# Patient Record
Sex: MALE | Race: WHITE | NOT HISPANIC OR LATINO | Employment: FULL TIME | URBAN - NONMETROPOLITAN AREA
[De-identification: names, ages, dates, MRNs, and addresses within clinical notes are randomized per-mention and may not be internally consistent; named-entity substitution may affect disease eponyms.]

---

## 2017-01-22 ENCOUNTER — APPOINTMENT (OUTPATIENT)
Dept: GENERAL RADIOLOGY | Facility: HOSPITAL | Age: 52
End: 2017-01-22

## 2017-01-22 ENCOUNTER — APPOINTMENT (OUTPATIENT)
Dept: CT IMAGING | Facility: HOSPITAL | Age: 52
End: 2017-01-22

## 2017-01-22 ENCOUNTER — HOSPITAL ENCOUNTER (INPATIENT)
Facility: HOSPITAL | Age: 52
LOS: 2 days | Discharge: HOME | End: 2017-01-24
Attending: HOSPITALIST | Admitting: HOSPITALIST

## 2017-01-22 DIAGNOSIS — E10.10 DIABETIC KETOACIDOSIS WITHOUT COMA ASSOCIATED WITH TYPE 1 DIABETES MELLITUS (HCC): ICD-10-CM

## 2017-01-22 DIAGNOSIS — S16.1XXA CERVICAL STRAIN, ACUTE, INITIAL ENCOUNTER: Primary | ICD-10-CM

## 2017-01-22 DIAGNOSIS — M54.50 ACUTE RIGHT-SIDED LOW BACK PAIN WITHOUT SCIATICA: ICD-10-CM

## 2017-01-22 DIAGNOSIS — F10.10 ALCOHOL ABUSE: ICD-10-CM

## 2017-01-22 PROBLEM — E11.10 DIABETES MELLITUS WITH KETOACIDOSIS, UNCONTROLLED (HCC): Status: ACTIVE | Noted: 2017-01-22

## 2017-01-22 LAB
ALBUMIN SERPL-MCNC: 4 G/DL (ref 3.4–4.8)
ALBUMIN SERPL-MCNC: 4.4 G/DL (ref 3.4–4.8)
ALBUMIN/GLOB SERPL: 1.3 G/DL (ref 1.1–1.8)
ALBUMIN/GLOB SERPL: 1.4 G/DL (ref 1.1–1.8)
ALP SERPL-CCNC: 67 U/L (ref 38–126)
ALP SERPL-CCNC: 74 U/L (ref 38–126)
ALT SERPL W P-5'-P-CCNC: 105 U/L (ref 21–72)
ALT SERPL W P-5'-P-CCNC: 76 U/L (ref 21–72)
AMPHET+METHAMPHET UR QL: NEGATIVE
AMYLASE SERPL-CCNC: 52 U/L (ref 50–130)
ANION GAP SERPL CALCULATED.3IONS-SCNC: 13 MMOL/L (ref 5–15)
ANION GAP SERPL CALCULATED.3IONS-SCNC: 20 MMOL/L (ref 5–15)
ANION GAP SERPL CALCULATED.3IONS-SCNC: 20 MMOL/L (ref 5–15)
ARTERIAL PATENCY WRIST A: ABNORMAL
AST SERPL-CCNC: 192 U/L (ref 17–59)
AST SERPL-CCNC: 42 U/L (ref 17–59)
ATMOSPHERIC PRESS: ABNORMAL MMHG
BACTERIA UR QL AUTO: ABNORMAL /HPF
BARBITURATES UR QL SCN: NEGATIVE
BASE EXCESS BLDA CALC-SCNC: -8.1 MMOL/L (ref -2.4–2.4)
BASOPHILS # BLD AUTO: 0.01 10*3/MM3 (ref 0–0.2)
BASOPHILS NFR BLD AUTO: 0.1 % (ref 0–2)
BDY SITE: ABNORMAL
BENZODIAZ UR QL SCN: NEGATIVE
BILIRUB SERPL-MCNC: 1.3 MG/DL (ref 0.2–1.3)
BILIRUB SERPL-MCNC: 1.9 MG/DL (ref 0.2–1.3)
BILIRUB UR QL STRIP: NEGATIVE
BUN BLD-MCNC: 11 MG/DL (ref 7–21)
BUN BLD-MCNC: 11 MG/DL (ref 7–21)
BUN BLD-MCNC: 14 MG/DL (ref 7–21)
BUN/CREAT SERPL: 13.1 (ref 7–25)
BUN/CREAT SERPL: 13.1 (ref 7–25)
BUN/CREAT SERPL: 21.2 (ref 7–25)
CA-I BLDA-SCNC: 5 MMOL/L (ref 1.15–1.29)
CALCIUM SPEC-SCNC: 9.5 MG/DL (ref 8.4–10.2)
CANNABINOIDS SERPL QL: NEGATIVE
CHLORIDE SERPL-SCNC: 101 MMOL/L (ref 95–110)
CHLORIDE SERPL-SCNC: 99 MMOL/L (ref 95–110)
CHLORIDE SERPL-SCNC: 99 MMOL/L (ref 95–110)
CLARITY UR: CLEAR
CO2 BLDA-SCNC: 17.8 MMOL/L (ref 23–27)
CO2 SERPL-SCNC: 21 MMOL/L (ref 22–31)
CO2 SERPL-SCNC: 21 MMOL/L (ref 22–31)
CO2 SERPL-SCNC: 24 MMOL/L (ref 22–31)
COCAINE UR QL: NEGATIVE
COLOR UR: YELLOW
CREAT BLD-MCNC: 0.66 MG/DL (ref 0.7–1.3)
CREAT BLD-MCNC: 0.84 MG/DL (ref 0.7–1.3)
CREAT BLD-MCNC: 0.84 MG/DL (ref 0.7–1.3)
DEPRECATED RDW RBC AUTO: 40.4 FL (ref 35.1–43.9)
DEPRECATED RDW RBC AUTO: 41.1 FL (ref 35.1–43.9)
EOSINOPHIL # BLD AUTO: 0.02 10*3/MM3 (ref 0–0.7)
EOSINOPHIL NFR BLD AUTO: 0.3 % (ref 0–7)
ERYTHROCYTE [DISTWIDTH] IN BLOOD BY AUTOMATED COUNT: 13.8 % (ref 11.5–14.5)
ERYTHROCYTE [DISTWIDTH] IN BLOOD BY AUTOMATED COUNT: 14.2 % (ref 11.5–14.5)
ETHANOL BLD-MCNC: 175 MG/DL (ref 0–0)
ETHANOL UR QL: 0.17 %
GFR SERPL CREATININE-BSD FRML MDRD: 127 ML/MIN/1.73 (ref 56–130)
GFR SERPL CREATININE-BSD FRML MDRD: 96 ML/MIN/1.73 (ref 60–130)
GFR SERPL CREATININE-BSD FRML MDRD: 96 ML/MIN/1.73 (ref 60–130)
GLOBULIN UR ELPH-MCNC: 3.1 GM/DL (ref 2.3–3.5)
GLOBULIN UR ELPH-MCNC: 3.2 GM/DL (ref 2.3–3.5)
GLUCOSE BLD-MCNC: 244 MG/DL (ref 60–100)
GLUCOSE BLD-MCNC: 438 MG/DL (ref 60–100)
GLUCOSE BLD-MCNC: 438 MG/DL (ref 60–100)
GLUCOSE BLDA-MCNC: 385 MMOL/L
GLUCOSE BLDC GLUCOMTR-MCNC: 121 MG/DL (ref 70–130)
GLUCOSE BLDC GLUCOMTR-MCNC: 165 MG/DL (ref 70–130)
GLUCOSE BLDC GLUCOMTR-MCNC: 224 MG/DL (ref 70–130)
GLUCOSE BLDC GLUCOMTR-MCNC: 269 MG/DL (ref 70–130)
GLUCOSE UR STRIP-MCNC: ABNORMAL MG/DL
HCO3 BLDA-SCNC: 16.7 MMOL/L (ref 22–26)
HCT VFR BLD AUTO: 45.2 % (ref 39–49)
HCT VFR BLD AUTO: 47.7 % (ref 39–49)
HCT VFR BLD CALC: 55 % (ref 40–54)
HGB BLD-MCNC: 16.7 G/DL (ref 13.7–17.3)
HGB BLD-MCNC: 17.9 G/DL (ref 13.7–17.3)
HGB BLDA-MCNC: 18.6 G/DL (ref 14–18)
HGB UR QL STRIP.AUTO: ABNORMAL
HOLD SPECIMEN: NORMAL
HOLD SPECIMEN: NORMAL
HYALINE CASTS UR QL AUTO: ABNORMAL /LPF
IMM GRANULOCYTES # BLD: 0.02 10*3/MM3 (ref 0–0.02)
IMM GRANULOCYTES NFR BLD: 0.3 % (ref 0–0.5)
INR PPP: 0.98 (ref 0.8–1.2)
KETONES UR QL STRIP: ABNORMAL
LEUKOCYTE ESTERASE UR QL STRIP.AUTO: NEGATIVE
LIPASE SERPL-CCNC: 147 U/L (ref 23–300)
LYMPHOCYTES # BLD AUTO: 1.48 10*3/MM3 (ref 0.6–4.2)
LYMPHOCYTES # BLD MANUAL: 0.98 10*3/MM3 (ref 0.6–4.2)
LYMPHOCYTES NFR BLD AUTO: 18.9 % (ref 10–50)
LYMPHOCYTES NFR BLD MANUAL: 11 % (ref 10–50)
LYMPHOCYTES NFR BLD MANUAL: 5 % (ref 0–12)
MCH RBC QN AUTO: 29.6 PG (ref 26.5–34)
MCH RBC QN AUTO: 30.5 PG (ref 26.5–34)
MCHC RBC AUTO-ENTMCNC: 36.9 G/DL (ref 31.5–36.3)
MCHC RBC AUTO-ENTMCNC: 37.5 G/DL (ref 31.5–36.3)
MCV RBC AUTO: 80.1 FL (ref 80–98)
MCV RBC AUTO: 81.3 FL (ref 80–98)
METHADONE UR QL SCN: NEGATIVE
MODALITY: ABNORMAL
MONOCYTES # BLD AUTO: 0.44 10*3/MM3 (ref 0–0.9)
MONOCYTES # BLD AUTO: 0.58 10*3/MM3 (ref 0–0.9)
MONOCYTES NFR BLD AUTO: 7.4 % (ref 0–12)
NEUTROPHILS # BLD AUTO: 5.74 10*3/MM3 (ref 2–8.6)
NEUTROPHILS # BLD AUTO: 7.46 10*3/MM3 (ref 2–8.6)
NEUTROPHILS NFR BLD AUTO: 73 % (ref 37–80)
NEUTROPHILS NFR BLD MANUAL: 84 % (ref 37–80)
NITRITE UR QL STRIP: NEGATIVE
OPIATES UR QL: NEGATIVE
OXYCODONE UR QL SCN: NEGATIVE
PCO2 BLDA: 33.7 MM HG (ref 35–45)
PH BLDA: 7.31 PH UNITS (ref 7.35–7.45)
PH UR STRIP.AUTO: <=5 [PH] (ref 5–9)
PLAT MORPH BLD: NORMAL
PLATELET # BLD AUTO: 162 10*3/MM3 (ref 150–450)
PLATELET # BLD AUTO: 192 10*3/MM3 (ref 150–450)
PMV BLD AUTO: 10.6 FL (ref 8–12)
PMV BLD AUTO: 10.9 FL (ref 8–12)
PO2 BLDA: 82.8 MM HG (ref 80–105)
POTASSIUM BLD-SCNC: 3.8 MMOL/L (ref 3.5–5.1)
POTASSIUM BLD-SCNC: 4.5 MMOL/L (ref 3.5–5.1)
POTASSIUM BLD-SCNC: 4.5 MMOL/L (ref 3.5–5.1)
POTASSIUM BLDA-SCNC: 4.19 MMOL/L (ref 3.6–4.9)
PROT SERPL-MCNC: 7.1 G/DL (ref 6.3–8.6)
PROT SERPL-MCNC: 7.6 G/DL (ref 6.3–8.6)
PROT UR QL STRIP: ABNORMAL
PROTHROMBIN TIME: 13 SECONDS (ref 11.1–15.3)
RBC # BLD AUTO: 5.64 10*6/MM3 (ref 4.37–5.74)
RBC # BLD AUTO: 5.87 10*6/MM3 (ref 4.37–5.74)
RBC # UR: ABNORMAL /HPF
RBC MORPH BLD: NORMAL
REF LAB TEST METHOD: ABNORMAL
SAO2 % BLDCOA: 94.9 %
SODIUM BLD-SCNC: 138 MMOL/L (ref 137–145)
SODIUM BLD-SCNC: 140 MMOL/L (ref 137–145)
SODIUM BLD-SCNC: 140 MMOL/L (ref 137–145)
SODIUM BLDA-SCNC: 139.3 MMOL/L (ref 138–146)
SP GR UR STRIP: 1.02 (ref 1–1.03)
SQUAMOUS #/AREA URNS HPF: ABNORMAL /HPF
UROBILINOGEN UR QL STRIP: ABNORMAL
WBC MORPH BLD: NORMAL
WBC NRBC COR # BLD: 7.85 10*3/MM3 (ref 3.2–9.8)
WBC NRBC COR # BLD: 8.88 10*3/MM3 (ref 3.2–9.8)
WBC UR QL AUTO: ABNORMAL /HPF
WHOLE BLOOD HOLD SPECIMEN: NORMAL
WHOLE BLOOD HOLD SPECIMEN: NORMAL

## 2017-01-22 PROCEDURE — 80307 DRUG TEST PRSMV CHEM ANLYZR: CPT

## 2017-01-22 PROCEDURE — 63710000001 INSULIN REGULAR HUMAN PER 5 UNITS: Performed by: EMERGENCY MEDICINE

## 2017-01-22 PROCEDURE — 82803 BLOOD GASES ANY COMBINATION: CPT | Performed by: EMERGENCY MEDICINE

## 2017-01-22 PROCEDURE — 82150 ASSAY OF AMYLASE: CPT | Performed by: EMERGENCY MEDICINE

## 2017-01-22 PROCEDURE — 85007 BL SMEAR W/DIFF WBC COUNT: CPT | Performed by: EMERGENCY MEDICINE

## 2017-01-22 PROCEDURE — 25010000002 INSULIN REGULAR HUMAN PER 5 UNITS: Performed by: EMERGENCY MEDICINE

## 2017-01-22 PROCEDURE — 36600 WITHDRAWAL OF ARTERIAL BLOOD: CPT | Performed by: EMERGENCY MEDICINE

## 2017-01-22 PROCEDURE — 83690 ASSAY OF LIPASE: CPT | Performed by: EMERGENCY MEDICINE

## 2017-01-22 PROCEDURE — 80053 COMPREHEN METABOLIC PANEL: CPT | Performed by: EMERGENCY MEDICINE

## 2017-01-22 PROCEDURE — 85025 COMPLETE CBC W/AUTO DIFF WBC: CPT | Performed by: EMERGENCY MEDICINE

## 2017-01-22 PROCEDURE — 82962 GLUCOSE BLOOD TEST: CPT

## 2017-01-22 PROCEDURE — 25010000002 ONDANSETRON PER 1 MG: Performed by: EMERGENCY MEDICINE

## 2017-01-22 PROCEDURE — 99285 EMERGENCY DEPT VISIT HI MDM: CPT

## 2017-01-22 PROCEDURE — 80307 DRUG TEST PRSMV CHEM ANLYZR: CPT | Performed by: EMERGENCY MEDICINE

## 2017-01-22 PROCEDURE — 71020 HC CHEST PA AND LATERAL: CPT

## 2017-01-22 PROCEDURE — 85610 PROTHROMBIN TIME: CPT | Performed by: EMERGENCY MEDICINE

## 2017-01-22 PROCEDURE — 72170 X-RAY EXAM OF PELVIS: CPT

## 2017-01-22 PROCEDURE — 25010000002 KETOROLAC TROMETHAMINE PER 15 MG: Performed by: EMERGENCY MEDICINE

## 2017-01-22 PROCEDURE — 81001 URINALYSIS AUTO W/SCOPE: CPT | Performed by: EMERGENCY MEDICINE

## 2017-01-22 PROCEDURE — 87086 URINE CULTURE/COLONY COUNT: CPT | Performed by: EMERGENCY MEDICINE

## 2017-01-22 PROCEDURE — 72125 CT NECK SPINE W/O DYE: CPT

## 2017-01-22 PROCEDURE — 80048 BASIC METABOLIC PNL TOTAL CA: CPT | Performed by: EMERGENCY MEDICINE

## 2017-01-22 RX ORDER — LORAZEPAM 0.5 MG/1
1 TABLET ORAL
Status: DISCONTINUED | OUTPATIENT
Start: 2017-01-22 | End: 2017-01-24 | Stop reason: HOSPADM

## 2017-01-22 RX ORDER — SODIUM CHLORIDE 9 MG/ML
75 INJECTION, SOLUTION INTRAVENOUS CONTINUOUS
Status: DISCONTINUED | OUTPATIENT
Start: 2017-01-22 | End: 2017-01-23

## 2017-01-22 RX ORDER — LORAZEPAM 2 MG/ML
1 INJECTION INTRAMUSCULAR
Status: DISCONTINUED | OUTPATIENT
Start: 2017-01-22 | End: 2017-01-24 | Stop reason: HOSPADM

## 2017-01-22 RX ORDER — KETOROLAC TROMETHAMINE 30 MG/ML
30 INJECTION, SOLUTION INTRAMUSCULAR; INTRAVENOUS ONCE
Status: COMPLETED | OUTPATIENT
Start: 2017-01-22 | End: 2017-01-22

## 2017-01-22 RX ORDER — DEXTROSE MONOHYDRATE 25 G/50ML
25-50 INJECTION, SOLUTION INTRAVENOUS
Status: DISCONTINUED | OUTPATIENT
Start: 2017-01-22 | End: 2017-01-24 | Stop reason: HOSPADM

## 2017-01-22 RX ORDER — LORAZEPAM 2 MG/1
2 TABLET ORAL
Status: DISCONTINUED | OUTPATIENT
Start: 2017-01-22 | End: 2017-01-24 | Stop reason: HOSPADM

## 2017-01-22 RX ORDER — ALPRAZOLAM 1 MG/1
1 TABLET ORAL 2 TIMES DAILY PRN
COMMUNITY
End: 2019-06-21

## 2017-01-22 RX ORDER — LORAZEPAM 2 MG/ML
2 INJECTION INTRAMUSCULAR
Status: DISCONTINUED | OUTPATIENT
Start: 2017-01-22 | End: 2017-01-24 | Stop reason: HOSPADM

## 2017-01-22 RX ORDER — ONDANSETRON 2 MG/ML
4 INJECTION INTRAMUSCULAR; INTRAVENOUS ONCE
Status: COMPLETED | OUTPATIENT
Start: 2017-01-22 | End: 2017-01-22

## 2017-01-22 RX ORDER — SODIUM CHLORIDE 0.9 % (FLUSH) 0.9 %
10 SYRINGE (ML) INJECTION AS NEEDED
Status: DISCONTINUED | OUTPATIENT
Start: 2017-01-22 | End: 2017-01-24 | Stop reason: HOSPADM

## 2017-01-22 RX ORDER — INSULIN GLARGINE 100 [IU]/ML
125 INJECTION, SOLUTION SUBCUTANEOUS 2 TIMES DAILY
COMMUNITY
End: 2017-01-24 | Stop reason: HOSPADM

## 2017-01-22 RX ORDER — HYDROCHLOROTHIAZIDE 12.5 MG/1
5 TABLET ORAL DAILY
Status: ON HOLD | COMMUNITY
End: 2017-01-24

## 2017-01-22 RX ADMIN — ONDANSETRON 4 MG: 2 INJECTION INTRAMUSCULAR; INTRAVENOUS at 13:32

## 2017-01-22 RX ADMIN — KETOROLAC TROMETHAMINE 30 MG: 30 INJECTION, SOLUTION INTRAMUSCULAR; INTRAVENOUS at 12:28

## 2017-01-22 RX ADMIN — SODIUM CHLORIDE 75 ML/HR: 9 INJECTION, SOLUTION INTRAVENOUS at 21:06

## 2017-01-22 RX ADMIN — HUMAN INSULIN 6 UNITS: 100 INJECTION, SOLUTION SUBCUTANEOUS at 13:34

## 2017-01-22 RX ADMIN — SODIUM CHLORIDE 4 UNITS/HR: 9 INJECTION, SOLUTION INTRAVENOUS at 13:38

## 2017-01-22 NOTE — Clinical Note
Level of Care: Critical Care [6]   Diagnosis: Cervical strain, acute, initial encounter [201744]   Admitting Physician: SIMI DEWITT [[id number]]   Attending Physician: KATHY ROGERS [[id number]]   Bed request comments: step down
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
## 2017-01-22 NOTE — IP AVS SNAPSHOT
AFTER VISIT SUMMARY             Herbert White           About your hospitalization     You were admitted on:  January 22, 2017 You last received care in the:  [facility]       Procedures & Surgeries         Medications    If you or your caregiver advised us that you are currently taking a medication and that medication is marked below as “Resume”, this simply indicates that we have reviewed those medications to make sure our new therapy recommendations do not interfere.  If you have concerns about medications other than those new ones which we are prescribing today, please consult the physician who prescribed them (or your primary physician).  Our review of your home medications is not meant to indicate that we are directing their use.             Your Medications      START taking these medications     Blood Glucose Test strip   Use 4 x daily, one touch verio IQ           Empagliflozin 10 MG tablet   Take 1 tablet by mouth Daily With Breakfast.   Commonly known as:  JARDIANCE           gabapentin 300 MG capsule   Take 1 capsule by mouth Every Night.   Commonly known as:  NEURONTIN           insulin aspart 100 UNIT/ML solution pen-injector sc pen   Up to 50 units three times daily   Commonly known as:  NOVOLOG FLEXPEN           insulin aspart 100 UNIT/ML injection   Inject 6-30 Units under the skin 3 (Three) Times a Day With Meals.   Last time this was given:  1/24/2017 12:11 PM   Commonly known as:  novoLOG           Insulin Degludec 200 UNIT/ML solution pen-injector   Inject 120 Units under the skin Every Night.   Commonly known as:  TRESIBA FLEXTOUCH           Insulin Pen Needle 31G X 5 MM misc   Use 4 times daily   Commonly known as:  B-D UF III MINI PEN NEEDLES           metoprolol succinate  MG 24 hr tablet   Take 1 tablet by mouth Daily.   Commonly known as:  TOPROL XL           ONE TOUCH DELICA LANCING DEV misc   delica lancing device if approved, otherwise use  alternative           ONETOUCH DELICA LANCETS 33G misc   1 each 4 (Four) Times a Day. delica if covered, use alternative if not covered           SITagliptin-MetFORMIN HCl -1000 MG tablet sustained-release 24 hour   One tab daily with bkfast   Commonly known as:  JANUMET XR             CHANGE how you take these medications     hydrochlorothiazide 12.5 MG tablet   Take 1 tablet by mouth Daily.   Commonly known as:  HYDRODIURIL   What changed:  how much to take             CONTINUE taking these medications     ALPRAZolam 1 MG tablet   Take 1 mg by mouth 2 (Two) Times a Day As Needed for anxiety.   Commonly known as:  XANAX             STOP taking these medications     insulin glargine 100 UNIT/ML injection   Commonly known as:  LANTUS           metFORMIN 500 MG tablet   Commonly known as:  GLUCOPHAGE                Where to Get Your Medications      These medications were sent to [facility]/pharmacy #[id number] - [place], [place] - [address] [place] [address] [phone]  - [phone] [address] [address]     Phone:  [phone]     Blood Glucose Test strip    Empagliflozin 10 MG tablet    insulin aspart 100 UNIT/ML solution pen-injector sc pen    Insulin Degludec 200 UNIT/ML solution pen-injector    Insulin Pen Needle 31G X 5 MM misc    ONE TOUCH DELICA LANCING DEV misc    ONETOUCH DELICA LANCETS 33G misc    SITagliptin-MetFORMIN HCl -1000 MG tablet sustained-release 24 hour         You can get these medications from any pharmacy     Bring a paper prescription for each of these medications     gabapentin 300 MG capsule    hydrochlorothiazide 12.5 MG tablet    insulin aspart 100 UNIT/ML injection    metoprolol succinate  MG 24 hr tablet                  Your Medications      Your Medication List           Morning Noon Evening Bedtime As Needed    ALPRAZolam 1 MG tablet   Take 1 mg by mouth 2 (Two) Times a Day As Needed for anxiety.   Commonly known as:  XANAX                                    Blood Glucose Test strip   Use 4 x daily, one touch verio IQ                                Empagliflozin 10 MG tablet   Take 1 tablet by mouth Daily With Breakfast.   Commonly known as:  JARDIANCE            1/25/2017                       gabapentin 300 MG capsule   Take 1 capsule by mouth Every Night.   Commonly known as:  NEURONTIN                        1/24/201           hydrochlorothiazide 12.5 MG tablet   Take 1 tablet by mouth Daily.   Commonly known as:  HYDRODIURIL            1/25/2017                       * insulin aspart 100 UNIT/ML solution pen-injector sc pen   Up to 50 units three times daily   Commonly known as:  NOVOLOG FLEXPEN                    1/24/2017 with dinner               * insulin aspart 100 UNIT/ML injection   Inject 6-30 Units under the skin 3 (Three) Times a Day With Meals.   Commonly known as:  novoLOG                    1/24/2017 with dinner               Insulin Degludec 200 UNIT/ML solution pen-injector   Inject 120 Units under the skin Every Night.   Commonly known as:  TRESIBA FLEXTOUCH                        1/24/2017           Insulin Pen Needle 31G X 5 MM misc   Use 4 times daily   Commonly known as:  B-D UF III MINI PEN NEEDLES                                metoprolol succinate  MG 24 hr tablet   Take 1 tablet by mouth Daily.   Commonly known as:  TOPROL XL                        1/24/2017           ONE TOUCH DELICA LANCING DEV misc   delica lancing device if approved, otherwise use alternative                                ONETOUCH DELICA LANCETS 33G misc   1 each 4 (Four) Times a Day. delica if covered, use alternative if not covered                                SITagliptin-MetFORMIN HCl -1000 MG tablet sustained-release 24 hour   One tab daily with bkfast   Commonly known as:  JANUMET XR            1/25/2017                       * Notice:  This list has 2 medication(s) that are the same as other medications prescribed for you. Read the directions  carefully, and ask your doctor or other care provider to review them with you.             Instructions for After Discharge        Activity Instructions     As tolerated           Diet Instructions     Diet: Consistent Carbohydrate; Thin Liquids, No Restrictions       Discharge Diet:  Consistent Carbohydrate   Fluid Consistency:  Thin Liquids, No Restrictions               Diabetic              Discharge References/Attachments     ALCOHOL USE DISORDER (ENGLISH)    DIABETIC KETOACIDOSIS (ENGLISH)       Follow-ups for After Discharge        Follow-up Information     Follow up with Artemio Sanchez MD. Go on 1/31/2017.    Specialties:  Family Medicine, Emergency Medicine    Why:  9AM    Contact information:    [address] [facility] [address]  [phone]          Follow up with Alessandro Ferreira MD. Go on 1/31/2017.    Specialty:  Endocrinology    Why:  10:45am    Contact information:    [address]  [address] [place] [id number] [phone]        Referrals and Follow-ups to Schedule     Additional Follow-Up    As directed    Dr Holden 1 week       Follow-Up    As directed    Dr Sanchez in 1 week             Scheduled Appointments     Jan 31, 2017  9:00 AM CST   New Patient with Artemio Sanchez MD   [facility] FAMILY MEDICINE (--)    [address] [facility] [id number] [phone]           Bring all previous medical records and films, along with current medications and insurance information.            Jan 31, 2017 10:45 AM CST   Same Day with Alessandro Ferreira MD   [facility] ENDOCRINOLOGY (--)    [address]  [address] [place] [id number] [phone]              MyChart Signup     Our records indicate that you have declined [facility] MyChart signup. If you would like to sign up for [employer], please email [email or web address] or call [phone] to obtain an activation code.          Summary of Your Hospitalization        Reason for Hospitalization     Your primary diagnosis was:  Not on File    Your diagnoses also included:  Acute Strain Of Neck Muscle, Uncontrolled Diabetes With Ketoacidosis, High Blood Pressure, Diabetes With Neurologic Complications      Care Providers     Provider Service Role Specialty    Alessandro Ferreira MD -- Consulting Physician  Endocrinology     Javon Vela MD Medicine Consulting Physician  Hospitalist       Your Allergies  Date Reviewed: 1/22/2017    Allergen Reactions    Ace Inhibitors Anaphylaxis         Wellbutrin (Bupropion) Anaphylaxis      Patient Belongings Returned     Document Return of Belongings Flowsheet     Were the patient bedside belongings sent home?   Yes   Belongings Retrieved from Security & Sent Home   N/A    Belongings Sent to Safe   --   Medications Retrieved from Pharmacy & Sent Home   N/A              More Information      Alcohol Use Disorder  Alcohol use disorder is a mental disorder. It is not a one-time incident of heavy drinking. Alcohol use disorder is the excessive and uncontrollable use of alcohol over time that leads to problems with functioning in one or more areas of daily living. People with this disorder risk harming themselves and others when they drink to excess. Alcohol use disorder also can cause other mental disorders, such as mood and anxiety disorders, and serious physical problems. People with alcohol use disorder often misuse other drugs.   Alcohol use disorder is common and widespread. Some people with this disorder drink alcohol to cope with or escape from negative life events. Others drink to relieve chronic pain or symptoms of mental illness. People with a family history of alcohol use disorder are at higher risk of losing control and using alcohol to excess.   Drinking too much alcohol can cause injury, accidents, and health problems. One drink can be too much when you are:  · Working.  · Pregnant or  breastfeeding.  · Taking medicines. Ask your doctor.  · Driving or planning to drive.  SYMPTOMS   Signs and symptoms of alcohol use disorder may include the following:   · Consumption of alcohol in larger amounts or over a longer period of time than intended.  · Multiple unsuccessful attempts to cut down or control alcohol use.    · A great deal of time spent obtaining alcohol, using alcohol, or recovering from the effects of alcohol (hangover).  · A strong desire or urge to use alcohol (cravings).    · Continued use of alcohol despite problems at work, school, or home because of alcohol use.    · Continued use of alcohol despite problems in relationships because of alcohol use.  · Continued use of alcohol in situations when it is physically hazardous, such as driving a car.  · Continued use of alcohol despite awareness of a physical or psychological problem that is likely related to alcohol use. Physical problems related to alcohol use can involve the brain, heart, liver, stomach, and intestines. Psychological problems related to alcohol use include intoxication, depression, anxiety, psychosis, delirium, and dementia.    · The need for increased amounts of alcohol to achieve the same desired effect, or a decreased effect from the consumption of the same amount of alcohol (tolerance).  · Withdrawal symptoms upon reducing or stopping alcohol use, or alcohol use to reduce or avoid withdrawal symptoms. Withdrawal symptoms include:  ¨ Racing heart.  ¨ Hand tremor.  ¨ Difficulty sleeping.  ¨ Nausea.  ¨ Vomiting.  ¨ Hallucinations.  ¨ Restlessness.  ¨ Seizures.  DIAGNOSIS  Alcohol use disorder is diagnosed through an assessment by your health care provider. Your health care provider may start by asking three or four questions to screen for excessive or problematic alcohol use. To confirm a diagnosis of alcohol use disorder, at least two symptoms must be present within a 12-month period. The severity of alcohol use  disorder depends on the number of symptoms:  · Mild--two or three.  · Moderate--four or five.  · Severe--six or more.  Your health care provider may perform a physical exam or use results from lab tests to see if you have physical problems resulting from alcohol use. Your health care provider may refer you to a mental health professional for evaluation.  TREATMENT   Some people with alcohol use disorder are able to reduce their alcohol use to low-risk levels. Some people with alcohol use disorder need to quit drinking alcohol. When necessary, mental health professionals with specialized training in substance use treatment can help. Your health care provider can help you decide how severe your alcohol use disorder is and what type of treatment you need. The following forms of treatment are available:   · Detoxification. Detoxification involves the use of prescription medicines to prevent alcohol withdrawal symptoms in the first week after quitting. This is important for people with a history of symptoms of withdrawal and for heavy drinkers who are likely to have withdrawal symptoms. Alcohol withdrawal can be dangerous and, in severe cases, cause death. Detoxification is usually provided in a hospital or in-patient substance use treatment facility.  · Counseling or talk therapy. Talk therapy is provided by substance use treatment counselors. It addresses the reasons people use alcohol and ways to keep them from drinking again. The goals of talk therapy are to help people with alcohol use disorder find healthy activities and ways to cope with life stress, to identify and avoid triggers for alcohol use, and to handle cravings, which can cause relapse.  · Medicines. Different medicines can help treat alcohol use disorder through the following actions:    Decrease alcohol cravings.    Decrease the positive reward response felt from alcohol use.    Produce an uncomfortable physical reaction when alcohol is used (aversion  therapy).  · Support groups. Support groups are run by people who have quit drinking. They provide emotional support, advice, and guidance.  These forms of treatment are often combined. Some people with alcohol use disorder benefit from intensive combination treatment provided by specialized substance use treatment centers. Both inpatient and outpatient treatment programs are available.     This information is not intended to replace advice given to you by your health care provider. Make sure you discuss any questions you have with your health care provider.     Document Released: 01/25/2006 Document Revised: 01/08/2016 Document Reviewed: 03/27/2014  [employer] Interactive Patient Education ©2016 [employer]vier Inc.          Diabetic Ketoacidosis  Diabetic ketoacidosis is a life-threatening complication of diabetes. If it is not treated, it can cause severe dehydration and organ damage and can lead to a coma or death.  CAUSES  This condition develops when there is not enough of the hormone insulin in the body. Insulin helps the body to break down sugar for energy. Without insulin, the body cannot break down sugar, so it breaks down fats instead. This leads to the production of acids that are called ketones. Ketones are poisonous at high levels.  This condition can be triggered by:  · Stress on the body that is brought on by an illness.  · Medicines that raise blood glucose levels.  · Not taking diabetes medicine.  SYMPTOMS  Symptoms of this condition include:  · Fatigue.  · Weight loss.  · Excessive thirst.  · Light-headedness.  · Fruity or sweet-smelling breath.  · Excessive urination.  · Vision changes.  · Confusion or irritability.  · Nausea.  · Vomiting.  · Rapid breathing.  · Abdominal pain.  · Feeling flushed.  DIAGNOSIS  This condition is diagnosed based on a medical history, a physical exam, and blood tests. You may also have a urine test that checks for ketones.  TREATMENT  This condition may be treated  "with:  · Fluid replacement. This may be done to correct dehydration.  · Insulin injections. These may be given through the skin or through an IV tube.  · Electrolyte replacement. Electrolytes, such as potassium and sodium, may be given in pill form or through an IV tube.  · Antibiotic medicines. These may be prescribed if your condition was caused by an infection.  HOME CARE INSTRUCTIONS  Eating and Drinking  · Drink enough fluids to keep your urine clear or pale yellow.  · If you cannot eat, alternate between drinking fluids with sugar (such as juice) and salty fluids (such as broth or bouillon).  · If you can eat, follow your usual diet and drink sugar-free liquids, such as water.  Other Instructions  · Take insulin as directed by your health care provider. Do not skip insulin injections. Do not use  insulin.  · If your blood sugar is over 240 mg/dL, monitor your urine ketones every 4-6 hours.  · If you were prescribed an antibiotic medicine, finish all of it even if you start to feel better.  · Rest and exercise only as directed by your health care provider.  · If you get sick, call your health care provider and begin treatment quickly. Your body often needs extra insulin to fight an illness.  · Check your blood glucose levels regularly. If your blood glucose is high, drink plenty of fluids. This helps to flush out ketones.  SEEK MEDICAL CARE IF:  · Your blood glucose level is too high or too low.  · You have ketones in your urine.  · You have a fever.  · You cannot eat.  · You cannot tolerate fluids.  · You have been vomiting for more than 2 hours.  · You continue to have symptoms of this condition.  · You develop new symptoms.  SEEK IMMEDIATE MEDICAL CARE IF:  · Your blood glucose levels continue to be high (elevated).  · Your monitor reads \"high\" even when you are taking insulin.  · You faint.  · You have chest pain.  · You have trouble breathing.  · You have a sudden, severe headache.  · You have " sudden weakness in one arm or one leg.  · You have sudden trouble speaking or swallowing.  · You have vomiting or diarrhea that gets worse after 3 hours.  · You feel severely fatigued.  · You have trouble thinking.  · You have abdominal pain.  · You are severely dehydrated. Symptoms of severe dehydration include:    Extreme thirst.    Dry mouth.    Blue lips.    Cold hands and feet.    Rapid breathing.     This information is not intended to replace advice given to you by your health care provider. Make sure you discuss any questions you have with your health care provider.     Document Released: 12/15/2001 Document Revised: 05/03/2016 Document Reviewed: 11/25/2015  [employer] Interactive Patient Education ©2016 Elsevier Inc.            SYMPTOMS OF A STROKE    Call 911 or have someone take you to the Emergency Department if you have any of the following:    · Sudden numbness or weakness of your face, arm or leg especially on one side of the body  · Sudden confusion, diffiiculty speaking or trouble understanding   · Changes in your vision or loss of sight in one eye  · Sudden severe headache with no known cause  · sudden dizziness, trouble walking, loss of balance or coordination    It is important to seek emergency care right away if you have further stroke symptoms. If you get emergency help quickly, the powerful clot-dissolving medicines can reduce the disabilities caused by a stroke.     For more information:    American Stroke Association  9-429-4-STROKE  www.strokeassociation.org           IF YOU SMOKE OR USE TOBACCO PLEASE READ THE FOLLOWING:    Why is smoking bad for me?  Smoking increases the risk of heart disease, lung disease, vascular disease, stroke, and cancer.     If you smoke, STOP!    If you would like more information on quitting smoking, please visit the [employer] website: [email or web address]   This link will provide additional resources including the  QUIT line and the Beat the Pack support groups.     For more information:    American Cancer Society  ([phone] 349-6841    American Heart Association  1-338.609.8075               YOU ARE THE MOST IMPORTANT FACTOR IN YOUR RECOVERY.     Follow all instructions carefully.     I have reviewed my discharge instructions with my nurse, including the following information, if applicable:     Information about my illness and diagnosis   Follow up appointments (including lab draws)   Wound Care   Equipment Needs   Medications (new and continuing) along with side effects   Preventative information such as vaccines and smoking cessations   Diet   Pain   I know when to contact my Doctor's office or seek emergency care      I want my nurse to describe the side effects of my medications: YES NO   If the answer is no, I understand the side effects of my medications: YES NO   My nurse described the side effects of my medications in a way that I could understand: YES NO   I have taken my personal belongings and my own medications with me at discharge: YES NO            I have received this information and my questions have been answered. I have discussed any concerns I see with this plan with the nurse or physician. I understand these instructions.    Signature of Patient or Responsible Person: _____________________________________    Date: _________________  Time: __________________    Signature of Healthcare Provider: _______________________________________  Date: _________________  Time: __________________

## 2017-01-23 PROBLEM — I10 ESSENTIAL HYPERTENSION: Status: ACTIVE | Noted: 2017-01-23

## 2017-01-23 LAB
ALBUMIN SERPL-MCNC: 4 G/DL (ref 3.4–4.8)
ALBUMIN/GLOB SERPL: 1.4 G/DL (ref 1.1–1.8)
ALP SERPL-CCNC: 66 U/L (ref 38–126)
ALT SERPL W P-5'-P-CCNC: 71 U/L (ref 21–72)
ANION GAP SERPL CALCULATED.3IONS-SCNC: 13 MMOL/L (ref 5–15)
AST SERPL-CCNC: 32 U/L (ref 17–59)
BACTERIA SPEC AEROBE CULT: NORMAL
BASOPHILS # BLD AUTO: 0.01 10*3/MM3 (ref 0–0.2)
BASOPHILS NFR BLD AUTO: 0.1 % (ref 0–2)
BILIRUB SERPL-MCNC: 1.7 MG/DL (ref 0.2–1.3)
BUN BLD-MCNC: 16 MG/DL (ref 7–21)
BUN/CREAT SERPL: 24.6 (ref 7–25)
CALCIUM SPEC-SCNC: 9.5 MG/DL (ref 8.4–10.2)
CHLORIDE SERPL-SCNC: 102 MMOL/L (ref 95–110)
CO2 SERPL-SCNC: 25 MMOL/L (ref 22–31)
CREAT BLD-MCNC: 0.65 MG/DL (ref 0.7–1.3)
DEPRECATED RDW RBC AUTO: 40.8 FL (ref 35.1–43.9)
EOSINOPHIL # BLD AUTO: 0.08 10*3/MM3 (ref 0–0.7)
EOSINOPHIL NFR BLD AUTO: 0.8 % (ref 0–7)
ERYTHROCYTE [DISTWIDTH] IN BLOOD BY AUTOMATED COUNT: 14 % (ref 11.5–14.5)
GFR SERPL CREATININE-BSD FRML MDRD: 129 ML/MIN/1.73 (ref 56–130)
GLOBULIN UR ELPH-MCNC: 2.8 GM/DL (ref 2.3–3.5)
GLUCOSE BLD-MCNC: 70 MG/DL (ref 60–100)
GLUCOSE BLDC GLUCOMTR-MCNC: 109 MG/DL (ref 70–130)
GLUCOSE BLDC GLUCOMTR-MCNC: 164 MG/DL (ref 70–130)
GLUCOSE BLDC GLUCOMTR-MCNC: 217 MG/DL (ref 70–130)
GLUCOSE BLDC GLUCOMTR-MCNC: 225 MG/DL (ref 70–130)
GLUCOSE BLDC GLUCOMTR-MCNC: 244 MG/DL (ref 70–130)
GLUCOSE BLDC GLUCOMTR-MCNC: 274 MG/DL (ref 70–130)
GLUCOSE BLDC GLUCOMTR-MCNC: 278 MG/DL (ref 70–130)
GLUCOSE BLDC GLUCOMTR-MCNC: 389 MG/DL (ref 70–130)
GLUCOSE BLDC GLUCOMTR-MCNC: 77 MG/DL (ref 70–130)
GLUCOSE BLDC GLUCOMTR-MCNC: 85 MG/DL (ref 70–130)
HBA1C MFR BLD: 12.81 % (ref 4–5.6)
HCT VFR BLD AUTO: 46.1 % (ref 39–49)
HGB BLD-MCNC: 17.1 G/DL (ref 13.7–17.3)
HOLD SPECIMEN: NORMAL
HOLD SPECIMEN: NORMAL
IMM GRANULOCYTES # BLD: 0.02 10*3/MM3 (ref 0–0.02)
IMM GRANULOCYTES NFR BLD: 0.2 % (ref 0–0.5)
LYMPHOCYTES # BLD AUTO: 2.89 10*3/MM3 (ref 0.6–4.2)
LYMPHOCYTES NFR BLD AUTO: 29.1 % (ref 10–50)
MCH RBC QN AUTO: 30.1 PG (ref 26.5–34)
MCHC RBC AUTO-ENTMCNC: 37.1 G/DL (ref 31.5–36.3)
MCV RBC AUTO: 81.2 FL (ref 80–98)
MONOCYTES # BLD AUTO: 0.97 10*3/MM3 (ref 0–0.9)
MONOCYTES NFR BLD AUTO: 9.8 % (ref 0–12)
NEUTROPHILS # BLD AUTO: 5.95 10*3/MM3 (ref 2–8.6)
NEUTROPHILS NFR BLD AUTO: 60 % (ref 37–80)
PLATELET # BLD AUTO: 201 10*3/MM3 (ref 150–450)
PMV BLD AUTO: 10.8 FL (ref 8–12)
POTASSIUM BLD-SCNC: 3.1 MMOL/L (ref 3.5–5.1)
PROT SERPL-MCNC: 6.8 G/DL (ref 6.3–8.6)
RBC # BLD AUTO: 5.68 10*6/MM3 (ref 4.37–5.74)
SODIUM BLD-SCNC: 140 MMOL/L (ref 137–145)
WBC NRBC COR # BLD: 9.92 10*3/MM3 (ref 3.2–9.8)
WHOLE BLOOD HOLD SPECIMEN: NORMAL
WHOLE BLOOD HOLD SPECIMEN: NORMAL

## 2017-01-23 PROCEDURE — 83036 HEMOGLOBIN GLYCOSYLATED A1C: CPT | Performed by: INTERNAL MEDICINE

## 2017-01-23 PROCEDURE — 99254 IP/OBS CNSLTJ NEW/EST MOD 60: CPT | Performed by: INTERNAL MEDICINE

## 2017-01-23 PROCEDURE — 80053 COMPREHEN METABOLIC PANEL: CPT | Performed by: INTERNAL MEDICINE

## 2017-01-23 PROCEDURE — 80074 ACUTE HEPATITIS PANEL: CPT | Performed by: INTERNAL MEDICINE

## 2017-01-23 PROCEDURE — 82962 GLUCOSE BLOOD TEST: CPT

## 2017-01-23 PROCEDURE — 63710000001 INSULIN ASPART PER 5 UNITS: Performed by: INTERNAL MEDICINE

## 2017-01-23 PROCEDURE — 85025 COMPLETE CBC W/AUTO DIFF WBC: CPT | Performed by: INTERNAL MEDICINE

## 2017-01-23 PROCEDURE — 25010000002 MAGNESIUM SULFATE PER 500 MG OF MAGNESIUM: Performed by: INTERNAL MEDICINE

## 2017-01-23 PROCEDURE — 25010000002 THIAMINE PER 100 MG: Performed by: INTERNAL MEDICINE

## 2017-01-23 PROCEDURE — 63710000001 INSULIN DETEMIR PER 5 UNITS: Performed by: INTERNAL MEDICINE

## 2017-01-23 PROCEDURE — 25810000003 DEXTROSE-NACL PER 500 ML: Performed by: HOSPITALIST

## 2017-01-23 PROCEDURE — 63710000001 INSULIN ASPART PER 5 UNITS: Performed by: HOSPITALIST

## 2017-01-23 PROCEDURE — 25010000002 HYDRALAZINE PER 20 MG: Performed by: HOSPITALIST

## 2017-01-23 RX ORDER — DEXTROSE AND SODIUM CHLORIDE 5; .9 G/100ML; G/100ML
75 INJECTION, SOLUTION INTRAVENOUS CONTINUOUS
Status: DISCONTINUED | OUTPATIENT
Start: 2017-01-23 | End: 2017-01-23

## 2017-01-23 RX ORDER — NICOTINE POLACRILEX 4 MG
15 LOZENGE BUCCAL
Status: DISCONTINUED | OUTPATIENT
Start: 2017-01-23 | End: 2017-01-23

## 2017-01-23 RX ORDER — SODIUM CHLORIDE 9 MG/ML
75 INJECTION, SOLUTION INTRAVENOUS CONTINUOUS
Status: DISCONTINUED | OUTPATIENT
Start: 2017-01-23 | End: 2017-01-24 | Stop reason: HOSPADM

## 2017-01-23 RX ORDER — NICOTINE POLACRILEX 4 MG
15 LOZENGE BUCCAL
Status: DISCONTINUED | OUTPATIENT
Start: 2017-01-23 | End: 2017-01-24 | Stop reason: HOSPADM

## 2017-01-23 RX ORDER — HYDRALAZINE HYDROCHLORIDE 20 MG/ML
10 INJECTION INTRAMUSCULAR; INTRAVENOUS ONCE
Status: COMPLETED | OUTPATIENT
Start: 2017-01-23 | End: 2017-01-23

## 2017-01-23 RX ORDER — ACETAMINOPHEN 325 MG/1
650 TABLET ORAL EVERY 6 HOURS PRN
Status: DISCONTINUED | OUTPATIENT
Start: 2017-01-23 | End: 2017-01-24 | Stop reason: HOSPADM

## 2017-01-23 RX ORDER — DEXTROSE MONOHYDRATE 25 G/50ML
25 INJECTION, SOLUTION INTRAVENOUS
Status: DISCONTINUED | OUTPATIENT
Start: 2017-01-23 | End: 2017-01-24 | Stop reason: HOSPADM

## 2017-01-23 RX ORDER — DEXTROSE MONOHYDRATE 25 G/50ML
25 INJECTION, SOLUTION INTRAVENOUS
Status: DISCONTINUED | OUTPATIENT
Start: 2017-01-23 | End: 2017-01-23

## 2017-01-23 RX ORDER — HYDRALAZINE HYDROCHLORIDE 20 MG/ML
10 INJECTION INTRAMUSCULAR; INTRAVENOUS EVERY 6 HOURS PRN
Status: DISCONTINUED | OUTPATIENT
Start: 2017-01-23 | End: 2017-01-24 | Stop reason: HOSPADM

## 2017-01-23 RX ADMIN — INSULIN DETEMIR 100 UNITS: 100 INJECTION, SOLUTION SUBCUTANEOUS at 08:55

## 2017-01-23 RX ADMIN — ACETAMINOPHEN 650 MG: 325 TABLET ORAL at 20:36

## 2017-01-23 RX ADMIN — SODIUM CHLORIDE 75 ML/HR: 9 INJECTION, SOLUTION INTRAVENOUS at 03:18

## 2017-01-23 RX ADMIN — INSULIN ASPART 5 UNITS: 100 INJECTION, SOLUTION INTRAVENOUS; SUBCUTANEOUS at 17:52

## 2017-01-23 RX ADMIN — INSULIN ASPART 5 UNITS: 100 INJECTION, SOLUTION INTRAVENOUS; SUBCUTANEOUS at 08:59

## 2017-01-23 RX ADMIN — FOLIC ACID 100 ML/HR: 5 INJECTION, SOLUTION INTRAMUSCULAR; INTRAVENOUS; SUBCUTANEOUS at 08:51

## 2017-01-23 RX ADMIN — DEXTROSE AND SODIUM CHLORIDE 75 ML/HR: 5; 900 INJECTION, SOLUTION INTRAVENOUS at 00:23

## 2017-01-23 RX ADMIN — HYDRALAZINE HYDROCHLORIDE 10 MG: 20 INJECTION INTRAMUSCULAR; INTRAVENOUS at 20:36

## 2017-01-23 RX ADMIN — INSULIN DETEMIR 100 UNITS: 100 INJECTION, SOLUTION SUBCUTANEOUS at 00:43

## 2017-01-23 RX ADMIN — HYDRALAZINE HYDROCHLORIDE 10 MG: 20 INJECTION INTRAMUSCULAR; INTRAVENOUS at 04:13

## 2017-01-23 RX ADMIN — HYDRALAZINE HYDROCHLORIDE 10 MG: 20 INJECTION INTRAMUSCULAR; INTRAVENOUS at 06:44

## 2017-01-23 RX ADMIN — INSULIN ASPART 24 UNITS: 100 INJECTION, SOLUTION INTRAVENOUS; SUBCUTANEOUS at 17:53

## 2017-01-23 RX ADMIN — ACETAMINOPHEN 650 MG: 325 TABLET ORAL at 04:13
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
## 2017-01-23 NOTE — CONSULTS
"CONSULT NOTE     Herbert White is a 51 y.o. male who I am being consulted for  evaluation of   Problem   Diabetes Mellitus With Ketoacidosis, Uncontrolled   Diabetic Polyneuropathy Associated With Type 2 Diabetes Mellitus         Consulting  Provider, Dr. Javon Vela      Duration approx 5 years      Timing - Diabetes is Constant    Quality -  poorly controlled    Severity -  severe    Complications - presente with DKA this admission. He also has diabetic neuropathy       Current symptoms/problems  increase appetite, paresthesia of the feet, polydipsia, polyuria and visual disturbances     Alleviating Factors: understanding of diabetes regimen    Side Effects  none    Current diet  in general, an \"unhealthy\" diet    Current exercise walking    Current monitoring regimen: home blood tests - rarely checking  Home blood sugar records: when he checks bg in the 400 range     Hypoglycemia none    Past Medical History   Diagnosis Date   • Diabetes mellitus    • Hypertension      Family History   Problem Relation Age of Onset   • Diabetes Mother    • Hypertension Mother    • Cancer Mother    • Diabetes Father    • Hypertension Father    • Cancer Father      Social History   Substance Use Topics   • Smoking status: Current Every Day Smoker     Packs/day: 0.50     Years: 25.00     Types: Cigarettes   • Smokeless tobacco: None   • Alcohol use 15.0 oz/week     25 Cans of beer per week         Current Facility-Administered Medications:   •  acetaminophen (TYLENOL) tablet 650 mg, 650 mg, Oral, Q6H PRN, Hayden Lucia MD, 650 mg at 01/23/17 2036  •  dextrose (D50W) solution 25 g, 25 g, Intravenous, Q15 Min PRN, Alessandro Ferreira MD  •  dextrose (D50W) solution 25-50 mL, 25-50 mL, Intravenous, Q30 Min PRN, Cristian Kim MD  •  dextrose (GLUTOSE) oral gel 15 g, 15 g, Oral, Q15 Min PRN, Alessandro Ferreira MD  •  glucagon (human recombinant) (GLUCAGEN DIAGNOSTIC) injection 1 mg, 1 mg, Subcutaneous, Q15 Min PRN, " Alessandro Ferreira MD  •  hydrALAZINE (APRESOLINE) injection 10 mg, 10 mg, Intravenous, Q6H PRN, Hayden Lucia MD, 10 mg at 01/24/17 0827  •  influenza vac split quad (FLUZONE QUADRIVALENT) IM suspension 0.5 mL, 0.5 mL, Intramuscular, During Hospitalization, Hayden Lucia MD  •  insulin aspart (novoLOG) injection 0-14 Units, 0-14 Units, Subcutaneous, 4x Daily AC & at Bedtime, Hayden Lucia MD, 5 Units at 01/24/17 1211  •  insulin aspart (novoLOG) injection 0-14 Units, 0-14 Units, Subcutaneous, PRN, Alessandro Ferreira MD, 0 Units at 01/24/17 0828  •  insulin aspart (novoLOG) injection 6-30 Units, 6-30 Units, Subcutaneous, TID With Meals, Alessandro Ferreira MD, 18 Units at 01/24/17 1211  •  insulin detemir (LEVEMIR) injection 60 Units, 60 Units, Subcutaneous, Q12H, Alessandro Ferreira MD, 60 Units at 01/24/17 0823  •  LORazepam (ATIVAN) tablet 1 mg, 1 mg, Oral, Q2H PRN **OR** LORazepam (ATIVAN) injection 1 mg, 1 mg, Intravenous, Q2H PRN **OR** LORazepam (ATIVAN) tablet 2 mg, 2 mg, Oral, Q1H PRN **OR** LORazepam (ATIVAN) injection 2 mg, 2 mg, Intravenous, Q1H PRN **OR** LORazepam (ATIVAN) injection 2 mg, 2 mg, Intravenous, Q15 Min PRN **OR** LORazepam (ATIVAN) injection 1 mg, 1 mg, Intramuscular, Q15 Min PRN, Adams Elizabeth MD  •  multiple vitamin (M.V.I. Adult) 10 mL, thiamine (B-1) 100 mg, folic acid 1 mg, magnesium sulfate 2 g in sodium chloride 0.9 % 1,000 mL infusion, 100 mL/hr, Intravenous, Daily, Adams Elizabeth MD, Last Rate: 100 mL/hr at 01/24/17 1027, 100 mL/hr at 01/24/17 1027  •  Insert peripheral IV, , , Once **AND** sodium chloride 0.9 % flush 10 mL, 10 mL, Intravenous, PRN, Cristian Kim MD  •  sodium chloride 0.9 % infusion, 75 mL/hr, Intravenous, Continuous, Hayden Lucia MD, Last Rate: 75 mL/hr at 01/24/17 0150, 75 mL/hr at 01/24/17 0150    Review of Systems    Review of Systems   Constitutional: Positive for fatigue and unexpected weight change.  "Negative for activity change, appetite change, chills, diaphoresis and fever.   HENT: Negative for congestion, dental problem, ear discharge, ear pain, facial swelling, mouth sores, postnasal drip, rhinorrhea, sinus pressure, sore throat, tinnitus, trouble swallowing and voice change.    Eyes: Negative for photophobia, pain, discharge, redness, itching and visual disturbance.   Respiratory: Negative for apnea, cough, choking, chest tightness, shortness of breath, wheezing and stridor.    Cardiovascular: Negative for chest pain, palpitations and leg swelling.   Gastrointestinal: Negative for abdominal distention, abdominal pain, constipation, diarrhea, nausea and vomiting.   Endocrine: Positive for polydipsia, polyphagia and polyuria. Negative for cold intolerance and heat intolerance.   Genitourinary: Negative for decreased urine volume, difficulty urinating, dysuria, flank pain, frequency, hematuria and urgency.   Musculoskeletal: Negative for arthralgias, back pain, gait problem, joint swelling, myalgias, neck pain and neck stiffness.   Skin: Negative for color change, pallor, rash and wound.   Allergic/Immunologic: Negative for immunocompromised state.   Neurological: Positive for numbness. Negative for dizziness, tremors, seizures, syncope, facial asymmetry, speech difficulty, weakness, light-headedness and headaches.   Hematological: Negative for adenopathy.   Psychiatric/Behavioral: Negative for agitation, behavioral problems, confusion, decreased concentration, dysphoric mood, hallucinations, self-injury, sleep disturbance and suicidal ideas. The patient is not nervous/anxious and is not hyperactive.         Objective:     Visit Vitals   • /78 (BP Location: Left arm, Patient Position: Lying)   • Pulse 75   • Temp 98.5 °F (36.9 °C) (Oral)   • Resp 18   • Ht 74\" (188 cm)   • Wt 291 lb 14.2 oz (132 kg)   • SpO2 98%   • BMI 37.48 kg/m2       Physical Exam   Constitutional: He is oriented to person, place, " and time. He appears well-developed and well-nourished. He is cooperative.   HENT:   Head: Normocephalic and atraumatic.   Right Ear: External ear normal.   Left Ear: External ear normal.   Nose: Nose normal.   Mouth/Throat: Oropharynx is clear and moist. No oropharyngeal exudate.   Eyes: Conjunctivae and EOM are normal. Pupils are equal, round, and reactive to light. No scleral icterus. Right eye exhibits normal extraocular motion. Left eye exhibits normal extraocular motion.   Neck: Neck supple. No JVD present. No muscular tenderness present. No tracheal deviation, no edema and no erythema present. No thyromegaly present.   Cardiovascular: Normal rate, regular rhythm, normal heart sounds and intact distal pulses.  Exam reveals no gallop and no friction rub.    No murmur heard.  Pulmonary/Chest: Effort normal and breath sounds normal. No stridor. No respiratory distress. He has no decreased breath sounds. He has no wheezes. He has no rhonchi. He has no rales. He exhibits no tenderness.   Abdominal: Soft. Bowel sounds are normal. He exhibits no distension and no mass. There is no hepatomegaly. There is no tenderness. There is no rebound and no guarding. No hernia.   Musculoskeletal: Normal range of motion. He exhibits no edema, tenderness or deformity.   Lymphadenopathy:     He has no cervical adenopathy.   Neurological: He is alert and oriented to person, place, and time. He has normal reflexes. No cranial nerve deficit. He exhibits normal muscle tone. Coordination normal.   Skin: Skin is warm. No rash noted. No erythema. No pallor.   Psychiatric: He has a normal mood and affect. His behavior is normal. Judgment and thought content normal.   Nursing note and vitals reviewed.      Lab Review    Labs reviewed, detailed below    Assessment/Plan     Problem   Diabetes Mellitus With Ketoacidosis, Uncontrolled   Diabetic Polyneuropathy Associated With Type 2 Diabetes Mellitus       Diabetes     Glycemic Management:   Lab  Results   Component Value Date    HGBA1C 12.81 (H) 01/23/2017     Lab Results   Component Value Date    GLUCOSE 70 01/23/2017    BUN 16 01/23/2017    CREATININE 0.65 (L) 01/23/2017    EGFRIFNONA 129 01/23/2017    BCR 24.6 01/23/2017    CO2 25.0 01/23/2017    CALCIUM 9.5 01/23/2017    ALBUMIN 4.00 01/23/2017    LABIL2 1.4 01/23/2017    AST 32 01/23/2017    ALT 71 01/23/2017     Lab Results   Component Value Date    WBC 9.92 (H) 01/23/2017    HGB 17.1 01/23/2017    HCT 46.1 01/23/2017    MCV 81.2 01/23/2017     01/23/2017     Presently on levemir 100 bid and only sliding scale    Plan    levemir 60 bid inpatient but outpatient I will do tresiba 120 units daily    Novolog or Humalog 6 units per carb and 3 per 50    Start Janumet 100/1000 mg XR every bkfast upon discharge    Start Jardiance 10 mg before bkfast every day upon discharge         Lipid Management  No results found for: CHOL  No results found for: TRIG  No results found for: HDL  No results found for: LDLCALC  No results found for: LDL    Lipids pending       Blood Pressure Management:    Vitals:    01/24/17 1115   BP: 150/78   Pulse: 75   Resp: 18   Temp: 98.5 °F (36.9 °C)   SpO2: 98%     Lab Results   Component Value Date    GLUCOSE 70 01/23/2017    CALCIUM 9.5 01/23/2017     01/23/2017    K 3.1 (L) 01/23/2017    CO2 25.0 01/23/2017     01/23/2017    BUN 16 01/23/2017    CREATININE 0.65 (L) 01/23/2017    EGFRIFNONA 129 01/23/2017    BCR 24.6 01/23/2017    ANIONGAP 13.0 01/23/2017     Lab Results   Component Value Date    GLUCOSE 70 01/23/2017    BUN 16 01/23/2017    CREATININE 0.65 (L) 01/23/2017    EGFRIFNONA 129 01/23/2017    BCR 24.6 01/23/2017    CO2 25.0 01/23/2017    CALCIUM 9.5 01/23/2017    ALBUMIN 4.00 01/23/2017    LABIL2 1.4 01/23/2017    AST 32 01/23/2017    ALT 71 01/23/2017       Not on antihypertensives      Microvascular Complication Monitoring:      Start neurontin 300 mg po qhs    Immunizations:      none    Preventive  Care:      No smoking     Weight Related:   Wt Readings from Last 3 Encounters:   01/24/17 291 lb 14.2 oz (132 kg)     Body mass index is 37.48 kg/(m^2).        Diet interventions: moderate (500 kCal/d) deficit diet.      Bone Health    Lab Results   Component Value Date    CALCIUM 9.5 01/23/2017       Thyroid Health  No results found for: TSH        Other Diabetes Related Aspects       No results found for: [id number]        Last celiac panel         I reviewed and summarized records from Dr. Vela  from 2017 and I reviewed / ordered labs.           A copy of my note was sent to Dr. Javon Vela    Please see my above opinion and suggestions.

## 2017-01-24 VITALS
OXYGEN SATURATION: 98 % | DIASTOLIC BLOOD PRESSURE: 78 MMHG | HEIGHT: 74 IN | HEART RATE: 82 BPM | RESPIRATION RATE: 18 BRPM | BODY MASS INDEX: 37.46 KG/M2 | SYSTOLIC BLOOD PRESSURE: 150 MMHG | WEIGHT: 291.89 LBS | TEMPERATURE: 98.5 F

## 2017-01-24 PROBLEM — E11.42 DIABETIC POLYNEUROPATHY ASSOCIATED WITH TYPE 2 DIABETES MELLITUS: Status: ACTIVE | Noted: 2017-01-24

## 2017-01-24 LAB
GLUCOSE BLDC GLUCOMTR-MCNC: 228 MG/DL (ref 70–130)
GLUCOSE BLDC GLUCOMTR-MCNC: 229 MG/DL (ref 70–130)
GLUCOSE BLDC GLUCOMTR-MCNC: 406 MG/DL (ref 70–130)
GLUCOSE BLDC GLUCOMTR-MCNC: 407 MG/DL (ref 70–130)
GLUCOSE BLDC GLUCOMTR-MCNC: 409 MG/DL (ref 70–130)
HAV IGM SERPL QL IA: NEGATIVE
HBV CORE IGM SERPL QL IA: NEGATIVE
HBV SURFACE AG SERPL QL IA: NEGATIVE
HCV AB SER DONR QL: NEGATIVE
HCV S/C RATIO: 0.01 (ref 0–0.89)

## 2017-01-24 PROCEDURE — 25010000002 HYDRALAZINE PER 20 MG: Performed by: HOSPITALIST

## 2017-01-24 PROCEDURE — 25010000002 THIAMINE PER 100 MG: Performed by: INTERNAL MEDICINE

## 2017-01-24 PROCEDURE — 25010000002 MAGNESIUM SULFATE PER 500 MG OF MAGNESIUM: Performed by: INTERNAL MEDICINE

## 2017-01-24 PROCEDURE — 63710000001 INSULIN ASPART PER 5 UNITS: Performed by: HOSPITALIST

## 2017-01-24 PROCEDURE — 63710000001 INSULIN DETEMIR PER 5 UNITS: Performed by: INTERNAL MEDICINE

## 2017-01-24 PROCEDURE — 82962 GLUCOSE BLOOD TEST: CPT

## 2017-01-24 PROCEDURE — 63710000001 INSULIN ASPART PER 5 UNITS: Performed by: INTERNAL MEDICINE

## 2017-01-24 RX ORDER — LANCETS 33 GAUGE
1 EACH MISCELLANEOUS 4 TIMES DAILY
Qty: 120 EACH | Refills: 11 | Status: SHIPPED | OUTPATIENT
Start: 2017-01-24

## 2017-01-24 RX ORDER — LANCING DEVICE/LANCETS
KIT MISCELLANEOUS
Qty: 1 EACH | Refills: 11 | Status: SHIPPED | OUTPATIENT
Start: 2017-01-24 | End: 2020-11-13 | Stop reason: SDUPTHER

## 2017-01-24 RX ORDER — GABAPENTIN 300 MG/1
300 CAPSULE ORAL NIGHTLY
Qty: 30 CAPSULE | Refills: 0 | Status: SHIPPED | OUTPATIENT
Start: 2017-01-24 | End: 2017-01-31 | Stop reason: SDUPTHER

## 2017-01-24 RX ORDER — GABAPENTIN 300 MG/1
300 CAPSULE ORAL NIGHTLY
Status: DISCONTINUED | OUTPATIENT
Start: 2017-01-24 | End: 2017-01-24 | Stop reason: HOSPADM

## 2017-01-24 RX ORDER — GLUCOSAMINE HCL/CHONDROITIN SU 500-400 MG
CAPSULE ORAL
Qty: 120 EACH | Refills: 11 | Status: SHIPPED | OUTPATIENT
Start: 2017-01-24 | End: 2019-08-05 | Stop reason: SDUPTHER

## 2017-01-24 RX ORDER — HEPARIN SODIUM 5000 [USP'U]/ML
5000 INJECTION, SOLUTION INTRAVENOUS; SUBCUTANEOUS EVERY 8 HOURS SCHEDULED
Status: DISCONTINUED | OUTPATIENT
Start: 2017-01-24 | End: 2017-01-24 | Stop reason: HOSPADM

## 2017-01-24 RX ORDER — HYDROCHLOROTHIAZIDE 12.5 MG/1
12.5 TABLET ORAL DAILY
Qty: 30 TABLET | Refills: 0 | Status: SHIPPED | OUTPATIENT
Start: 2017-01-24 | End: 2017-01-31 | Stop reason: DRUGHIGH

## 2017-01-24 RX ORDER — METOPROLOL SUCCINATE 100 MG/1
100 TABLET, EXTENDED RELEASE ORAL DAILY
Qty: 30 TABLET | Refills: 0 | Status: SHIPPED | OUTPATIENT
Start: 2017-01-24 | End: 2019-06-21

## 2017-01-24 RX ADMIN — HYDRALAZINE HYDROCHLORIDE 10 MG: 20 INJECTION INTRAMUSCULAR; INTRAVENOUS at 08:27

## 2017-01-24 RX ADMIN — INSULIN ASPART 18 UNITS: 100 INJECTION, SOLUTION INTRAVENOUS; SUBCUTANEOUS at 08:23

## 2017-01-24 RX ADMIN — INSULIN ASPART 5 UNITS: 100 INJECTION, SOLUTION INTRAVENOUS; SUBCUTANEOUS at 08:20

## 2017-01-24 RX ADMIN — SODIUM CHLORIDE 75 ML/HR: 9 INJECTION, SOLUTION INTRAVENOUS at 01:50

## 2017-01-24 RX ADMIN — FOLIC ACID 100 ML/HR: 5 INJECTION, SOLUTION INTRAMUSCULAR; INTRAVENOUS; SUBCUTANEOUS at 10:27

## 2017-01-24 RX ADMIN — INSULIN ASPART 5 UNITS: 100 INJECTION, SOLUTION INTRAVENOUS; SUBCUTANEOUS at 12:11

## 2017-01-24 RX ADMIN — INSULIN ASPART 18 UNITS: 100 INJECTION, SOLUTION INTRAVENOUS; SUBCUTANEOUS at 12:11

## 2017-01-24 RX ADMIN — INSULIN DETEMIR 60 UNITS: 100 INJECTION, SOLUTION SUBCUTANEOUS at 08:23

## 2017-01-24 NOTE — NURSING NOTE
Patient discharged home. Follow up appointments provided. IV removed CDI. Telemetry monitor removed. Walked to lobby per patient request.

## 2017-01-24 NOTE — PLAN OF CARE
Problem: Patient Care Overview (Adult)  Goal: Plan of Care Review  Outcome: Outcome(s) achieved Date Met:  01/24/17 01/24/17 1016   Coping/Psychosocial Response Interventions   Plan Of Care Reviewed With patient   Patient Care Overview   Progress no change   Outcome Evaluation   Outcome Summary/Follow up Plan monitor blood pressure and blood glucose       Goal: Adult Individualization and Mutuality  Outcome: Ongoing (interventions implemented as appropriate)    01/24/17 1016   Mutuality/Individual Preferences   What Anxieties, Fears or Concerns Do You Have About Your Health or Care? no   What Questions Do You Have About Your Health or Care? no   What Information Would Help Us Give You More Personalized Care? no       Goal: Discharge Needs Assessment    01/24/17 1016   Discharge Needs Assessment   Concerns To Be Addressed no discharge needs identified   Readmission Within The Last 30 Days no previous admission in last 30 days   Equipment Needed After Discharge none   Current Health   Anticipated Changes Related to Illness none   Self-Care   Equipment Currently Used at Home none   Living Environment   Transportation Available car         Problem: Diabetes, Type 2 (Adult)  Intervention: Support/Optimize Psychosocial Response to Condition    01/24/17 1016   Coping Strategies   Family/Support System Care support provided   Supportive Measures active listening utilized   Coping/Psychosocial Interventions   Environmental Support calm environment promoted       Intervention: Optimize Glycemic Control    01/24/17 1016   Nutrition Interventions   Glycemic Management blood glucose monitoring;carbohydrate replacement provided;supplemental insulin given         Goal: Signs and Symptoms of Listed Potential Problems Will be Absent or Manageable (Diabetes, Type 2)  Outcome: Ongoing (interventions implemented as appropriate)    01/24/17 1016   Diabetes, Type 2   Problems Assessed (Type 2 Diabetes) diabetic ketoacidosis  (DKA)/hyperosmolar hyperglycemic state (HHS);impaired glycemic control;hypoglycemia   Problems Present (Type 2 Diabetes) impaired glycemic control
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
## 2017-01-31 ENCOUNTER — OFFICE VISIT (OUTPATIENT)
Dept: ENDOCRINOLOGY | Facility: CLINIC | Age: 52
End: 2017-01-31

## 2017-01-31 ENCOUNTER — OFFICE VISIT (OUTPATIENT)
Dept: FAMILY MEDICINE CLINIC | Facility: CLINIC | Age: 52
End: 2017-01-31

## 2017-01-31 VITALS
BODY MASS INDEX: 53.53 KG/M2 | DIASTOLIC BLOOD PRESSURE: 82 MMHG | SYSTOLIC BLOOD PRESSURE: 142 MMHG | HEIGHT: 62 IN | HEART RATE: 67 BPM | WEIGHT: 290.9 LBS

## 2017-01-31 VITALS
TEMPERATURE: 95.7 F | HEIGHT: 74 IN | WEIGHT: 289 LBS | BODY MASS INDEX: 37.09 KG/M2 | DIASTOLIC BLOOD PRESSURE: 96 MMHG | OXYGEN SATURATION: 98 % | SYSTOLIC BLOOD PRESSURE: 140 MMHG | HEART RATE: 59 BPM

## 2017-01-31 DIAGNOSIS — Z79.4 TYPE 2 DIABETES MELLITUS WITH DIABETIC POLYNEUROPATHY, WITH LONG-TERM CURRENT USE OF INSULIN (HCC): Primary | ICD-10-CM

## 2017-01-31 DIAGNOSIS — E10.69 MIXED DIABETIC HYPERLIPIDEMIA ASSOCIATED WITH TYPE 1 DIABETES MELLITUS (HCC): ICD-10-CM

## 2017-01-31 DIAGNOSIS — F33.1 MODERATE EPISODE OF RECURRENT MAJOR DEPRESSIVE DISORDER (HCC): ICD-10-CM

## 2017-01-31 DIAGNOSIS — E11.59 HYPERTENSION ASSOCIATED WITH DIABETES (HCC): ICD-10-CM

## 2017-01-31 DIAGNOSIS — I15.2 HYPERTENSION ASSOCIATED WITH DIABETES (HCC): ICD-10-CM

## 2017-01-31 DIAGNOSIS — E55.9 VITAMIN D DEFICIENCY: ICD-10-CM

## 2017-01-31 DIAGNOSIS — E11.42 TYPE 2 DIABETES MELLITUS WITH DIABETIC POLYNEUROPATHY, WITH LONG-TERM CURRENT USE OF INSULIN (HCC): Primary | ICD-10-CM

## 2017-01-31 DIAGNOSIS — E78.2 MIXED DIABETIC HYPERLIPIDEMIA ASSOCIATED WITH TYPE 1 DIABETES MELLITUS (HCC): ICD-10-CM

## 2017-01-31 DIAGNOSIS — I10 ESSENTIAL HYPERTENSION: Primary | ICD-10-CM

## 2017-01-31 DIAGNOSIS — E11.42 DIABETIC POLYNEUROPATHY ASSOCIATED WITH TYPE 2 DIABETES MELLITUS (HCC): ICD-10-CM

## 2017-01-31 LAB — GLUCOSE BLDC GLUCOMTR-MCNC: 257 MG/DL (ref 70–130)

## 2017-01-31 PROCEDURE — 82962 GLUCOSE BLOOD TEST: CPT | Performed by: INTERNAL MEDICINE

## 2017-01-31 PROCEDURE — 99203 OFFICE O/P NEW LOW 30 MIN: CPT | Performed by: FAMILY MEDICINE

## 2017-01-31 PROCEDURE — 99214 OFFICE O/P EST MOD 30 MIN: CPT | Performed by: INTERNAL MEDICINE

## 2017-01-31 RX ORDER — FLUOXETINE HYDROCHLORIDE 20 MG/1
20-60 CAPSULE ORAL DAILY
Qty: 90 CAPSULE | Refills: 11 | Status: SHIPPED | OUTPATIENT
Start: 2017-01-31 | End: 2017-03-21 | Stop reason: SDUPTHER

## 2017-01-31 RX ORDER — TRIAMTERENE AND HYDROCHLOROTHIAZIDE 37.5; 25 MG/1; MG/1
1 TABLET ORAL DAILY
Qty: 30 TABLET | Refills: 0 | Status: SHIPPED | OUTPATIENT
Start: 2017-01-31 | End: 2017-01-31

## 2017-01-31 RX ORDER — GABAPENTIN 300 MG/1
900 CAPSULE ORAL 3 TIMES DAILY
Qty: 270 CAPSULE | Refills: 0 | Status: SHIPPED | OUTPATIENT
Start: 2017-01-31 | End: 2017-03-02

## 2017-01-31 RX ORDER — AMLODIPINE BESYLATE 5 MG/1
5 TABLET ORAL DAILY
Qty: 30 TABLET | Refills: 11 | Status: SHIPPED | OUTPATIENT
Start: 2017-01-31 | End: 2017-02-07 | Stop reason: DRUGHIGH

## 2017-01-31 RX ORDER — PRAVASTATIN SODIUM 20 MG
20 TABLET ORAL EVERY EVENING
Qty: 30 TABLET | Refills: 11 | Status: SHIPPED | OUTPATIENT
Start: 2017-01-31 | End: 2018-01-31

## 2017-01-31 NOTE — MR AVS SNAPSHOT
Herbert White   1/31/2017 9:00 AM   Office Visit    Dept Phone:  [phone]   Encounter #:  [id number]    Provider:  Artemio Sanchez MD   Department:  [facility] FAMILY MEDICINE                Your Full Care Plan              Today's Medication Changes          These changes are accurate as of: 1/31/17  9:37 AM.  If you have any questions, ask your nurse or doctor.               New Medication(s)Ordered:     FLUoxetine 20 MG capsule   Commonly known as:  PROZAC   Take 1-3 capsules by mouth Daily.   Started by:  Artemio Sanchez MD       triamterene-hydrochlorothiazide 37.5-25 MG per tablet   Commonly known as:  MAXZIDE-25   Take 1 tablet by mouth Daily.   Started by:  Artemio Sanchez MD         Stop taking medication(s)listed here:     hydrochlorothiazide 12.5 MG tablet   Commonly known as:  HYDRODIURIL   Stopped by:  Artemio Sanchez MD                Where to Get Your Medications      These medications were sent to [facility]/pharmacy #[id number] - [address] [phone]  - [phone] [address] [address]     Phone:  [phone]     FLUoxetine 20 MG capsule    triamterene-hydrochlorothiazide 37.5-25 MG per tablet                  Your Updated Medication List          This list is accurate as of: 1/31/17  9:37 AM.  Always use your most recent med list.                ALPRAZolam 1 MG tablet   Commonly known as:  XANAX       Blood Glucose Test strip   Use 4 x daily, one touch verio IQ       Empagliflozin 10 MG tablet   Commonly known as:  JARDIANCE   Take 1 tablet by mouth Daily With Breakfast.       FLUoxetine 20 MG capsule   Commonly known as:  PROZAC   Take 1-3 capsules by mouth Daily.       gabapentin 300 MG capsule   Commonly known as:  NEURONTIN   Take 1 capsule by mouth Every Night.       * insulin aspart 100 UNIT/ML solution pen-injector sc pen   Commonly known as:  NOVOLOG FLEXPEN   Up to 50 units three times daily       *  insulin aspart 100 UNIT/ML injection   Commonly known as:  novoLOG   Inject 6-30 Units under the skin 3 (Three) Times a Day With Meals.       Insulin Degludec 200 UNIT/ML solution pen-injector   Commonly known as:  TRESIBA FLEXTOUCH   Inject 120 Units under the skin Every Night.       Insulin Pen Needle 31G X 5 MM misc   Commonly known as:  B-D UF III MINI PEN NEEDLES   Use 4 times daily       metoprolol succinate  MG 24 hr tablet   Commonly known as:  TOPROL XL   Take 1 tablet by mouth Daily.       ONE TOUCH DELICA LANCING DEV misc   delica lancing device if approved, otherwise use alternative       ONETOUCH DELICA LANCETS 33G misc   1 each 4 (Four) Times a Day. delica if covered, use alternative if not covered       SITagliptin-MetFORMIN HCl -1000 MG tablet sustained-release 24 hour   Commonly known as:  JANUMET XR   One tab daily with bkfast       triamterene-hydrochlorothiazide 37.5-25 MG per tablet   Commonly known as:  MAXZIDE-25   Take 1 tablet by mouth Daily.       * Notice:  This list has 2 medication(s) that are the same as other medications prescribed for you. Read the directions carefully, and ask your doctor or other care provider to review them with you.            Instructions     None    Patient Instructions History      Upcoming Appointments     Visit Type Date Time Department    NEW PATIENT 1/31/2017  9:00 AM [facility]    SAME DAY 1/31/2017 10:45 AM [facility] ENDOCRINOLOGY [facility]    OFFICE VISIT 2/7/2017 10:00 AM [facility]      [employer] Signup     Our records indicate that you have an active [employer] account.    You can view your After Visit Summary by going to [employer] and logging in with your [employer] username and password.  If you don't have a [employer] username and password but a parent or guardian has access to your record, the parent or guardian should login with their own [employer] username and password and access your record to view the After Visit  "Summary.    If you have questions, you can email [email or web address] or call [phone] to talk to our MyChart staff.  Remember, [employer] is NOT to be used for urgent needs.  For medical emergencies, dial 911.               Other Info from Your Visit           Your Appointments     Jan 31, 2017 10:45 AM CST   Same Day with Alessandro Ferreira MD   [facility] ENDOCRINOLOGY (--)    [address]  [facility] [place] [address] [place] [address] [phone]            Feb 07, 2017 10:00 AM CST   Office Visit with Artemio Sanchez MD   [facility] FAMILY MEDICINE (--)    [address]  [facility] [address] [phone]           Arrive 15 minutes prior to appointment.              Allergies     Ace Inhibitors  Anaphylaxis    Wellbutrin [Bupropion]  Anaphylaxis      Reason for Visit     Follow-up Hospital       Vital Signs     Blood Pressure Pulse Temperature Height    140/96 (BP Location: Left arm, Patient Position: Sitting, Cuff Size: Adult) 59 95.7 °F (35.4 °C) (Temporal Artery ) 74\" (188 cm)    Weight Oxygen Saturation Body Mass Index Smoking Status    289 lb (131 kg) 98% 37.11 kg/m2 Current Every Day Smoker        "

## 2017-01-31 NOTE — PROGRESS NOTES
" Herbert White is a 51 y.o. male who presents for  evaluation of   Chief Complaint   Patient presents with   • Diabetes         Primary Care / Referring Provider  Artemio Sanchez MD      Duration since 2002      Timing - Diabetes is Constant    Quality -  reasonably well controlled    Severity -  severe    Complications - peripheral neuropathy      Current symptoms/problems  paresthesia of the feet     Alleviating Factors: Compliance      Side Effects  none    Current diet  in general, a \"healthy\" diet      Current exercise walking    Current monitoring regimen: home blood tests - 3 times daily  Home blood sugar records:  Under 200s    Hypoglycemia none    Past Medical History   Diagnosis Date   • Diabetes mellitus    • Hypertension      Family History   Problem Relation Age of Onset   • Diabetes Mother    • Hypertension Mother    • Cancer Mother    • Diabetes Father    • Hypertension Father    • Cancer Father      Social History   Substance Use Topics   • Smoking status: Current Every Day Smoker     Packs/day: 0.50     Years: 25.00     Types: Cigarettes   • Smokeless tobacco: Not on file   • Alcohol use No         Current Outpatient Prescriptions:   •  ALPRAZolam (XANAX) 1 MG tablet, Take 1 mg by mouth 2 (Two) Times a Day As Needed for anxiety., Disp: , Rfl:   •  Empagliflozin (JARDIANCE) 10 MG tablet, Take 1 tablet by mouth Daily With Breakfast., Disp: 30 tablet, Rfl: 11  •  FLUoxetine (PROZAC) 20 MG capsule, Take 1-3 capsules by mouth Daily., Disp: 90 capsule, Rfl: 11  •  gabapentin (NEURONTIN) 300 MG capsule, Take 3 capsules by mouth 3 (Three) Times a Day for 30 days., Disp: 270 capsule, Rfl: 0  •  Glucose Blood (BLOOD GLUCOSE TEST) strip, Use 4 x daily, one touch verio IQ, Disp: 120 each, Rfl: 11  •  insulin aspart (NOVOLOG FLEXPEN) 100 UNIT/ML solution pen-injector sc pen, Up to 50 units three times daily, Disp: 15 pen, Rfl: 11  •  Insulin Degludec (TRESIBA FLEXTOUCH) 200 UNIT/ML solution pen-injector, Inject " 120 Units under the skin Every Night., Disp: 6 pen, Rfl: 11  •  Insulin Pen Needle (B-D UF III MINI PEN NEEDLES) 31G X 5 MM misc, Use 4 times daily, Disp: 120 each, Rfl: 11  •  Lancet Devices (ONE TOUCH DELICA LANCING DEV) misc, delica lancing device if approved, otherwise use alternative, Disp: 1 each, Rfl: 11  •  metoprolol succinate XL (TOPROL XL) 100 MG 24 hr tablet, Take 1 tablet by mouth Daily., Disp: 30 tablet, Rfl: 0  •  ONETOUCH DELICA LANCETS 33G misc, 1 each 4 (Four) Times a Day. delica if covered, use alternative if not covered, Disp: 120 each, Rfl: 11  •  SITagliptin-MetFORMIN HCl ER (JANUMET XR) 100-1000 MG tablet sustained-release 24 hour, One tab daily with bkfast, Disp: 30 tablet, Rfl: 11  •  amLODIPine (NORVASC) 5 MG tablet, Take 1 tablet by mouth Daily., Disp: 30 tablet, Rfl: 11    Review of Systems    Review of Systems   Constitutional: Negative for activity change, appetite change, chills, diaphoresis, fatigue, fever and unexpected weight change.   HENT: Negative for congestion, dental problem, drooling, ear discharge, ear pain, facial swelling, mouth sores, postnasal drip, rhinorrhea, sinus pressure, sore throat, tinnitus, trouble swallowing and voice change.    Eyes: Negative for photophobia, pain, discharge, redness, itching and visual disturbance.   Respiratory: Negative for apnea, cough, choking, chest tightness, shortness of breath, wheezing and stridor.    Cardiovascular: Negative for chest pain, palpitations and leg swelling.   Gastrointestinal: Negative for abdominal distention, abdominal pain, constipation, diarrhea, nausea and vomiting.   Endocrine: Negative for cold intolerance, heat intolerance, polydipsia, polyphagia and polyuria.   Genitourinary: Negative for decreased urine volume, difficulty urinating, dysuria, flank pain, frequency, hematuria and urgency.   Musculoskeletal: Negative for arthralgias, back pain, gait problem, joint swelling, myalgias, neck pain and neck  "stiffness.   Skin: Negative for color change, pallor, rash and wound.   Allergic/Immunologic: Negative for immunocompromised state.   Neurological: Negative for dizziness, tremors, seizures, syncope, facial asymmetry, speech difficulty, weakness, light-headedness, numbness and headaches.   Hematological: Negative for adenopathy.   Psychiatric/Behavioral: Negative for agitation, behavioral problems, confusion, decreased concentration, dysphoric mood, hallucinations, self-injury, sleep disturbance and suicidal ideas. The patient is not nervous/anxious and is not hyperactive.         Objective:     Visit Vitals   • /82 (BP Location: Right arm, Patient Position: Sitting, Cuff Size: Large Adult)   • Pulse 67   • Ht 62\" (157.5 cm)   • Wt 290 lb 14.4 oz (132 kg)   • BMI 53.21 kg/m2       Physical Exam   Constitutional: He is oriented to person, place, and time. He appears well-developed and well-nourished. He is cooperative.   HENT:   Head: Normocephalic and atraumatic.   Right Ear: External ear normal.   Left Ear: External ear normal.   Nose: Nose normal.   Mouth/Throat: Oropharynx is clear and moist. No oropharyngeal exudate.   Eyes: Conjunctivae and EOM are normal. Pupils are equal, round, and reactive to light. No scleral icterus. Right eye exhibits normal extraocular motion. Left eye exhibits normal extraocular motion.   Neck: Neck supple. No JVD present. No muscular tenderness present. No tracheal deviation, no edema and no erythema present. No thyromegaly present.   Cardiovascular: Normal rate, regular rhythm, normal heart sounds and intact distal pulses.  Exam reveals no gallop and no friction rub.    No murmur heard.  Pulmonary/Chest: Effort normal and breath sounds normal. No stridor. No respiratory distress. He has no decreased breath sounds. He has no wheezes. He has no rhonchi. He has no rales. He exhibits no tenderness.   Abdominal: Soft. Bowel sounds are normal. He exhibits no distension and no mass. " There is no hepatomegaly. There is no tenderness. There is no rebound and no guarding. No hernia.   Musculoskeletal: Normal range of motion. He exhibits no edema, tenderness or deformity.   Lymphadenopathy:     He has no cervical adenopathy.   Neurological: He is alert and oriented to person, place, and time. He has normal reflexes. No cranial nerve deficit. He exhibits normal muscle tone. Coordination normal.   Skin: Skin is warm. No rash noted. No erythema. No pallor.   Psychiatric: He has a normal mood and affect. His behavior is normal. Judgment and thought content normal.   Nursing note and vitals reviewed.      Lab Review    Results for orders placed or performed in visit on 01/31/17   POC Glucose Fingerstick   Result Value Ref Range    Glucose 257 (A) 70 - 130 mg/dL           Assessment/Plan       ICD-10-CM ICD-9-CM   1. Type 2 diabetes mellitus with diabetic polyneuropathy, with long-term current use of insulin E11.42 250.60    Z79.4 357.2     V58.67   2. Hypertension associated with diabetes E11.59 250.80    I10 401.9   3. Mixed diabetic hyperlipidemia associated with type 1 diabetes mellitus E10.69 250.81    E78.2 272.2   4. Diabetic polyneuropathy associated with type 2 diabetes mellitus E11.42 250.60     357.2       Glycemic Management:   Lab Results   Component Value Date    HGBA1C 12.81 (H) 01/23/2017     Lab Results   Component Value Date    GLUCOSE 70 01/23/2017    BUN 16 01/23/2017    CREATININE 0.65 (L) 01/23/2017    EGFRIFNONA 129 01/23/2017    BCR 24.6 01/23/2017    CO2 25.0 01/23/2017    CALCIUM 9.5 01/23/2017    ALBUMIN 4.00 01/23/2017    LABIL2 1.4 01/23/2017    AST 32 01/23/2017    ALT 71 01/23/2017     Lab Results   Component Value Date    WBC 9.92 (H) 01/23/2017    HGB 17.1 01/23/2017    HCT 46.1 01/23/2017    MCV 81.2 01/23/2017     01/23/2017       •  Empagliflozin (JARDIANCE) 10 MG tablet, Take 1 tablet by mouth Daily With Breakfast., Disp: 30 tablet, Rfl: 11    •   SITagliptin-MetFORMIN HCl ER (JANUMET XR) 100-1000 MG tablet sustained-release 24 hour, One tab daily with bkfast, Disp: 30 tablet, Rfl: 11    •  insulin aspart (NOVOLOG FLEXPEN) 100 UNIT/ML solution pen-injector sc pen, Up to 50 units three times daily, Disp: 15 pen, Rfl: 11    6 units per 15 grams   +  3 per 50     •  Insulin Degludec (TRESIBA FLEXTOUCH) 200 UNIT/ML solution pen-injector, Inject 120 Units under the skin Every Night., Disp: 6 pen, Rfl: 11  Doing 100 units daily         Lipid Management  No results found for: CHOL  No results found for: TRIG  No results found for: HDL  No results found for: LDLCALC  No results found for: LDL    Start pravastatin 20 mg qhs       Blood Pressure Management:    Vitals:    01/31/17 1109   BP: 142/82   Pulse: 67     Lab Results   Component Value Date    GLUCOSE 70 01/23/2017    CALCIUM 9.5 01/23/2017     01/23/2017    K 3.1 (L) 01/23/2017    CO2 25.0 01/23/2017     01/23/2017    BUN 16 01/23/2017    CREATININE 0.65 (L) 01/23/2017    EGFRIFNONA 129 01/23/2017    BCR 24.6 01/23/2017    ANIONGAP 13.0 01/23/2017     Lab Results   Component Value Date    GLUCOSE 70 01/23/2017    BUN 16 01/23/2017    CREATININE 0.65 (L) 01/23/2017    EGFRIFNONA 129 01/23/2017    BCR 24.6 01/23/2017    CO2 25.0 01/23/2017    CALCIUM 9.5 01/23/2017    ALBUMIN 4.00 01/23/2017    LABIL2 1.4 01/23/2017    AST 32 01/23/2017    ALT 71 01/23/2017       ANAPHYLAXIS ON ACE INHIBITORS    •  metoprolol succinate XL (TOPROL XL) 100 MG 24 hr tablet, Take 1 tablet by mouth Daily., Disp: 30 tablet, Rfl: 0    •  triamterene-hydrochlorothiazide (MAXZIDE-25) 37.5-25 MG per tablet, Take 1 tablet by mouth Daily., Disp: 30 tablet, Rfl: 0 = PLEASE DON'T TAKE    Start norvasc 5 mg daily           Microvascular Complication Monitoring:      •  gabapentin (NEURONTIN) 300 MG capsule, Take 1 capsule by mouth Every Night., Disp: 30 capsule, Rfl: 0    Change to up 3 tabs , 3 times daily     Immunizations:       Please get with Dr. Sanchez    Preventive Care:      Not a smoker     Weight Related:   Wt Readings from Last 3 Encounters:   01/31/17 290 lb 14.4 oz (132 kg)   01/31/17 289 lb (131 kg)   01/24/17 291 lb 14.2 oz (132 kg)     Body mass index is 53.21 kg/(m^2).          Diet interventions: moderate (500 kCal/d) deficit diet.      Bone Health    Lab Results   Component Value Date    CALCIUM 9.5 01/23/2017       Thyroid Health  No results found for: TSH        Other Diabetes Related Aspects       No results found for: [id number]        Last celiac panel         I reviewed and summarized records from Artemio Sanchez MD from 2017 and I reviewed / ordered labs.     Orders Placed This Encounter   Procedures   • POC Glucose Fingerstick         A copy of my note was sent to Artemio Sanchez MD    Please see my above opinion and suggestions.

## 2017-01-31 NOTE — MR AVS SNAPSHOT
Herbert White   1/31/2017 10:45 AM   Office Visit    Dept Phone:  [phone]   Encounter #:  [id number]    Provider:  Alessandro Ferreira MD   Department:  [facility] ENDOCRINOLOGY                Your Full Care Plan              Today's Medication Changes          These changes are accurate as of: 1/31/17 11:45 AM.  If you have any questions, ask your nurse or doctor.               New Medication(s)Ordered:     amLODIPine 5 MG tablet   Commonly known as:  NORVASC   Take 1 tablet by mouth Daily.   Started by:  Alessandro Ferreira MD       FLUoxetine 20 MG capsule   Commonly known as:  PROZAC   Take 1-3 capsules by mouth Daily.   Started by:  Artemio Sanchez MD       pravastatin 20 MG tablet   Commonly known as:  PRAVACHOL   Take 1 tablet by mouth Every Evening.   Started by:  Alessandro Ferreira MD         Medication(s)that have changed:     gabapentin 300 MG capsule   Commonly known as:  NEURONTIN   Take 3 capsules by mouth 3 (Three) Times a Day for 30 days.   What changed:    - how much to take  - when to take this   Changed by:  Alessandro Ferreira MD       insulin aspart 100 UNIT/ML solution pen-injector sc pen   Commonly known as:  NOVOLOG FLEXPEN   Up to 50 units three times daily   What changed:  Another medication with the same name was removed. Continue taking this medication, and follow the directions you see here.   Changed by:  Alessandro Ferreira MD         Stop taking medication(s)listed here:     hydrochlorothiazide 12.5 MG tablet   Commonly known as:  HYDRODIURIL   Stopped by:  Artemio Sanchez MD                Where to Get Your Medications      These medications were sent to [facility]/pharmacy #[id number] - [place], [place] - [address] [place] [address] - [phone]  - [phone] [address] [address]     Phone:  [phone]     amLODIPine 5 MG tablet    FLUoxetine 20 MG capsule    pravastatin 20 MG tablet         You can get  these medications from any pharmacy     Bring a paper prescription for each of these medications     gabapentin 300 MG capsule                  Your Updated Medication List          This list is accurate as of: 1/31/17 11:45 AM.  Always use your most recent med list.                ALPRAZolam 1 MG tablet   Commonly known as:  XANAX       amLODIPine 5 MG tablet   Commonly known as:  NORVASC   Take 1 tablet by mouth Daily.       Blood Glucose Test strip   Use 4 x daily, one touch verio IQ       Empagliflozin 10 MG tablet   Commonly known as:  JARDIANCE   Take 1 tablet by mouth Daily With Breakfast.       FLUoxetine 20 MG capsule   Commonly known as:  PROZAC   Take 1-3 capsules by mouth Daily.       gabapentin 300 MG capsule   Commonly known as:  NEURONTIN   Take 3 capsules by mouth 3 (Three) Times a Day for 30 days.       insulin aspart 100 UNIT/ML solution pen-injector sc pen   Commonly known as:  NOVOLOG FLEXPEN   Up to 50 units three times daily       Insulin Degludec 200 UNIT/ML solution pen-injector   Commonly known as:  TRESIBA FLEXTOUCH   Inject 120 Units under the skin Every Night.       Insulin Pen Needle 31G X 5 MM misc   Commonly known as:  B-D UF III MINI PEN NEEDLES   Use 4 times daily       metoprolol succinate  MG 24 hr tablet   Commonly known as:  TOPROL XL   Take 1 tablet by mouth Daily.       ONE TOUCH DELICA LANCING DEV misc   delica lancing device if approved, otherwise use alternative       ONETOUCH DELICA LANCETS 33G misc   1 each 4 (Four) Times a Day. delica if covered, use alternative if not covered       pravastatin 20 MG tablet   Commonly known as:  PRAVACHOL   Take 1 tablet by mouth Every Evening.       SITagliptin-MetFORMIN HCl -1000 MG tablet sustained-release 24 hour   Commonly known as:  JANUMET XR   One tab daily with bkfast               We Performed the Following     CBC Auto Differential     Comprehensive Metabolic Panel     Hemoglobin A1c     Lipid Panel     Microalbumin  / Creatinine Urine Ratio     POC Glucose Fingerstick     TSH     Vitamin B12     Vitamin D 25 Hydroxy       You Were Diagnosed With        Codes Comments    Type 2 diabetes mellitus with diabetic polyneuropathy, with long-term current use of insulin    -  Primary ICD-10-CM: E11.42, Z79.4  ICD-9-CM: 250.60, 357.2, V58.67     Hypertension associated with diabetes     ICD-10-CM: E11.59, I10  ICD-9-CM: 250.80, 401.9     Mixed diabetic hyperlipidemia associated with type 1 diabetes mellitus     ICD-10-CM: E10.69, E78.2  ICD-9-CM: 250.81, 272.2     Diabetic polyneuropathy associated with type 2 diabetes mellitus     ICD-10-CM: E11.42  ICD-9-CM: 250.60, 357.2     Vitamin D deficiency     ICD-10-CM: E55.9  ICD-9-CM: 268.9       Instructions     None    Patient Instructions History      Upcoming Appointments     Visit Type Date Time Department    NEW PATIENT 1/31/2017  9:00 AM [facility]    SAME DAY 1/31/2017 10:45 AM [facility] ENDOCRINOLOGY [facility]    OFFICE VISIT 2/7/2017 10:00 AM [facility]    FOLLOW UP 3/27/2017  9:00 AM [facility] ENDOCRINOLOGY [facility]      MyChart Signup     Our records indicate that you have an active [employer] account.    You can view your After Visit Summary by going to [employer] and logging in with your [employer] username and password.  If you don't have a [employer] username and password but a parent or guardian has access to your record, the parent or guardian should login with their own [employer] username and password and access your record to view the After Visit Summary.    If you have questions, you can email [email or web address] or call [phone] to talk to our [employer] staff.  Remember, [employer] is NOT to be used for urgent needs.  For medical emergencies, dial 911.               Other Info from Your Visit           Your Appointments     Feb 07, 2017 10:00 AM CST   Office Visit with Artemio Sanchez MD   [facility] (--)    [address]  [facility] [id number] [phone]           Arrive 15 minutes prior to appointment.            Mar 27, 2017  9:00 AM CDT   Follow Up with NELSY Fields   [facility] ENDOCRINOLOGY (--)    [address]  [address] [place] [id number] [phone]           Arrive 15 minutes prior to appointment.              Allergies     Ace Inhibitors  Anaphylaxis    Wellbutrin [Bupropion]  Anaphylaxis      Reason for Visit     Diabetes           Vital Signs     Blood Pressure Pulse Height Weight Body Mass Index Smoking Status    142/82 (BP Location: Right arm, Patient Position: Sitting, Cuff Size: Large Adult) 67 62\" (157.5 cm) 290 lb 14.4 oz (132 kg) 53.21 kg/m2 Current Every Day Smoker      Problems and Diagnoses Noted     Diabetes with neurologic complications    High blood pressure associated with diabetes    Mixed diabetic hyperlipidemia associated with type 1 diabetes mellitus    Type 2 diabetes mellitus with diabetic polyneuropathy, with long-term current use of insulin    Vitamin D deficiency          Results     POC Glucose Fingerstick      Component Value Standard Range & Units    Glucose 257 70 - 130 mg/dL                    "
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
## 2017-02-07 ENCOUNTER — OFFICE VISIT (OUTPATIENT)
Dept: FAMILY MEDICINE CLINIC | Facility: CLINIC | Age: 52
End: 2017-02-07

## 2017-02-07 VITALS
SYSTOLIC BLOOD PRESSURE: 148 MMHG | TEMPERATURE: 96.3 F | OXYGEN SATURATION: 97 % | WEIGHT: 291 LBS | BODY MASS INDEX: 37.35 KG/M2 | DIASTOLIC BLOOD PRESSURE: 90 MMHG | HEART RATE: 64 BPM | HEIGHT: 74 IN

## 2017-02-07 DIAGNOSIS — M95.4 CHEST WALL DEFORMITY: Primary | ICD-10-CM

## 2017-02-07 DIAGNOSIS — I10 ESSENTIAL HYPERTENSION: ICD-10-CM

## 2017-02-07 PROCEDURE — 99213 OFFICE O/P EST LOW 20 MIN: CPT | Performed by: FAMILY MEDICINE

## 2017-02-07 RX ORDER — AMLODIPINE BESYLATE 10 MG/1
10 TABLET ORAL DAILY
Qty: 90 TABLET | Refills: 3 | Status: SHIPPED | OUTPATIENT
Start: 2017-02-07 | End: 2019-06-07

## 2017-02-07 NOTE — PROGRESS NOTES
Subjective   Herbert White is a 51 y.o. male.     History of Present Illness     Patient pleased with dr fonseca.  Changes made in his meds.  nolonger on hctz.  Taking jardiance that will also act as diuretic and help lower bp.  He was started on amlodopine 1 week ago.  bp not at goal but better.  No leg swelling  Has lost 50 lbs or so.  Has noticed xiphoid process is quite large and protruding.  It is not really painful.     Review of Systems   Constitutional: Positive for fatigue.   Cardiovascular: Positive for chest pain.   Endocrine: Positive for polydipsia and polyuria.   Skin: Negative for pallor.   Neurological: Positive for tremors. Negative for dizziness, seizures, speech difficulty and headaches.   Psychiatric/Behavioral: Negative for confusion. The patient is nervous/anxious.    All other systems reviewed and are negative.      Objective   Physical Exam   Constitutional: He is oriented to person, place, and time. He appears well-developed and well-nourished.   HENT:   Head: Normocephalic and atraumatic.   Right Ear: External ear normal.   Left Ear: External ear normal.   Nose: Nose normal.   Eyes: Conjunctivae and EOM are normal. Pupils are equal, round, and reactive to light.   Neck: Normal range of motion.   Pulmonary/Chest: Effort normal.   Musculoskeletal: Normal range of motion.   Neurological: He is alert and oriented to person, place, and time.   Skin:   No edema legs.  Xiphoid process is quite large and not red or swelling.   Psychiatric: He has a normal mood and affect. His behavior is normal. Judgment and thought content normal.   Nursing note and vitals reviewed.      Assessment/Plan   Herbert was seen today for follow-up.    Diagnoses and all orders for this visit:    Chest wall deformity  -     XR sternum pa and lateral    Essential hypertension  -     amLODIPine (NORVASC) 10 MG tablet; Take 1 tablet by mouth Daily.      Increased norvasc to 10mg qd.    Waiting for radiologist opinion on  xiphoid process.  Suspect benign.  Was large due to weight gain, now noticeable due to weight loss.

## 2017-03-21 RX ORDER — FLUOXETINE HYDROCHLORIDE 20 MG/1
20-60 CAPSULE ORAL DAILY
Qty: 270 CAPSULE | Refills: 3 | Status: SHIPPED | OUTPATIENT
Start: 2017-03-21 | End: 2019-05-31

## 2018-01-13 ENCOUNTER — APPOINTMENT (OUTPATIENT)
Dept: ULTRASOUND IMAGING | Facility: HOSPITAL | Age: 53
End: 2018-01-13

## 2018-01-13 ENCOUNTER — APPOINTMENT (OUTPATIENT)
Dept: CT IMAGING | Facility: HOSPITAL | Age: 53
End: 2018-01-13

## 2018-01-13 ENCOUNTER — HOSPITAL ENCOUNTER (EMERGENCY)
Facility: HOSPITAL | Age: 53
Discharge: HOME | End: 2018-01-13
Attending: FAMILY MEDICINE | Admitting: FAMILY MEDICINE

## 2018-01-13 VITALS
BODY MASS INDEX: 34.01 KG/M2 | OXYGEN SATURATION: 93 % | HEIGHT: 74 IN | DIASTOLIC BLOOD PRESSURE: 98 MMHG | HEART RATE: 71 BPM | WEIGHT: 265 LBS | TEMPERATURE: 98 F | RESPIRATION RATE: 20 BRPM | SYSTOLIC BLOOD PRESSURE: 155 MMHG

## 2018-01-13 DIAGNOSIS — R10.31 GROIN PAIN, RIGHT: Primary | ICD-10-CM

## 2018-01-13 DIAGNOSIS — N50.3 EPIDIDYMAL CYST: ICD-10-CM

## 2018-01-13 LAB
ALBUMIN SERPL-MCNC: 4.3 G/DL (ref 3.4–4.8)
ALBUMIN/GLOB SERPL: 1.5 G/DL (ref 1.1–1.8)
ALP SERPL-CCNC: 57 U/L (ref 38–126)
ALT SERPL W P-5'-P-CCNC: 26 U/L (ref 21–72)
ANION GAP SERPL CALCULATED.3IONS-SCNC: 14 MMOL/L (ref 5–15)
AST SERPL-CCNC: 24 U/L (ref 17–59)
BASOPHILS # BLD AUTO: 0.03 10*3/MM3 (ref 0–0.2)
BASOPHILS NFR BLD AUTO: 0.5 % (ref 0–2)
BILIRUB SERPL-MCNC: 1 MG/DL (ref 0.2–1.3)
BILIRUB UR QL STRIP: ABNORMAL
BUN BLD-MCNC: 17 MG/DL (ref 7–21)
BUN/CREAT SERPL: 29.8 (ref 7–25)
CALCIUM SPEC-SCNC: 9.4 MG/DL (ref 8.4–10.2)
CHLORIDE SERPL-SCNC: 104 MMOL/L (ref 95–110)
CLARITY UR: CLEAR
CO2 SERPL-SCNC: 24 MMOL/L (ref 22–31)
COLOR UR: YELLOW
CREAT BLD-MCNC: 0.57 MG/DL (ref 0.7–1.3)
DEPRECATED RDW RBC AUTO: 36.7 FL (ref 35.1–43.9)
EOSINOPHIL # BLD AUTO: 0.21 10*3/MM3 (ref 0–0.7)
EOSINOPHIL NFR BLD AUTO: 3.3 % (ref 0–7)
ERYTHROCYTE [DISTWIDTH] IN BLOOD BY AUTOMATED COUNT: 13.1 % (ref 11.5–14.5)
GFR SERPL CREATININE-BSD FRML MDRD: 150 ML/MIN/1.73 (ref 60–130)
GLOBULIN UR ELPH-MCNC: 2.9 GM/DL (ref 2.3–3.5)
GLUCOSE BLD-MCNC: 189 MG/DL (ref 60–100)
GLUCOSE UR STRIP-MCNC: ABNORMAL MG/DL
HCT VFR BLD AUTO: 45.5 % (ref 39–49)
HGB BLD-MCNC: 16.5 G/DL (ref 13.7–17.3)
HGB UR QL STRIP.AUTO: NEGATIVE
HOLD SPECIMEN: NORMAL
HOLD SPECIMEN: NORMAL
IMM GRANULOCYTES # BLD: 0.02 10*3/MM3 (ref 0–0.02)
IMM GRANULOCYTES NFR BLD: 0.3 % (ref 0–0.5)
KETONES UR QL STRIP: ABNORMAL
LEUKOCYTE ESTERASE UR QL STRIP.AUTO: NEGATIVE
LIPASE SERPL-CCNC: 263 U/L (ref 23–300)
LYMPHOCYTES # BLD AUTO: 2.41 10*3/MM3 (ref 0.6–4.2)
LYMPHOCYTES NFR BLD AUTO: 38.3 % (ref 10–50)
MCH RBC QN AUTO: 28.9 PG (ref 26.5–34)
MCHC RBC AUTO-ENTMCNC: 36.7 G/DL (ref 31.5–36.3)
MCV RBC AUTO: 78.9 FL (ref 80–98)
MONOCYTES # BLD AUTO: 0.52 10*3/MM3 (ref 0–0.9)
MONOCYTES NFR BLD AUTO: 8.3 % (ref 0–12)
NEUTROPHILS # BLD AUTO: 3.11 10*3/MM3 (ref 2–8.6)
NEUTROPHILS NFR BLD AUTO: 49.3 % (ref 37–80)
NITRITE UR QL STRIP: NEGATIVE
NRBC BLD MANUAL-RTO: 0 /100 WBC (ref 0–0)
PH UR STRIP.AUTO: 5.5 [PH] (ref 5–9)
PLATELET # BLD AUTO: 197 10*3/MM3 (ref 150–450)
PMV BLD AUTO: 10.6 FL (ref 8–12)
POTASSIUM BLD-SCNC: 3.9 MMOL/L (ref 3.5–5.1)
PROT SERPL-MCNC: 7.2 G/DL (ref 6.3–8.6)
PROT UR QL STRIP: ABNORMAL
RBC # BLD AUTO: 5.77 10*6/MM3 (ref 4.37–5.74)
SODIUM BLD-SCNC: 142 MMOL/L (ref 137–145)
SP GR UR STRIP: 1.02 (ref 1–1.03)
UROBILINOGEN UR QL STRIP: ABNORMAL
WBC NRBC COR # BLD: 6.3 10*3/MM3 (ref 3.2–9.8)
WHOLE BLOOD HOLD SPECIMEN: NORMAL
WHOLE BLOOD HOLD SPECIMEN: NORMAL

## 2018-01-13 PROCEDURE — 80053 COMPREHEN METABOLIC PANEL: CPT | Performed by: FAMILY MEDICINE

## 2018-01-13 PROCEDURE — 81003 URINALYSIS AUTO W/O SCOPE: CPT | Performed by: FAMILY MEDICINE

## 2018-01-13 PROCEDURE — 76870 US EXAM SCROTUM: CPT

## 2018-01-13 PROCEDURE — 83690 ASSAY OF LIPASE: CPT | Performed by: FAMILY MEDICINE

## 2018-01-13 PROCEDURE — 93976 VASCULAR STUDY: CPT

## 2018-01-13 PROCEDURE — 96374 THER/PROPH/DIAG INJ IV PUSH: CPT

## 2018-01-13 PROCEDURE — 99284 EMERGENCY DEPT VISIT MOD MDM: CPT

## 2018-01-13 PROCEDURE — 25010000002 KETOROLAC TROMETHAMINE PER 15 MG: Performed by: FAMILY MEDICINE

## 2018-01-13 PROCEDURE — 74176 CT ABD & PELVIS W/O CONTRAST: CPT

## 2018-01-13 PROCEDURE — 85025 COMPLETE CBC W/AUTO DIFF WBC: CPT | Performed by: FAMILY MEDICINE

## 2018-01-13 PROCEDURE — 96361 HYDRATE IV INFUSION ADD-ON: CPT

## 2018-01-13 RX ORDER — KETOROLAC TROMETHAMINE 30 MG/ML
30 INJECTION, SOLUTION INTRAMUSCULAR; INTRAVENOUS ONCE
Status: COMPLETED | OUTPATIENT
Start: 2018-01-13 | End: 2018-01-13

## 2018-01-13 RX ORDER — HYDROCODONE BITARTRATE AND ACETAMINOPHEN 10; 325 MG/1; MG/1
1 TABLET ORAL ONCE
Status: COMPLETED | OUTPATIENT
Start: 2018-01-13 | End: 2018-01-13

## 2018-01-13 RX ORDER — HYDROCODONE BITARTRATE AND ACETAMINOPHEN 7.5; 325 MG/1; MG/1
1 TABLET ORAL EVERY 8 HOURS PRN
Qty: 10 TABLET | Refills: 0 | Status: SHIPPED | OUTPATIENT
Start: 2018-01-13 | End: 2018-01-16

## 2018-01-13 RX ORDER — SULFAMETHOXAZOLE AND TRIMETHOPRIM 800; 160 MG/1; MG/1
1 TABLET ORAL 2 TIMES DAILY
Qty: 14 TABLET | Refills: 0 | Status: SHIPPED | OUTPATIENT
Start: 2018-01-13 | End: 2018-01-20

## 2018-01-13 RX ORDER — SODIUM CHLORIDE 9 MG/ML
1000 INJECTION, SOLUTION INTRAVENOUS ONCE
Status: COMPLETED | OUTPATIENT
Start: 2018-01-13 | End: 2018-01-13

## 2018-01-13 RX ADMIN — SODIUM CHLORIDE 1000 ML: 9 INJECTION, SOLUTION INTRAVENOUS at 14:49

## 2018-01-13 RX ADMIN — HYDROCODONE BITARTRATE AND ACETAMINOPHEN 1 TABLET: 10; 325 TABLET ORAL at 16:50

## 2018-01-13 RX ADMIN — KETOROLAC TROMETHAMINE 30 MG: 30 INJECTION, SOLUTION INTRAMUSCULAR; INTRAVENOUS at 14:52

## 2018-01-13 NOTE — ED PROVIDER NOTES
Subjective   Patient is a 52 y.o. male presenting with abdominal pain.   History provided by:  Patient  Abdominal Pain   Pain location:  RLQ  Pain quality: aching and dull    Pain radiates to:  Groin  Pain severity:  Severe  Onset quality:  Sudden  Duration:  2 days  Timing:  Constant  Progression:  Worsening  Chronicity:  New  Context: not alcohol use, not awakening from sleep, not diet changes, not medication withdrawal, not previous surgeries, not recent illness, not retching, not sick contacts and not suspicious food intake    Relieved by:  Nothing  Worsened by:  Nothing  Ineffective treatments:  None tried  Associated symptoms: no anorexia, no belching, no chest pain, no chills, no constipation, no cough, no diarrhea, no dysuria, no fatigue, no fever, no flatus, no hematemesis, no melena, no nausea, no shortness of breath, no sore throat and no vaginal bleeding    Risk factors: no alcohol abuse, no aspirin use, has not had multiple surgeries and no NSAID use        Review of Systems   Constitutional: Negative for activity change, chills, diaphoresis, fatigue and fever.   HENT: Negative for congestion, dental problem, drooling and sore throat.    Eyes: Negative for pain, discharge, redness and itching.   Respiratory: Negative for apnea, cough, choking, chest tightness and shortness of breath.    Cardiovascular: Negative for chest pain.   Gastrointestinal: Positive for abdominal pain. Negative for abdominal distention, anal bleeding, anorexia, blood in stool, constipation, diarrhea, flatus, hematemesis, melena and nausea.   Endocrine: Negative for cold intolerance, heat intolerance, polydipsia and polyphagia.   Genitourinary: Negative for difficulty urinating, dysuria, enuresis, flank pain, frequency and vaginal bleeding.   Musculoskeletal: Negative for arthralgias, back pain, gait problem and joint swelling.   Skin: Negative for color change, pallor and rash.   Allergic/Immunologic: Negative for environmental  "allergies, food allergies and immunocompromised state.   Neurological: Negative for dizziness, facial asymmetry, light-headedness, numbness and headaches.   Psychiatric/Behavioral: Negative for agitation, behavioral problems, confusion, decreased concentration, dysphoric mood and hallucinations.       Past Medical History:   Diagnosis Date   • Diabetes mellitus    • Hypertension        Allergies   Allergen Reactions   • Ace Inhibitors Anaphylaxis   • Wellbutrin [Bupropion] Anaphylaxis       Past Surgical History:   Procedure Laterality Date   • CHOLECYSTECTOMY     • TONSILLECTOMY         Family History   Problem Relation Age of Onset   • Diabetes Mother    • Hypertension Mother    • Cancer Mother    • Diabetes Father    • Hypertension Father    • Cancer Father        Social History     Social History   • Marital status:      Spouse name: N/A   • Number of children: N/A   • Years of education: N/A     Social History Main Topics   • Smoking status: Current Every Day Smoker     Packs/day: 0.50     Years: 25.00     Types: Cigarettes   • Smokeless tobacco: None   • Alcohol use No   • Drug use: No   • Sexual activity: Defer     Other Topics Concern   • None     Social History Narrative           Objective    /98 (BP Location: Right arm, Patient Position: Lying)  Pulse 71  Temp 98 °F (36.7 °C) (Oral)   Resp 20  Ht 188 cm (74\")  Wt 120 kg (265 lb)  SpO2 93%  BMI 34.02 kg/m2    Physical Exam   Constitutional: He is oriented to person, place, and time. He appears well-developed and well-nourished.   HENT:   Head: Normocephalic and atraumatic.   Right Ear: External ear normal.   Left Ear: External ear normal.   Nose: Nose normal.   Mouth/Throat: Oropharynx is clear and moist.   Eyes: Conjunctivae and EOM are normal. Pupils are equal, round, and reactive to light.   Neck: Normal range of motion. Neck supple.   Cardiovascular: Normal rate, regular rhythm, normal heart sounds and intact distal pulses.  "   Pulmonary/Chest: Effort normal and breath sounds normal. No accessory muscle usage. No respiratory distress. He exhibits no tenderness and no deformity.   Abdominal: Soft. Bowel sounds are normal. There is tenderness in the right lower quadrant.       Musculoskeletal: Normal range of motion.   Neurological: He is alert and oriented to person, place, and time. He has normal reflexes.   Skin: Skin is warm.   Psychiatric: He has a normal mood and affect. His behavior is normal. Judgment and thought content normal.   Nursing note and vitals reviewed.      Procedures         ED Course  ED Course   Comment By Time   Awaiting us testicles /scrotum . Celena Elizabeth MD 01/13 7531     Labs Reviewed   COMPREHENSIVE METABOLIC PANEL - Abnormal; Notable for the following:        Result Value    Glucose 189 (*)     Creatinine 0.57 (*)     BUN/Creatinine Ratio 29.8 (*)     All other components within normal limits   URINALYSIS W/ CULTURE IF INDICATED - Abnormal; Notable for the following:     Glucose, UA >=1000 mg/dL (3+) (*)     Ketones, UA Trace (*)     Bilirubin, UA Small (1+) (*)     Protein, UA Trace (*)     All other components within normal limits    Narrative:     Urine microscopic not indicated.   CBC WITH AUTO DIFFERENTIAL - Abnormal; Notable for the following:     RBC 5.77 (*)     MCV 78.9 (*)     MCHC 36.7 (*)     All other components within normal limits   LIPASE - Normal   RAINBOW DRAW    Narrative:     The following orders were created for panel order [facility] Draw.  Procedure                               Abnormality         Status                     ---------                               -----------         ------                     Light Blue Top[[id number]]                                    Final result               Green Top (Gel)[[id number]]                                   Final result               Lavender Top[[id number]]                                      Final result               Gold Top -  SST[[id number]]                                   Final result                 Please view results for these tests on the individual orders.   LIGHT BLUE TOP   GREEN TOP   LAVENDER TOP   GOLD TOP - [facility]   CBC AND DIFFERENTIAL    Narrative:     The following orders were created for panel order CBC & Differential.  Procedure                               Abnormality         Status                     ---------                               -----------         ------                     CBC Auto Differential[[id number]]        Abnormal            Final result                 Please view results for these tests on the individual orders.     Ct Abdomen Pelvis Without Contrast    Result Date: 1/13/2018  Narrative: EXAMINATION:  Computed Tomography         REGION:    Abdomen / Pelvis                 INDICATION:   right groin pain  HISTORY: SOLOMON. IMAGING:    none        TECHNIQUE:     - reconstructions:    axial, coronal, sagittal     - contrast:      oral:  no ;   intravenous:  no    - Please note:       - Lack of IV contrast limits assessment of solid organ parenchyma, urinary system, or vascular structures.       - Lack of oral contrast limits assessment of GI tract structures and peritoneal disease.      This exam was performed according to the departmental dose-optimization program which includes automated exposure control, adjustment of the mA and/or kV according to patient size and/or use of iterative reconstruction technique.            COMMENTS: THORAX (INFERIOR): The lung bases are clear.   The pleura is without fluid or a mass.   The heart size is normal size and there is no pericardial fluid. ABDOMEN: Limited assessment of the solid organ parenchyma is grossly unremarkable demonstrating no evidence of organomegaly.  Status post cholecystectomy Limited assessment of the viscera is grossly unremarkable demonstrating normal caliber bowel loops.  No evidence of free fluid or free intraperitoneal air.   The osseous  structures are grossly unremarkable for age. RETROPERITONEUM: Limited assessment of the kidneys demonstrates overall normal size.   Limited assessment of the ureters demonstrates normal caliber and course.  The adrenal glands are of normal size and contour.   No gross evidence of significant retroperitoneal adenopathy. The vascular structures are grossly within normal limits for age. PELVIS: Limited assessment of the viscera is grossly unremarkable demonstrating normal caliber bowel loops.   No evidence of free fluid or free intraperitoneal air.   The osseous structures are grossly unremarkable for age.   The vascular structures are grossly within normal limits for age.       .          Impression: CONCLUSION:   1. Limited examination due to the lack of intravenous and oral contrast.      2. Grossly unremarkable.                             Electronically signed by:  CAROLE Storm MD  1/13/2018 3:59 PM CST Workstation: [id number]    Us Scrotum & Testicles    Result Date: 1/13/2018  Narrative: . EXAMINATION:  Ultrasound, scrotal CLINICAL INDICATION:  Right sided pain  HISTORY:  no trauma history elicited  COMPARISON:  none  TECHNIQUE:  scrotal ultrasound with grayscale, color flow, and Doppler imaging.   FINDINGS:    - Right testis:      - size:  normal , measures  4.4 x 2.8 x 3.5 cm      - echotexture:  physiologic, no mass      - blood flow:  grossly wnl  - Right epididymis:     - size:  grossly normal     - echotexture:  physiologic for age     - blood flow:  grossly wnl  - Right hemiscrotum:     - fluid:  Small amount of free fluid     - varicocele:  none  - Left testis:     - size:  normal , measures  3.7 x 2.6 x 4.5 cm     - echotexture:  physiologic, no mass     - blood flow:  grossly wnl   - Left epididymis:     - size:  grossly normal     - echotexture:  physiologic for age     - blood flow:  grossly wnl  - Left hemiscrotum:     - fluid:  Small amount of free fluid     - varicocele:  none  - Misc:   Adjacent to the left epididymis is an indeterminate soft tissue lesion measuring approximately 2 x 1.4 cm, of uncertain etiology. .    Impression: CONCLUSION: 1.  Negative right hemiscrotal examination. 2. Indeterminate soft tissue lesion in the left hemiscrotum adjacent to the epididymis, of uncertain etiology. Suggest 6 month follow-up.    Electronically signed by:  CAROLE Storm MD  1/13/2018 6:42 PM CST Workstation: [id number]    Us Testicular Or Ovarian Vascular Limited    Result Date: 1/13/2018  Narrative: . EXAMINATION:  Ultrasound, scrotal CLINICAL INDICATION:  Right sided pain  HISTORY:  no trauma history elicited  COMPARISON:  none  TECHNIQUE:  scrotal ultrasound with grayscale, color flow, and Doppler imaging.   FINDINGS:    - Right testis:      - size:  normal , measures  4.4 x 2.8 x 3.5 cm      - echotexture:  physiologic, no mass      - blood flow:  grossly wnl  - Right epididymis:     - size:  grossly normal     - echotexture:  physiologic for age     - blood flow:  grossly wnl  - Right hemiscrotum:     - fluid:  Small amount of free fluid     - varicocele:  none  - Left testis:     - size:  normal , measures  3.7 x 2.6 x 4.5 cm     - echotexture:  physiologic, no mass     - blood flow:  grossly wnl   - Left epididymis:     - size:  grossly normal     - echotexture:  physiologic for age     - blood flow:  grossly wnl  - Left hemiscrotum:     - fluid:  Small amount of free fluid     - varicocele:  none  - Misc:  Adjacent to the left epididymis is an indeterminate soft tissue lesion measuring approximately 2 x 1.4 cm, of uncertain etiology. .    Impression: CONCLUSION: 1.  Negative right hemiscrotal examination. 2. Indeterminate soft tissue lesion in the left hemiscrotum adjacent to the epididymis, of uncertain etiology. Suggest 6 month follow-up.    Electronically signed by:  CAROLE Storm MD  1/13/2018 6:42 PM CST Workstation: [id number]    Discussed findings with pt and reassured.   Advised  antibiotics and po analgesic           MDM  Number of Diagnoses or Management Options  Groin pain, right:       Final diagnoses:   Groin pain, right   Epididymal cyst            Celena Elizabeth MD  01/13/18 [id number]

## 2018-01-13 NOTE — ED NOTES
Unable to reassess patient pain at this time due to patient in [place].      Sandra Holman, YUSEF  01/13/18 [id number]

## 2019-05-31 ENCOUNTER — OFFICE VISIT (OUTPATIENT)
Dept: FAMILY MEDICINE CLINIC | Facility: CLINIC | Age: 54
End: 2019-05-31

## 2019-05-31 VITALS
DIASTOLIC BLOOD PRESSURE: 80 MMHG | WEIGHT: 235.8 LBS | SYSTOLIC BLOOD PRESSURE: 130 MMHG | TEMPERATURE: 98.1 F | BODY MASS INDEX: 30.26 KG/M2 | HEIGHT: 74 IN | HEART RATE: 92 BPM | OXYGEN SATURATION: 99 %

## 2019-05-31 DIAGNOSIS — R07.9 CHEST PAIN, UNSPECIFIED TYPE: Primary | ICD-10-CM

## 2019-05-31 DIAGNOSIS — M43.10 PARS DEFECT WITH SPONDYLOLISTHESIS: ICD-10-CM

## 2019-05-31 DIAGNOSIS — M43.00 PARS DEFECT WITH SPONDYLOLISTHESIS: ICD-10-CM

## 2019-05-31 DIAGNOSIS — E11.42 TYPE 2 DIABETES MELLITUS WITH DIABETIC POLYNEUROPATHY, WITH LONG-TERM CURRENT USE OF INSULIN (HCC): ICD-10-CM

## 2019-05-31 DIAGNOSIS — M54.41 CHRONIC BILATERAL LOW BACK PAIN WITH BILATERAL SCIATICA: ICD-10-CM

## 2019-05-31 DIAGNOSIS — Z79.4 TYPE 2 DIABETES MELLITUS WITH DIABETIC POLYNEUROPATHY, WITH LONG-TERM CURRENT USE OF INSULIN (HCC): ICD-10-CM

## 2019-05-31 DIAGNOSIS — Z82.49 FAMILY HISTORY OF HEART ATTACK: ICD-10-CM

## 2019-05-31 DIAGNOSIS — R94.31 ABNORMAL EKG: ICD-10-CM

## 2019-05-31 DIAGNOSIS — M54.42 CHRONIC BILATERAL LOW BACK PAIN WITH BILATERAL SCIATICA: ICD-10-CM

## 2019-05-31 DIAGNOSIS — R53.83 FATIGUE, UNSPECIFIED TYPE: ICD-10-CM

## 2019-05-31 DIAGNOSIS — G89.29 CHRONIC BILATERAL LOW BACK PAIN WITH BILATERAL SCIATICA: ICD-10-CM

## 2019-05-31 PROCEDURE — 96372 THER/PROPH/DIAG INJ SC/IM: CPT | Performed by: NURSE PRACTITIONER

## 2019-05-31 PROCEDURE — 99214 OFFICE O/P EST MOD 30 MIN: CPT | Performed by: NURSE PRACTITIONER

## 2019-05-31 PROCEDURE — 93005 ELECTROCARDIOGRAM TRACING: CPT | Performed by: NURSE PRACTITIONER

## 2019-05-31 PROCEDURE — 93010 ELECTROCARDIOGRAM REPORT: CPT | Performed by: INTERNAL MEDICINE

## 2019-05-31 RX ORDER — KETOROLAC TROMETHAMINE 10 MG/1
10 TABLET, FILM COATED ORAL 3 TIMES DAILY PRN
Refills: 0 | COMMUNITY
Start: 2019-05-25 | End: 2019-06-07 | Stop reason: ALTCHOICE

## 2019-05-31 RX ORDER — TRIAMCINOLONE ACETONIDE 40 MG/ML
80 INJECTION, SUSPENSION INTRA-ARTICULAR; INTRAMUSCULAR ONCE
Status: CANCELLED | OUTPATIENT
Start: 2019-05-31 | End: 2019-05-31

## 2019-05-31 RX ORDER — METHOCARBAMOL 500 MG/1
500 TABLET, FILM COATED ORAL 2 TIMES DAILY PRN
Refills: 0 | COMMUNITY
Start: 2019-05-25 | End: 2019-05-31 | Stop reason: SDUPTHER

## 2019-05-31 RX ORDER — TRIAMCINOLONE ACETONIDE 40 MG/ML
80 INJECTION, SUSPENSION INTRA-ARTICULAR; INTRAMUSCULAR ONCE
Status: COMPLETED | OUTPATIENT
Start: 2019-05-31 | End: 2019-05-31

## 2019-05-31 RX ORDER — METHOCARBAMOL 500 MG/1
500 TABLET, FILM COATED ORAL 2 TIMES DAILY PRN
Qty: 60 TABLET | Refills: 0 | Status: SHIPPED | OUTPATIENT
Start: 2019-05-31 | End: 2019-07-12 | Stop reason: SDUPTHER

## 2019-05-31 RX ADMIN — TRIAMCINOLONE ACETONIDE 80 MG: 40 INJECTION, SUSPENSION INTRA-ARTICULAR; INTRAMUSCULAR at 16:29

## 2019-06-07 ENCOUNTER — APPOINTMENT (OUTPATIENT)
Dept: LAB | Facility: HOSPITAL | Age: 54
End: 2019-06-07

## 2019-06-07 ENCOUNTER — OFFICE VISIT (OUTPATIENT)
Dept: FAMILY MEDICINE CLINIC | Facility: CLINIC | Age: 54
End: 2019-06-07

## 2019-06-07 VITALS
SYSTOLIC BLOOD PRESSURE: 150 MMHG | TEMPERATURE: 97.2 F | BODY MASS INDEX: 30.83 KG/M2 | OXYGEN SATURATION: 98 % | HEART RATE: 74 BPM | DIASTOLIC BLOOD PRESSURE: 100 MMHG | HEIGHT: 74 IN | WEIGHT: 240.2 LBS

## 2019-06-07 DIAGNOSIS — E11.42 TYPE 2 DIABETES MELLITUS WITH DIABETIC POLYNEUROPATHY, WITH LONG-TERM CURRENT USE OF INSULIN (HCC): ICD-10-CM

## 2019-06-07 DIAGNOSIS — Z79.4 TYPE 2 DIABETES MELLITUS WITH DIABETIC POLYNEUROPATHY, WITH LONG-TERM CURRENT USE OF INSULIN (HCC): ICD-10-CM

## 2019-06-07 DIAGNOSIS — I10 ESSENTIAL HYPERTENSION: Primary | ICD-10-CM

## 2019-06-07 DIAGNOSIS — E11.42 DIABETIC POLYNEUROPATHY ASSOCIATED WITH TYPE 2 DIABETES MELLITUS (HCC): ICD-10-CM

## 2019-06-07 PROCEDURE — 99213 OFFICE O/P EST LOW 20 MIN: CPT | Performed by: NURSE PRACTITIONER

## 2019-06-07 PROCEDURE — 80061 LIPID PANEL: CPT | Performed by: NURSE PRACTITIONER

## 2019-06-07 PROCEDURE — 80053 COMPREHEN METABOLIC PANEL: CPT | Performed by: NURSE PRACTITIONER

## 2019-06-07 PROCEDURE — 83036 HEMOGLOBIN GLYCOSYLATED A1C: CPT | Performed by: NURSE PRACTITIONER

## 2019-06-07 PROCEDURE — 85027 COMPLETE CBC AUTOMATED: CPT | Performed by: NURSE PRACTITIONER

## 2019-06-07 RX ORDER — AMLODIPINE BESYLATE 5 MG/1
5 TABLET ORAL DAILY
Status: CANCELLED | OUTPATIENT
Start: 2019-06-07

## 2019-06-07 RX ORDER — AMITRIPTYLINE HYDROCHLORIDE 10 MG/1
10 TABLET, FILM COATED ORAL NIGHTLY
Qty: 30 TABLET | Refills: 0 | Status: SHIPPED | OUTPATIENT
Start: 2019-06-07 | End: 2019-07-12 | Stop reason: SDUPTHER

## 2019-06-07 RX ORDER — AMLODIPINE BESYLATE 5 MG/1
10 TABLET ORAL DAILY
Qty: 60 TABLET | Refills: 0 | Status: SHIPPED | OUTPATIENT
Start: 2019-06-07 | End: 2019-07-12 | Stop reason: SDUPTHER

## 2019-06-07 NOTE — PATIENT INSTRUCTIONS
Type 2 Diabetes Mellitus, Diagnosis, Adult  Type 2 diabetes (type 2 diabetes mellitus) is a long-term (chronic) disease. It may be caused by one or both of these problems:  Your pancreas does not make enough of a hormone called insulin.  Your body does not react in a normal way to insulin that it makes.    Insulin lets sugars (glucose) go into cells in your body. This gives you energy. If you have type 2 diabetes, sugars cannot get into cells. This causes high blood sugar (hyperglycemia).  Your doctor will set treatment goals for you. Generally, you should have these blood sugar levels:  Before meals (preprandial):  mg/dL (4.4-7.2 mmol/L).  After meals (postprandial): below 180 mg/dL (10 mmol/L).  A1c (hemoglobin A1c) level: less than 7%.    Follow these instructions at home:  Questions to ask your doctor  You may want to ask these questions:  Do I need to meet with a diabetes educator?  Where can I find a support group for people with diabetes?  What equipment will I need to care for myself at home?  What diabetes medicines do I need? When should I take them?  How often do I need to check my blood sugar?  What number can I call if I have questions?  When is my next doctor's visit?  General instructions  Take over-the-counter and prescription medicines only as told by your doctor.  Keep all follow-up visits as told by your doctor. This is important.  Contact a doctor if:  Your blood sugar is at or above 240 mg/dL (13.3 mmol/L) for 2 days in a row.  You have been sick for 2 days or more, and you are not getting better.  You have had a fever for 2 days or more, and you are not getting better.  You have any of these problems for more than 6 hours:  You cannot eat or drink.  You feel sick to your stomach (nauseous).  You throw up (vomit).  You have watery poop (diarrhea).  Get help right away if:  Your blood sugar is lower than 54 mg/dL (3 mmol/L).  You get confused.  You have trouble:  Thinking  clearly.  Breathing.  You have moderate or large ketone levels in your pee (urine).  Summary  Type 2 diabetes is a long-term (chronic) disease. Your pancreas may not make enough of a hormone called insulin, or your body may not react normally to insulin that it makes.  Take over-the-counter and prescription medicines only as told by your doctor.  Keep all follow-up visits as told by your doctor. This is important.  This information is not intended to replace advice given to you by your health care provider. Make sure you discuss any questions you have with your health care provider.  Document Released: 09/26/2009 Document Revised: 07/19/2018 Document Reviewed: 01/20/2017  [employer] Interactive Patient Education © 2019 [employer]  Coping with Quitting Smoking  Quitting smoking is a physical and mental challenge. You will face cravings, withdrawal symptoms, and temptation. Before quitting, work with your health care provider to make a plan that can help you cope. Preparation can help you quit and keep you from giving in.  How can I cope with cravings?  Cravings usually last for 5-10 minutes. If you get through it, the craving will pass. Consider taking the following actions to help you cope with cravings:  · Keep your mouth busy:  ? Chew sugar-free gum.  ? Suck on hard candies or a straw.  ? Brush your teeth.  · Keep your hands and body busy:  ? Immediately change to a different activity when you feel a craving.  ? Squeeze or play with a ball.  ? Do an activity or a hobby, like making bead jewelry, practicing needlepoint, or working with wood.  ? Mix up your normal routine.  ? Take a short exercise break. Go for a quick walk or run up and down stairs.  ? Spend time in public places where smoking is not allowed.  · Focus on doing something kind or helpful for someone else.  · Call a friend or family member to talk during a craving.  · Join a support group.  · Call a quit line, such as Talkable800-QUIT-NOW.  · Talk with  your health care provider about medicines that might help you cope with cravings and make quitting easier for you.    How can I deal with withdrawal symptoms?  Your body may experience negative effects as it tries to get used to not having nicotine in the system. These effects are called withdrawal symptoms. They may include:  · Feeling hungrier than normal.  · Trouble concentrating.  · Irritability.  · Trouble sleeping.  · Feeling depressed.  · Restlessness and agitation.  · Craving a cigarette.    To manage withdrawal symptoms:  · Avoid places, people, and activities that trigger your cravings.  · Remember why you want to quit.  · Get plenty of sleep.  · Avoid coffee and other caffeinated drinks. These may worsen some of your symptoms.    How can I handle social situations?  Social situations can be difficult when you are quitting smoking, especially in the first few weeks. To manage this, you can:  · Avoid parties, bars, and other social situations where people might be smoking.  · Avoid alcohol.  · Leave right away if you have the urge to smoke.  · Explain to your family and friends that you are quitting smoking. Ask for understanding and support.  · Plan activities with friends or family where smoking is not an option.    What are some ways I can cope with stress?  Wanting to smoke may cause stress, and stress can make you want to smoke. Find ways to manage your stress. Relaxation techniques can help. For example:  · Breathe slowly and deeply, in through your nose and out through your mouth.  · Listen to soothing, relaxing music.  · Talk with a family member or friend about your stress.  · Light a candle.  · Soak in a bath or take a shower.  · Think about a peaceful place.    What are some ways I can prevent weight gain?  Be aware that many people gain weight after they quit smoking. However, not everyone does. To keep from gaining weight, have a plan in place before you quit and stick to the plan after you  quit. Your plan should include:  · Having healthy snacks. When you have a craving, it may help to:  ? Eat plain popcorn, crunchy carrots, celery, or other cut vegetables.  ? Chew sugar-free gum.  · Changing how you eat:  ? Eat small portion sizes at meals.  ? Eat 4-6 small meals throughout the day instead of 1-2 large meals a day.  ? Be mindful when you eat. Do not watch television or do other things that might distract you as you eat.  · Exercising regularly:  ? Make time to exercise each day. If you do not have time for a long workout, do short bouts of exercise for 5-10 minutes several times a day.  ? Do some form of strengthening exercise, like weight lifting, and some form of aerobic exercise, like running or swimming.  · Drinking plenty of water or other low-calorie or no-calorie drinks. Drink 6-8 glasses of water daily, or as much as instructed by your health care provider.    Summary  · Quitting smoking is a physical and mental challenge. You will face cravings, withdrawal symptoms, and temptation to smoke again. Preparation can help you as you go through these challenges.  · You can cope with cravings by keeping your mouth busy (such as by chewing gum), keeping your body and hands busy, and making calls to family, friends, or a helpline for people who want to quit smoking.  · You can cope with withdrawal symptoms by avoiding places where people smoke, avoiding drinks with caffeine, and getting plenty of rest.  · Ask your health care provider about the different ways to prevent weight gain, avoid stress, and handle social situations.  This information is not intended to replace advice given to you by your health care provider. Make sure you discuss any questions you have with your health care provider.  Document Released: 12/15/2017 Document Revised: 12/15/2017 Document Reviewed: 12/15/2017  [employer] Interactive Patient Education © 2019 [employer]  Hypertension  Hypertension, commonly called high blood  "pressure, is when the force of blood pumping through the arteries is too strong. The arteries are the blood vessels that carry blood from the heart throughout the body. Hypertension forces the heart to work harder to pump blood and may cause arteries to become narrow or stiff. Having untreated or uncontrolled hypertension can cause heart attacks, strokes, kidney disease, and other problems.  A blood pressure reading consists of a higher number over a lower number. Ideally, your blood pressure should be below 120/80. The first (\"top\") number is called the systolic pressure. It is a measure of the pressure in your arteries as your heart beats. The second (\"bottom\") number is called the diastolic pressure. It is a measure of the pressure in your arteries as the heart relaxes.  What are the causes?  The cause of this condition is not known.  What increases the risk?  Some risk factors for high blood pressure are under your control. Others are not.  Factors you can change  · Smoking.  · Having type 2 diabetes mellitus, high cholesterol, or both.  · Not getting enough exercise or physical activity.  · Being overweight.  · Having too much fat, sugar, calories, or salt (sodium) in your diet.  · Drinking too much alcohol.  Factors that are difficult or impossible to change  · Having chronic kidney disease.  · Having a family history of high blood pressure.  · Age. Risk increases with age.  · Race. You may be at higher risk if you are -American.  · Gender. Men are at higher risk than women before age 45. After age 65, women are at higher risk than men.  · Having obstructive sleep apnea.  · Stress.  What are the signs or symptoms?  Extremely high blood pressure (hypertensive crisis) may cause:  · Headache.  · Anxiety.  · Shortness of breath.  · Nosebleed.  · Nausea and vomiting.  · Severe chest pain.  · Jerky movements you cannot control (seizures).    How is this diagnosed?  This condition is diagnosed by measuring " your blood pressure while you are seated, with your arm resting on a surface. The cuff of the blood pressure monitor will be placed directly against the skin of your upper arm at the level of your heart. It should be measured at least twice using the same arm. Certain conditions can cause a difference in blood pressure between your right and left arms.  Certain factors can cause blood pressure readings to be lower or higher than normal (elevated) for a short period of time:  · When your blood pressure is higher when you are in a health care provider's office than when you are at home, this is called white coat hypertension. Most people with this condition do not need medicines.  · When your blood pressure is higher at home than when you are in a health care provider's office, this is called masked hypertension. Most people with this condition may need medicines to control blood pressure.    If you have a high blood pressure reading during one visit or you have normal blood pressure with other risk factors:  · You may be asked to return on a different day to have your blood pressure checked again.  · You may be asked to monitor your blood pressure at home for 1 week or longer.    If you are diagnosed with hypertension, you may have other blood or imaging tests to help your health care provider understand your overall risk for other conditions.  How is this treated?  This condition is treated by making healthy lifestyle changes, such as eating healthy foods, exercising more, and reducing your alcohol intake. Your health care provider may prescribe medicine if lifestyle changes are not enough to get your blood pressure under control, and if:  · Your systolic blood pressure is above 130.  · Your diastolic blood pressure is above 80.    Your personal target blood pressure may vary depending on your medical conditions, your age, and other factors.  Follow these instructions at home:  Eating and drinking  · Eat a diet that  is high in fiber and potassium, and low in sodium, added sugar, and fat. An example eating plan is called the DASH (Dietary Approaches to Stop Hypertension) diet. To eat this way:  ? Eat plenty of fresh fruits and vegetables. Try to fill half of your plate at each meal with fruits and vegetables.  ? Eat whole grains, such as whole wheat pasta, brown rice, or whole grain bread. Fill about one quarter of your plate with whole grains.  ? Eat or drink low-fat dairy products, such as skim milk or low-fat yogurt.  ? Avoid fatty cuts of meat, processed or cured meats, and poultry with skin. Fill about one quarter of your plate with lean proteins, such as fish, chicken without skin, beans, eggs, and tofu.  ? Avoid premade and processed foods. These tend to be higher in sodium, added sugar, and fat.  · Reduce your daily sodium intake. Most people with hypertension should eat less than 1,500 mg of sodium a day.  · Limit alcohol intake to no more than 1 drink a day for nonpregnant women and 2 drinks a day for men. One drink equals 12 oz of beer, 5 oz of wine, or 1½ oz of hard liquor.  Lifestyle  · Work with your health care provider to maintain a healthy body weight or to lose weight. Ask what an ideal weight is for you.  · Get at least 30 minutes of exercise that causes your heart to beat faster (aerobic exercise) most days of the week. Activities may include walking, swimming, or biking.  · Include exercise to strengthen your muscles (resistance exercise), such as pilates or lifting weights, as part of your weekly exercise routine. Try to do these types of exercises for 30 minutes at least 3 days a week.  · Do not use any products that contain nicotine or tobacco, such as cigarettes and e-cigarettes. If you need help quitting, ask your health care provider.  · Monitor your blood pressure at home as told by your health care provider.  · Keep all follow-up visits as told by your health care provider. This is  important.  Medicines  · Take over-the-counter and prescription medicines only as told by your health care provider. Follow directions carefully. Blood pressure medicines must be taken as prescribed.  · Do not skip doses of blood pressure medicine. Doing this puts you at risk for problems and can make the medicine less effective.  · Ask your health care provider about side effects or reactions to medicines that you should watch for.  Contact a health care provider if:  · You think you are having a reaction to a medicine you are taking.  · You have headaches that keep coming back (recurring).  · You feel dizzy.  · You have swelling in your ankles.  · You have trouble with your vision.  Get help right away if:  · You develop a severe headache or confusion.  · You have unusual weakness or numbness.  · You feel faint.  · You have severe pain in your chest or abdomen.  · You vomit repeatedly.  · You have trouble breathing.  Summary  · Hypertension is when the force of blood pumping through your arteries is too strong. If this condition is not controlled, it may put you at risk for serious complications.  · Your personal target blood pressure may vary depending on your medical conditions, your age, and other factors. For most people, a normal blood pressure is less than 120/80.  · Hypertension is treated with lifestyle changes, medicines, or a combination of both. Lifestyle changes include weight loss, eating a healthy, low-sodium diet, exercising more, and limiting alcohol.  This information is not intended to replace advice given to you by your health care provider. Make sure you discuss any questions you have with your health care provider.  Document Released: 12/18/2006 Document Revised: 11/15/2017 Document Reviewed: 11/15/2017  [employer] Interactive Patient Education © 2019 [employer]

## 2019-06-07 NOTE — PROGRESS NOTES
Subjective   Herbert White . is a 53 y.o. male. Physical exam.    History of Present Illness Patient is here today for annual physical exam and lab work. He has a history of Type II DM, essential hypertension, and depression. He has not been taking any of his medications for diabetes or high blood pressure because he cant afford them. 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 Taking Robaxin, says it provides some pain relief, has appointment with Dr. Kiran for further evaluation. Has appointment with cardiology for further evaluation of chest pain and abnormal ekg. His only concerns today are a spot on his second toe (left foot) and numbness and pain in his feet. The numbness and pain in his feet he says has been going on for several months now.     The following portions of the patient's history were reviewed and updated as appropriate: allergies, current medications, past family history, past medical history, past social history, past surgical history and problem list.    Review of Systems   Constitutional: Negative for chills, fatigue and fever.   HENT: Negative for congestion, ear pain, rhinorrhea and sore throat.    Eyes: Negative for blurred vision, double vision and visual disturbance.   Respiratory: Negative for cough, chest tightness, shortness of breath and wheezing.    Cardiovascular: Negative for chest pain, palpitations and leg swelling.   Gastrointestinal: Negative for abdominal pain, diarrhea, nausea and vomiting.   Endocrine: Negative for cold intolerance and heat intolerance.   Genitourinary: Negative for difficulty urinating, dysuria, frequency and hematuria.   Musculoskeletal: Positive for back pain. Negative for arthralgias, neck pain and neck stiffness.   Skin: Positive for skin lesions. Negative for dry skin, pallor, rash and bruise.   Allergic/Immunologic: Negative for environmental allergies, food allergies and immunocompromised state.    Neurological: Positive for numbness (numbness and tingling in his feet). Negative for dizziness, syncope, weakness, light-headedness, headache and confusion.   Hematological: Negative for adenopathy. Does not bruise/bleed easily.   Psychiatric/Behavioral: Negative for self-injury, sleep disturbance, suicidal ideas, depressed mood and stress. The patient is not nervous/anxious.        Objective   Physical Exam   Constitutional: He is oriented to person, place, and time. He appears well-developed and well-nourished. He is cooperative. He does not have a sickly appearance. He does not appear ill. No distress.   HENT:   Head: Normocephalic.   Right Ear: Hearing, tympanic membrane, external ear and ear canal normal.   Left Ear: Hearing, tympanic membrane, external ear and ear canal normal.   Nose: Nose normal.   Mouth/Throat: Uvula is midline, oropharynx is clear and moist and mucous membranes are normal.   Eyes: Conjunctivae, EOM and lids are normal. Pupils are equal, round, and reactive to light.   Neck: Trachea normal, normal range of motion, full passive range of motion without pain and phonation normal. Neck supple. No thyroid mass and no thyromegaly present.   Cardiovascular: Normal rate, regular rhythm, S1 normal, S2 normal and normal heart sounds.   No murmur heard.  Pulses:       Dorsalis pedis pulses are 1+ on the right side, and 1+ on the left side.        Posterior tibial pulses are 1+ on the right side, and 1+ on the left side.   Pulmonary/Chest: Effort normal and breath sounds normal. No respiratory distress. He has no wheezes. He has no rhonchi. He has no rales.   Abdominal: Soft. Normal appearance. There is no tenderness.    Herbert had a diabetic foot exam performed today.   During the foot exam he had a monofilament test performed.    Neurological Sensory Findings - Unaltered hot/cold right ankle/foot discrimination and unaltered hot/cold left ankle/foot discrimination. Altered sharp/dull right  ankle/foot discrimination and altered sharp/dull left ankle/foot discrimination.  Vascular Status -  His right foot exhibits normal foot vasculature  and no edema. His left foot exhibits normal foot vasculature  and no edema.  Skin Integrity  -  His right foot skin is intact.  He has no right foot onychomycosis, no right foot ulcer and non-callous right foot.His left foot skin is intact.He has callous left foot. He has no left foot onychomycosis and no left foot ulcer..  Neurological: He is alert and oriented to person, place, and time. He has normal strength. No cranial nerve deficit. Coordination and gait normal.   Skin: Skin is warm, dry and intact. He is not diaphoretic. No pallor.   Psychiatric: He has a normal mood and affect. His speech is normal and behavior is normal. Judgment and thought content normal. Cognition and memory are normal.         Assessment/Plan   Herbert was seen today for annual exam.    Diagnoses and all orders for this visit:    Essential hypertension  -     amLODIPine (NORVASC) 5 MG tablet; Take 2 tablets by mouth Daily.    Diabetic polyneuropathy associated with type 2 diabetes mellitus (CMS/[facility] [place])  -     amitriptyline (ELAVIL) 10 MG tablet; Take 1 tablet by mouth Every Night.    Type 2 diabetes mellitus with diabetic polyneuropathy, with long-term current use of insulin (CMS/[facility] [place])    Other orders  -     Cancel: amLODIPine (NORVASC) 5 MG tablet; Take 1 tablet by mouth Daily.    Essential Hypertension- Norvasc 10mg daily, patient has taken Norvasc in the past without any issues, instructed patient to keep blood pressure diary for my review at next appointment, will see back in 2 weeks for recheck.  Type 2 DM w/polyneuropathy- patient is not currently taking any medications for his diabetes because he could not afford his medications, checking hemoglobin a1c today, plan to follow up with lab results. Amitriptylline 10mg nightly for nerve pain.  If symptoms do not improve or worsen, patient  was instructed to return to clinic for further evaluation.         This document has been electronically signed by NELSY Morris on  June 7, 2019 11:22 PM

## 2019-06-10 ENCOUNTER — OFFICE VISIT (OUTPATIENT)
Dept: ORTHOPEDIC SURGERY | Facility: CLINIC | Age: 54
End: 2019-06-10

## 2019-06-10 DIAGNOSIS — M43.16 SPONDYLOLISTHESIS OF LUMBAR REGION: ICD-10-CM

## 2019-06-10 DIAGNOSIS — E11.42 TYPE 2 DIABETES MELLITUS WITH DIABETIC POLYNEUROPATHY, WITH LONG-TERM CURRENT USE OF INSULIN (HCC): Primary | ICD-10-CM

## 2019-06-10 DIAGNOSIS — E78.5 HYPERLIPIDEMIA, UNSPECIFIED HYPERLIPIDEMIA TYPE: ICD-10-CM

## 2019-06-10 DIAGNOSIS — R73.09 ELEVATED HEMOGLOBIN A1C: ICD-10-CM

## 2019-06-10 DIAGNOSIS — Z79.4 TYPE 2 DIABETES MELLITUS WITH DIABETIC POLYNEUROPATHY, WITH LONG-TERM CURRENT USE OF INSULIN (HCC): Primary | ICD-10-CM

## 2019-06-10 DIAGNOSIS — M51.36 DDD (DEGENERATIVE DISC DISEASE), LUMBAR: ICD-10-CM

## 2019-06-10 DIAGNOSIS — M54.5 ACUTE BILATERAL LOW BACK PAIN, WITH SCIATICA PRESENCE UNSPECIFIED: Primary | ICD-10-CM

## 2019-06-10 PROBLEM — M54.50 ACUTE BILATERAL LOW BACK PAIN: Status: ACTIVE | Noted: 2019-06-10

## 2019-06-10 LAB
ALBUMIN SERPL-MCNC: 4.7 G/DL (ref 3.5–5.2)
ALBUMIN/GLOB SERPL: 1.7 G/DL
ALP SERPL-CCNC: 51 U/L (ref 39–117)
ALT SERPL W P-5'-P-CCNC: 11 U/L (ref 1–41)
ANION GAP SERPL CALCULATED.3IONS-SCNC: 15.3 MMOL/L
AST SERPL-CCNC: 15 U/L (ref 1–40)
BILIRUB SERPL-MCNC: 1.5 MG/DL (ref 0.2–1.2)
BUN BLD-MCNC: 10 MG/DL (ref 6–20)
BUN/CREAT SERPL: 13.9 (ref 7–25)
CALCIUM SPEC-SCNC: 10.2 MG/DL (ref 8.6–10.5)
CHLORIDE SERPL-SCNC: 100 MMOL/L (ref 98–107)
CHOLEST SERPL-MCNC: 218 MG/DL (ref 0–200)
CO2 SERPL-SCNC: 23.7 MMOL/L (ref 22–29)
CREAT BLD-MCNC: 0.72 MG/DL (ref 0.76–1.27)
DEPRECATED RDW RBC AUTO: 38.5 FL (ref 37–54)
ERYTHROCYTE [DISTWIDTH] IN BLOOD BY AUTOMATED COUNT: 12.9 % (ref 12.3–15.4)
GFR SERPL CREATININE-BSD FRML MDRD: 114 ML/MIN/1.73
GLOBULIN UR ELPH-MCNC: 2.7 GM/DL
GLUCOSE BLD-MCNC: 154 MG/DL (ref 65–99)
HBA1C MFR BLD: 8.65 % (ref 4.8–5.6)
HCT VFR BLD AUTO: 49 % (ref 37.5–51)
HDLC SERPL-MCNC: 39 MG/DL (ref 40–60)
HGB BLD-MCNC: 16.5 G/DL (ref 13–17.7)
LDLC SERPL CALC-MCNC: 154 MG/DL (ref 0–100)
LDLC/HDLC SERPL: 3.94 {RATIO}
MCH RBC QN AUTO: 28.7 PG (ref 26.6–33)
MCHC RBC AUTO-ENTMCNC: 33.7 G/DL (ref 31.5–35.7)
MCV RBC AUTO: 85.2 FL (ref 79–97)
PLATELET # BLD AUTO: 259 10*3/MM3 (ref 140–450)
PMV BLD AUTO: 12 FL (ref 6–12)
POTASSIUM BLD-SCNC: 4.4 MMOL/L (ref 3.5–5.2)
PROT SERPL-MCNC: 7.4 G/DL (ref 6–8.5)
RBC # BLD AUTO: 5.75 10*6/MM3 (ref 4.14–5.8)
SODIUM BLD-SCNC: 139 MMOL/L (ref 136–145)
TRIGL SERPL-MCNC: 126 MG/DL (ref 0–150)
VLDLC SERPL-MCNC: 25.2 MG/DL (ref 5–40)
WBC NRBC COR # BLD: 8.74 10*3/MM3 (ref 3.4–10.8)

## 2019-06-10 PROCEDURE — 99203 OFFICE O/P NEW LOW 30 MIN: CPT | Performed by: ORTHOPAEDIC SURGERY

## 2019-06-10 RX ORDER — PRAVASTATIN SODIUM 20 MG
20 TABLET ORAL DAILY
Qty: 30 TABLET | Refills: 3 | Status: SHIPPED | OUTPATIENT
Start: 2019-06-10 | End: 2019-09-20

## 2019-06-10 NOTE — PROGRESS NOTES
Herbert White . is a 53 y.o. male   Primary provider:  Artemio Sanchez MD       Chief Complaint   Patient presents with   • Lumbar Spine - Pain   [facility] x[facility]ray     HISTORY OF PRESENT ILLNESS:low back pain. No known injury.  Pain scale today 7/10 Pain radiating down both legs left leg gives out more making him fall.      Pain   This is a new problem. The current episode started 1 to 4 weeks ago. The problem occurs constantly (severe). Associated symptoms include chest pain. Associated symptoms comments: Crushing,stabbing pain low back . The symptoms are aggravated by walking and standing (driving/sitting). He has tried rest for the symptoms. The treatment provided no relief.     53 y complains of sharp pain, low back, states pain radiates down both legs, pain is constant, pain is aggravated with work, states medicines help.    Hot shower helps.  Abrupt.  No fevers/ chills  + numbness of feet  History of diabetes, 10 years, insulin for 10 years  Smoker 1 pack daily 30 years  Drives a fork truck         CONCURRENT MEDICAL HISTORY:    Past Medical History:   Diagnosis Date   • Diabetes mellitus (CMS/HCC)    • Hypertension        Allergies   Allergen Reactions   • Ace Inhibitors Anaphylaxis   • Wellbutrin [Bupropion] Anaphylaxis         Current Outpatient Medications:   •  ALPRAZolam (XANAX) 1 MG tablet, Take 1 mg by mouth 2 (Two) Times a Day As Needed for anxiety., Disp: , Rfl:   •  amitriptyline (ELAVIL) 10 MG tablet, Take 1 tablet by mouth Every Night., Disp: 30 tablet, Rfl: 0  •  amLODIPine (NORVASC) 5 MG tablet, Take 2 tablets by mouth Daily., Disp: 60 tablet, Rfl: 0  •  Glucose Blood (BLOOD GLUCOSE TEST) strip, Use 4 x daily, one touch verio IQ, Disp: 120 each, Rfl: 11  •  insulin aspart (NOVOLOG FLEXPEN) 100 UNIT/ML solution pen-injector sc pen, Up to 50 units three times daily, Disp: 15 pen, Rfl: 11  •  Insulin Degludec (TRESIBA FLEXTOUCH) 200 UNIT/ML solution pen-injector, Inject 120  Units under the skin Every Night., Disp: 6 pen, Rfl: 11  •  Insulin Pen Needle (B-D UF III MINI PEN NEEDLES) 31G X 5 MM misc, Use 4 times daily, Disp: 120 each, Rfl: 11  •  Lancet Devices (ONE TOUCH DELICA LANCING DEV) misc, delica lancing device if approved, otherwise use alternative, Disp: 1 each, Rfl: 11  •  methocarbamol (ROBAXIN) 500 MG tablet, Take 1 tablet by mouth 2 (Two) Times a Day As Needed for Muscle Spasms. for pain, Disp: 60 tablet, Rfl: 0  •  metoprolol succinate XL (TOPROL XL) 100 MG 24 hr tablet, Take 1 tablet by mouth Daily., Disp: 30 tablet, Rfl: 0  •  ONETOUCH DELICA LANCETS 33G misc, 1 each 4 (Four) Times a Day. delica if covered, use alternative if not covered, Disp: 120 each, Rfl: 11    Past Surgical History:   Procedure Laterality Date   • CHOLECYSTECTOMY     • TONSILLECTOMY         Family History   Problem Relation Age of Onset   • Diabetes Mother    • Hypertension Mother    • Cancer Mother    • Diabetes Father    • Hypertension Father    • Cancer Father         Social History     Socioeconomic History   • Marital status:      Spouse name: Not on file   • Number of children: Not on file   • Years of education: Not on file   • Highest education level: Not on file   Tobacco Use   • Smoking status: Current Every Day Smoker     Packs/day: 0.50     Years: 25.00     Pack years: 12.50     Types: Cigarettes   Substance and Sexual Activity   • Alcohol use: No   • Drug use: No   • Sexual activity: Defer        Review of Systems   Constitutional: Negative.    HENT: Positive for postnasal drip.    Eyes: Positive for pain.        Dry eye   Respiratory: Negative.    Cardiovascular: Positive for chest pain.        Irregular heartbeat   Gastrointestinal: Negative.    Endocrine: Negative.    Genitourinary: Negative.    Musculoskeletal: Positive for back pain and gait problem.   Skin: Negative.    Allergic/Immunologic: Negative.    Hematological: Negative.    Psychiatric/Behavioral: The patient is  nervous/anxious.        PHYSICAL EXAMINATION:       There were no vitals taken for this visit.    Physical Exam   Constitutional: He appears well-developed.   HENT:   Head: Normocephalic and atraumatic.   Eyes: Pupils are equal, round, and reactive to light. Right eye exhibits no discharge. Left eye exhibits no discharge.   Neck: Normal range of motion. No JVD present. No tracheal deviation present. No thyromegaly present.   Cardiovascular: Normal rate and intact distal pulses. Exam reveals no gallop and no friction rub.   No murmur heard.  Pulmonary/Chest: Effort normal and breath sounds normal. No respiratory distress. He has no wheezes. He has no rales. He exhibits no tenderness.   Abdominal: Soft. Bowel sounds are normal. He exhibits no distension. There is no tenderness. There is no guarding.   Musculoskeletal: He exhibits no edema, tenderness or deformity.   Lymphadenopathy:     He has no cervical adenopathy.   Neurological: He is alert. He has normal reflexes. He displays normal reflexes. No cranial nerve deficit. Coordination normal.   Skin: Skin is warm. Capillary refill takes less than 2 seconds. No rash noted. No erythema.   Psychiatric: He has a normal mood and affect. His behavior is normal. Thought content normal.       GAIT:     []  Normal  [x]  Antalgic    Assistive device: [x]  None  []  Walker     []  Crutches  []  Cane     []  Wheelchair  []  Stretcher    Back Exam     Tenderness   The patient is experiencing tenderness in the lumbar.    Range of Motion   Extension: 10   Flexion: 70   Lateral bend right: 10   Lateral bend left: 10   Rotation right: 10   Rotation left: 10     Muscle Strength   Right Quadriceps:  5/5   Left Quadriceps:  5/5   Right Hamstrings:  5/5   Left Hamstrings:  5/5     Tests   Straight leg raise right: negative  Straight leg raise left: negative    Other   Gait: normal   Erythema: no back redness    Comments:  Very cautious gait              L_SPINE 2/3 views   5/25/19  The  pedicles and transverse processes are normal. The interspaces are well-maintained.  This patient has bilateral pars defect at L5 and a Grade 1 Spondylolisthesis of L5 on S1 .        ASSESSMENT:    Diagnoses and all orders for this visit:    Acute bilateral low back pain, with sciatica presence unspecified  -     MRI Lumbar Spine Without Contrast; Future    Spondylolisthesis of lumbar region  -     MRI Lumbar Spine Without Contrast; Future    DDD (degenerative disc disease), lumbar  -     MRI Lumbar Spine Without Contrast; Future          PLAN    Recommend MRI of lumbar spine        Gaudencio Kiran MD

## 2019-06-12 ENCOUNTER — TELEPHONE (OUTPATIENT)
Dept: FAMILY MEDICINE CLINIC | Facility: CLINIC | Age: 54
End: 2019-06-12

## 2019-06-12 DIAGNOSIS — E11.9 DIABETIC EYE EXAM (HCC): Primary | ICD-10-CM

## 2019-06-12 DIAGNOSIS — Z01.00 DIABETIC EYE EXAM (HCC): Primary | ICD-10-CM

## 2019-06-12 NOTE — TELEPHONE ENCOUNTER
PT CAME IN TODAY FOR BP CHECK    122/80  YOU RECENTLY PUT HIM ON BP MEDICINE    TOLD HIM IF HE CAN FIND HIS BP MACHINE AT HOME TO TRY AND KEEP A DAILY LOG OF IT TO MAKE SURE IT STAYS DOWN

## 2019-06-13 ENCOUNTER — HOSPITAL ENCOUNTER (OUTPATIENT)
Dept: MRI IMAGING | Facility: HOSPITAL | Age: 54
Discharge: HOME | End: 2019-06-13
Admitting: ORTHOPAEDIC SURGERY

## 2019-06-13 DIAGNOSIS — M54.5 ACUTE BILATERAL LOW BACK PAIN, WITH SCIATICA PRESENCE UNSPECIFIED: ICD-10-CM

## 2019-06-13 DIAGNOSIS — M43.16 SPONDYLOLISTHESIS OF LUMBAR REGION: ICD-10-CM

## 2019-06-13 DIAGNOSIS — M51.36 DDD (DEGENERATIVE DISC DISEASE), LUMBAR: ICD-10-CM

## 2019-06-13 PROCEDURE — 72148 MRI LUMBAR SPINE W/O DYE: CPT

## 2019-06-14 NOTE — TELEPHONE ENCOUNTER
I will place referral for diabetic eye exam. If he is not having any problems with his bowels and no personal or family history of colon cancer they will want us to do stool card. If he is having any issues with bowel function and/or has personal or family history then I can send referral for colonoscopy. I just need to know what problems or significant history of colon cancer pertains to him. Has he ever had colonoscopy? If so, did he have any polyps?

## 2019-06-21 ENCOUNTER — OFFICE VISIT (OUTPATIENT)
Dept: FAMILY MEDICINE CLINIC | Facility: CLINIC | Age: 54
End: 2019-06-21

## 2019-06-21 VITALS
DIASTOLIC BLOOD PRESSURE: 88 MMHG | TEMPERATURE: 96.6 F | SYSTOLIC BLOOD PRESSURE: 140 MMHG | WEIGHT: 232.6 LBS | HEIGHT: 74 IN | BODY MASS INDEX: 29.85 KG/M2 | HEART RATE: 88 BPM | OXYGEN SATURATION: 98 %

## 2019-06-21 DIAGNOSIS — M54.5 ACUTE BILATERAL LOW BACK PAIN, WITH SCIATICA PRESENCE UNSPECIFIED: ICD-10-CM

## 2019-06-21 DIAGNOSIS — I10 ESSENTIAL HYPERTENSION: Primary | ICD-10-CM

## 2019-06-21 PROCEDURE — 99213 OFFICE O/P EST LOW 20 MIN: CPT | Performed by: NURSE PRACTITIONER

## 2019-06-21 NOTE — PROGRESS NOTES
"Subjective   Herbert White . is a 53 y.o. male. Recheck BP    History of Present Illness Patient is here today for a recheck. At his last visit he was started on Norvasc for his blood pressure. He denies any bothersome side effects from it and his blood pressure is better today at 140/88. Reports he continues to have the back pain that radiates down his legs. He saw Dr. Kiran and is waiting to here about his MRI results and the plan. Reports the robaxin helps some with his back pain. He wants to know if I can give him \"stronger pain meds\" today. I informed him that I can not give controlled pain medications but he can take 600mg ibuprofen every 8 hours as needed. I informed him that he needs to talk to Dr. Silva office about his MRI results and plan.    The following portions of the patient's history were reviewed and updated as appropriate: allergies, current medications, past family history, past medical history, past social history, past surgical history and problem list.    Review of Systems   Constitutional: Negative for chills, fatigue and fever.   HENT: Negative for congestion, ear pain, rhinorrhea and sore throat.    Eyes: Negative for blurred vision, double vision and visual disturbance.   Respiratory: Negative for cough, chest tightness, shortness of breath and wheezing.    Cardiovascular: Negative for chest pain, palpitations and leg swelling.   Gastrointestinal: Negative for abdominal pain, diarrhea, nausea and vomiting.   Endocrine: Negative for cold intolerance and heat intolerance.   Genitourinary: Negative for difficulty urinating, dysuria, frequency and hematuria.   Musculoskeletal: Positive for back pain. Negative for arthralgias, neck pain and neck stiffness.   Skin: Negative for dry skin, pallor, rash, skin lesions and bruise.   Allergic/Immunologic: Negative for environmental allergies, food allergies and immunocompromised state.   Neurological: Negative for dizziness, syncope, " weakness, light-headedness, headache and confusion.   Hematological: Negative for adenopathy. Does not bruise/bleed easily.   Psychiatric/Behavioral: Negative for self-injury, sleep disturbance, suicidal ideas, depressed mood and stress. The patient is not nervous/anxious.        Objective   Physical Exam   Constitutional: He is oriented to person, place, and time. Vital signs are normal. He appears well-developed and well-nourished. He is cooperative. He does not have a sickly appearance. He does not appear ill. No distress.   HENT:   Head: Normocephalic.   Eyes: Conjunctivae, EOM and lids are normal. Pupils are equal, round, and reactive to light.   Neck: Carotid bruit is not present.   Cardiovascular: Normal rate, regular rhythm, S1 normal, S2 normal and normal heart sounds.   No murmur heard.  Pulmonary/Chest: Effort normal and breath sounds normal. No respiratory distress. He has no wheezes. He has no rhonchi. He has no rales.   Musculoskeletal:        Lumbar back: He exhibits pain.   Neurological: He is alert and oriented to person, place, and time.   Skin: Skin is warm, dry and intact. He is not diaphoretic. No pallor.   Psychiatric: He has a normal mood and affect. His speech is normal and behavior is normal. Judgment and thought content normal. Cognition and memory are normal.     Vitals:    06/21/19 1507   BP: 140/88   Pulse: 88   Temp: 96.6 °F (35.9 °C)   SpO2: 98%         Assessment/Plan   Herbert was seen today for follow-up.    Diagnoses and all orders for this visit:    Essential hypertension    Acute bilateral low back pain, with sciatica presence unspecified    Essential Hypertension- blood pressure is much better, Norvasc working well, continue Norvasc at current dose.  Low back pain- instructed patient to take Robaxin and Ibuprofen as needed for the pain, Dr. Silva office ordered MRI and will discuss results and plan with him.  If symptoms do not improve or worsen, patient was instructed to  return to clinic for further evaluation.         This document has been electronically signed by NELSY Morris on  June 22, 2019 4:39 PM

## 2019-06-24 ENCOUNTER — OFFICE VISIT (OUTPATIENT)
Dept: ORTHOPEDIC SURGERY | Facility: CLINIC | Age: 54
End: 2019-06-24

## 2019-06-24 VITALS — HEIGHT: 74 IN | WEIGHT: 235 LBS | BODY MASS INDEX: 30.16 KG/M2

## 2019-06-24 DIAGNOSIS — M51.36 DDD (DEGENERATIVE DISC DISEASE), LUMBAR: ICD-10-CM

## 2019-06-24 DIAGNOSIS — M54.5 ACUTE BILATERAL LOW BACK PAIN, WITH SCIATICA PRESENCE UNSPECIFIED: Primary | ICD-10-CM

## 2019-06-24 DIAGNOSIS — M43.16 SPONDYLOLISTHESIS OF LUMBAR REGION: ICD-10-CM

## 2019-06-24 PROCEDURE — 99213 OFFICE O/P EST LOW 20 MIN: CPT | Performed by: ORTHOPAEDIC SURGERY

## 2019-06-24 NOTE — PROGRESS NOTES
Herbert White Sr. is a 53 y.o. male returns for     Chief Complaint   Patient presents with   • Lumbar Spine - Follow-up, Pain   • Results     Herbert White Sr. is a 53 y.o. male   Primary provider:  Sandra Sorto APRN       Chief Complaint   Patient presents with   • Lumbar Spine - Follow-up, Pain   • Results   [facility] x-ray     HISTORY OF PRESENT ILLNESS:low back pain. No known injury.  Pain scale today 7/10 Pain radiating down both legs left leg gives out more making him fall.      Pain   This is a new problem. The current episode started 1 to 4 weeks ago. The problem occurs constantly (severe). Associated symptoms include chest pain. Associated symptoms comments: Crushing,stabbing pain low back . The symptoms are aggravated by walking and standing (driving/sitting). He has tried rest for the symptoms. The treatment provided no relief.     53 y complains of sharp pain, low back, states pain radiates down both legs, pain is constant, pain is aggravated with work, states medicines help.    Hot shower helps.  Abrupt.  No fevers/ chills  + numbness of feet  History of diabetes, 10 years, insulin for 10 years  Smoker 1 pack daily 30 years  Drives a fork truck         CONCURRENT MEDICAL HISTORY:    Past Medical History:   Diagnosis Date   • Diabetes mellitus (CMS/HCC)    • Hypertension        Allergies   Allergen Reactions   • Ace Inhibitors Anaphylaxis   • Wellbutrin [Bupropion] Anaphylaxis         Current Outpatient Medications:   •  amitriptyline (ELAVIL) 10 MG tablet, Take 1 tablet by mouth Every Night., Disp: 30 tablet, Rfl: 0  •  amLODIPine (NORVASC) 5 MG tablet, Take 2 tablets by mouth Daily., Disp: 60 tablet, Rfl: 0  •  Glucose Blood (BLOOD GLUCOSE TEST) strip, Use 4 x daily, one touch verio IQ, Disp: 120 each, Rfl: 11  •  insulin aspart (NOVOLOG FLEXPEN) 100 UNIT/ML solution pen-injector sc pen, Up to 50 units three times daily, Disp: 15 pen, Rfl: 11  •  Insulin Degludec  (TRESIBA FLEXTOUCH) 200 UNIT/ML solution pen-injector, Inject 120 Units under the skin Every Night., Disp: 6 pen, Rfl: 11  •  Insulin Pen Needle (B-D UF III MINI PEN NEEDLES) 31G X 5 MM misc, Use 4 times daily, Disp: 120 each, Rfl: 11  •  Lancet Devices (ONE TOUCH DELICA LANCING DEV) misc, delica lancing device if approved, otherwise use alternative, Disp: 1 each, Rfl: 11  •  methocarbamol (ROBAXIN) 500 MG tablet, Take 1 tablet by mouth 2 (Two) Times a Day As Needed for Muscle Spasms. for pain, Disp: 60 tablet, Rfl: 0  •  ONETOUCH DELICA LANCETS 33G misc, 1 each 4 (Four) Times a Day. delica if covered, use alternative if not covered, Disp: 120 each, Rfl: 11  •  pravastatin (PRAVACHOL) 20 MG tablet, Take 1 tablet by mouth Daily., Disp: 30 tablet, Rfl: 3    Past Surgical History:   Procedure Laterality Date   • CHOLECYSTECTOMY     • TONSILLECTOMY         Family History   Problem Relation Age of Onset   • Diabetes Mother    • Hypertension Mother    • Cancer Mother    • Diabetes Father    • Hypertension Father    • Cancer Father         Social History     Socioeconomic History   • Marital status:      Spouse name: Not on file   • Number of children: Not on file   • Years of education: Not on file   • Highest education level: Not on file   Tobacco Use   • Smoking status: Current Every Day Smoker     Packs/day: 0.50     Years: 25.00     Pack years: 12.50     Types: Cigarettes   Substance and Sexual Activity   • Alcohol use: No   • Drug use: No   • Sexual activity: Defer        Review of Systems   Constitutional: Negative.    HENT: Positive for postnasal drip.    Eyes: Positive for pain.        Dry eye   Respiratory: Negative.    Cardiovascular: Positive for chest pain.        Irregular heartbeat   Gastrointestinal: Negative.    Endocrine: Negative.    Genitourinary: Negative.    Musculoskeletal: Positive for back pain and gait problem.   Skin: Negative.    Allergic/Immunologic: Negative.    Hematological: Negative.  "   Psychiatric/Behavioral: The patient is nervous/anxious.        PHYSICAL EXAMINATION:       Ht 188 cm (74\")   Wt 107 kg (235 lb)   BMI 30.17 kg/m²     Physical Exam   Constitutional: He appears well-developed.   HENT:   Head: Normocephalic and atraumatic.   Eyes: Pupils are equal, round, and reactive to light. Right eye exhibits no discharge. Left eye exhibits no discharge.   Neck: Normal range of motion. No JVD present. No tracheal deviation present. No thyromegaly present.   Cardiovascular: Normal rate and intact distal pulses. Exam reveals no gallop and no friction rub.   No murmur heard.  Pulmonary/Chest: Effort normal and breath sounds normal. No respiratory distress. He has no wheezes. He has no rales. He exhibits no tenderness.   Abdominal: Soft. Bowel sounds are normal. He exhibits no distension. There is no tenderness. There is no guarding.   Musculoskeletal: He exhibits no edema, tenderness or deformity.   Lymphadenopathy:     He has no cervical adenopathy.   Neurological: He is alert. He has normal reflexes. He displays normal reflexes. No cranial nerve deficit. Coordination normal.   Skin: Skin is warm. Capillary refill takes less than 2 seconds. No rash noted. No erythema.   Psychiatric: He has a normal mood and affect. His behavior is normal. Thought content normal.       GAIT:     []  Normal  [x]  Antalgic    Assistive device: [x]  None  []  Walker     []  Crutches  []  Cane     []  Wheelchair  []  Stretcher    Back Exam     Tenderness   The patient is experiencing tenderness in the lumbar.    Range of Motion   Extension: 10   Flexion: 70   Lateral bend right: 10   Lateral bend left: 10   Rotation right: 10   Rotation left: 10     Muscle Strength   Right Quadriceps:  5/5   Left Quadriceps:  5/5   Right Hamstrings:  5/5   Left Hamstrings:  5/5     Tests   Straight leg raise right: negative  Straight leg raise left: negative    Other   Gait: normal   Erythema: no back redness    Comments:  Very " cautious gait              L_SPINE 2/3 views   5/25/19  The pedicles and transverse processes are normal. The interspaces are well-maintained.  This patient has bilateral pars defect at L5 and a Grade 1 Spondylolisthesis of L5 on S1 .    I reviewed the MRI of the lumbar spine, there is degenerativ changes of L4-5 and l5-S1, moderate facet degenerative changes, no spinal stenosis, moderate degenerative changes of hte disc at L5-S1      ASSESSMENT:    Diagnoses and all orders for this visit:    Acute bilateral low back pain, with sciatica presence unspecified    Spondylolisthesis of lumbar region    DDD (degenerative disc disease), lumbar          PLAN      Reviewed treatment options, schedule for lumbar epidural steroid injection, I reviewed with him the risks including hte risk of infection hematoma spinal fluid leak neurologic symptoms including numbness and weakness and possible worsneing of pain, all questions answered, no guarantees are given.         Gaudencio Kiran MD

## 2019-07-12 DIAGNOSIS — I10 ESSENTIAL HYPERTENSION: ICD-10-CM

## 2019-07-12 DIAGNOSIS — G89.29 CHRONIC BILATERAL LOW BACK PAIN WITH BILATERAL SCIATICA: ICD-10-CM

## 2019-07-12 DIAGNOSIS — M54.42 CHRONIC BILATERAL LOW BACK PAIN WITH BILATERAL SCIATICA: ICD-10-CM

## 2019-07-12 DIAGNOSIS — M54.41 CHRONIC BILATERAL LOW BACK PAIN WITH BILATERAL SCIATICA: ICD-10-CM

## 2019-07-12 DIAGNOSIS — E11.42 DIABETIC POLYNEUROPATHY ASSOCIATED WITH TYPE 2 DIABETES MELLITUS (HCC): ICD-10-CM

## 2019-07-12 RX ORDER — AMLODIPINE BESYLATE 5 MG/1
10 TABLET ORAL DAILY
Qty: 60 TABLET | Refills: 0 | Status: CANCELLED | OUTPATIENT
Start: 2019-07-12

## 2019-07-12 RX ORDER — AMLODIPINE BESYLATE 5 MG/1
10 TABLET ORAL DAILY
Qty: 60 TABLET | Refills: 3 | Status: SHIPPED | OUTPATIENT
Start: 2019-07-12 | End: 2019-07-24

## 2019-07-12 RX ORDER — AMITRIPTYLINE HYDROCHLORIDE 10 MG/1
10 TABLET, FILM COATED ORAL NIGHTLY
Qty: 90 TABLET | Refills: 3 | Status: SHIPPED | OUTPATIENT
Start: 2019-07-12 | End: 2019-07-24

## 2019-07-12 RX ORDER — METHOCARBAMOL 500 MG/1
500 TABLET, FILM COATED ORAL 2 TIMES DAILY PRN
Qty: 60 TABLET | Refills: 1 | Status: SHIPPED | OUTPATIENT
Start: 2019-07-12 | End: 2019-07-24

## 2019-07-12 RX ORDER — METHOCARBAMOL 500 MG/1
500 TABLET, FILM COATED ORAL 2 TIMES DAILY PRN
Qty: 60 TABLET | Refills: 0 | Status: CANCELLED | OUTPATIENT
Start: 2019-07-12

## 2019-07-12 RX ORDER — AMITRIPTYLINE HYDROCHLORIDE 10 MG/1
10 TABLET, FILM COATED ORAL NIGHTLY
Qty: 90 TABLET | Refills: 3 | Status: CANCELLED | OUTPATIENT
Start: 2019-07-12

## 2019-07-24 ENCOUNTER — OFFICE VISIT (OUTPATIENT)
Dept: CARDIOLOGY | Facility: CLINIC | Age: 54
End: 2019-07-24

## 2019-07-24 ENCOUNTER — DOCUMENTATION (OUTPATIENT)
Dept: CARDIOLOGY | Facility: CLINIC | Age: 54
End: 2019-07-24

## 2019-07-24 ENCOUNTER — HOSPITAL ENCOUNTER (OUTPATIENT)
Dept: INTERVENTIONAL RADIOLOGY/VASCULAR | Facility: HOSPITAL | Age: 54
Discharge: HOME | End: 2019-07-24
Admitting: ORTHOPAEDIC SURGERY

## 2019-07-24 VITALS
WEIGHT: 235 LBS | SYSTOLIC BLOOD PRESSURE: 140 MMHG | HEART RATE: 63 BPM | OXYGEN SATURATION: 99 % | HEIGHT: 74 IN | BODY MASS INDEX: 30.16 KG/M2 | DIASTOLIC BLOOD PRESSURE: 80 MMHG

## 2019-07-24 VITALS
SYSTOLIC BLOOD PRESSURE: 185 MMHG | DIASTOLIC BLOOD PRESSURE: 84 MMHG | HEART RATE: 61 BPM | TEMPERATURE: 96.6 F | WEIGHT: 234.13 LBS | BODY MASS INDEX: 30.05 KG/M2 | HEIGHT: 74 IN | RESPIRATION RATE: 20 BRPM | OXYGEN SATURATION: 100 %

## 2019-07-24 DIAGNOSIS — R07.9 CHEST PAIN, UNSPECIFIED TYPE: Primary | ICD-10-CM

## 2019-07-24 DIAGNOSIS — Z79.4 TYPE 2 DIABETES MELLITUS WITHOUT COMPLICATION, WITH LONG-TERM CURRENT USE OF INSULIN (HCC): ICD-10-CM

## 2019-07-24 DIAGNOSIS — R06.02 SOB (SHORTNESS OF BREATH): ICD-10-CM

## 2019-07-24 DIAGNOSIS — Z72.0 NICOTINE ABUSE: ICD-10-CM

## 2019-07-24 DIAGNOSIS — E11.9 TYPE 2 DIABETES MELLITUS WITHOUT COMPLICATION, WITH LONG-TERM CURRENT USE OF INSULIN (HCC): ICD-10-CM

## 2019-07-24 DIAGNOSIS — I73.9 PERIPHERAL ARTERIAL DISEASE (HCC): ICD-10-CM

## 2019-07-24 DIAGNOSIS — M54.5 ACUTE BILATERAL LOW BACK PAIN, WITH SCIATICA PRESENCE UNSPECIFIED: ICD-10-CM

## 2019-07-24 DIAGNOSIS — E78.00 PURE HYPERCHOLESTEROLEMIA: ICD-10-CM

## 2019-07-24 DIAGNOSIS — M43.16 SPONDYLOLISTHESIS OF LUMBAR REGION: ICD-10-CM

## 2019-07-24 DIAGNOSIS — I10 ESSENTIAL HYPERTENSION: ICD-10-CM

## 2019-07-24 PROCEDURE — 0 IOPAMIDOL 41 % SOLUTION: Performed by: ORTHOPAEDIC SURGERY

## 2019-07-24 PROCEDURE — 64483 NJX AA&/STRD TFRM EPI L/S 1: CPT | Performed by: ORTHOPAEDIC SURGERY

## 2019-07-24 PROCEDURE — 25010000002 METHYLPREDNISOLONE PER 80 MG: Performed by: ORTHOPAEDIC SURGERY

## 2019-07-24 PROCEDURE — 99204 OFFICE O/P NEW MOD 45 MIN: CPT | Performed by: INTERNAL MEDICINE

## 2019-07-24 PROCEDURE — 93000 ELECTROCARDIOGRAM COMPLETE: CPT | Performed by: INTERNAL MEDICINE

## 2019-07-24 RX ORDER — METHYLPREDNISOLONE ACETATE 80 MG/ML
80 INJECTION, SUSPENSION INTRA-ARTICULAR; INTRALESIONAL; INTRAMUSCULAR; SOFT TISSUE ONCE
Status: COMPLETED | OUTPATIENT
Start: 2019-07-24 | End: 2019-07-24

## 2019-07-24 RX ORDER — METOPROLOL SUCCINATE 25 MG/1
25 TABLET, EXTENDED RELEASE ORAL DAILY
Qty: 30 TABLET | Refills: 11 | Status: SHIPPED | OUTPATIENT
Start: 2019-07-24 | End: 2019-08-24 | Stop reason: SDUPTHER

## 2019-07-24 RX ORDER — BUPIVACAINE HYDROCHLORIDE 2.5 MG/ML
10 INJECTION, SOLUTION EPIDURAL; INFILTRATION; INTRACAUDAL ONCE
Status: COMPLETED | OUTPATIENT
Start: 2019-07-24 | End: 2019-07-24

## 2019-07-24 RX ADMIN — IOPAMIDOL 20 ML: 408 INJECTION, SOLUTION INTRATHECAL at 14:30

## 2019-07-24 RX ADMIN — BUPIVACAINE HYDROCHLORIDE 10 ML: 2.5 INJECTION, SOLUTION EPIDURAL; INFILTRATION; INTRACAUDAL; PERINEURAL at 14:30

## 2019-07-24 RX ADMIN — METHYLPREDNISOLONE ACETATE 80 MG: 80 INJECTION, SUSPENSION INTRA-ARTICULAR; INTRALESIONAL; INTRAMUSCULAR; SOFT TISSUE at 14:30

## 2019-07-24 NOTE — INTERVAL H&P NOTE
H&P reviewed. The patient was examined and there are no changes to the H&P.      Temp:  [97 °F (36.1 °C)] 97 °F (36.1 °C)  Heart Rate:  [50] 50  Resp:  [20] 20  BP: (196)/(91) 196/91    Allergies   Allergen Reactions   • Ace Inhibitors Anaphylaxis   • Wellbutrin [Bupropion] Anaphylaxis       Prior to Admission medications    Medication Sig Start Date End Date Taking? Authorizing Provider   amitriptyline (ELAVIL) 10 MG tablet Take 1 tablet by mouth Every Night. 7/12/19  Yes Sandra Sorto APRN   amLODIPine (NORVASC) 5 MG tablet Take 2 tablets by mouth Daily. 7/12/19  Yes Sandra Sorto APRN   insulin aspart (NOVOLOG FLEXPEN) 100 UNIT/ML solution pen-injector sc pen Up to 50 units three times daily 1/24/17  Yes Alessandro Gan MD   Insulin Degludec (TRESIBA FLEXTOUCH) 200 UNIT/ML solution pen-injector Inject 120 Units under the skin Every Night. 1/24/17  Yes Alessandro Gan MD   methocarbamol (ROBAXIN) 500 MG tablet Take 1 tablet by mouth 2 (Two) Times a Day As Needed for Muscle Spasms. for pain 7/12/19  Yes Sandra Sorto APRN   pravastatin (PRAVACHOL) 20 MG tablet Take 1 tablet by mouth Daily. 6/10/19  Yes Sandra Sorto APRN   Glucose Blood (BLOOD GLUCOSE TEST) strip Use 4 x daily, one touch verio IQ 1/24/17   Alessandro Gan MD   Insulin Pen Needle (B-D UF III MINI PEN NEEDLES) 31G X 5 MM misc Use 4 times daily 1/24/17   Alessandro Gan MD   Lancet Devices (ONE TOUCH DELICA LANCING DEV) misc delica lancing device if approved, otherwise use alternative 1/24/17   Alessandro Gan MD   ONETOUCH DELICA LANCETS 33G misc 1 each 4 (Four) Times a Day. delica if covered, use alternative if not covered 1/24/17   Alessandro Gan MD       Past Medical History:   Diagnosis Date   • Diabetes mellitus (CMS/HCC)    • Hyperlipidemia    • Hypertension    • Sleep apnea        Past Surgical History:   Procedure Laterality Date   • CHOLECYSTECTOMY     •  TONSILLECTOMY         Social History     Socioeconomic History   • Marital status:      Spouse name: Not on file   • Number of children: Not on file   • Years of education: Not on file   • Highest education level: Not on file   Tobacco Use   • Smoking status: Current Every Day Smoker     Packs/day: 1.00     Years: 25.00     Pack years: 25.00     Types: Cigarettes   • Smokeless tobacco: Never Used   Substance and Sexual Activity   • Alcohol use: No   • Drug use: No   • Sexual activity: Defer       Family History   Problem Relation Age of Onset   • Diabetes Mother    • Hypertension Mother    • Cancer Mother    • Diabetes Father    • Hypertension Father    • Cancer Father          * No active hospital problems. *

## 2019-07-24 NOTE — PROGRESS NOTES
[facility] Cardiology  OFFICE NOTE    Herbert Ballh Sr.  54 y.o. male    07/24/2019  1. Chest pain, unspecified type    2. Essential hypertension    3. Type 2 diabetes mellitus without complication, with long-term current use of insulin (CMS/[facility])    4. Pure hypercholesterolemia    5. Nicotine abuse    6. Peripheral arterial disease (CMS/HCC)    7. SOB (shortness of breath)        Chief complaint -chest pain with shortness of breath    History of present Fbumemj-54-pvo[age]-old gentleman who is been a diabetic since 2002 and used to smoke 2 packs a day now he smokes 1 pack a day on insulin for his diabetes and his A1c is 8.7 and has been on pravastatin since June and his LDL is 157 has been having chest tightness with shortness of breath and pain in the both calf.  Denies any headache or visual complaints.  He never had any stroke or heart attack in the past.              Allergies   Allergen Reactions   • Ace Inhibitors Anaphylaxis   • Wellbutrin [Bupropion] Anaphylaxis         Past Medical History:   Diagnosis Date   • Diabetes mellitus (CMS/HCC)    • Hyperlipidemia    • Hypertension    • Sleep apnea          Past Surgical History:   Procedure Laterality Date   • CHOLECYSTECTOMY     • TONSILLECTOMY           Family History   Problem Relation Age of Onset   • Diabetes Mother    • Hypertension Mother    • Cancer Mother    • Diabetes Father    • Hypertension Father    • Cancer Father          Social History     Socioeconomic History   • Marital status:      Spouse name: Not on file   • Number of children: Not on file   • Years of education: Not on file   • Highest education level: Not on file   Tobacco Use   • Smoking status: Current Every Day Smoker     Packs/day: 1.00     Years: 25.00     Pack years: 25.00     Types: Cigarettes   • Smokeless tobacco: Never Used   Substance and Sexual Activity   • Alcohol use: No   • Drug use: No   • Sexual activity: Defer         Current Outpatient  "Medications   Medication Sig Dispense Refill   • Glucose Blood (BLOOD GLUCOSE TEST) strip Use 4 x daily, one touch verio  each 11   • insulin aspart (NOVOLOG FLEXPEN) 100 UNIT/ML solution pen-injector sc pen Up to 50 units three times daily 15 pen 11   • Insulin Degludec (TRESIBA FLEXTOUCH) 200 UNIT/ML solution pen-injector Inject 120 Units under the skin Every Night. 6 pen 11   • Insulin Pen Needle (B-D UF III MINI PEN NEEDLES) 31G X 5 MM misc Use 4 times daily 120 each 11   • Lancet Devices (ONE TOUCH DELICA LANCING DEV) misc delica lancing device if approved, otherwise use alternative 1 each 11   • ONETOUCH DELICA LANCETS 33G misc 1 each 4 (Four) Times a Day. delica if covered, use alternative if not covered 120 each 11   • pravastatin (PRAVACHOL) 20 MG tablet Take 1 tablet by mouth Daily. 30 tablet 3   • metoprolol succinate XL (TOPROL-XL) 25 MG 24 hr tablet Take 1 tablet by mouth Daily. 30 tablet 11     No current facility-administered medications for this visit.          Review of Systems     Constitution: Denies any fatigue, fever or chills    HENT: Denies any headache, hearing impairment,     Eyes: Denies any blurring of vision, or photophobia     Cardivascular - As per history of present illness     Respiratory system-shortness of breath     Endocrine:  history of hyperlipidemia, diabetes                       Musculoskeletal:   history of arthritis with musculoskeletal problems    Gastrointestinal: No nausea, vomiting, or melena    Genitourinary: No dysuria or hematuria    Neurological:   No history of seizure disorder, stroke, memory problems    Psychiatric/Behavioral:        No history of depression    Hematological- no history of easy bruising or any bleeding diathesis            OBJECTIVE    /80   Pulse 63   Ht 188 cm (74.02\")   Wt 107 kg (235 lb)   SpO2 99%   BMI 30.16 kg/m²       Physical Exam     Constitutional: is oriented to person, place, and time.     Skin-warm and dry    Well " developed and nourished in no acute distress      Head: Normocephalic and atraumatic.     Eyes: Pupils are equal    Neck: Neck supple. No bruit in the carotids    Cardiovascular: [place] in the fifth intercostal space   Regular rate, and  Rhythm,    S1 greater than S2, no S3 or S4, no gallop     Pulmonary/Chest:   Air  Entry is equal on both sides  No wheezing or crackles,      Abdominal: Soft.  No hepatosplenomegaly, bowel sounds are present    Musculoskeletal: No kyphoscoliosis, no significant thickening of the joints    Neurological: is alert and oriented to person, place, and time.    cranial nerve are intact .   No motor or sensory deficit    Extremities-no edema, no radial femoral delay      Psychiatric: He has a normal mood and affect.                  His behavior is normal.           Procedures      Lab Results   Component Value Date    WBC 8.74 06/07/2019    HGB 16.5 06/07/2019    HCT 49.0 06/07/2019    MCV 85.2 06/07/2019     06/07/2019     Lab Results   Component Value Date    GLUCOSE 154 (H) 06/07/2019    BUN 10 06/07/2019    CREATININE 0.72 (L) 06/07/2019    EGFRIFNONA 114 06/07/2019    BCR 13.9 06/07/2019    CO2 23.7 06/07/2019    CALCIUM 10.2 06/07/2019    ALBUMIN 4.70 06/07/2019    AST 15 06/07/2019    ALT 11 06/07/2019     Lab Results   Component Value Date    CHOL 218 (H) 06/07/2019     Lab Results   Component Value Date    TRIG 126 06/07/2019     Lab Results   Component Value Date    HDL 39 (L) 06/07/2019     No components found for: LDLCALC  Lab Results   Component Value Date     (H) 06/07/2019     No results found for: HDLLDLRATIO  No components found for: CHOLHDL  Lab Results   Component Value Date    HGBA1C 8.65 (H) 06/07/2019     No results found for: TSH, [id number]     Patient's Body mass index is 30.16 kg/m². BMI is above normal parameters. Recommendations include: referral to primary care.      A/P  Chest pain with risk factors of diabetes, hypertension and tobacco use scheduled  for a exercise Cardiolite stress test.  Explained all the risks and benefits and if he cannot walk on the treadmill we will do a Lexiscan nuclear stress test.  We will get an echo to assess LV function valvular function as he has shortness of breath.    Peripheral vascular disease with claudication with diabetes and tobacco use scheduled for [facility].    Diabetes on insulin.    Hyperlipidemia on pravastatin started recently.    Tobacco use talked about risk factor modification.    Follow-up in 4 weeks after these tests            This document has been electronically signed by Beau Fuentes MD on July 24, 2019 3:12 PM       EMR Dragon/Transcription disclaimer:   Some of this note may be an electronic transcription/translation of spoken language to printed text. The electronic translation of spoken language may permit erroneous, or at times, nonsensical words or phrases to be inadvertently transcribed; Although I have reviewed the note for such errors, some may still exist.

## 2019-08-05 ENCOUNTER — TELEPHONE (OUTPATIENT)
Dept: ENDOCRINOLOGY | Facility: CLINIC | Age: 54
End: 2019-08-05

## 2019-08-05 ENCOUNTER — OFFICE VISIT (OUTPATIENT)
Dept: ENDOCRINOLOGY | Facility: CLINIC | Age: 54
End: 2019-08-05

## 2019-08-05 VITALS
HEIGHT: 74 IN | BODY MASS INDEX: 29.56 KG/M2 | DIASTOLIC BLOOD PRESSURE: 92 MMHG | OXYGEN SATURATION: 99 % | WEIGHT: 230.3 LBS | HEART RATE: 82 BPM | SYSTOLIC BLOOD PRESSURE: 164 MMHG

## 2019-08-05 DIAGNOSIS — E11.42 DIABETIC POLYNEUROPATHY ASSOCIATED WITH TYPE 2 DIABETES MELLITUS (HCC): ICD-10-CM

## 2019-08-05 DIAGNOSIS — E78.2 MIXED HYPERLIPIDEMIA: ICD-10-CM

## 2019-08-05 DIAGNOSIS — I15.2 HYPERTENSION ASSOCIATED WITH DIABETES (HCC): ICD-10-CM

## 2019-08-05 DIAGNOSIS — E11.59 HYPERTENSION ASSOCIATED WITH DIABETES (HCC): ICD-10-CM

## 2019-08-05 DIAGNOSIS — Z79.4 TYPE 2 DIABETES MELLITUS WITHOUT COMPLICATION, WITH LONG-TERM CURRENT USE OF INSULIN (HCC): Primary | ICD-10-CM

## 2019-08-05 DIAGNOSIS — E11.9 TYPE 2 DIABETES MELLITUS WITHOUT COMPLICATION, WITH LONG-TERM CURRENT USE OF INSULIN (HCC): Primary | ICD-10-CM

## 2019-08-05 PROCEDURE — 99204 OFFICE O/P NEW MOD 45 MIN: CPT | Performed by: NURSE PRACTITIONER

## 2019-08-05 RX ORDER — GLUCOSAMINE HCL/CHONDROITIN SU 500-400 MG
CAPSULE ORAL
Qty: 120 EACH | Refills: 11 | Status: SHIPPED | OUTPATIENT
Start: 2019-08-05 | End: 2019-10-01 | Stop reason: HOSPADM

## 2019-08-05 RX ORDER — PREGABALIN 50 MG/1
50 CAPSULE ORAL 2 TIMES DAILY
Qty: 28 CAPSULE | Refills: 0 | Status: SHIPPED | OUTPATIENT
Start: 2019-08-05 | End: 2019-10-11

## 2019-08-05 NOTE — TELEPHONE ENCOUNTER
JARVIS WHITE (Key: [id number])   Rx #: [id number]   Trulicity 0.75MG/0.5ML pen-injectors   Form  [facility] Non-Medicare Trulicity Form   Plan Contact  [phone] phone   [phone] fax   Created   3 hours ago   Sent to Plan   now   Determination   Wait for Determination  Please wait for [facility] to return a determination.           JARVIS WHITE (Key: [id number])   Rx #: [id number]   NovoLOG FlexPen 100UNIT/ML pen-injectors   Form  [facility] NON-MEDICARE Clinical Prior Authorization Criteria General Request Form   Plan Contact  [phone] phone   [phone] fax   Created   3 hours ago   Sent to Plan   now   Determination   Wait for Determination  Please wait for [facility] to return a determination.

## 2019-08-05 NOTE — PROGRESS NOTES
Referring provider Sandra WHITTINGTON     Subjective    HPI     54 year old male presents for consultation    REASON - Diabetes Mellitus not controlled       Duration---  Since 2002    Timing -constant       Quality -not controlled       Severity - moderate     Severity (Complication/Hospitalizations)          Secondary Macrovascular -- no CAD, no PAD, no CVA        Secondary Microvascular-- mild retinopathy, neuropathy      Context -- diagnosed after gallbladder             Diabetes Regimen -- insulin              Lab Results   Component Value Date    HGBA1C 8.65 (H) 06/07/2019           Blood Glucose Readings    Checks at least 2 to 3 times     Am readings 130     During the day             Diet-       Regular diet           Associated Signs/Symptoms       Hyperglycemic Symptoms: no polyuria, no polydipsia, no polyphagia        Hypoglycemic Episodes: history of hypoglycemia       Review of Systems  Review of Systems   Constitutional: Positive for fatigue and unexpected weight loss. Negative for activity change, appetite change, chills and unexpected weight gain.   HENT: Negative for congestion, dental problem, ear discharge, ear pain, tinnitus, trouble swallowing and voice change.    Eyes: Positive for blurred vision. Negative for photophobia, pain, discharge, redness, itching and visual disturbance.   Respiratory: Negative for apnea, cough, choking, chest tightness, shortness of breath and wheezing.    Cardiovascular: Positive for chest pain. Negative for palpitations and leg swelling.   Gastrointestinal: Negative for abdominal distention, abdominal pain, constipation, diarrhea, nausea and vomiting.   Endocrine: Positive for polydipsia, polyphagia and polyuria. Negative for cold intolerance and heat intolerance.   Genitourinary: Negative for breast discharge, decreased libido, decreased urine volume and difficulty urinating.   Musculoskeletal: Negative for arthralgias, back pain and neck pain.   Skin: Negative  for color change, dry skin, pallor and skin lesions.   Allergic/Immunologic: Negative for environmental allergies, food allergies and immunocompromised state.   Neurological: Positive for dizziness. Negative for tremors, seizures, speech difficulty, weakness, numbness, headache and memory problem.   Hematological: Negative for adenopathy.   Psychiatric/Behavioral: Negative for agitation, behavioral problems, decreased concentration and depressed mood. The patient is not nervous/anxious.      Wt Readings from Last 3 Encounters:   08/05/19 104 kg (230 lb 4.8 oz)   07/24/19 106 kg (234 lb 2.1 oz)   07/24/19 107 kg (235 lb)     Temp Readings from Last 3 Encounters:   07/24/19 96.6 °F (35.9 °C)   06/21/19 96.6 °F (35.9 °C)   06/07/19 97.2 °F (36.2 °C)     BP Readings from Last 3 Encounters:   08/05/19 164/92   07/24/19 (!) 185/84   07/24/19 140/80     Pulse Readings from Last 3 Encounters:   08/05/19 82   07/24/19 61   07/24/19 63       Physical Exam  Physical Exam   Constitutional: He is oriented to person, place, and time. He appears well-developed and well-nourished. No distress.   HENT:   Head: Normocephalic and atraumatic.   Right Ear: External ear normal.   Left Ear: External ear normal.   Nose: Nose normal.   Eyes: Conjunctivae and EOM are normal. Pupils are equal, round, and reactive to light.   Neck: Normal range of motion. Neck supple. No tracheal deviation present. No thyromegaly present.   Cardiovascular: Normal rate, regular rhythm and normal heart sounds.   No murmur heard.  Pulmonary/Chest: Effort normal and breath sounds normal. No respiratory distress. He has no wheezes.   Abdominal: Soft. Bowel sounds are normal. There is no tenderness. There is no rebound and no guarding.   Musculoskeletal: Normal range of motion. He exhibits no edema, tenderness or deformity.   Neurological: He is alert and oriented to person, place, and time. No cranial nerve deficit.   Skin: Skin is warm and dry. No rash noted.    Psychiatric: He has a normal mood and affect. His behavior is normal. Judgment and thought content normal.        Diagnosis Plan   1. Type 2 diabetes mellitus without complication, with long-term current use of insulin (CMS/[facility])  CBC & Differential    Comprehensive Metabolic Panel    Hemoglobin A1c    Lipid Panel    Vitamin B12    TSH    Protein / Creatinine Ratio, Urine - Urine, Clean Catch    Microalbumin / Creatinine Urine Ratio - Urine, Clean Catch    Vitamin D 25 Hydroxy    C-Peptide   2. Mixed hyperlipidemia  CBC & Differential    Comprehensive Metabolic Panel    Hemoglobin A1c    Lipid Panel    Vitamin B12    TSH    Protein / Creatinine Ratio, Urine - Urine, Clean Catch    Microalbumin / Creatinine Urine Ratio - Urine, Clean Catch    Vitamin D 25 Hydroxy    C-Peptide   3. Hypertension associated with diabetes (CMS/[facility])  CBC & Differential    Comprehensive Metabolic Panel    Hemoglobin A1c    Lipid Panel    Vitamin B12    TSH    Protein / Creatinine Ratio, Urine - Urine, Clean Catch    Microalbumin / Creatinine Urine Ratio - Urine, Clean Catch    Vitamin D 25 Hydroxy    C-Peptide   4. Diabetic polyneuropathy associated with type 2 diabetes mellitus (CMS/[facility])       Assessment/Plan      Glycemic Management  Lab Results   Component Value Date    HGBA1C 8.65 (H) 06/07/2019       Tresiba taking 50 units -- decrease to 40 units since starting trulicity     If am less than 80 decrease by 5 units       Novolog     4 units per 15 grams of CHO --- decrease to 3 per 15 since starting trulicity     +     Sliding scale     2 per 50 above 151           Start Trulicity 0.75 mg weekly    Side effects discussed including nausea     If nauseated eat less    If vomiting or abdominal pain stop    No history of pancreatitis                 Lipid Management        Lab Results   Component Value Date    CHOL 218 (H) 06/07/2019     Lab Results   Component Value Date    TRIG 126 06/07/2019     Lab Results   Component Value Date     HDL 39 (L) 06/07/2019     Lab Results   Component Value Date     (H) 06/07/2019     Lab Results   Component Value Date    VLDL 25.2 06/07/2019     Lab Results   Component Value Date    LDLHDL 3.94 06/07/2019         On Pravachol 20 mg at night -- just started last month     LDL should be 70 or less    Will reassess and if not at goal change to crestor or lipitor            Blood Pressure Management    Not controlled    Metoprolol XL 25 mg daily     Add lisinopril 10 mg daily       Microvascular Complication Monitoring      No results found for: MICROALBUR, [id number]         Neuropathy -- Neurontin not effective      Will try Lyrica 50 mg BID     [facility] controlled substance policy discussed.   [place] reviewed and appropriate    Eye exam scheduled for this month he states     Bone Health      Lab Results   Component Value Date    CALCIUM 10.2 06/07/2019             Other Diabetes Related Aspects      No results found for: [id number]      Thyroid Health    No results found for: TSH        Return in about 6 weeks (around 9/16/2019) for Recheck.       Records from  [facility] received and reviewed from 2019  Thank you for this consultation

## 2019-08-07 ENCOUNTER — TELEPHONE (OUTPATIENT)
Dept: ENDOCRINOLOGY | Facility: CLINIC | Age: 54
End: 2019-08-07

## 2019-08-09 ENCOUNTER — TELEPHONE (OUTPATIENT)
Dept: ENDOCRINOLOGY | Facility: CLINIC | Age: 54
End: 2019-08-09

## 2019-08-09 RX ORDER — METFORMIN HYDROCHLORIDE 500 MG/1
TABLET, EXTENDED RELEASE ORAL
Qty: 60 TABLET | Refills: 11 | Status: SHIPPED | OUTPATIENT
Start: 2019-08-09 | End: 2019-09-20

## 2019-08-15 ENCOUNTER — OFFICE VISIT (OUTPATIENT)
Dept: FAMILY MEDICINE CLINIC | Facility: CLINIC | Age: 54
End: 2019-08-15

## 2019-08-15 VITALS
HEART RATE: 80 BPM | SYSTOLIC BLOOD PRESSURE: 140 MMHG | BODY MASS INDEX: 29.7 KG/M2 | DIASTOLIC BLOOD PRESSURE: 80 MMHG | WEIGHT: 231.4 LBS | TEMPERATURE: 96.7 F | HEIGHT: 74 IN | OXYGEN SATURATION: 98 %

## 2019-08-15 DIAGNOSIS — N50.819 TESTICLE PAIN: Primary | ICD-10-CM

## 2019-08-15 PROCEDURE — 99213 OFFICE O/P EST LOW 20 MIN: CPT | Performed by: FAMILY MEDICINE

## 2019-08-15 NOTE — PROGRESS NOTES
" Subjective   Herbert White . is a 54 y.o. male.     History of Present Illness     4 days of testicular pain.  Getting better.  Not wearing boxers.  No trauma other than having dogs that may hit him there.  Not sexually active  U/s testicles showed lesion left testicle recommendation was rechecking 6 months.   This was 1/2018    Review of Systems   Constitutional: Negative for chills, fatigue and fever.   HENT: Negative for congestion, ear discharge, ear pain, facial swelling, hearing loss, postnasal drip, rhinorrhea, sinus pressure, sore throat, trouble swallowing and voice change.    Eyes: Negative for discharge, redness and visual disturbance.   Respiratory: Negative for cough, chest tightness, shortness of breath and wheezing.    Cardiovascular: Negative for chest pain and palpitations.   Gastrointestinal: Negative for abdominal pain, blood in stool, constipation, diarrhea, nausea and vomiting.   Endocrine: Negative for polydipsia and polyuria.   Genitourinary: Negative for dysuria, flank pain, hematuria and urgency.   Musculoskeletal: Negative for arthralgias, back pain, joint swelling and myalgias.   Skin: Negative for rash.   Neurological: Negative for dizziness, weakness, numbness and headaches.   Hematological: Negative for adenopathy.   Psychiatric/Behavioral: Negative for confusion and sleep disturbance. The patient is not nervous/anxious.            /80 (BP Location: Left arm, Patient Position: Sitting, Cuff Size: Adult)   Pulse 80   Temp 96.7 °F (35.9 °C) (Temporal)   Ht 188 cm (74.02\")   Wt 105 kg (231 lb 6.4 oz)   SpO2 98%   BMI 29.70 kg/m²       Objective     Physical Exam   Constitutional: He is oriented to person, place, and time. He appears well-developed and well-nourished.   HENT:   Head: Normocephalic and atraumatic.   Right Ear: External ear normal.   Left Ear: External ear normal.   Nose: Nose normal.   Eyes: Conjunctivae and EOM are normal. Pupils are equal, round, and " reactive to light.   Neck: Normal range of motion.   Pulmonary/Chest: Effort normal.   Abdominal:   No hernia either side, no palpable abnormality either testicle but he reports some tenderness to palpation   Musculoskeletal: Normal range of motion.   Neurological: He is alert and oriented to person, place, and time.   Psychiatric: He has a normal mood and affect. His behavior is normal. Judgment and thought content normal.   Nursing note and vitals reviewed.          PAST MEDICAL HISTORY     Past Medical History:   Diagnosis Date   • Diabetes mellitus (CMS/HCC)    • Hyperlipidemia    • Hypertension    • Sleep apnea       PAST SURGICAL HISTORY     Past Surgical History:   Procedure Laterality Date   • CHOLECYSTECTOMY     • TONSILLECTOMY        SOCIAL HISTORY     Social History     Socioeconomic History   • Marital status:      Spouse name: Not on file   • Number of children: Not on file   • Years of education: Not on file   • Highest education level: Not on file   Tobacco Use   • Smoking status: Current Every Day Smoker     Packs/day: 1.00     Years: 25.00     Pack years: 25.00     Types: Cigarettes   • Smokeless tobacco: Never Used   Substance and Sexual Activity   • Alcohol use: No   • Drug use: No   • Sexual activity: Defer      ALLERGIES   Ace inhibitors and Wellbutrin [bupropion]   MEDICATIONS     Current Outpatient Medications   Medication Sig Dispense Refill   • Glucose Blood (BLOOD GLUCOSE TEST) strip Use 4 x daily, one touch verio  each 11   • metFORMIN ER (GLUCOPHAGE XR) 500 MG 24 hr tablet Take one with supper for one week then one po BID 60 tablet 11   • metoprolol succinate XL (TOPROL-XL) 25 MG 24 hr tablet Take 1 tablet by mouth Daily. 30 tablet 11   • pravastatin (PRAVACHOL) 20 MG tablet Take 1 tablet by mouth Daily. 30 tablet 3   • pregabalin (LYRICA) 50 MG capsule Take 1 capsule by mouth 2 (Two) Times a Day. 28 capsule 0   • Dulaglutide 0.75 MG/0.5ML solution pen-injector Inject 0.75 mg  under the skin into the appropriate area as directed 1 (One) Time Per Week. 4 pen 11   • insulin aspart (NOVOLOG FLEXPEN) 100 UNIT/ML solution pen-injector sc pen Up to 50 units three times daily 15 pen 11   • Insulin Degludec (TRESIBA FLEXTOUCH) 200 UNIT/ML solution pen-injector Inject 60 Units under the skin into the appropriate area as directed Every Night. 6 pen 11   • Insulin Pen Needle (B-D UF III MINI PEN NEEDLES) 31G X 5 MM misc Use 4 times daily 120 each 11   • Lancet Devices (ONE TOUCH DELICA LANCING DEV) misc delica lancing device if approved, otherwise use alternative 1 each 11   • ONETOUCH DELICA LANCETS 33G misc 1 each 4 (Four) Times a Day. delica if covered, use alternative if not covered 120 each 11     No current facility-administered medications for this visit.         The following portions of the patient's history were reviewed and updated as appropriate: allergies, current medications, past family history, past medical history, past social history, past surgical history and problem list.        Assessment/Plan   Herbert was seen today for testicle pain.    Diagnoses and all orders for this visit:    Testicle pain  -     US scrotum and testicles       Cool compress, motrin otc, ultra sound since lesion reported last time left testicle                 No Follow-up on file.                  This document has been electronically signed by Artemio Sancehz MD on August 15, 2019 10:52 AM

## 2019-08-19 ENCOUNTER — HOSPITAL ENCOUNTER (OUTPATIENT)
Dept: NUCLEAR MEDICINE | Facility: HOSPITAL | Age: 54
Discharge: HOME | End: 2019-08-19

## 2019-08-19 ENCOUNTER — APPOINTMENT (OUTPATIENT)
Dept: NUCLEAR MEDICINE | Facility: HOSPITAL | Age: 54
End: 2019-08-19

## 2019-08-19 ENCOUNTER — HOSPITAL ENCOUNTER (OUTPATIENT)
Dept: CARDIOLOGY | Facility: HOSPITAL | Age: 54
End: 2019-08-19

## 2019-08-19 DIAGNOSIS — Z79.4 TYPE 2 DIABETES MELLITUS WITHOUT COMPLICATION, WITH LONG-TERM CURRENT USE OF INSULIN (HCC): ICD-10-CM

## 2019-08-19 DIAGNOSIS — R06.02 SOB (SHORTNESS OF BREATH): ICD-10-CM

## 2019-08-19 DIAGNOSIS — E11.9 TYPE 2 DIABETES MELLITUS WITHOUT COMPLICATION, WITH LONG-TERM CURRENT USE OF INSULIN (HCC): ICD-10-CM

## 2019-08-19 DIAGNOSIS — R07.9 CHEST PAIN, UNSPECIFIED TYPE: ICD-10-CM

## 2019-08-19 DIAGNOSIS — I10 ESSENTIAL HYPERTENSION: ICD-10-CM

## 2019-08-19 LAB
BH CV ECHO MEAS - ACS: 2.3 CM
BH CV ECHO MEAS - AO MAX PG (FULL): 0.68 MMHG
BH CV ECHO MEAS - AO MAX PG: 6.3 MMHG
BH CV ECHO MEAS - AO MEAN PG (FULL): 0 MMHG
BH CV ECHO MEAS - AO MEAN PG: 3 MMHG
BH CV ECHO MEAS - AO ROOT AREA (BSA CORRECTED): 1.5
BH CV ECHO MEAS - AO ROOT AREA: 9.1 CM^2
BH CV ECHO MEAS - AO ROOT DIAM: 3.4 CM
BH CV ECHO MEAS - AO V2 MAX: 125 CM/SEC
BH CV ECHO MEAS - AO V2 MEAN: 82.2 CM/SEC
BH CV ECHO MEAS - AO V2 VTI: 26 CM
BH CV ECHO MEAS - ASC AORTA: 3.5 CM
BH CV ECHO MEAS - AVA(I,A): 4.1 CM^2
BH CV ECHO MEAS - AVA(I,D): 4.1 CM^2
BH CV ECHO MEAS - AVA(V,A): 3.9 CM^2
BH CV ECHO MEAS - AVA(V,D): 3.9 CM^2
BH CV ECHO MEAS - BSA(HAYCOCK): 2.4 M^2
BH CV ECHO MEAS - BSA: 2.3 M^2
BH CV ECHO MEAS - BZI_BMI: 29.7 KILOGRAMS/M^2
BH CV ECHO MEAS - BZI_METRIC_HEIGHT: 188 CM
BH CV ECHO MEAS - BZI_METRIC_WEIGHT: 104.8 KG
BH CV ECHO MEAS - EDV(CUBED): 68.9 ML
BH CV ECHO MEAS - EDV(TEICH): 74.2 ML
BH CV ECHO MEAS - EF(CUBED): 77.3 %
BH CV ECHO MEAS - EF(TEICH): 69.9 %
BH CV ECHO MEAS - EPSS: 0.2 CM
BH CV ECHO MEAS - ESV(CUBED): 15.6 ML
BH CV ECHO MEAS - ESV(TEICH): 22.3 ML
BH CV ECHO MEAS - FS: 39 %
BH CV ECHO MEAS - IVS/LVPW: 0.68
BH CV ECHO MEAS - IVSD: 1.3 CM
BH CV ECHO MEAS - LA DIMENSION: 4 CM
BH CV ECHO MEAS - LA/AO: 1.2
BH CV ECHO MEAS - LV MASS(C)D: 266.9 GRAMS
BH CV ECHO MEAS - LV MASS(C)DI: 115.5 GRAMS/M^2
BH CV ECHO MEAS - LV MAX PG: 5.6 MMHG
BH CV ECHO MEAS - LV MEAN PG: 3 MMHG
BH CV ECHO MEAS - LV V1 MAX: 118 CM/SEC
BH CV ECHO MEAS - LV V1 MEAN: 78.7 CM/SEC
BH CV ECHO MEAS - LV V1 VTI: 25.5 CM
BH CV ECHO MEAS - LVIDD: 4.1 CM
BH CV ECHO MEAS - LVIDS: 2.5 CM
BH CV ECHO MEAS - LVOT AREA (M): 4.2 CM^2
BH CV ECHO MEAS - LVOT AREA: 4.2 CM^2
BH CV ECHO MEAS - LVOT DIAM: 2.3 CM
BH CV ECHO MEAS - LVPWD: 1.9 CM
BH CV ECHO MEAS - MV A MAX VEL: 54.8 CM/SEC
BH CV ECHO MEAS - MV E MAX VEL: 69.6 CM/SEC
BH CV ECHO MEAS - MV E/A: 1.3
BH CV ECHO MEAS - PA MAX PG: 5.2 MMHG
BH CV ECHO MEAS - PA MEAN PG: 3 MMHG
BH CV ECHO MEAS - PA V2 MAX: 114 CM/SEC
BH CV ECHO MEAS - PA V2 MEAN: 82.7 CM/SEC
BH CV ECHO MEAS - PA V2 VTI: 25.6 CM
BH CV ECHO MEAS - SI(AO): 102.2 ML/M^2
BH CV ECHO MEAS - SI(CUBED): 23.1 ML/M^2
BH CV ECHO MEAS - SI(LVOT): 45.9 ML/M^2
BH CV ECHO MEAS - SI(TEICH): 22.5 ML/M^2
BH CV ECHO MEAS - SV(AO): 236.1 ML
BH CV ECHO MEAS - SV(CUBED): 53.3 ML
BH CV ECHO MEAS - SV(LVOT): 105.9 ML
BH CV ECHO MEAS - SV(TEICH): 51.9 ML
LV EF 2D ECHO EST: 61 %

## 2019-08-19 PROCEDURE — 78452 HT MUSCLE IMAGE SPECT MULT: CPT

## 2019-08-19 PROCEDURE — 93017 CV STRESS TEST TRACING ONLY: CPT

## 2019-08-19 PROCEDURE — A9500 TC99M SESTAMIBI: HCPCS | Performed by: INTERNAL MEDICINE

## 2019-08-19 PROCEDURE — 93016 CV STRESS TEST SUPVJ ONLY: CPT | Performed by: INTERNAL MEDICINE

## 2019-08-19 PROCEDURE — 78452 HT MUSCLE IMAGE SPECT MULT: CPT | Performed by: INTERNAL MEDICINE

## 2019-08-19 PROCEDURE — 0 TECHNETIUM SESTAMIBI: Performed by: INTERNAL MEDICINE

## 2019-08-19 PROCEDURE — 93018 CV STRESS TEST I&R ONLY: CPT | Performed by: INTERNAL MEDICINE

## 2019-08-19 RX ADMIN — TECHNETIUM TC 99M SESTAMIBI 1 DOSE: 1 INJECTION INTRAVENOUS at 08:40

## 2019-08-20 ENCOUNTER — HOSPITAL ENCOUNTER (OUTPATIENT)
Dept: NUCLEAR MEDICINE | Facility: HOSPITAL | Age: 54
Discharge: HOME | End: 2019-08-20

## 2019-08-20 ENCOUNTER — HOSPITAL ENCOUNTER (OUTPATIENT)
Dept: ULTRASOUND IMAGING | Facility: HOSPITAL | Age: 54
Discharge: HOME | End: 2019-08-20

## 2019-08-20 ENCOUNTER — HOSPITAL ENCOUNTER (OUTPATIENT)
Dept: CARDIOLOGY | Facility: HOSPITAL | Age: 54
Discharge: HOME | End: 2019-08-20

## 2019-08-20 DIAGNOSIS — N50.819 TESTICLE PAIN: ICD-10-CM

## 2019-08-20 PROCEDURE — 93976 VASCULAR STUDY: CPT

## 2019-08-20 PROCEDURE — 25010000002 REGADENOSON 0.4 MG/5ML SOLUTION: Performed by: INTERNAL MEDICINE

## 2019-08-20 PROCEDURE — 0 TECHNETIUM SESTAMIBI: Performed by: INTERNAL MEDICINE

## 2019-08-20 PROCEDURE — A9500 TC99M SESTAMIBI: HCPCS | Performed by: INTERNAL MEDICINE

## 2019-08-20 PROCEDURE — 76870 US EXAM SCROTUM: CPT

## 2019-08-20 RX ORDER — 0.9 % SODIUM CHLORIDE 0.9 %
10 VIAL (ML) INJECTION ONCE
Status: COMPLETED | OUTPATIENT
Start: 2019-08-20 | End: 2019-08-20

## 2019-08-20 RX ADMIN — REGADENOSON 0.4 MG: 0.08 INJECTION, SOLUTION INTRAVENOUS at 08:12

## 2019-08-20 RX ADMIN — SODIUM CHLORIDE, PRESERVATIVE FREE 10 ML: 5 INJECTION INTRAVENOUS at 08:13

## 2019-08-20 RX ADMIN — TECHNETIUM TC 99M SESTAMIBI 1 DOSE: 1 INJECTION INTRAVENOUS at 08:13

## 2019-08-21 ENCOUNTER — TELEPHONE (OUTPATIENT)
Dept: FAMILY MEDICINE CLINIC | Facility: CLINIC | Age: 54
End: 2019-08-21

## 2019-08-21 DIAGNOSIS — N43.3 HYDROCELE, BILATERAL: Primary | ICD-10-CM

## 2019-08-21 LAB
BH CV STRESS BP STAGE 1: NORMAL
BH CV STRESS COMMENTS STAGE 1: NORMAL
BH CV STRESS DOSE REGADENOSON STAGE 1: 0.4
BH CV STRESS DURATION MIN STAGE 1: 0
BH CV STRESS DURATION SEC STAGE 1: 10
BH CV STRESS HR STAGE 1: 53
BH CV STRESS PROTOCOL 1: NORMAL
BH CV STRESS RECOVERY BP: 73 MMHG
BH CV STRESS RECOVERY HR: 73 BPM
BH CV STRESS STAGE 1: 1
LV EF NUC BP: 65 %
MAXIMAL PREDICTED HEART RATE: 165 BPM
PERCENT MAX PREDICTED HR: 51.52 %
STRESS BASELINE BP: NORMAL MMHG
STRESS BASELINE HR: 53 BPM
STRESS PERCENT HR: 61 %
STRESS POST ESTIMATED WORKLOAD: 1 METS
STRESS POST PEAK BP: NORMAL MMHG
STRESS POST PEAK HR: 85 BPM
STRESS TARGET HR: 140 BPM

## 2019-08-21 NOTE — TELEPHONE ENCOUNTER
----- Message from Chauncey Sifuentes sent at 8/21/2019  2:57 PM CDT -----  He would like referral to a urologist.  Informed patient of results.

## 2019-08-22 ENCOUNTER — OFFICE VISIT (OUTPATIENT)
Dept: CARDIOLOGY | Facility: CLINIC | Age: 54
End: 2019-08-22

## 2019-08-22 VITALS
SYSTOLIC BLOOD PRESSURE: 156 MMHG | HEART RATE: 86 BPM | BODY MASS INDEX: 29.77 KG/M2 | OXYGEN SATURATION: 97 % | DIASTOLIC BLOOD PRESSURE: 80 MMHG | WEIGHT: 232 LBS | HEIGHT: 74 IN

## 2019-08-22 DIAGNOSIS — I73.9 PERIPHERAL ARTERIAL DISEASE (HCC): ICD-10-CM

## 2019-08-22 DIAGNOSIS — Z79.4 TYPE 2 DIABETES MELLITUS WITHOUT COMPLICATION, WITH LONG-TERM CURRENT USE OF INSULIN (HCC): ICD-10-CM

## 2019-08-22 DIAGNOSIS — I20.8 ANGINA OF EFFORT (HCC): Primary | ICD-10-CM

## 2019-08-22 DIAGNOSIS — E11.9 TYPE 2 DIABETES MELLITUS WITHOUT COMPLICATION, WITH LONG-TERM CURRENT USE OF INSULIN (HCC): ICD-10-CM

## 2019-08-22 DIAGNOSIS — I10 ESSENTIAL HYPERTENSION: ICD-10-CM

## 2019-08-22 DIAGNOSIS — E78.00 PURE HYPERCHOLESTEROLEMIA: ICD-10-CM

## 2019-08-22 PROCEDURE — 99214 OFFICE O/P EST MOD 30 MIN: CPT | Performed by: INTERNAL MEDICINE

## 2019-08-22 NOTE — PROGRESS NOTES
[facility] Cardiology  OFFICE NOTE    Herbert White Sr.  54 y.o. male    08/22/2019  1. Angina of effort (CMS/HCC)    2. Essential hypertension    3. Pure hypercholesterolemia    4. Peripheral arterial disease (CMS/HCC)    5. Type 2 diabetes mellitus without complication, with long-term current use of insulin (CMS/HCC)        Chief complaint -chest pain with shortness of breath    History of present [id number] gentleman who is been a diabetic since 2002 and used to smoke 2 packs a day now he smokes 1 pack a day on insulin for his diabetes and his A1c is 8.7 and has been on pravastatin since June and his LDL is 157 has been having chest tightness with shortness of breath and pain in the both calf.  He had a nuclear stress of shortness ischemia but still having significant angina with activity, so explained all the risks and benefits about cardiac catheterization and patient wishes to proceed with definitive diagnosis and he is consented for Dr. Dwyer to do the procedure.  He never had any stroke or heart attack in the past.              Allergies   Allergen Reactions   • Ace Inhibitors Anaphylaxis   • Wellbutrin [Bupropion] Anaphylaxis         Past Medical History:   Diagnosis Date   • Diabetes mellitus (CMS/HCC)    • Hyperlipidemia    • Hypertension    • Sleep apnea          Past Surgical History:   Procedure Laterality Date   • CHOLECYSTECTOMY     • TONSILLECTOMY           Family History   Problem Relation Age of Onset   • Diabetes Mother    • Hypertension Mother    • Cancer Mother    • Diabetes Father    • Hypertension Father    • Cancer Father          Social History     Socioeconomic History   • Marital status:      Spouse name: Not on file   • Number of children: Not on file   • Years of education: Not on file   • Highest education level: Not on file   Tobacco Use   • Smoking status: Current Every Day Smoker     Packs/day: 1.00     Years: 25.00     Pack years: 25.00      Types: Cigarettes   • Smokeless tobacco: Never Used   Substance and Sexual Activity   • Alcohol use: No   • Drug use: No   • Sexual activity: Defer         Current Outpatient Medications   Medication Sig Dispense Refill   • Dulaglutide 0.75 MG/0.5ML solution pen-injector Inject 0.75 mg under the skin into the appropriate area as directed 1 (One) Time Per Week. 4 pen 11   • Glucose Blood (BLOOD GLUCOSE TEST) strip Use 4 x daily, one touch verio  each 11   • insulin aspart (NOVOLOG FLEXPEN) 100 UNIT/ML solution pen-injector sc pen Up to 50 units three times daily 15 pen 11   • Insulin Degludec (TRESIBA FLEXTOUCH) 200 UNIT/ML solution pen-injector Inject 60 Units under the skin into the appropriate area as directed Every Night. 6 pen 11   • Insulin Pen Needle (B-D UF III MINI PEN NEEDLES) 31G X 5 MM misc Use 4 times daily 120 each 11   • Lancet Devices (ONE TOUCH DELICA LANCING DEV) misc delica lancing device if approved, otherwise use alternative 1 each 11   • metFORMIN ER (GLUCOPHAGE XR) 500 MG 24 hr tablet Take one with supper for one week then one po BID 60 tablet 11   • metoprolol succinate XL (TOPROL-XL) 25 MG 24 hr tablet Take 1 tablet by mouth Daily. 30 tablet 11   • ONETOUCH DELICA LANCETS 33G misc 1 each 4 (Four) Times a Day. delica if covered, use alternative if not covered 120 each 11   • pravastatin (PRAVACHOL) 20 MG tablet Take 1 tablet by mouth Daily. 30 tablet 3   • pregabalin (LYRICA) 50 MG capsule Take 1 capsule by mouth 2 (Two) Times a Day. 28 capsule 0     No current facility-administered medications for this visit.          Review of Systems     Constitution: Denies any fatigue, fever or chills    HENT: Denies any headache, hearing impairment,     Eyes: Denies any blurring of vision, or photophobia     Cardivascular - As per history of present illness     Respiratory system-shortness of breath     Endocrine:  history of hyperlipidemia, diabetes                       Musculoskeletal:    "history of arthritis with musculoskeletal problems    Gastrointestinal: No nausea, vomiting, or melena    Genitourinary: No dysuria or hematuria    Neurological:   No history of seizure disorder, stroke, memory problems    Psychiatric/Behavioral:        No history of depression    Hematological- no history of easy bruising or any bleeding diathesis            OBJECTIVE    /80   Pulse 86   Ht 188 cm (74.02\")   Wt 105 kg (232 lb)   SpO2 97%   BMI 29.77 kg/m²       Physical Exam     Constitutional: is oriented to person, place, and time.     Skin-warm and dry    Well developed and nourished in no acute distress      Head: Normocephalic and atraumatic.     Eyes: Pupils are equal    Neck: Neck supple. No bruit in the carotids    Cardiovascular: [place] in the fifth intercostal space   Regular rate, and  Rhythm,    S1 greater than S2, no S3 or S4, no gallop     Pulmonary/Chest:   Air  Entry is equal on both sides  No wheezing or crackles,      Abdominal: Soft.  No hepatosplenomegaly, bowel sounds are present    Musculoskeletal: No kyphoscoliosis, no significant thickening of the joints    Neurological: is alert and oriented to person, place, and time.    cranial nerve are intact .   No motor or sensory deficit    Extremities-no edema, no radial femoral delay      Psychiatric: He has a normal mood and affect.                  His behavior is normal.           Procedures      Lab Results   Component Value Date    WBC 8.74 06/07/2019    HGB 16.5 06/07/2019    HCT 49.0 06/07/2019    MCV 85.2 06/07/2019     06/07/2019     Lab Results   Component Value Date    GLUCOSE 154 (H) 06/07/2019    BUN 10 06/07/2019    CREATININE 0.72 (L) 06/07/2019    EGFRIFNONA 114 06/07/2019    BCR 13.9 06/07/2019    CO2 23.7 06/07/2019    CALCIUM 10.2 06/07/2019    ALBUMIN 4.70 06/07/2019    AST 15 06/07/2019    ALT 11 06/07/2019     Lab Results   Component Value Date    CHOL 218 (H) 06/07/2019     Lab Results   Component Value Date "    TRIG 126 06/07/2019     Lab Results   Component Value Date    HDL 39 (L) 06/07/2019     No components found for: LDLCALC  Lab Results   Component Value Date     (H) 06/07/2019     No results found for: HDLLDLRATIO  No components found for: CHOLHDL  Lab Results   Component Value Date    HGBA1C 8.65 (H) 06/07/2019     No results found for: TSH, [id number]     Patient's Body mass index is 29.77 kg/m². BMI is above normal parameters. Recommendations include: referral to primary care.      A/P  Chest pain with risk factors of diabetes, hypertension and tobacco use had a nuclear stress test which showed no ischemia patient still having angina symptoms discussed option of cardiac catheterization explained all the risks and benefits not limited to bleeding, stroke, even death and patient consented for Dr. Dwyer to do cardiac catheterization through the right radial artery    Peripheral vascular disease with claudication with diabetes and tobacco use, ZAHRA was okay but possibly related to calcification of the arteries the ZAHRA was high    Diabetes on insulin.    Hyperlipidemia on pravastatin started recently.    Tobacco use talked about risk factor modification.                This document has been electronically signed by Beau Fuentes MD on August 22, 2019 10:23 AM       EMR Dragon/Transcription disclaimer:   Some of this note may be an electronic transcription/translation of spoken language to printed text. The electronic translation of spoken language may permit erroneous, or at times, nonsensical words or phrases to be inadvertently transcribed; Although I have reviewed the note for such errors, some may still exist.

## 2019-08-26 RX ORDER — METOPROLOL SUCCINATE 25 MG/1
25 TABLET, EXTENDED RELEASE ORAL DAILY
Qty: 30 TABLET | Refills: 11 | Status: SHIPPED | OUTPATIENT
Start: 2019-08-26 | End: 2019-09-20

## 2019-08-27 DIAGNOSIS — R07.9 CHEST PAIN, UNSPECIFIED TYPE: Primary | ICD-10-CM

## 2019-08-27 RX ORDER — SODIUM CHLORIDE 9 MG/ML
100 INJECTION, SOLUTION INTRAVENOUS CONTINUOUS
Status: CANCELLED | OUTPATIENT
Start: 2019-09-03

## 2019-09-03 ENCOUNTER — HOSPITAL ENCOUNTER (OUTPATIENT)
Facility: HOSPITAL | Age: 54
Setting detail: HOSPITAL OUTPATIENT SURGERY
Discharge: HOME | End: 2019-09-03
Attending: INTERNAL MEDICINE | Admitting: INTERNAL MEDICINE

## 2019-09-03 VITALS
WEIGHT: 226.63 LBS | RESPIRATION RATE: 18 BRPM | HEART RATE: 56 BPM | BODY MASS INDEX: 29.09 KG/M2 | SYSTOLIC BLOOD PRESSURE: 133 MMHG | HEIGHT: 74 IN | OXYGEN SATURATION: 99 % | DIASTOLIC BLOOD PRESSURE: 63 MMHG | TEMPERATURE: 97.2 F

## 2019-09-03 DIAGNOSIS — I73.9 PERIPHERAL ARTERIAL DISEASE (HCC): ICD-10-CM

## 2019-09-03 DIAGNOSIS — R07.9 CHEST PAIN, UNSPECIFIED TYPE: ICD-10-CM

## 2019-09-03 DIAGNOSIS — Z72.0 NICOTINE ABUSE: Primary | ICD-10-CM

## 2019-09-03 PROBLEM — I25.118 CORONARY ARTERY DISEASE OF NATIVE ARTERY OF NATIVE HEART WITH STABLE ANGINA PECTORIS (HCC): Status: ACTIVE | Noted: 2019-09-03

## 2019-09-03 LAB
ANION GAP SERPL CALCULATED.3IONS-SCNC: 9 MMOL/L (ref 5–15)
ARTERIAL PATENCY WRIST A: ABNORMAL
ATMOSPHERIC PRESS: ABNORMAL MM[HG]
BASE EXCESS BLDA CALC-SCNC: 1.5 MMOL/L (ref 0–2)
BDY SITE: ABNORMAL
BUN BLD-MCNC: 13 MG/DL (ref 6–20)
BUN/CREAT SERPL: 19.7 (ref 7–25)
CALCIUM SPEC-SCNC: 9.1 MG/DL (ref 8.6–10.5)
CHLORIDE SERPL-SCNC: 104 MMOL/L (ref 98–107)
CO2 SERPL-SCNC: 28 MMOL/L (ref 22–29)
CREAT BLD-MCNC: 0.66 MG/DL (ref 0.76–1.27)
DEPRECATED RDW RBC AUTO: 39.8 FL (ref 37–54)
ERYTHROCYTE [DISTWIDTH] IN BLOOD BY AUTOMATED COUNT: 13.2 % (ref 12.3–15.4)
GFR SERPL CREATININE-BSD FRML MDRD: 125 ML/MIN/1.73
GLUCOSE BLD-MCNC: 169 MG/DL (ref 65–99)
HCO3 BLDA-SCNC: 26.5 MMOL/L (ref 20–26)
HCT VFR BLD AUTO: 41.4 % (ref 37.5–51)
HGB BLD-MCNC: 14.8 G/DL (ref 13–17.7)
INR PPP: 0.96 (ref 0.8–1.2)
MCH RBC QN AUTO: 29.7 PG (ref 26.6–33)
MCHC RBC AUTO-ENTMCNC: 35.7 G/DL (ref 31.5–35.7)
MCV RBC AUTO: 83.1 FL (ref 79–97)
MODALITY: ABNORMAL
PCO2 BLDA: 42.3 MM HG (ref 35–45)
PH BLDA: 7.41 PH UNITS (ref 7.35–7.45)
PLATELET # BLD AUTO: 212 10*3/MM3 (ref 140–450)
PMV BLD AUTO: 10.8 FL (ref 6–12)
PO2 BLDA: 84.6 MM HG (ref 83–108)
POTASSIUM BLD-SCNC: 4.3 MMOL/L (ref 3.5–5.2)
PROTHROMBIN TIME: 12.6 SECONDS (ref 11.1–15.3)
RBC # BLD AUTO: 4.98 10*6/MM3 (ref 4.14–5.8)
SAO2 % BLDCOA: 95.3 % (ref 94–99)
SODIUM BLD-SCNC: 141 MMOL/L (ref 136–145)
WBC NRBC COR # BLD: 6.48 10*3/MM3 (ref 3.4–10.8)

## 2019-09-03 PROCEDURE — 85610 PROTHROMBIN TIME: CPT | Performed by: INTERNAL MEDICINE

## 2019-09-03 PROCEDURE — 0 IOPAMIDOL PER 1 ML: Performed by: INTERNAL MEDICINE

## 2019-09-03 PROCEDURE — 82803 BLOOD GASES ANY COMBINATION: CPT | Performed by: NURSE PRACTITIONER

## 2019-09-03 PROCEDURE — 93459 L HRT ART/GRFT ANGIO: CPT | Performed by: INTERNAL MEDICINE

## 2019-09-03 PROCEDURE — 25010000002 HEPARIN (PORCINE) PER 1000 UNITS: Performed by: INTERNAL MEDICINE

## 2019-09-03 PROCEDURE — 36600 WITHDRAWAL OF ARTERIAL BLOOD: CPT | Performed by: NURSE PRACTITIONER

## 2019-09-03 PROCEDURE — 85027 COMPLETE CBC AUTOMATED: CPT | Performed by: INTERNAL MEDICINE

## 2019-09-03 PROCEDURE — 80048 BASIC METABOLIC PNL TOTAL CA: CPT | Performed by: INTERNAL MEDICINE

## 2019-09-03 PROCEDURE — 25010000002 MIDAZOLAM PER 1 MG: Performed by: INTERNAL MEDICINE

## 2019-09-03 PROCEDURE — C1769 GUIDE WIRE: HCPCS | Performed by: INTERNAL MEDICINE

## 2019-09-03 PROCEDURE — 25010000002 FENTANYL CITRATE (PF) 100 MCG/2ML SOLUTION: Performed by: INTERNAL MEDICINE

## 2019-09-03 PROCEDURE — C1894 INTRO/SHEATH, NON-LASER: HCPCS | Performed by: INTERNAL MEDICINE

## 2019-09-03 PROCEDURE — 99244 OFF/OP CNSLTJ NEW/EST MOD 40: CPT | Performed by: NURSE PRACTITIONER

## 2019-09-03 RX ORDER — SODIUM CHLORIDE 9 MG/ML
5 INJECTION, SOLUTION INTRAVENOUS CONTINUOUS
Status: ACTIVE | OUTPATIENT
Start: 2019-09-03 | End: 2019-09-03

## 2019-09-03 RX ORDER — FENTANYL CITRATE 50 UG/ML
INJECTION, SOLUTION INTRAMUSCULAR; INTRAVENOUS AS NEEDED
Status: DISCONTINUED | OUTPATIENT
Start: 2019-09-03 | End: 2019-09-03 | Stop reason: HOSPADM

## 2019-09-03 RX ORDER — AMLODIPINE BESYLATE 5 MG/1
5 TABLET ORAL DAILY
Qty: 90 TABLET | Refills: 11 | Status: SHIPPED | OUTPATIENT
Start: 2019-09-03 | End: 2019-09-13 | Stop reason: HOSPADM

## 2019-09-03 RX ORDER — MIDAZOLAM HYDROCHLORIDE 1 MG/ML
INJECTION INTRAMUSCULAR; INTRAVENOUS AS NEEDED
Status: DISCONTINUED | OUTPATIENT
Start: 2019-09-03 | End: 2019-09-03 | Stop reason: HOSPADM

## 2019-09-03 RX ORDER — ASPIRIN 325 MG
325 TABLET ORAL ONCE
Status: COMPLETED | OUTPATIENT
Start: 2019-09-03 | End: 2019-09-03

## 2019-09-03 RX ORDER — SODIUM CHLORIDE 9 MG/ML
100 INJECTION, SOLUTION INTRAVENOUS CONTINUOUS
Status: DISCONTINUED | OUTPATIENT
Start: 2019-09-03 | End: 2019-09-03 | Stop reason: HOSPADM

## 2019-09-03 RX ORDER — LIDOCAINE HYDROCHLORIDE 20 MG/ML
INJECTION, SOLUTION INFILTRATION; PERINEURAL AS NEEDED
Status: DISCONTINUED | OUTPATIENT
Start: 2019-09-03 | End: 2019-09-03 | Stop reason: HOSPADM

## 2019-09-03 RX ORDER — HEPARIN SODIUM 1000 [USP'U]/ML
INJECTION, SOLUTION INTRAVENOUS; SUBCUTANEOUS AS NEEDED
Status: DISCONTINUED | OUTPATIENT
Start: 2019-09-03 | End: 2019-09-03 | Stop reason: HOSPADM

## 2019-09-03 RX ADMIN — SODIUM CHLORIDE 100 ML/HR: 9 INJECTION, SOLUTION INTRAVENOUS at 06:42

## 2019-09-03 RX ADMIN — ASPIRIN 325 MG: 325 TABLET, COATED ORAL at 08:30

## 2019-09-03 NOTE — INTERVAL H&P NOTE
[facility] Pre-Op:    Invasive coronary angiography was recommended to the patient. 54 year old M with DM, HTN, Tobacco use has  Been having intermittent chest pain and significant SOB, also noted to have significant PVCs, underwent lexiscan which was without evidence of ischemia, however concerning for balanced ischemia, patient is here for cardiac cath to rule out obstructive CAD The patientdenies  bleeding issues. The patient denies  use of antiplatelet agents.  The patient reports CKD. The patient denies  contrast allergy. The patient reports use of diabetes medications.    Pre-Cath Surgical History: NA    The indications, risks/benefits and alternatives of diagnostic left heart cardiac catheterization, angiography, conscious sedation, and possible blood transfusion were discussed in detail with the patient. The potential complications of 1/2000 chance of death, 1/1000 chance of heart attack or stroke, 1/500 chance of bleeding or clotting of the femoral artery, and 1/500 chance of allergic reaction to contrast were discussed. We also reviewed possible complications of infection and kidney dysfunction. If PCI were performed and intra-coronary stents indicated, we discussed the details about VINEET. This included a review of the risks of the infrequent, but relatively higher incidence of late thrombosis with VINEET. The importance of maintaining a consistent daily regimen of aspirin and an additional anti-platelet agent for as long as directed after implantation was emphasized. No contraindications were found. The patient  appeared to understand and agree to the above.  -Left heart catheterization, Coronary angiography, In-situ LIMA angiography, Left ventriculography, Intravascular ultrasound, Optical coherence tomography, Flow wire, Balloon Angioplasty, Coronary stent, Graft stent, Device closure, femoral artery, Intra-aortic balloon pump, Transvenous pacemaker, Pericardiocentesis and Subclavian angiography  -stop metformin  48 hours prior to surgery and restart 48 hours after surgery      ASA Class: II  Mallampati Score: II    Recommended contrast: 250  Maximum contrast: 375    Contraindications to DAPT: None      H&P reviewed. The patient was examined and there are no changes to the H&P.

## 2019-09-03 NOTE — CONSULTS
CVTS CONSULTATION          Patient Care Team:  Sandra Sorto APRN as PCP - General (Family Medicine)  Gianna Ochoa as Technician  Artemio Sanchez MD (Family Medicine)  Requesting Provider:     Chief complaint: CAD      SUBJECTIVE       History of Present Illness:  54 year old M with DM, HTN, Tobacco use has  Been having intermittent chest pain and significant SOB, also noted to have significant PVCs, underwent lexiscan which was without evidence of ischemia, however continued with chest discomfort and classic angina symptoms. Patient underwent cardiac cath to rule out obstructive CAD The patient denies  bleeding issues. The patient denies  use of antiplatelet agents.  The patient reports CKD. Cath demonstrated severe CAD multivessel with lesions LAD 80% DX 70% OM2 60% PDA 70%. Echocardiogram WNL. Cardiothoracic surgery consulted for surgical consideration.       The following portions of the patient's history were reviewed and updated as appropriate: allergies, current medications, past family history, past medical history, past social history, past surgical history and problem list.  Recent images independently reviewed.  Available laboratory values reviewed.    Review of Systems   Constitutional: Positive for activity change and fatigue.   Respiratory: Positive for chest tightness and shortness of breath.    Cardiovascular: Positive for chest pain. Negative for palpitations and leg swelling.   Gastrointestinal: Negative for abdominal pain.   Endocrine: Negative.    Genitourinary: Negative for difficulty urinating.   Musculoskeletal: Positive for arthralgias. Negative for gait problem.   Skin: Negative.    Neurological: Negative for dizziness, seizures, light-headedness, numbness and headaches.   Hematological: Does not bruise/bleed easily.   Psychiatric/Behavioral: Negative for agitation. The patient is not nervous/anxious.    All other systems reviewed and are negative.       Past Medical History:    Diagnosis Date   • Diabetes mellitus (CMS/HCC)    • Hyperlipidemia    • Hypertension    • Sleep apnea      Past Surgical History:   Procedure Laterality Date   • CHOLECYSTECTOMY     • TONSILLECTOMY       Family History   Problem Relation Age of Onset   • Diabetes Mother    • Hypertension Mother    • Cancer Mother    • Diabetes Father    • Hypertension Father    • Cancer Father      Social History     Tobacco Use   • Smoking status: Current Every Day Smoker     Packs/day: 1.00     Years: 25.00     Pack years: 25.00     Types: Cigarettes   • Smokeless tobacco: Never Used   Substance Use Topics   • Alcohol use: No   • Drug use: No     Medications Prior to Admission   Medication Sig Dispense Refill Last Dose   • Dulaglutide 0.75 MG/0.5ML solution pen-injector Inject 0.75 mg under the skin into the appropriate area as directed 1 (One) Time Per Week. 4 pen 11 9/2/2019 at 0800   • insulin aspart (NOVOLOG FLEXPEN) 100 UNIT/ML solution pen-injector sc pen Up to 50 units three times daily 15 pen 11 9/2/2019 at 0800   • Insulin Degludec (TRESIBA FLEXTOUCH) 200 UNIT/ML solution pen-injector Inject 60 Units under the skin into the appropriate area as directed Every Night. 6 pen 11 9/2/2019 at 2000   • metFORMIN ER (GLUCOPHAGE XR) 500 MG 24 hr tablet Take one with supper for one week then one po BID 60 tablet 11 9/2/2019 at 2000   • metoprolol succinate XL (TOPROL-XL) 25 MG 24 hr tablet Take 1 tablet by mouth Daily. 30 tablet 11 9/2/2019 at 0800   • pravastatin (PRAVACHOL) 20 MG tablet Take 1 tablet by mouth Daily. 30 tablet 3 9/2/2019 at [id number]   • pregabalin (LYRICA) 50 MG capsule Take 1 capsule by mouth 2 (Two) Times a Day. 28 capsule 0 9/2/2019 at 2000   • Glucose Blood (BLOOD GLUCOSE TEST) strip Use 4 x daily, one touch verio  each 11 Taking   • Insulin Pen Needle (B-D UF III MINI PEN NEEDLES) 31G X 5 MM misc Use 4 times daily 120 each 11    • Lancet Devices (ONE TOUCH DELICA LANCING DEV) misc delica lancing device if  "approved, otherwise use alternative 1 each 11 Taking   • ONETOUCH DELICA LANCETS 33G misc 1 each 4 (Four) Times a Day. delica if covered, use alternative if not covered 120 each 11 Taking       nicardipine 100 mcg/mL 500 mcg Intravenous Once     Allergies:  Ace inhibitors and Wellbutrin [bupropion]    OBJECTIVE        Vital Signs  Temp:  [96.9 °F (36.1 °C)-97 °F (36.1 °C)] 96.9 °F (36.1 °C)  Heart Rate:  [43-69] 63  Resp:  [18-20] 18  BP: (162-177)/(77-89) 162/87    Flowsheet Rows      First Filed Value   Admission Height  188 cm (74\") Documented at 09/03/2019 0634   Admission Weight  103 kg (226 lb 10.1 oz) Documented at 09/03/2019 0634        188 cm (74\")    Physical Exam   Constitutional: He is oriented to person, place, and time. He appears well-nourished.   HENT:   Head: Atraumatic.   Eyes: EOM are normal.   Neck: Carotid bruit is not present.   Cardiovascular: Normal heart sounds. Bradycardia present.   Pulses:       Dorsalis pedis pulses are 2+ on the right side, and 2+ on the left side.        Posterior tibial pulses are 2+ on the right side, and 2+ on the left side.   Pulmonary/Chest: Effort normal and breath sounds normal.   Abdominal: Soft. Bowel sounds are normal. He exhibits no distension.   Musculoskeletal: Normal range of motion. He exhibits no edema.   Neurological: He is alert and oriented to person, place, and time.   Skin: Skin is warm and dry. Capillary refill takes less than 2 seconds. No erythema.   Psychiatric: He has a normal mood and affect. Thought content normal.   Vitals reviewed.      Results Review:   Lab Results   Component Value Date    WBC 6.48 09/03/2019    HGB 14.8 09/03/2019    HCT 41.4 09/03/2019    MCV 83.1 09/03/2019     09/03/2019     Lab Results   Component Value Date    GLUCOSE 169 (H) 09/03/2019    BUN 13 09/03/2019    CREATININE 0.66 (L) 09/03/2019    EGFRIFNONA 125 09/03/2019    BCR 19.7 09/03/2019    K 4.3 09/03/2019    CO2 28.0 09/03/2019    CALCIUM 9.1 " 09/03/2019    ALBUMIN 4.70 06/07/2019    AST 15 06/07/2019    ALT 11 06/07/2019       Results for orders placed in visit on 07/24/19   Adult Transthoracic Echo Complete W/ Cont if Necessary Per Protocol    Narrative · Left ventricular wall thickness is consistent with mild concentric   hypertrophy.  · Estimated EF = 61%.  · Left ventricular systolic function is normal.  · Left ventricular diastolic dysfunction (grade I) consistent with   impaired relaxation.  · Mild mitral valve regurgitation is present             ASSESSMENT/PLAN         Chest pain    Coronary artery disease of native artery of native heart with stable angina pectoris (CMS/HCC)      Assessment & Plan    Coronary Artery Bypass Grafting x 3 vessels is advisable with endoscopic vein harvesting.  Currently we will complete pre-op work-up: Carotid Duplex Scan, BLE Vein Mapping, 5 Meter Walk Test, Type & Screen, PFT, ABG, TSH, MRSA Screen as outpatient. Dr. Ballard to review preoperative evaluation and see in clinic for further discussion. Discuss Risks and Benefits with patient and family and schedule accordingly. Preoperative teaching completed and all questions answered.       Thank you for the opportunity to participate in this patient's care.        I discussed the patients findings and my recommendations with Dr. Ballard              This document has been electronically signed by NELSY Moreland on September 3, 2019 11:20 AM

## 2019-09-03 NOTE — DISCHARGE INSTRUCTIONS
Radial Site Care    This sheet gives you information about how to care for yourself after your procedure. Your health care provider may also give you more specific instructions. If you have problems or questions, contact your health care provider.  What can I expect after the procedure?  After the procedure, it is common to have:  · Bruising and tenderness at the catheter insertion area.  Follow these instructions at home:  Medicines  · Take over-the-counter and prescription medicines only as told by your health care provider.  Insertion site care  · Follow instructions from your health care provider about how to take care of your insertion site. Make sure you:  ? Wash your hands with soap and water before you change your bandage (dressing). If soap and water are not available, use hand .  ? Change your dressing as told by your health care provider.  ? Leave stitches (sutures), skin glue, or adhesive strips in place. These skin closures may need to stay in place for 2 weeks or longer. If adhesive strip edges start to loosen and curl up, you may trim the loose edges. Do not remove adhesive strips completely unless your health care provider tells you to do that.  · Check your insertion site every day for signs of infection. Check for:  ? Redness, swelling, or pain.  ? Fluid or blood.  ? Pus or a bad smell.  ? Warmth.  · Do not take baths, swim, or use a hot tub until your health care provider approves.  · You may shower 24-48 hours after the procedure, or as directed by your health care provider.  ? Remove the dressing and gently wash the site with plain soap and water.  ? Pat the area dry with a clean towel.  ? Do not rub the site. That could cause bleeding.  · Do not apply powder or lotion to the site.  Activity    · For 24 hours after the procedure, or as directed by your health care provider:  ? Do not flex or bend the affected arm.  ? Do not push or pull heavy objects with the affected arm.  ? Do not  drive yourself home from the hospital or clinic. You may drive 24 hours after the procedure unless your health care provider tells you not to.  ? Do not operate machinery or power tools.  · Do not lift anything that is heavier than 10 lb (4.5 kg), or the limit that you are told, until your health care provider says that it is safe.  · Ask your health care provider when it is okay to:  ? Return to work or school.  ? Resume usual physical activities or sports.  ? Resume sexual activity.  General instructions  · If the catheter site starts to bleed, raise your arm and put firm pressure on the site. If the bleeding does not stop, get help right away. This is a medical emergency.  · If you went home on the same day as your procedure, a responsible adult should be with you for the first 24 hours after you arrive home.  · Keep all follow-up visits as told by your health care provider. This is important.  Contact a health care provider if:  · You have a fever.  · You have redness, swelling, or yellow drainage around your insertion site.  Get help right away if:  · You have unusual pain at the radial site.  · The catheter insertion area swells very fast.  · The insertion area is bleeding, and the bleeding does not stop when you hold steady pressure on the area.  · Your arm or hand becomes pale, cool, tingly, or numb.  These symptoms may represent a serious problem that is an emergency. Do not wait to see if the symptoms will go away. Get medical help right away. Call your local emergency services (911 in the [place] Do not drive yourself to the hospital.  Summary  · After the procedure, it is common to have bruising and tenderness at the site.  · Follow instructions from your health care provider about how to take care of your radial site wound. Check the wound every day for signs of infection.  · Do not lift anything that is heavier than 10 lb (4.5 kg), or the limit that you are told, until your health care provider says  that it is safe.  This information is not intended to replace advice given to you by your health care provider. Make sure you discuss any questions you have with your health care provider.  Document Released: 01/20/2012 Document Revised: 01/23/2019 Document Reviewed: 01/23/2019  [employer] Interactive Patient Education © 2019 [employer]

## 2019-09-06 ENCOUNTER — DOCUMENTATION (OUTPATIENT)
Dept: CARDIAC REHAB | Facility: HOSPITAL | Age: 54
End: 2019-09-06

## 2019-09-09 ENCOUNTER — PROCEDURE VISIT (OUTPATIENT)
Dept: PULMONOLOGY | Facility: CLINIC | Age: 54
End: 2019-09-09

## 2019-09-09 DIAGNOSIS — Z72.0 NICOTINE ABUSE: ICD-10-CM

## 2019-09-09 PROCEDURE — 94729 DIFFUSING CAPACITY: CPT | Performed by: INTERNAL MEDICINE

## 2019-09-09 PROCEDURE — 94060 EVALUATION OF WHEEZING: CPT | Performed by: INTERNAL MEDICINE

## 2019-09-09 PROCEDURE — 94727 GAS DIL/WSHOT DETER LNG VOL: CPT | Performed by: INTERNAL MEDICINE

## 2019-09-12 ENCOUNTER — APPOINTMENT (OUTPATIENT)
Dept: GENERAL RADIOLOGY | Facility: HOSPITAL | Age: 54
End: 2019-09-12

## 2019-09-12 ENCOUNTER — HOSPITAL ENCOUNTER (OUTPATIENT)
Facility: HOSPITAL | Age: 54
Setting detail: OBSERVATION
Discharge: HOME | End: 2019-09-13
Attending: EMERGENCY MEDICINE | Admitting: INTERNAL MEDICINE

## 2019-09-12 DIAGNOSIS — R07.9 CHEST PAIN, UNSPECIFIED TYPE: Primary | ICD-10-CM

## 2019-09-12 DIAGNOSIS — I25.118 CORONARY ARTERY DISEASE OF NATIVE ARTERY OF NATIVE HEART WITH STABLE ANGINA PECTORIS (HCC): ICD-10-CM

## 2019-09-12 LAB
ALBUMIN SERPL-MCNC: 4.1 G/DL (ref 3.5–5.2)
ALBUMIN/GLOB SERPL: 1.5 G/DL
ALP SERPL-CCNC: 49 U/L (ref 39–117)
ALT SERPL W P-5'-P-CCNC: 10 U/L (ref 1–41)
ANION GAP SERPL CALCULATED.3IONS-SCNC: 11 MMOL/L (ref 5–15)
AST SERPL-CCNC: 10 U/L (ref 1–40)
BASOPHILS # BLD AUTO: 0.04 10*3/MM3 (ref 0–0.2)
BASOPHILS NFR BLD AUTO: 0.5 % (ref 0–1.5)
BILIRUB SERPL-MCNC: 1.2 MG/DL (ref 0.2–1.2)
BUN BLD-MCNC: 16 MG/DL (ref 6–20)
BUN/CREAT SERPL: 23.2 (ref 7–25)
CALCIUM SPEC-SCNC: 9.3 MG/DL (ref 8.6–10.5)
CHLORIDE SERPL-SCNC: 103 MMOL/L (ref 98–107)
CO2 SERPL-SCNC: 27 MMOL/L (ref 22–29)
CREAT BLD-MCNC: 0.69 MG/DL (ref 0.76–1.27)
DEPRECATED RDW RBC AUTO: 38.8 FL (ref 37–54)
EOSINOPHIL # BLD AUTO: 0.16 10*3/MM3 (ref 0–0.4)
EOSINOPHIL NFR BLD AUTO: 2 % (ref 0.3–6.2)
ERYTHROCYTE [DISTWIDTH] IN BLOOD BY AUTOMATED COUNT: 13 % (ref 12.3–15.4)
GFR SERPL CREATININE-BSD FRML MDRD: 119 ML/MIN/1.73
GLOBULIN UR ELPH-MCNC: 2.8 GM/DL
GLUCOSE BLD-MCNC: 191 MG/DL (ref 65–99)
GLUCOSE BLDC GLUCOMTR-MCNC: 171 MG/DL (ref 70–130)
GLUCOSE BLDC GLUCOMTR-MCNC: 199 MG/DL (ref 70–130)
HCT VFR BLD AUTO: 43.6 % (ref 37.5–51)
HGB BLD-MCNC: 15.4 G/DL (ref 13–17.7)
HOLD SPECIMEN: NORMAL
HOLD SPECIMEN: NORMAL
IMM GRANULOCYTES # BLD AUTO: 0.03 10*3/MM3 (ref 0–0.05)
IMM GRANULOCYTES NFR BLD AUTO: 0.4 % (ref 0–0.5)
LYMPHOCYTES # BLD AUTO: 2.11 10*3/MM3 (ref 0.7–3.1)
LYMPHOCYTES NFR BLD AUTO: 25.9 % (ref 19.6–45.3)
MAGNESIUM SERPL-MCNC: 1.9 MG/DL (ref 1.6–2.6)
MCH RBC QN AUTO: 29.2 PG (ref 26.6–33)
MCHC RBC AUTO-ENTMCNC: 35.3 G/DL (ref 31.5–35.7)
MCV RBC AUTO: 82.7 FL (ref 79–97)
MONOCYTES # BLD AUTO: 0.47 10*3/MM3 (ref 0.1–0.9)
MONOCYTES NFR BLD AUTO: 5.8 % (ref 5–12)
NEUTROPHILS # BLD AUTO: 5.34 10*3/MM3 (ref 1.7–7)
NEUTROPHILS NFR BLD AUTO: 65.4 % (ref 42.7–76)
NRBC BLD AUTO-RTO: 0 /100 WBC (ref 0–0.2)
NT-PROBNP SERPL-MCNC: 356.1 PG/ML (ref 5–900)
PHOSPHATE SERPL-MCNC: 2.9 MG/DL (ref 2.5–4.5)
PLATELET # BLD AUTO: 218 10*3/MM3 (ref 140–450)
PMV BLD AUTO: 10.7 FL (ref 6–12)
POTASSIUM BLD-SCNC: 3.8 MMOL/L (ref 3.5–5.2)
PROT SERPL-MCNC: 6.9 G/DL (ref 6–8.5)
RBC # BLD AUTO: 5.27 10*6/MM3 (ref 4.14–5.8)
SODIUM BLD-SCNC: 141 MMOL/L (ref 136–145)
TROPONIN T SERPL-MCNC: <0.01 NG/ML (ref 0–0.03)
TROPONIN T SERPL-MCNC: <0.01 NG/ML (ref 0–0.03)
WBC NRBC COR # BLD: 8.15 10*3/MM3 (ref 3.4–10.8)
WHOLE BLOOD HOLD SPECIMEN: NORMAL

## 2019-09-12 PROCEDURE — 84484 ASSAY OF TROPONIN QUANT: CPT | Performed by: EMERGENCY MEDICINE

## 2019-09-12 PROCEDURE — 25010000002 ENOXAPARIN PER 10 MG: Performed by: INTERNAL MEDICINE

## 2019-09-12 PROCEDURE — 93005 ELECTROCARDIOGRAM TRACING: CPT | Performed by: EMERGENCY MEDICINE

## 2019-09-12 PROCEDURE — 94799 UNLISTED PULMONARY SVC/PX: CPT

## 2019-09-12 PROCEDURE — 71046 X-RAY EXAM CHEST 2 VIEWS: CPT

## 2019-09-12 PROCEDURE — G0378 HOSPITAL OBSERVATION PER HR: HCPCS

## 2019-09-12 PROCEDURE — 80053 COMPREHEN METABOLIC PANEL: CPT | Performed by: EMERGENCY MEDICINE

## 2019-09-12 PROCEDURE — 84100 ASSAY OF PHOSPHORUS: CPT | Performed by: INTERNAL MEDICINE

## 2019-09-12 PROCEDURE — 63710000001 INSULIN ASPART PER 5 UNITS: Performed by: INTERNAL MEDICINE

## 2019-09-12 PROCEDURE — 96376 TX/PRO/DX INJ SAME DRUG ADON: CPT

## 2019-09-12 PROCEDURE — 25010000002 MORPHINE PER 10 MG: Performed by: EMERGENCY MEDICINE

## 2019-09-12 PROCEDURE — 93010 ELECTROCARDIOGRAM REPORT: CPT | Performed by: INTERNAL MEDICINE

## 2019-09-12 PROCEDURE — 96374 THER/PROPH/DIAG INJ IV PUSH: CPT

## 2019-09-12 PROCEDURE — 36415 COLL VENOUS BLD VENIPUNCTURE: CPT | Performed by: EMERGENCY MEDICINE

## 2019-09-12 PROCEDURE — 99284 EMERGENCY DEPT VISIT MOD MDM: CPT

## 2019-09-12 PROCEDURE — 85025 COMPLETE CBC W/AUTO DIFF WBC: CPT | Performed by: EMERGENCY MEDICINE

## 2019-09-12 PROCEDURE — 96372 THER/PROPH/DIAG INJ SC/IM: CPT

## 2019-09-12 PROCEDURE — 94760 N-INVAS EAR/PLS OXIMETRY 1: CPT

## 2019-09-12 PROCEDURE — 83880 ASSAY OF NATRIURETIC PEPTIDE: CPT | Performed by: EMERGENCY MEDICINE

## 2019-09-12 PROCEDURE — 25010000002 MORPHINE PER 10 MG: Performed by: INTERNAL MEDICINE

## 2019-09-12 PROCEDURE — 83735 ASSAY OF MAGNESIUM: CPT | Performed by: INTERNAL MEDICINE

## 2019-09-12 PROCEDURE — 82962 GLUCOSE BLOOD TEST: CPT

## 2019-09-12 PROCEDURE — 93005 ELECTROCARDIOGRAM TRACING: CPT

## 2019-09-12 RX ORDER — AMLODIPINE BESYLATE 5 MG/1
5 TABLET ORAL DAILY
Status: DISCONTINUED | OUTPATIENT
Start: 2019-09-12 | End: 2019-09-13

## 2019-09-12 RX ORDER — METOPROLOL SUCCINATE 25 MG/1
25 TABLET, EXTENDED RELEASE ORAL DAILY
Status: DISCONTINUED | OUTPATIENT
Start: 2019-09-12 | End: 2019-09-13 | Stop reason: HOSPADM

## 2019-09-12 RX ORDER — IBUPROFEN 200 MG
600 TABLET ORAL 2 TIMES DAILY PRN
COMMUNITY
End: 2019-09-13 | Stop reason: HOSPADM

## 2019-09-12 RX ORDER — PRAVASTATIN SODIUM 10 MG
20 TABLET ORAL DAILY
Status: DISCONTINUED | OUTPATIENT
Start: 2019-09-12 | End: 2019-09-13 | Stop reason: HOSPADM

## 2019-09-12 RX ORDER — SODIUM CHLORIDE 0.9 % (FLUSH) 0.9 %
10 SYRINGE (ML) INJECTION AS NEEDED
Status: DISCONTINUED | OUTPATIENT
Start: 2019-09-12 | End: 2019-09-13 | Stop reason: HOSPADM

## 2019-09-12 RX ORDER — NICOTINE POLACRILEX 4 MG
15 LOZENGE BUCCAL
Status: DISCONTINUED | OUTPATIENT
Start: 2019-09-12 | End: 2019-09-13 | Stop reason: HOSPADM

## 2019-09-12 RX ORDER — DEXTROSE MONOHYDRATE 25 G/50ML
25 INJECTION, SOLUTION INTRAVENOUS
Status: DISCONTINUED | OUTPATIENT
Start: 2019-09-12 | End: 2019-09-13 | Stop reason: HOSPADM

## 2019-09-12 RX ORDER — PREGABALIN 50 MG/1
50 CAPSULE ORAL 2 TIMES DAILY
Status: DISCONTINUED | OUTPATIENT
Start: 2019-09-12 | End: 2019-09-13 | Stop reason: HOSPADM

## 2019-09-12 RX ORDER — NITROGLYCERIN 0.4 MG/1
0.4 TABLET SUBLINGUAL ONCE
Status: COMPLETED | OUTPATIENT
Start: 2019-09-12 | End: 2019-09-12

## 2019-09-12 RX ORDER — NITROGLYCERIN 0.4 MG/1
0.4 TABLET SUBLINGUAL
Status: DISCONTINUED | OUTPATIENT
Start: 2019-09-12 | End: 2019-09-13 | Stop reason: HOSPADM

## 2019-09-12 RX ORDER — SODIUM CHLORIDE 0.9 % (FLUSH) 0.9 %
10 SYRINGE (ML) INJECTION EVERY 12 HOURS SCHEDULED
Status: DISCONTINUED | OUTPATIENT
Start: 2019-09-12 | End: 2019-09-13 | Stop reason: HOSPADM

## 2019-09-12 RX ORDER — IBUPROFEN 600 MG/1
600 TABLET ORAL 2 TIMES DAILY PRN
Status: DISCONTINUED | OUTPATIENT
Start: 2019-09-12 | End: 2019-09-13

## 2019-09-12 RX ADMIN — INSULIN ASPART 2 UNITS: 100 INJECTION, SOLUTION INTRAVENOUS; SUBCUTANEOUS at 18:37

## 2019-09-12 RX ADMIN — MORPHINE SULFATE 4 MG: 4 INJECTION INTRAVENOUS at 21:01

## 2019-09-12 RX ADMIN — NITROGLYCERIN 0.4 MG: 0.4 TABLET SUBLINGUAL at 15:30

## 2019-09-12 RX ADMIN — SODIUM CHLORIDE, PRESERVATIVE FREE 10 ML: 5 INJECTION INTRAVENOUS at 20:21

## 2019-09-12 RX ADMIN — PREGABALIN 50 MG: 50 CAPSULE ORAL at 20:22

## 2019-09-12 RX ADMIN — IBUPROFEN 600 MG: 600 TABLET ORAL at 18:44

## 2019-09-12 RX ADMIN — MORPHINE SULFATE 4 MG: 4 INJECTION, SOLUTION INTRAMUSCULAR; INTRAVENOUS at 18:20

## 2019-09-12 RX ADMIN — AMLODIPINE BESYLATE 5 MG: 5 TABLET ORAL at 18:37

## 2019-09-12 RX ADMIN — INSULIN ASPART 2 UNITS: 100 INJECTION, SOLUTION INTRAVENOUS; SUBCUTANEOUS at 20:27

## 2019-09-12 RX ADMIN — PRAVASTATIN SODIUM 20 MG: 10 TABLET ORAL at 18:37

## 2019-09-12 RX ADMIN — ENOXAPARIN SODIUM 40 MG: 40 INJECTION SUBCUTANEOUS at 18:37

## 2019-09-12 NOTE — ED PROVIDER NOTES
Subjective   Patient presents to emergency department for substernal chest pain radiating to his left arm this morning which woke him from sleep.  Describes pain as pressure with intermittent sharp pains.  States he had a cardiac catheterization performed 1 week ago which showed three-vessel disease.  Dr. Queen is his cardiologist and has referred him to cardiovascular surgery, Dr. Ballard for further evaluation.  States today's is the worst pain he has had.  Current smoker.  History of diabetes hypertension hyperlipidemia.        History provided by:  Patient   used: No    Chest Pain   Pain location:  Substernal area  Pain quality: pressure and sharp    Pain radiates to:  L arm  Pain severity:  Severe  Onset quality:  Sudden  Duration:  1 day  Timing:  Constant  Progression:  Unchanged  Chronicity:  New  Context: at rest    Context: not breathing    Associated symptoms: no abdominal pain, no back pain, no dysphagia, no fever, no nausea, no shortness of breath, no vomiting and no weakness    Risk factors: coronary artery disease, diabetes mellitus, high cholesterol, hypertension, male sex and smoking        Review of Systems   Constitutional: Negative for chills and fever.   HENT: Negative for sore throat and trouble swallowing.    Eyes: Negative for visual disturbance.   Respiratory: Negative for shortness of breath and wheezing.    Cardiovascular: Positive for chest pain. Negative for leg swelling.   Gastrointestinal: Negative for abdominal pain, nausea and vomiting.   Genitourinary: Negative for dysuria and flank pain.   Musculoskeletal: Negative for back pain.   Skin: Negative for color change.   Neurological: Negative for syncope and weakness.   Hematological: Does not bruise/bleed easily.   Psychiatric/Behavioral: Negative for confusion.       Past Medical History:   Diagnosis Date   • Diabetes mellitus (CMS/HCC)    • Hyperlipidemia    • Hypertension    • Sleep apnea        Allergies  "  Allergen Reactions   • Ace Inhibitors Anaphylaxis   • Wellbutrin [Bupropion] Anaphylaxis       Past Surgical History:   Procedure Laterality Date   • CARDIAC CATHETERIZATION N/A 9/3/2019    Procedure: Coronary angiography/PCI if indicated;  Surgeon: Denia Dwyer MD;  Location: [facility] INVASIVE LOCATION;  Service: Cardiology   • CHOLECYSTECTOMY     • TONSILLECTOMY         Family History   Problem Relation Age of Onset   • Diabetes Mother    • Hypertension Mother    • Cancer Mother    • Diabetes Father    • Hypertension Father    • Cancer Father        Social History     Socioeconomic History   • Marital status:      Spouse name: Not on file   • Number of children: Not on file   • Years of education: Not on file   • Highest education level: Not on file   Tobacco Use   • Smoking status: Current Every Day Smoker     Packs/day: 1.00     Years: 25.00     Pack years: 25.00     Types: Cigarettes   • Smokeless tobacco: Never Used   Substance and Sexual Activity   • Alcohol use: No   • Drug use: No   • Sexual activity: Defer           Objective      /82 (BP Location: Right arm, Patient Position: Sitting)   Pulse 50   Temp 97.4 °F (36.3 °C) (Oral)   Resp 19   Ht 188 cm (74\")   Wt 102 kg (224 lb 12.8 oz)   SpO2 100%   BMI 28.86 kg/m²     Physical Exam   Constitutional: He is oriented to person, place, and time. He appears well-developed and well-nourished. No distress.   HENT:   Head: Normocephalic and atraumatic.   Eyes: Conjunctivae are normal.   Cardiovascular: Normal rate, regular rhythm and normal heart sounds.   Pulmonary/Chest: Effort normal and breath sounds normal.   Abdominal: Soft. Bowel sounds are normal.   Musculoskeletal: He exhibits no edema.   Neurological: He is alert and oriented to person, place, and time.   Skin: Skin is warm and dry. Capillary refill takes less than 2 seconds.   Psychiatric: He has a normal mood and affect. His behavior is normal. Thought content normal. "   Nursing note and vitals reviewed.      ECG 12 Lead    Date/Time: 9/12/2019 4:48 PM  Performed by: Curly Arenas PA-C  Authorized by: Rob Beebe MD   Interpreted by physician  Comparison: compared with previous ECG from 7/24/2019  Similar to previous ECG  Rhythm: sinus bradycardia  Rate: bradycardic  BPM: 58  ST Segments: ST segments normal  Clinical impression: abnormal ECG                 ED Course  ED Course as of Sep 12 1737   Thu Sep 12, 2019   1632 Paged Dr Villaseñor.    [SHARON]      ED Course User Index  [SHARON] Curly Arenas PA-C      Results for orders placed or performed during the hospital encounter of 09/12/19   Troponin   Result Value Ref Range    Troponin T <0.010 0.000 - 0.030 ng/mL   Comprehensive Metabolic Panel   Result Value Ref Range    Glucose 191 (H) 65 - 99 mg/dL    BUN 16 6 - 20 mg/dL    Creatinine 0.69 (L) 0.76 - 1.27 mg/dL    Sodium 141 136 - 145 mmol/L    Potassium 3.8 3.5 - 5.2 mmol/L    Chloride 103 98 - 107 mmol/L    CO2 27.0 22.0 - 29.0 mmol/L    Calcium 9.3 8.6 - 10.5 mg/dL    Total Protein 6.9 6.0 - 8.5 g/dL    Albumin 4.10 3.50 - 5.20 g/dL    ALT (SGPT) 10 1 - 41 U/L    AST (SGOT) 10 1 - 40 U/L    Alkaline Phosphatase 49 39 - 117 U/L    Total Bilirubin 1.2 0.2 - 1.2 mg/dL    eGFR Non African Amer 119 >60 mL/min/1.73    Globulin 2.8 gm/dL    A/G Ratio 1.5 g/dL    BUN/Creatinine Ratio 23.2 7.0 - 25.0    Anion Gap 11.0 5.0 - 15.0 mmol/L   BNP   Result Value Ref Range    proBNP 356.1 5.0 - 900.0 pg/mL   CBC Auto Differential   Result Value Ref Range    WBC 8.15 3.40 - 10.80 10*3/mm3    RBC 5.27 4.14 - 5.80 10*6/mm3    Hemoglobin 15.4 13.0 - 17.7 g/dL    Hematocrit 43.6 37.5 - 51.0 %    MCV 82.7 79.0 - 97.0 fL    MCH 29.2 26.6 - 33.0 pg    MCHC 35.3 31.5 - 35.7 g/dL    RDW 13.0 12.3 - 15.4 %    RDW-SD 38.8 37.0 - 54.0 fl    MPV 10.7 6.0 - 12.0 fL    Platelets 218 140 - 450 10*3/mm3    Neutrophil % 65.4 42.7 - 76.0 %    Lymphocyte % 25.9 19.6 - 45.3 %    Monocyte % 5.8 5.0  - 12.0 %    Eosinophil % 2.0 0.3 - 6.2 %    Basophil % 0.5 0.0 - 1.5 %    Immature Grans % 0.4 0.0 - 0.5 %    Neutrophils, Absolute 5.34 1.70 - 7.00 10*3/mm3    Lymphocytes, Absolute 2.11 0.70 - 3.10 10*3/mm3    Monocytes, Absolute 0.47 0.10 - 0.90 10*3/mm3    Eosinophils, Absolute 0.16 0.00 - 0.40 10*3/mm3    Basophils, Absolute 0.04 0.00 - 0.20 10*3/mm3    Immature Grans, Absolute 0.03 0.00 - 0.05 10*3/mm3    nRBC 0.0 0.0 - 0.2 /100 WBC   Magnesium   Result Value Ref Range    Magnesium 1.9 1.6 - 2.6 mg/dL   Phosphorus   Result Value Ref Range    Phosphorus 2.9 2.5 - 4.5 mg/dL   Light Blue Top   Result Value Ref Range    Extra Tube hold for add-on    Green Top (Gel)   Result Value Ref Range    Extra Tube Hold for add-ons.    Lavender Top   Result Value Ref Range    Extra Tube hold for add-on    Gold Top - SST   Result Value Ref Range    Extra Tube Hold for add-ons.      Xr Chest 2 View    Result Date: 9/12/2019  Narrative: PROCEDURE: Two-view chest COMPARISON: 1/22/2017 HISTORY: Chest Pain Triage Protocol FINDINGS: Frontal and lateral views of the chest are obtained. Devices: None Lungs/Pleura: The lungs are clear Cardiomediastinal structures: Normal     Impression: CONCLUSION:  No acute cardiopulmonary disease Electronically signed by:  Cuauhtemoc Chew MD  9/12/2019 3:27 PM CDT Workstation: [id number]    Us Scrotum And Testicles    Result Date: 8/20/2019  Narrative: PROCEDURE: US SCROTUM AND TESTICLES (accession [id number]), US TESTICULAR OR OVARIAN VASCULAR LIMITED (accession [id number]) Clinical History: testicular pain days, history of lesion left testicle, N50.819 Testicular pain, unspecified Indication: Same as above. Comparison: 1/13/2018 . Technique: Grayscale and color Doppler evaluation of the testes and the scrotum was done Findings: The right testes measures 4.5 x 2.2 x 3.5  centimeters and the left testes measures 3.9 x 2.5 x 3.0  centimeters. The bilateral testes do not  show any evidence of infiltrative  or focal mass lesions or any evidence of testicular atrophy. The bilateral testes show normal blood flow. Benign small epididymal head cysts/spermatoceles are again seen bilaterally. Note is again made of benign epididymal appendices bilaterally The bilateral epididymides show normal blood flow. There is presence of small bilateral hydroceles. There is no presence of varicocele on  either side. There is no presence of scrotal hernias.     Impression: Impression:  Small bilateral hydroceles  Electronically signed by:  Javier Carvalho MD  8/20/2019 3:28 PM CDT Workstation: [id number]    Us Testicular Or Ovarian Vascular Limited    Result Date: 8/20/2019  Narrative: PROCEDURE: US SCROTUM AND TESTICLES (accession [id number]), US TESTICULAR OR OVARIAN VASCULAR LIMITED (accession [id number]) Clinical History: testicular pain days, history of lesion left testicle, N50.819 Testicular pain, unspecified Indication: Same as above. Comparison: 1/13/2018 . Technique: Grayscale and color Doppler evaluation of the testes and the scrotum was done Findings: The right testes measures 4.5 x 2.2 x 3.5  centimeters and the left testes measures 3.9 x 2.5 x 3.0  centimeters. The bilateral testes do not  show any evidence of infiltrative or focal mass lesions or any evidence of testicular atrophy. The bilateral testes show normal blood flow. Benign small epididymal head cysts/spermatoceles are again seen bilaterally. Note is again made of benign epididymal appendices bilaterally The bilateral epididymides show normal blood flow. There is presence of small bilateral hydroceles. There is no presence of varicocele on  either side. There is no presence of scrotal hernias.     Impression: Impression:  Small bilateral hydroceles  Electronically signed by:  Javier Carvalho MD  8/20/2019 3:28 PM CDT Workstation: [id number]        Patient admitted to hospitalist.        [facility]    Final diagnoses:   Chest pain, unspecified type               Curly Arenas PA-C  09/12/19 1732

## 2019-09-12 NOTE — H&P
[facility] Medicine Admission      Date of Admission: 9/12/2019      Primary Care Physician: Artemio Sanchez MD      Chief Complaint: Chest pain x2 days     HPI:this is a 54-year-old man with past medical history of coronary artery disease patient had cardiac cath 1 week ago it was told that have three-vessel disease patient was told that he need to see Dr. Ballard from cardiothoracic this week Dr. Ballard office canceled the patient today patient had chest pain starting from the chest going to the left shoulder started yesterday increasingly worsening radiating to left shoulder acid with shortness of breath pain is at rest more and aggravated with effort patient had no fever no loss of consciousness no burning urination no seizure activity in is relieved by nitroglycerin for which patient is being admitted      Concurrent Medical History:  has a past medical history of Diabetes mellitus (CMS/[facility]), Hyperlipidemia, Hypertension, and Sleep apnea.    Past Surgical History:  has a past surgical history that includes Cholecystectomy; Tonsillectomy; and Cardiac catheterization (N/A, 9/3/2019).    Family History: family history includes Cancer in his father and mother; Diabetes in his father and mother; Hypertension in his father and mother.     Social History:  reports that he has been smoking cigarettes.  He has a 25.00 pack-year smoking history. He has never used smokeless tobacco. He reports that he does not drink alcohol or use drugs.    Allergies:   Allergies   Allergen Reactions   • Ace Inhibitors Anaphylaxis   • Wellbutrin [Bupropion] Anaphylaxis       Medications:   Prior to Admission medications    Medication Sig Start Date End Date Taking? Authorizing Provider   amLODIPine (NORVASC) 5 MG tablet Take 1 tablet by mouth Daily.  Patient taking differently: Take 5 mg by mouth every night at bedtime. 9/3/19  Yes Denia Dwyer MD   ibuprofen (ADVIL,MOTRIN) 200 MG tablet Take  600 mg by mouth 2 (Two) Times a Day As Needed for Mild Pain .   Yes Provider, MD Hai   metFORMIN ER (GLUCOPHAGE XR) 500 MG 24 hr tablet Take one with supper for one week then one po BID  Patient taking differently: Take 500 mg by mouth 2 (Two) Times a Day. Take one with supper for one week then one po BID 8/9/19  Yes Martinez Card APRN   metoprolol succinate XL (TOPROL-XL) 25 MG 24 hr tablet Take 1 tablet by mouth Daily.  Patient taking differently: Take 25 mg by mouth every night at bedtime. 8/26/19  Yes Beau Fuentes MD   pravastatin (PRAVACHOL) 20 MG tablet Take 1 tablet by mouth Daily.  Patient taking differently: Take 20 mg by mouth Every Night. 6/10/19  Yes Sandra Sorto APRN   Dulaglutide 0.75 MG/0.5ML solution pen-injector Inject 0.75 mg under the skin into the appropriate area as directed 1 (One) Time Per Week. 8/5/19   Martinez Card APRN   Glucose Blood (BLOOD GLUCOSE TEST) strip Use 4 x daily, one touch verio IQ 8/5/19   Martinez Card APRN   Lancet Devices (ONE TOUCH DELICA LANCING DEV) misc delica lancing device if approved, otherwise use alternative 1/24/17   Alessandro Gan MD   ONETOUCH DELICA LANCETS 33G misc 1 each 4 (Four) Times a Day. delica if covered, use alternative if not covered 1/24/17   Alessandro Gan MD   pregabalin (LYRICA) 50 MG capsule Take 1 capsule by mouth 2 (Two) Times a Day. 8/5/19 8/4/20  Martinez Card APRN   insulin aspart (NOVOLOG FLEXPEN) 100 UNIT/ML solution pen-injector sc pen Up to 50 units three times daily 8/5/19 9/12/19  Martinez Card APRN   Insulin Degludec (TRESIBA FLEXTOUCH) 200 UNIT/ML solution pen-injector Inject 60 Units under the skin into the appropriate area as directed Every Night. 8/5/19 9/12/19  Martinez Card APRN   Insulin Pen Needle (B-D UF III MINI PEN NEEDLES) 31G X 5 MM misc Use 4 times daily 8/5/19 9/12/19  Martinez Card APRN       Review of  Systems:  Review of Systems   Constitutional: Positive for activity change, appetite change, chills and fatigue. Negative for diaphoresis.   HENT: Negative for congestion, ear discharge, ear pain, hearing loss, postnasal drip, sinus pressure and sneezing.    Eyes: Negative for pain, discharge and itching.   Respiratory: Positive for chest tightness and shortness of breath.    Cardiovascular: Positive for chest pain. Negative for palpitations and leg swelling.   Gastrointestinal: Negative for abdominal distention, abdominal pain, diarrhea and vomiting.   Genitourinary: Negative for difficulty urinating, dysuria and flank pain.   Musculoskeletal: Negative for arthralgias, joint swelling and myalgias.   Skin: Negative for pallor.   Neurological: Negative for dizziness, seizures, facial asymmetry, weakness and light-headedness.   Psychiatric/Behavioral: Negative for confusion, decreased concentration, dysphoric mood and hallucinations.      Otherwise complete ROS is negative except as mentioned above.    Physical Exam:   Temp:  [97.4 °F (36.3 °C)] 97.4 °F (36.3 °C)  Heart Rate:  [50-59] 50  Resp:  [19] 19  BP: (162-167)/(82-98) 162/82  Physical Exam   Constitutional: He is oriented to person, place, and time. He appears well-developed and well-nourished.   HENT:   Head: Normocephalic and atraumatic.   Right Ear: External ear normal.   Left Ear: External ear normal.   Nose: Nose normal.   Mouth/Throat: Oropharynx is clear and moist. No oropharyngeal exudate.   Eyes: Conjunctivae and EOM are normal. Pupils are equal, round, and reactive to light. Right eye exhibits no discharge. Left eye exhibits no discharge. No scleral icterus.   Neck: Normal range of motion. Neck supple. No JVD present. No tracheal deviation present.   Cardiovascular: Normal rate, regular rhythm, normal heart sounds and intact distal pulses. Exam reveals no friction rub.   No murmur heard.  Pulmonary/Chest: Breath sounds normal. He is in respiratory  distress. He has no wheezes. He has no rales.   Abdominal: Soft. Bowel sounds are normal. He exhibits no distension. There is no rebound and no guarding.   Musculoskeletal: Normal range of motion. He exhibits no edema or deformity.   Neurological: He is alert and oriented to person, place, and time. He displays normal reflexes. A cranial nerve deficit is present. No sensory deficit. He exhibits normal muscle tone. Coordination normal.   Skin: Skin is warm. No rash noted.   Psychiatric: He has a normal mood and affect. His behavior is normal. Judgment and thought content normal.   Nursing note and vitals reviewed.        Results Reviewed:  I have personally reviewed current lab, radiology, and data and agree with results.  Lab Results (last 24 hours)     Procedure Component Value Units Date/Time    [place] Draw [[id number]] Collected:  09/12/19 1430    Specimen:  Blood Updated:  09/12/19 1545    Narrative:       The following orders were created for panel order [place] Draw.  Procedure                               Abnormality         Status                     ---------                               -----------         ------                     Light Blue Top[[id number]]                                   Final result               Green Top (Gel)[[id number]]                                  Final result               Lavender Top[[id number]]                                     Final result               Gold Top - SST[[id number]]                                   Final result                 Please view results for these tests on the individual orders.    Light Blue Top [[id number]] Collected:  09/12/19 1430    Specimen:  Blood Updated:  09/12/19 1545     Extra Tube hold for add-on     Comment: Auto resulted       Green Top (Gel) [[id number]] Collected:  09/12/19 1430    Specimen:  Blood Updated:  09/12/19 1545     Extra Tube Hold for add-ons.     Comment: Auto resulted.       Lavender Top [[id number]] Collected:  09/12/19  1430    Specimen:  Blood Updated:  09/12/19 1545     Extra Tube hold for add-on     Comment: Auto resulted       Gold Top - SST [[id number]] Collected:  09/12/19 1430    Specimen:  Blood Updated:  09/12/19 1545     Extra Tube Hold for add-ons.     Comment: Auto resulted.       Comprehensive Metabolic Panel [[id number]]  (Abnormal) Collected:  09/12/19 1430    Specimen:  Blood Updated:  09/12/19 1517     Glucose 191 mg/dL      BUN 16 mg/dL      Creatinine 0.69 mg/dL      Sodium 141 mmol/L      Potassium 3.8 mmol/L      Chloride 103 mmol/L      CO2 27.0 mmol/L      Calcium 9.3 mg/dL      Total Protein 6.9 g/dL      Albumin 4.10 g/dL      ALT (SGPT) 10 U/L      AST (SGOT) 10 U/L      Alkaline Phosphatase 49 U/L      Total Bilirubin 1.2 mg/dL      eGFR Non African Amer 119 mL/min/1.73      Globulin 2.8 gm/dL      A/G Ratio 1.5 g/dL      BUN/Creatinine Ratio 23.2     Anion Gap 11.0 mmol/L     Narrative:       GFR Normal >60  Chronic Kidney Disease <60  Kidney Failure <15    Troponin [[id number]]  (Normal) Collected:  09/12/19 1430    Specimen:  Blood Updated:  09/12/19 1513     Troponin T <0.010 ng/mL     Narrative:       Troponin T Reference Range:  <= 0.03 ng/mL-   Negative for AMI  >0.03 ng/mL-     Abnormal for myocardial necrosis.  Clinicians would have to utilize clinical acumen, EKG, Troponin and serial changes to determine if it is an Acute Myocardial Infarction or myocardial injury due to an underlying chronic condition.     BNP [[id number]]  (Normal) Collected:  09/12/19 1430    Specimen:  Blood Updated:  09/12/19 1512     proBNP 356.1 pg/mL     Narrative:       Among patients with dyspnea, NT-proBNP is highly sensitive for the detection of acute congestive heart failure. In addition NT-proBNP of <300 pg/ml effectively rules out acute congestive heart failure with 99% negative predictive value.    CBC & Differential [[id number]] Collected:  09/12/19 1430    Specimen:  Blood Updated:  09/12/19 1443    Narrative:        The following orders were created for panel order CBC & Differential.  Procedure                               Abnormality         Status                     ---------                               -----------         ------                     CBC Auto Differential[[id number]]        Normal              Final result                 Please view results for these tests on the individual orders.    CBC Auto Differential [[id number]]  (Normal) Collected:  09/12/19 1430    Specimen:  Blood Updated:  09/12/19 1443     WBC 8.15 10*3/mm3      RBC 5.27 10*6/mm3      Hemoglobin 15.4 g/dL      Hematocrit 43.6 %      MCV 82.7 fL      MCH 29.2 pg      MCHC 35.3 g/dL      RDW 13.0 %      RDW-SD 38.8 fl      MPV 10.7 fL      Platelets 218 10*3/mm3      Neutrophil % 65.4 %      Lymphocyte % 25.9 %      Monocyte % 5.8 %      Eosinophil % 2.0 %      Basophil % 0.5 %      Immature Grans % 0.4 %      Neutrophils, Absolute 5.34 10*3/mm3      Lymphocytes, Absolute 2.11 10*3/mm3      Monocytes, Absolute 0.47 10*3/mm3      Eosinophils, Absolute 0.16 10*3/mm3      Basophils, Absolute 0.04 10*3/mm3      Immature Grans, Absolute 0.03 10*3/mm3      nRBC 0.0 /100 WBC         Imaging Results (last 24 hours)     Procedure Component Value Units Date/Time    XR Chest 2 View [[id number]] Collected:  09/12/19 1500     Updated:  09/12/19 1528    Narrative:       PROCEDURE: Two-view chest    COMPARISON: 1/22/2017    HISTORY: Chest Pain Triage Protocol    FINDINGS: Frontal and lateral views of the chest are obtained.     Devices: None    Lungs/Pleura: The lungs are clear    Cardiomediastinal structures: Normal         Impression:       CONCLUSION:    No acute cardiopulmonary disease    Electronically signed by:  Cuauhtemoc Chew MD  9/12/2019 3:27 PM CDT  Workstation: [id number]            Assessment:      Chest pain          Plan:  1.  Chest pain  Patient had recent cardiac cath which showed three-vessel disease  Plan admit the patient to the hospital troponin  first cardiac enzyme is negative  Observe  Cardiology consult Dr. Dwyer group  Patient will benefit from Dr. Ballard cardiothoracic consult in a.m. by morning team for evaluation for three-vessel disease  Nitroglycerin as needed for pain    2.  Diabetes mellitus  Fingerstick cover with her insulin    3.  Hypertension  Continue current home medication  Follow blood pressure    4.  Hyperlipidemia  Continue current home medication    I discussed the patient's findings and my recommendations with: ER physician and ER    Marley Peace MD  09/12/19  4:53 PM

## 2019-09-13 VITALS
RESPIRATION RATE: 13 BRPM | SYSTOLIC BLOOD PRESSURE: 158 MMHG | DIASTOLIC BLOOD PRESSURE: 83 MMHG | OXYGEN SATURATION: 98 % | TEMPERATURE: 97.3 F | BODY MASS INDEX: 28.85 KG/M2 | HEART RATE: 65 BPM | HEIGHT: 74 IN | WEIGHT: 224.8 LBS

## 2019-09-13 LAB
ANION GAP SERPL CALCULATED.3IONS-SCNC: 9 MMOL/L (ref 5–15)
BASOPHILS # BLD AUTO: 0.03 10*3/MM3 (ref 0–0.2)
BASOPHILS # BLD AUTO: 0.04 10*3/MM3 (ref 0–0.2)
BASOPHILS NFR BLD AUTO: 0.6 % (ref 0–1.5)
BASOPHILS NFR BLD AUTO: 0.7 % (ref 0–1.5)
BUN BLD-MCNC: 18 MG/DL (ref 6–20)
BUN/CREAT SERPL: 24 (ref 7–25)
CALCIUM SPEC-SCNC: 9.1 MG/DL (ref 8.6–10.5)
CHLORIDE SERPL-SCNC: 102 MMOL/L (ref 98–107)
CO2 SERPL-SCNC: 28 MMOL/L (ref 22–29)
CREAT BLD-MCNC: 0.75 MG/DL (ref 0.76–1.27)
DEPRECATED RDW RBC AUTO: 39 FL (ref 37–54)
DEPRECATED RDW RBC AUTO: 39.2 FL (ref 37–54)
EOSINOPHIL # BLD AUTO: 0.15 10*3/MM3 (ref 0–0.4)
EOSINOPHIL # BLD AUTO: 0.15 10*3/MM3 (ref 0–0.4)
EOSINOPHIL NFR BLD AUTO: 2.5 % (ref 0.3–6.2)
EOSINOPHIL NFR BLD AUTO: 2.9 % (ref 0.3–6.2)
ERYTHROCYTE [DISTWIDTH] IN BLOOD BY AUTOMATED COUNT: 13 % (ref 12.3–15.4)
ERYTHROCYTE [DISTWIDTH] IN BLOOD BY AUTOMATED COUNT: 13.1 % (ref 12.3–15.4)
GFR SERPL CREATININE-BSD FRML MDRD: 108 ML/MIN/1.73
GLUCOSE BLD-MCNC: 153 MG/DL (ref 65–99)
GLUCOSE BLDC GLUCOMTR-MCNC: 186 MG/DL (ref 70–130)
GLUCOSE BLDC GLUCOMTR-MCNC: 224 MG/DL (ref 70–130)
HBA1C MFR BLD: 7.4 % (ref 4.8–5.6)
HCT VFR BLD AUTO: 40.4 % (ref 37.5–51)
HCT VFR BLD AUTO: 41.3 % (ref 37.5–51)
HGB BLD-MCNC: 14.3 G/DL (ref 13–17.7)
HGB BLD-MCNC: 14.6 G/DL (ref 13–17.7)
IMM GRANULOCYTES # BLD AUTO: 0.02 10*3/MM3 (ref 0–0.05)
IMM GRANULOCYTES # BLD AUTO: 0.02 10*3/MM3 (ref 0–0.05)
IMM GRANULOCYTES NFR BLD AUTO: 0.3 % (ref 0–0.5)
IMM GRANULOCYTES NFR BLD AUTO: 0.4 % (ref 0–0.5)
LYMPHOCYTES # BLD AUTO: 1.27 10*3/MM3 (ref 0.7–3.1)
LYMPHOCYTES # BLD AUTO: 1.77 10*3/MM3 (ref 0.7–3.1)
LYMPHOCYTES NFR BLD AUTO: 24.1 % (ref 19.6–45.3)
LYMPHOCYTES NFR BLD AUTO: 29.3 % (ref 19.6–45.3)
MAGNESIUM SERPL-MCNC: 1.9 MG/DL (ref 1.6–2.6)
MCH RBC QN AUTO: 29.3 PG (ref 26.6–33)
MCH RBC QN AUTO: 29.5 PG (ref 26.6–33)
MCHC RBC AUTO-ENTMCNC: 35.4 G/DL (ref 31.5–35.7)
MCHC RBC AUTO-ENTMCNC: 35.4 G/DL (ref 31.5–35.7)
MCV RBC AUTO: 82.8 FL (ref 79–97)
MCV RBC AUTO: 83.4 FL (ref 79–97)
MONOCYTES # BLD AUTO: 0.4 10*3/MM3 (ref 0.1–0.9)
MONOCYTES # BLD AUTO: 0.49 10*3/MM3 (ref 0.1–0.9)
MONOCYTES NFR BLD AUTO: 7.6 % (ref 5–12)
MONOCYTES NFR BLD AUTO: 8.1 % (ref 5–12)
NEUTROPHILS # BLD AUTO: 3.39 10*3/MM3 (ref 1.7–7)
NEUTROPHILS # BLD AUTO: 3.57 10*3/MM3 (ref 1.7–7)
NEUTROPHILS NFR BLD AUTO: 59.1 % (ref 42.7–76)
NEUTROPHILS NFR BLD AUTO: 64.4 % (ref 42.7–76)
NRBC BLD AUTO-RTO: 0 /100 WBC (ref 0–0.2)
NRBC BLD AUTO-RTO: 0 /100 WBC (ref 0–0.2)
PHOSPHATE SERPL-MCNC: 3.9 MG/DL (ref 2.5–4.5)
PLATELET # BLD AUTO: 175 10*3/MM3 (ref 140–450)
PLATELET # BLD AUTO: 190 10*3/MM3 (ref 140–450)
PMV BLD AUTO: 10.6 FL (ref 6–12)
PMV BLD AUTO: 10.7 FL (ref 6–12)
POTASSIUM BLD-SCNC: 4.1 MMOL/L (ref 3.5–5.2)
RBC # BLD AUTO: 4.88 10*6/MM3 (ref 4.14–5.8)
RBC # BLD AUTO: 4.95 10*6/MM3 (ref 4.14–5.8)
SODIUM BLD-SCNC: 139 MMOL/L (ref 136–145)
WBC NRBC COR # BLD: 5.26 10*3/MM3 (ref 3.4–10.8)
WBC NRBC COR # BLD: 6.04 10*3/MM3 (ref 3.4–10.8)

## 2019-09-13 PROCEDURE — 85025 COMPLETE CBC W/AUTO DIFF WBC: CPT | Performed by: NURSE PRACTITIONER

## 2019-09-13 PROCEDURE — 93010 ELECTROCARDIOGRAM REPORT: CPT | Performed by: INTERNAL MEDICINE

## 2019-09-13 PROCEDURE — 84681 ASSAY OF C-PEPTIDE: CPT | Performed by: NURSE PRACTITIONER

## 2019-09-13 PROCEDURE — 85025 COMPLETE CBC W/AUTO DIFF WBC: CPT | Performed by: INTERNAL MEDICINE

## 2019-09-13 PROCEDURE — 94760 N-INVAS EAR/PLS OXIMETRY 1: CPT

## 2019-09-13 PROCEDURE — 63710000001 INSULIN ASPART PER 5 UNITS: Performed by: INTERNAL MEDICINE

## 2019-09-13 PROCEDURE — 80053 COMPREHEN METABOLIC PANEL: CPT | Performed by: NURSE PRACTITIONER

## 2019-09-13 PROCEDURE — 83036 HEMOGLOBIN GLYCOSYLATED A1C: CPT | Performed by: NURSE PRACTITIONER

## 2019-09-13 PROCEDURE — 93005 ELECTROCARDIOGRAM TRACING: CPT | Performed by: INTERNAL MEDICINE

## 2019-09-13 PROCEDURE — 84100 ASSAY OF PHOSPHORUS: CPT | Performed by: INTERNAL MEDICINE

## 2019-09-13 PROCEDURE — 80061 LIPID PANEL: CPT | Performed by: NURSE PRACTITIONER

## 2019-09-13 PROCEDURE — 83735 ASSAY OF MAGNESIUM: CPT | Performed by: INTERNAL MEDICINE

## 2019-09-13 PROCEDURE — 82962 GLUCOSE BLOOD TEST: CPT

## 2019-09-13 PROCEDURE — 80048 BASIC METABOLIC PNL TOTAL CA: CPT | Performed by: INTERNAL MEDICINE

## 2019-09-13 PROCEDURE — 25010000002 MORPHINE PER 10 MG: Performed by: INTERNAL MEDICINE

## 2019-09-13 PROCEDURE — 84443 ASSAY THYROID STIM HORMONE: CPT | Performed by: NURSE PRACTITIONER

## 2019-09-13 PROCEDURE — 96376 TX/PRO/DX INJ SAME DRUG ADON: CPT

## 2019-09-13 PROCEDURE — 99244 OFF/OP CNSLTJ NEW/EST MOD 40: CPT | Performed by: NURSE PRACTITIONER

## 2019-09-13 PROCEDURE — 82306 VITAMIN D 25 HYDROXY: CPT | Performed by: NURSE PRACTITIONER

## 2019-09-13 PROCEDURE — 99244 OFF/OP CNSLTJ NEW/EST MOD 40: CPT | Performed by: INTERNAL MEDICINE

## 2019-09-13 PROCEDURE — G0378 HOSPITAL OBSERVATION PER HR: HCPCS

## 2019-09-13 PROCEDURE — 82607 VITAMIN B-12: CPT | Performed by: NURSE PRACTITIONER

## 2019-09-13 RX ORDER — ISOSORBIDE MONONITRATE 30 MG/1
30 TABLET, EXTENDED RELEASE ORAL
Qty: 30 TABLET | Refills: 1 | Status: ON HOLD | OUTPATIENT
Start: 2019-09-14 | End: 2019-09-25

## 2019-09-13 RX ORDER — ASPIRIN 81 MG/1
81 TABLET ORAL DAILY
Qty: 30 TABLET | Refills: 1 | Status: SHIPPED | OUTPATIENT
Start: 2019-09-14 | End: 2019-10-01 | Stop reason: HOSPADM

## 2019-09-13 RX ORDER — AMLODIPINE BESYLATE 10 MG/1
10 TABLET ORAL DAILY
Qty: 30 TABLET | Refills: 1 | Status: SHIPPED | OUTPATIENT
Start: 2019-09-14 | End: 2019-10-01 | Stop reason: HOSPADM

## 2019-09-13 RX ORDER — ISOSORBIDE MONONITRATE 30 MG/1
30 TABLET, EXTENDED RELEASE ORAL
Status: DISCONTINUED | OUTPATIENT
Start: 2019-09-13 | End: 2019-09-13 | Stop reason: HOSPADM

## 2019-09-13 RX ORDER — ASPIRIN 81 MG/1
81 TABLET ORAL DAILY
Status: DISCONTINUED | OUTPATIENT
Start: 2019-09-13 | End: 2019-09-13 | Stop reason: HOSPADM

## 2019-09-13 RX ORDER — NITROGLYCERIN 0.4 MG/1
0.4 TABLET SUBLINGUAL
Qty: 30 TABLET | Refills: 1 | Status: SHIPPED | OUTPATIENT
Start: 2019-09-13 | End: 2022-09-13 | Stop reason: SDUPTHER

## 2019-09-13 RX ORDER — AMLODIPINE BESYLATE 10 MG/1
10 TABLET ORAL DAILY
Status: DISCONTINUED | OUTPATIENT
Start: 2019-09-14 | End: 2019-09-13 | Stop reason: HOSPADM

## 2019-09-13 RX ADMIN — METOPROLOL SUCCINATE 25 MG: 25 TABLET, EXTENDED RELEASE ORAL at 08:40

## 2019-09-13 RX ADMIN — PRAVASTATIN SODIUM 20 MG: 10 TABLET ORAL at 08:40

## 2019-09-13 RX ADMIN — AMLODIPINE BESYLATE 5 MG: 5 TABLET ORAL at 08:39

## 2019-09-13 RX ADMIN — NITROGLYCERIN 0.4 MG: 0.4 TABLET SUBLINGUAL at 13:54

## 2019-09-13 RX ADMIN — PREGABALIN 50 MG: 50 CAPSULE ORAL at 08:39

## 2019-09-13 RX ADMIN — ASPIRIN 81 MG: 81 TABLET ORAL at 11:18

## 2019-09-13 RX ADMIN — NITROGLYCERIN 0.4 MG: 0.4 TABLET SUBLINGUAL at 13:49

## 2019-09-13 RX ADMIN — SODIUM CHLORIDE, PRESERVATIVE FREE 10 ML: 5 INJECTION INTRAVENOUS at 08:45

## 2019-09-13 RX ADMIN — ISOSORBIDE MONONITRATE 30 MG: 30 TABLET, EXTENDED RELEASE ORAL at 15:35

## 2019-09-13 RX ADMIN — INSULIN ASPART 2 UNITS: 100 INJECTION, SOLUTION INTRAVENOUS; SUBCUTANEOUS at 08:44

## 2019-09-13 RX ADMIN — INSULIN ASPART 3 UNITS: 100 INJECTION, SOLUTION INTRAVENOUS; SUBCUTANEOUS at 11:18

## 2019-09-13 RX ADMIN — MORPHINE SULFATE 4 MG: 4 INJECTION INTRAVENOUS at 02:03

## 2019-09-13 RX ADMIN — MORPHINE SULFATE 4 MG: 4 INJECTION INTRAVENOUS at 08:42

## 2019-09-13 NOTE — PLAN OF CARE
Problem: Patient Care Overview  Goal: Plan of Care Review  Outcome: Ongoing (interventions implemented as appropriate)   09/13/19 0413   Coping/Psychosocial   Plan of Care Reviewed With patient   Plan of Care Review   Progress no change   OTHER   Outcome Summary pt c/o pain, morphine given, placed on co2 monitor. vss.        Problem: Cardiac: ACS (Acute Coronary Syndrome) (Adult)  Goal: Signs and Symptoms of Listed Potential Problems Will be Absent, Minimized or Managed (Cardiac: ACS)  Outcome: Ongoing (interventions implemented as appropriate)

## 2019-09-13 NOTE — DISCHARGE SUMMARY
[facility] Medicine Services  DISCHARGE SUMMARY       Date of Admission: 9/12/2019  Date of Discharge:  9/13/2019  Primary Care Physician: Artemio Sanchez MD    Presenting Problem/History of Present Illness:  Chest pain, unspecified type [R07.9]   54-year-old male with history of diabetes mellitus hypertension, dyslipidemia, coronary artery disease who presented to emergency room secondary to chest pain.  Patient reportedly did radiate to the left upper extremity and was associated with shortness of air but was relieved with nitroglycerin.  The emergency room he had negative troponin and EKG did not show any acute changes.  Patient did have invasive coronary angiogram on 9/3/2019 and noted to have three-vessel coronary artery disease for which was recommended CABG.      Final Discharge Diagnoses:  Active Hospital Problems    Diagnosis   • Chest pain       Consults:   Consults     Date and Time Order Name Status Description    9/13/2019 1213 Inpatient Cardiothoracic Surgery Consult Completed     9/12/2019 1645 Cardiology - Primary (on-call MD unless specified) Completed     9/12/2019 1645 Hospitalist (on-call MD unless specified)            Procedures Performed: None.                Pertinent Test Results:   Lab Results (last 72 hours)     Procedure Component Value Units Date/Time    POC Glucose Once [[id number]]  (Abnormal) Collected:  09/13/19 1014    Specimen:  Blood Updated:  09/13/19 1050     Glucose 224 mg/dL      Comment: RN NotifiedOperator: [id number] LEANDRO PETERSENYMeter ID: [id number]       Phosphorus [[id number]]  (Normal) Collected:  09/13/19 0524    Specimen:  Blood Updated:  09/13/19 0632     Phosphorus 3.9 mg/dL     Basic Metabolic Panel [[id number]]  (Abnormal) Collected:  09/13/19 0524    Specimen:  Blood Updated:  09/13/19 0629     Glucose 153 mg/dL      BUN 18 mg/dL      Creatinine 0.75 mg/dL      Sodium 139 mmol/L      Potassium 4.1 mmol/L      Chloride  102 mmol/L      CO2 28.0 mmol/L      Calcium 9.1 mg/dL      eGFR Non African Amer 108 mL/min/1.73      BUN/Creatinine Ratio 24.0     Anion Gap 9.0 mmol/L     Narrative:       GFR Normal >60  Chronic Kidney Disease <60  Kidney Failure <15    Magnesium [[id number]]  (Normal) Collected:  09/13/19 0524    Specimen:  Blood Updated:  09/13/19 0629     Magnesium 1.9 mg/dL     CBC & Differential [[id number]] Collected:  09/13/19 0524    Specimen:  Blood Updated:  09/13/19 0607    Narrative:       The following orders were created for panel order CBC & Differential.  Procedure                               Abnormality         Status                     ---------                               -----------         ------                     CBC Auto Differential[[id number]]        Normal              Final result                 Please view results for these tests on the individual orders.    CBC Auto Differential [[id number]]  (Normal) Collected:  09/13/19 0524    Specimen:  Blood Updated:  09/13/19 0607     WBC 6.04 10*3/mm3      RBC 4.95 10*6/mm3      Hemoglobin 14.6 g/dL      Hematocrit 41.3 %      MCV 83.4 fL      MCH 29.5 pg      MCHC 35.4 g/dL      RDW 13.0 %      RDW-SD 39.0 fl      MPV 10.7 fL      Platelets 175 10*3/mm3      Neutrophil % 59.1 %      Lymphocyte % 29.3 %      Monocyte % 8.1 %      Eosinophil % 2.5 %      Basophil % 0.7 %      Immature Grans % 0.3 %      Neutrophils, Absolute 3.57 10*3/mm3      Lymphocytes, Absolute 1.77 10*3/mm3      Monocytes, Absolute 0.49 10*3/mm3      Eosinophils, Absolute 0.15 10*3/mm3      Basophils, Absolute 0.04 10*3/mm3      Immature Grans, Absolute 0.02 10*3/mm3      nRBC 0.0 /100 WBC     POC Glucose Once [[id number]]  (Abnormal) Collected:  09/12/19 1732    Specimen:  Blood Updated:  09/12/19 2312     Glucose 171 mg/dL      Comment: Sliding Scale AdminOperator: [id number] APARNA Hernandezer ID: [id number]       POC Glucose Once [[id number]]  (Abnormal) Collected:  09/12/19 2024     Specimen:  Blood Updated:  09/12/19 2038     Glucose 199 mg/dL      Comment: Result Not ConfirmedOperator: [id number] NUSRAT STOREYFERMeter ID: [id number]       Extra Tubes [[id number]] Collected:  09/12/19 1732    Specimen:  Blood, Venous Line Updated:  09/12/19 1845    Narrative:       The following orders were created for panel order Extra Tubes.  Procedure                               Abnormality         Status                     ---------                               -----------         ------                     Light Blue Top[[id number]]                                   Final result               Lavender Top[[id number]]                                     Final result                 Please view results for these tests on the individual orders.    Light Blue Top [[id number]] Collected:  09/12/19 1732    Specimen:  Blood Updated:  09/12/19 1845     Extra Tube hold for add-on     Comment: Auto resulted       Lavender Top [[id number]] Collected:  09/12/19 1732    Specimen:  Blood Updated:  09/12/19 1845     Extra Tube hold for add-on     Comment: Auto resulted       Troponin [[id number]]  (Normal) Collected:  09/12/19 1732    Specimen:  Blood Updated:  09/12/19 1801     Troponin T <0.010 ng/mL     Narrative:       Troponin T Reference Range:  <= 0.03 ng/mL-   Negative for AMI  >0.03 ng/mL-     Abnormal for myocardial necrosis.  Clinicians would have to utilize clinical acumen, EKG, Troponin and serial changes to determine if it is an Acute Myocardial Infarction or myocardial injury due to an underlying chronic condition.     Magnesium [[id number]]  (Normal) Collected:  09/12/19 1430    Specimen:  Blood Updated:  09/12/19 1736     Magnesium 1.9 mg/dL     Phosphorus [[id number]]  (Normal) Collected:  09/12/19 1430    Specimen:  Blood Updated:  09/12/19 1736     Phosphorus 2.9 mg/dL     [place] Draw [[id number]] Collected:  09/12/19 1430    Specimen:  Blood Updated:  09/12/19 2845    Narrative:       The following  orders were created for panel order [place] Draw.  Procedure                               Abnormality         Status                     ---------                               -----------         ------                     Light Blue Top[[id number]]                                   Final result               Green Top (Gel)[[id number]]                                  Final result               Lavender Top[[id number]]                                     Final result               Gold Top - SST[[id number]]                                   Final result                 Please view results for these tests on the individual orders.    Light Blue Top [[id number]] Collected:  09/12/19 1430    Specimen:  Blood Updated:  09/12/19 1545     Extra Tube hold for add-on     Comment: Auto resulted       Green Top (Gel) [[id number]] Collected:  09/12/19 1430    Specimen:  Blood Updated:  09/12/19 1545     Extra Tube Hold for add-ons.     Comment: Auto resulted.       Lavender Top [[id number]] Collected:  09/12/19 1430    Specimen:  Blood Updated:  09/12/19 1545     Extra Tube hold for add-on     Comment: Auto resulted       Gold Top - SST [[id number]] Collected:  09/12/19 1430    Specimen:  Blood Updated:  09/12/19 1545     Extra Tube Hold for add-ons.     Comment: Auto resulted.       Comprehensive Metabolic Panel [[id number]]  (Abnormal) Collected:  09/12/19 1430    Specimen:  Blood Updated:  09/12/19 1517     Glucose 191 mg/dL      BUN 16 mg/dL      Creatinine 0.69 mg/dL      Sodium 141 mmol/L      Potassium 3.8 mmol/L      Chloride 103 mmol/L      CO2 27.0 mmol/L      Calcium 9.3 mg/dL      Total Protein 6.9 g/dL      Albumin 4.10 g/dL      ALT (SGPT) 10 U/L      AST (SGOT) 10 U/L      Alkaline Phosphatase 49 U/L      Total Bilirubin 1.2 mg/dL      eGFR Non African Amer 119 mL/min/1.73      Globulin 2.8 gm/dL      A/G Ratio 1.5 g/dL      BUN/Creatinine Ratio 23.2     Anion Gap 11.0 mmol/L     Narrative:       GFR Normal  >60  Chronic Kidney Disease <60  Kidney Failure <15    Troponin [[id number]]  (Normal) Collected:  09/12/19 1430    Specimen:  Blood Updated:  09/12/19 1513     Troponin T <0.010 ng/mL     Narrative:       Troponin T Reference Range:  <= 0.03 ng/mL-   Negative for AMI  >0.03 ng/mL-     Abnormal for myocardial necrosis.  Clinicians would have to utilize clinical acumen, EKG, Troponin and serial changes to determine if it is an Acute Myocardial Infarction or myocardial injury due to an underlying chronic condition.     BNP [[id number]]  (Normal) Collected:  09/12/19 1430    Specimen:  Blood Updated:  09/12/19 1512     proBNP 356.1 pg/mL     Narrative:       Among patients with dyspnea, NT-proBNP is highly sensitive for the detection of acute congestive heart failure. In addition NT-proBNP of <300 pg/ml effectively rules out acute congestive heart failure with 99% negative predictive value.    CBC & Differential [[id number]] Collected:  09/12/19 1430    Specimen:  Blood Updated:  09/12/19 1443    Narrative:       The following orders were created for panel order CBC & Differential.  Procedure                               Abnormality         Status                     ---------                               -----------         ------                     CBC Auto Differential[[id number]]        Normal              Final result                 Please view results for these tests on the individual orders.    CBC Auto Differential [[id number]]  (Normal) Collected:  09/12/19 1430    Specimen:  Blood Updated:  09/12/19 1443     WBC 8.15 10*3/mm3      RBC 5.27 10*6/mm3      Hemoglobin 15.4 g/dL      Hematocrit 43.6 %      MCV 82.7 fL      MCH 29.2 pg      MCHC 35.3 g/dL      RDW 13.0 %      RDW-SD 38.8 fl      MPV 10.7 fL      Platelets 218 10*3/mm3      Neutrophil % 65.4 %      Lymphocyte % 25.9 %      Monocyte % 5.8 %      Eosinophil % 2.0 %      Basophil % 0.5 %      Immature Grans % 0.4 %      Neutrophils, Absolute 5.34  "10*3/mm3      Lymphocytes, Absolute 2.11 10*3/mm3      Monocytes, Absolute 0.47 10*3/mm3      Eosinophils, Absolute 0.16 10*3/mm3      Basophils, Absolute 0.04 10*3/mm3      Immature Grans, Absolute 0.03 10*3/mm3      nRBC 0.0 /100 WBC         Imaging Results (last 72 hours)     Procedure Component Value Units Date/Time    XR Chest 2 View [[id number]] Collected:  09/12/19 1500     Updated:  09/12/19 1528    Narrative:       PROCEDURE: Two-view chest    COMPARISON: 1/22/2017    HISTORY: Chest Pain Triage Protocol    FINDINGS: Frontal and lateral views of the chest are obtained.     Devices: None    Lungs/Pleura: The lungs are clear    Cardiomediastinal structures: Normal         Impression:       CONCLUSION:    No acute cardiopulmonary disease    Electronically signed by:  Cuauhtemoc Chew MD  9/12/2019 3:27 PM CDT  Workstation: [id number]            Chief Complaint on Day of Discharge: None.    Hospital Course:  The patient was admitted to telemetry and continued on aspirin, nitrates, statins and beta-blockers.  He remained chest pain-free, was seen by the cardiologist and CT surgery and appropriate recommendations were made.  Patient will be seen for preoperative assessment by CT surgery on 9/20/2019 and will be scheduled for CABG 9/25/2019.    Patient is a known diabetic was continued on anti-glycemic with Accu-Cheks and sliding scale insulin.  His hemoglobin A1c was 7.40 (it was 8.65 on 6/7/2019).  He also has history of hypertension and his amlodipine was increased to 10 mg daily for optimal blood pressure control.    Patient did ambulate prior to discharge and was asymptomatic.    Condition on Discharge: Stable.    Physical Exam on Discharge:  /77 (BP Location: Left arm, Patient Position: Lying)   Pulse 56   Temp 97.5 °F (36.4 °C) (Temporal)   Resp 11   Ht 188 cm (74\")   Wt 102 kg (224 lb 12.8 oz)   SpO2 98%   BMI 28.86 kg/m²   Physical Exam   Constitutional: He is oriented to person, place, and time. He " appears well-developed and well-nourished. No distress.   HENT:   Head: Normocephalic and atraumatic.   Eyes: EOM are normal. Pupils are equal, round, and reactive to light. No scleral icterus.   Neck: Normal range of motion. Neck supple. No JVD present. No thyromegaly present.   Cardiovascular: Normal rate, regular rhythm and normal heart sounds. Exam reveals no gallop and no friction rub.   No murmur heard.  Pulmonary/Chest: Effort normal and breath sounds normal. He has no wheezes. He has no rales. He exhibits no tenderness.   Abdominal: Soft. Bowel sounds are normal. He exhibits no distension and no mass. There is no tenderness. There is no rebound and no guarding.   Musculoskeletal: He exhibits no edema, tenderness or deformity.   Neurological: He is alert and oriented to person, place, and time. No cranial nerve deficit or sensory deficit. He exhibits normal muscle tone. Coordination normal.   Skin: Skin is warm and dry. No rash noted. He is not diaphoretic. No erythema. No pallor.   Psychiatric: He has a normal mood and affect. His behavior is normal. Judgment and thought content normal.   Nursing note and vitals reviewed.        Discharge Disposition:  Home or Self Care    Discharge Medications:     Discharge Medications      New Medications      Instructions Start Date   aspirin 81 MG EC tablet   81 mg, Oral, Daily   Start Date:  9/14/2019     isosorbide mononitrate 30 MG 24 hr tablet  Commonly known as:  IMDUR   30 mg, Oral, Every 24 Hours Scheduled   Start Date:  9/14/2019     nitroglycerin 0.4 MG SL tablet  Commonly known as:  NITROSTAT   0.4 mg, Sublingual, Every 5 Minutes PRN, Take no more than 3 doses in 15 minutes.         Changes to Medications      Instructions Start Date   amLODIPine 10 MG tablet  Commonly known as:  NORVASC  What changed:    · medication strength  · how much to take   10 mg, Oral, Daily   Start Date:  9/14/2019     metFORMIN  MG 24 hr tablet  Commonly known as:  GLUCOPHAGE  XR  What changed:    · how much to take  · how to take this  · when to take this  · additional instructions   Take one with supper for one week then one po BID      metoprolol succinate XL 25 MG 24 hr tablet  Commonly known as:  TOPROL-XL  What changed:  when to take this   25 mg, Oral, Daily      pravastatin 20 MG tablet  Commonly known as:  PRAVACHOL  What changed:  when to take this   20 mg, Oral, Daily         Continue These Medications      Instructions Start Date   Blood Glucose Test strip   Use 4 x daily, one touch verio IQ      ONE TOUCH DELICA LANCING DEV misc   delica lancing device if approved, otherwise use alternative      ONETOUCH DELICA LANCETS 33G misc   1 each, Does not apply, 4 Times Daily, delica if covered, use alternative if not covered      pregabalin 50 MG capsule  Commonly known as:  LYRICA   50 mg, Oral, 2 Times Daily         Stop These Medications    ibuprofen 200 MG tablet  Commonly known as:  ADVIL,MOTRIN            Discharge Diet: Cardiac and diabetic.     Activity at Discharge: As tolerated.     Discharge Care Plan/Instructions: Patient has been advised to take his medications as prescribed and to return to the emergency room in the event of any worsening symptoms.    Follow-up Appointments:   Future Appointments   Date Time Provider Department Center   9/17/2019 10:45 AM Martinez Card APRN [facility] None       Test Results Pending at Discharge:     Marcelino Goode MD  09/13/19  4:32 PM    Time: 35 minutes.

## 2019-09-13 NOTE — PROGRESS NOTES
[facility] Medicine Services  INPATIENT PROGRESS NOTE    Length of Stay: 0  Date of Admission: 9/12/2019  Primary Care Physician: Artemio Sanchez MD    Subjective   Chief Complaint: No new complaints.    HPI: Patient is seen for follow-up today.  He has history of coronary artery disease, hypertension, dyslipidemia and was admitted for chest pain.  He is currently chest pain-free and voices no new complaints.  He has had invasive coronary angiogram on 9/3/2019 and was recommended CABG.    Review of Systems   Constitutional: Positive for fatigue. Negative for activity change, appetite change, chills, diaphoresis and fever.   HENT: Negative for trouble swallowing and voice change.    Eyes: Negative for photophobia and visual disturbance.   Respiratory: Negative for cough, choking, chest tightness, shortness of breath, wheezing and stridor.    Cardiovascular: Negative for chest pain, palpitations and leg swelling.   Gastrointestinal: Negative for abdominal distention, abdominal pain, blood in stool, constipation, diarrhea, nausea and vomiting.   Endocrine: Negative for cold intolerance, heat intolerance, polydipsia, polyphagia and polyuria.   Genitourinary: Negative for decreased urine volume, difficulty urinating, dysuria, enuresis, flank pain, frequency, hematuria and urgency.   Musculoskeletal: Negative for arthralgias, gait problem, myalgias, neck pain and neck stiffness.   Skin: Negative for pallor, rash and wound.   Neurological: Negative for dizziness, tremors, seizures, syncope, facial asymmetry, speech difficulty, weakness, light-headedness, numbness and headaches.   Hematological: Does not bruise/bleed easily.   Psychiatric/Behavioral: Negative for agitation, behavioral problems and confusion.       Objective    Temp:  [97.3 °F (36.3 °C)-97.7 °F (36.5 °C)] 97.3 °F (36.3 °C)  Heart Rate:  [49-68] 68  Resp:  [12-20] 12  BP: (129-172)/(68-98) 157/81    Physical Exam    Constitutional: He is oriented to person, place, and time. He appears well-developed and well-nourished. No distress.   HENT:   Head: Normocephalic and atraumatic.   Eyes: EOM are normal. Pupils are equal, round, and reactive to light. No scleral icterus.   Neck: Normal range of motion. Neck supple. No JVD present. No thyromegaly present.   Cardiovascular: Normal rate, regular rhythm and normal heart sounds. Exam reveals no gallop and no friction rub.   No murmur heard.  Pulmonary/Chest: Effort normal and breath sounds normal. He has no wheezes. He has no rales. He exhibits no tenderness.   Abdominal: Soft. Bowel sounds are normal. He exhibits no distension and no mass. There is no tenderness. There is no rebound and no guarding.   Musculoskeletal: He exhibits no edema, tenderness or deformity.   Neurological: He is alert and oriented to person, place, and time. No cranial nerve deficit or sensory deficit. He exhibits normal muscle tone. Coordination normal.   Skin: Skin is warm and dry. No rash noted. He is not diaphoretic. No erythema. No pallor.   Psychiatric: He has a normal mood and affect. His behavior is normal. Judgment and thought content normal.   Nursing note and vitals reviewed.        Medication Review:    Current Facility-Administered Medications:   •  amLODIPine (NORVASC) tablet 5 mg, 5 mg, Oral, Daily, Marley Peace MD, 5 mg at 09/13/19 0839  •  dextrose (D50W) 25 g/ 50mL Intravenous Solution 25 g, 25 g, Intravenous, Q15 Min PRN, aMrley Peace MD  •  dextrose (GLUTOSE) oral gel 15 g, 15 g, Oral, Q15 Min PRN, Marley Peace MD  •  enoxaparin (LOVENOX) syringe 40 mg, 40 mg, Subcutaneous, Q24H, Marley Peace MD, 40 mg at 09/12/19 1837  •  glucagon (human recombinant) (GLUCAGEN DIAGNOSTIC) injection 1 mg, 1 mg, Subcutaneous, Q15 Min PRN, Marley Peace MD  •  ibuprofen (ADVIL,MOTRIN) tablet 600 mg, 600 mg, Oral, BID PRN, Marley Peace MD, 600 mg at 09/12/19 1844  •  insulin aspart (novoLOG)  injection 0-7 Units, 0-7 Units, Subcutaneous, 4x Daily AC & at Bedtime, Marley Peace MD, 2 Units at 09/13/19 0844  •  metoprolol succinate XL (TOPROL-XL) 24 hr tablet 25 mg, 25 mg, Oral, Daily, Marley Peace MD, 25 mg at 09/13/19 0840  •  morphine injection 4 mg, 4 mg, Intravenous, Q4H PRN, Lucio Coyle MD, 4 mg at 09/13/19 0842  •  nitroglycerin (NITROSTAT) SL tablet 0.4 mg, 0.4 mg, Sublingual, Q5 Min PRN, Marley Peace MD  •  pravastatin (PRAVACHOL) tablet 20 mg, 20 mg, Oral, Daily, Marley Peace MD, 20 mg at 09/13/19 0840  •  pregabalin (LYRICA) capsule 50 mg, 50 mg, Oral, BID, Marley Peace MD, 50 mg at 09/13/19 0839  •  sodium chloride 0.9 % flush 10 mL, 10 mL, Intravenous, PRN, Rob Beebe MD  •  sodium chloride 0.9 % flush 10 mL, 10 mL, Intravenous, Q12H, Marley Peace MD, 10 mL at 09/13/19 0845  •  sodium chloride 0.9 % flush 10 mL, 10 mL, Intravenous, PRN, Marley Peace MD    Results Review:  I have reviewed the labs, radiology results, and diagnostic studies.    Laboratory Data:   Results from last 7 days   Lab Units 09/13/19  0524 09/12/19  1430   SODIUM mmol/L 139 141   POTASSIUM mmol/L 4.1 3.8   CHLORIDE mmol/L 102 103   CO2 mmol/L 28.0 27.0   BUN mg/dL 18 16   CREATININE mg/dL 0.75* 0.69*   GLUCOSE mg/dL 153* 191*   CALCIUM mg/dL 9.1 9.3   BILIRUBIN mg/dL  --  1.2   ALK PHOS U/L  --  49   ALT (SGPT) U/L  --  10   AST (SGOT) U/L  --  10   ANION GAP mmol/L 9.0 11.0     Estimated Creatinine Clearance: 141.8 mL/min (A) (by C-G formula based on SCr of 0.75 mg/dL (L)).  Results from last 7 days   Lab Units 09/13/19  0524 09/12/19  1430   MAGNESIUM mg/dL 1.9 1.9   PHOSPHORUS mg/dL 3.9 2.9         Results from last 7 days   Lab Units 09/13/19  0524 09/12/19  1430   WBC 10*3/mm3 6.04 8.15   HEMOGLOBIN g/dL 14.6 15.4   HEMATOCRIT % 41.3 43.6   PLATELETS 10*3/mm3 175 218           Culture Data:   No results found for: BLOODCX  No results found for: URINECX  No results found for:  RESPCX  No results found for: WOUNDCX  No results found for: STOOLCX  No components found for: BODYFLD    Radiology Data:   Imaging Results (last 24 hours)     Procedure Component Value Units Date/Time    XR Chest 2 View [[id number]] Collected:  09/12/19 1500     Updated:  09/12/19 1528    Narrative:       PROCEDURE: Two-view chest    COMPARISON: 1/22/2017    HISTORY: Chest Pain Triage Protocol    FINDINGS: Frontal and lateral views of the chest are obtained.     Devices: None    Lungs/Pleura: The lungs are clear    Cardiomediastinal structures: Normal         Impression:       CONCLUSION:    No acute cardiopulmonary disease    Electronically signed by:  Cuauhtemoc Chew MD  9/12/2019 3:27 PM CDT  Workstation: [id number]          I have reviewed the patient's current medications.     Assessment/Plan     Hospital Problem List:  Active Problems:  History of symptomatic coronary artery disease: Patient is currently chest pain-free.  Continue guideline directed medical therapy.  Cardiologist and CT surgery have been consulted.    Diabetes mellitus: Stable continue anti-glycemic with Accu-Cheks and sliding scale insulin.    Hypertension: Continue current medications with adjustments as needed for optimal blood pressure control.    Continue GI and DVT prophylaxis.    Discharge Planning: In progress.    Marcelino Goode MD   09/13/19   10:48 AM

## 2019-09-13 NOTE — PROGRESS NOTES
CVTS CONSULTATION          Patient Care Team:  Artemio Sanhcez MD as PCP - General (Family Medicine)  GabrielaGianna arauz as Technician  Artemio Sanchez MD (Family Medicine)  Requesting Provider:     Chief complaint: Chest Pain/ CAD      SUBJECTIVE       History of Present Illness:  54 year old M with DM, HTN, Tobacco use has  Been having intermittent chest pain and significant SOB, also noted to have significant PVCs, underwent lexiscan which was without evidence of ischemia, however continued with chest discomfort and classic angina symptoms. Patient underwent cardiac cath to rule out obstructive CAD The patient denies  bleeding issues. The patient denies  use of antiplatelet agents.  The patient reports CKD. Cath demonstrated severe CAD multivessel with lesions LAD 80% DX 70% OM2 60% PDA 70%. Echocardiogram WNL. While awaiting outpatient CABG he returned with intermittent chest discomfort. Medical management not currently maximized. CT consulted for surgical evaluation.       The following portions of the patient's history were reviewed and updated as appropriate: allergies, current medications, past family history, past medical history, past social history, past surgical history and problem list.  Recent images independently reviewed.  Available laboratory values reviewed.    Review of Systems   Constitutional: Positive for activity change and fatigue.   Respiratory: Positive for chest tightness and shortness of breath.    Cardiovascular: Positive for chest pain. Negative for palpitations and leg swelling.   Gastrointestinal: Negative for abdominal pain.   Endocrine: Negative.    Genitourinary: Negative for difficulty urinating.   Musculoskeletal: Positive for arthralgias. Negative for gait problem.   Skin: Negative.    Neurological: Negative for dizziness, seizures, light-headedness, numbness and headaches.   Hematological: Does not bruise/bleed easily.   Psychiatric/Behavioral: Negative for agitation. The  patient is not nervous/anxious.    All other systems reviewed and are negative.    Past Medical History:   Diagnosis Date   • Coronary artery disease    • Diabetes mellitus (CMS/HCC)    • GERD (gastroesophageal reflux disease)    • Hyperlipidemia    • Hypertension    • Sleep apnea      Past Surgical History:   Procedure Laterality Date   • CARDIAC CATHETERIZATION N/A 9/3/2019    Procedure: Coronary angiography/PCI if indicated;  Surgeon: Denia Dwyer MD;  Location: [facility] INVASIVE LOCATION;  Service: Cardiology   • CHOLECYSTECTOMY     • TONSILLECTOMY       Family History   Problem Relation Age of Onset   • Diabetes Mother    • Hypertension Mother    • Cancer Mother    • Diabetes Father    • Hypertension Father    • Cancer Father      Social History     Tobacco Use   • Smoking status: Former Smoker     Packs/day: 1.00     Years: 25.00     Pack years: 25.00     Types: Cigarettes   • Smokeless tobacco: Never Used   Substance Use Topics   • Alcohol use: No   • Drug use: No     Medications Prior to Admission   Medication Sig Dispense Refill Last Dose   • amLODIPine (NORVASC) 5 MG tablet Take 1 tablet by mouth Daily. (Patient taking differently: Take 5 mg by mouth every night at bedtime.) 90 tablet 11 9/11/2019 at 2100   • ibuprofen (ADVIL,MOTRIN) 200 MG tablet Take 600 mg by mouth 2 (Two) Times a Day As Needed for Mild Pain .   9/12/2019 at 1000   • metFORMIN ER (GLUCOPHAGE XR) 500 MG 24 hr tablet Take one with supper for one week then one po BID (Patient taking differently: Take 500 mg by mouth 2 (Two) Times a Day. Take one with supper for one week then one po BID) 60 tablet 11 9/12/2019 at 0900   • metoprolol succinate XL (TOPROL-XL) 25 MG 24 hr tablet Take 1 tablet by mouth Daily. (Patient taking differently: Take 25 mg by mouth every night at bedtime.) 30 tablet 11 9/11/2019 at 2100   • pravastatin (PRAVACHOL) 20 MG tablet Take 1 tablet by mouth Daily. (Patient taking differently: Take 20 mg by mouth Every  "Night.) 30 tablet 3 9/11/2019 at 2100   • Glucose Blood (BLOOD GLUCOSE TEST) strip Use 4 x daily, one touch verio  each 11 Taking   • Lancet Devices (ONE TOUCH DELICA LANCING DEV) misc delica lancing device if approved, otherwise use alternative 1 each 11 Taking   • ONETOUCH DELICA LANCETS 33G misc 1 each 4 (Four) Times a Day. delica if covered, use alternative if not covered 120 each 11 Taking   • pregabalin (LYRICA) 50 MG capsule Take 1 capsule by mouth 2 (Two) Times a Day. 28 capsule 0 9/4/2019       [START ON 9/14/2019] amLODIPine 10 mg Oral Daily   aspirin 81 mg Oral Daily   enoxaparin 40 mg Subcutaneous Q24H   insulin aspart 0-7 Units Subcutaneous 4x Daily AC & at Bedtime   isosorbide mononitrate 30 mg Oral Q24H   metoprolol succinate XL 25 mg Oral Daily   pravastatin 20 mg Oral Daily   pregabalin 50 mg Oral BID   sodium chloride 10 mL Intravenous Q12H     Allergies:  Ace inhibitors and Wellbutrin [bupropion]    OBJECTIVE        Vital Signs  Temp:  [97.3 °F (36.3 °C)-97.7 °F (36.5 °C)] 97.5 °F (36.4 °C)  Heart Rate:  [49-68] 56  Resp:  [11-20] 11  BP: (128-172)/(66-85) 138/77    Flowsheet Rows      First Filed Value   Admission Height  188 cm (74\") Documented at 09/12/2019 1403   Admission Weight  104 kg (229 lb) Documented at 09/12/2019 1403        188 cm (74\")    Physical Exam  Constitutional: He is oriented to person, place, and time. He appears well-nourished.   HENT:   Head: Atraumatic.   Eyes: EOM are normal.   Neck: Carotid bruit is not present.   Cardiovascular: Normal heart sounds. Bradycardia present.   Pulses:       Dorsalis pedis pulses are 2+ on the right side, and 2+ on the left side.        Posterior tibial pulses are 2+ on the right side, and 2+ on the left side.   Pulmonary/Chest: Effort normal and breath sounds normal.   Abdominal: Soft. Bowel sounds are normal. He exhibits no distension.   Musculoskeletal: Normal range of motion. He exhibits no edema.   Neurological: He is alert and " oriented to person, place, and time.   Skin: Skin is warm and dry. Capillary refill takes less than 2 seconds. No erythema.   Psychiatric: He has a normal mood and affect. Thought content normal.   Vitals reviewed.    Results Review:   Lab Results   Component Value Date    WBC 5.26 09/13/2019    HGB 14.3 09/13/2019    HCT 40.4 09/13/2019    MCV 82.8 09/13/2019     09/13/2019     Lab Results   Component Value Date    GLUCOSE 153 (H) 09/13/2019    BUN 18 09/13/2019    CREATININE 0.75 (L) 09/13/2019    EGFRIFNONA 108 09/13/2019    BCR 24.0 09/13/2019    K 4.1 09/13/2019    CO2 28.0 09/13/2019    CALCIUM 9.1 09/13/2019    ALBUMIN 4.10 09/12/2019    AST 10 09/12/2019    ALT 10 09/12/2019     Lab Results   Component Value Date    TROPONINT <0.010 09/12/2019             ASSESSMENT/PLAN         Chest pain      Assessment & Plan    Known multivessel CAD with negative troponin. Uncontrolled chest pain. Add Imdur to medical management may increase dosing if needed. Ambulate in halls and evaluate for chest discomfort. Plan CABG 9/25/19 per Dr. Ballard, Preop evaluation 9/20/19. He will proceed with his scheduled endocrinology follow up on 9/17/19 with DARLIN WHITTINGTON. Detailed discussion regarding risks, benefits, and treatment plan. Patient understands, agrees, and wishes to proceed with plan.        Thank you for the opportunity to participate in this patient's care.      I discussed the patients findings and my recommendations with               This document has been electronically signed by NELSY Moreland on September 13, 2019 3:48 PM

## 2019-09-13 NOTE — CONSULTS
[facility] Cardiology  HISTORY AND PHYSICAL  Herbert White Sr.  54 y.o. male    Chief complaint -  Chest pain    History of Present Illness:  1. Angina of effort (CMS/HCC)    2. Essential hypertension    3. Pure hypercholesterolemia    4. Peripheral arterial disease (CMS/HCC)    5. Type 2 diabetes mellitus without complication, with long-term current use of insulin (CMS/HCC)          Chief complaint -chest pain with shortness of breath     History of present [id number]old gentleman who is been a diabetic since 2002 and used to smoke 2 packs a day now he smokes 1 pack a day on insulin for his diabetes and his A1c is 8.7 and has been on pravastatin since June and his LDL is 157 has been having chest tightness with shortness of breath and pain in the both calf.  He had a nuclear stress of shortness ischemia but still having significant angina with activity, so explained all the risks and benefits about cardiac catheterization, lhc revealed three-vessel obstructive coronary artery disease.  In the setting of diabetes with three-vessel coronary artery disease patient was referred to cardiovascular surgery for potential evaluation for CABG.  Patient is presented again with intermittent chest pain.          Allergies   Allergen Reactions   • Ace Inhibitors Anaphylaxis   • Wellbutrin [Bupropion] Anaphylaxis         Past Medical History:   Diagnosis Date   • Coronary artery disease    • Diabetes mellitus (CMS/HCC)    • GERD (gastroesophageal reflux disease)    • Hyperlipidemia    • Hypertension    • Sleep apnea          Past Surgical History:   Procedure Laterality Date   • CARDIAC CATHETERIZATION N/A 9/3/2019    Procedure: Coronary angiography/PCI if indicated;  Surgeon: Denia Dwyer MD;  Location: [facility] INVASIVE LOCATION;  Service: Cardiology   • CHOLECYSTECTOMY     • TONSILLECTOMY           Family History   Problem Relation Age of Onset   • Diabetes Mother    • Hypertension Mother     • Cancer Mother    • Diabetes Father    • Hypertension Father    • Cancer Father          Social History     Socioeconomic History   • Marital status:      Spouse name: Not on file   • Number of children: Not on file   • Years of education: Not on file   • Highest education level: Not on file   Tobacco Use   • Smoking status: Former Smoker     Packs/day: 1.00     Years: 25.00     Pack years: 25.00     Types: Cigarettes   • Smokeless tobacco: Never Used   Substance and Sexual Activity   • Alcohol use: No   • Drug use: No   • Sexual activity: Defer         Prior to Admission medications    Medication Sig Start Date End Date Taking? Authorizing Provider   amLODIPine (NORVASC) 5 MG tablet Take 1 tablet by mouth Daily.  Patient taking differently: Take 5 mg by mouth every night at bedtime. 9/3/19  Yes Denia Dwyer MD   ibuprofen (ADVIL,MOTRIN) 200 MG tablet Take 600 mg by mouth 2 (Two) Times a Day As Needed for Mild Pain .   Yes ProviderHai MD   metFORMIN ER (GLUCOPHAGE XR) 500 MG 24 hr tablet Take one with supper for one week then one po BID  Patient taking differently: Take 500 mg by mouth 2 (Two) Times a Day. Take one with supper for one week then one po BID 8/9/19  Yes Martinez Card APRN   metoprolol succinate XL (TOPROL-XL) 25 MG 24 hr tablet Take 1 tablet by mouth Daily.  Patient taking differently: Take 25 mg by mouth every night at bedtime. 8/26/19  Yes Beau Fuentes MD   pravastatin (PRAVACHOL) 20 MG tablet Take 1 tablet by mouth Daily.  Patient taking differently: Take 20 mg by mouth Every Night. 6/10/19  Yes Sandra Sorto APRN   Glucose Blood (BLOOD GLUCOSE TEST) strip Use 4 x daily, one touch verio IQ 8/5/19   Martinez Card APRN   Lancet Devices (ONE TOUCH DELICA LANCING DEV) misc delica lancing device if approved, otherwise use alternative 1/24/17   Alessandro Gan MD   ONETOUCH DELICA LANCETS 33G misc 1 each 4 (Four) Times a Day. delica if  "covered, use alternative if not covered 1/24/17   Alessandro Gan MD   pregabalin (LYRICA) 50 MG capsule Take 1 capsule by mouth 2 (Two) Times a Day. 8/5/19 8/4/20  Martinez Card APRN         Review of Systems:     Constitution: Denies any fatigue, fever or chills.  HENT: Denies any headache, hearing impairment.  Eyes: Denies any blurring of vision, or photophobia.  Cardiovascular:  As per history of present illness.   Respiratory system: Denies any COPD, shortness of breath.  Endocrine:  No history of hyperlipidemia, diabetes.  Musculoskeletal:  No history of arthritis with musculoskeletal problems.  Gastrointestinal: No nausea, vomiting, or melena.  Genitourinary: No dysuria or hematuria.  Neurological: No history of seizure disorder, stroke, or memory problems.    Psychiatric/Behavioral: No history of depression, bipolar disorder or schizophrenia .    Hematological: No history of easy bruising.    ROS          OBJECTIVE:    /74 (BP Location: Left arm, Patient Position: Lying)   Pulse 57   Temp 97.4 °F (36.3 °C) (Temporal)   Resp 12   Ht 188 cm (74\")   Wt 102 kg (224 lb 12.8 oz)   SpO2 98%   BMI 28.86 kg/m²       Physical Exam:   Constitutional: Patient is oriented to person, place, and time.   Skin: Warm and dry.  Well developed and nourished in no acute distress .  Head: Normocephalic and atraumatic.   Eyes: Pupils are equal, round, and reactive to light.   Neck: Neck supple. No bruit in the carotids, no elevation of JVD.  Cardiovascular: [place] in the fifth intercostal space, regular rate, and rhythm,  S1 greater than S2, no S3 or S4, no gallop.  Pulmonary/Chest: Air entry is equal on both sides.  No wheezing or crackles.  Abdominal: Soft.  No hepatosplenomegaly, bowel sounds are present.  Musculoskeletal: No kyphoscoliosis.  Neurological: Alert and oriented to person, place, and time.  Cranial nerve are intact. No motor or sensory deficit.  Extremities: No edema, no radial " femoral delay.  Psychiatric: Normal mood and affect. Behavior is normal.      Lab Results (last 24 hours)     Procedure Component Value Units Date/Time    Troponin [[id number]]  (Normal) Collected:  09/12/19 1430    Specimen:  Blood Updated:  09/12/19 1513     Troponin T <0.010 ng/mL     Narrative:       Troponin T Reference Range:  <= 0.03 ng/mL-   Negative for AMI  >0.03 ng/mL-     Abnormal for myocardial necrosis.  Clinicians would have to utilize clinical acumen, EKG, Troponin and serial changes to determine if it is an Acute Myocardial Infarction or myocardial injury due to an underlying chronic condition.     CBC & Differential [[id number]] Collected:  09/12/19 1430    Specimen:  Blood Updated:  09/12/19 1443    Narrative:       The following orders were created for panel order CBC & Differential.  Procedure                               Abnormality         Status                     ---------                               -----------         ------                     CBC Auto Differential[[id number]]        Normal              Final result                 Please view results for these tests on the individual orders.    Comprehensive Metabolic Panel [[id number]]  (Abnormal) Collected:  09/12/19 1430    Specimen:  Blood Updated:  09/12/19 1517     Glucose 191 mg/dL      BUN 16 mg/dL      Creatinine 0.69 mg/dL      Sodium 141 mmol/L      Potassium 3.8 mmol/L      Chloride 103 mmol/L      CO2 27.0 mmol/L      Calcium 9.3 mg/dL      Total Protein 6.9 g/dL      Albumin 4.10 g/dL      ALT (SGPT) 10 U/L      AST (SGOT) 10 U/L      Alkaline Phosphatase 49 U/L      Total Bilirubin 1.2 mg/dL      eGFR Non African Amer 119 mL/min/1.73      Globulin 2.8 gm/dL      A/G Ratio 1.5 g/dL      BUN/Creatinine Ratio 23.2     Anion Gap 11.0 mmol/L     Narrative:       GFR Normal >60  Chronic Kidney Disease <60  Kidney Failure <15    BNP [[id number]]  (Normal) Collected:  09/12/19 1430    Specimen:  Blood Updated:  09/12/19 1512      proBNP 356.1 pg/mL     Narrative:       Among patients with dyspnea, NT-proBNP is highly sensitive for the detection of acute congestive heart failure. In addition NT-proBNP of <300 pg/ml effectively rules out acute congestive heart failure with 99% negative predictive value.    CBC Auto Differential [[id number]]  (Normal) Collected:  09/12/19 1430    Specimen:  Blood Updated:  09/12/19 1443     WBC 8.15 10*3/mm3      RBC 5.27 10*6/mm3      Hemoglobin 15.4 g/dL      Hematocrit 43.6 %      MCV 82.7 fL      MCH 29.2 pg      MCHC 35.3 g/dL      RDW 13.0 %      RDW-SD 38.8 fl      MPV 10.7 fL      Platelets 218 10*3/mm3      Neutrophil % 65.4 %      Lymphocyte % 25.9 %      Monocyte % 5.8 %      Eosinophil % 2.0 %      Basophil % 0.5 %      Immature Grans % 0.4 %      Neutrophils, Absolute 5.34 10*3/mm3      Lymphocytes, Absolute 2.11 10*3/mm3      Monocytes, Absolute 0.47 10*3/mm3      Eosinophils, Absolute 0.16 10*3/mm3      Basophils, Absolute 0.04 10*3/mm3      Immature Grans, Absolute 0.03 10*3/mm3      nRBC 0.0 /100 WBC     Magnesium [[id number]]  (Normal) Collected:  09/12/19 1430    Specimen:  Blood Updated:  09/12/19 1736     Magnesium 1.9 mg/dL     Phosphorus [[id number]]  (Normal) Collected:  09/12/19 1430    Specimen:  Blood Updated:  09/12/19 1736     Phosphorus 2.9 mg/dL     Troponin [[id number]]  (Normal) Collected:  09/12/19 1732    Specimen:  Blood Updated:  09/12/19 1801     Troponin T <0.010 ng/mL     Narrative:       Troponin T Reference Range:  <= 0.03 ng/mL-   Negative for AMI  >0.03 ng/mL-     Abnormal for myocardial necrosis.  Clinicians would have to utilize clinical acumen, EKG, Troponin and serial changes to determine if it is an Acute Myocardial Infarction or myocardial injury due to an underlying chronic condition.     POC Glucose Once [[id number]]  (Abnormal) Collected:  09/12/19 1732    Specimen:  Blood Updated:  09/12/19 2312     Glucose 171 mg/dL      Comment: Sliding Scale AdminOperator:  [id number] APARNA MACKENZIEMeter ID: [id number]       POC Glucose Once [[id number]]  (Abnormal) Collected:  09/12/19 2024    Specimen:  Blood Updated:  09/12/19 2038     Glucose 199 mg/dL      Comment: Result Not ConfirmedOperator: [id number] NUSRAT TRAVISMeter ID: [id number]       Basic Metabolic Panel [[id number]]  (Abnormal) Collected:  09/13/19 0524    Specimen:  Blood Updated:  09/13/19 0629     Glucose 153 mg/dL      BUN 18 mg/dL      Creatinine 0.75 mg/dL      Sodium 139 mmol/L      Potassium 4.1 mmol/L      Chloride 102 mmol/L      CO2 28.0 mmol/L      Calcium 9.1 mg/dL      eGFR Non African Amer 108 mL/min/1.73      BUN/Creatinine Ratio 24.0     Anion Gap 9.0 mmol/L     Narrative:       GFR Normal >60  Chronic Kidney Disease <60  Kidney Failure <15    CBC & Differential [[id number]] Collected:  09/13/19 0524    Specimen:  Blood Updated:  09/13/19 0607    Narrative:       The following orders were created for panel order CBC & Differential.  Procedure                               Abnormality         Status                     ---------                               -----------         ------                     CBC Auto Differential[[id number]]        Normal              Final result                 Please view results for these tests on the individual orders.    Magnesium [[id number]]  (Normal) Collected:  09/13/19 0524    Specimen:  Blood Updated:  09/13/19 0629     Magnesium 1.9 mg/dL     Phosphorus [[id number]]  (Normal) Collected:  09/13/19 0524    Specimen:  Blood Updated:  09/13/19 0632     Phosphorus 3.9 mg/dL     CBC Auto Differential [[id number]]  (Normal) Collected:  09/13/19 0524    Specimen:  Blood Updated:  09/13/19 0607     WBC 6.04 10*3/mm3      RBC 4.95 10*6/mm3      Hemoglobin 14.6 g/dL      Hematocrit 41.3 %      MCV 83.4 fL      MCH 29.5 pg      MCHC 35.4 g/dL      RDW 13.0 %      RDW-SD 39.0 fl      MPV 10.7 fL      Platelets 175 10*3/mm3      Neutrophil % 59.1 %      Lymphocyte % 29.3 %       Monocyte % 8.1 %      Eosinophil % 2.5 %      Basophil % 0.7 %      Immature Grans % 0.3 %      Neutrophils, Absolute 3.57 10*3/mm3      Lymphocytes, Absolute 1.77 10*3/mm3      Monocytes, Absolute 0.49 10*3/mm3      Eosinophils, Absolute 0.15 10*3/mm3      Basophils, Absolute 0.04 10*3/mm3      Immature Grans, Absolute 0.02 10*3/mm3      nRBC 0.0 /100 WBC     POC Glucose Once [[id number]]  (Abnormal) Collected:  09/13/19 1014    Specimen:  Blood Updated:  09/13/19 1050     Glucose 224 mg/dL      Comment: RN NotifiedOperator: [id number] LEANDRO TIFZAHRAAYMeter ID: [id number]             Results for orders placed in visit on 07/24/19   Adult Transthoracic Echo Complete W/ Cont if Necessary Per Protocol    Narrative · Left ventricular wall thickness is consistent with mild concentric   hypertrophy.  · Estimated EF = 61%.  · Left ventricular systolic function is normal.  · Left ventricular diastolic dysfunction (grade I) consistent with   impaired relaxation.  · Mild mitral valve regurgitation is present          The ASCVD Risk score ([place] DC Jr., et al., 2013) failed to calculate for the following reasons:    The patient has a prior MI or stroke diagnosis          A/P:    1.  Coronary artery disease:  Recent cardiac catheterization she revealed obstructive coronary artery disease involving LAD, diagonal, OM and RPDA.  Patient is diabetic and was referred to CTS for potential evaluation for CABG.  Continue aspirin/statin/beta-blocker.  EKG without any ischemic changes and troponins have been negative.  Talk to Dr. Ballard and will get him evaluated while he is inpatient.  Patient is not a candidate will consider PCI of the LAD.  Crease his amlodipine to 10 mg for optimal and antianginal therapy.    2.  Hypertension:  Continue current regimen we will increase amlodipine to 10 mg.    3.  Hyperlipidemia:  Continue statin.        Denia Dwyer MD  9/13/2019  12:42 PM      Part of this note may be an electronic  transcription/translation of spoken language to printed text using the Dragon Dictation System.

## 2019-09-13 NOTE — CONSULTS
CVTS CONSULTATION          Patient Care Team:  Artemio Sanchez MD as PCP - General (Family Medicine)  GabrielaGianna arauz as Technician  Artemio Sanchez MD (Family Medicine)  Requesting Provider:     Chief complaint: Chest Pain/ CAD      SUBJECTIVE       History of Present Illness:  54 year old M with DM, HTN, Tobacco use has  Been having intermittent chest pain and significant SOB, also noted to have significant PVCs, underwent lexiscan which was without evidence of ischemia, however continued with chest discomfort and classic angina symptoms. Patient underwent cardiac cath to rule out obstructive CAD The patient denies  bleeding issues. The patient denies  use of antiplatelet agents.  The patient reports CKD. Cath demonstrated severe CAD multivessel with lesions LAD 80% DX 70% OM2 60% PDA 70%. Echocardiogram WNL. While awaiting outpatient CABG he returned with intermittent chest discomfort. Medical management not currently maximized. CT consulted for surgical evaluation.       The following portions of the patient's history were reviewed and updated as appropriate: allergies, current medications, past family history, past medical history, past social history, past surgical history and problem list.  Recent images independently reviewed.  Available laboratory values reviewed.    Review of Systems   Constitutional: Positive for activity change and fatigue.   Respiratory: Positive for chest tightness and shortness of breath.    Cardiovascular: Positive for chest pain. Negative for palpitations and leg swelling.   Gastrointestinal: Negative for abdominal pain.   Endocrine: Negative.    Genitourinary: Negative for difficulty urinating.   Musculoskeletal: Positive for arthralgias. Negative for gait problem.   Skin: Negative.    Neurological: Negative for dizziness, seizures, light-headedness, numbness and headaches.   Hematological: Does not bruise/bleed easily.   Psychiatric/Behavioral: Negative for agitation. The  patient is not nervous/anxious.    All other systems reviewed and are negative.    Past Medical History:   Diagnosis Date   • Coronary artery disease    • Diabetes mellitus (CMS/HCC)    • GERD (gastroesophageal reflux disease)    • Hyperlipidemia    • Hypertension    • Sleep apnea      Past Surgical History:   Procedure Laterality Date   • CARDIAC CATHETERIZATION N/A 9/3/2019    Procedure: Coronary angiography/PCI if indicated;  Surgeon: Denia Dwyer MD;  Location: [facility] INVASIVE LOCATION;  Service: Cardiology   • CHOLECYSTECTOMY     • TONSILLECTOMY       Family History   Problem Relation Age of Onset   • Diabetes Mother    • Hypertension Mother    • Cancer Mother    • Diabetes Father    • Hypertension Father    • Cancer Father      Social History     Tobacco Use   • Smoking status: Former Smoker     Packs/day: 1.00     Years: 25.00     Pack years: 25.00     Types: Cigarettes   • Smokeless tobacco: Never Used   Substance Use Topics   • Alcohol use: No   • Drug use: No     Medications Prior to Admission   Medication Sig Dispense Refill Last Dose   • amLODIPine (NORVASC) 5 MG tablet Take 1 tablet by mouth Daily. (Patient taking differently: Take 5 mg by mouth every night at bedtime.) 90 tablet 11 9/11/2019 at 2100   • ibuprofen (ADVIL,MOTRIN) 200 MG tablet Take 600 mg by mouth 2 (Two) Times a Day As Needed for Mild Pain .   9/12/2019 at 1000   • metFORMIN ER (GLUCOPHAGE XR) 500 MG 24 hr tablet Take one with supper for one week then one po BID (Patient taking differently: Take 500 mg by mouth 2 (Two) Times a Day. Take one with supper for one week then one po BID) 60 tablet 11 9/12/2019 at 0900   • metoprolol succinate XL (TOPROL-XL) 25 MG 24 hr tablet Take 1 tablet by mouth Daily. (Patient taking differently: Take 25 mg by mouth every night at bedtime.) 30 tablet 11 9/11/2019 at 2100   • pravastatin (PRAVACHOL) 20 MG tablet Take 1 tablet by mouth Daily. (Patient taking differently: Take 20 mg by mouth Every  "Night.) 30 tablet 3 9/11/2019 at 2100   • Glucose Blood (BLOOD GLUCOSE TEST) strip Use 4 x daily, one touch verio  each 11 Taking   • Lancet Devices (ONE TOUCH DELICA LANCING DEV) misc delica lancing device if approved, otherwise use alternative 1 each 11 Taking   • ONETOUCH DELICA LANCETS 33G misc 1 each 4 (Four) Times a Day. delica if covered, use alternative if not covered 120 each 11 Taking   • pregabalin (LYRICA) 50 MG capsule Take 1 capsule by mouth 2 (Two) Times a Day. 28 capsule 0 9/4/2019       [START ON 9/14/2019] amLODIPine 10 mg Oral Daily   aspirin 81 mg Oral Daily   enoxaparin 40 mg Subcutaneous Q24H   insulin aspart 0-7 Units Subcutaneous 4x Daily AC & at Bedtime   isosorbide mononitrate 30 mg Oral Q24H   metoprolol succinate XL 25 mg Oral Daily   pravastatin 20 mg Oral Daily   pregabalin 50 mg Oral BID   sodium chloride 10 mL Intravenous Q12H     Allergies:  Ace inhibitors and Wellbutrin [bupropion]    OBJECTIVE        Vital Signs  Temp:  [97.3 °F (36.3 °C)-97.7 °F (36.5 °C)] 97.5 °F (36.4 °C)  Heart Rate:  [49-68] 56  Resp:  [11-20] 11  BP: (128-172)/(66-85) 138/77    Flowsheet Rows      First Filed Value   Admission Height  188 cm (74\") Documented at 09/12/2019 1403   Admission Weight  104 kg (229 lb) Documented at 09/12/2019 1403        188 cm (74\")    Physical Exam  Constitutional: He is oriented to person, place, and time. He appears well-nourished.   HENT:   Head: Atraumatic.   Eyes: EOM are normal.   Neck: Carotid bruit is not present.   Cardiovascular: Normal heart sounds. Bradycardia present.   Pulses:       Dorsalis pedis pulses are 2+ on the right side, and 2+ on the left side.        Posterior tibial pulses are 2+ on the right side, and 2+ on the left side.   Pulmonary/Chest: Effort normal and breath sounds normal.   Abdominal: Soft. Bowel sounds are normal. He exhibits no distension.   Musculoskeletal: Normal range of motion. He exhibits no edema.   Neurological: He is alert and " oriented to person, place, and time.   Skin: Skin is warm and dry. Capillary refill takes less than 2 seconds. No erythema.   Psychiatric: He has a normal mood and affect. Thought content normal.   Vitals reviewed.    Results Review:   Lab Results   Component Value Date    WBC 5.26 09/13/2019    HGB 14.3 09/13/2019    HCT 40.4 09/13/2019    MCV 82.8 09/13/2019     09/13/2019     Lab Results   Component Value Date    GLUCOSE 153 (H) 09/13/2019    BUN 18 09/13/2019    CREATININE 0.75 (L) 09/13/2019    EGFRIFNONA 108 09/13/2019    BCR 24.0 09/13/2019    K 4.1 09/13/2019    CO2 28.0 09/13/2019    CALCIUM 9.1 09/13/2019    ALBUMIN 4.10 09/12/2019    AST 10 09/12/2019    ALT 10 09/12/2019     Lab Results   Component Value Date    TROPONINT <0.010 09/12/2019             ASSESSMENT/PLAN         Chest pain      Assessment & Plan    Known multivessel CAD with negative troponin. Uncontrolled chest pain. Add Imdur to medical management may increase dosing if needed. Ambulate in halls and evaluate for chest discomfort. Plan CABG 9/25/19 per Dr. Ballard, Preop evaluation 9/20/19. He will proceed with his scheduled endocrinology follow up on 9/17/19 with DARLIN WHITTINGTON. Detailed discussion regarding risks, benefits, and treatment plan. Patient understands, agrees, and wishes to proceed with plan.        Thank you for the opportunity to participate in this patient's care.      I discussed the patients findings and my recommendations with               This document has been electronically signed by NELSY Moreland on September 13, 2019 3:53 PM

## 2019-09-13 NOTE — PLAN OF CARE
Problem: Patient Care Overview  Goal: Plan of Care Review  Outcome: Ongoing (interventions implemented as appropriate)   09/13/19 1107   Coping/Psychosocial   Plan of Care Reviewed With patient   Plan of Care Review   Progress no change   OTHER   Outcome Summary patient having chest pain       Problem: Cardiac: ACS (Acute Coronary Syndrome) (Adult)  Goal: Signs and Symptoms of Listed Potential Problems Will be Absent, Minimized or Managed (Cardiac: ACS)  Outcome: Ongoing (interventions implemented as appropriate)

## 2019-09-14 LAB
25(OH)D3 SERPL-MCNC: 11.1 NG/ML (ref 30–100)
ALBUMIN SERPL-MCNC: 3.6 G/DL (ref 3.5–5.2)
ALBUMIN/GLOB SERPL: 1.4 G/DL
ALP SERPL-CCNC: 101 U/L (ref 39–117)
ALT SERPL W P-5'-P-CCNC: 191 U/L (ref 1–41)
ANION GAP SERPL CALCULATED.3IONS-SCNC: 12.2 MMOL/L (ref 5–15)
AST SERPL-CCNC: 138 U/L (ref 1–40)
BILIRUB SERPL-MCNC: 1.4 MG/DL (ref 0.2–1.2)
BUN BLD-MCNC: 15 MG/DL (ref 6–20)
BUN/CREAT SERPL: 22.4 (ref 7–25)
CALCIUM SPEC-SCNC: 8.8 MG/DL (ref 8.6–10.5)
CHLORIDE SERPL-SCNC: 104 MMOL/L (ref 98–107)
CHOLEST SERPL-MCNC: 137 MG/DL (ref 0–200)
CO2 SERPL-SCNC: 25.8 MMOL/L (ref 22–29)
CREAT BLD-MCNC: 0.67 MG/DL (ref 0.76–1.27)
GFR SERPL CREATININE-BSD FRML MDRD: 123 ML/MIN/1.73
GLOBULIN UR ELPH-MCNC: 2.5 GM/DL
GLUCOSE BLD-MCNC: 141 MG/DL (ref 65–99)
GLUCOSE BLDC GLUCOMTR-MCNC: 151 MG/DL (ref 70–130)
HDLC SERPL-MCNC: 34 MG/DL (ref 40–60)
LDLC SERPL CALC-MCNC: 77 MG/DL (ref 0–100)
LDLC/HDLC SERPL: 2.26 {RATIO}
POTASSIUM BLD-SCNC: 4.1 MMOL/L (ref 3.5–5.2)
PROT SERPL-MCNC: 6.1 G/DL (ref 6–8.5)
SODIUM BLD-SCNC: 142 MMOL/L (ref 136–145)
TRIGL SERPL-MCNC: 131 MG/DL (ref 0–150)
TSH SERPL DL<=0.05 MIU/L-ACNC: 4 UIU/ML (ref 0.27–4.2)
VIT B12 BLD-MCNC: 302 PG/ML (ref 211–946)
VLDLC SERPL-MCNC: 26.2 MG/DL (ref 5–40)

## 2019-09-16 DIAGNOSIS — I25.118 CORONARY ARTERY DISEASE OF NATIVE ARTERY OF NATIVE HEART WITH STABLE ANGINA PECTORIS (HCC): Primary | ICD-10-CM

## 2019-09-16 LAB — C PEPTIDE SERPL-MCNC: 3.5 NG/ML (ref 1.1–4.4)

## 2019-09-16 RX ORDER — DEXTROSE AND SODIUM CHLORIDE 5; .45 G/100ML; G/100ML
100 INJECTION, SOLUTION INTRAVENOUS CONTINUOUS
Status: CANCELLED | OUTPATIENT
Start: 2019-09-25

## 2019-09-16 RX ORDER — NITROGLYCERIN 0.4 MG/1
0.4 TABLET SUBLINGUAL
Status: CANCELLED | OUTPATIENT
Start: 2019-09-25

## 2019-09-16 RX ORDER — BUPIVACAINE HCL/0.9 % NACL/PF 0.1 %
2 PLASTIC BAG, INJECTION (ML) EPIDURAL ONCE
Status: CANCELLED | OUTPATIENT
Start: 2019-09-25

## 2019-09-17 ENCOUNTER — APPOINTMENT (OUTPATIENT)
Dept: LAB | Facility: HOSPITAL | Age: 54
End: 2019-09-17

## 2019-09-17 ENCOUNTER — OFFICE VISIT (OUTPATIENT)
Dept: ENDOCRINOLOGY | Facility: CLINIC | Age: 54
End: 2019-09-17

## 2019-09-17 ENCOUNTER — ANESTHESIA EVENT (OUTPATIENT)
Dept: PERIOP | Facility: HOSPITAL | Age: 54
End: 2019-09-17

## 2019-09-17 VITALS
OXYGEN SATURATION: 99 % | BODY MASS INDEX: 28.89 KG/M2 | WEIGHT: 225.1 LBS | DIASTOLIC BLOOD PRESSURE: 80 MMHG | HEART RATE: 72 BPM | SYSTOLIC BLOOD PRESSURE: 132 MMHG | HEIGHT: 74 IN

## 2019-09-17 DIAGNOSIS — E78.2 MIXED HYPERLIPIDEMIA: ICD-10-CM

## 2019-09-17 DIAGNOSIS — R74.8 ELEVATED LIVER ENZYMES: Primary | ICD-10-CM

## 2019-09-17 DIAGNOSIS — I10 ESSENTIAL HYPERTENSION: ICD-10-CM

## 2019-09-17 DIAGNOSIS — E11.9 CONTROLLED TYPE 2 DIABETES MELLITUS WITHOUT COMPLICATION, WITHOUT LONG-TERM CURRENT USE OF INSULIN (HCC): Primary | ICD-10-CM

## 2019-09-17 PROCEDURE — 36415 COLL VENOUS BLD VENIPUNCTURE: CPT | Performed by: NURSE PRACTITIONER

## 2019-09-17 PROCEDURE — 80053 COMPREHEN METABOLIC PANEL: CPT | Performed by: NURSE PRACTITIONER

## 2019-09-17 PROCEDURE — 99214 OFFICE O/P EST MOD 30 MIN: CPT | Performed by: NURSE PRACTITIONER

## 2019-09-17 PROCEDURE — 80074 ACUTE HEPATITIS PANEL: CPT | Performed by: NURSE PRACTITIONER

## 2019-09-17 RX ORDER — ERGOCALCIFEROL 1.25 MG/1
CAPSULE ORAL
Qty: 10 CAPSULE | Refills: 11 | Status: SHIPPED | OUTPATIENT
Start: 2019-09-17 | End: 2019-09-20

## 2019-09-17 NOTE — PROGRESS NOTES
Subjective    Herbert White . is a 54 y.o. male. he is here for follow up     History of Present Illness       HPI      54 year old male presents for follow up      REASON - Diabetes Mellitus type 2         Duration---  Since 2002     Timing -constant         Quality -improved controlled         Severity - moderate      Severity (Complication/Hospitalizations)           Secondary Macrovascular -- no CAD, no PAD, no CVA    He was in the hospital and does have heart blockages    He will have CAGB next week             Secondary Microvascular-- mild retinopathy, neuropathy        Context -- diagnosed after gallbladder                Diabetes Regimen -- insulin         Lab Results   Component Value Date    HGBA1C 7.40 (H) 09/13/2019                   Blood Glucose Readings     Checks at least 2 to 3 times      Am readings 130      During the day                  Diet-        Regular diet               Associated Signs/Symptoms       Hyperglycemic Symptoms: no polyuria, no polydipsia, no polyphagia        Hypoglycemic Episodes: history of hypoglycemia                   The following portions of the patient's history were reviewed and updated as appropriate:   Past Medical History:   Diagnosis Date   • Coronary artery disease    • Diabetes mellitus (CMS/HCC)    • GERD (gastroesophageal reflux disease)    • Hyperlipidemia    • Hypertension    • Sleep apnea      Past Surgical History:   Procedure Laterality Date   • CARDIAC CATHETERIZATION N/A 9/3/2019    Procedure: Coronary angiography/PCI if indicated;  Surgeon: Denia Dwyer MD;  Location: [facility] INVASIVE LOCATION;  Service: Cardiology   • CHOLECYSTECTOMY     • TONSILLECTOMY       Family History   Problem Relation Age of Onset   • Diabetes Mother    • Hypertension Mother    • Cancer Mother    • Diabetes Father    • Hypertension Father    • Cancer Father        Current Outpatient Medications   Medication Sig Dispense Refill   • amLODIPine (NORVASC) 10 MG  tablet Take 1 tablet by mouth Daily. 30 tablet 1   • aspirin 81 MG EC tablet Take 1 tablet by mouth Daily. 30 tablet 1   • Glucose Blood (BLOOD GLUCOSE TEST) strip Use 4 x daily, one touch verio  each 11   • isosorbide mononitrate (IMDUR) 30 MG 24 hr tablet Take 1 tablet by mouth Daily. 30 tablet 1   • Lancet Devices (ONE TOUCH DELICA LANCING DEV) misc delica lancing device if approved, otherwise use alternative 1 each 11   • metFORMIN ER (GLUCOPHAGE XR) 500 MG 24 hr tablet Take one with supper for one week then one po BID (Patient taking differently: Take 500 mg by mouth 2 (Two) Times a Day. Take one with supper for one week then one po BID) 60 tablet 11   • metoprolol succinate XL (TOPROL-XL) 25 MG 24 hr tablet Take 1 tablet by mouth Daily. (Patient taking differently: Take 25 mg by mouth every night at bedtime.) 30 tablet 11   • nitroglycerin (NITROSTAT) 0.4 MG SL tablet Place 1 tablet under the tongue Every 5 (Five) Minutes As Needed for Chest Pain. Take no more than 3 doses in 15 minutes. 30 tablet 1   • ONETOUCH DELICA LANCETS 33G misc 1 each 4 (Four) Times a Day. delica if covered, use alternative if not covered 120 each 11   • pravastatin (PRAVACHOL) 20 MG tablet Take 1 tablet by mouth Daily. (Patient taking differently: Take 20 mg by mouth Every Night.) 30 tablet 3   • pregabalin (LYRICA) 50 MG capsule Take 1 capsule by mouth 2 (Two) Times a Day. 28 capsule 0   • vitamin D (ERGOCALCIFEROL) 85730 units capsule capsule Take 1 capsule by mouth twice a week. 10 capsule 11     No current facility-administered medications for this visit.      Allergies   Allergen Reactions   • Ace Inhibitors Anaphylaxis   • Wellbutrin [Bupropion] Anaphylaxis     Social History     Socioeconomic History   • Marital status:      Spouse name: Not on file   • Number of children: Not on file   • Years of education: Not on file   • Highest education level: Not on file   Tobacco Use   • Smoking status: Former Smoker      "Packs/day: 1.00     Years: 25.00     Pack years: 25.00     Types: Cigarettes   • Smokeless tobacco: Never Used   Substance and Sexual Activity   • Alcohol use: No   • Drug use: No   • Sexual activity: Defer       Review of Systems  Review of Systems   Constitutional: Negative for activity change, appetite change, diaphoresis and fatigue.   HENT: Negative for facial swelling, sneezing, sore throat, tinnitus, trouble swallowing and voice change.    Eyes: Negative for photophobia, pain, discharge, redness, itching and visual disturbance.   Respiratory: Negative for apnea, cough, choking, chest tightness and shortness of breath.    Cardiovascular: Negative for chest pain, palpitations and leg swelling.   Gastrointestinal: Negative for abdominal distention, abdominal pain, constipation, diarrhea, nausea and vomiting.   Endocrine: Negative for cold intolerance, heat intolerance, polydipsia, polyphagia and polyuria.   Genitourinary: Negative for difficulty urinating, dysuria, frequency, hematuria and urgency.   Musculoskeletal: Negative for arthralgias, back pain, gait problem, joint swelling, myalgias, neck pain and neck stiffness.   Skin: Negative for color change, pallor, rash and wound.   Neurological: Negative for dizziness, tremors, weakness, light-headedness, numbness and headaches.   Hematological: Negative for adenopathy. Does not bruise/bleed easily.   Psychiatric/Behavioral: Negative for behavioral problems, confusion and sleep disturbance.        Objective    /80   Pulse 72   Ht 188 cm (74\")   Wt 102 kg (225 lb 1.6 oz)   SpO2 99%   BMI 28.90 kg/m²   Physical Exam   Constitutional: He is oriented to person, place, and time. He appears well-developed and well-nourished. No distress.   HENT:   Head: Normocephalic and atraumatic.   Right Ear: External ear normal.   Left Ear: External ear normal.   Nose: Nose normal.   Eyes: Conjunctivae and EOM are normal. Pupils are equal, round, and reactive to light. "   Neck: Normal range of motion. Neck supple. No tracheal deviation present. No thyromegaly present.   Cardiovascular: Normal rate, regular rhythm and normal heart sounds.   No murmur heard.  Pulmonary/Chest: Effort normal and breath sounds normal. No respiratory distress. He has no wheezes.   Abdominal: Soft. Bowel sounds are normal. There is no tenderness. There is no rebound and no guarding.   Musculoskeletal: Normal range of motion. He exhibits no edema, tenderness or deformity.   Neurological: He is alert and oriented to person, place, and time. No cranial nerve deficit.   Skin: Skin is warm and dry. No rash noted.   Psychiatric: He has a normal mood and affect. His behavior is normal. Judgment and thought content normal.       Lab Review  Glucose (mg/dL)   Date Value   09/13/2019 141 (H)   09/13/2019 153 (H)   09/12/2019 191 (H)     Glucose, Arterial (mmol/L)   Date Value   01/22/2017 385     Sodium (mmol/L)   Date Value   09/13/2019 142   09/13/2019 139   09/12/2019 141     Potassium (mmol/L)   Date Value   09/13/2019 4.1   09/13/2019 4.1   09/12/2019 3.8     Chloride (mmol/L)   Date Value   09/13/2019 104   09/13/2019 102   09/12/2019 103     CO2 (mmol/L)   Date Value   09/13/2019 25.8   09/13/2019 28.0   09/12/2019 27.0     BUN (mg/dL)   Date Value   09/13/2019 15   09/13/2019 18   09/12/2019 16     Creatinine (mg/dL)   Date Value   09/13/2019 0.67 (L)   09/13/2019 0.75 (L)   09/12/2019 0.69 (L)     Hemoglobin A1C (%)   Date Value   09/13/2019 7.40 (H)   06/07/2019 8.65 (H)   01/23/2017 12.81 (H)     Triglycerides (mg/dL)   Date Value   09/13/2019 131   06/07/2019 126     LDL Cholesterol  (mg/dL)   Date Value   09/13/2019 77   06/07/2019 154 (H)       Assessment/Plan      1. Controlled type 2 diabetes mellitus without complication, without long-term current use of insulin (CMS/[facility])    2. Mixed hyperlipidemia    3. Essential hypertension    .    Medications prescribed:  Outpatient Encounter Medications as of  9/17/2019   Medication Sig Dispense Refill   • amLODIPine (NORVASC) 10 MG tablet Take 1 tablet by mouth Daily. 30 tablet 1   • aspirin 81 MG EC tablet Take 1 tablet by mouth Daily. 30 tablet 1   • Glucose Blood (BLOOD GLUCOSE TEST) strip Use 4 x daily, one touch verio  each 11   • isosorbide mononitrate (IMDUR) 30 MG 24 hr tablet Take 1 tablet by mouth Daily. 30 tablet 1   • Lancet Devices (ONE TOUCH DELICA LANCING DEV) misc delica lancing device if approved, otherwise use alternative 1 each 11   • metFORMIN ER (GLUCOPHAGE XR) 500 MG 24 hr tablet Take one with supper for one week then one po BID (Patient taking differently: Take 500 mg by mouth 2 (Two) Times a Day. Take one with supper for one week then one po BID) 60 tablet 11   • metoprolol succinate XL (TOPROL-XL) 25 MG 24 hr tablet Take 1 tablet by mouth Daily. (Patient taking differently: Take 25 mg by mouth every night at bedtime.) 30 tablet 11   • nitroglycerin (NITROSTAT) 0.4 MG SL tablet Place 1 tablet under the tongue Every 5 (Five) Minutes As Needed for Chest Pain. Take no more than 3 doses in 15 minutes. 30 tablet 1   • ONETOUCH DELICA LANCETS 33G misc 1 each 4 (Four) Times a Day. delica if covered, use alternative if not covered 120 each 11   • pravastatin (PRAVACHOL) 20 MG tablet Take 1 tablet by mouth Daily. (Patient taking differently: Take 20 mg by mouth Every Night.) 30 tablet 3   • pregabalin (LYRICA) 50 MG capsule Take 1 capsule by mouth 2 (Two) Times a Day. 28 capsule 0   • vitamin D (ERGOCALCIFEROL) 90151 units capsule capsule Take 1 capsule by mouth twice a week. 10 capsule 11     No facility-administered encounter medications on file as of 9/17/2019.        Orders placed during this encounter include:  No orders of the defined types were placed in this encounter.    Glycemic Management    Diabetes mellitus type 2 ketone prone     cpeptide is detectable will check Ab     Lab Results   Component Value Date    HGBA1C 7.40 (H) 09/13/2019      Component      Latest Ref Rng & Units 9/13/2019   C-Peptide      1.1 - 4.4 ng/mL 3.5           Tresiba --- stopped      Novolog -- stopped                 Start Trulicity 0.75 mg weekly- stopped      Side effects discussed including nausea      If nauseated eat less     If vomiting or abdominal pain stop     No history of pancreatitis          metformin xr 500 mg bid --continue     After he is released from hospital will start jardiance               Lipid Management           Component      Latest Ref Rng & Units 9/13/2019   Total Cholesterol      0 - 200 mg/dL 137   Triglycerides      0 - 150 mg/dL 131   HDL Cholesterol      40 - 60 mg/dL 34 (L)   LDL Cholesterol       0 - 100 mg/dL 77   VLDL Cholesterol      5 - 40 mg/dL 26.2   LDL/HDL Ratio       2.26              pravachol                  Blood Pressure Management     Not controlled     Metoprolol XL 25 mg daily      Add lisinopril 10 mg daily         Microvascular Complication Monitoring        No results found for: MICROALBUR, [id number]            Neuropathy -- Neurontin not effective        Will try Lyrica 50 mg BID      [facility] controlled substance policy discussed.   Bowen reviewed and appropriate     Eye exam scheduled for this month he states      Bone Health              Lab Results   Component Value Date     CALCIUM 10.2 06/07/2019         Component      Latest Ref Rng & Units 9/13/2019   25 Hydroxy, Vitamin D      30.0 - 100.0 ng/ml 11.1 (L)      start 50,000 units twice a week         Other Diabetes Related Aspects     Lab Results   Component Value Date    [id number] 09/13/2019             Thyroid Health       Lab Results   Component Value Date    TSH 4.000 09/13/2019                   4. Follow-up: Return in about 6 weeks (around 10/29/2019) for Recheck.

## 2019-09-18 ENCOUNTER — OFFICE VISIT (OUTPATIENT)
Dept: FAMILY MEDICINE CLINIC | Facility: CLINIC | Age: 54
End: 2019-09-18

## 2019-09-18 VITALS
HEIGHT: 74 IN | OXYGEN SATURATION: 99 % | BODY MASS INDEX: 28.67 KG/M2 | SYSTOLIC BLOOD PRESSURE: 142 MMHG | WEIGHT: 223.4 LBS | TEMPERATURE: 97 F | HEART RATE: 93 BPM | DIASTOLIC BLOOD PRESSURE: 90 MMHG

## 2019-09-18 DIAGNOSIS — I25.118 CORONARY ARTERY DISEASE OF NATIVE ARTERY OF NATIVE HEART WITH STABLE ANGINA PECTORIS (HCC): Primary | ICD-10-CM

## 2019-09-18 LAB
ALBUMIN SERPL-MCNC: 4.3 G/DL (ref 3.5–5.2)
ALBUMIN/GLOB SERPL: 1.6 G/DL
ALP SERPL-CCNC: 81 U/L (ref 39–117)
ALT SERPL W P-5'-P-CCNC: 49 U/L (ref 1–41)
ANION GAP SERPL CALCULATED.3IONS-SCNC: 15.2 MMOL/L (ref 5–15)
AST SERPL-CCNC: 17 U/L (ref 1–40)
BILIRUB SERPL-MCNC: 0.8 MG/DL (ref 0.2–1.2)
BUN BLD-MCNC: 15 MG/DL (ref 6–20)
BUN/CREAT SERPL: 23.1 (ref 7–25)
CALCIUM SPEC-SCNC: 9.4 MG/DL (ref 8.6–10.5)
CHLORIDE SERPL-SCNC: 101 MMOL/L (ref 98–107)
CO2 SERPL-SCNC: 23.8 MMOL/L (ref 22–29)
CREAT BLD-MCNC: 0.65 MG/DL (ref 0.76–1.27)
GFR SERPL CREATININE-BSD FRML MDRD: 128 ML/MIN/1.73
GLOBULIN UR ELPH-MCNC: 2.7 GM/DL
GLUCOSE BLD-MCNC: 213 MG/DL (ref 65–99)
HAV IGM SERPL QL IA: NORMAL
HBV CORE IGM SERPL QL IA: NORMAL
HBV SURFACE AG SERPL QL IA: NORMAL
HCV AB SER DONR QL: NORMAL
POTASSIUM BLD-SCNC: 4.1 MMOL/L (ref 3.5–5.2)
PROT SERPL-MCNC: 7 G/DL (ref 6–8.5)
SODIUM BLD-SCNC: 140 MMOL/L (ref 136–145)

## 2019-09-18 PROCEDURE — 99212 OFFICE O/P EST SF 10 MIN: CPT | Performed by: FAMILY MEDICINE

## 2019-09-18 NOTE — PROGRESS NOTES
" Subjective   Herbert White . is a 54 y.o. male.     History of Present Illness     Follow up er chest pain  Plan is cabg dr camargo wedensday.    Then to have hydrocele's repaired  Has lost weight, feels better in that regard.  He stopped smoking 5 days ago      Review of Systems   Constitutional: Negative for chills, fatigue and fever.   HENT: Negative for congestion, ear discharge, ear pain, facial swelling, hearing loss, postnasal drip, rhinorrhea, sinus pressure, sore throat, trouble swallowing and voice change.    Eyes: Negative for discharge, redness and visual disturbance.   Respiratory: Negative for cough, chest tightness, shortness of breath and wheezing.    Cardiovascular: Negative for chest pain and palpitations.   Gastrointestinal: Negative for abdominal pain, blood in stool, constipation, diarrhea, nausea and vomiting.   Endocrine: Negative for polydipsia and polyuria.   Genitourinary: Negative for dysuria, flank pain, hematuria and urgency.   Musculoskeletal: Negative for arthralgias, back pain, joint swelling and myalgias.   Skin: Negative for rash.   Neurological: Negative for dizziness, weakness, numbness and headaches.   Hematological: Negative for adenopathy.   Psychiatric/Behavioral: Negative for confusion and sleep disturbance. The patient is not nervous/anxious.            /90 (BP Location: Left arm, Patient Position: Sitting, Cuff Size: Adult)   Pulse 93   Temp 97 °F (36.1 °C) (Temporal)   Ht 188 cm (74.02\")   Wt 101 kg (223 lb 6.4 oz)   SpO2 99%   BMI 28.67 kg/m²       Objective     Physical Exam   Constitutional: He is oriented to person, place, and time. He appears well-developed and well-nourished.   HENT:   Head: Normocephalic and atraumatic.   Right Ear: External ear normal.   Left Ear: External ear normal.   Nose: Nose normal.   Mouth/Throat: Oropharynx is clear and moist.   Eyes: Conjunctivae and EOM are normal. Pupils are equal, round, and reactive to light. "   Neck: Normal range of motion. Neck supple.   Cardiovascular: Normal rate, regular rhythm and normal heart sounds. Exam reveals no gallop and no friction rub.   No murmur heard.  Pulmonary/Chest: Effort normal and breath sounds normal.   Abdominal: Soft. Bowel sounds are normal. He exhibits no distension. There is no tenderness. There is no rebound and no guarding.   Musculoskeletal: Normal range of motion. He exhibits no edema or deformity.   Neurological: He is alert and oriented to person, place, and time. No cranial nerve deficit.   Skin: Skin is warm and dry. No rash noted. No erythema.   Psychiatric: He has a normal mood and affect. His behavior is normal. Judgment and thought content normal.   Nursing note and vitals reviewed.          PAST MEDICAL HISTORY     Past Medical History:   Diagnosis Date   • Coronary artery disease    • Diabetes mellitus (CMS/HCC)    • GERD (gastroesophageal reflux disease)    • Hyperlipidemia    • Hypertension    • Sleep apnea       PAST SURGICAL HISTORY     Past Surgical History:   Procedure Laterality Date   • CARDIAC CATHETERIZATION N/A 9/3/2019    Procedure: Coronary angiography/PCI if indicated;  Surgeon: Denia Dwyer MD;  Location: [facility] INVASIVE LOCATION;  Service: Cardiology   • CHOLECYSTECTOMY     • TONSILLECTOMY        SOCIAL HISTORY     Social History     Socioeconomic History   • Marital status:      Spouse name: Not on file   • Number of children: Not on file   • Years of education: Not on file   • Highest education level: Not on file   Tobacco Use   • Smoking status: Former Smoker     Packs/day: 1.00     Years: 25.00     Pack years: 25.00     Types: Cigarettes   • Smokeless tobacco: Never Used   Substance and Sexual Activity   • Alcohol use: No   • Drug use: No   • Sexual activity: Defer      ALLERGIES   Ace inhibitors and Wellbutrin [bupropion]   MEDICATIONS     Current Outpatient Medications   Medication Sig Dispense Refill   • amLODIPine (NORVASC)  10 MG tablet Take 1 tablet by mouth Daily. 30 tablet 1   • aspirin 81 MG EC tablet Take 1 tablet by mouth Daily. 30 tablet 1   • Glucose Blood (BLOOD GLUCOSE TEST) strip Use 4 x daily, one touch verio  each 11   • isosorbide mononitrate (IMDUR) 30 MG 24 hr tablet Take 1 tablet by mouth Daily. 30 tablet 1   • Lancet Devices (ONE TOUCH DELICA LANCING DEV) misc delica lancing device if approved, otherwise use alternative 1 each 11   • metFORMIN ER (GLUCOPHAGE XR) 500 MG 24 hr tablet Take one with supper for one week then one po BID (Patient taking differently: Take 500 mg by mouth 2 (Two) Times a Day. Take one with supper for one week then one po BID) 60 tablet 11   • metoprolol succinate XL (TOPROL-XL) 25 MG 24 hr tablet Take 1 tablet by mouth Daily. (Patient taking differently: Take 25 mg by mouth every night at bedtime.) 30 tablet 11   • nitroglycerin (NITROSTAT) 0.4 MG SL tablet Place 1 tablet under the tongue Every 5 (Five) Minutes As Needed for Chest Pain. Take no more than 3 doses in 15 minutes. 30 tablet 1   • ONETOUCH DELICA LANCETS 33G misc 1 each 4 (Four) Times a Day. delica if covered, use alternative if not covered 120 each 11   • pravastatin (PRAVACHOL) 20 MG tablet Take 1 tablet by mouth Daily. (Patient taking differently: Take 20 mg by mouth Every Night.) 30 tablet 3   • pregabalin (LYRICA) 50 MG capsule Take 1 capsule by mouth 2 (Two) Times a Day. 28 capsule 0   • vitamin D (ERGOCALCIFEROL) 54382 units capsule capsule Take 1 capsule by mouth twice a week. 10 capsule 11     No current facility-administered medications for this visit.         The following portions of the patient's history were reviewed and updated as appropriate: allergies, current medications, past family history, past medical history, past social history, past surgical history and problem list.        Assessment/Plan   Herbert was seen today for follow-up.    Diagnoses and all orders for this visit:    Coronary artery disease of  native artery of native heart with stable angina pectoris (CMS/HCC)      He should do well.  Reassurance cabg is best way to go.                 No Follow-up on file.                  This document has been electronically signed by Artemio Sanchez MD on September 18, 2019 12:20 PM

## 2019-09-18 NOTE — PATIENT INSTRUCTIONS

## 2019-09-20 ENCOUNTER — APPOINTMENT (OUTPATIENT)
Dept: PREADMISSION TESTING | Facility: HOSPITAL | Age: 54
End: 2019-09-20

## 2019-09-20 VITALS
HEIGHT: 73 IN | BODY MASS INDEX: 29.42 KG/M2 | DIASTOLIC BLOOD PRESSURE: 69 MMHG | WEIGHT: 222 LBS | HEART RATE: 67 BPM | RESPIRATION RATE: 18 BRPM | SYSTOLIC BLOOD PRESSURE: 116 MMHG | OXYGEN SATURATION: 100 %

## 2019-09-20 DIAGNOSIS — I25.118 CORONARY ARTERY DISEASE OF NATIVE ARTERY OF NATIVE HEART WITH STABLE ANGINA PECTORIS (HCC): ICD-10-CM

## 2019-09-20 LAB
ABO GROUP BLD: NORMAL
APTT PPP: 32 SECONDS (ref 20–40.3)
BLD GP AB SCN SERPL QL: NEGATIVE
INR PPP: 0.98 (ref 0.8–1.2)
Lab: NORMAL
PROTHROMBIN TIME: 12.8 SECONDS (ref 11.1–15.3)
RH BLD: POSITIVE
T&S EXPIRATION DATE: NORMAL

## 2019-09-20 PROCEDURE — 86850 RBC ANTIBODY SCREEN: CPT | Performed by: NURSE PRACTITIONER

## 2019-09-20 PROCEDURE — 85610 PROTHROMBIN TIME: CPT | Performed by: NURSE PRACTITIONER

## 2019-09-20 PROCEDURE — 94799 UNLISTED PULMONARY SVC/PX: CPT

## 2019-09-20 PROCEDURE — 86900 BLOOD TYPING SEROLOGIC ABO: CPT | Performed by: NURSE PRACTITIONER

## 2019-09-20 PROCEDURE — 85730 THROMBOPLASTIN TIME PARTIAL: CPT | Performed by: NURSE PRACTITIONER

## 2019-09-20 PROCEDURE — 36415 COLL VENOUS BLD VENIPUNCTURE: CPT

## 2019-09-20 PROCEDURE — 86901 BLOOD TYPING SEROLOGIC RH(D): CPT | Performed by: NURSE PRACTITIONER

## 2019-09-20 RX ORDER — ERGOCALCIFEROL 1.25 MG/1
50000 CAPSULE ORAL 2 TIMES WEEKLY
COMMUNITY
End: 2021-01-14

## 2019-09-20 RX ORDER — METOPROLOL SUCCINATE 25 MG/1
25 TABLET, EXTENDED RELEASE ORAL NIGHTLY
COMMUNITY
End: 2019-10-01 | Stop reason: HOSPADM

## 2019-09-20 RX ORDER — PRAVASTATIN SODIUM 20 MG
20 TABLET ORAL NIGHTLY
COMMUNITY
End: 2019-10-01 | Stop reason: HOSPADM

## 2019-09-20 NOTE — DISCHARGE INSTRUCTIONS
[facility]  Pre-op Information and Guidelines    You will be called after 2 p.m. the day before your surgery (Friday for Monday surgery) and notified of your time for arrival and approximate surgery time.  If you have not received a call by 4P.M., please contact Same Day Surgery at [phone] of if outside [facility] call [phone].    Please Follow these Important Safety Guidelines:    • The morning of your procedure, take only the medications listed below with   A sip of water:_____________________________________________       ______________________________________________    • DO NOT eat or drink anything after 12:00 midnight the night before surgery  Specific instructions concerning drinking clear liquids will be discussed during  the pre-surgery instruction call the day before your surgery.    • If you take a blood thinner (ex. Plavix, Coumadin, aspirin), ask your doctor when to stop it before surgery  STOP DATE: _________________    • Only 2 visitors are allowed in patient rooms at a time  Your visitors will be asked to wait in the lobby until the admission process is complete with the exception of a parent with a child and patients in need of special assistance.    • YOU CANNOT DRIVE YOURSELF HOME  You must be accompanied by someone who will be responsible for driving you home after surgery and for your care at home.    • DO NOT chew gum, use breath mints, hard candy, or smoke the day of surgery  • DO NOT drink alcohol for at least 24 hours before your surgery  • DO NOT wear any jewelry and remove all body piercing before coming to the hospital  • DO NOT wear make-up to the hospital  • If you are having surgery on an extremity (arm/leg/foot) remove nail polish/artificial nails on the surgical side  • Clothing, glasses, contacts, dentures, and hairpieces must be removed before surgery  • Bathe the night before or the morning of your surgery and do not use powders/lotions on  skin.

## 2019-09-20 NOTE — PAT
Dr. Serra and Vijay Richardson here to talk with patient and answer questions. Heart pathway, chlorhexidine wash, and pre-op instructions given, patient voiced understanding.

## 2019-09-24 RX ORDER — TRANEXAMIC ACID 100 MG/ML
2.5 INJECTION, SOLUTION INTRAVENOUS ONCE
Status: DISCONTINUED | OUTPATIENT
Start: 2019-09-25 | End: 2019-09-25

## 2019-09-25 ENCOUNTER — HOSPITAL ENCOUNTER (INPATIENT)
Facility: HOSPITAL | Age: 54
LOS: 6 days | Discharge: HOME | End: 2019-10-01
Attending: THORACIC SURGERY (CARDIOTHORACIC VASCULAR SURGERY) | Admitting: THORACIC SURGERY (CARDIOTHORACIC VASCULAR SURGERY)

## 2019-09-25 ENCOUNTER — APPOINTMENT (OUTPATIENT)
Dept: GENERAL RADIOLOGY | Facility: HOSPITAL | Age: 54
End: 2019-09-25

## 2019-09-25 ENCOUNTER — ANESTHESIA (OUTPATIENT)
Dept: PERIOP | Facility: HOSPITAL | Age: 54
End: 2019-09-25

## 2019-09-25 DIAGNOSIS — Z74.09 IMPAIRED FUNCTIONAL MOBILITY, BALANCE, GAIT, AND ENDURANCE: ICD-10-CM

## 2019-09-25 DIAGNOSIS — Z78.9 IMPAIRED MOBILITY AND ADLS: ICD-10-CM

## 2019-09-25 DIAGNOSIS — R26.89 DECREASED FUNCTIONAL MOBILITY: ICD-10-CM

## 2019-09-25 DIAGNOSIS — I25.118 CORONARY ARTERY DISEASE OF NATIVE ARTERY OF NATIVE HEART WITH STABLE ANGINA PECTORIS (HCC): Primary | ICD-10-CM

## 2019-09-25 DIAGNOSIS — Z74.09 IMPAIRED MOBILITY AND ADLS: ICD-10-CM

## 2019-09-25 PROBLEM — I25.10 CORONARY ARTERY DISEASE: Status: ACTIVE | Noted: 2019-09-25

## 2019-09-25 LAB
A-A DO2: ABNORMAL
ABO GROUP BLD: NORMAL
ACT BLD: 130 SECONDS (ref 82–152)
ACT BLD: 142 SECONDS (ref 82–152)
ACT BLD: 356 SECONDS (ref 82–152)
ACT BLD: 417 SECONDS (ref 82–152)
ACT BLD: 437 SECONDS (ref 82–152)
ACT BLD: 481 SECONDS (ref 82–152)
ACT BLD: 610 SECONDS (ref 82–152)
ALBUMIN SERPL-MCNC: 3.8 G/DL (ref 3.5–5.2)
ANION GAP SERPL CALCULATED.3IONS-SCNC: 11 MMOL/L (ref 5–15)
APTT PPP: 31.9 SECONDS (ref 20–40.3)
ARTERIAL PATENCY WRIST A: ABNORMAL
ATMOSPHERIC PRESS: 743 MMHG
ATMOSPHERIC PRESS: 744 MMHG
ATMOSPHERIC PRESS: 744 MMHG
BASE EXCESS BLDA CALC-SCNC: -0.6 MMOL/L (ref 0–2)
BASE EXCESS BLDA CALC-SCNC: -0.8 MMOL/L (ref 0–2)
BASE EXCESS BLDA CALC-SCNC: -1.1 MMOL/L (ref 0–2)
BASE EXCESS BLDA CALC-SCNC: -1.4 MMOL/L (ref 0–2)
BASE EXCESS BLDA CALC-SCNC: -1.8 MMOL/L (ref 0–2)
BASE EXCESS BLDA CALC-SCNC: -1.9 MMOL/L (ref 0–2)
BASE EXCESS BLDA CALC-SCNC: -2.6 MMOL/L (ref 0–2)
BASE EXCESS BLDA CALC-SCNC: 0 MMOL/L (ref -5–5)
BASE EXCESS BLDA CALC-SCNC: 0 MMOL/L (ref -5–5)
BASE EXCESS BLDA CALC-SCNC: 1 MMOL/L (ref -5–5)
BASE EXCESS BLDA CALC-SCNC: 2 MMOL/L (ref -5–5)
BASE EXCESS BLDA CALC-SCNC: 3 MMOL/L (ref -5–5)
BASE EXCESS BLDA CALC-SCNC: 4 MMOL/L (ref -5–5)
BASOPHILS # BLD AUTO: 0.03 10*3/MM3 (ref 0–0.2)
BASOPHILS NFR BLD AUTO: 0.3 % (ref 0–1.5)
BDY SITE: ABNORMAL
BLD GP AB SCN SERPL QL: NEGATIVE
BUN BLD-MCNC: 13 MG/DL (ref 6–20)
BUN/CREAT SERPL: 16.5 (ref 7–25)
CA-I BLD-MCNC: 4.58 MG/DL (ref 4.6–5.6)
CA-I BLD-MCNC: 4.61 MG/DL (ref 4.6–5.6)
CA-I BLD-MCNC: 4.65 MG/DL (ref 4.6–5.6)
CA-I BLD-MCNC: 4.66 MG/DL (ref 4.6–5.6)
CA-I BLD-MCNC: 4.7 MG/DL (ref 4.6–5.6)
CA-I BLD-MCNC: 4.82 MG/DL (ref 4.6–5.6)
CA-I BLDA-SCNC: 1.04 MMOL/L (ref 1.2–1.32)
CA-I BLDA-SCNC: 1.08 MMOL/L (ref 1.2–1.32)
CA-I BLDA-SCNC: 1.09 MMOL/L (ref 1.2–1.32)
CA-I BLDA-SCNC: 1.11 MMOL/L (ref 1.2–1.32)
CA-I BLDA-SCNC: 1.11 MMOL/L (ref 1.2–1.32)
CA-I BLDA-SCNC: 1.14 MMOL/L (ref 1.2–1.32)
CA-I BLDA-SCNC: 1.17 MMOL/L (ref 1.2–1.32)
CA-I BLDA-SCNC: 1.18 MMOL/L (ref 1.2–1.32)
CALCIUM SPEC-SCNC: 8.2 MG/DL (ref 8.6–10.5)
CHLORIDE SERPL-SCNC: 108 MMOL/L (ref 98–107)
CO2 BLDA-SCNC: 26 MMOL/L (ref 24–29)
CO2 BLDA-SCNC: 27 MMOL/L (ref 24–29)
CO2 BLDA-SCNC: 27 MMOL/L (ref 24–29)
CO2 BLDA-SCNC: 29 MMOL/L (ref 24–29)
CO2 BLDA-SCNC: 30 MMOL/L (ref 24–29)
CO2 BLDA-SCNC: 30 MMOL/L (ref 24–29)
CO2 SERPL-SCNC: 24 MMOL/L (ref 22–29)
COHGB MFR BLD: 1.1 % (ref 0–5)
COHGB MFR BLD: 1.2 % (ref 0–5)
COHGB MFR BLD: 1.2 % (ref 0–5)
COHGB MFR BLD: 1.5 % (ref 0–5)
CREAT BLD-MCNC: 0.79 MG/DL (ref 0.76–1.27)
DEPRECATED RDW RBC AUTO: 38.2 FL (ref 37–54)
EOSINOPHIL # BLD AUTO: 0.06 10*3/MM3 (ref 0–0.4)
EOSINOPHIL NFR BLD AUTO: 0.5 % (ref 0.3–6.2)
ERYTHROCYTE [DISTWIDTH] IN BLOOD BY AUTOMATED COUNT: 12.7 % (ref 12.3–15.4)
GAS FLOW AIRWAY: 2 LPM
GFR SERPL CREATININE-BSD FRML MDRD: 102 ML/MIN/1.73
GLUCOSE BLD-MCNC: 127 MG/DL (ref 65–99)
GLUCOSE BLDA-MCNC: 123 MMOL/L (ref 65–95)
GLUCOSE BLDA-MCNC: 124 MMOL/L (ref 65–95)
GLUCOSE BLDA-MCNC: 148 MMOL/L (ref 65–95)
GLUCOSE BLDA-MCNC: 172 MMOL/L (ref 65–95)
GLUCOSE BLDA-MCNC: 222 MMOL/L (ref 65–95)
GLUCOSE BLDA-MCNC: 227 MMOL/L (ref 65–95)
GLUCOSE BLDC GLUCOMTR-MCNC: 124 MG/DL (ref 70–130)
GLUCOSE BLDC GLUCOMTR-MCNC: 124 MG/DL (ref 70–130)
GLUCOSE BLDC GLUCOMTR-MCNC: 126 MG/DL (ref 70–130)
GLUCOSE BLDC GLUCOMTR-MCNC: 130 MG/DL (ref 70–130)
GLUCOSE BLDC GLUCOMTR-MCNC: 136 MG/DL (ref 70–130)
GLUCOSE BLDC GLUCOMTR-MCNC: 144 MG/DL (ref 70–130)
GLUCOSE BLDC GLUCOMTR-MCNC: 173 MG/DL (ref 70–130)
GLUCOSE BLDC GLUCOMTR-MCNC: 182 MG/DL (ref 70–130)
GLUCOSE BLDC GLUCOMTR-MCNC: 228 MG/DL (ref 70–130)
GLUCOSE BLDC GLUCOMTR-MCNC: 243 MG/DL (ref 70–130)
HCO3 BLDA-SCNC: 24 MMOL/L (ref 20–26)
HCO3 BLDA-SCNC: 24.2 MMOL/L (ref 20–26)
HCO3 BLDA-SCNC: 24.2 MMOL/L (ref 20–26)
HCO3 BLDA-SCNC: 24.5 MMOL/L (ref 20–26)
HCO3 BLDA-SCNC: 24.6 MMOL/L (ref 20–26)
HCO3 BLDA-SCNC: 24.7 MMOL/L (ref 22–26)
HCO3 BLDA-SCNC: 25.4 MMOL/L (ref 20–26)
HCO3 BLDA-SCNC: 25.6 MMOL/L (ref 22–26)
HCO3 BLDA-SCNC: 25.8 MMOL/L (ref 22–26)
HCO3 BLDA-SCNC: 26.7 MMOL/L (ref 20–26)
HCO3 BLDA-SCNC: 27.8 MMOL/L (ref 22–26)
HCO3 BLDA-SCNC: 27.9 MMOL/L (ref 22–26)
HCO3 BLDA-SCNC: 28 MMOL/L (ref 22–26)
HCO3 BLDA-SCNC: 28.2 MMOL/L (ref 22–26)
HCO3 BLDA-SCNC: 28.5 MMOL/L (ref 22–26)
HCT VFR BLD AUTO: 34 % (ref 37.5–51)
HCT VFR BLD CALC: 38.6 % (ref 38–51)
HCT VFR BLD CALC: 38.9 % (ref 38–51)
HCT VFR BLD CALC: 38.9 % (ref 38–51)
HCT VFR BLD CALC: 39.1 % (ref 38–51)
HCT VFR BLD CALC: 39.5 % (ref 38–51)
HCT VFR BLD CALC: 39.9 % (ref 38–51)
HCT VFR BLDA CALC: 27 % (ref 38–51)
HCT VFR BLDA CALC: 29 % (ref 38–51)
HCT VFR BLDA CALC: 29 % (ref 38–51)
HCT VFR BLDA CALC: 30 % (ref 38–51)
HCT VFR BLDA CALC: 31 % (ref 38–51)
HCT VFR BLDA CALC: 31 % (ref 38–51)
HCT VFR BLDA CALC: 35 % (ref 38–51)
HCT VFR BLDA CALC: 35 % (ref 38–51)
HGB BLD-MCNC: 12.3 G/DL (ref 13–17.7)
HGB BLDA-MCNC: 10.2 G/DL (ref 12–17)
HGB BLDA-MCNC: 10.5 G/DL (ref 12–17)
HGB BLDA-MCNC: 10.5 G/DL (ref 12–17)
HGB BLDA-MCNC: 11.9 G/DL (ref 12–17)
HGB BLDA-MCNC: 11.9 G/DL (ref 12–17)
HGB BLDA-MCNC: 12.6 G/DL (ref 14–18)
HGB BLDA-MCNC: 12.7 G/DL (ref 14–18)
HGB BLDA-MCNC: 12.9 G/DL (ref 14–18)
HGB BLDA-MCNC: 13 G/DL (ref 14–18)
HGB BLDA-MCNC: 9.2 G/DL (ref 12–17)
HGB BLDA-MCNC: 9.9 G/DL (ref 12–17)
HGB BLDA-MCNC: 9.9 G/DL (ref 12–17)
HOROWITZ INDEX BLD+IHG-RTO: 40 %
HOROWITZ INDEX BLD+IHG-RTO: 70 %
HOROWITZ INDEX BLD+IHG-RTO: 70 %
IMM GRANULOCYTES # BLD AUTO: 0.06 10*3/MM3 (ref 0–0.05)
IMM GRANULOCYTES NFR BLD AUTO: 0.5 % (ref 0–0.5)
INR PPP: 1.41 (ref 0.8–1.2)
LYMPHOCYTES # BLD AUTO: 1.34 10*3/MM3 (ref 0.7–3.1)
LYMPHOCYTES NFR BLD AUTO: 11.8 % (ref 19.6–45.3)
Lab: ABNORMAL
Lab: NORMAL
MAGNESIUM SERPL-MCNC: 2.3 MG/DL (ref 1.6–2.6)
MAGNESIUM SERPL-MCNC: 2.6 MG/DL (ref 1.6–2.6)
MCH RBC QN AUTO: 29.9 PG (ref 26.6–33)
MCHC RBC AUTO-ENTMCNC: 36.2 G/DL (ref 31.5–35.7)
MCV RBC AUTO: 82.5 FL (ref 79–97)
METHGB BLD QL: 0.6 % (ref 0–3)
METHGB BLD QL: 1 % (ref 0–3)
METHGB BLD QL: 1.2 % (ref 0–3)
METHGB BLD QL: 1.3 % (ref 0–3)
METHGB BLD QL: 1.3 % (ref 0–3)
METHGB BLD QL: 1.4 % (ref 0–3)
MODALITY: ABNORMAL
MONOCYTES # BLD AUTO: 0.63 10*3/MM3 (ref 0.1–0.9)
MONOCYTES NFR BLD AUTO: 5.5 % (ref 5–12)
NEUTROPHILS # BLD AUTO: 9.28 10*3/MM3 (ref 1.7–7)
NEUTROPHILS NFR BLD AUTO: 81.4 % (ref 42.7–76)
NOTE: ABNORMAL
NRBC BLD AUTO-RTO: 0 /100 WBC (ref 0–0.2)
OXYHGB MFR BLDV: 80.7 % (ref 94–99)
OXYHGB MFR BLDV: 94.3 % (ref 94–99)
OXYHGB MFR BLDV: 96 % (ref 94–99)
OXYHGB MFR BLDV: 96 % (ref 94–99)
OXYHGB MFR BLDV: 96.2 % (ref 94–99)
OXYHGB MFR BLDV: 97 % (ref 94–99)
PCO2 BLDA: 40.3 MM HG (ref 35–45)
PCO2 BLDA: 41.8 MM HG (ref 35–45)
PCO2 BLDA: 42 MM HG (ref 35–45)
PCO2 BLDA: 42.2 MM HG (ref 35–45)
PCO2 BLDA: 42.8 MM HG (ref 35–45)
PCO2 BLDA: 44 MM HG (ref 35–45)
PCO2 BLDA: 44.9 MM HG (ref 35–45)
PCO2 BLDA: 47.3 MM HG (ref 35–45)
PCO2 BLDA: 47.8 MM HG (ref 35–45)
PCO2 BLDA: 47.9 MM HG (ref 35–45)
PCO2 BLDA: 49.1 MM HG (ref 35–45)
PCO2 BLDA: 49.4 MM HG (ref 35–45)
PCO2 BLDA: 51.3 MM HG (ref 35–45)
PCO2 BLDA: 52.4 MM HG (ref 35–45)
PCO2 BLDA: 54 MM HG (ref 35–45)
PCO2 TEMP ADJ BLD: ABNORMAL MM[HG]
PEEP RESPIRATORY: 5 CM[H2O]
PH BLDA: 7.29 PH UNITS (ref 7.35–7.45)
PH BLDA: 7.3 PH UNITS (ref 7.35–7.45)
PH BLDA: 7.32 PH UNITS (ref 7.35–7.45)
PH BLDA: 7.34 PH UNITS (ref 7.35–7.45)
PH BLDA: 7.34 PH UNITS (ref 7.35–7.45)
PH BLDA: 7.34 PH UNITS (ref 7.35–7.6)
PH BLDA: 7.34 PH UNITS (ref 7.35–7.6)
PH BLDA: 7.36 PH UNITS (ref 7.35–7.45)
PH BLDA: 7.37 PH UNITS (ref 7.35–7.45)
PH BLDA: 7.37 PH UNITS (ref 7.35–7.6)
PH BLDA: 7.4 PH UNITS (ref 7.35–7.6)
PH BLDA: 7.4 PH UNITS (ref 7.35–7.6)
PH BLDA: 7.43 PH UNITS (ref 7.35–7.6)
PH, TEMP CORRECTED: ABNORMAL
PHOSPHATE SERPL-MCNC: 1.7 MG/DL (ref 2.5–4.5)
PLATELET # BLD AUTO: 136 10*3/MM3 (ref 140–450)
PMV BLD AUTO: 10.8 FL (ref 6–12)
PO2 BLDA: 102 MM HG (ref 83–108)
PO2 BLDA: 105 MM HG (ref 83–108)
PO2 BLDA: 115 MM HG (ref 83–108)
PO2 BLDA: 119 MM HG (ref 83–108)
PO2 BLDA: 336 MMHG (ref 80–105)
PO2 BLDA: 355 MMHG (ref 80–105)
PO2 BLDA: 374 MMHG (ref 80–105)
PO2 BLDA: 400 MMHG (ref 80–105)
PO2 BLDA: 426 MMHG (ref 80–105)
PO2 BLDA: 460 MMHG (ref 80–105)
PO2 BLDA: 47 MMHG (ref 80–105)
PO2 BLDA: 47 MMHG (ref 80–105)
PO2 BLDA: 50.1 MM HG (ref 83–108)
PO2 BLDA: 84.4 MM HG (ref 83–108)
PO2 BLDA: 93.7 MM HG (ref 83–108)
PO2 TEMP ADJ BLD: ABNORMAL MM[HG]
POTASSIUM BLD-SCNC: 3.5 MMOL/L (ref 3.5–5.2)
POTASSIUM BLD-SCNC: 3.9 MMOL/L (ref 3.5–5.2)
POTASSIUM BLDA-SCNC: 3.1 MMOL/L (ref 3.5–4.9)
POTASSIUM BLDA-SCNC: 3.4 MMOL/L (ref 3.4–4.5)
POTASSIUM BLDA-SCNC: 3.4 MMOL/L (ref 3.4–4.5)
POTASSIUM BLDA-SCNC: 3.5 MMOL/L (ref 3.4–4.5)
POTASSIUM BLDA-SCNC: 3.5 MMOL/L (ref 3.5–4.9)
POTASSIUM BLDA-SCNC: 3.6 MMOL/L (ref 3.4–4.5)
POTASSIUM BLDA-SCNC: 3.6 MMOL/L (ref 3.5–4.9)
POTASSIUM BLDA-SCNC: 3.6 MMOL/L (ref 3.5–4.9)
POTASSIUM BLDA-SCNC: 3.9 MMOL/L (ref 3.5–4.9)
POTASSIUM BLDA-SCNC: 4 MMOL/L (ref 3.5–4.9)
POTASSIUM BLDA-SCNC: 4 MMOL/L (ref 3.5–4.9)
POTASSIUM BLDA-SCNC: 4.1 MMOL/L (ref 3.4–4.5)
POTASSIUM BLDA-SCNC: 4.2 MMOL/L (ref 3.4–4.5)
POTASSIUM BLDA-SCNC: 4.2 MMOL/L (ref 3.5–4.9)
PROTHROMBIN TIME: 17 SECONDS (ref 11.1–15.3)
PSV: 10 CMH2O
RBC # BLD AUTO: 4.12 10*6/MM3 (ref 4.14–5.8)
RBC MORPH BLD: NORMAL
RH BLD: POSITIVE
SAO2 % BLDA: 100 % (ref 95–98)
SAO2 % BLDA: 79 % (ref 95–98)
SAO2 % BLDA: 83 % (ref 95–98)
SAO2 % BLDCOA: 82.8 % (ref 94–99)
SAO2 % BLDCOA: 96.7 % (ref 94–99)
SAO2 % BLDCOA: 97.9 % (ref 94–99)
SAO2 % BLDCOA: 98.4 % (ref 94–99)
SAO2 % BLDCOA: 98.5 % (ref 94–99)
SAO2 % BLDCOA: 98.5 % (ref 94–99)
SAO2 % BLDCOA: 98.7 % (ref 94–99)
SET MECH RESP RATE: 12
SET MECH RESP RATE: 12
SET MECH RESP RATE: 16
SET MECH RESP RATE: 8
SMALL PLATELETS BLD QL SMEAR: NORMAL
SODIUM BLD-SCNC: 143 MMOL/L (ref 136–145)
SODIUM BLDA-SCNC: 139 MMOL/L (ref 138–146)
SODIUM BLDA-SCNC: 139 MMOL/L (ref 138–146)
SODIUM BLDA-SCNC: 140 MMOL/L (ref 138–146)
SODIUM BLDA-SCNC: 141 MMOL/L (ref 136–146)
SODIUM BLDA-SCNC: 141 MMOL/L (ref 138–146)
SODIUM BLDA-SCNC: 141 MMOL/L (ref 138–146)
SODIUM BLDA-SCNC: 142 MMOL/L (ref 136–146)
SODIUM BLDA-SCNC: 143 MMOL/L (ref 136–146)
SODIUM BLDA-SCNC: 144 MMOL/L (ref 138–146)
T&S EXPIRATION DATE: NORMAL
VENTILATOR MODE: ABNORMAL
VENTILATOR MODE: AC
VT ON VENT VENT: 600 ML
WBC MORPH BLD: NORMAL
WBC NRBC COR # BLD: 11.4 10*3/MM3 (ref 3.4–10.8)

## 2019-09-25 PROCEDURE — 25010000002 FENTANYL CITRATE (PF) 100 MCG/2ML SOLUTION: Performed by: NURSE ANESTHETIST, CERTIFIED REGISTERED

## 2019-09-25 PROCEDURE — 25010000002 PAPAVERINE PER 60 MG: Performed by: THORACIC SURGERY (CARDIOTHORACIC VASCULAR SURGERY)

## 2019-09-25 PROCEDURE — 33508 ENDOSCOPIC VEIN HARVEST: CPT | Performed by: THORACIC SURGERY (CARDIOTHORACIC VASCULAR SURGERY)

## 2019-09-25 PROCEDURE — 25010000002 DOBUTAMINE PER 250 MG: Performed by: NURSE ANESTHETIST, CERTIFIED REGISTERED

## 2019-09-25 PROCEDURE — 021109W BYPASS CORONARY ARTERY, TWO ARTERIES FROM AORTA WITH AUTOLOGOUS VENOUS TISSUE, OPEN APPROACH: ICD-10-PCS | Performed by: THORACIC SURGERY (CARDIOTHORACIC VASCULAR SURGERY)

## 2019-09-25 PROCEDURE — 86900 BLOOD TYPING SEROLOGIC ABO: CPT | Performed by: ANESTHESIOLOGY

## 2019-09-25 PROCEDURE — 85730 THROMBOPLASTIN TIME PARTIAL: CPT | Performed by: NURSE PRACTITIONER

## 2019-09-25 PROCEDURE — 84132 ASSAY OF SERUM POTASSIUM: CPT

## 2019-09-25 PROCEDURE — 82803 BLOOD GASES ANY COMBINATION: CPT

## 2019-09-25 PROCEDURE — 25010000002 AMIODARONE PER 30 MG: Performed by: NURSE ANESTHETIST, CERTIFIED REGISTERED

## 2019-09-25 PROCEDURE — 71045 X-RAY EXAM CHEST 1 VIEW: CPT

## 2019-09-25 PROCEDURE — 80069 RENAL FUNCTION PANEL: CPT | Performed by: NURSE PRACTITIONER

## 2019-09-25 PROCEDURE — 25010000002 EPINEPHRINE PER 0.1 MG: Performed by: THORACIC SURGERY (CARDIOTHORACIC VASCULAR SURGERY)

## 2019-09-25 PROCEDURE — C1751 CATH, INF, PER/CENT/MIDLINE: HCPCS | Performed by: ANESTHESIOLOGY

## 2019-09-25 PROCEDURE — 25010000002 MORPHINE PER 10 MG: Performed by: NURSE PRACTITIONER

## 2019-09-25 PROCEDURE — 63710000001 INSULIN DETEMIR PER 5 UNITS: Performed by: NURSE PRACTITIONER

## 2019-09-25 PROCEDURE — 93005 ELECTROCARDIOGRAM TRACING: CPT | Performed by: NURSE PRACTITIONER

## 2019-09-25 PROCEDURE — 25010000002 HEPARIN (PORCINE) PER 1000 UNITS: Performed by: NURSE ANESTHETIST, CERTIFIED REGISTERED

## 2019-09-25 PROCEDURE — 25010000002 MIDAZOLAM PER 1 MG: Performed by: NURSE ANESTHETIST, CERTIFIED REGISTERED

## 2019-09-25 PROCEDURE — 84132 ASSAY OF SERUM POTASSIUM: CPT | Performed by: THORACIC SURGERY (CARDIOTHORACIC VASCULAR SURGERY)

## 2019-09-25 PROCEDURE — P9041 ALBUMIN (HUMAN),5%, 50ML: HCPCS | Performed by: NURSE PRACTITIONER

## 2019-09-25 PROCEDURE — 33533 CABG ARTERIAL SINGLE: CPT | Performed by: THORACIC SURGERY (CARDIOTHORACIC VASCULAR SURGERY)

## 2019-09-25 PROCEDURE — 83735 ASSAY OF MAGNESIUM: CPT | Performed by: THORACIC SURGERY (CARDIOTHORACIC VASCULAR SURGERY)

## 2019-09-25 PROCEDURE — 82962 GLUCOSE BLOOD TEST: CPT

## 2019-09-25 PROCEDURE — 25010000002 FUROSEMIDE PER 20 MG: Performed by: NURSE ANESTHETIST, CERTIFIED REGISTERED

## 2019-09-25 PROCEDURE — 94799 UNLISTED PULMONARY SVC/PX: CPT

## 2019-09-25 PROCEDURE — 83735 ASSAY OF MAGNESIUM: CPT | Performed by: NURSE PRACTITIONER

## 2019-09-25 PROCEDURE — C1729 CATH, DRAINAGE: HCPCS | Performed by: THORACIC SURGERY (CARDIOTHORACIC VASCULAR SURGERY)

## 2019-09-25 PROCEDURE — 25010000002 PROTAMINE SULFATE PER 10 MG: Performed by: NURSE ANESTHETIST, CERTIFIED REGISTERED

## 2019-09-25 PROCEDURE — 25010000002 SUCCINYLCHOLINE PER 20 MG: Performed by: NURSE ANESTHETIST, CERTIFIED REGISTERED

## 2019-09-25 PROCEDURE — 85347 COAGULATION TIME ACTIVATED: CPT

## 2019-09-25 PROCEDURE — 25010000002 HEPARIN (PORCINE) PER 1000 UNITS: Performed by: THORACIC SURGERY (CARDIOTHORACIC VASCULAR SURGERY)

## 2019-09-25 PROCEDURE — 82375 ASSAY CARBOXYHB QUANT: CPT

## 2019-09-25 PROCEDURE — 82330 ASSAY OF CALCIUM: CPT

## 2019-09-25 PROCEDURE — 02100Z9 BYPASS CORONARY ARTERY, ONE ARTERY FROM LEFT INTERNAL MAMMARY, OPEN APPROACH: ICD-10-PCS | Performed by: THORACIC SURGERY (CARDIOTHORACIC VASCULAR SURGERY)

## 2019-09-25 PROCEDURE — 33518 CABG ARTERY-VEIN TWO: CPT | Performed by: THORACIC SURGERY (CARDIOTHORACIC VASCULAR SURGERY)

## 2019-09-25 PROCEDURE — C1713 ANCHOR/SCREW BN/BN,TIS/BN: HCPCS | Performed by: THORACIC SURGERY (CARDIOTHORACIC VASCULAR SURGERY)

## 2019-09-25 PROCEDURE — 06BQ4ZZ EXCISION OF LEFT SAPHENOUS VEIN, PERCUTANEOUS ENDOSCOPIC APPROACH: ICD-10-PCS | Performed by: THORACIC SURGERY (CARDIOTHORACIC VASCULAR SURGERY)

## 2019-09-25 PROCEDURE — 83050 HGB METHEMOGLOBIN QUAN: CPT

## 2019-09-25 PROCEDURE — 25010000002 ONDANSETRON PER 1 MG: Performed by: NURSE PRACTITIONER

## 2019-09-25 PROCEDURE — 25010000002 ALBUMIN HUMAN 5% PER 50 ML: Performed by: NURSE PRACTITIONER

## 2019-09-25 PROCEDURE — 25010000002 PROPOFOL 1000 MG/100ML EMULSION: Performed by: NURSE ANESTHETIST, CERTIFIED REGISTERED

## 2019-09-25 PROCEDURE — 86923 COMPATIBILITY TEST ELECTRIC: CPT

## 2019-09-25 PROCEDURE — 85014 HEMATOCRIT: CPT

## 2019-09-25 PROCEDURE — 5A1221Z PERFORMANCE OF CARDIAC OUTPUT, CONTINUOUS: ICD-10-PCS | Performed by: THORACIC SURGERY (CARDIOTHORACIC VASCULAR SURGERY)

## 2019-09-25 PROCEDURE — C1896 LEAD, AICD, NON SING/DUAL: HCPCS | Performed by: THORACIC SURGERY (CARDIOTHORACIC VASCULAR SURGERY)

## 2019-09-25 PROCEDURE — 86850 RBC ANTIBODY SCREEN: CPT | Performed by: ANESTHESIOLOGY

## 2019-09-25 PROCEDURE — 63710000001 INSULIN REGULAR HUMAN PER 5 UNITS: Performed by: NURSE ANESTHETIST, CERTIFIED REGISTERED

## 2019-09-25 PROCEDURE — 84295 ASSAY OF SERUM SODIUM: CPT

## 2019-09-25 PROCEDURE — 85007 BL SMEAR W/DIFF WBC COUNT: CPT | Performed by: NURSE PRACTITIONER

## 2019-09-25 PROCEDURE — 85025 COMPLETE CBC W/AUTO DIFF WBC: CPT | Performed by: NURSE PRACTITIONER

## 2019-09-25 PROCEDURE — 25010000002 POTASSIUM PER 2 MEQ: Performed by: NURSE PRACTITIONER

## 2019-09-25 PROCEDURE — 82947 ASSAY GLUCOSE BLOOD QUANT: CPT

## 2019-09-25 PROCEDURE — 85610 PROTHROMBIN TIME: CPT | Performed by: NURSE PRACTITIONER

## 2019-09-25 PROCEDURE — 82805 BLOOD GASES W/O2 SATURATION: CPT

## 2019-09-25 PROCEDURE — 94002 VENT MGMT INPAT INIT DAY: CPT

## 2019-09-25 PROCEDURE — 25010000002 CEFAZOLIN PER 500 MG: Performed by: NURSE PRACTITIONER

## 2019-09-25 PROCEDURE — 93010 ELECTROCARDIOGRAM REPORT: CPT | Performed by: INTERNAL MEDICINE

## 2019-09-25 PROCEDURE — 86901 BLOOD TYPING SEROLOGIC RH(D): CPT | Performed by: ANESTHESIOLOGY

## 2019-09-25 DEVICE — IMPLANTABLE DEVICE: Type: IMPLANTABLE DEVICE | Status: FUNCTIONAL

## 2019-09-25 DEVICE — PLEDGET INCISIONLINE REINF TFE SFT PTFE 1.5X4.8X9.5MM WHT: Type: IMPLANTABLE DEVICE | Status: FUNCTIONAL

## 2019-09-25 RX ORDER — CLOPIDOGREL BISULFATE 75 MG/1
75 TABLET ORAL DAILY
Status: DISCONTINUED | OUTPATIENT
Start: 2019-09-26 | End: 2019-10-01 | Stop reason: HOSPADM

## 2019-09-25 RX ORDER — ASCORBIC ACID 500 MG
500 TABLET ORAL 2 TIMES DAILY
Status: DISCONTINUED | OUTPATIENT
Start: 2019-09-25 | End: 2019-10-01 | Stop reason: HOSPADM

## 2019-09-25 RX ORDER — POTASSIUM CHLORIDE 29.8 MG/ML
20 INJECTION INTRAVENOUS
Status: DISCONTINUED | OUTPATIENT
Start: 2019-09-25 | End: 2019-10-01 | Stop reason: HOSPADM

## 2019-09-25 RX ORDER — PAPAVERINE HYDROCHLORIDE 30 MG/ML
INJECTION INTRAMUSCULAR; INTRAVENOUS AS NEEDED
Status: DISCONTINUED | OUTPATIENT
Start: 2019-09-25 | End: 2019-09-25 | Stop reason: HOSPADM

## 2019-09-25 RX ORDER — DEXTROSE AND SODIUM CHLORIDE 5; .45 G/100ML; G/100ML
30 INJECTION, SOLUTION INTRAVENOUS CONTINUOUS
Status: DISCONTINUED | OUTPATIENT
Start: 2019-09-25 | End: 2019-09-26

## 2019-09-25 RX ORDER — SODIUM CHLORIDE, SODIUM GLUCONATE, SODIUM ACETATE, POTASSIUM CHLORIDE, AND MAGNESIUM CHLORIDE 526; 502; 368; 37; 30 MG/100ML; MG/100ML; MG/100ML; MG/100ML; MG/100ML
INJECTION, SOLUTION INTRAVENOUS CONTINUOUS PRN
Status: DISCONTINUED | OUTPATIENT
Start: 2019-09-25 | End: 2019-09-25 | Stop reason: SURG

## 2019-09-25 RX ORDER — PHENYLEPHRINE HCL IN 0.9% NACL 0.5 MG/5ML
.5-3 SYRINGE (ML) INTRAVENOUS CONTINUOUS PRN
Status: DISCONTINUED | OUTPATIENT
Start: 2019-09-25 | End: 2019-09-26

## 2019-09-25 RX ORDER — BUPIVACAINE HCL/0.9 % NACL/PF 0.1 %
2 PLASTIC BAG, INJECTION (ML) EPIDURAL EVERY 8 HOURS
Status: DISPENSED | OUTPATIENT
Start: 2019-09-25 | End: 2019-09-27

## 2019-09-25 RX ORDER — VECURONIUM BROMIDE 20 MG/20ML
INJECTION, POWDER, LYOPHILIZED, FOR SOLUTION INTRAVENOUS AS NEEDED
Status: DISCONTINUED | OUTPATIENT
Start: 2019-09-25 | End: 2019-09-25 | Stop reason: SURG

## 2019-09-25 RX ORDER — FAMOTIDINE 10 MG/ML
20 INJECTION, SOLUTION INTRAVENOUS EVERY 12 HOURS SCHEDULED
Status: DISCONTINUED | OUTPATIENT
Start: 2019-09-25 | End: 2019-10-01 | Stop reason: HOSPADM

## 2019-09-25 RX ORDER — HEPARIN SODIUM 1000 [USP'U]/ML
INJECTION, SOLUTION INTRAVENOUS; SUBCUTANEOUS AS NEEDED
Status: DISCONTINUED | OUTPATIENT
Start: 2019-09-25 | End: 2019-09-25 | Stop reason: SURG

## 2019-09-25 RX ORDER — BUPIVACAINE HCL/0.9 % NACL/PF 0.1 %
2 PLASTIC BAG, INJECTION (ML) EPIDURAL ONCE
Status: COMPLETED | OUTPATIENT
Start: 2019-09-25 | End: 2019-09-25

## 2019-09-25 RX ORDER — PROTAMINE SULFATE 10 MG/ML
INJECTION, SOLUTION INTRAVENOUS AS NEEDED
Status: DISCONTINUED | OUTPATIENT
Start: 2019-09-25 | End: 2019-09-25 | Stop reason: SURG

## 2019-09-25 RX ORDER — FUROSEMIDE 10 MG/ML
INJECTION INTRAMUSCULAR; INTRAVENOUS AS NEEDED
Status: DISCONTINUED | OUTPATIENT
Start: 2019-09-25 | End: 2019-09-25 | Stop reason: SURG

## 2019-09-25 RX ORDER — FAMOTIDINE 20 MG/1
20 TABLET, FILM COATED ORAL EVERY 12 HOURS SCHEDULED
Status: DISCONTINUED | OUTPATIENT
Start: 2019-09-25 | End: 2019-10-01 | Stop reason: HOSPADM

## 2019-09-25 RX ORDER — ASPIRIN 81 MG/1
81 TABLET ORAL DAILY
Status: DISCONTINUED | OUTPATIENT
Start: 2019-09-26 | End: 2019-09-30

## 2019-09-25 RX ORDER — MORPHINE SULFATE 2 MG/ML
2 INJECTION, SOLUTION INTRAMUSCULAR; INTRAVENOUS
Status: DISCONTINUED | OUTPATIENT
Start: 2019-09-25 | End: 2019-09-26

## 2019-09-25 RX ORDER — ACETAMINOPHEN 500 MG
1000 TABLET ORAL EVERY 6 HOURS
Status: DISCONTINUED | OUTPATIENT
Start: 2019-09-25 | End: 2019-10-01 | Stop reason: HOSPADM

## 2019-09-25 RX ORDER — SODIUM CHLORIDE, SODIUM GLUCONATE, SODIUM ACETATE, POTASSIUM CHLORIDE AND MAGNESIUM CHLORIDE 526; 502; 368; 37; 30 MG/100ML; MG/100ML; MG/100ML; MG/100ML; MG/100ML
INJECTION, SOLUTION INTRAVENOUS CONTINUOUS PRN
Status: COMPLETED | OUTPATIENT
Start: 2019-09-25 | End: 2019-09-25

## 2019-09-25 RX ORDER — ALBUMIN, HUMAN INJ 5% 5 %
500 SOLUTION INTRAVENOUS AS NEEDED
Status: DISCONTINUED | OUTPATIENT
Start: 2019-09-25 | End: 2019-09-26

## 2019-09-25 RX ORDER — NITROGLYCERIN 0.4 MG/1
0.4 TABLET SUBLINGUAL
Status: DISCONTINUED | OUTPATIENT
Start: 2019-09-25 | End: 2019-09-25 | Stop reason: HOSPADM

## 2019-09-25 RX ORDER — DOBUTAMINE HYDROCHLORIDE 100 MG/100ML
INJECTION INTRAVENOUS CONTINUOUS PRN
Status: DISCONTINUED | OUTPATIENT
Start: 2019-09-25 | End: 2019-09-25 | Stop reason: SURG

## 2019-09-25 RX ORDER — ATORVASTATIN CALCIUM 40 MG/1
40 TABLET, FILM COATED ORAL NIGHTLY
Status: DISCONTINUED | OUTPATIENT
Start: 2019-09-25 | End: 2019-10-01 | Stop reason: HOSPADM

## 2019-09-25 RX ORDER — OXYCODONE HYDROCHLORIDE 5 MG/1
5 TABLET ORAL EVERY 4 HOURS PRN
Status: DISPENSED | OUTPATIENT
Start: 2019-09-25 | End: 2019-10-01

## 2019-09-25 RX ORDER — FUROSEMIDE 10 MG/ML
20 INJECTION INTRAMUSCULAR; INTRAVENOUS ONCE
Status: DISCONTINUED | OUTPATIENT
Start: 2019-09-26 | End: 2019-09-26

## 2019-09-25 RX ORDER — PRENATAL VIT/IRON FUM/FOLIC AC 27MG-0.8MG
1 TABLET ORAL DAILY
Status: DISCONTINUED | OUTPATIENT
Start: 2019-09-26 | End: 2019-10-01 | Stop reason: HOSPADM

## 2019-09-25 RX ORDER — SUCCINYLCHOLINE CHLORIDE 20 MG/ML
INJECTION INTRAMUSCULAR; INTRAVENOUS AS NEEDED
Status: DISCONTINUED | OUTPATIENT
Start: 2019-09-25 | End: 2019-09-25 | Stop reason: SURG

## 2019-09-25 RX ORDER — DEXTROSE MONOHYDRATE 25 G/50ML
25-50 INJECTION, SOLUTION INTRAVENOUS
Status: DISCONTINUED | OUTPATIENT
Start: 2019-09-25 | End: 2019-09-26

## 2019-09-25 RX ORDER — MAGNESIUM SULFATE HEPTAHYDRATE 40 MG/ML
2 INJECTION, SOLUTION INTRAVENOUS AS NEEDED
Status: DISCONTINUED | OUTPATIENT
Start: 2019-09-25 | End: 2019-09-26

## 2019-09-25 RX ORDER — OXYCODONE HYDROCHLORIDE 5 MG/1
10 TABLET ORAL EVERY 4 HOURS PRN
Status: DISPENSED | OUTPATIENT
Start: 2019-09-25 | End: 2019-10-01

## 2019-09-25 RX ORDER — ONDANSETRON 2 MG/ML
4 INJECTION INTRAMUSCULAR; INTRAVENOUS EVERY 6 HOURS PRN
Status: DISCONTINUED | OUTPATIENT
Start: 2019-09-25 | End: 2019-10-01 | Stop reason: HOSPADM

## 2019-09-25 RX ORDER — FENTANYL CITRATE 50 UG/ML
INJECTION, SOLUTION INTRAMUSCULAR; INTRAVENOUS AS NEEDED
Status: DISCONTINUED | OUTPATIENT
Start: 2019-09-25 | End: 2019-09-25 | Stop reason: SURG

## 2019-09-25 RX ORDER — AMIODARONE HYDROCHLORIDE 50 MG/ML
INJECTION, SOLUTION INTRAVENOUS AS NEEDED
Status: DISCONTINUED | OUTPATIENT
Start: 2019-09-25 | End: 2019-09-25 | Stop reason: SURG

## 2019-09-25 RX ORDER — SENNA AND DOCUSATE SODIUM 50; 8.6 MG/1; MG/1
1 TABLET, FILM COATED ORAL 2 TIMES DAILY
Status: DISCONTINUED | OUTPATIENT
Start: 2019-09-26 | End: 2019-10-01 | Stop reason: HOSPADM

## 2019-09-25 RX ORDER — EPINEPHRINE 1 MG/ML
INJECTION, SOLUTION, CONCENTRATE INTRAVENOUS AS NEEDED
Status: DISCONTINUED | OUTPATIENT
Start: 2019-09-25 | End: 2019-09-25 | Stop reason: HOSPADM

## 2019-09-25 RX ORDER — MIDAZOLAM HYDROCHLORIDE 1 MG/ML
INJECTION INTRAMUSCULAR; INTRAVENOUS AS NEEDED
Status: DISCONTINUED | OUTPATIENT
Start: 2019-09-25 | End: 2019-09-25 | Stop reason: SURG

## 2019-09-25 RX ORDER — DEXTROSE AND SODIUM CHLORIDE 5; .45 G/100ML; G/100ML
100 INJECTION, SOLUTION INTRAVENOUS CONTINUOUS
Status: DISCONTINUED | OUTPATIENT
Start: 2019-09-25 | End: 2019-09-25

## 2019-09-25 RX ORDER — PROPOFOL 10 MG/ML
INJECTION, EMULSION INTRAVENOUS AS NEEDED
Status: DISCONTINUED | OUTPATIENT
Start: 2019-09-25 | End: 2019-09-25 | Stop reason: SURG

## 2019-09-25 RX ORDER — DOBUTAMINE HYDROCHLORIDE 100 MG/100ML
2-20 INJECTION INTRAVENOUS CONTINUOUS PRN
Status: DISCONTINUED | OUTPATIENT
Start: 2019-09-25 | End: 2019-09-26

## 2019-09-25 RX ORDER — SODIUM CHLORIDE 9 MG/ML
INJECTION, SOLUTION INTRAVENOUS CONTINUOUS PRN
Status: DISCONTINUED | OUTPATIENT
Start: 2019-09-25 | End: 2019-09-25 | Stop reason: SURG

## 2019-09-25 RX ADMIN — HEPARIN SODIUM 30000 UNITS: 1000 INJECTION, SOLUTION INTRAVENOUS; SUBCUTANEOUS at 08:12

## 2019-09-25 RX ADMIN — FENTANYL CITRATE 250 MCG: 50 INJECTION, SOLUTION INTRAMUSCULAR; INTRAVENOUS at 07:10

## 2019-09-25 RX ADMIN — VECURONIUM BROMIDE 10 MG: 1 INJECTION, POWDER, LYOPHILIZED, FOR SOLUTION INTRAVENOUS at 07:20

## 2019-09-25 RX ADMIN — SODIUM CHLORIDE 2 G: 9 INJECTION, SOLUTION INTRAVENOUS at 16:09

## 2019-09-25 RX ADMIN — FAMOTIDINE 20 MG: 10 INJECTION INTRAVENOUS at 13:46

## 2019-09-25 RX ADMIN — FAMOTIDINE 20 MG: 10 INJECTION INTRAVENOUS at 20:54

## 2019-09-25 RX ADMIN — SODIUM CHLORIDE 2 G: 9 INJECTION, SOLUTION INTRAVENOUS at 22:58

## 2019-09-25 RX ADMIN — INSULIN DETEMIR 30 UNITS: 100 INJECTION, SOLUTION SUBCUTANEOUS at 20:54

## 2019-09-25 RX ADMIN — PROTAMINE SULFATE 350 MG: 10 INJECTION, SOLUTION INTRAVENOUS at 11:19

## 2019-09-25 RX ADMIN — ONDANSETRON 4 MG: 2 INJECTION INTRAMUSCULAR; INTRAVENOUS at 16:56

## 2019-09-25 RX ADMIN — SODIUM CHLORIDE 12 UNITS/HR: 9 INJECTION, SOLUTION INTRAVENOUS at 07:43

## 2019-09-25 RX ADMIN — ATORVASTATIN CALCIUM 40 MG: 40 TABLET, FILM COATED ORAL at 20:54

## 2019-09-25 RX ADMIN — VECURONIUM BROMIDE 5 MCG/MIN: 10 INJECTION, POWDER, LYOPHILIZED, FOR SOLUTION INTRAVENOUS at 07:45

## 2019-09-25 RX ADMIN — FENTANYL CITRATE 300 MCG: 50 INJECTION, SOLUTION INTRAMUSCULAR; INTRAVENOUS at 07:44

## 2019-09-25 RX ADMIN — SODIUM CHLORIDE, SODIUM GLUCONATE, SODIUM ACETATE, POTASSIUM CHLORIDE, AND MAGNESIUM CHLORIDE: 526; 502; 368; 37; 30 INJECTION, SOLUTION INTRAVENOUS at 07:06

## 2019-09-25 RX ADMIN — MIDAZOLAM HYDROCHLORIDE 2 MG: 2 INJECTION, SOLUTION INTRAMUSCULAR; INTRAVENOUS at 11:13

## 2019-09-25 RX ADMIN — FENTANYL CITRATE 100 MCG: 50 INJECTION, SOLUTION INTRAMUSCULAR; INTRAVENOUS at 11:13

## 2019-09-25 RX ADMIN — EPHEDRINE SULFATE 5 MG: 50 INJECTION INTRAMUSCULAR; INTRAVENOUS; SUBCUTANEOUS at 08:23

## 2019-09-25 RX ADMIN — ALBUMIN HUMAN 250 ML: 0.05 INJECTION, SOLUTION INTRAVENOUS at 13:00

## 2019-09-25 RX ADMIN — DOBUTAMINE HYDROCHLORIDE 5 MCG/KG/MIN: 100 INJECTION INTRAVENOUS at 10:28

## 2019-09-25 RX ADMIN — SODIUM CHLORIDE: 9 INJECTION, SOLUTION INTRAVENOUS at 11:40

## 2019-09-25 RX ADMIN — SODIUM CHLORIDE 13 UNITS/HR: 9 INJECTION, SOLUTION INTRAVENOUS at 18:56

## 2019-09-25 RX ADMIN — DEXMEDETOMIDINE HYDROCHLORIDE 0.2 MCG/KG/HR: 100 INJECTION, SOLUTION INTRAVENOUS at 12:27

## 2019-09-25 RX ADMIN — FENTANYL CITRATE 250 MCG: 50 INJECTION, SOLUTION INTRAMUSCULAR; INTRAVENOUS at 07:47

## 2019-09-25 RX ADMIN — SODIUM CHLORIDE: 9 INJECTION, SOLUTION INTRAVENOUS at 08:21

## 2019-09-25 RX ADMIN — OXYCODONE HYDROCHLORIDE 10 MG: 5 TABLET ORAL at 18:56

## 2019-09-25 RX ADMIN — MAGNESIUM OXIDE 400 MG (241.3 MG MAGNESIUM) TABLET 400 MG: TABLET at 20:54

## 2019-09-25 RX ADMIN — OXYCODONE HYDROCHLORIDE AND ACETAMINOPHEN 500 MG: 500 TABLET ORAL at 20:54

## 2019-09-25 RX ADMIN — SODIUM CHLORIDE, SODIUM GLUCONATE, SODIUM ACETATE, POTASSIUM CHLORIDE, AND MAGNESIUM CHLORIDE: 526; 502; 368; 37; 30 INJECTION, SOLUTION INTRAVENOUS at 09:24

## 2019-09-25 RX ADMIN — FENTANYL CITRATE 50 MCG: 50 INJECTION, SOLUTION INTRAMUSCULAR; INTRAVENOUS at 10:31

## 2019-09-25 RX ADMIN — FENTANYL CITRATE 100 MCG: 50 INJECTION, SOLUTION INTRAMUSCULAR; INTRAVENOUS at 07:30

## 2019-09-25 RX ADMIN — ACETAMINOPHEN 1000 MG: 500 TABLET ORAL at 20:54

## 2019-09-25 RX ADMIN — MIDAZOLAM HYDROCHLORIDE 2 MG: 2 INJECTION, SOLUTION INTRAMUSCULAR; INTRAVENOUS at 09:19

## 2019-09-25 RX ADMIN — FENTANYL CITRATE 100 MCG: 50 INJECTION, SOLUTION INTRAMUSCULAR; INTRAVENOUS at 07:46

## 2019-09-25 RX ADMIN — TRANEXAMIC ACID 1000 MG: 100 INJECTION, SOLUTION INTRAVENOUS at 07:20

## 2019-09-25 RX ADMIN — FENTANYL CITRATE 250 MCG: 50 INJECTION, SOLUTION INTRAMUSCULAR; INTRAVENOUS at 07:04

## 2019-09-25 RX ADMIN — FENTANYL CITRATE 100 MCG: 50 INJECTION, SOLUTION INTRAMUSCULAR; INTRAVENOUS at 12:22

## 2019-09-25 RX ADMIN — DEXTROSE AND SODIUM CHLORIDE 30 ML/HR: 5; 450 INJECTION, SOLUTION INTRAVENOUS at 12:40

## 2019-09-25 RX ADMIN — MORPHINE SULFATE 2 MG: 2 INJECTION, SOLUTION INTRAMUSCULAR; INTRAVENOUS at 13:53

## 2019-09-25 RX ADMIN — MIDAZOLAM HYDROCHLORIDE 2 MG: 2 INJECTION, SOLUTION INTRAMUSCULAR; INTRAVENOUS at 06:59

## 2019-09-25 RX ADMIN — Medication 2 G: at 07:03

## 2019-09-25 RX ADMIN — POTASSIUM CHLORIDE 20 MEQ: 400 INJECTION, SOLUTION INTRAVENOUS at 14:47

## 2019-09-25 RX ADMIN — MIDAZOLAM HYDROCHLORIDE 2 MG: 2 INJECTION, SOLUTION INTRAMUSCULAR; INTRAVENOUS at 07:30

## 2019-09-25 RX ADMIN — POTASSIUM CHLORIDE 20 MEQ: 400 INJECTION, SOLUTION INTRAVENOUS at 13:46

## 2019-09-25 RX ADMIN — AMIODARONE HYDROCHLORIDE 300 MG: 50 INJECTION, SOLUTION INTRAVENOUS at 10:58

## 2019-09-25 RX ADMIN — HEPARIN SODIUM 5000 UNITS: 1000 INJECTION, SOLUTION INTRAVENOUS; SUBCUTANEOUS at 07:35

## 2019-09-25 RX ADMIN — NICARDIPINE HYDROCHLORIDE 5 MG/HR: 25 INJECTION INTRAVENOUS at 13:12

## 2019-09-25 RX ADMIN — FENTANYL CITRATE 100 MCG: 50 INJECTION, SOLUTION INTRAMUSCULAR; INTRAVENOUS at 12:20

## 2019-09-25 RX ADMIN — PROPOFOL 200 MG: 10 INJECTION, EMULSION INTRAVENOUS at 07:10

## 2019-09-25 RX ADMIN — SUCCINYLCHOLINE CHLORIDE 140 MG: 20 INJECTION, SOLUTION INTRAMUSCULAR; INTRAVENOUS at 07:10

## 2019-09-25 RX ADMIN — DEXTROSE AND SODIUM CHLORIDE: 5; 450 INJECTION, SOLUTION INTRAVENOUS at 06:38

## 2019-09-25 RX ADMIN — FUROSEMIDE 20 MG: 10 INJECTION, SOLUTION INTRAVENOUS at 09:48

## 2019-09-25 RX ADMIN — TRANEXAMIC ACID 1212 MG: 100 INJECTION, SOLUTION INTRAVENOUS at 07:50

## 2019-09-25 NOTE — ANESTHESIA POSTPROCEDURE EVALUATION
Patient: Herbert White [place]    Procedure Summary     Date:  09/25/19 Room / Location:  [facility] OR 04 / [facility] OR    Anesthesia Start:  0701 Anesthesia Stop:      Procedure:  CORONARY ARTERY BYPASS GRAFTING, ENDOSCOPIC VEIN HARVEST       (CELL SAVER) (N/A Chest) Diagnosis:       Coronary artery disease of native artery of native heart with stable angina pectoris (CMS/HCC)      (Coronary artery disease of native artery of native heart with stable angina pectoris (CMS/HCC) [I25.118])    Surgeon:  Xavier Ballard MD Provider:  Beau Serra MD    Anesthesia Type:  general ASA Status:  4          Anesthesia Type: general  Last vitals  BP   145/76 (09/25/19 0551)   Temp   97.5 °F (36.4 °C) (09/25/19 0550)   Pulse   59 (09/25/19 0550)   Resp   19 (09/25/19 0550)     SpO2   99 % (09/25/19 0550)     Post Anesthesia Care and Evaluation    Patient location during evaluation: ICU  Patient participation: complete - patient cannot participate  Level of consciousness: obtunded/minimal responses  Pain score: 0  Pain management: adequate  Airway patency: patent  Anesthetic complications: No anesthetic complications  PONV Status: none  Cardiovascular status: acceptable and hemodynamically stable  Respiratory status: acceptable, ETT and intubated  Hydration status: acceptable

## 2019-09-25 NOTE — ANESTHESIA PREPROCEDURE EVALUATION
Anesthesia Evaluation     Patient summary reviewed   NPO Solid Status: > 8 hours  NPO Liquid Status: > 8 hours           Airway   Mallampati: II  TM distance: >3 FB  Neck ROM: full  No difficulty expected  Dental    (+) poor dentition    Pulmonary - normal exam   (+) sleep apnea,   Cardiovascular - normal exam  Exercise tolerance: poor (<4 METS)    NYHA Classification: III  ECG reviewed  PT is on anticoagulation therapy  Patient on routine beta blocker    (+) hypertension, CAD, angina, PVD, hyperlipidemia,     ROS comment: Last chest pain was Sunday, relieved with rest. And ntg.    Neuro/Psych  (+) weakness, numbness, psychiatric history Anxiety,     GI/Hepatic/Renal/Endo    (+)  GERD well controlled,  diabetes mellitus,     Musculoskeletal     (+) back pain,   Abdominal    Substance History      OB/GYN          Other   (+) arthritis                     Anesthesia Plan    ASA 4     general   (I discussed the intubation process along with the arterial line and central line procedures; patient understands postop ventilation.)  intravenous induction   Anesthetic plan, all risks, benefits, and alternatives have been provided, discussed and informed consent has been obtained with: patient.  Use of blood products discussed with patient .

## 2019-09-25 NOTE — ANESTHESIA PROCEDURE NOTES
Central Line      Patient location during procedure: OR  Indications: central pressure monitoring and vascular access  Staff  Anesthesiologist: Beau Serra MD  Preanesthetic Checklist  Completed: patient identified and risks and benefits discussed  Central Line Prep  Sterile Tech:cap, gloves, gown, mask and sterile barriers  Prep: chloraprep  Patient monitoring: continuous pulse oximetry, blood pressure monitoring and EKG  Central Line Procedure  Laterality:right  Location:internal jugular  Catheter Type:Cordis  Catheter Size:9 Fr  Guidance:landmark technique and palpation technique  Assessment  Post procedure:biopatch applied, line sutured and occlusive dressing applied  Assessement:blood return through all ports and free fluid flow  Complications:no  Patient Tolerance:patient tolerated the procedure well with no apparent complications

## 2019-09-25 NOTE — ANESTHESIA PROCEDURE NOTES
Arterial Line      Patient location during procedure: OR   Line placed for hemodynamic monitoring.  Performed By   Anesthesiologist: Beau Serra MD  Preanesthetic Checklist  Completed: patient identified  Arterial Line Prep   Sterile Tech: gloves, mask and cap  Prep: ChloraPrep  Patient monitoring: blood pressure monitoring, continuous pulse oximetry and EKG  Arterial Line Procedure   Laterality:left  Location:  radial artery  Catheter size: 20 G   Guidance: palpation technique  Number of attempts: 1  Successful placement: yes  Post Assessment   Dressing Type: occlusive dressing applied.   Complications no  Patient Tolerance: patient tolerated the procedure well with no apparent complications

## 2019-09-25 NOTE — ANESTHESIA PROCEDURE NOTES
Airway  Date/Time: 9/25/2019 7:12 AM  Airway not difficult    General Information and Staff    Patient location during procedure: OR  CRNA: Jimmy Guerrero CRNA    Indications and Patient Condition  Indications for airway management: airway protection    Preoxygenated: yes  MILS maintained throughout  Mask difficulty assessment: 0 - not attempted    Final Airway Details  Final airway type: endotracheal airway      Successful airway: ETT  Cuffed: yes   Successful intubation technique: direct laryngoscopy  Facilitating devices/methods: cricoid pressure and intubating stylet  Endotracheal tube insertion site: oral  Blade: Terrie  Blade size: 4  ETT size (mm): 7.5  Cormack-Lehane Classification: grade III - view of epiglottis only  Placement verified by: chest auscultation and capnometry   Measured from: lips  ETT/EBT  to lips (cm): 22  Number of attempts at approach: 1  Assessment: lips, teeth, and gum same as pre-op and atraumatic intubation

## 2019-09-25 NOTE — ANESTHESIA PROCEDURE NOTES
Central Line      Patient reassessed immediately prior to procedure    Patient location during procedure: OR  Indications: central pressure monitoring and vascular access  Staff  Anesthesiologist: Beau Serra MD  CRNA: Jimmy Guerrero CRNA  Preanesthetic Checklist  Completed: patient identified, site marked, surgical consent, pre-op evaluation, timeout performed, IV checked, risks and benefits discussed and monitors and equipment checked  Central Line Prep  Sterile Tech:cap, gloves, mask, sterile barriers and gown  Prep: chloraprep  Patient monitoring: continuous pulse oximetry, EKG and blood pressure monitoring  Central Line Procedure  Laterality:right  Location:internal jugular  Catheter Type:[place]  Catheter Size:9 Fr  Guidance:landmark technique and palpation techniqueImages: still images not obtained  Assessment  Post procedure:biopatch applied, line sutured and occlusive dressing applied  Assessement:blood return through all ports, free fluid flow and Dre Test  Complications:no  Patient Tolerance:patient tolerated the procedure well with no apparent complications

## 2019-09-26 ENCOUNTER — APPOINTMENT (OUTPATIENT)
Dept: GENERAL RADIOLOGY | Facility: HOSPITAL | Age: 54
End: 2019-09-26

## 2019-09-26 LAB
ANION GAP SERPL CALCULATED.3IONS-SCNC: 9 MMOL/L (ref 5–15)
BASOPHILS # BLD AUTO: 0.02 10*3/MM3 (ref 0–0.2)
BASOPHILS NFR BLD AUTO: 0.2 % (ref 0–1.5)
BUN BLD-MCNC: 16 MG/DL (ref 6–20)
BUN/CREAT SERPL: 19.5 (ref 7–25)
CALCIUM SPEC-SCNC: 8.6 MG/DL (ref 8.6–10.5)
CHLORIDE SERPL-SCNC: 107 MMOL/L (ref 98–107)
CO2 SERPL-SCNC: 26 MMOL/L (ref 22–29)
CREAT BLD-MCNC: 0.82 MG/DL (ref 0.76–1.27)
DEPRECATED RDW RBC AUTO: 39.4 FL (ref 37–54)
EOSINOPHIL # BLD AUTO: 0 10*3/MM3 (ref 0–0.4)
EOSINOPHIL NFR BLD AUTO: 0 % (ref 0.3–6.2)
ERYTHROCYTE [DISTWIDTH] IN BLOOD BY AUTOMATED COUNT: 12.9 % (ref 12.3–15.4)
GFR SERPL CREATININE-BSD FRML MDRD: 98 ML/MIN/1.73
GLUCOSE BLD-MCNC: 117 MG/DL (ref 65–99)
GLUCOSE BLDC GLUCOMTR-MCNC: 101 MG/DL (ref 70–130)
GLUCOSE BLDC GLUCOMTR-MCNC: 104 MG/DL (ref 70–130)
GLUCOSE BLDC GLUCOMTR-MCNC: 106 MG/DL (ref 70–130)
GLUCOSE BLDC GLUCOMTR-MCNC: 108 MG/DL (ref 70–130)
GLUCOSE BLDC GLUCOMTR-MCNC: 122 MG/DL (ref 70–130)
GLUCOSE BLDC GLUCOMTR-MCNC: 124 MG/DL (ref 70–130)
GLUCOSE BLDC GLUCOMTR-MCNC: 138 MG/DL (ref 70–130)
GLUCOSE BLDC GLUCOMTR-MCNC: 183 MG/DL (ref 70–130)
GLUCOSE BLDC GLUCOMTR-MCNC: 208 MG/DL (ref 70–130)
GLUCOSE BLDC GLUCOMTR-MCNC: 214 MG/DL (ref 70–130)
GLUCOSE BLDC GLUCOMTR-MCNC: 247 MG/DL (ref 70–130)
GLUCOSE BLDC GLUCOMTR-MCNC: 87 MG/DL (ref 70–130)
GLUCOSE BLDC GLUCOMTR-MCNC: 88 MG/DL (ref 70–130)
GLUCOSE BLDC GLUCOMTR-MCNC: 99 MG/DL (ref 70–130)
HCT VFR BLD AUTO: 31.9 % (ref 37.5–51)
HGB BLD-MCNC: 11.3 G/DL (ref 13–17.7)
IMM GRANULOCYTES # BLD AUTO: 0.05 10*3/MM3 (ref 0–0.05)
IMM GRANULOCYTES NFR BLD AUTO: 0.5 % (ref 0–0.5)
LYMPHOCYTES # BLD AUTO: 0.47 10*3/MM3 (ref 0.7–3.1)
LYMPHOCYTES NFR BLD AUTO: 4.4 % (ref 19.6–45.3)
MAGNESIUM SERPL-MCNC: 2.2 MG/DL (ref 1.6–2.6)
MCH RBC QN AUTO: 29.9 PG (ref 26.6–33)
MCHC RBC AUTO-ENTMCNC: 35.4 G/DL (ref 31.5–35.7)
MCV RBC AUTO: 84.4 FL (ref 79–97)
MONOCYTES # BLD AUTO: 0.65 10*3/MM3 (ref 0.1–0.9)
MONOCYTES NFR BLD AUTO: 6.1 % (ref 5–12)
NEUTROPHILS # BLD AUTO: 9.55 10*3/MM3 (ref 1.7–7)
NEUTROPHILS NFR BLD AUTO: 88.8 % (ref 42.7–76)
NRBC BLD AUTO-RTO: 0 /100 WBC (ref 0–0.2)
PLATELET # BLD AUTO: 164 10*3/MM3 (ref 140–450)
PMV BLD AUTO: 11 FL (ref 6–12)
POTASSIUM BLD-SCNC: 5 MMOL/L (ref 3.5–5.2)
RBC # BLD AUTO: 3.78 10*6/MM3 (ref 4.14–5.8)
SODIUM BLD-SCNC: 142 MMOL/L (ref 136–145)
WBC NRBC COR # BLD: 10.74 10*3/MM3 (ref 3.4–10.8)

## 2019-09-26 PROCEDURE — 99024 POSTOP FOLLOW-UP VISIT: CPT | Performed by: NURSE PRACTITIONER

## 2019-09-26 PROCEDURE — 85025 COMPLETE CBC W/AUTO DIFF WBC: CPT | Performed by: NURSE PRACTITIONER

## 2019-09-26 PROCEDURE — 80048 BASIC METABOLIC PNL TOTAL CA: CPT | Performed by: NURSE PRACTITIONER

## 2019-09-26 PROCEDURE — 82962 GLUCOSE BLOOD TEST: CPT

## 2019-09-26 PROCEDURE — 94799 UNLISTED PULMONARY SVC/PX: CPT

## 2019-09-26 PROCEDURE — 97166 OT EVAL MOD COMPLEX 45 MIN: CPT

## 2019-09-26 PROCEDURE — 63710000001 INSULIN DETEMIR PER 5 UNITS: Performed by: NURSE PRACTITIONER

## 2019-09-26 PROCEDURE — 93010 ELECTROCARDIOGRAM REPORT: CPT | Performed by: INTERNAL MEDICINE

## 2019-09-26 PROCEDURE — 63710000001 INSULIN ASPART PER 5 UNITS: Performed by: NURSE PRACTITIONER

## 2019-09-26 PROCEDURE — 83735 ASSAY OF MAGNESIUM: CPT | Performed by: NURSE PRACTITIONER

## 2019-09-26 PROCEDURE — 93005 ELECTROCARDIOGRAM TRACING: CPT | Performed by: NURSE PRACTITIONER

## 2019-09-26 PROCEDURE — 25010000002 CEFAZOLIN PER 500 MG: Performed by: NURSE PRACTITIONER

## 2019-09-26 PROCEDURE — 25010000002 ONDANSETRON PER 1 MG: Performed by: NURSE PRACTITIONER

## 2019-09-26 PROCEDURE — 71045 X-RAY EXAM CHEST 1 VIEW: CPT

## 2019-09-26 PROCEDURE — 25010000002 FUROSEMIDE PER 20 MG: Performed by: NURSE PRACTITIONER

## 2019-09-26 PROCEDURE — 97162 PT EVAL MOD COMPLEX 30 MIN: CPT

## 2019-09-26 RX ORDER — SODIUM CHLORIDE 0.9 % (FLUSH) 0.9 %
10 SYRINGE (ML) INJECTION AS NEEDED
Status: DISCONTINUED | OUTPATIENT
Start: 2019-09-26 | End: 2019-10-01 | Stop reason: HOSPADM

## 2019-09-26 RX ORDER — FUROSEMIDE 10 MG/ML
20 INJECTION INTRAMUSCULAR; INTRAVENOUS ONCE
Status: COMPLETED | OUTPATIENT
Start: 2019-09-26 | End: 2019-09-26

## 2019-09-26 RX ORDER — NICOTINE POLACRILEX 4 MG
15 LOZENGE BUCCAL
Status: DISCONTINUED | OUTPATIENT
Start: 2019-09-26 | End: 2019-10-01 | Stop reason: HOSPADM

## 2019-09-26 RX ORDER — SODIUM CHLORIDE 0.9 % (FLUSH) 0.9 %
20 SYRINGE (ML) INJECTION AS NEEDED
Status: DISCONTINUED | OUTPATIENT
Start: 2019-09-26 | End: 2019-10-01 | Stop reason: HOSPADM

## 2019-09-26 RX ORDER — SODIUM CHLORIDE 0.9 % (FLUSH) 0.9 %
10 SYRINGE (ML) INJECTION EVERY 12 HOURS SCHEDULED
Status: DISCONTINUED | OUTPATIENT
Start: 2019-09-26 | End: 2019-10-01 | Stop reason: HOSPADM

## 2019-09-26 RX ORDER — DEXTROSE MONOHYDRATE 25 G/50ML
25 INJECTION, SOLUTION INTRAVENOUS
Status: DISCONTINUED | OUTPATIENT
Start: 2019-09-26 | End: 2019-10-01 | Stop reason: HOSPADM

## 2019-09-26 RX ADMIN — PRENATAL VIT W/ FE FUMARATE-FA TAB 27-0.8 MG 1 TABLET: 27-0.8 TAB at 08:33

## 2019-09-26 RX ADMIN — OXYCODONE HYDROCHLORIDE 10 MG: 5 TABLET ORAL at 04:58

## 2019-09-26 RX ADMIN — INSULIN DETEMIR 30 UNITS: 100 INJECTION, SOLUTION SUBCUTANEOUS at 20:05

## 2019-09-26 RX ADMIN — MAGNESIUM OXIDE 400 MG (241.3 MG MAGNESIUM) TABLET 400 MG: TABLET at 20:06

## 2019-09-26 RX ADMIN — FAMOTIDINE 20 MG: 20 TABLET ORAL at 08:32

## 2019-09-26 RX ADMIN — OXYCODONE HYDROCHLORIDE AND ACETAMINOPHEN 500 MG: 500 TABLET ORAL at 08:32

## 2019-09-26 RX ADMIN — ATORVASTATIN CALCIUM 40 MG: 40 TABLET, FILM COATED ORAL at 20:06

## 2019-09-26 RX ADMIN — OXYCODONE HYDROCHLORIDE 10 MG: 5 TABLET ORAL at 22:14

## 2019-09-26 RX ADMIN — INSULIN ASPART 3 UNITS: 100 INJECTION, SOLUTION INTRAVENOUS; SUBCUTANEOUS at 17:22

## 2019-09-26 RX ADMIN — SENNOSIDES AND DOCUSATE SODIUM 1 TABLET: 8.6; 5 TABLET ORAL at 08:33

## 2019-09-26 RX ADMIN — ACETAMINOPHEN 1000 MG: 500 TABLET ORAL at 02:56

## 2019-09-26 RX ADMIN — SODIUM CHLORIDE 2 G: 9 INJECTION, SOLUTION INTRAVENOUS at 22:15

## 2019-09-26 RX ADMIN — SODIUM CHLORIDE, PRESERVATIVE FREE 10 ML: 5 INJECTION INTRAVENOUS at 20:08

## 2019-09-26 RX ADMIN — FAMOTIDINE 20 MG: 20 TABLET ORAL at 20:06

## 2019-09-26 RX ADMIN — SODIUM CHLORIDE 2 G: 9 INJECTION, SOLUTION INTRAVENOUS at 08:33

## 2019-09-26 RX ADMIN — OXYCODONE HYDROCHLORIDE 5 MG: 5 TABLET ORAL at 18:03

## 2019-09-26 RX ADMIN — METOPROLOL TARTRATE 25 MG: 25 TABLET ORAL at 20:06

## 2019-09-26 RX ADMIN — ACETAMINOPHEN 1000 MG: 500 TABLET ORAL at 14:01

## 2019-09-26 RX ADMIN — MAGNESIUM OXIDE 400 MG (241.3 MG MAGNESIUM) TABLET 400 MG: TABLET at 08:32

## 2019-09-26 RX ADMIN — OXYCODONE HYDROCHLORIDE 10 MG: 5 TABLET ORAL at 00:37

## 2019-09-26 RX ADMIN — METOPROLOL TARTRATE 12.5 MG: 25 TABLET, FILM COATED ORAL at 08:32

## 2019-09-26 RX ADMIN — SODIUM CHLORIDE, PRESERVATIVE FREE 10 ML: 5 INJECTION INTRAVENOUS at 20:07

## 2019-09-26 RX ADMIN — ACETAMINOPHEN 1000 MG: 500 TABLET ORAL at 08:32

## 2019-09-26 RX ADMIN — SENNOSIDES AND DOCUSATE SODIUM 1 TABLET: 8.6; 5 TABLET ORAL at 20:06

## 2019-09-26 RX ADMIN — ONDANSETRON 4 MG: 2 INJECTION INTRAMUSCULAR; INTRAVENOUS at 09:36

## 2019-09-26 RX ADMIN — CLOPIDOGREL BISULFATE 75 MG: 75 TABLET ORAL at 08:32

## 2019-09-26 RX ADMIN — OXYCODONE HYDROCHLORIDE AND ACETAMINOPHEN 500 MG: 500 TABLET ORAL at 20:06

## 2019-09-26 RX ADMIN — ASPIRIN 81 MG: 81 TABLET, DELAYED RELEASE ORAL at 08:32

## 2019-09-26 RX ADMIN — FUROSEMIDE 20 MG: 10 INJECTION, SOLUTION INTRAVENOUS at 09:55

## 2019-09-26 RX ADMIN — ACETAMINOPHEN 1000 MG: 500 TABLET ORAL at 20:06

## 2019-09-26 RX ADMIN — OXYCODONE HYDROCHLORIDE 10 MG: 5 TABLET ORAL at 14:01

## 2019-09-27 ENCOUNTER — APPOINTMENT (OUTPATIENT)
Dept: GENERAL RADIOLOGY | Facility: HOSPITAL | Age: 54
End: 2019-09-27

## 2019-09-27 LAB
GLUCOSE BLDC GLUCOMTR-MCNC: 144 MG/DL (ref 70–130)
GLUCOSE BLDC GLUCOMTR-MCNC: 152 MG/DL (ref 70–130)
GLUCOSE BLDC GLUCOMTR-MCNC: 82 MG/DL (ref 70–130)
MAGNESIUM SERPL-MCNC: 2 MG/DL (ref 1.6–2.6)
MAGNESIUM SERPL-MCNC: 2.1 MG/DL (ref 1.6–2.6)
POTASSIUM BLD-SCNC: 3.9 MMOL/L (ref 3.5–5.2)
POTASSIUM BLD-SCNC: 4.3 MMOL/L (ref 3.5–5.2)

## 2019-09-27 PROCEDURE — 93005 ELECTROCARDIOGRAM TRACING: CPT | Performed by: INTERNAL MEDICINE

## 2019-09-27 PROCEDURE — 71046 X-RAY EXAM CHEST 2 VIEWS: CPT

## 2019-09-27 PROCEDURE — 97116 GAIT TRAINING THERAPY: CPT

## 2019-09-27 PROCEDURE — 63710000001 INSULIN ASPART PER 5 UNITS: Performed by: NURSE PRACTITIONER

## 2019-09-27 PROCEDURE — 84132 ASSAY OF SERUM POTASSIUM: CPT | Performed by: NURSE PRACTITIONER

## 2019-09-27 PROCEDURE — 97110 THERAPEUTIC EXERCISES: CPT

## 2019-09-27 PROCEDURE — 82962 GLUCOSE BLOOD TEST: CPT

## 2019-09-27 PROCEDURE — 83735 ASSAY OF MAGNESIUM: CPT | Performed by: THORACIC SURGERY (CARDIOTHORACIC VASCULAR SURGERY)

## 2019-09-27 PROCEDURE — 83735 ASSAY OF MAGNESIUM: CPT | Performed by: NURSE PRACTITIONER

## 2019-09-27 PROCEDURE — 25010000002 ONDANSETRON PER 1 MG: Performed by: NURSE PRACTITIONER

## 2019-09-27 PROCEDURE — 25010000002 AMIODARONE IN DEXTROSE 5% 360-4.14 MG/200ML-% SOLUTION: Performed by: NURSE PRACTITIONER

## 2019-09-27 PROCEDURE — 93010 ELECTROCARDIOGRAM REPORT: CPT | Performed by: INTERNAL MEDICINE

## 2019-09-27 PROCEDURE — 25010000002 MAGNESIUM SULFATE IN D5W 1G/100ML (PREMIX) 1-5 GM/100ML-% SOLUTION: Performed by: NURSE PRACTITIONER

## 2019-09-27 PROCEDURE — 84132 ASSAY OF SERUM POTASSIUM: CPT | Performed by: THORACIC SURGERY (CARDIOTHORACIC VASCULAR SURGERY)

## 2019-09-27 PROCEDURE — 25010000002 AMIODARONE IN DEXTROSE 5% 150-4.21 MG/100ML-% SOLUTION: Performed by: NURSE PRACTITIONER

## 2019-09-27 PROCEDURE — 99024 POSTOP FOLLOW-UP VISIT: CPT | Performed by: NURSE PRACTITIONER

## 2019-09-27 PROCEDURE — 63710000001 INSULIN DETEMIR PER 5 UNITS: Performed by: NURSE PRACTITIONER

## 2019-09-27 PROCEDURE — 97535 SELF CARE MNGMENT TRAINING: CPT

## 2019-09-27 RX ORDER — POTASSIUM CHLORIDE 750 MG/1
40 CAPSULE, EXTENDED RELEASE ORAL ONCE
Status: COMPLETED | OUTPATIENT
Start: 2019-09-27 | End: 2019-09-27

## 2019-09-27 RX ORDER — MAGNESIUM SULFATE 1 G/100ML
1 INJECTION INTRAVENOUS ONCE
Status: COMPLETED | OUTPATIENT
Start: 2019-09-27 | End: 2019-09-27

## 2019-09-27 RX ADMIN — OXYCODONE HYDROCHLORIDE 5 MG: 5 TABLET ORAL at 23:17

## 2019-09-27 RX ADMIN — INSULIN DETEMIR 30 UNITS: 100 INJECTION, SOLUTION SUBCUTANEOUS at 20:59

## 2019-09-27 RX ADMIN — ACETAMINOPHEN 1000 MG: 500 TABLET ORAL at 20:56

## 2019-09-27 RX ADMIN — FAMOTIDINE 20 MG: 20 TABLET ORAL at 20:57

## 2019-09-27 RX ADMIN — OXYCODONE HYDROCHLORIDE 10 MG: 5 TABLET ORAL at 06:28

## 2019-09-27 RX ADMIN — SODIUM CHLORIDE, PRESERVATIVE FREE 10 ML: 5 INJECTION INTRAVENOUS at 20:59

## 2019-09-27 RX ADMIN — METOPROLOL TARTRATE 25 MG: 25 TABLET ORAL at 20:57

## 2019-09-27 RX ADMIN — OXYCODONE HYDROCHLORIDE 10 MG: 5 TABLET ORAL at 10:33

## 2019-09-27 RX ADMIN — ACETAMINOPHEN 1000 MG: 500 TABLET ORAL at 11:55

## 2019-09-27 RX ADMIN — OXYCODONE HYDROCHLORIDE 10 MG: 5 TABLET ORAL at 18:42

## 2019-09-27 RX ADMIN — ATORVASTATIN CALCIUM 40 MG: 40 TABLET, FILM COATED ORAL at 20:57

## 2019-09-27 RX ADMIN — POTASSIUM CHLORIDE 40 MEQ: 750 CAPSULE, EXTENDED RELEASE ORAL at 11:56

## 2019-09-27 RX ADMIN — OXYCODONE HYDROCHLORIDE AND ACETAMINOPHEN 500 MG: 500 TABLET ORAL at 08:30

## 2019-09-27 RX ADMIN — METOPROLOL TARTRATE 25 MG: 25 TABLET ORAL at 08:31

## 2019-09-27 RX ADMIN — AMIODARONE HYDROCHLORIDE 150 MG: 1.5 INJECTION, SOLUTION INTRAVENOUS at 23:12

## 2019-09-27 RX ADMIN — MAGNESIUM OXIDE 400 MG (241.3 MG MAGNESIUM) TABLET 400 MG: TABLET at 20:57

## 2019-09-27 RX ADMIN — CLOPIDOGREL BISULFATE 75 MG: 75 TABLET ORAL at 08:30

## 2019-09-27 RX ADMIN — SODIUM CHLORIDE, PRESERVATIVE FREE 10 ML: 5 INJECTION INTRAVENOUS at 08:32

## 2019-09-27 RX ADMIN — OXYCODONE HYDROCHLORIDE 10 MG: 5 TABLET ORAL at 14:54

## 2019-09-27 RX ADMIN — PRENATAL VIT W/ FE FUMARATE-FA TAB 27-0.8 MG 1 TABLET: 27-0.8 TAB at 08:30

## 2019-09-27 RX ADMIN — SENNOSIDES AND DOCUSATE SODIUM 1 TABLET: 8.6; 5 TABLET ORAL at 20:57

## 2019-09-27 RX ADMIN — INSULIN ASPART 3 UNITS: 100 INJECTION, SOLUTION INTRAVENOUS; SUBCUTANEOUS at 16:43

## 2019-09-27 RX ADMIN — MUPIROCIN 1 APPLICATION: 20 OINTMENT TOPICAL at 08:31

## 2019-09-27 RX ADMIN — ASPIRIN 81 MG: 81 TABLET, DELAYED RELEASE ORAL at 08:31

## 2019-09-27 RX ADMIN — AMIODARONE HYDROCHLORIDE 1 MG/MIN: 1.8 INJECTION, SOLUTION INTRAVENOUS at 23:24

## 2019-09-27 RX ADMIN — ACETAMINOPHEN 1000 MG: 500 TABLET ORAL at 02:02

## 2019-09-27 RX ADMIN — ONDANSETRON 4 MG: 2 INJECTION INTRAMUSCULAR; INTRAVENOUS at 09:56

## 2019-09-27 RX ADMIN — MAGNESIUM OXIDE 400 MG (241.3 MG MAGNESIUM) TABLET 400 MG: TABLET at 08:30

## 2019-09-27 RX ADMIN — OXYCODONE HYDROCHLORIDE AND ACETAMINOPHEN 500 MG: 500 TABLET ORAL at 20:57

## 2019-09-27 RX ADMIN — OXYCODONE HYDROCHLORIDE 10 MG: 5 TABLET ORAL at 02:01

## 2019-09-27 RX ADMIN — SENNOSIDES AND DOCUSATE SODIUM 1 TABLET: 8.6; 5 TABLET ORAL at 08:30

## 2019-09-27 RX ADMIN — FAMOTIDINE 20 MG: 20 TABLET ORAL at 08:30

## 2019-09-27 RX ADMIN — MAGNESIUM SULFATE HEPTAHYDRATE 1 G: 1 INJECTION, SOLUTION INTRAVENOUS at 15:00

## 2019-09-28 ENCOUNTER — APPOINTMENT (OUTPATIENT)
Dept: GENERAL RADIOLOGY | Facility: HOSPITAL | Age: 54
End: 2019-09-28

## 2019-09-28 LAB
ABO + RH BLD: NORMAL
ABO + RH BLD: NORMAL
ALBUMIN SERPL-MCNC: 3.7 G/DL (ref 3.5–5.2)
ALBUMIN/GLOB SERPL: 1.2 G/DL
ALP SERPL-CCNC: 153 U/L (ref 39–117)
ALT SERPL W P-5'-P-CCNC: 73 U/L (ref 1–41)
ANION GAP SERPL CALCULATED.3IONS-SCNC: 10 MMOL/L (ref 5–15)
AST SERPL-CCNC: 50 U/L (ref 1–40)
BH BB BLOOD EXPIRATION DATE: NORMAL
BH BB BLOOD EXPIRATION DATE: NORMAL
BH BB BLOOD TYPE BARCODE: 6200
BH BB BLOOD TYPE BARCODE: 6200
BH BB DISPENSE STATUS: NORMAL
BH BB DISPENSE STATUS: NORMAL
BH BB PRODUCT CODE: NORMAL
BH BB PRODUCT CODE: NORMAL
BH BB UNIT NUMBER: NORMAL
BH BB UNIT NUMBER: NORMAL
BILIRUB SERPL-MCNC: 1.4 MG/DL (ref 0.2–1.2)
BUN BLD-MCNC: 16 MG/DL (ref 6–20)
BUN/CREAT SERPL: 19 (ref 7–25)
CALCIUM SPEC-SCNC: 9.2 MG/DL (ref 8.6–10.5)
CHLORIDE SERPL-SCNC: 103 MMOL/L (ref 98–107)
CO2 SERPL-SCNC: 27 MMOL/L (ref 22–29)
CREAT BLD-MCNC: 0.84 MG/DL (ref 0.76–1.27)
DEPRECATED RDW RBC AUTO: 39.6 FL (ref 37–54)
ERYTHROCYTE [DISTWIDTH] IN BLOOD BY AUTOMATED COUNT: 12.9 % (ref 12.3–15.4)
GFR SERPL CREATININE-BSD FRML MDRD: 95 ML/MIN/1.73
GLOBULIN UR ELPH-MCNC: 3 GM/DL
GLUCOSE BLD-MCNC: 149 MG/DL (ref 65–99)
GLUCOSE BLDC GLUCOMTR-MCNC: 113 MG/DL (ref 70–130)
GLUCOSE BLDC GLUCOMTR-MCNC: 142 MG/DL (ref 70–130)
GLUCOSE BLDC GLUCOMTR-MCNC: 160 MG/DL (ref 70–130)
GLUCOSE BLDC GLUCOMTR-MCNC: 180 MG/DL (ref 70–130)
GLUCOSE BLDC GLUCOMTR-MCNC: 305 MG/DL (ref 70–130)
HCT VFR BLD AUTO: 36.3 % (ref 37.5–51)
HGB BLD-MCNC: 12.4 G/DL (ref 13–17.7)
MCH RBC QN AUTO: 29 PG (ref 26.6–33)
MCHC RBC AUTO-ENTMCNC: 34.2 G/DL (ref 31.5–35.7)
MCV RBC AUTO: 85 FL (ref 79–97)
PLATELET # BLD AUTO: 201 10*3/MM3 (ref 140–450)
PMV BLD AUTO: 11.4 FL (ref 6–12)
POTASSIUM BLD-SCNC: 4.4 MMOL/L (ref 3.5–5.2)
PROT SERPL-MCNC: 6.7 G/DL (ref 6–8.5)
RBC # BLD AUTO: 4.27 10*6/MM3 (ref 4.14–5.8)
SODIUM BLD-SCNC: 140 MMOL/L (ref 136–145)
UNIT  ABO: NORMAL
UNIT  ABO: NORMAL
UNIT  RH: NORMAL
UNIT  RH: NORMAL
WBC NRBC COR # BLD: 11.68 10*3/MM3 (ref 3.4–10.8)

## 2019-09-28 PROCEDURE — 97535 SELF CARE MNGMENT TRAINING: CPT

## 2019-09-28 PROCEDURE — 85027 COMPLETE CBC AUTOMATED: CPT | Performed by: NURSE PRACTITIONER

## 2019-09-28 PROCEDURE — 71046 X-RAY EXAM CHEST 2 VIEWS: CPT

## 2019-09-28 PROCEDURE — 63710000001 INSULIN ASPART PER 5 UNITS: Performed by: NURSE PRACTITIONER

## 2019-09-28 PROCEDURE — 25010000002 AMIODARONE IN DEXTROSE 5% 360-4.14 MG/200ML-% SOLUTION: Performed by: NURSE PRACTITIONER

## 2019-09-28 PROCEDURE — 25010000002 MAGNESIUM SULFATE 2 GM/50ML SOLUTION: Performed by: NURSE PRACTITIONER

## 2019-09-28 PROCEDURE — 25010000002 ONDANSETRON PER 1 MG: Performed by: NURSE PRACTITIONER

## 2019-09-28 PROCEDURE — 63710000001 INSULIN DETEMIR PER 5 UNITS: Performed by: NURSE PRACTITIONER

## 2019-09-28 PROCEDURE — 82962 GLUCOSE BLOOD TEST: CPT

## 2019-09-28 PROCEDURE — 97116 GAIT TRAINING THERAPY: CPT

## 2019-09-28 PROCEDURE — 80053 COMPREHEN METABOLIC PANEL: CPT | Performed by: NURSE PRACTITIONER

## 2019-09-28 PROCEDURE — 99024 POSTOP FOLLOW-UP VISIT: CPT | Performed by: NURSE PRACTITIONER

## 2019-09-28 RX ORDER — AMIODARONE HYDROCHLORIDE 200 MG/1
200 TABLET ORAL EVERY 12 HOURS
Status: DISCONTINUED | OUTPATIENT
Start: 2019-09-29 | End: 2019-09-29

## 2019-09-28 RX ORDER — MAGNESIUM SULFATE HEPTAHYDRATE 40 MG/ML
2 INJECTION, SOLUTION INTRAVENOUS ONCE
Status: COMPLETED | OUTPATIENT
Start: 2019-09-28 | End: 2019-09-28

## 2019-09-28 RX ADMIN — OXYCODONE HYDROCHLORIDE AND ACETAMINOPHEN 500 MG: 500 TABLET ORAL at 20:40

## 2019-09-28 RX ADMIN — CLOPIDOGREL BISULFATE 75 MG: 75 TABLET ORAL at 08:34

## 2019-09-28 RX ADMIN — SENNOSIDES AND DOCUSATE SODIUM 1 TABLET: 8.6; 5 TABLET ORAL at 20:40

## 2019-09-28 RX ADMIN — SODIUM CHLORIDE, PRESERVATIVE FREE 10 ML: 5 INJECTION INTRAVENOUS at 20:42

## 2019-09-28 RX ADMIN — METOPROLOL TARTRATE 25 MG: 25 TABLET ORAL at 20:40

## 2019-09-28 RX ADMIN — OXYCODONE HYDROCHLORIDE AND ACETAMINOPHEN 500 MG: 500 TABLET ORAL at 08:33

## 2019-09-28 RX ADMIN — OXYCODONE HYDROCHLORIDE 10 MG: 5 TABLET ORAL at 20:39

## 2019-09-28 RX ADMIN — AMIODARONE HYDROCHLORIDE 200 MG: 200 TABLET ORAL at 21:59

## 2019-09-28 RX ADMIN — INSULIN DETEMIR 30 UNITS: 100 INJECTION, SOLUTION SUBCUTANEOUS at 20:41

## 2019-09-28 RX ADMIN — PRENATAL VIT W/ FE FUMARATE-FA TAB 27-0.8 MG 1 TABLET: 27-0.8 TAB at 08:34

## 2019-09-28 RX ADMIN — ASPIRIN 81 MG: 81 TABLET, DELAYED RELEASE ORAL at 08:33

## 2019-09-28 RX ADMIN — ATORVASTATIN CALCIUM 40 MG: 40 TABLET, FILM COATED ORAL at 20:40

## 2019-09-28 RX ADMIN — MUPIROCIN 1 APPLICATION: 20 OINTMENT TOPICAL at 08:33

## 2019-09-28 RX ADMIN — ACETAMINOPHEN 1000 MG: 500 TABLET ORAL at 15:51

## 2019-09-28 RX ADMIN — OXYCODONE HYDROCHLORIDE 10 MG: 5 TABLET ORAL at 03:21

## 2019-09-28 RX ADMIN — OXYCODONE HYDROCHLORIDE 10 MG: 5 TABLET ORAL at 09:26

## 2019-09-28 RX ADMIN — AMIODARONE HYDROCHLORIDE 1 MG/MIN: 1.8 INJECTION, SOLUTION INTRAVENOUS at 04:57

## 2019-09-28 RX ADMIN — SENNOSIDES AND DOCUSATE SODIUM 1 TABLET: 8.6; 5 TABLET ORAL at 10:22

## 2019-09-28 RX ADMIN — FAMOTIDINE 20 MG: 20 TABLET ORAL at 08:34

## 2019-09-28 RX ADMIN — FAMOTIDINE 20 MG: 20 TABLET ORAL at 20:40

## 2019-09-28 RX ADMIN — METOPROLOL TARTRATE 25 MG: 25 TABLET ORAL at 08:34

## 2019-09-28 RX ADMIN — ACETAMINOPHEN 1000 MG: 500 TABLET ORAL at 08:34

## 2019-09-28 RX ADMIN — ONDANSETRON 4 MG: 2 INJECTION INTRAMUSCULAR; INTRAVENOUS at 21:00

## 2019-09-28 RX ADMIN — INSULIN ASPART 3 UNITS: 100 INJECTION, SOLUTION INTRAVENOUS; SUBCUTANEOUS at 17:16

## 2019-09-28 RX ADMIN — MAGNESIUM OXIDE 400 MG (241.3 MG MAGNESIUM) TABLET 400 MG: TABLET at 20:40

## 2019-09-28 RX ADMIN — INSULIN ASPART 10 UNITS: 100 INJECTION, SOLUTION INTRAVENOUS; SUBCUTANEOUS at 20:41

## 2019-09-28 RX ADMIN — AMIODARONE HYDROCHLORIDE 0.5 MG/MIN: 1.8 INJECTION, SOLUTION INTRAVENOUS at 15:46

## 2019-09-28 RX ADMIN — MAGNESIUM SULFATE HEPTAHYDRATE 2 G: 40 INJECTION, SOLUTION INTRAVENOUS at 09:19

## 2019-09-28 RX ADMIN — ONDANSETRON 4 MG: 2 INJECTION INTRAMUSCULAR; INTRAVENOUS at 07:15

## 2019-09-28 RX ADMIN — MAGNESIUM OXIDE 400 MG (241.3 MG MAGNESIUM) TABLET 400 MG: TABLET at 08:33

## 2019-09-28 RX ADMIN — ACETAMINOPHEN 1000 MG: 500 TABLET ORAL at 01:09

## 2019-09-28 RX ADMIN — OXYCODONE HYDROCHLORIDE 10 MG: 5 TABLET ORAL at 15:51

## 2019-09-28 RX ADMIN — INSULIN ASPART 3 UNITS: 100 INJECTION, SOLUTION INTRAVENOUS; SUBCUTANEOUS at 11:21

## 2019-09-29 LAB
GLUCOSE BLDC GLUCOMTR-MCNC: 168 MG/DL (ref 70–130)
GLUCOSE BLDC GLUCOMTR-MCNC: 171 MG/DL (ref 70–130)
GLUCOSE BLDC GLUCOMTR-MCNC: 184 MG/DL (ref 70–130)
GLUCOSE BLDC GLUCOMTR-MCNC: 213 MG/DL (ref 70–130)
GLUCOSE BLDC GLUCOMTR-MCNC: 88 MG/DL (ref 70–130)
MAGNESIUM SERPL-MCNC: 1.9 MG/DL (ref 1.6–2.6)
POTASSIUM BLD-SCNC: 4.5 MMOL/L (ref 3.5–5.2)

## 2019-09-29 PROCEDURE — 63710000001 INSULIN DETEMIR PER 5 UNITS: Performed by: NURSE PRACTITIONER

## 2019-09-29 PROCEDURE — 25010000002 ONDANSETRON PER 1 MG: Performed by: NURSE PRACTITIONER

## 2019-09-29 PROCEDURE — 63710000001 INSULIN ASPART PER 5 UNITS: Performed by: NURSE PRACTITIONER

## 2019-09-29 PROCEDURE — 94760 N-INVAS EAR/PLS OXIMETRY 1: CPT

## 2019-09-29 PROCEDURE — 25010000002 AMIODARONE IN DEXTROSE 5% 360-4.14 MG/200ML-% SOLUTION: Performed by: NURSE PRACTITIONER

## 2019-09-29 PROCEDURE — 93005 ELECTROCARDIOGRAM TRACING: CPT | Performed by: THORACIC SURGERY (CARDIOTHORACIC VASCULAR SURGERY)

## 2019-09-29 PROCEDURE — 83735 ASSAY OF MAGNESIUM: CPT | Performed by: NURSE PRACTITIONER

## 2019-09-29 PROCEDURE — 84132 ASSAY OF SERUM POTASSIUM: CPT | Performed by: NURSE PRACTITIONER

## 2019-09-29 PROCEDURE — 93010 ELECTROCARDIOGRAM REPORT: CPT | Performed by: INTERNAL MEDICINE

## 2019-09-29 PROCEDURE — 82962 GLUCOSE BLOOD TEST: CPT

## 2019-09-29 PROCEDURE — 94799 UNLISTED PULMONARY SVC/PX: CPT

## 2019-09-29 PROCEDURE — 99024 POSTOP FOLLOW-UP VISIT: CPT | Performed by: NURSE PRACTITIONER

## 2019-09-29 RX ORDER — AMIODARONE HYDROCHLORIDE 200 MG/1
400 TABLET ORAL EVERY 12 HOURS
Status: DISCONTINUED | OUTPATIENT
Start: 2019-09-29 | End: 2019-10-01

## 2019-09-29 RX ORDER — AMIODARONE HYDROCHLORIDE 200 MG/1
400 TABLET ORAL EVERY 12 HOURS
Status: DISCONTINUED | OUTPATIENT
Start: 2019-09-29 | End: 2019-09-29

## 2019-09-29 RX ORDER — DILTIAZEM HYDROCHLORIDE 5 MG/ML
10 INJECTION INTRAVENOUS ONCE
Status: COMPLETED | OUTPATIENT
Start: 2019-09-29 | End: 2019-09-29

## 2019-09-29 RX ORDER — DILTIAZEM HYDROCHLORIDE 5 MG/ML
INJECTION INTRAVENOUS
Status: COMPLETED
Start: 2019-09-29 | End: 2019-09-29

## 2019-09-29 RX ADMIN — SENNOSIDES AND DOCUSATE SODIUM 1 TABLET: 8.6; 5 TABLET ORAL at 08:24

## 2019-09-29 RX ADMIN — ATORVASTATIN CALCIUM 40 MG: 40 TABLET, FILM COATED ORAL at 20:03

## 2019-09-29 RX ADMIN — OXYCODONE HYDROCHLORIDE 10 MG: 5 TABLET ORAL at 06:07

## 2019-09-29 RX ADMIN — OXYCODONE HYDROCHLORIDE AND ACETAMINOPHEN 500 MG: 500 TABLET ORAL at 20:03

## 2019-09-29 RX ADMIN — MUPIROCIN 1 APPLICATION: 20 OINTMENT TOPICAL at 08:25

## 2019-09-29 RX ADMIN — PRENATAL VIT W/ FE FUMARATE-FA TAB 27-0.8 MG 1 TABLET: 27-0.8 TAB at 08:25

## 2019-09-29 RX ADMIN — OXYCODONE HYDROCHLORIDE 10 MG: 5 TABLET ORAL at 02:05

## 2019-09-29 RX ADMIN — FAMOTIDINE 20 MG: 20 TABLET ORAL at 08:25

## 2019-09-29 RX ADMIN — ONDANSETRON 4 MG: 2 INJECTION INTRAMUSCULAR; INTRAVENOUS at 14:49

## 2019-09-29 RX ADMIN — ACETAMINOPHEN 1000 MG: 500 TABLET ORAL at 13:36

## 2019-09-29 RX ADMIN — OXYCODONE HYDROCHLORIDE 10 MG: 5 TABLET ORAL at 10:44

## 2019-09-29 RX ADMIN — SENNOSIDES AND DOCUSATE SODIUM 1 TABLET: 8.6; 5 TABLET ORAL at 20:02

## 2019-09-29 RX ADMIN — MAGNESIUM OXIDE 400 MG (241.3 MG MAGNESIUM) TABLET 400 MG: TABLET at 20:03

## 2019-09-29 RX ADMIN — METOPROLOL TARTRATE 25 MG: 25 TABLET ORAL at 08:24

## 2019-09-29 RX ADMIN — INSULIN ASPART 5 UNITS: 100 INJECTION, SOLUTION INTRAVENOUS; SUBCUTANEOUS at 20:02

## 2019-09-29 RX ADMIN — OXYCODONE HYDROCHLORIDE 10 MG: 5 TABLET ORAL at 18:31

## 2019-09-29 RX ADMIN — ONDANSETRON 4 MG: 2 INJECTION INTRAMUSCULAR; INTRAVENOUS at 05:14

## 2019-09-29 RX ADMIN — OXYCODONE HYDROCHLORIDE AND ACETAMINOPHEN 500 MG: 500 TABLET ORAL at 08:25

## 2019-09-29 RX ADMIN — INSULIN DETEMIR 30 UNITS: 100 INJECTION, SOLUTION SUBCUTANEOUS at 20:02

## 2019-09-29 RX ADMIN — INSULIN ASPART 3 UNITS: 100 INJECTION, SOLUTION INTRAVENOUS; SUBCUTANEOUS at 17:30

## 2019-09-29 RX ADMIN — FAMOTIDINE 20 MG: 20 TABLET ORAL at 20:02

## 2019-09-29 RX ADMIN — CLOPIDOGREL BISULFATE 75 MG: 75 TABLET ORAL at 08:24

## 2019-09-29 RX ADMIN — ASPIRIN 81 MG: 81 TABLET, DELAYED RELEASE ORAL at 08:25

## 2019-09-29 RX ADMIN — DILTIAZEM HYDROCHLORIDE 10 MG: 5 INJECTION INTRAVENOUS at 07:55

## 2019-09-29 RX ADMIN — ACETAMINOPHEN 1000 MG: 500 TABLET ORAL at 08:25

## 2019-09-29 RX ADMIN — INSULIN ASPART 3 UNITS: 100 INJECTION, SOLUTION INTRAVENOUS; SUBCUTANEOUS at 10:38

## 2019-09-29 RX ADMIN — AMIODARONE HYDROCHLORIDE 400 MG: 200 TABLET ORAL at 08:33

## 2019-09-29 RX ADMIN — AMIODARONE HYDROCHLORIDE 400 MG: 200 TABLET ORAL at 20:03

## 2019-09-29 RX ADMIN — SODIUM CHLORIDE, PRESERVATIVE FREE 10 ML: 5 INJECTION INTRAVENOUS at 08:25

## 2019-09-29 RX ADMIN — MAGNESIUM OXIDE 400 MG (241.3 MG MAGNESIUM) TABLET 400 MG: TABLET at 08:25

## 2019-09-29 RX ADMIN — DOCUSATE SODIUM 1 ENEMA: 283 LIQUID RECTAL at 13:25

## 2019-09-29 RX ADMIN — AMIODARONE HYDROCHLORIDE 0.5 MG/MIN: 1.8 INJECTION, SOLUTION INTRAVENOUS at 05:50

## 2019-09-29 RX ADMIN — METOPROLOL TARTRATE 12.5 MG: 25 TABLET, FILM COATED ORAL at 20:03

## 2019-09-29 RX ADMIN — DILTIAZEM HYDROCHLORIDE 10 MG/HR: 5 INJECTION INTRAVENOUS at 08:22

## 2019-09-29 RX ADMIN — DILTIAZEM HYDROCHLORIDE 10 MG/HR: 5 INJECTION INTRAVENOUS at 22:10

## 2019-09-29 RX ADMIN — ACETAMINOPHEN 1000 MG: 500 TABLET ORAL at 20:02

## 2019-09-30 LAB
ANION GAP SERPL CALCULATED.3IONS-SCNC: 7 MMOL/L (ref 5–15)
BUN BLD-MCNC: 10 MG/DL (ref 6–20)
BUN/CREAT SERPL: 16.7 (ref 7–25)
CALCIUM SPEC-SCNC: 8.9 MG/DL (ref 8.6–10.5)
CHLORIDE SERPL-SCNC: 104 MMOL/L (ref 98–107)
CO2 SERPL-SCNC: 29 MMOL/L (ref 22–29)
CREAT BLD-MCNC: 0.6 MG/DL (ref 0.76–1.27)
DEPRECATED RDW RBC AUTO: 38.5 FL (ref 37–54)
ERYTHROCYTE [DISTWIDTH] IN BLOOD BY AUTOMATED COUNT: 12.6 % (ref 12.3–15.4)
GFR SERPL CREATININE-BSD FRML MDRD: 140 ML/MIN/1.73
GLUCOSE BLD-MCNC: 99 MG/DL (ref 65–99)
GLUCOSE BLDC GLUCOMTR-MCNC: 181 MG/DL (ref 70–130)
GLUCOSE BLDC GLUCOMTR-MCNC: 209 MG/DL (ref 70–130)
GLUCOSE BLDC GLUCOMTR-MCNC: 211 MG/DL (ref 70–130)
GLUCOSE BLDC GLUCOMTR-MCNC: 216 MG/DL (ref 70–130)
GLUCOSE BLDC GLUCOMTR-MCNC: 96 MG/DL (ref 70–130)
HCT VFR BLD AUTO: 32.7 % (ref 37.5–51)
HGB BLD-MCNC: 11.1 G/DL (ref 13–17.7)
MCH RBC QN AUTO: 29 PG (ref 26.6–33)
MCHC RBC AUTO-ENTMCNC: 33.9 G/DL (ref 31.5–35.7)
MCV RBC AUTO: 85.4 FL (ref 79–97)
PLATELET # BLD AUTO: 238 10*3/MM3 (ref 140–450)
PMV BLD AUTO: 11 FL (ref 6–12)
POTASSIUM BLD-SCNC: 4.4 MMOL/L (ref 3.5–5.2)
RBC # BLD AUTO: 3.83 10*6/MM3 (ref 4.14–5.8)
SODIUM BLD-SCNC: 140 MMOL/L (ref 136–145)
WBC NRBC COR # BLD: 6.64 10*3/MM3 (ref 3.4–10.8)

## 2019-09-30 PROCEDURE — 82962 GLUCOSE BLOOD TEST: CPT

## 2019-09-30 PROCEDURE — 94799 UNLISTED PULMONARY SVC/PX: CPT

## 2019-09-30 PROCEDURE — 25010000002 ONDANSETRON PER 1 MG: Performed by: NURSE PRACTITIONER

## 2019-09-30 PROCEDURE — 80048 BASIC METABOLIC PNL TOTAL CA: CPT | Performed by: NURSE PRACTITIONER

## 2019-09-30 PROCEDURE — 25010000002 AMIODARONE IN DEXTROSE 5% 360-4.14 MG/200ML-% SOLUTION: Performed by: NURSE PRACTITIONER

## 2019-09-30 PROCEDURE — 97110 THERAPEUTIC EXERCISES: CPT

## 2019-09-30 PROCEDURE — 99024 POSTOP FOLLOW-UP VISIT: CPT | Performed by: NURSE PRACTITIONER

## 2019-09-30 PROCEDURE — 93010 ELECTROCARDIOGRAM REPORT: CPT | Performed by: INTERNAL MEDICINE

## 2019-09-30 PROCEDURE — 93005 ELECTROCARDIOGRAM TRACING: CPT | Performed by: NURSE PRACTITIONER

## 2019-09-30 PROCEDURE — 63710000001 INSULIN ASPART PER 5 UNITS: Performed by: NURSE PRACTITIONER

## 2019-09-30 PROCEDURE — 63710000001 INSULIN DETEMIR PER 5 UNITS: Performed by: NURSE PRACTITIONER

## 2019-09-30 PROCEDURE — 97535 SELF CARE MNGMENT TRAINING: CPT

## 2019-09-30 PROCEDURE — 99254 IP/OBS CNSLTJ NEW/EST MOD 60: CPT | Performed by: INTERNAL MEDICINE

## 2019-09-30 PROCEDURE — 85027 COMPLETE CBC AUTOMATED: CPT | Performed by: NURSE PRACTITIONER

## 2019-09-30 PROCEDURE — 97116 GAIT TRAINING THERAPY: CPT

## 2019-09-30 RX ADMIN — OXYCODONE HYDROCHLORIDE AND ACETAMINOPHEN 500 MG: 500 TABLET ORAL at 08:28

## 2019-09-30 RX ADMIN — SODIUM CHLORIDE, PRESERVATIVE FREE 10 ML: 5 INJECTION INTRAVENOUS at 21:02

## 2019-09-30 RX ADMIN — METOPROLOL TARTRATE 12.5 MG: 25 TABLET, FILM COATED ORAL at 08:29

## 2019-09-30 RX ADMIN — METOPROLOL TARTRATE 12.5 MG: 25 TABLET, FILM COATED ORAL at 20:57

## 2019-09-30 RX ADMIN — SODIUM CHLORIDE, PRESERVATIVE FREE 10 ML: 5 INJECTION INTRAVENOUS at 08:35

## 2019-09-30 RX ADMIN — OXYCODONE HYDROCHLORIDE 5 MG: 5 TABLET ORAL at 00:54

## 2019-09-30 RX ADMIN — ACETAMINOPHEN 1000 MG: 500 TABLET ORAL at 14:03

## 2019-09-30 RX ADMIN — ONDANSETRON 4 MG: 2 INJECTION INTRAMUSCULAR; INTRAVENOUS at 14:03

## 2019-09-30 RX ADMIN — SENNOSIDES AND DOCUSATE SODIUM 1 TABLET: 8.6; 5 TABLET ORAL at 08:29

## 2019-09-30 RX ADMIN — ASPIRIN 81 MG: 81 TABLET, DELAYED RELEASE ORAL at 08:29

## 2019-09-30 RX ADMIN — PRENATAL VIT W/ FE FUMARATE-FA TAB 27-0.8 MG 1 TABLET: 27-0.8 TAB at 08:29

## 2019-09-30 RX ADMIN — INSULIN ASPART 5 UNITS: 100 INJECTION, SOLUTION INTRAVENOUS; SUBCUTANEOUS at 17:31

## 2019-09-30 RX ADMIN — MAGNESIUM OXIDE 400 MG (241.3 MG MAGNESIUM) TABLET 400 MG: TABLET at 08:28

## 2019-09-30 RX ADMIN — MAGNESIUM OXIDE 400 MG (241.3 MG MAGNESIUM) TABLET 400 MG: TABLET at 20:57

## 2019-09-30 RX ADMIN — INSULIN DETEMIR 30 UNITS: 100 INJECTION, SOLUTION SUBCUTANEOUS at 20:56

## 2019-09-30 RX ADMIN — SODIUM CHLORIDE, PRESERVATIVE FREE 10 ML: 5 INJECTION INTRAVENOUS at 08:34

## 2019-09-30 RX ADMIN — OXYCODONE HYDROCHLORIDE AND ACETAMINOPHEN 500 MG: 500 TABLET ORAL at 20:57

## 2019-09-30 RX ADMIN — ACETAMINOPHEN 1000 MG: 500 TABLET ORAL at 20:57

## 2019-09-30 RX ADMIN — OXYCODONE HYDROCHLORIDE 5 MG: 5 TABLET ORAL at 11:30

## 2019-09-30 RX ADMIN — AMIODARONE HYDROCHLORIDE 400 MG: 200 TABLET ORAL at 08:30

## 2019-09-30 RX ADMIN — AMIODARONE HYDROCHLORIDE 0.5 MG/MIN: 1.8 INJECTION, SOLUTION INTRAVENOUS at 21:35

## 2019-09-30 RX ADMIN — FAMOTIDINE 20 MG: 20 TABLET ORAL at 08:29

## 2019-09-30 RX ADMIN — SENNOSIDES AND DOCUSATE SODIUM 1 TABLET: 8.6; 5 TABLET ORAL at 20:57

## 2019-09-30 RX ADMIN — DILTIAZEM HYDROCHLORIDE 10 MG/HR: 5 INJECTION INTRAVENOUS at 08:29

## 2019-09-30 RX ADMIN — ACETAMINOPHEN 1000 MG: 500 TABLET ORAL at 08:28

## 2019-09-30 RX ADMIN — FAMOTIDINE 20 MG: 20 TABLET ORAL at 20:57

## 2019-09-30 RX ADMIN — ONDANSETRON 4 MG: 2 INJECTION INTRAMUSCULAR; INTRAVENOUS at 05:07

## 2019-09-30 RX ADMIN — ACETAMINOPHEN 1000 MG: 500 TABLET ORAL at 02:10

## 2019-09-30 RX ADMIN — INSULIN ASPART 5 UNITS: 100 INJECTION, SOLUTION INTRAVENOUS; SUBCUTANEOUS at 20:56

## 2019-09-30 RX ADMIN — INSULIN ASPART 3 UNITS: 100 INJECTION, SOLUTION INTRAVENOUS; SUBCUTANEOUS at 11:22

## 2019-09-30 RX ADMIN — CLOPIDOGREL BISULFATE 75 MG: 75 TABLET ORAL at 08:29

## 2019-09-30 RX ADMIN — AMIODARONE HYDROCHLORIDE 400 MG: 200 TABLET ORAL at 20:57

## 2019-09-30 RX ADMIN — APIXABAN 5 MG: 5 TABLET, FILM COATED ORAL at 11:21

## 2019-09-30 RX ADMIN — ATORVASTATIN CALCIUM 40 MG: 40 TABLET, FILM COATED ORAL at 20:57

## 2019-09-30 RX ADMIN — APIXABAN 5 MG: 5 TABLET, FILM COATED ORAL at 20:57

## 2019-09-30 RX ADMIN — MUPIROCIN 1 APPLICATION: 20 OINTMENT TOPICAL at 08:30

## 2019-09-30 RX ADMIN — AMIODARONE HYDROCHLORIDE 0.5 MG/MIN: 1.8 INJECTION, SOLUTION INTRAVENOUS at 11:21

## 2019-10-01 VITALS
WEIGHT: 236.7 LBS | HEIGHT: 73 IN | HEART RATE: 77 BPM | OXYGEN SATURATION: 97 % | RESPIRATION RATE: 20 BRPM | DIASTOLIC BLOOD PRESSURE: 80 MMHG | TEMPERATURE: 97.6 F | BODY MASS INDEX: 31.37 KG/M2 | SYSTOLIC BLOOD PRESSURE: 152 MMHG

## 2019-10-01 LAB
GLUCOSE BLDC GLUCOMTR-MCNC: 108 MG/DL (ref 70–130)
GLUCOSE BLDC GLUCOMTR-MCNC: 182 MG/DL (ref 70–130)

## 2019-10-01 PROCEDURE — 99024 POSTOP FOLLOW-UP VISIT: CPT | Performed by: NURSE PRACTITIONER

## 2019-10-01 PROCEDURE — 97116 GAIT TRAINING THERAPY: CPT

## 2019-10-01 PROCEDURE — 97110 THERAPEUTIC EXERCISES: CPT

## 2019-10-01 PROCEDURE — 63710000001 INSULIN ASPART PER 5 UNITS: Performed by: NURSE PRACTITIONER

## 2019-10-01 PROCEDURE — 97530 THERAPEUTIC ACTIVITIES: CPT

## 2019-10-01 PROCEDURE — 82962 GLUCOSE BLOOD TEST: CPT

## 2019-10-01 PROCEDURE — 99232 SBSQ HOSP IP/OBS MODERATE 35: CPT | Performed by: INTERNAL MEDICINE

## 2019-10-01 PROCEDURE — 25010000002 ONDANSETRON PER 1 MG: Performed by: NURSE PRACTITIONER

## 2019-10-01 RX ORDER — SENNA AND DOCUSATE SODIUM 50; 8.6 MG/1; MG/1
1 TABLET, FILM COATED ORAL 2 TIMES DAILY
Start: 2019-10-01 | End: 2019-11-07

## 2019-10-01 RX ORDER — ASCORBIC ACID 500 MG
500 TABLET ORAL 2 TIMES DAILY
Start: 2019-10-01 | End: 2019-11-07

## 2019-10-01 RX ORDER — CLOPIDOGREL BISULFATE 75 MG/1
75 TABLET ORAL DAILY
Qty: 30 TABLET | Refills: 11 | Status: SHIPPED | OUTPATIENT
Start: 2019-10-02 | End: 2020-11-13 | Stop reason: SDUPTHER

## 2019-10-01 RX ORDER — AMIODARONE HYDROCHLORIDE 200 MG/1
200 TABLET ORAL EVERY 12 HOURS
Status: DISCONTINUED | OUTPATIENT
Start: 2019-10-01 | End: 2019-10-01 | Stop reason: HOSPADM

## 2019-10-01 RX ORDER — OXYCODONE HYDROCHLORIDE 5 MG/1
10 TABLET ORAL EVERY 4 HOURS PRN
Status: DISCONTINUED | OUTPATIENT
Start: 2019-10-01 | End: 2019-10-01 | Stop reason: HOSPADM

## 2019-10-01 RX ORDER — PRENATAL VIT/IRON FUM/FOLIC AC 27MG-0.8MG
1 TABLET ORAL DAILY
Start: 2019-10-02 | End: 2019-11-07

## 2019-10-01 RX ORDER — AMIODARONE HYDROCHLORIDE 200 MG/1
200 TABLET ORAL EVERY 12 HOURS
Qty: 40 TABLET | Refills: 0 | Status: SHIPPED | OUTPATIENT
Start: 2019-10-01 | End: 2019-10-16

## 2019-10-01 RX ORDER — ATORVASTATIN CALCIUM 40 MG/1
40 TABLET, FILM COATED ORAL NIGHTLY
Qty: 30 TABLET | Refills: 3 | Status: SHIPPED | OUTPATIENT
Start: 2019-10-01 | End: 2020-07-29 | Stop reason: SDUPTHER

## 2019-10-01 RX ORDER — OXYCODONE HYDROCHLORIDE AND ACETAMINOPHEN 5; 325 MG/1; MG/1
1-2 TABLET ORAL EVERY 4 HOURS PRN
Qty: 36 TABLET | Refills: 0 | Status: SHIPPED | OUTPATIENT
Start: 2019-10-01 | End: 2019-11-07

## 2019-10-01 RX ORDER — OXYCODONE HYDROCHLORIDE 5 MG/1
5 TABLET ORAL EVERY 4 HOURS PRN
Status: DISCONTINUED | OUTPATIENT
Start: 2019-10-01 | End: 2019-10-01 | Stop reason: HOSPADM

## 2019-10-01 RX ADMIN — ACETAMINOPHEN 1000 MG: 500 TABLET ORAL at 08:19

## 2019-10-01 RX ADMIN — OXYCODONE HYDROCHLORIDE AND ACETAMINOPHEN 500 MG: 500 TABLET ORAL at 08:20

## 2019-10-01 RX ADMIN — ONDANSETRON 4 MG: 2 INJECTION INTRAMUSCULAR; INTRAVENOUS at 00:10

## 2019-10-01 RX ADMIN — CLOPIDOGREL BISULFATE 75 MG: 75 TABLET ORAL at 08:20

## 2019-10-01 RX ADMIN — ACETAMINOPHEN 1000 MG: 500 TABLET ORAL at 01:48

## 2019-10-01 RX ADMIN — OXYCODONE HYDROCHLORIDE 10 MG: 5 TABLET ORAL at 12:29

## 2019-10-01 RX ADMIN — APIXABAN 5 MG: 5 TABLET, FILM COATED ORAL at 08:20

## 2019-10-01 RX ADMIN — MAGNESIUM OXIDE 400 MG (241.3 MG MAGNESIUM) TABLET 400 MG: TABLET at 08:20

## 2019-10-01 RX ADMIN — INSULIN ASPART 3 UNITS: 100 INJECTION, SOLUTION INTRAVENOUS; SUBCUTANEOUS at 10:52

## 2019-10-01 RX ADMIN — OXYCODONE HYDROCHLORIDE 5 MG: 5 TABLET ORAL at 08:28

## 2019-10-01 RX ADMIN — PRENATAL VIT W/ FE FUMARATE-FA TAB 27-0.8 MG 1 TABLET: 27-0.8 TAB at 08:20

## 2019-10-01 RX ADMIN — AMIODARONE HYDROCHLORIDE 400 MG: 200 TABLET ORAL at 08:23

## 2019-10-01 RX ADMIN — DOCUSATE SODIUM 1 ENEMA: 283 LIQUID RECTAL at 10:53

## 2019-10-01 RX ADMIN — ACETAMINOPHEN 1000 MG: 500 TABLET ORAL at 13:33

## 2019-10-01 RX ADMIN — METOPROLOL TARTRATE 12.5 MG: 25 TABLET, FILM COATED ORAL at 08:20

## 2019-10-01 RX ADMIN — SODIUM CHLORIDE, PRESERVATIVE FREE 10 ML: 5 INJECTION INTRAVENOUS at 09:52

## 2019-10-01 RX ADMIN — OXYCODONE HYDROCHLORIDE 5 MG: 5 TABLET ORAL at 04:15

## 2019-10-01 RX ADMIN — MUPIROCIN 1 APPLICATION: 20 OINTMENT TOPICAL at 08:29

## 2019-10-01 RX ADMIN — SENNOSIDES AND DOCUSATE SODIUM 1 TABLET: 8.6; 5 TABLET ORAL at 08:20

## 2019-10-01 NOTE — PROGRESS NOTES
LOS: 6 days   Patient Care Team:  Artemio Sanchez MD as PCP - General (Family Medicine)  Gianna Ochoa as Technician  Artemio Sanchez MD (Family Medicine)    Chief Complaint: Paroxysmal atrial fibrillation recovering from CABG currently sinus rhythm.  Will DC IV amiodarone and change to oral amiodarone to 200 mg twice a day for 5 days then 200 mg daily.  Continue oral anticoagulation to decrease risk of cardio embolic phenomena       Review of Systems:   ROS  Constitution:  No symptom of weakness reported  HENT: Denies any headache, hearing impairment.  Eyes: Denies any blurring of vision, or photophobia.  Cardiovascular:  As per history of present illness.   Respiratory system: Denies any COPD, shortness of breath.  Objective     Current Facility-Administered Medications   Medication Dose Route Frequency Provider Last Rate Last Dose   • acetaminophen (TYLENOL) tablet 1,000 mg  1,000 mg Oral Q6H Lidia Sanderson APRN   1,000 mg at 10/01/19 0819   • amiodarone (PACERONE) tablet 400 mg  400 mg Oral Q12H Zeferino Fernando APRN   400 mg at 10/01/19 0823   • apixaban (ELIQUIS) tablet 5 mg  5 mg Oral Q12H Zeferion Fernando APRN   5 mg at 10/01/19 0820   • atorvastatin (LIPITOR) tablet 40 mg  40 mg Oral Nightly Lidia Sanderson APRN   40 mg at 09/30/19 2057   • clopidogrel (PLAVIX) tablet 75 mg  75 mg Oral Daily Lidia Sanderson APRN   75 mg at 10/01/19 0820   • dextrose (D50W) 25 g/ 50mL Intravenous Solution 25 g  25 g Intravenous Q15 Min PRN Zeferino Fernando APRBRYANNA       • dextrose (GLUTOSE) oral gel 15 g  15 g Oral Q15 Min PRN Zeferino Fernando APRBRYANNA       • docusate sodium (ENEMEEZ) enema 1 enema  1 enema Rectal Daily PRN Zeferino Fernando APRBRYANNA   1 enema at 09/29/19 1325   • famotidine (PEPCID) injection 20 mg  20 mg Intravenous Q12H Lidia Sanderson APRN   20 mg at 09/25/19 2054    Or   • famotidine (PEPCID) tablet 20 mg  20 mg Oral Q12H Lidia Sanderson APRN   20 mg at 09/30/19 2057   • glucagon  (human recombinant) (GLUCAGEN DIAGNOSTIC) injection 1 mg  1 mg Subcutaneous Q15 Min PRN Zeferino Fernando APRBRYANNA       • insulin aspart (novoLOG) injection 0-14 Units  0-14 Units Subcutaneous 4x Daily AC & at Bedtime Zeferino Fernando APRN   5 Units at 09/30/19 2056   • insulin detemir (LEVEMIR) injection 30 Units  30 Units Subcutaneous Nightly Lidia Sanderson APRN   30 Units at 09/30/19 2056   • magnesium oxide (MAGOX) tablet 400 mg  400 mg Oral BID Lidia Sanderson APRN   400 mg at 10/01/19 0820   • metoprolol tartrate (LOPRESSOR) half tablet 12.5 mg  12.5 mg Oral Q12H Zeferino Fernando APRN   12.5 mg at 10/01/19 0820   • mupirocin (BACTROBAN) 2 % nasal ointment 1 application  1 application Each Nare Daily Lidia Sanderson APRN   1 application at 10/01/19 0829   • ondansetron (ZOFRAN) injection 4 mg  4 mg Intravenous Q6H PRN Lidia Sanderson APRN   4 mg at 10/01/19 0010   • oxyCODONE (ROXICODONE) immediate release tablet 10 mg  10 mg Oral Q4H PRN Xavier Ballard MD   10 mg at 09/29/19 1831   • oxyCODONE (ROXICODONE) immediate release tablet 5 mg  5 mg Oral Q4H PRN Xavier Ballard MD   5 mg at 10/01/19 0828   • potassium chloride 20 mEq in 50 mL IVPB  20 mEq Intravenous Q1H PRN Lidia Sanderson APRN        Or   • potassium chloride 20 mEq in 50 mL IVPB  20 mEq Intravenous Q1H PRN Lidia Sanderson APRN 50 mL/hr at 09/25/19 1447 20 mEq at 09/25/19 1447   • prenatal vitamin 27-0.8 tablet 1 tablet  1 tablet Oral Daily Lidia Sanderson APRN   1 tablet at 10/01/19 0820   • sennosides-docusate sodium (SENOKOT-S) 8.6-50 MG tablet 1 tablet  1 tablet Oral BID Lidia Sanderson APRN   1 tablet at 10/01/19 0820   • sodium chloride 0.9 % flush 10 mL  10 mL Intravenous Q12H Xavier Ballard MD   10 mL at 09/30/19 0835   • sodium chloride 0.9 % flush 10 mL  10 mL Intravenous Q12H Xavier Ballard MD   10 mL at 09/30/19 2102   • sodium chloride 0.9 % flush 10 mL  10 mL Intravenous PRN Xavier Ballard  "MD SAMI       • sodium chloride 0.9 % flush 20 mL  20 mL Intravenous PRN Xavier Ballard MD       • vitamin C (ASCORBIC ACID) tablet 500 mg  500 mg Oral BID Lidia Sanderson APRBRYANNA   500 mg at 10/01/19 0820       Vital Sign Min/Max for last 24 hours  Temp  Min: 96.8 °F (36 °C)  Max: 98.6 °F (37 °C)   BP  Min: 129/67  Max: 146/78   Pulse  Min: 59  Max: 103   Resp  Min: 18  Max: 18   SpO2  Min: 96 %  Max: 100 %   Flow (L/min)  Min: 1  Max: 2   Weight  Min: 107 kg (236 lb 11.2 oz)  Max: 107 kg (236 lb 11.2 oz)     Flowsheet Rows      First Filed Value   Admission Height  184.2 cm (72.5\") Documented at 09/25/2019 0550   Admission Weight  104 kg (229 lb 4.5 oz) Documented at 09/25/2019 0550            Intake/Output Summary (Last 24 hours) at 10/1/2019 0910  Last data filed at 10/1/2019 0830  Gross per 24 hour   Intake 1060 ml   Output 1350 ml   Net -290 ml       Physical Exam:     General Appearance:    Alert, cooperative, in no acute distress   Lungs:     Clear to auscultation,respirations regular, even and                  unlabored    Heart:    Regular rhythm and normal rate, normal S1 and S2, no            murmur, no gallop, no rub, no click   Chest Wall:    No abnormalities observed   Abdomen:     Normal bowel sounds, no masses, no organomegaly, soft        non-tender, non-distended, no guarding, no rebound                tenderness   Extremities:   Moves all extremities well, no edema, no cyanosis, no             redness   Pulses:   Pulses palpable and equal bilaterally   Skin:   No bleeding, bruising or rash        Results Review:   I reviewed the patient's new clinical results.  Lab Results   Component Value Date    WBC 6.64 09/30/2019    HGB 11.1 (L) 09/30/2019    HCT 32.7 (L) 09/30/2019    MCV 85.4 09/30/2019     09/30/2019     Lab Results   Component Value Date    GLUCOSE 99 09/30/2019    BUN 10 09/30/2019    CREATININE 0.60 (L) 09/30/2019    EGFRIFNONA 140 09/30/2019    BCR 16.7 09/30/2019    CO2 29.0 " 09/30/2019    CALCIUM 8.9 09/30/2019    ALBUMIN 3.70 09/28/2019    AST 50 (H) 09/28/2019    ALT 73 (H) 09/28/2019     Lab Results   Component Value Date    TSH 4.000 09/13/2019     Lab Results   Component Value Date    INR 1.41 (H) 09/25/2019    INR 0.98 09/20/2019    INR 0.96 09/03/2019    PROTIME 17.0 (H) 09/25/2019    PROTIME 12.8 09/20/2019    PROTIME 12.6 09/03/2019     Lab Results   Component Value Date    PTT 31.9 09/25/2019       EKG:     Telemetry:    Ejection Fraction  Lab Results   Component Value Date    EF 65 08/20/2019       Echo EF Estimated  Lab Results   Component Value Date    ECHOEFEST 61 08/19/2019         Present on Admission:  • Coronary artery disease of native artery of native heart with stable angina pectoris (CMS/[facility])  • Coronary artery disease    Assessment/Plan   #1 postop paroxysmal atrial fibrillation  #2 CAD status post CABG  #3 hypertension  #4 hyperlipidemia  #5 diabetes  54 years old patient with a background history of hypertension, hyperlipidemia, diabetes, metabolic syndrome subjective cardiac catheterization with multivessel CAD status post CABG flipped into atrial fibrillation.  Patient started on IV amiodarone along with continuation of oral.  Patient converted to sinus rhythm.  Recommend to discontinue IV amiodarone and decrease a dose of oral for 400 mg twice a day to 200 mg twice daily for 5 days then 200 mg daily.  To continue oral anticoagulation to decrease risk of cardio embolic phenomena.  With history of CAD and CABG, atorvastatin for hyperlipidemia and Lopressor for management of hypertensive heart disease.  See as needed in the hospital in the office.    Alice Rice MD  10/01/19  9:10 AM      Part of this note may be an electronic transcription/translation of spoken language to printed text using the Dragon Dictation system.

## 2019-10-01 NOTE — DISCHARGE SUMMARY
CVTS DISCHARGE SUMMARY  Date of Admission: 9/25/2019  5:29 AM  Date of Discharge:  10/1/2019    Admission Diagnosis:   Coronary artery disease of native artery of native heart with stable angina pectoris (CMS/Formerly [facility]) [I25.118]    Discharge Diagnosis:   Post-Op Diagnosis Codes:     * Coronary artery disease of native artery of native heart with stable angina pectoris (CMS/HCC) [I25.118]    Consults:   Consults     Date and Time Order Name Status Description    9/30/2019 0856 Inpatient Cardiology Consult Completed     9/13/2019 1213 Inpatient Cardiothoracic Surgery Consult Completed     9/12/2019 1645 Cardiology - Primary (on-call MD unless specified) Completed           Procedures Performed  Procedure(s):  CORONARY ARTERY BYPASS GRAFTING, ENDOSCOPIC VEIN HARVEST       (CELL SAVER)       Discharge Medications     Discharge Medications      New Medications      Instructions Start Date   amiodarone 200 MG tablet  Commonly known as:  PACERONE   200 mg, Oral, Every 12 Hours, Take 200mg twice a day for 7 days followed by 200mg daily      apixaban 5 MG tablet tablet  Commonly known as:  ELIQUIS   5 mg, Oral, Every 12 Hours Scheduled      ascorbic acid 500 MG tablet  Commonly known as:  VITAMIN C   500 mg, Oral, 2 Times Daily      atorvastatin 40 MG tablet  Commonly known as:  LIPITOR   40 mg, Oral, Nightly      clopidogrel 75 MG tablet  Commonly known as:  PLAVIX   75 mg, Oral, Daily   Start Date:  10/2/2019     magnesium oxide 400 (241.3 Mg) MG tablet tablet  Commonly known as:  MAGOX   400 mg, Oral, 2 Times Daily      metoprolol tartrate 25 MG tablet  Commonly known as:  LOPRESSOR   12.5 mg, Oral, Every 12 Hours Scheduled      prenatal vitamin 27-0.8 27-0.8 MG tablet tablet   1 tablet, Oral, Daily   Start Date:  10/2/2019     sennosides-docusate sodium 8.6-50 MG tablet  Commonly known as:  SENOKOT-S   1 tablet, Oral, 2 Times Daily         Continue These Medications      Instructions Start Date   metFORMIN 500 MG  tablet  Commonly known as:  GLUCOPHAGE   500 mg, Oral, 2 Times Daily With Meals      nitroglycerin 0.4 MG SL tablet  Commonly known as:  NITROSTAT   0.4 mg, Sublingual, Every 5 Minutes PRN, Take no more than 3 doses in 15 minutes.      ONE TOUCH DELICA LANCING DEV misc   delica lancing device if approved, otherwise use alternative      ONETOUCH DELICA LANCETS 33G misc   1 each, Does not apply, 4 Times Daily, delica if covered, use alternative if not covered      pregabalin 50 MG capsule  Commonly known as:  LYRICA   50 mg, Oral, 2 Times Daily      vitamin D 13638 units capsule capsule  Commonly known as:  ERGOCALCIFEROL   50,000 Units, Oral, 2 Times Weekly         Stop These Medications    amLODIPine 10 MG tablet  Commonly known as:  NORVASC     ASPIRIN LOW DOSE 81 MG EC tablet  Generic drug:  aspirin     Blood Glucose Test strip     metoprolol succinate XL 25 MG 24 hr tablet  Commonly known as:  TOPROL-XL     pravastatin 20 MG tablet  Commonly known as:  PRAVACHOL          Lidia Maria E WHITTINGTON  Reviewed risks, benefits, and habit forming potential and weaning from narcotic medication. Patient understands and wishes to receive prescription.  Prescription written for Percocet for post-surgical pain after Bowen placed on file.    Discharge Diet:   Diet Instructions     Diet: Regular, Consistent Carbohydrate, Cardiac      Discharge Diet:   Regular  Consistent Carbohydrate  Cardiac             Discharge Disposition: Home in stable condition with follow up appointments with CVTS Nurse Practitioner 1 week, Dr. Ballard 2 weeks, Cardiology/PCP as scheduled.     History of Present Illness/Hospital Course:   54 year old M with DM, HTN, Tobacco use has  Been having intermittent chest pain and significant SOB, also noted to have significant PVCs, underwent lexiscan which was without evidence of ischemia, however continued with chest discomfort and classic angina symptoms. Patient underwent cardiac cath to rule out obstructive  CAD The patient denies  bleeding issues. The patient denies  use of antiplatelet agents.  The patient reports CKD. Cath demonstrated severe CAD multivessel with lesions LAD 80% DX 70% OM2 60% PDA 70%. Echocardiogram WNL. While awaiting outpatient CABG he returned with intermittent chest discomfort. Medical management not currently maximized. CT consulted for surgical evaluation.     After adequate preop studies completed he was scheduled electively. Day of surgery he was admitted through [facility], taken to the operating suite placed under general anesthesia underwent said procedure without complications or difficulty.  Weaned easily from cardiopulmonary bypass with the assistance in drips of ABHISHEK, CARDENE, precedex, insulin and transferred to CCU in stable condition.  They began weaning mechanical ventilation with successful extubation and placed on oxygen per nasal cannula.  A total of 0 in blood products were given during their hospital stay.  POD1 they were out of bed to the chair and tolerated breakfast with stable glucose between the hours of 18-24 postoperatively.  Insulin drip was discontinued and they were continued on sliding scale insulin coverage as well as Lantus nightly.  Hemodynamics and neuro status remained stable for transfer to  telemetry. They continued with PT/OT participation to satisfactory levels with activities of daily living. Chest tubes were without air leak and removed with satisfactory post removal CXR viewed on 9/29.  Patient with a fib RVR, paroxsymal, required cardizem and IV amio for a period of time to control rate, converted to sinus on POD 5, embolic prophylaxis with eliquis, cardiology following case. Dr. Rice will see in 4 weeks.  Hemodynamics remained stable, they remained afebrile postoperatively, and in regular sinus rhythm for satisfactory discharge home in stable condition on POD 6.      Vital Signs  Temp:  [96.8 °F (36 °C)-98.6 °F (37 °C)] 97.6 °F (36.4 °C)  Heart Rate:   "[] 55  Resp:  [18] 18  BP: (126-146)/(65-92) 126/65      09/29/19  0417 09/30/19  0600 10/01/19  0425   Weight: 107 kg (235 lb 6.4 oz) 107 kg (236 lb 11.2 oz) 107 kg (236 lb 11.2 oz)       Pertinent Test Results:  Lab Results   Component Value Date    WBC 6.64 09/30/2019    HGB 11.1 (L) 09/30/2019    HCT 32.7 (L) 09/30/2019    MCV 85.4 09/30/2019     09/30/2019     Lab Results   Component Value Date    GLUCOSE 99 09/30/2019    BUN 10 09/30/2019    CREATININE 0.60 (L) 09/30/2019    EGFRIFNONA 140 09/30/2019    BCR 16.7 09/30/2019    K 4.4 09/30/2019    CO2 29.0 09/30/2019    CALCIUM 8.9 09/30/2019    ALBUMIN 3.70 09/28/2019    AST 50 (H) 09/28/2019    ALT 73 (H) 09/28/2019          Physical Exam   Objective:  General Appearance:  Comfortable, well-appearing, in no acute distress and in pain.    Vital signs: (most recent): Blood pressure 126/65, pulse 55, temperature 97.6 °F (36.4 °C), temperature source Temporal, resp. rate 18, height 184.2 cm (72.52\"), weight 107 kg (236 lb 11.2 oz), SpO2 99 %.  Vital signs are normal.  No fever.    Output: Producing urine and no stool output.    HEENT: Normal HEENT exam.    Lungs:  Normal effort and normal respiratory rate.  There are decreased breath sounds.  (CTx2  removed)  Heart: Normal rate.  Regular rhythm.  S1 normal and S2 normal.     Physical Exam   Objective:  General Appearance:  Comfortable, well-appearing, in no acute distress and in pain.    Vital signs: (most recent): Blood pressure 126/65, pulse 55, temperature 97.6 °F (36.4 °C), temperature source Temporal, resp. rate 18, height 184.2 cm (72.52\"), weight 107 kg (236 lb 11.2 oz), SpO2 99 %.  Vital signs are normal.  No fever.    Output: Producing urine and BM.    HEENT: Normal HEENT exam.    Lungs:  Normal effort and normal respiratory rate.  There are decreased breath sounds.  (CTx2  removed)  Heart: Normal rate.  Regular rhythm.  S1 normal and S2 normal.  (V wires removed     NSR)  Chest: Symmetric " chest wall expansion. Chest wall tenderness present.    Abdomen: Abdomen is soft.  Hypoactive bowel sounds.   There is no abdominal tenderness.     Extremities: Normal range of motion.  There is dependent edema.    Pulses: Distal pulses are intact.  There are no decreased pulses.    Neurological: Patient is alert and oriented to person, place and time.  GCS score is 15.    Pupils:  Pupils are equal, round, and reactive to light.    Skin:  Warm and dry.  (Midsternal incision CDI)     NSR)  Chest: Symmetric chest wall expansion. Chest wall tenderness present.    Abdomen: Abdomen is soft.  Hypoactive bowel sounds.   There is no abdominal tenderness.     Extremities: Normal range of motion.  There is dependent edema.    Pulses: Distal pulses are intact.  There are no decreased pulses.    Neurological: Patient is alert and oriented to person, place and time.  GCS score is 15.    Pupils:  Pupils are equal, round, and reactive to light.    Skin:  Warm and dry.  (Midsternal incision CDI)  Additional Instructions for the Follow-ups that You Need to Schedule     Call MD With Problems / Concerns   As directed      Instructions: Contact provider for shortness of breath, fever, chills, drainage at incision site, or uncontrolled pain    Order Comments:  Instructions: Contact provider for shortness of breath, fever, chills, drainage at incision site, or uncontrolled pain          Discharge Follow-up with PCP   As directed       Currently Documented PCP:    Artemio Sanchez MD    PCP Phone Number:    [phone]     Follow Up Details:  12 weeks         Discharge Follow-up with Specified Provider: Oscra WHITTINGTON 10/4/19 @ 11:00; 1 Week   As directed      To:  Oscar WHITTINGTON 10/4/19 @ 11:00    Follow Up:  1 Week    Follow Up Details:  10/4/19 @ 1100         Discharge Follow-up with Specified Provider: Dr. Rice; 1 Month   As directed      To:  Dr. Rice    Follow Up:  1 Month         Referral to Home Health   As directed      Face  to Face Visit Date:  10/1/2019    Follow-up provider for Plan of Care?:  I will be treating the patient on an ongoing basis.  Please send me the Plan of Care for signature.    Follow-up provider:  KAILEY HODGES [[id number]]    Reason/Clinical Findings:  CABG    Describe mobility limitations that make leaving home difficult:  Resitrctions    Nursing/Therapeutic Services Requested:  Other (H/H transition visit)    Frequency:  1 Week 1               Discharge Instructions: Discharge instructions include no heavy lifting anything greater than 10lbs for approximately 12 weeks.  No sex or driving for 3-6 weeks. Printed information given to the patient with advancement of activities weekly.  Risks and benefits of narcotic medications and weaning postoperatively have been discussed. Clean operative site with antibacterial soap/water, pat dry. Keep open to air unless draining, then may apply dry dressing.  No ointments or creams unless prescribed by provider. Signs and symptoms of infection including drainage from operative site, redness, swelling, with associated fever and/or chills notify [facility] center immediately for wound check.  Patient verbalizes understanding of discharge instructions, all questions are answered, follow up appointments have been made, they are discharged home in stable condition.         Follow-up Appointments  Future Appointments   Date Time Provider Department Center   10/4/2019 11:00 AM Zeferino Fernando APRN [facility] None   10/29/2019 11:15 AM Martinez Card APRN [facility] None       Test Results Pending at Discharge           This document has been electronically signed by Oscar Fernando AGACNP-BC @  On October 1, 2019 2:03 PM      Time: Discharge 45 min

## 2019-10-01 NOTE — PLAN OF CARE
Problem: Patient Care Overview  Goal: Plan of Care Review  Outcome: Ongoing (interventions implemented as appropriate)   10/01/19 3354   OTHER   Outcome Summary Pt sitting in chair upon entry. Sit-stand with supervision. Amb 200' CGA/SBA with intermittant use of 1 handrail. VSS with treatment. Pt will cont to benefit from further PT at next level of care.      Goal: Individualization and Mutuality  Outcome: Ongoing (interventions implemented as appropriate)

## 2019-10-01 NOTE — THERAPY TREATMENT NOTE
Acute Care - Occupational Therapy Treatment Note  [facility]     Patient Name: Herbert White Sr.  : 1965  MRN: [id number]  Today's Date: 10/1/2019  Onset of Illness/Injury or Date of Surgery: 19  Date of Referral to OT: 19  Referring Physician: Lidia Sanderson    Admit Date: 2019       ICD-10-CM ICD-9-CM   1. Decreased functional mobility R26.89 781.99   2. Impaired mobility and ADLs Z74.09 799.89   3. Impaired functional mobility, balance, gait, and endurance Z74.09 V49.89     Patient Active Problem List   Diagnosis   • Cervical strain, acute   • Diabetes mellitus with ketoacidosis, uncontrolled (CMS/[facility])   • Essential hypertension   • Diabetic polyneuropathy associated with type 2 diabetes mellitus (CMS/HCC)   • Type 2 diabetes mellitus without complication, with long-term current use of insulin (CMS/[facility])   • Hypertension associated with diabetes (CMS/HCC)   • Mixed diabetic hyperlipidemia associated with type 1 diabetes mellitus (CMS/HCC)   • Acute bilateral low back pain   • Spondylolisthesis of lumbar region   • DDD (degenerative disc disease), lumbar   • Chest pain   • Pure hypercholesterolemia   • Nicotine abuse   • Peripheral arterial disease (CMS/[facility])   • Mixed hyperlipidemia   • Coronary artery disease of native artery of native heart with stable angina pectoris (CMS/[facility])   • Coronary artery disease     Past Medical History:   Diagnosis Date   • Arthritis     c/o joint pain   • Coronary artery disease    • Diabetes mellitus (CMS/[facility])    • Hyperlipidemia    • Hypertension    • Retinopathy of both eyes    • Sleep apnea     not using c-pap     Past Surgical History:   Procedure Laterality Date   • CARDIAC CATHETERIZATION N/A 9/3/2019    Procedure: Coronary angiography/PCI if indicated;  Surgeon: Denia Dwyer MD;  Location: [facility] INVASIVE LOCATION;  Service: Cardiology   • CHOLECYSTECTOMY     • TONSILLECTOMY         Therapy Treatment    Rehabilitation Treatment  Summary     Row Name 10/01/19 0735             Treatment Time/Intention    Discipline  occupational therapy assistant  -KD      Document Type  therapy note (daily note)  -KD      Subjective Information  no complaints  -KD      Mode of Treatment  occupational therapy  -KD      Patient/Family Observations  pt up in chair  -KD      Therapy Frequency (OT Eval)  daily  -KD      Patient Effort  good  -KD      Comment  Pt reported he had ADL last night  -KD2      Existing Precautions/Restrictions  sternal;fall  -KD      Equipment Issued to Patient  gait belt  -KD      Recorded by [KD] Marisol Mccall BAI/L 10/01/19 0844  [KD2] Marisol Mccall BAI/L 10/01/19 0845      Row Name 10/01/19 0735             Vital Signs    Pre Systolic BP Rehab  158  -KD      Pre Treatment Diastolic BP  88  -KD      Pretreatment Heart Rate (beats/min)  69  -KD      Posttreatment Heart Rate (beats/min)  72  -KD      Pre SpO2 (%)  99  -KD      O2 Delivery Pre Treatment  supplemental O2  -KD      Post SpO2 (%)  99  -KD      O2 Delivery Post Treatment  supplemental O2  -KD      Pre Patient Position  Sitting  -KD      Intra Patient Position  Standing  -KD      Post Patient Position  Sitting  -KD      Recorded by [KD] Marisol Mccall BAI/L 10/01/19 0844      Row Name 10/01/19 0735             Cognitive Assessment/Intervention- PT/OT    Affect/Mental Status (Cognitive)  WFL  -KD      Orientation Status (Cognition)  oriented x 4  -KD      Follows Commands (Cognition)  WFL  -KD      Cognitive Function (Cognitive)  WFL  -KD      Recorded by [KD] Marisol Mccall BAI/L 10/01/19 0844      Row Name 10/01/19 0735             Functional Mobility    Functional Mobility- Ind. Level  supervision required  -KD      Functional Mobility- Device  other (see comments) Pt used HR in hallway no AD  -KD      Functional Mobility-Distance (Feet)  145  -KD      Functional Mobility- Comment  pt ambulated w/ no 02, sats 99% after gait. Pt required vc's for HH use.  -KD       Recorded by [KD] Marisol Mccall COTA/L 10/01/19 0844      Row Name 10/01/19 0735             Sit-Stand Transfer    Sit-Stand [place] (Transfers)  supervision  -KD      Assistive Device (Sit-Stand Transfers)  other (see comments)  -KD      Recorded by [KD] Marisol Mccall COTA/L 10/01/19 0844      Row Name 10/01/19 0735             Stand-Sit Transfer    Stand-Sit [place] (Transfers)  supervision  -KD      Recorded by [KD] Marisol Mccall COTA/L 10/01/19 0844      Row Name 10/01/19 0735             Therapeutic Exercise    Upper Extremity Range of Motion (Therapeutic Exercise)  shoulder abduction/adduction, bilateral;shoulder horizontal abduction/adduction, bilateral;shoulder internal/external rotation, bilateral;elbow flexion/extension, bilateral;forearm supination/pronation, bilateral;wrist flexion/extension, bilateral  -KD      Weight/Resistance (Therapeutic Exercise)  2 pounds  -KD      Exercise Type (Therapeutic Exercise)  AROM (active range of motion)  -KD      Position (Therapeutic Exercise)  seated  -KD      Equipment (Therapeutic Exercise)  free weight, barbell  -KD      Expected Outcome (Therapeutic Exercise)  facilitate normal movement patterns  -KD      Recorded by [KD] Marisol Mccall COTA/L 10/01/19 0844      Row Name 10/01/19 0735             Positioning and Restraints    Pre-Treatment Position  sitting in chair/recliner  -KD      In Bed  sitting;call light within reach;encouraged to call for assist  -KD      Recorded by [KD] Marisol Mccall COTA/L 10/01/19 0844      Row Name 10/01/19 0735             Pain Scale: Numbers Pre/Post-Treatment    Pain Scale: Numbers, Pretreatment  2/10  -KD      Pain Scale: Numbers, Post-Treatment  2/10  -KD      Pain Location  chest denies meds  -KD      Recorded by [KD] Marisol Mccall COTA/L 10/01/19 0844      Row Name                Wound 09/25/19 0902 midline sternal Incision    Wound - Properties Group Date first assessed: 09/25/19 [KM] Time first  assessed: 0902 [KM] Present on Hospital Admission: N [KM] Orientation: midline [KM] Location: sternal [KM] Primary Wound Type: Incision [KM] Recorded by:  [KM] Kate Becker RN 09/25/19 0903    Row Name                Wound 09/25/19 0903 Left leg Incision    Wound - Properties Group Date first assessed: 09/25/19 [KM] Time first assessed: 0903 [KM] Present on Hospital Admission: N [KM] Side: Left [KM] Location: leg [KM] Primary Wound Type: Incision [KM] Recorded by:  [KM] Kate Becker RN 09/25/19 0903    Row Name 10/01/19 0735             Outcome Summary/Treatment Plan (OT)    Daily Summary of Progress (OT)  progress toward functional goals as expected  -KD      Plan for Continued Treatment (OT)  cont ot poc  -KD      Anticipated Discharge Disposition (OT)  anticipate therapy at next level of care  -KD      Recorded by [KD] Marisol Mccall BAI/L 10/01/19 0844        User Key  (r) = Recorded By, (t) = Taken By, (c) = Cosigned By    Initials Name Effective Dates Discipline    KD Marisol Mccall BAI/L 03/07/18 -  OT    KM Kate Becker RN 05/21/18 -  Nurse        Wound 09/25/19 0902 midline sternal Incision (Active)   Dressing Appearance dry;intact 9/30/2019  9:07 PM   Closure MIRIAN 9/30/2019  9:07 PM   Base dressing in place, unable to visualize 9/30/2019  9:07 PM   Periwound intact;dry 9/30/2019  9:48 AM   Drainage Amount none 9/30/2019  9:07 PM   Care, Wound cleansed with 9/30/2019  9:48 AM   Dressing Care, Wound dressing changed 9/30/2019  9:48 AM       Wound 09/25/19 0903 Left leg Incision (Active)   Dressing Appearance open to air 9/30/2019  9:07 PM   Closure Liquid skin adhesive 9/30/2019  9:07 PM   Periwound intact;dry;ecchymotic 9/30/2019  9:07 PM   Periwound Temperature warm 9/30/2019  9:07 PM   Periwound Skin Turgor soft 9/30/2019  9:07 PM   Drainage Amount none 9/30/2019  9:07 PM   Care, Wound cleansed with 9/30/2019  9:48 AM     Rehab Goal Summary     Row Name 10/01/19 0735             Occupational  Therapy Goals    Transfer Goal Selection (OT)  transfer, OT goal 1  -KD      Bathing Goal Selection (OT)  bathing, OT goal 1  -KD      Dressing Goal Selection (OT)  dressing, OT goal 1  -KD      Toileting Goal Selection (OT)  toileting, OT goal 1  -KD         Transfer Goal 1 (OT)    Activity/Assistive Device (Transfer Goal 1, OT)  transfers, all  -KD      Dickinson Level/Cues Needed (Transfer Goal 1, OT)  supervision required  -KD      Time Frame (Transfer Goal 1, OT)  long term goal (LTG);by discharge  -KD      Progress/Outcome (Transfer Goal 1, OT)  goal met  -KD         Bathing Goal 1 (OT)    Activity/Assistive Device (Bathing Goal 1, OT)  bathing skills, all;long-handled sponge  -KD      Dickinson Level/Cues Needed (Bathing Goal 1, OT)  supervision required;set-up required  -KD      Time Frame (Bathing Goal 1, OT)  long term goal (LTG);by discharge  -KD      Progress/Outcomes (Bathing Goal 1, OT)  goal not met  -KD         Dressing Goal 1 (OT)    Activity/Assistive Device (Dressing Goal 1, OT)  dressing skills, all  -KD      Dickinson/Cues Needed (Dressing Goal 1, OT)  supervision required;set-up required  -KD      Time Frame (Dressing Goal 1, OT)  long term goal (LTG);by discharge  -KD      Progress/Outcome (Dressing Goal 1, OT)  goal not met  -KD         Toileting Goal 1 (OT)    Activity/Device (Toileting Goal 1, OT)  toileting skills, all  -KD      Dickinson Level/Cues Needed (Toileting Goal 1, OT)  supervision required  -KD      Time Frame (Toileting Goal 1, OT)  long term goal (LTG);by discharge  -KD      Progress/Outcome (Toileting Goal 1, OT)  goal met  -KD         Patient Education Goal (OT)    Activity (Patient Education Goal, OT)  Pt to demo understanding of sternal precautions as indication of improved safety awareness.   -KD      Dickinson/Cues/Accuracy (Memory Goal 2, OT)  demonstrates adequately  -KD      Time Frame (Patient Education Goal, OT)  long term goal (LTG);by discharge  -KD       Progress/Outcome (Patient Education Goal, OT)  goal met  -KD        User Key  (r) = Recorded By, (t) = Taken By, (c) = Cosigned By    Initials Name Provider Type Discipline    Marisol Melendez COTA/L Occupational Therapy Assistant OT        Occupational Therapy Education     Title: PT OT SLP Therapies (In Progress)     Topic: Occupational Therapy (Done)     Point: ADL training (Done)     Description: Instruct learner(s) on proper safety adaptation and remediation techniques during self care or transfers.   Instruct in proper use of assistive devices.    Learning Progress Summary           Patient Acceptance, E,TB,H, VU by LW at 9/30/2019  3:27 PM    Comment:  Discussed and Educated Sternal precautions with Pt.                   Point: Home exercise program (Done)     Description: Instruct learner(s) on appropriate technique for monitoring, assisting and/or progressing therapeutic exercises/activities.    Learning Progress Summary           Patient Acceptance, E,TB,H, VU by LW at 9/30/2019  3:27 PM    Comment:  Discussed and Educated Sternal precautions with Pt.                   Point: Precautions (Done)     Description: Instruct learner(s) on prescribed precautions during self-care and functional transfers.    Learning Progress Summary           Patient Acceptance, E,TB,H, VU by LW at 9/30/2019  3:27 PM    Comment:  Discussed and Educated Sternal precautions with Pt.    Acceptance, E, VU by BB at 9/28/2019  2:28 PM    Acceptance, E, VU by AS at 9/26/2019 12:57 PM    Comment:  role of OT, OT POC, sternal precautions, fall prevention, transfer training, purpose/use of heart hugger, call for assist when OOB                   Point: Body mechanics (Done)     Description: Instruct learner(s) on proper positioning and spine alignment during self-care, functional mobility activities and/or exercises.    Learning Progress Summary           Patient Acceptance, E,TB,H, VU by LW at 9/30/2019  3:27 PM    Comment:   Discussed and Educated Sternal precautions with Pt.                               User Key     Initials Effective Dates Name Provider Type Discipline    BB 03/07/18 -  Emelina Esposito COTA/L Occupational Therapy Assistant OT    LW 03/07/18 -  Joan Shea COTA/L Occupational Therapy Assistant OT    AS 03/25/19 -  Abi Lemon OT Occupational Therapist OT                OT Recommendation and Plan  Outcome Summary/Treatment Plan (OT)  Daily Summary of Progress (OT): progress toward functional goals as expected  Plan for Continued Treatment (OT): cont ot poc  Anticipated Discharge Disposition (OT): anticipate therapy at next level of care  Therapy Frequency (OT Eval): daily  Daily Summary of Progress (OT): progress toward functional goals as expected  Plan of Care Review  Plan of Care Reviewed With: patient  Plan of Care Reviewed With: patient  Outcome Summary: Sit-stand-sit-SBA, amb ~145' in hallway SBA w/ no AD, pt used HR. Pt completed BUE ther ex in all planes per sternal precautions w/ 2lb HW and RB's PRN.  Pt required vc's to use HH. Pt educated on sternal precautions  Outcome Measures     Row Name 09/30/19 1530 09/30/19 1020 09/28/19 1425       How much help from another person do you currently need...    Turning from your back to your side while in flat bed without using bedrails?  3  -TW  --  3  -SM    Moving from lying on back to sitting on the side of a flat bed without bedrails?  3  -TW  --  3  -SM    Moving to and from a bed to a chair (including a wheelchair)?  3  -TW  --  3  -SM    Standing up from a chair using your arms (e.g., wheelchair, bedside chair)?  3  -TW  --  3  -SM    Climbing 3-5 steps with a railing?  3  -TW  --  3  -SM    To walk in hospital room?  3  -TW  --  3  -SM    AM-PAC 6 Clicks Score (PT)  18  -TW  --  18  -SM       How much help from another is currently needed...    Putting on and taking off regular lower body clothing?  --  3  -LW  --    Bathing (including washing,  rinsing, and drying)  --  3  -LW  --    Toileting (which includes using toilet bed pan or urinal)  --  3  -LW  --    Putting on and taking off regular upper body clothing  --  3  -LW  --    Taking care of personal grooming (such as brushing teeth)  --  3  -LW  --    Eating meals  --  4  -LW  --    AM-PAC 6 Clicks Score (OT)  --  19 -LW  --       Functional Assessment    Outcome Measure Options  AM-PAC 6 Clicks Basic Mobility (PT)  -  --  AM-PAC 6 Clicks Basic Mobility (PT)  -    Row Name 09/28/19 0935             How much help from another is currently needed...    Putting on and taking off regular lower body clothing?  3  -BB      Bathing (including washing, rinsing, and drying)  3  -BB      Toileting (which includes using toilet bed pan or urinal)  3  -BB      Putting on and taking off regular upper body clothing  3  -BB      Taking care of personal grooming (such as brushing teeth)  3  -BB      Eating meals  4  -BB      AM-PAC 6 Clicks Score (OT)  19 -BB        User Key  (r) = Recorded By, (t) = Taken By, (c) = Cosigned By    Initials Name Provider Type     Catina Gavin, PTA Physical Therapy Assistant    TW Felipe Vela, AMIE Physical Therapy Assistant    BB Emelina Esposito, BAI/L Occupational Therapy Assistant    LW Joan Shea, BAI/L Occupational Therapy Assistant           Time Calculation:   Time Calculation- OT     Row Name 10/01/19 0848             Time Calculation- OT    OT Start Time  0735  -KD      OT Stop Time  0813  -KD      OT Time Calculation (min)  38 min  -KD      Total Timed Code Minutes- OT  38 minute(s)  -KD      OT Received On  10/01/19  -KD        User Key  (r) = Recorded By, (t) = Taken By, (c) = Cosigned By    Initials Name Provider Type    Marisol Melendez COTA/L Occupational Therapy Assistant        Therapy Charges for Today     Code Description Service Date Service Provider Modifiers Qty    [id number]  OT THERAPEUTIC ACT EA 15 MIN 10/1/2019 Stefany  BHUMIKA Chase/L GO 1    [id number]  OT THER PROC EA 15 MIN 10/1/2019 Marisol Mccall COTA/L GO 2               ARLEEN Manrique  10/1/2019

## 2019-10-01 NOTE — PLAN OF CARE
Problem: Patient Care Overview  Goal: Plan of Care Review  Outcome: Ongoing (interventions implemented as appropriate)   10/01/19 1311   Coping/Psychosocial   Plan of Care Reviewed With patient   Plan of Care Review   Progress improving   OTHER   Outcome Summary walked twice today on room air wnl. pt has had gas and small bm since surgery but hypoactive bowel sounds so enemeez given per APRN request. Also gave prune juice. Amio drip dc'd today will continue po.        Problem: Fall Risk (Adult)  Goal: Absence of Fall  Outcome: Ongoing (interventions implemented as appropriate)      Problem: Cardiac Surgery (Adult)  Goal: Signs and Symptoms of Listed Potential Problems Will be Absent, Minimized or Managed (Cardiac Surgery)  Outcome: Ongoing (interventions implemented as appropriate)      Problem: Arrhythmia/Dysrhythmia (Symptomatic) (Adult)  Goal: Signs and Symptoms of Listed Potential Problems Will be Absent, Minimized or Managed (Arrhythmia/Dysrhythmia)  Outcome: Ongoing (interventions implemented as appropriate)

## 2019-10-01 NOTE — PLAN OF CARE
Problem: Patient Care Overview  Goal: Plan of Care Review  Outcome: Ongoing (interventions implemented as appropriate)      Problem: Fall Risk (Adult)  Goal: Absence of Fall  Outcome: Ongoing (interventions implemented as appropriate)      Problem: Cardiac Surgery (Adult)  Goal: Signs and Symptoms of Listed Potential Problems Will be Absent, Minimized or Managed (Cardiac Surgery)  Outcome: Ongoing (interventions implemented as appropriate)      Problem: Arrhythmia/Dysrhythmia (Symptomatic) (Adult)  Goal: Signs and Symptoms of Listed Potential Problems Will be Absent, Minimized or Managed (Arrhythmia/Dysrhythmia)  Outcome: Ongoing (interventions implemented as appropriate)

## 2019-10-01 NOTE — PAYOR COMM NOTE
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 disease    • Diabetes mellitus (CMS/HCC)    • Hyperlipidemia    • Hypertension    • Retinopathy of both eyes    • Sleep apnea     not using c-pap       Past Surgical History:   Procedure Laterality Date   • CARDIAC CATHETERIZATION N/A 9/3/2019    Procedure: Coronary angiography/PCI if indicated;  Surgeon: Denia Dwyer MD;  Location: [facility] CATH INVASIVE LOCATION;  Service: Cardiology   • CHOLECYSTECTOMY     • TONSILLECTOMY         Allergies   Allergen Reactions   • Ace Inhibitors Anaphylaxis   • Wellbutrin [Bupropion] Anaphylaxis       No current facility-administered medications on file prior to encounter.      Current Outpatient Medications on File Prior to Encounter   Medication Sig Dispense Refill   • amLODIPine (NORVASC) 10 MG tablet Take 1 tablet by mouth Daily. 30 tablet 1   • aspirin 81 MG EC tablet Take 1 tablet by mouth Daily. 30 tablet 1   • pregabalin (LYRICA) 50 MG capsule Take 1 capsule by mouth 2 (Two) Times a Day. 28 capsule 0   • Glucose Blood (BLOOD GLUCOSE TEST) strip Use 4 x daily, one touch verio  each 11   • Lancet Devices (ONE TOUCH DELICA LANCING DEV) misc delica lancing device if approved,  otherwise use alternative 1 each 11   • nitroglycerin (NITROSTAT) 0.4 MG SL tablet Place 1 tablet under the tongue Every 5 (Five) Minutes As Needed for Chest Pain. Take no more than 3 doses in 15 minutes. 30 tablet 1   • ONETOUCH DELICA LANCETS 33G misc 1 each 4 (Four) Times a Day. delica if covered, use alternative if not covered 120 each 11       Social History     Socioeconomic History   • Marital status:      Spouse name: Not on file   • Number of children: Not on file   • Years of education: Not on file   • Highest education level: Not on file   Tobacco Use   • Smoking status: Former Smoker     Packs/day: 1.00     Years: 35.00     Pack years: 35.00     Types: Cigarettes     Last attempt to quit: 9/3/2019     Years since quittin.0   • Smokeless tobacco: Never Used   • Tobacco comment: quit after heart cath   Substance and Sexual Activity   • Alcohol use: No   • Drug use: No   • Sexual activity: Defer           REVIEW OF SYSTEMS:   ROS  Constitution: Fatigue and generalized weakness  HENT: Denies any headache, hearing impairment.  Eyes: Denies any blurring of vision, or photophobia.  Cardiovascular:  As per history of present illness.   Respiratory system: Denies any COPD, shortness of breath.  Endocrine:  No history of hyperlipidemia, diabetes.  Musculoskeletal:  No history of arthritis with musculoskeletal problems.  Gastrointestinal: No nausea, vomiting, or melena.  Genitourinary: No dysuria or hematuria.  Neurological: No history of seizure disorder, stroke, or memory problems.     Psychiatric/Behavioral: No history of depression, bipolar disorder or schizophrenia .     Hematological: No history of easy bruising.     Objective:     Vitals:    19 0717 19 1159 19 1506 19 1636   BP:  129/81 138/92    BP Location:  Left arm Left arm    Patient Position:  Sitting Lying    Pulse: 120 76 103 62   Resp:  18 18    Temp:  97.9 °F (36.6 °C) 97.9 °F (36.6 °C)    TempSrc:  Temporal  "Temporal    SpO2:  100% 97%    Weight:       Height:         Body mass index is 31.64 kg/m².  Flowsheet Rows      First Filed Value   Admission Height  184.2 cm (72.5\") Documented at 09/25/2019 0550   Admission Weight  104 kg (229 lb 4.5 oz) Documented at 09/25/2019 0550          Intake/Output Summary (Last 24 hours) at 9/30/2019 1652  Last data filed at 9/30/2019 1300  Gross per 24 hour   Intake 980 ml   Output 1600 ml   Net -620 ml         Physical Exam   Physical Exam  General Appearance:  Comfortable, well-appearing, in no acute distress and in pain.    Vital signs: (most recent): Blood pressure 142/70, pulse 120, temperature 97.4 °F (36.3 °C), temperature source Temporal, resp. rate 18, height 184.2 cm (72.52\"), weight 107 kg (236 lb 11.2 oz), SpO2 99 %.  Vital signs are normal.  No fever.    Output: Producing urine and no stool output.    HEENT: Normal HEENT exam.    Lungs:  Normal effort and normal respiratory rate.  There are decreased breath sounds.    Heart: Tachycardia with regular rate and rhythm  Chest: Symmetric chest wall expansion. Chest wall tenderness present.    Abdomen: Abdomen is soft.  Hypoactive bowel sounds.   There is no abdominal tenderness.     Extremities: Normal range of motion.  There is dependent edema.    Pulses: Distal pulses are intact.  There are no decreased pulses.    Neurological: Patient is alert and oriented to person, place and time.  GCS score is 15.    Pupils:  Pupils are equal, round, and reactive to light.    Skin:  Warm and dry.  (Midsternal incision CDI)  Radiology Review    XR Chest 2 View   Final Result   1. Stable very small left apical pneumothorax.   2. Trace left pleural effusion with mild basilar atelectasis.      Electronically signed by:  Carlos Guzmna  9/28/2019 6:33 AM   CDT Workstation: [id number]      XR Chest 2 View   Final Result   Impression:   1. Removal left thoracotomy tube with very small left apical   pneumothorax.   2. Mild left basilar " atelectasis with small left pleural   effusion.      Electronically signed by:  Arpan Christensen MD  9/27/2019 1:20 PM   CDT Workstation: [id number]      XR Chest 1 View   Final Result   Small left pleural effusion with mild bibasilar   atelectasis.      Electronically signed by:  Carlos Guzman  9/26/2019 5:52 AM   CDT Workstation: [id number]      XR Chest 1 View   Final Result   Lines and tubes as above. No acute abnormality identified.         Electronically signed by:  Yuliya Anders MD  9/25/2019 1:42 PM CDT   Workstation: [id number]          Lab Results:  Lab Results (last 24 hours)     Procedure Component Value Units Date/Time    POC Glucose Once [[id number]]  (Abnormal) Collected:  09/30/19 1608    Specimen:  Blood Updated:  09/30/19 1642     Glucose 216 mg/dL      Comment: RN NotifiedOperator: [id number] BETO NOAHMeter ID: [id number]       POC Glucose Once [[id number]]  (Abnormal) Collected:  09/30/19 1520    Specimen:  Blood Updated:  09/30/19 1642     Glucose 209 mg/dL      Comment: RN NotifiedOperator: [id number] VIED LACEYMeter ID: [id number]       POC Glucose Once [[id number]]  (Abnormal) Collected:  09/30/19 1009    Specimen:  Blood Updated:  09/30/19 1640     Glucose 181 mg/dL      Comment: RN NotifiedOperator: [id number] BETO NOAHMeter ID: [id number]       POC Glucose Once [[id number]]  (Normal) Collected:  09/30/19 0616    Specimen:  Blood Updated:  09/30/19 1639     Glucose 96 mg/dL      Comment: : [phone] [facility] ANGELAWNAMeter ID: [id number]       Basic Metabolic Panel [[id number]]  (Abnormal) Collected:  09/30/19 0451    Specimen:  Blood Updated:  09/30/19 0618     Glucose 99 mg/dL      BUN 10 mg/dL      Creatinine 0.60 mg/dL      Sodium 140 mmol/L      Potassium 4.4 mmol/L      Chloride 104 mmol/L      CO2 29.0 mmol/L      Calcium 8.9 mg/dL      eGFR Non African Amer 140 mL/min/1.73      BUN/Creatinine Ratio 16.7     Anion Gap 7.0 mmol/L     Narrative:       GFR Normal >60  Chronic Kidney  Disease <60  Kidney Failure <15    CBC (No Diff) [[id number]]  (Abnormal) Collected:  09/30/19 0451    Specimen:  Blood Updated:  09/30/19 0559     WBC 6.64 10*3/mm3      RBC 3.83 10*6/mm3      Hemoglobin 11.1 g/dL      Hematocrit 32.7 %      MCV 85.4 fL      MCH 29.0 pg      MCHC 33.9 g/dL      RDW 12.6 %      RDW-SD 38.5 fl      MPV 11.0 fL      Platelets 238 10*3/mm3     POC Glucose Once [[id number]]  (Abnormal) Collected:  09/29/19 1938    Specimen:  Blood Updated:  09/29/19 2052     Glucose 213 mg/dL      Comment: RN NotifiedOperator: [id number] DONITA MILLERWNAMeter ID: [id number]       POC Glucose Once [[id number]]  (Abnormal) Collected:  09/29/19 1646    Specimen:  Blood Updated:  09/29/19 1725     Glucose 168 mg/dL      Comment: RN NotifiedOperator: [id number] SIVAN SLAUGHTERYMeter ID: [id number]             I personally viewed and interpreted the patient's EKG/Telemetry data       Assessment/Plan:   #1 postop paroxysmal atrial fibrillation  #2 CAD status post CABG  #3 hypertension  #4 hyperlipidemia  #5 diabetes  54 years old patient with a background history of hypertension, hyperlipidemia, diabetes, metabolic syndrome subjective cardiac catheterization with multivessel CAD status post CABG flipped into atrial fibrillation over the weekend started IV Cardizem and oral amiodarone converted when unable to maintain sinus rhythm.  The patient back in atrial fibrillation with rapid ventricular response.  Patient given the chads vascular score is a moderate risk for cardio embolic phenomena.  Recommend oral anticoagulation if there is no absolute contraindications.  I will discontinue IV Cardizem, continue oral amiodarone and start IV amiodarone after giving 150 mg bolus and then continue infusion.  Periodic IV Cardizem or Lopressor can be given depending on blood pressure and heart rate.  Insulin for management of diabetes on sliding scale, Plavix with history of CAD and CABG, atorvastatin for management of  hyperlipidemia and Lopressor with a history of hypertension hypertensive heart disease.  Risk factor lifestyle modification discussed.        Thank you for allowing me to participate in the care of Herbert White Sr.. Feel free to contact me directly with any further questions or concerns.    Alice Rice MD  09/30/19  4:52 PM.      Part of this note may be an electronic transcription/translation of spoken language to printed text using the Dragon Dictation system.        Electronically signed by Alice Rice MD at 09/30/19 1703     ref# [id number]  Sujatha Toscano RN  [facility]  [phone]  Fax [phone]

## 2019-10-01 NOTE — PROGRESS NOTES
"  Cardiothoracic - Vascular Surgery Daily Note        LOS: 6 days   Patient Care Team:  Artemio Sanchez MD as PCP - General ([facility])  Gianna Ochoa as Technician  Artemio Sanchez MD (Family Medicine)  POD6  CABGx3  LIMA-LAD, SVG-PDA, SVG-OM    Chief Complaint: Post surgical pain      Subjective     The following portions of the patient's history were reviewed and updated as appropriate: allergies, current medications, past family history, past medical history, past social history, past surgical history and problem list.     Subjective:  Symptoms:  Stable.  He reports shortness of breath and weakness.    Diet:  Adequate intake.  He reports  nausea.  No vomiting.    Activity level: Impaired due to weakness.    Pain:  He complains of pain that is mild.  He reports pain is improving.  Pain is well controlled and requiring pain medication.          Objective     Vital Signs  Temp:  [96.8 °F (36 °C)-98.6 °F (37 °C)] 97.6 °F (36.4 °C)  Heart Rate:  [] 55  Resp:  [18] 18  BP: (126-146)/(65-92) 126/65  Body mass index is 31.64 kg/m².    Intake/Output Summary (Last 24 hours) at 10/1/2019 1319  Last data filed at 10/1/2019 0830  Gross per 24 hour   Intake 720 ml   Output 1150 ml   Net -430 ml     I/O this shift:  In: 480 [P.O.:480]  Out: -     Wt Readings from Last 3 Encounters:   10/01/19 107 kg (236 lb 11.2 oz)   09/20/19 101 kg (222 lb)   09/18/19 101 kg (223 lb 6.4 oz)         Physical Exam   Objective:  General Appearance:  Comfortable, well-appearing, in no acute distress and in pain.    Vital signs: (most recent): Blood pressure 126/65, pulse 55, temperature 97.6 °F (36.4 °C), temperature source Temporal, resp. rate 18, height 184.2 cm (72.52\"), weight 107 kg (236 lb 11.2 oz), SpO2 99 %.  Vital signs are normal.  No fever.    Output: Producing urine and producing stool.    HEENT: Normal HEENT exam.    Lungs:  Normal effort and normal respiratory rate.  There are decreased breath sounds.  (CTx2  " removed)  Heart: Normal rate.  Regular rhythm.  S1 normal and S2 normal.  (V wires I/T    NSR)  Chest: Symmetric chest wall expansion. Chest wall tenderness present.    Abdomen: Abdomen is soft.  Hypoactive bowel sounds.   There is no abdominal tenderness.     Extremities: Normal range of motion.  There is dependent edema.    Pulses: Distal pulses are intact.  There are no decreased pulses.    Neurological: Patient is alert and oriented to person, place and time.  GCS score is 15.    Pupils:  Pupils are equal, round, and reactive to light.    Skin:  Warm and dry.  (Midsternal incision CDI)            Results Review:    Lab Results   Component Value Date    WBC 6.64 09/30/2019    HGB 11.1 (L) 09/30/2019    HCT 32.7 (L) 09/30/2019    MCV 85.4 09/30/2019     09/30/2019     Lab Results   Component Value Date    GLUCOSE 99 09/30/2019    BUN 10 09/30/2019    CREATININE 0.60 (L) 09/30/2019    EGFRIFNONA 140 09/30/2019    BCR 16.7 09/30/2019    K 4.4 09/30/2019    CO2 29.0 09/30/2019    CALCIUM 8.9 09/30/2019    ALBUMIN 3.70 09/28/2019    AST 50 (H) 09/28/2019    ALT 73 (H) 09/28/2019       Calcium Calcium   Date/Time Value Ref Range Status   09/30/2019 0451 8.9 8.6 - 10.5 mg/dL Final      Magnesium Magnesium   Date/Time Value Ref Range Status   09/29/2019 0758 1.9 1.6 - 2.6 mg/dL Final        Imaging Results (last 24 hours)     ** No results found for the last 24 hours. **                                  acetaminophen 1,000 mg Oral Q6H   amiodarone 200 mg Oral Q12H   apixaban 5 mg Oral Q12H   atorvastatin 40 mg Oral Nightly   clopidogrel 75 mg Oral Daily   famotidine 20 mg Intravenous Q12H   Or      famotidine 20 mg Oral Q12H   insulin aspart 0-14 Units Subcutaneous 4x Daily AC & at Bedtime   insulin detemir 30 Units Subcutaneous Nightly   magnesium oxide 400 mg Oral BID   metoprolol tartrate 12.5 mg Oral Q12H   mupirocin 1 application Each Nare Daily   prenatal vitamin 27-0.8 1 tablet Oral Daily    sennosides-docusate sodium 1 tablet Oral BID   sodium chloride 10 mL Intravenous Q12H   sodium chloride 10 mL Intravenous Q12H   vitamin C 500 mg Oral BID          CT removed.          ASSESSMENT/PLAN     Satisfactory Post Op  Up in chair, well appearing, PO labs stable. Bowel sounds, BM.  Progressing well.      A-Fib RVR  Completed 24 hour amio infusion, converted back to sinus, amio gtt DC'd, will transition to amiodarone taper as outpatient.        ASCVD  DAPT, Statin, BB.  ACE/ARB contraindicated due to allergy.        DM II  Hold metformin till DC, continue SSI/Levemir    Transient Post Operative Hyperglycemia  SSI/Levemir    Post Operative Pain  Controlled IR Oxy/acetominophen    Respiratory  Room air, Nebs, IS/OPEP.    Rehab  PT/OT ambulate TID    Disposition  Possible DC home today      This document has been electronically signed by DANIELLA Zhou-BC @  On October 1, 2019 1:19 PM

## 2019-10-01 NOTE — THERAPY TREATMENT NOTE
Acute Care - Physical Therapy Treatment Note  [facility]     Patient Name: Herbert White Sr.  : 1965  MRN: [id number]  Today's Date: 10/1/2019  Onset of Illness/Injury or Date of Surgery: 19  Date of Referral to PT: 19  Referring Physician: Lidia Sanderson    Admit Date: 2019    Visit Dx:    ICD-10-CM ICD-9-CM   1. Coronary artery disease of native artery of native heart with stable angina pectoris (CMS/HCC) I25.118 414.01     413.9   2. Decreased functional mobility R26.89 781.99   3. Impaired mobility and ADLs Z74.09 799.89   4. Impaired functional mobility, balance, gait, and endurance Z74.09 V49.89     Patient Active Problem List   Diagnosis   • Cervical strain, acute   • Diabetes mellitus with ketoacidosis, uncontrolled (CMS/[facility], located within [facility])   • Essential hypertension   • Diabetic polyneuropathy associated with type 2 diabetes mellitus (CMS/[facility], located within [facility])   • Type 2 diabetes mellitus without complication, with long-term current use of insulin (CMS/[facility]AC, located within [facility])   • Hypertension associated with diabetes (CMS/HCC)   • Mixed diabetic hyperlipidemia associated with type 1 diabetes mellitus (CMS/HCC)   • Acute bilateral low back pain   • Spondylolisthesis of lumbar region   • DDD (degenerative disc disease), lumbar   • Chest pain   • Pure hypercholesterolemia   • Nicotine abuse   • Peripheral arterial disease (CMS/HCC)   • Mixed hyperlipidemia   • Coronary artery disease of native artery of native heart with stable angina pectoris (CMS/HCC)   • Coronary artery disease       Therapy Treatment    Rehabilitation Treatment Summary     Row Name 10/01/19 1452 10/01/19 0735          Treatment Time/Intention    Discipline  physical therapy assistant  -KARLIE  occupational therapy assistant  -KD     Document Type  therapy note (daily note)  -KARLIE  therapy note (daily note)  -KD     Subjective Information  no complaints  -KARLIE  no complaints  -KD     Mode of Treatment  physical therapy;individual therapy  -KARLIE  occupational  therapy  -KD     Patient/Family Observations  Pt's family present.   -JW  pt up in chair  -KD     Therapy Frequency (PT Clinical Impression)  5 times/wk  -JW  --     Therapy Frequency (OT Eval)  --  daily  -KD     Patient Effort  good  -JW  good  -KD     Comment  --  Pt reported he had ADL last night  -KD2     Existing Precautions/Restrictions  sternal;fall  -JW  sternal;fall  -KD     Equipment Issued to Patient  --  gait belt  -KD     Recorded by [JW] Philip Snyder, PTA 10/01/19 1523 [KD] Marisol Mccall, BAI/L 10/01/19 0844  [KD2] Marisol Mccall, BAI/L 10/01/19 0845     Row Name 10/01/19 1452 10/01/19 0735          Vital Signs    Pre Systolic BP Rehab  141  -JW  158  -KD     Pre Treatment Diastolic BP  71  -JW  88  -KD     Post Systolic BP Rehab  152  -JW  --     Post Treatment Diastolic BP  80  -JW  --     Pretreatment Heart Rate (beats/min)  56  -JW  69  -KD     Posttreatment Heart Rate (beats/min)  58  -JW  72  -KD     Pre SpO2 (%)  96  -JW  99  -KD     O2 Delivery Pre Treatment  room air  -JW  supplemental O2  -KD     Post SpO2 (%)  97  -JW  99  -KD     O2 Delivery Post Treatment  room air  -JW  supplemental O2  -KD     Pre Patient Position  Sitting  -JW  Sitting  -KD     Intra Patient Position  --  Standing  -KD     Post Patient Position  Sitting  -JW  Sitting  -KD     Recorded by [JW] Philip Snyder, PTA 10/01/19 1523 [KD] Marisol Mccall, BAI/L 10/01/19 0844     Row Name 10/01/19 1452 10/01/19 0795          Cognitive Assessment/Intervention- PT/OT    Affect/Mental Status (Cognitive)  WFL  -JW  WFL  -KD     Orientation Status (Cognition)  oriented x 4  -JW  oriented x 4  -KD     Follows Commands (Cognition)  WFL  -JW  WFL  -KD     Cognitive Function (Cognitive)  WFL  -  WFL  -KD     Recorded by [JW] Philip Snyder, PTA 10/01/19 1523 [KD] Marisol Mccall, BAI/L 10/01/19 0844     Row Name 10/01/19 1457             Safety Issues, Functional Mobility    Impairments Affecting Function (Mobility)   pain;endurance/activity tolerance  -JW      Recorded by [JW] Philip Snyder, PTA 10/01/19 1523      Row Name 10/01/19 1452             Bed Mobility Assessment/Treatment    Bed Mobility Assessment/Treatment  supine-sit  -JW      Supine-Sit [place] (Bed Mobility)  not tested  -JW      Sit-Supine [place] (Bed Mobility)  not tested  -JW      Recorded by [JW] Philip Snyder, PTA 10/01/19 1523      Row Name 10/01/19 0735             Functional Mobility    Functional Mobility- Ind. Level  supervision required  -KD      Functional Mobility- Device  other (see comments) Pt used HR in hallway no AD  -KD      Functional Mobility-Distance (Feet)  145  -KD      Functional Mobility- Comment  pt ambulated w/ no 02, sats 99% after gait. Pt required vc's for HH use.  -KD      Recorded by [KD] Marisol Mcclal BAI/L 10/01/19 0844      Row Name 10/01/19 1452             Transfer Assessment/Treatment    Transfer Assessment/Treatment  sit-stand transfer;stand-sit transfer  -JW      Recorded by [JW] Philip Snyder, PTA 10/01/19 1523      Row Name 10/01/19 1452 10/01/19 0735          Sit-Stand Transfer    Sit-Stand [place] (Transfers)  supervision  -  supervision  -KD     Assistive Device (Sit-Stand Transfers)  other (see comments) No AD  -JW  other (see comments)  -KD     Recorded by [JW] Philip Snyder, PTA 10/01/19 1523 [KD] Marisol Mccall BAI/L 10/01/19 0844     Row Name 10/01/19 1452 10/01/19 0735          Stand-Sit Transfer    Stand-Sit [place] (Transfers)  supervision  -  supervision  -KD     Assistive Device (Stand-Sit Transfers)  other (see comments) No AD  -JW  --     Recorded by [JW] Philip Snyder, PTA 10/01/19 1523 [KD] Marisol Mccall BAI/L 10/01/19 0844     Row Name 10/01/19 1452             Gait/Stairs Assessment/Training    [place] Level (Gait)  contact guard;stand by assist  -      Assistive Device (Gait)  -- intermitt use of 1 handrail  -JW      Distance in Feet (Gait)  200   -JW      Pattern (Gait)  step-through  -JW      Deviations/Abnormal Patterns (Gait)  gait speed decreased;stride length decreased;base of support, narrow  -JW      Recorded by [JW] Philip Snyder, PTA 10/01/19 1523      Row Name 10/01/19 0735             Therapeutic Exercise    Upper Extremity Range of Motion (Therapeutic Exercise)  shoulder abduction/adduction, bilateral;shoulder horizontal abduction/adduction, bilateral;shoulder internal/external rotation, bilateral;elbow flexion/extension, bilateral;forearm supination/pronation, bilateral;wrist flexion/extension, bilateral  -KD      Weight/Resistance (Therapeutic Exercise)  2 pounds  -KD      Exercise Type (Therapeutic Exercise)  AROM (active range of motion)  -KD      Position (Therapeutic Exercise)  seated  -KD      Equipment (Therapeutic Exercise)  free weight, barbell  -KD      Expected Outcome (Therapeutic Exercise)  facilitate normal movement patterns  -KD      Recorded by [KD] Marisol Mccall COTA/L 10/01/19 0844      Row Name 10/01/19 1452 10/01/19 0735          Positioning and Restraints    Pre-Treatment Position  sitting in chair/recliner  -JW  sitting in chair/recliner  -KD     Post Treatment Position  chair  -JW  --     In Bed  call light within reach;encouraged to call for assist;with family/caregiver;with nsg  -JW  sitting;call light within reach;encouraged to call for assist  -KD     Recorded by [JW] Philip Snyder, PTA 10/01/19 1523 [KD] Marisol Mccall BAI/L 10/01/19 0844     Row Name 10/01/19 1452 10/01/19 0735          Pain Scale: Numbers Pre/Post-Treatment    Pain Scale: Numbers, Pretreatment  5/10  -JW  2/10  -KD     Pain Scale: Numbers, Post-Treatment  5/10  -JW  2/10  -KD     Pain Location - Side  Left  -JW  --     Pain Location - Orientation  anterior  -JW  --     Pain Location  chest  -JW  chest denies meds  -KD     Recorded by [JW] Philip Snyder, PTA 10/01/19 1523 [KD] Marisol Mccall BAI/L 10/01/19 0844     Row Name                 Wound 09/25/19 0902 midline sternal Incision    Wound - Properties Group Date first assessed: 09/25/19 [KM] Time first assessed: 0902 [KM] Present on Hospital Admission: N [KM] Orientation: midline [KM] Location: sternal [KM] Primary Wound Type: Incision [KM] Recorded by:  [KM] Kate Becker RN 09/25/19 0903    Row Name                Wound 09/25/19 0903 Left leg Incision    Wound - Properties Group Date first assessed: 09/25/19 [KM] Time first assessed: 0903 [KM] Present on Hospital Admission: N [KM] Side: Left [KM] Location: leg [KM] Primary Wound Type: Incision [KM] Recorded by:  [KM] Kate Becker RN 09/25/19 0903    Row Name 10/01/19 0735             Outcome Summary/Treatment Plan (OT)    Daily Summary of Progress (OT)  progress toward functional goals as expected  -KD      Plan for Continued Treatment (OT)  cont ot poc  -KD      Anticipated Discharge Disposition (OT)  anticipate therapy at next level of care  -KD      Recorded by [KD] Marisol Mccall BAI/L 10/01/19 0844      Row Name 10/01/19 1452             Outcome Summary/Treatment Plan (PT)    Daily Summary of Progress (PT)  progress toward functional goals as expected  -      Plan for Continued Treatment (PT)  Cont per POC  -JW      Anticipated Discharge Disposition (PT)  anticipate therapy at next level of care  -      Recorded by [JW] Philip Snyder, PTA 10/01/19 1523        User Key  (r) = Recorded By, (t) = Taken By, (c) = Cosigned By    Initials Name Effective Dates Discipline     Philip Snyder, PTA 03/07/18 -  PT    KD Marisol Mccall BAI/L 03/07/18 -  OT    KM Kate Becker, RN 05/21/18 -  Nurse          Wound 09/25/19 0902 midline sternal Incision (Active)   Dressing Appearance dry;intact 9/30/2019  9:07 PM   Closure Liquid skin adhesive;Approximated 10/1/2019  2:00 PM   Base dressing in place, unable to visualize 10/1/2019 10:00 AM   Periwound pink 10/1/2019  2:00 PM   Drainage Amount none 10/1/2019  2:00 PM   Care,  Wound cleansed with;antimicrobial agent applied 10/1/2019  2:00 PM   Dressing Care, Wound dressing changed 10/1/2019  2:00 PM       Wound 09/25/19 0903 Left leg Incision (Active)   Dressing Appearance open to air 10/1/2019 10:00 AM   Closure Liquid skin adhesive 10/1/2019 10:00 AM   Base purple 10/1/2019 10:00 AM   Periwound intact;dry;ecchymotic 9/30/2019  9:07 PM   Periwound Temperature warm 9/30/2019  9:07 PM   Periwound Skin Turgor soft 9/30/2019  9:07 PM   Drainage Amount none 10/1/2019 10:00 AM   Care, Wound cleansed with;antimicrobial agent applied 10/1/2019  2:00 PM       Rehab Goal Summary     Row Name 10/01/19 1452 10/01/19 0735          Physical Therapy Goals    Bed Mobility Goal Selection (PT)  bed mobility, PT goal 1  -JW  --     Transfer Goal Selection (PT)  transfer, PT goal 1  -JW  --     Gait Training Goal Selection (PT)  gait training, PT goal 1  -JW  --     Problem Specific Goal Selection (PT)  problem specific goal 1, PT;problem specific goal 2, PT  -JW  --        Bed Mobility Goal 1 (PT)    Activity/Assistive Device (Bed Mobility Goal 1, PT)  sit to supine;supine to sit  -JW  --     [place] Level/Cues Needed (Bed Mobility Goal 1, PT)  independent  -JW  --     Time Frame (Bed Mobility Goal 1, PT)  5 days  -JW  --     Progress/Outcomes (Bed Mobility Goal 1, PT)  goal not met  -JW  --        Transfer Goal 1 (PT)    Activity/Assistive Device (Transfer Goal 1, PT)  sit-to-stand/stand-to-sit  -JW  --     [place] Level/Cues Needed (Transfer Goal 1, PT)  independent  -JW  --     Time Frame (Transfer Goal 1, PT)  5 days  -JW  --     Progress/Outcome (Transfer Goal 1, PT)  goal not met  -JW  --        Gait Training Goal 1 (PT)    Activity/Assistive Device (Gait Training Goal 1, PT)  gait (walking locomotion);decrease fall risk;diminish gait deviation;improve balance and speed;increase endurance/gait distance;increase energy conservation;maintain weight bearing status;forward stepping  -JW  --      [place] Level (Gait Training Goal 1, PT)  independent  -JW  --     Distance (Gait Goal 1, PT)  100 FT X1  -JW  --     Time Frame (Gait Training Goal 1, PT)  5 days  -JW  --     Progress/Outcome (Gait Training Goal 1, PT)  good progress toward goal  -JW  --        Problem Specific Goal 1 (PT)    Problem Specific Goal 1 (PT)  Patient will improve Tinetti balance gait assessment by 5 points.  -JW  --     Time Frame (Problem Specific Goal 1, PT)  3 - 5 days  -JW  --     Progress/Outcome (Problem Specific Goal 1, PT)  goal not met  -JW  --        Problem Specific Goal 2 (PT)    Problem Specific Goal 2 (PT)  Perform [place]etti balance and gait assessment .  -JW  --     Time Frame (Problem Specific Goal 2, PT)  3 - 5 days  -JW  --     Progress/Outcome (Problem Specific Goal 2, PT)  goal not met  -JW  --        Occupational Therapy Goals    Transfer Goal Selection (OT)  --  transfer, OT goal 1  -KD     Bathing Goal Selection (OT)  --  bathing, OT goal 1  -KD     Dressing Goal Selection (OT)  --  dressing, OT goal 1  -KD     Toileting Goal Selection (OT)  --  toileting, OT goal 1  -KD        Transfer Goal 1 (OT)    Activity/Assistive Device (Transfer Goal 1, OT)  --  transfers, all  -KD     [place] Level/Cues Needed (Transfer Goal 1, OT)  --  supervision required  -KD     Time Frame (Transfer Goal 1, OT)  --  long term goal (LTG);by discharge  -KD     Progress/Outcome (Transfer Goal 1, OT)  --  goal met  -KD        Bathing Goal 1 (OT)    Activity/Assistive Device (Bathing Goal 1, OT)  --  bathing skills, all;long-handled sponge  -KD     [place] Level/Cues Needed (Bathing Goal 1, OT)  --  supervision required;set-up required  -KD     Time Frame (Bathing Goal 1, OT)  --  long term goal (LTG);by discharge  -KD     Progress/Outcomes (Bathing Goal 1, OT)  --  goal not met  -KD        Dressing Goal 1 (OT)    Activity/Assistive Device (Dressing Goal 1, OT)  --  dressing skills, all  -KD     [place]/Cues Needed  (Dressing Goal 1, OT)  --  supervision required;set-up required  -KD     Time Frame (Dressing Goal 1, OT)  --  long term goal (LTG);by discharge  -KD     Progress/Outcome (Dressing Goal 1, OT)  --  goal not met  -KD        Toileting Goal 1 (OT)    Activity/Device (Toileting Goal 1, OT)  --  toileting skills, all  -KD     [place] Level/Cues Needed (Toileting Goal 1, OT)  --  supervision required  -KD     Time Frame (Toileting Goal 1, OT)  --  long term goal (LTG);by discharge  -KD     Progress/Outcome (Toileting Goal 1, OT)  --  goal met  -KD        Patient Education Goal (OT)    Activity (Patient Education Goal, OT)  --  Pt to demo understanding of sternal precautions as indication of improved safety awareness.   -KD     [place]/Cues/Accuracy (Memory Goal 2, OT)  --  demonstrates adequately  -KD     Time Frame (Patient Education Goal, OT)  --  long term goal (LTG);by discharge  -KD     Progress/Outcome (Patient Education Goal, OT)  --  goal met  -KD       User Key  (r) = Recorded By, (t) = Taken By, (c) = Cosigned By    Initials Name Provider Type Discipline    JW Philip Snyder, AMIE Physical Therapy Assistant PT    KD Marisol Mccall COTA/L Occupational Therapy Assistant OT          Physical Therapy Education     Title: PT OT SLP Therapies (In Progress)     Topic: Physical Therapy (In Progress)     Point: Mobility training (Done)     Learning Progress Summary           Patient Acceptance, E,TB, VU by ARLINE at 9/26/2019 10:34 AM    Comment:  PT provided  Verbal/visual/tactile cues for hand placement, body mechanics, safety, and PT POC.                               User Key     Initials Effective Dates Name Provider Type Discipline    ARLINE 06/25/19 -  Km Lee Physical Therapist PT                PT Recommendation and Plan  Anticipated Discharge Disposition (PT): anticipate therapy at next level of care  Therapy Frequency (PT Clinical Impression): 5 times/wk  Outcome Summary/Treatment Plan (PT)  Daily  Summary of Progress (PT): progress toward functional goals as expected  Plan for Continued Treatment (PT): Cont per POC  Anticipated Discharge Disposition (PT): anticipate therapy at next level of care  Outcome Summary: Pt sitting in chair upon entry. Sit-stand supervision. Amb 200' CGA/SBA with intermittant use of 1 handrail. VSS with treatment. Pt will cont to benefit from further PT at next level of care.   Outcome Measures     Row Name 10/01/19 1500 09/30/19 1530 09/30/19 1020       How much help from another person do you currently need...    Turning from your back to your side while in flat bed without using bedrails?  3  -  3  -TW  --    Moving from lying on back to sitting on the side of a flat bed without bedrails?  3  -  3  -TW  --    Moving to and from a bed to a chair (including a wheelchair)?  3  -  3  -TW  --    Standing up from a chair using your arms (e.g., wheelchair, bedside chair)?  3  -  3  -TW  --    Climbing 3-5 steps with a railing?  3  -  3  -TW  --    To walk in hospital room?  3  -  3  -TW  --    AM-PAC 6 Clicks Score (PT)  18  -  18  -TW  --       How much help from another is currently needed...    Putting on and taking off regular lower body clothing?  --  --  3  -LW    Bathing (including washing, rinsing, and drying)  --  --  3  -LW    Toileting (which includes using toilet bed pan or urinal)  --  --  3  -LW    Putting on and taking off regular upper body clothing  --  --  3  -LW    Taking care of personal grooming (such as brushing teeth)  --  --  3  -LW    Eating meals  --  --  4  -LW    AM-PAC 6 Clicks Score (OT)  --  --  19  -LW       Functional Assessment    Outcome Measure Options  AM-PAC 6 Clicks Basic Mobility (PT)  -  AM-PAC 6 Clicks Basic Mobility (PT)  -  --      User Key  (r) = Recorded By, (t) = Taken By, (c) = Cosigned By    Initials Name Provider Type     Philip Snyder, AMIE Physical Therapy Assistant    TW Felipe Vela, AMIE Physical Therapy  Assistant    Joan Hagen COTA/L Occupational Therapy Assistant         Time Calculation:   PT Charges     Row Name 10/01/19 1527             Time Calculation    Start Time  1452  -      Stop Time  1508  -      Time Calculation (min)  16 min  -      PT Received On  10/01/19  -         Time Calculation- PT    Total Timed Code Minutes- PT  16 minute(s)  -        User Key  (r) = Recorded By, (t) = Taken By, (c) = Cosigned By    Initials Name Provider Type     Philip Snyder PTA Physical Therapy Assistant        Therapy Charges for Today     Code Description Service Date Service Provider Modifiers Qty    [id number] HC GAIT TRAINING EA 15 MIN 10/1/2019 Philip Snyder PTA GP 1          PT G-Codes  Outcome Measure Options: AM-PAC 6 Clicks Basic Mobility (PT)  AM-PAC 6 Clicks Score (PT): 18  AM-PAC 6 Clicks Score (OT): 19    Philip Snyder PTA  10/1/2019

## 2019-10-01 NOTE — CONSULTS
Adult Nutrition  Assessment    Patient Name:  Herbert White Sr.  YOB: 1965  MRN: [id number]  Admit Date:  9/25/2019    Assessment Date:  10/1/2019    Comments:  Pt reported pretty good appetite.  Indicated he a lost a few lb since admit but was unsure of how much.  Intake 100% - 6x.  Blood glucose - usually elvated.  Levemir insulin, sliding scale novolog prescribed.  Hgb, Hct are low.  Prenatal Vit, Vitt C prescribed.  Pt reported some nausea.  Pepcid, PRN Zofran prescribed.  Pt with dx CAD and S/P CABG x 3.  Heart Healthy Diet info used to provide Heart Healthy Diet ed and diet copy provided.  Pt discharged.    Reason for Assessment     Row Name 10/01/19 1607          Reason for Assessment    Reason For Assessment  per organizational policy Length of Stay, Heart Healhty Diet ed     Diagnosis  cardiac disease dx CAD - S/P CABG x 3     Identified At Risk by Screening Criteria  need for education             Labs/Tests/Procedures/Meds     Row Name 10/01/19 1609          Labs/Procedures/Meds    Lab Results Reviewed  reviewed, pertinent        Medications    Pertinent Medications Reviewed  reviewed, pertinent           Estimated/Assessed Needs     Row Name 10/01/19 1610          Calculation Measurements    Weight Used For Calculations  87.5 kg (192 lb 14.4 oz)        Estimated/Assessed Needs    Additional Documentation  Calorie Requirements (Group);Fluid Requirements (Group);[place]-St. Jeor Equation (Group);Protein Requirements (Group)        Calorie Requirements    Estimated Calorie Need Method  [place]-St Jeor     Measured Resting Energy Expenditure (MREE)  2283 Kcal/day        [place]-St. Jeor Equation    RMR ([place]-St. Jeor Equation)  1756.255        Protein Requirements    Est Protein Requirement Amount (gms/kg)  1.2 gm protein     Estimated Protein Requirements (gms/day)  105        Fluid Requirements    Estimated Fluid Requirements (mL/day)  2625     RDA Method (mL)  3385      Jaimee Method (over 20 kg)  3250         Nutrition Prescription Ordered     Row Name 10/01/19 1614          Nutrition Prescription PO    Current PO Diet  Regular     Common Modifiers  Cardiac;Consistent Carbohydrate         Evaluation of Received Nutrient/Fluid Intake     Row Name 10/01/19 1614          PO Evaluation    Number of Meals  6     % PO Intake  100% - 6x               Electronically signed by:  Faye Ramirez, ANNAMARIA  10/01/19 4:15 PM

## 2019-10-01 NOTE — PLAN OF CARE
Problem: Patient Care Overview  Goal: Plan of Care Review  Outcome: Ongoing (interventions implemented as appropriate)   10/01/19 0433 10/01/19 0735   Coping/Psychosocial   Plan of Care Reviewed With patient --    Plan of Care Review   Progress improving --    OTHER   Outcome Summary --  Sit-stand-sit-SBA, amb ~145' in hallway SBA w/ no AD, pt used HR. Pt completed BUE ther ex in all planes per sternal precautions w/ 2lb HW and RB's PRN. Pt required vc's to use HH. Pt educated on sternal precautions.  Cont OT POC.

## 2019-10-02 ENCOUNTER — READMISSION MANAGEMENT (OUTPATIENT)
Dept: CALL CENTER | Facility: HOSPITAL | Age: 54
End: 2019-10-02

## 2019-10-02 NOTE — OUTREACH NOTE
Prep Survey      Responses   Facility patient discharged from?  [facility]   Is patient eligible?  Yes   Discharge diagnosis  CAD, S/P CABG x3, [facility] left   Does the patient have one of the following disease processes/diagnoses(primary or secondary)?  Cardiothoracic surgery   Does the patient have Home health ordered?  Yes   What is the Home health agency?   [facility] transition visit   Is there a DME ordered?  No   Comments regarding appointments  Office will call   Prep survey completed?  Yes          Radha Moore RN

## 2019-10-02 NOTE — PAYOR COMM NOTE
"Gia Coates  [facility]  [phone])[phone]  (F)[phone]      auth#[id number]        CindyJarvis fu Sadiq Sr. (54 y.o. Male)     Date of Birth Social Security Number Address Home Phone MRN    1965  [address] [facility] [address] [phone] [id number]    [employer] Marital Status          Scientology        Admission Date Admission Type Admitting Provider Attending Provider Department, Room/Bed    9/25/19 Elective Xavier Ballard MD  [address], 303/1    Discharge Date Discharge Disposition Discharge Destination        10/1/2019 Home or Self Care              Attending Provider:  (none)   Allergies:  Ace Inhibitors, Wellbutrin [Bupropion]    Isolation:  None   Infection:  None   Code Status:  Prior    Ht:  184.2 cm (72.52\")   Wt:  107 kg (236 lb 11.2 oz)    Admission Cmt:  None   Principal Problem:  Coronary artery disease of native artery of native heart with stable angina pectoris (CMS/HCC) [I25.118]                 Active Insurance as of 9/25/2019     Primary Coverage     Payor Plan Insurance Group Employer/Plan Group    [employer] BLUE [employer]OSS [employer] BLUE [employer] BLUE [employer] PPO [id number]     Payor Plan Address Payor Plan Phone Number Payor Plan Fax Number Effective Dates    [address] [phone]  7/1/2019 - None Entered    [place] [address]       Subscriber Name Subscriber Birth Date Member ID       JARVIS WHITE SR. 1965 [id number]                 Emergency Contacts      (Rel.) Home Phone Work Phone Mobile Phone    Jarvis White Jr. (Son) [phone] -- [phone]               Discharge Summary      Zeferino Fernando APRN at 10/01/19 1402     Attestation signed by Xavier Ballard MD at 10/02/19 7781    Xavier DIAZ MD, personally performed the services described in this documentation as scribed by the above named individual in my presence, and it is both accurate and complete.  10/2/2019  7:39 AM       "            CVTS DISCHARGE SUMMARY  Date of Admission: 9/25/2019  5:29 AM  Date of Discharge:  10/1/2019    Admission Diagnosis:   Coronary artery disease of native artery of native heart with stable angina pectoris (CMS/[facility], located within [facility]) [I25.118]    Discharge Diagnosis:   Post-Op Diagnosis Codes:     * Coronary artery disease of native artery of native heart with stable angina pectoris (CMS/HCC) [I25.118]    Consults:   Consults     Date and Time Order Name Status Description    9/30/2019 0856 Inpatient Cardiology Consult Completed     9/13/2019 1213 Inpatient Cardiothoracic Surgery Consult Completed     9/12/2019 1645 Cardiology - Primary (on-call MD unless specified) Completed           Procedures Performed  Procedure(s):  CORONARY ARTERY BYPASS GRAFTING, ENDOSCOPIC VEIN HARVEST       (CELL SAVER)       Discharge Medications     Discharge Medications      New Medications      Instructions Start Date   amiodarone 200 MG tablet  Commonly known as:  PACERONE   200 mg, Oral, Every 12 Hours, Take 200mg twice a day for 7 days followed by 200mg daily      apixaban 5 MG tablet tablet  Commonly known as:  ELIQUIS   5 mg, Oral, Every 12 Hours Scheduled      ascorbic acid 500 MG tablet  Commonly known as:  VITAMIN C   500 mg, Oral, 2 Times Daily      atorvastatin 40 MG tablet  Commonly known as:  LIPITOR   40 mg, Oral, Nightly      clopidogrel 75 MG tablet  Commonly known as:  PLAVIX   75 mg, Oral, Daily   Start Date:  10/2/2019     magnesium oxide 400 (241.3 Mg) MG tablet tablet  Commonly known as:  MAGOX   400 mg, Oral, 2 Times Daily      metoprolol tartrate 25 MG tablet  Commonly known as:  LOPRESSOR   12.5 mg, Oral, Every 12 Hours Scheduled      prenatal vitamin 27-0.8 27-0.8 MG tablet tablet   1 tablet, Oral, Daily   Start Date:  10/2/2019     sennosides-docusate sodium 8.6-50 MG tablet  Commonly known as:  SENOKOT-S   1 tablet, Oral, 2 Times Daily         Continue These Medications      Instructions Start Date   metFORMIN 500 MG  tablet  Commonly known as:  GLUCOPHAGE   500 mg, Oral, 2 Times Daily With Meals      nitroglycerin 0.4 MG SL tablet  Commonly known as:  NITROSTAT   0.4 mg, Sublingual, Every 5 Minutes PRN, Take no more than 3 doses in 15 minutes.      ONE TOUCH DELICA LANCING DEV misc   delica lancing device if approved, otherwise use alternative      ONETOUCH DELICA LANCETS 33G misc   1 each, Does not apply, 4 Times Daily, delica if covered, use alternative if not covered      pregabalin 50 MG capsule  Commonly known as:  LYRICA   50 mg, Oral, 2 Times Daily      vitamin D 85344 units capsule capsule  Commonly known as:  ERGOCALCIFEROL   50,000 Units, Oral, 2 Times Weekly         Stop These Medications    amLODIPine 10 MG tablet  Commonly known as:  NORVASC     ASPIRIN LOW DOSE 81 MG EC tablet  Generic drug:  aspirin     Blood Glucose Test strip     metoprolol succinate XL 25 MG 24 hr tablet  Commonly known as:  TOPROL-XL     pravastatin 20 MG tablet  Commonly known as:  PRAVACHOL          Lidia Maria E WHITTINGTON  Reviewed risks, benefits, and habit forming potential and weaning from narcotic medication. Patient understands and wishes to receive prescription.  Prescription written for Percocet for post-surgical pain after Bowen placed on file.    Discharge Diet:   Diet Instructions     Diet: Regular, Consistent Carbohydrate, Cardiac      Discharge Diet:   Regular  Consistent Carbohydrate  Cardiac             Discharge Disposition: Home in stable condition with follow up appointments with CVTS Nurse Practitioner 1 week, Dr. Ballard 2 weeks, Cardiology/PCP as scheduled.     History of Present Illness/Hospital Course:   54 year old M with DM, HTN, Tobacco use has  Been having intermittent chest pain and significant SOB, also noted to have significant PVCs, underwent lexiscan which was without evidence of ischemia, however continued with chest discomfort and classic angina symptoms. Patient underwent cardiac cath to rule out obstructive  CAD The patient denies  bleeding issues. The patient denies  use of antiplatelet agents.  The patient reports CKD. Cath demonstrated severe CAD multivessel with lesions LAD 80% DX 70% OM2 60% PDA 70%. Echocardiogram WNL. While awaiting outpatient CABG he returned with intermittent chest discomfort. Medical management not currently maximized. CT consulted for surgical evaluation.     After adequate preop studies completed he was scheduled electively. Day of surgery he was admitted through [facility], taken to the operating suite placed under general anesthesia underwent said procedure without complications or difficulty.  Weaned easily from cardiopulmonary bypass with the assistance in drips of ABHISHEK, CARDENE, precedex, insulin and transferred to CCU in stable condition.  They began weaning mechanical ventilation with successful extubation and placed on oxygen per nasal cannula.  A total of 0 in blood products were given during their hospital stay.  POD1 they were out of bed to the chair and tolerated breakfast with stable glucose between the hours of 18-24 postoperatively.  Insulin drip was discontinued and they were continued on sliding scale insulin coverage as well as Lantus nightly.  Hemodynamics and neuro status remained stable for transfer to  telemetry. They continued with PT/OT participation to satisfactory levels with activities of daily living. Chest tubes were without air leak and removed with satisfactory post removal CXR viewed on 9/29.  Patient with a fib RVR, paroxsymal, required cardizem and IV amio for a period of time to control rate, converted to sinus on POD 5, embolic prophylaxis with eliquis, cardiology following case. Dr. Rice will see in 4 weeks.  Hemodynamics remained stable, they remained afebrile postoperatively, and in regular sinus rhythm for satisfactory discharge home in stable condition on POD 6.      Vital Signs  Temp:  [96.8 °F (36 °C)-98.6 °F (37 °C)] 97.6 °F (36.4 °C)  Heart Rate:   "[] 55  Resp:  [18] 18  BP: (126-146)/(65-92) 126/65      09/29/19  0417 09/30/19  0600 10/01/19  0425   Weight: 107 kg (235 lb 6.4 oz) 107 kg (236 lb 11.2 oz) 107 kg (236 lb 11.2 oz)       Pertinent Test Results:  Lab Results   Component Value Date    WBC 6.64 09/30/2019    HGB 11.1 (L) 09/30/2019    HCT 32.7 (L) 09/30/2019    MCV 85.4 09/30/2019     09/30/2019     Lab Results   Component Value Date    GLUCOSE 99 09/30/2019    BUN 10 09/30/2019    CREATININE 0.60 (L) 09/30/2019    EGFRIFNONA 140 09/30/2019    BCR 16.7 09/30/2019    K 4.4 09/30/2019    CO2 29.0 09/30/2019    CALCIUM 8.9 09/30/2019    ALBUMIN 3.70 09/28/2019    AST 50 (H) 09/28/2019    ALT 73 (H) 09/28/2019          Physical Exam   Objective:  General Appearance:  Comfortable, well-appearing, in no acute distress and in pain.    Vital signs: (most recent): Blood pressure 126/65, pulse 55, temperature 97.6 °F (36.4 °C), temperature source Temporal, resp. rate 18, height 184.2 cm (72.52\"), weight 107 kg (236 lb 11.2 oz), SpO2 99 %.  Vital signs are normal.  No fever.    Output: Producing urine and no stool output.    HEENT: Normal HEENT exam.    Lungs:  Normal effort and normal respiratory rate.  There are decreased breath sounds.  (CTx2  removed)  Heart: Normal rate.  Regular rhythm.  S1 normal and S2 normal.     Physical Exam   Objective:  General Appearance:  Comfortable, well-appearing, in no acute distress and in pain.    Vital signs: (most recent): Blood pressure 126/65, pulse 55, temperature 97.6 °F (36.4 °C), temperature source Temporal, resp. rate 18, height 184.2 cm (72.52\"), weight 107 kg (236 lb 11.2 oz), SpO2 99 %.  Vital signs are normal.  No fever.    Output: Producing urine and  BM.    HEENT: Normal HEENT exam.    Lungs:  Normal effort and normal respiratory rate.  There are decreased breath sounds.  (CTx2  removed)  Heart: Normal rate.  Regular rhythm.  S1 normal and S2 normal.  (V wires removed     NSR)  Chest: Symmetric " chest wall expansion. Chest wall tenderness present.    Abdomen: Abdomen is soft.  Hypoactive bowel sounds.   There is no abdominal tenderness.     Extremities: Normal range of motion.  There is dependent edema.    Pulses: Distal pulses are intact.  There are no decreased pulses.    Neurological: Patient is alert and oriented to person, place and time.  GCS score is 15.    Pupils:  Pupils are equal, round, and reactive to light.    Skin:  Warm and dry.  (Midsternal incision CDI)     NSR)  Chest: Symmetric chest wall expansion. Chest wall tenderness present.    Abdomen: Abdomen is soft.  Hypoactive bowel sounds.   There is no abdominal tenderness.     Extremities: Normal range of motion.  There is dependent edema.    Pulses: Distal pulses are intact.  There are no decreased pulses.    Neurological: Patient is alert and oriented to person, place and time.  GCS score is 15.    Pupils:  Pupils are equal, round, and reactive to light.    Skin:  Warm and dry.  (Midsternal incision CDI)  Additional Instructions for the Follow-ups that You Need to Schedule     Call MD With Problems / Concerns   As directed      Instructions: Contact provider for shortness of breath, fever, chills, drainage at incision site, or uncontrolled pain    Order Comments:  Instructions: Contact provider for shortness of breath, fever, chills, drainage at incision site, or uncontrolled pain          Discharge Follow-up with PCP   As directed       Currently Documented PCP:    Artemio Sanchez MD    PCP Phone Number:    [phone]     Follow Up Details:  12 weeks         Discharge Follow-up with Specified Provider: Oscar WHITTINGTON 10/4/19 @ 11:00; 1 Week   As directed      To:  Oscar WHITTINGTON 10/4/19 @ 11:00    Follow Up:  1 Week    Follow Up Details:  10/4/19 @ 1100         Discharge Follow-up with Specified Provider: Dr. Rice; 1 Month   As directed      To:  Dr. Rice    Follow Up:  1 Month         Referral to Home Health   As directed      Face  to Face Visit Date:  10/1/2019    Follow-up provider for Plan of Care?:  I will be treating the patient on an ongoing basis.  Please send me the Plan of Care for signature.    Follow-up provider:  XAVIER HODGES [[id number]]    Reason/Clinical Findings:  CABG    Describe mobility limitations that make leaving home difficult:  Resitrctions    Nursing/Therapeutic Services Requested:  Other (H/H transition visit)    Frequency:  1 Week 1               Discharge Instructions: Discharge instructions include no heavy lifting anything greater than 10lbs for approximately 12 weeks.  No sex or driving for 3-6 weeks. Printed information given to the patient with advancement of activities weekly.  Risks and benefits of narcotic medications and weaning postoperatively have been discussed. Clean operative site with antibacterial soap/water, pat dry. Keep open to air unless draining, then may apply dry dressing.  No ointments or creams unless prescribed by provider. Signs and symptoms of infection including drainage from operative site, redness, swelling, with associated fever and/or chills notify [facility] V[facility] center immediately for wound check.  Patient verbalizes understanding of discharge instructions, all questions are answered, follow up appointments have been made, they are discharged home in stable condition.         Follow-up Appointments  Future Appointments   Date Time Provider Department Center   10/4/2019 11:00 AM Zeferino Fernando APRN [facility] None   10/29/2019 11:15 AM Martinez Card APRN [facility] None       Test Results Pending at Discharge           This document has been electronically signed by Oscar Fernando AGACNP-BC @  On October 1, 2019 2:03 PM      Time: Discharge 45 min      Electronically signed by Xavier Hodges MD at 10/02/19 0739       Discharge Order (From admission, onward)    Start     Ordered    10/01/19 1353  Discharge patient  Once     Expected Discharge Date:  10/01/19     Discharge Disposition:  Home or Self Care    Physician of Record for Attribution - Please select from Treatment Team:  KAILEY HODGES [[id number]]    Review needed by CMO to determine Physician of Record:  No       Question Answer Comment   Physician of Record for Attribution - Please select from Treatment Team KAILEY HODGES    Review needed by CMO to determine Physician of Record No        10/01/19 7256

## 2019-10-03 ENCOUNTER — APPOINTMENT (OUTPATIENT)
Dept: CT IMAGING | Facility: HOSPITAL | Age: 54
End: 2019-10-03

## 2019-10-03 ENCOUNTER — APPOINTMENT (OUTPATIENT)
Dept: GENERAL RADIOLOGY | Facility: HOSPITAL | Age: 54
End: 2019-10-03

## 2019-10-03 ENCOUNTER — HOSPITAL ENCOUNTER (OUTPATIENT)
Facility: HOSPITAL | Age: 54
Setting detail: OBSERVATION
Discharge: HOME | End: 2019-10-04
Attending: EMERGENCY MEDICINE | Admitting: HOSPITALIST

## 2019-10-03 DIAGNOSIS — J18.9 PNEUMONIA OF BOTH LOWER LOBES DUE TO INFECTIOUS ORGANISM: Primary | ICD-10-CM

## 2019-10-03 DIAGNOSIS — Z48.812 SURGICAL AFTERCARE, CIRCULATORY SYSTEM: ICD-10-CM

## 2019-10-03 DIAGNOSIS — I48.91 ATRIAL FIBRILLATION WITH RAPID VENTRICULAR RESPONSE (HCC): ICD-10-CM

## 2019-10-03 LAB
ABO GROUP BLD: NORMAL
ALBUMIN SERPL-MCNC: 3.7 G/DL (ref 3.5–5.2)
ALBUMIN/GLOB SERPL: 1.2 G/DL
ALP SERPL-CCNC: 201 U/L (ref 39–117)
ALT SERPL W P-5'-P-CCNC: 27 U/L (ref 1–41)
ANION GAP SERPL CALCULATED.3IONS-SCNC: 14 MMOL/L (ref 5–15)
APTT PPP: 33.3 SECONDS (ref 20–40.3)
AST SERPL-CCNC: 16 U/L (ref 1–40)
BACTERIA UR QL AUTO: ABNORMAL /HPF
BASOPHILS # BLD AUTO: 0.03 10*3/MM3 (ref 0–0.2)
BASOPHILS NFR BLD AUTO: 0.3 % (ref 0–1.5)
BILIRUB SERPL-MCNC: 0.9 MG/DL (ref 0.2–1.2)
BILIRUB UR QL STRIP: ABNORMAL
BLD GP AB SCN SERPL QL: NEGATIVE
BUN BLD-MCNC: 13 MG/DL (ref 6–20)
BUN/CREAT SERPL: 18.1 (ref 7–25)
CALCIUM SPEC-SCNC: 9.1 MG/DL (ref 8.6–10.5)
CHLORIDE SERPL-SCNC: 102 MMOL/L (ref 98–107)
CLARITY UR: ABNORMAL
CO2 SERPL-SCNC: 26 MMOL/L (ref 22–29)
COLOR UR: ABNORMAL
CREAT BLD-MCNC: 0.72 MG/DL (ref 0.76–1.27)
D-DIMER, QUANTITATIVE (MAD,POW, STR): 1604 NG/ML (FEU) (ref 0–470)
D-LACTATE SERPL-SCNC: 1.5 MMOL/L (ref 0.5–2)
DEPRECATED RDW RBC AUTO: 38.8 FL (ref 37–54)
EOSINOPHIL # BLD AUTO: 0.04 10*3/MM3 (ref 0–0.4)
EOSINOPHIL NFR BLD AUTO: 0.3 % (ref 0.3–6.2)
ERYTHROCYTE [DISTWIDTH] IN BLOOD BY AUTOMATED COUNT: 12.8 % (ref 12.3–15.4)
GFR SERPL CREATININE-BSD FRML MDRD: 113 ML/MIN/1.73
GLOBULIN UR ELPH-MCNC: 3.1 GM/DL
GLUCOSE BLD-MCNC: 187 MG/DL (ref 65–99)
GLUCOSE UR STRIP-MCNC: ABNORMAL MG/DL
HCT VFR BLD AUTO: 35.6 % (ref 37.5–51)
HGB BLD-MCNC: 12 G/DL (ref 13–17.7)
HGB UR QL STRIP.AUTO: NEGATIVE
HOLD SPECIMEN: NORMAL
HOLD SPECIMEN: NORMAL
HYALINE CASTS UR QL AUTO: ABNORMAL /LPF
IMM GRANULOCYTES # BLD AUTO: 0.17 10*3/MM3 (ref 0–0.05)
IMM GRANULOCYTES NFR BLD AUTO: 1.4 % (ref 0–0.5)
INR PPP: 1.23 (ref 0.8–1.2)
KETONES UR QL STRIP: ABNORMAL
LEUKOCYTE ESTERASE UR QL STRIP.AUTO: ABNORMAL
LYMPHOCYTES # BLD AUTO: 1.09 10*3/MM3 (ref 0.7–3.1)
LYMPHOCYTES NFR BLD AUTO: 9.2 % (ref 19.6–45.3)
Lab: NORMAL
MAGNESIUM SERPL-MCNC: 1.9 MG/DL (ref 1.6–2.6)
MCH RBC QN AUTO: 28.9 PG (ref 26.6–33)
MCHC RBC AUTO-ENTMCNC: 33.7 G/DL (ref 31.5–35.7)
MCV RBC AUTO: 85.8 FL (ref 79–97)
MONOCYTES # BLD AUTO: 0.52 10*3/MM3 (ref 0.1–0.9)
MONOCYTES NFR BLD AUTO: 4.4 % (ref 5–12)
NEUTROPHILS # BLD AUTO: 9.98 10*3/MM3 (ref 1.7–7)
NEUTROPHILS NFR BLD AUTO: 84.4 % (ref 42.7–76)
NITRITE UR QL STRIP: NEGATIVE
NRBC BLD AUTO-RTO: 0 /100 WBC (ref 0–0.2)
NT-PROBNP SERPL-MCNC: 2968 PG/ML (ref 5–900)
PH UR STRIP.AUTO: 7 [PH] (ref 5–9)
PLATELET # BLD AUTO: 398 10*3/MM3 (ref 140–450)
PMV BLD AUTO: 9.9 FL (ref 6–12)
POTASSIUM BLD-SCNC: 4.5 MMOL/L (ref 3.5–5.2)
PROT SERPL-MCNC: 6.8 G/DL (ref 6–8.5)
PROT UR QL STRIP: ABNORMAL
PROTHROMBIN TIME: 15.3 SECONDS (ref 11.1–15.3)
RBC # BLD AUTO: 4.15 10*6/MM3 (ref 4.14–5.8)
RBC # UR: ABNORMAL /HPF
REF LAB TEST METHOD: ABNORMAL
RH BLD: POSITIVE
SODIUM BLD-SCNC: 142 MMOL/L (ref 136–145)
SP GR UR STRIP: 1.03 (ref 1–1.03)
SQUAMOUS #/AREA URNS HPF: ABNORMAL /HPF
T&S EXPIRATION DATE: NORMAL
TROPONIN T SERPL-MCNC: 0.03 NG/ML (ref 0–0.03)
TROPONIN T SERPL-MCNC: 0.03 NG/ML (ref 0–0.03)
UROBILINOGEN UR QL STRIP: ABNORMAL
WBC NRBC COR # BLD: 11.83 10*3/MM3 (ref 3.4–10.8)
WBC UR QL AUTO: ABNORMAL /HPF
WHOLE BLOOD HOLD SPECIMEN: NORMAL
WHOLE BLOOD HOLD SPECIMEN: NORMAL

## 2019-10-03 PROCEDURE — G0378 HOSPITAL OBSERVATION PER HR: HCPCS

## 2019-10-03 PROCEDURE — 87040 BLOOD CULTURE FOR BACTERIA: CPT | Performed by: EMERGENCY MEDICINE

## 2019-10-03 PROCEDURE — 99285 EMERGENCY DEPT VISIT HI MDM: CPT

## 2019-10-03 PROCEDURE — 83880 ASSAY OF NATRIURETIC PEPTIDE: CPT | Performed by: EMERGENCY MEDICINE

## 2019-10-03 PROCEDURE — 96376 TX/PRO/DX INJ SAME DRUG ADON: CPT

## 2019-10-03 PROCEDURE — 86850 RBC ANTIBODY SCREEN: CPT | Performed by: EMERGENCY MEDICINE

## 2019-10-03 PROCEDURE — 84484 ASSAY OF TROPONIN QUANT: CPT | Performed by: EMERGENCY MEDICINE

## 2019-10-03 PROCEDURE — 93010 ELECTROCARDIOGRAM REPORT: CPT | Performed by: INTERNAL MEDICINE

## 2019-10-03 PROCEDURE — 96366 THER/PROPH/DIAG IV INF ADDON: CPT

## 2019-10-03 PROCEDURE — 96368 THER/DIAG CONCURRENT INF: CPT

## 2019-10-03 PROCEDURE — 25010000002 LEVOFLOXACIN PER 250 MG: Performed by: EMERGENCY MEDICINE

## 2019-10-03 PROCEDURE — 93005 ELECTROCARDIOGRAM TRACING: CPT | Performed by: INTERNAL MEDICINE

## 2019-10-03 PROCEDURE — 71045 X-RAY EXAM CHEST 1 VIEW: CPT

## 2019-10-03 PROCEDURE — 85610 PROTHROMBIN TIME: CPT | Performed by: EMERGENCY MEDICINE

## 2019-10-03 PROCEDURE — 85025 COMPLETE CBC W/AUTO DIFF WBC: CPT | Performed by: EMERGENCY MEDICINE

## 2019-10-03 PROCEDURE — 96365 THER/PROPH/DIAG IV INF INIT: CPT

## 2019-10-03 PROCEDURE — 81001 URINALYSIS AUTO W/SCOPE: CPT | Performed by: EMERGENCY MEDICINE

## 2019-10-03 PROCEDURE — 83605 ASSAY OF LACTIC ACID: CPT | Performed by: EMERGENCY MEDICINE

## 2019-10-03 PROCEDURE — 86900 BLOOD TYPING SEROLOGIC ABO: CPT | Performed by: EMERGENCY MEDICINE

## 2019-10-03 PROCEDURE — 85379 FIBRIN DEGRADATION QUANT: CPT | Performed by: EMERGENCY MEDICINE

## 2019-10-03 PROCEDURE — 86901 BLOOD TYPING SEROLOGIC RH(D): CPT | Performed by: EMERGENCY MEDICINE

## 2019-10-03 PROCEDURE — 93005 ELECTROCARDIOGRAM TRACING: CPT | Performed by: EMERGENCY MEDICINE

## 2019-10-03 PROCEDURE — 96361 HYDRATE IV INFUSION ADD-ON: CPT

## 2019-10-03 PROCEDURE — 84484 ASSAY OF TROPONIN QUANT: CPT | Performed by: INTERNAL MEDICINE

## 2019-10-03 PROCEDURE — 80053 COMPREHEN METABOLIC PANEL: CPT | Performed by: EMERGENCY MEDICINE

## 2019-10-03 PROCEDURE — 93005 ELECTROCARDIOGRAM TRACING: CPT

## 2019-10-03 PROCEDURE — 85730 THROMBOPLASTIN TIME PARTIAL: CPT | Performed by: EMERGENCY MEDICINE

## 2019-10-03 PROCEDURE — 83735 ASSAY OF MAGNESIUM: CPT | Performed by: EMERGENCY MEDICINE

## 2019-10-03 RX ORDER — MAGNESIUM SULFATE HEPTAHYDRATE 40 MG/ML
2 INJECTION, SOLUTION INTRAVENOUS AS NEEDED
Status: DISCONTINUED | OUTPATIENT
Start: 2019-10-03 | End: 2019-10-04 | Stop reason: HOSPADM

## 2019-10-03 RX ORDER — LEVOFLOXACIN 5 MG/ML
750 INJECTION, SOLUTION INTRAVENOUS ONCE
Status: COMPLETED | OUTPATIENT
Start: 2019-10-03 | End: 2019-10-03

## 2019-10-03 RX ORDER — FAMOTIDINE 10 MG/ML
20 INJECTION, SOLUTION INTRAVENOUS EVERY 12 HOURS SCHEDULED
Status: DISCONTINUED | OUTPATIENT
Start: 2019-10-04 | End: 2019-10-04 | Stop reason: HOSPADM

## 2019-10-03 RX ORDER — ONDANSETRON 2 MG/ML
4 INJECTION INTRAMUSCULAR; INTRAVENOUS EVERY 6 HOURS PRN
Status: DISCONTINUED | OUTPATIENT
Start: 2019-10-03 | End: 2019-10-04 | Stop reason: HOSPADM

## 2019-10-03 RX ORDER — OXYCODONE HYDROCHLORIDE AND ACETAMINOPHEN 5; 325 MG/1; MG/1
1 TABLET ORAL EVERY 4 HOURS PRN
Status: DISCONTINUED | OUTPATIENT
Start: 2019-10-03 | End: 2019-10-04 | Stop reason: HOSPADM

## 2019-10-03 RX ORDER — NITROGLYCERIN 0.4 MG/1
0.4 TABLET SUBLINGUAL
Status: DISCONTINUED | OUTPATIENT
Start: 2019-10-03 | End: 2019-10-04 | Stop reason: HOSPADM

## 2019-10-03 RX ORDER — NICOTINE POLACRILEX 4 MG
15 LOZENGE BUCCAL
Status: DISCONTINUED | OUTPATIENT
Start: 2019-10-03 | End: 2019-10-04 | Stop reason: HOSPADM

## 2019-10-03 RX ORDER — CLOPIDOGREL BISULFATE 75 MG/1
75 TABLET ORAL DAILY
Status: DISCONTINUED | OUTPATIENT
Start: 2019-10-04 | End: 2019-10-04 | Stop reason: HOSPADM

## 2019-10-03 RX ORDER — ATORVASTATIN CALCIUM 40 MG/1
40 TABLET, FILM COATED ORAL NIGHTLY
Status: DISCONTINUED | OUTPATIENT
Start: 2019-10-04 | End: 2019-10-04 | Stop reason: HOSPADM

## 2019-10-03 RX ORDER — POTASSIUM CHLORIDE 1.5 G/1.77G
40 POWDER, FOR SOLUTION ORAL AS NEEDED
Status: DISCONTINUED | OUTPATIENT
Start: 2019-10-03 | End: 2019-10-04 | Stop reason: HOSPADM

## 2019-10-03 RX ORDER — BISACODYL 5 MG/1
5 TABLET, DELAYED RELEASE ORAL DAILY PRN
Status: DISCONTINUED | OUTPATIENT
Start: 2019-10-03 | End: 2019-10-04 | Stop reason: HOSPADM

## 2019-10-03 RX ORDER — AMIODARONE HYDROCHLORIDE 200 MG/1
200 TABLET ORAL EVERY 12 HOURS
Status: DISCONTINUED | OUTPATIENT
Start: 2019-10-04 | End: 2019-10-04 | Stop reason: HOSPADM

## 2019-10-03 RX ORDER — SODIUM CHLORIDE 0.9 % (FLUSH) 0.9 %
10 SYRINGE (ML) INJECTION EVERY 12 HOURS SCHEDULED
Status: DISCONTINUED | OUTPATIENT
Start: 2019-10-04 | End: 2019-10-04 | Stop reason: HOSPADM

## 2019-10-03 RX ORDER — SODIUM CHLORIDE 9 MG/ML
75 INJECTION, SOLUTION INTRAVENOUS CONTINUOUS
Status: DISCONTINUED | OUTPATIENT
Start: 2019-10-04 | End: 2019-10-04

## 2019-10-03 RX ORDER — ONDANSETRON 4 MG/1
4 TABLET, FILM COATED ORAL EVERY 6 HOURS PRN
Status: DISCONTINUED | OUTPATIENT
Start: 2019-10-03 | End: 2019-10-04 | Stop reason: HOSPADM

## 2019-10-03 RX ORDER — SODIUM CHLORIDE 9 MG/ML
125 INJECTION, SOLUTION INTRAVENOUS CONTINUOUS
Status: DISCONTINUED | OUTPATIENT
Start: 2019-10-03 | End: 2019-10-04

## 2019-10-03 RX ORDER — MAGNESIUM SULFATE HEPTAHYDRATE 40 MG/ML
4 INJECTION, SOLUTION INTRAVENOUS AS NEEDED
Status: DISCONTINUED | OUTPATIENT
Start: 2019-10-03 | End: 2019-10-04 | Stop reason: HOSPADM

## 2019-10-03 RX ORDER — DILTIAZEM HYDROCHLORIDE 5 MG/ML
25 INJECTION INTRAVENOUS ONCE
Status: COMPLETED | OUTPATIENT
Start: 2019-10-03 | End: 2019-10-03

## 2019-10-03 RX ORDER — DEXTROSE MONOHYDRATE 25 G/50ML
25 INJECTION, SOLUTION INTRAVENOUS
Status: DISCONTINUED | OUTPATIENT
Start: 2019-10-03 | End: 2019-10-04 | Stop reason: HOSPADM

## 2019-10-03 RX ORDER — POTASSIUM CHLORIDE 750 MG/1
40 CAPSULE, EXTENDED RELEASE ORAL AS NEEDED
Status: DISCONTINUED | OUTPATIENT
Start: 2019-10-03 | End: 2019-10-04 | Stop reason: HOSPADM

## 2019-10-03 RX ORDER — POTASSIUM CHLORIDE 7.45 MG/ML
10 INJECTION INTRAVENOUS
Status: DISCONTINUED | OUTPATIENT
Start: 2019-10-03 | End: 2019-10-04 | Stop reason: HOSPADM

## 2019-10-03 RX ORDER — PREGABALIN 50 MG/1
50 CAPSULE ORAL 2 TIMES DAILY
Status: DISCONTINUED | OUTPATIENT
Start: 2019-10-04 | End: 2019-10-04 | Stop reason: HOSPADM

## 2019-10-03 RX ORDER — SODIUM CHLORIDE 0.9 % (FLUSH) 0.9 %
10 SYRINGE (ML) INJECTION AS NEEDED
Status: DISCONTINUED | OUTPATIENT
Start: 2019-10-03 | End: 2019-10-04 | Stop reason: HOSPADM

## 2019-10-03 RX ADMIN — LEVOFLOXACIN 750 MG: 5 INJECTION, SOLUTION INTRAVENOUS at 22:03

## 2019-10-03 RX ADMIN — SODIUM CHLORIDE, PRESERVATIVE FREE 10 ML: 5 INJECTION INTRAVENOUS at 23:37

## 2019-10-03 RX ADMIN — SODIUM CHLORIDE, PRESERVATIVE FREE 10 ML: 5 INJECTION INTRAVENOUS at 18:04

## 2019-10-03 RX ADMIN — DILTIAZEM HYDROCHLORIDE 25 MG: 5 INJECTION INTRAVENOUS at 21:16

## 2019-10-03 RX ADMIN — SODIUM CHLORIDE 125 ML/HR: 900 INJECTION, SOLUTION INTRAVENOUS at 18:11

## 2019-10-03 RX ADMIN — SODIUM CHLORIDE, PRESERVATIVE FREE 10 ML: 5 INJECTION INTRAVENOUS at 22:03

## 2019-10-03 RX ADMIN — DILTIAZEM HYDROCHLORIDE 5 MG/HR: 5 INJECTION INTRAVENOUS at 19:08

## 2019-10-04 ENCOUNTER — READMISSION MANAGEMENT (OUTPATIENT)
Dept: CALL CENTER | Facility: HOSPITAL | Age: 54
End: 2019-10-04

## 2019-10-04 ENCOUNTER — APPOINTMENT (OUTPATIENT)
Dept: CT IMAGING | Facility: HOSPITAL | Age: 54
End: 2019-10-04

## 2019-10-04 VITALS
TEMPERATURE: 97.2 F | HEART RATE: 73 BPM | BODY MASS INDEX: 31.06 KG/M2 | DIASTOLIC BLOOD PRESSURE: 76 MMHG | SYSTOLIC BLOOD PRESSURE: 134 MMHG | WEIGHT: 242 LBS | RESPIRATION RATE: 18 BRPM | HEIGHT: 74 IN | OXYGEN SATURATION: 93 %

## 2019-10-04 LAB
ALBUMIN SERPL-MCNC: 2.9 G/DL (ref 3.5–5.2)
ALBUMIN/GLOB SERPL: 1.1 G/DL
ALP SERPL-CCNC: 152 U/L (ref 39–117)
ALT SERPL W P-5'-P-CCNC: 19 U/L (ref 1–41)
ANION GAP SERPL CALCULATED.3IONS-SCNC: 8 MMOL/L (ref 5–15)
ANION GAP SERPL CALCULATED.3IONS-SCNC: 9 MMOL/L (ref 5–15)
AST SERPL-CCNC: 13 U/L (ref 1–40)
BASOPHILS # BLD AUTO: 0.03 10*3/MM3 (ref 0–0.2)
BASOPHILS NFR BLD AUTO: 0.3 % (ref 0–1.5)
BILIRUB SERPL-MCNC: 0.6 MG/DL (ref 0.2–1.2)
BUN BLD-MCNC: 14 MG/DL (ref 6–20)
BUN BLD-MCNC: 17 MG/DL (ref 6–20)
BUN/CREAT SERPL: 22.2 (ref 7–25)
BUN/CREAT SERPL: 23.3 (ref 7–25)
CALCIUM SPEC-SCNC: 8.5 MG/DL (ref 8.6–10.5)
CALCIUM SPEC-SCNC: 8.8 MG/DL (ref 8.6–10.5)
CHLORIDE SERPL-SCNC: 104 MMOL/L (ref 98–107)
CHLORIDE SERPL-SCNC: 105 MMOL/L (ref 98–107)
CHOLEST SERPL-MCNC: 101 MG/DL (ref 0–200)
CO2 SERPL-SCNC: 26 MMOL/L (ref 22–29)
CO2 SERPL-SCNC: 27 MMOL/L (ref 22–29)
CREAT BLD-MCNC: 0.63 MG/DL (ref 0.76–1.27)
CREAT BLD-MCNC: 0.73 MG/DL (ref 0.76–1.27)
DEPRECATED RDW RBC AUTO: 38.3 FL (ref 37–54)
EOSINOPHIL # BLD AUTO: 0.11 10*3/MM3 (ref 0–0.4)
EOSINOPHIL NFR BLD AUTO: 1.1 % (ref 0.3–6.2)
ERYTHROCYTE [DISTWIDTH] IN BLOOD BY AUTOMATED COUNT: 12.7 % (ref 12.3–15.4)
GFR SERPL CREATININE-BSD FRML MDRD: 112 ML/MIN/1.73
GFR SERPL CREATININE-BSD FRML MDRD: 132 ML/MIN/1.73
GLOBULIN UR ELPH-MCNC: 2.6 GM/DL
GLUCOSE BLD-MCNC: 152 MG/DL (ref 65–99)
GLUCOSE BLD-MCNC: 77 MG/DL (ref 65–99)
GLUCOSE BLDC GLUCOMTR-MCNC: 110 MG/DL (ref 70–130)
GLUCOSE BLDC GLUCOMTR-MCNC: 169 MG/DL (ref 70–130)
GLUCOSE BLDC GLUCOMTR-MCNC: 223 MG/DL (ref 70–130)
HCT VFR BLD AUTO: 30.7 % (ref 37.5–51)
HDLC SERPL-MCNC: 27 MG/DL (ref 40–60)
HGB BLD-MCNC: 10.7 G/DL (ref 13–17.7)
IMM GRANULOCYTES # BLD AUTO: 0.13 10*3/MM3 (ref 0–0.05)
IMM GRANULOCYTES NFR BLD AUTO: 1.3 % (ref 0–0.5)
LDLC SERPL CALC-MCNC: 54 MG/DL (ref 0–100)
LDLC/HDLC SERPL: 2 {RATIO}
LYMPHOCYTES # BLD AUTO: 2.03 10*3/MM3 (ref 0.7–3.1)
LYMPHOCYTES NFR BLD AUTO: 19.9 % (ref 19.6–45.3)
MAGNESIUM SERPL-MCNC: 2 MG/DL (ref 1.6–2.6)
MCH RBC QN AUTO: 29.8 PG (ref 26.6–33)
MCHC RBC AUTO-ENTMCNC: 34.9 G/DL (ref 31.5–35.7)
MCV RBC AUTO: 85.5 FL (ref 79–97)
MONOCYTES # BLD AUTO: 0.73 10*3/MM3 (ref 0.1–0.9)
MONOCYTES NFR BLD AUTO: 7.2 % (ref 5–12)
NEUTROPHILS # BLD AUTO: 7.17 10*3/MM3 (ref 1.7–7)
NEUTROPHILS NFR BLD AUTO: 70.2 % (ref 42.7–76)
NRBC BLD AUTO-RTO: 0 /100 WBC (ref 0–0.2)
NT-PROBNP SERPL-MCNC: 2502 PG/ML (ref 5–900)
PHOSPHATE SERPL-MCNC: 3 MG/DL (ref 2.5–4.5)
PLATELET # BLD AUTO: 393 10*3/MM3 (ref 140–450)
PMV BLD AUTO: 9.7 FL (ref 6–12)
POTASSIUM BLD-SCNC: 3.9 MMOL/L (ref 3.5–5.2)
POTASSIUM BLD-SCNC: 4.1 MMOL/L (ref 3.5–5.2)
PROT SERPL-MCNC: 5.5 G/DL (ref 6–8.5)
RBC # BLD AUTO: 3.59 10*6/MM3 (ref 4.14–5.8)
SODIUM BLD-SCNC: 139 MMOL/L (ref 136–145)
SODIUM BLD-SCNC: 140 MMOL/L (ref 136–145)
TRIGL SERPL-MCNC: 100 MG/DL (ref 0–150)
TROPONIN T SERPL-MCNC: 0.03 NG/ML (ref 0–0.03)
TROPONIN T SERPL-MCNC: 0.04 NG/ML (ref 0–0.03)
TROPONIN T SERPL-MCNC: 0.05 NG/ML (ref 0–0.03)
VLDLC SERPL-MCNC: 20 MG/DL
WBC NRBC COR # BLD: 10.2 10*3/MM3 (ref 3.4–10.8)

## 2019-10-04 PROCEDURE — 83880 ASSAY OF NATRIURETIC PEPTIDE: CPT | Performed by: INTERNAL MEDICINE

## 2019-10-04 PROCEDURE — 71275 CT ANGIOGRAPHY CHEST: CPT

## 2019-10-04 PROCEDURE — 82962 GLUCOSE BLOOD TEST: CPT

## 2019-10-04 PROCEDURE — 90674 CCIIV4 VAC NO PRSV 0.5 ML IM: CPT | Performed by: INTERNAL MEDICINE

## 2019-10-04 PROCEDURE — 96375 TX/PRO/DX INJ NEW DRUG ADDON: CPT

## 2019-10-04 PROCEDURE — G0378 HOSPITAL OBSERVATION PER HR: HCPCS

## 2019-10-04 PROCEDURE — 83735 ASSAY OF MAGNESIUM: CPT | Performed by: INTERNAL MEDICINE

## 2019-10-04 PROCEDURE — 80061 LIPID PANEL: CPT | Performed by: INTERNAL MEDICINE

## 2019-10-04 PROCEDURE — 80048 BASIC METABOLIC PNL TOTAL CA: CPT | Performed by: INTERNAL MEDICINE

## 2019-10-04 PROCEDURE — 96361 HYDRATE IV INFUSION ADD-ON: CPT

## 2019-10-04 PROCEDURE — G0008 ADMIN INFLUENZA VIRUS VAC: HCPCS | Performed by: INTERNAL MEDICINE

## 2019-10-04 PROCEDURE — 25010000002 INFLUENZA VAC SUBUNIT QUAD 0.5 ML SUSPENSION PREFILLED SYRINGE: Performed by: INTERNAL MEDICINE

## 2019-10-04 PROCEDURE — 96376 TX/PRO/DX INJ SAME DRUG ADON: CPT

## 2019-10-04 PROCEDURE — 84100 ASSAY OF PHOSPHORUS: CPT | Performed by: INTERNAL MEDICINE

## 2019-10-04 PROCEDURE — 63710000001 INSULIN ASPART PER 5 UNITS: Performed by: INTERNAL MEDICINE

## 2019-10-04 PROCEDURE — 84484 ASSAY OF TROPONIN QUANT: CPT | Performed by: INTERNAL MEDICINE

## 2019-10-04 PROCEDURE — 0 IOPAMIDOL PER 1 ML: Performed by: INTERNAL MEDICINE

## 2019-10-04 PROCEDURE — 99024 POSTOP FOLLOW-UP VISIT: CPT | Performed by: NURSE PRACTITIONER

## 2019-10-04 PROCEDURE — 80053 COMPREHEN METABOLIC PANEL: CPT | Performed by: NURSE PRACTITIONER

## 2019-10-04 PROCEDURE — 85025 COMPLETE CBC W/AUTO DIFF WBC: CPT | Performed by: INTERNAL MEDICINE

## 2019-10-04 RX ORDER — FUROSEMIDE 20 MG/1
20 TABLET ORAL DAILY
Status: DISCONTINUED | OUTPATIENT
Start: 2019-10-04 | End: 2019-10-04 | Stop reason: HOSPADM

## 2019-10-04 RX ORDER — DILTIAZEM HYDROCHLORIDE 180 MG/1
180 CAPSULE, COATED, EXTENDED RELEASE ORAL DAILY
Qty: 30 CAPSULE | Refills: 0 | Status: SHIPPED | OUTPATIENT
Start: 2019-10-04 | End: 2020-04-29

## 2019-10-04 RX ADMIN — SODIUM CHLORIDE 75 ML/HR: 9 INJECTION, SOLUTION INTRAVENOUS at 01:09

## 2019-10-04 RX ADMIN — FUROSEMIDE 20 MG: 20 TABLET ORAL at 11:33

## 2019-10-04 RX ADMIN — FAMOTIDINE 20 MG: 10 INJECTION, SOLUTION INTRAVENOUS at 01:08

## 2019-10-04 RX ADMIN — INFLUENZA A VIRUS A/IDAHO/07/2018 (H1N1) ANTIGEN (MDCK CELL DERIVED, PROPIOLACTONE INACTIVATED, INFLUENZA A VIRUS A/INDIANA/08/2018 (H3N2) ANTIGEN (MDCK CELL DERIVED, PROPIOLACTONE INACTIVATED), INFLUENZA B VIRUS B/SINGAPORE/INFTT-16-0610/2016 ANTIGEN (MDCK CELL DERIVED, PROPIOLACTONE INACTIVATED), INFLUENZA B VIRUS B/IOWA/06/2017 ANTIGEN (MDCK CELL DERIVED, PROPIOLACTONE INACTIVATED) 0.5 ML: 15; 15; 15; 15 INJECTION, SUSPENSION INTRAMUSCULAR at 14:08

## 2019-10-04 RX ADMIN — PREGABALIN 50 MG: 50 CAPSULE ORAL at 08:43

## 2019-10-04 RX ADMIN — ATORVASTATIN CALCIUM 40 MG: 40 TABLET, FILM COATED ORAL at 01:08

## 2019-10-04 RX ADMIN — BISACODYL 5 MG: 5 TABLET, COATED ORAL at 02:50

## 2019-10-04 RX ADMIN — METOPROLOL TARTRATE 12.5 MG: 25 TABLET, FILM COATED ORAL at 01:08

## 2019-10-04 RX ADMIN — AMIODARONE HYDROCHLORIDE 200 MG: 200 TABLET ORAL at 13:13

## 2019-10-04 RX ADMIN — APIXABAN 5 MG: 5 TABLET, FILM COATED ORAL at 01:08

## 2019-10-04 RX ADMIN — CLOPIDOGREL BISULFATE 75 MG: 75 TABLET ORAL at 08:43

## 2019-10-04 RX ADMIN — METOPROLOL TARTRATE 12.5 MG: 25 TABLET, FILM COATED ORAL at 08:43

## 2019-10-04 RX ADMIN — APIXABAN 5 MG: 5 TABLET, FILM COATED ORAL at 08:43

## 2019-10-04 RX ADMIN — INSULIN ASPART 5 UNITS: 100 INJECTION, SOLUTION INTRAVENOUS; SUBCUTANEOUS at 01:08

## 2019-10-04 RX ADMIN — SODIUM CHLORIDE, PRESERVATIVE FREE 10 ML: 5 INJECTION INTRAVENOUS at 08:43

## 2019-10-04 RX ADMIN — OXYCODONE HYDROCHLORIDE AND ACETAMINOPHEN 1 TABLET: 5; 325 TABLET ORAL at 02:50

## 2019-10-04 RX ADMIN — FAMOTIDINE 20 MG: 10 INJECTION, SOLUTION INTRAVENOUS at 08:43

## 2019-10-04 RX ADMIN — OXYCODONE HYDROCHLORIDE AND ACETAMINOPHEN 1 TABLET: 5; 325 TABLET ORAL at 08:43

## 2019-10-04 RX ADMIN — AMIODARONE HYDROCHLORIDE 200 MG: 200 TABLET ORAL at 01:08

## 2019-10-04 RX ADMIN — PREGABALIN 50 MG: 50 CAPSULE ORAL at 01:08

## 2019-10-04 RX ADMIN — IOPAMIDOL 90 ML: 755 INJECTION, SOLUTION INTRAVENOUS at 03:30

## 2019-10-04 RX ADMIN — INSULIN ASPART 3 UNITS: 100 INJECTION, SOLUTION INTRAVENOUS; SUBCUTANEOUS at 11:35

## 2019-10-04 NOTE — ED PROVIDER NOTES
Subjective   55yo male pmh significant htn/hyperlipidemia/cad/atrial fibrillation/dm2, recently discharged [facility] 10.01.2019 s/p CABG, presents ED c/o acute onset tachycardia/dizziness/near syncope/generalized weakness today.  ROS (+) neg fever/chills/chest pain/abd pain/syncope.        History provided by:  Patient and EMS personnel  Illness   Severity:  Moderate  Onset quality:  Sudden  Duration:  1 day  Chronicity:  New  Associated symptoms: cough and fatigue        Review of Systems   Constitutional: Positive for fatigue.   HENT: Negative.    Respiratory: Positive for cough.    Cardiovascular: Negative.    Gastrointestinal: Negative.    Genitourinary: Negative.    Musculoskeletal: Negative.    Skin: Negative.    Neurological: Positive for dizziness, weakness and light-headedness. Negative for syncope.   All other systems reviewed and are negative.      Past Medical History:   Diagnosis Date   • Arthritis     c/o joint pain   • Coronary artery disease    • Diabetes mellitus (CMS/HCC)    • Hyperlipidemia    • Hypertension    • Retinopathy of both eyes    • Sleep apnea     not using c-pap       Allergies   Allergen Reactions   • Ace Inhibitors Anaphylaxis   • Wellbutrin [Bupropion] Anaphylaxis       Past Surgical History:   Procedure Laterality Date   • CARDIAC CATHETERIZATION N/A 9/3/2019    Procedure: Coronary angiography/PCI if indicated;  Surgeon: Denia Dwyer MD;  Location: [facility] INVASIVE LOCATION;  Service: Cardiology   • CHOLECYSTECTOMY     • TONSILLECTOMY         Family History   Problem Relation Age of Onset   • Diabetes Mother    • Hypertension Mother    • Cancer Mother    • Diabetes Father    • Hypertension Father    • Cancer Father        Social History     Socioeconomic History   • Marital status:      Spouse name: Not on file   • Number of children: Not on file   • Years of education: Not on file   • Highest education level: Not on file   Tobacco Use   • Smoking status: Former Smoker      Packs/day: 1.00     Years: 35.00     Pack years: 35.00     Types: Cigarettes     Last attempt to quit: 9/3/2019     Years since quittin.0   • Smokeless tobacco: Never Used   • Tobacco comment: quit after heart cath   Substance and Sexual Activity   • Alcohol use: No   • Drug use: No   • Sexual activity: Defer           Objective   Physical Exam   Constitutional: He is oriented to person, place, and time. He appears well-developed and well-nourished.   HENT:   Head: Normocephalic and atraumatic.   Mouth/Throat: Oropharynx is clear and moist.   Eyes: Pupils are equal, round, and reactive to light.   Neck: Normal range of motion. Neck supple. No JVD present. No tracheal deviation present.   Cardiovascular: S1 normal, S2 normal and normal heart sounds. A regularly irregular rhythm present. Tachycardia present. Exam reveals no gallop and no friction rub.   No murmur heard.  Pulmonary/Chest: Effort normal. He has decreased breath sounds in the right lower field and the left lower field. He has no wheezes. He has no rhonchi. He has no rales.   Abdominal: Soft. Bowel sounds are normal. He exhibits no distension and no mass. There is no tenderness. There is no guarding.   Musculoskeletal: He exhibits no edema.   Lymphadenopathy:     He has no cervical adenopathy.   Neurological: He is alert and oriented to person, place, and time.   Skin: Skin is warm and dry. There is pallor.   Nursing note and vitals reviewed.      ECG 12 Lead    Date/Time: 10/3/2019 9:15 PM  Performed by: Rob Beebe MD  Authorized by: Rob Beebe MD   Interpreted by physician  Rhythm: atrial fibrillation  Rate: tachycardic  BPM: 141  QRS axis: normal  Conduction: conduction normal  ST Segments: ST segments normal  T Waves: T waves normal  Other findings: PRWP  Clinical impression: abnormal ECG and dysrhythmia - atrial                 ED Course      Labs Reviewed   COMPREHENSIVE METABOLIC PANEL - Abnormal; Notable for the following components:        Result Value    Glucose 187 (*)     Creatinine 0.72 (*)     Alkaline Phosphatase 201 (*)     All other components within normal limits    Narrative:     GFR Normal >60  Chronic Kidney Disease <60  Kidney Failure <15   TROPONIN (IN-HOUSE) - Abnormal; Notable for the following components:    Troponin T 0.031 (*)     All other components within normal limits    Narrative:     Troponin T Reference Range:  <= 0.03 ng/mL-   Negative for AMI  >0.03 ng/mL-     Abnormal for myocardial necrosis.  Clinicians would have to utilize clinical acumen, EKG, Troponin and serial changes to determine if it is an Acute Myocardial Infarction or myocardial injury due to an underlying chronic condition.    URINALYSIS W/ MICROSCOPIC IF INDICATED (NO CULTURE) - Abnormal; Notable for the following components:    Appearance, UA Cloudy (*)     Glucose,  mg/dL (Trace) (*)     Ketones, UA 40 mg/dL (2+) (*)     Bilirubin, UA Moderate (2+) (*)     Protein,  mg/dL (2+) (*)     Leuk Esterase, UA Trace (*)     All other components within normal limits   CBC WITH AUTO DIFFERENTIAL - Abnormal; Notable for the following components:    WBC 11.83 (*)     Hemoglobin 12.0 (*)     Hematocrit 35.6 (*)     Neutrophil % 84.4 (*)     Lymphocyte % 9.2 (*)     Monocyte % 4.4 (*)     Immature Grans % 1.4 (*)     Neutrophils, Absolute 9.98 (*)     Immature Grans, Absolute 0.17 (*)     All other components within normal limits   PROTIME-INR - Abnormal; Notable for the following components:    INR 1.23 (*)     All other components within normal limits    Narrative:     Therapeutic range for most indications is 2.0-3.0 INR,  or 2.5-3.5 for mechanical heart valves.   BNP (IN-HOUSE) - Abnormal; Notable for the following components:    proBNP 2,968.0 (*)     All other components within normal limits    Narrative:     Among patients with dyspnea, NT-proBNP is highly sensitive for the detection of acute congestive heart failure. In addition NT-proBNP of  <300 pg/ml effectively rules out acute congestive heart failure with 99% negative predictive value.   D-DIMER, QUANTITATIVE - Abnormal; Notable for the following components:    D-Dimer, Quantitative 1,604 (*)     All other components within normal limits    Narrative:     Dimer values <500 ng/ml FEU are FDA approved as aid in diagnosis of deep venous thrombosis and pulmonary embolism.  This test should not be used in an exclusion strategy with pretest probability alone.    A recent guideline regarding diagnosis for pulmonary thromboembolism recommends an adjusted exclusion criterion of age x 10 ng/ml FEU for patients >50 years of age ([employer] Intern Med 2015; 163: 701-711).   URINALYSIS, MICROSCOPIC ONLY - Abnormal; Notable for the following components:    WBC, UA 6-12 (*)     Bacteria, UA 1+ (*)     Squamous Epithelial Cells, UA 3-5 (*)     All other components within normal limits   MAGNESIUM - Normal   APTT - Normal    Narrative:     The recommended Heparin therapeutic range is 68-97 seconds.   BLOOD CULTURE   BLOOD CULTURE   RAINBOW DRAW    Narrative:     The following orders were created for panel order [place] Draw.  Procedure                               Abnormality         Status                     ---------                               -----------         ------                     Light Blue Top[[id number]]                                   Final result               Green Top (Gel)[[id number]]                                  Final result               Lavender Top[[id number]]                                     Final result               Gold Top - SST[[id number]]                                   Final result                 Please view results for these tests on the individual orders.   TROPONIN (IN-HOUSE)   TROPONIN (IN-HOUSE)   TROPONIN (IN-HOUSE)   LACTIC ACID, PLASMA   TYPE AND SCREEN   PREVIOUS HISTORY   CBC AND DIFFERENTIAL    Narrative:     The following orders were created for panel order CBC &  Differential.  Procedure                               Abnormality         Status                     ---------                               -----------         ------                     CBC Auto Differential[[id number]]        Abnormal            Final result                 Please view results for these tests on the individual orders.   LIGHT BLUE TOP   GREEN TOP   LAVENDER TOP   GOLD TOP - SST     Xr Chest 2 View    Result Date: 9/28/2019  Narrative: Chest 2 view on  9/28/2019 CLINICAL INDICATION: Postop CABG, dizziness COMPARISON: 9/27/2019 FINDINGS: The patient is status post median sternotomy. There are trace pleural effusions. There is mild left basilar atelectasis. Mild cardiomegaly is noted. Right IJ catheter has been removed. The lungs are otherwise clear. Pulmonary vascularity is within normal limits. There is stable very small left apical pneumothorax. This only measures approximately 5 mm of pleural separation superiorly.     Impression: 1. Stable very small left apical pneumothorax. 2. Trace left pleural effusion with mild basilar atelectasis. Electronically signed by:  Carlos Guzman  9/28/2019 6:33 AM CDT Workstation: [id number]    Xr Chest 2 View    Result Date: 9/27/2019  Narrative: [facility] Patient Name: JARVIS WHITE ATTENDING: KAILEY HODGES REFERRING: MOUSTAPHA GONCALVES ORDERING: MOUSTAPHA GONCALVES ----------------------- PROCEDURE: Chest, PA and lateral DATE OF EXAM: 9/27/2019 HISTORY: Chest tube removal PA and lateral views of the chest were obtained. Study is compared to 9/26/2019. FINDINGS: Left chest tube is been removed. There is a very small left apical pneumothorax identified. The mild left basal atelectasis and small left pleural effusion is seen. The right lung appears fairly clear. Right-sided jugular central venous access catheter remains in place.     Impression: Impression: 1. Removal left thoracotomy tube with very small left apical pneumothorax. 2. Mild left  basilar atelectasis with small left pleural effusion. Electronically signed by:  Arpan Christensen MD  9/27/2019 1:20 PM CDT Workstation: [id number]    Xr Chest 2 View    Result Date: 9/12/2019  Narrative: PROCEDURE: Two-view chest COMPARISON: 1/22/2017 HISTORY: Chest Pain Triage Protocol FINDINGS: Frontal and lateral views of the chest are obtained. Devices: None Lungs/Pleura: The lungs are clear Cardiomediastinal structures: Normal     Impression: CONCLUSION:  No acute cardiopulmonary disease Electronically signed by:  Cuauhtemoc Chew MD  9/12/2019 3:27 PM CDT Workstation: [id number]    Xr Chest 1 View    Result Date: 10/3/2019  Narrative: PROCEDURE: XR CHEST 1 VW VIEWS:Single INDICATION: Weakness, dizziness, altered mental status COMPARISON: CXR: 9/28/2018 FINDINGS:   - lines/tubes: None   - cardiac: Mild stable cardiomegaly.   - mediastinum: Contour within normal limits.   - lungs: Ill-defined opacification in the left lung base, and mild streaky patchy airspace opacification in the right lung base, less prominent than right..   - pleura: Blunting of the costophrenic angles, left greater than right.    - osseous: Unremarkable for age.       Impression: Bibasilar airspace opacification, left greater than right, possibly correlation with small bilateral effusions. This is increased from the prior study of 9/28/2019, and pneumonia is not excluded. Interval follow-up to complete radiographic resolution is suggested Electronically signed by:  Yuliya Anders MD  10/3/2019 7:08 PM CDT Workstation: [id number]    Xr Chest 1 View    Result Date: 9/26/2019  Narrative: Chest single view on  9/26/2019 CLINICAL INDICATION: Postop open heart surgery COMPARISON: 9/25/2019 FINDINGS: ET tube and NG tube have been removed. The patient is status post median sternotomy. Mediastinal drain and left basilar chest tube remain in place. Right IJ [place]-Hayder catheter has been removed with the introducer catheter tip in the SVC. There is mild bilateral  lower lung atelectasis. There is a trace left pleural effusion. There is no pneumothorax.     Impression: Small left pleural effusion with mild bibasilar atelectasis. Electronically signed by:  Carlos Guzman  9/26/2019 5:52 AM CDT Workstation: [id number]    Xr Chest 1 View    Result Date: 9/25/2019  Narrative: PROCEDURE: XR CHEST 1 VW VIEWS:Single INDICATION: Postoperative evaluation, line and tube placement COMPARISON: CXR: 9/12/2019 FINDINGS:   - lines/tubes: Nasogastric tube terminates in the left upper quadrant in the region of the stomach. A right-sided [place]-Hayder catheter terminates in the expected region of the pulmonary trunk. Left-sided chest tube is present, terminating laterally in the lower aspect of the left hemithorax.   - cardiac: Size within normal limits.   - mediastinum: Contour within normal limits.   - lungs: No evidence of a focal air space process, pulmonary interstitial edema, nodule(s)/mass.   - pleura: No evidence of  fluid.    - osseous: No acute abnormality.. Median sternotomy wires are now present.     Impression: Lines and tubes as above. No acute abnormality identified. Electronically signed by:  Yuliya Anders MD  9/25/2019 1:42 PM CDT Workstation: [id number]                MDM    Final diagnoses:   Pneumonia of both lower lobes due to infectious organism (CMS/HCC)   Atrial fibrillation with rapid ventricular response (CMS/HCC)              Rob Beebe MD  10/03/19 [id number]

## 2019-10-04 NOTE — PROGRESS NOTES
Discharge Planning Assessment  [facility]     Patient Name: Herbert White Sr.  MRN: [id number]  Today's Date: 10/4/2019    Admit Date: 10/3/2019    Discharge Needs Assessment     Row Name 10/04/19 1110       Living Environment    Lives With  child(maddie), adult    Current Living Arrangements  home/apartment/condo    Duration at Residence  1 week    Primary Care Provided by  self    Provides Primary Care For  no one    Family Caregiver if Needed  child(maddie), adult    Quality of Family Relationships  helpful;supportive    Able to Return to Prior Arrangements  yes    Living Arrangement Comments  Pt has been staying with his son following his surgery.        Resource/Environmental Concerns    Transportation Concerns  car, none       Transition Planning    Patient/Family Anticipates Transition to  home    Patient/Family Anticipated Services at Transition  home health care Pt had transition visit arranged on d/c 10-1-19.    Transportation Anticipated  family or friend will provide       Discharge Needs Assessment    Readmission Within the Last 30 Days  other (see comments) Pt reported to LSW that he was lightheaded and dizzy and decided to return to ER. Pt was found to be in Afib RVR upon arrival.     Concerns to be Addressed  adjustment to diagnosis/illness    Equipment Currently Used at Home  glucometer    Anticipated Changes Related to Illness  none    Equipment Needed After Discharge  -- Awaiting therapy recomendations. Pt did not anticipate any additional DME needs.     Discharge Facility/Level of Care Needs  home with home health    Offered/Gave Vendor List  yes    Patient's Choice of Community Agency(s)  [facility]    Current Discharge Risk  chronically ill        Discharge Plan     Row Name 10/04/19 1115       Plan    Plan Comments  LSW assessment complete. Pt resides at home with son. Pt voiced good support system. Pt informed LSW that prior to surgery he was independent with both ADL's and IADL's. Since  his CABG last week his son has been assisting with IADL's. Pt had transition visit oliviaing d/c from hospital on 10-1-19. His plan is to return home with son at d/c. Pt may benefit from home health. If recomended he prefers [facility]. LSW awaiting additional recomendations from MD and therapy. LSW/case mgt will follow up as consulted and complete arrangements as ordered. Yaniv Rodriguez LSW        Destination      No service coordination in this encounter.      Durable Medical Equipment      No service coordination in this encounter.      Dialysis/Infusion      No service coordination in this encounter.      Home Medical Care      No service coordination in this encounter.      Therapy      No service coordination in this encounter.      Community Resources      No service coordination in this encounter.          Demographic Summary     Row Name 10/04/19 1106       General Information    Admission Type  observation    Referral Source  high risk screening    Reason for Consult  discharge planning    Preferred Language  English     Used During This Interaction  no       Contact Information    Contact Information Obtained for          Functional Status     Row Name 10/04/19 1106       Functional Status    Usual Activity Tolerance  moderate    Current Activity Tolerance  fair    Functional Status Comments  S/P CABG 1 week ago. Did not require DME to assist with mobility.        Functional Status, IADL    Medications  independent    Meal Preparation  independent    Housekeeping  assistive person    Laundry  assistive person    Shopping  assistive person    IADL Comments  Typically independent with ADL's and IADL's however son has been assisting with IADL's since surgery.        Mental Status Summary    Recent Changes in Mental Status/Cognitive Functioning  no changes       Employment/    Employment Status  employed full time        Psychosocial    No documentation.       Abuse/Neglect    No  documentation.       Legal    No documentation.       Substance Abuse    No documentation.       Patient Forms    No documentation.           ALANIS Stevens

## 2019-10-04 NOTE — PLAN OF CARE
Problem: Fall Risk (Adult)  Goal: Identify Related Risk Factors and Signs and Symptoms  Outcome: Outcome(s) achieved Date Met: 10/04/19      Problem: Patient Care Overview  Goal: Plan of Care Review   10/04/19 0518   Coping/Psychosocial   Plan of Care Reviewed With patient   Plan of Care Review   Progress improving   OTHER   Outcome Summary Pt VS stable throughout shift, Pt HR on tele NSR rate 70's, cardizem gtt on hold, pt recieving IVF, pt given p.o pain medication to help with pain. pt on 2L NC. will continue to monitor       Problem: Pain, Chronic (Adult)  Goal: Identify Related Risk Factors and Signs and Symptoms  Outcome: Outcome(s) achieved Date Met: 10/04/19

## 2019-10-04 NOTE — OUTREACH NOTE
CT Surgery Week 1 Survey      Responses   Facility patient discharged from?  [place] [facility]   Does the patient have one of the following disease processes/diagnoses(primary or secondary)?  Cardiothoracic surgery   Is there a successful TCM telephone encounter documented?  No   Week 1 attempt successful?  No   Revoke  Readmitted            Laura Elizabeth RN

## 2019-10-04 NOTE — PROGRESS NOTES
"  Cardiothoracic - Vascular Surgery Daily Note        LOS: 1 day   Patient Care Team:  Artemio Sanchez MD as PCP - General ([facility])  GabrielaGianna arauz as Technician  Artemio Sanchez MD (Family Medicine)  POD9  CABGx3  LIMA-LAD, SVG-PDA, SVG-OM    Chief Complaint: Post surgical pain      Subjective     The following portions of the patient's history were reviewed and updated as appropriate: allergies, current medications, past family history, past medical history, past social history, past surgical history and problem list.     Subjective:  Symptoms:  Stable.  No shortness of breath or weakness.    Diet:  Adequate intake.  No nausea or vomiting.    Activity level: Impaired due to weakness.    Pain:  He complains of pain that is mild.  He reports pain is improving.  Pain is well controlled and requiring pain medication.          Objective     Vital Signs  Temp:  [96.6 °F (35.9 °C)-99.4 °F (37.4 °C)] 97 °F (36.1 °C)  Heart Rate:  [] 76  Resp:  [16-18] 16  BP: (122-195)/(55-98) 151/88  Body mass index is 31.07 kg/m².    Intake/Output Summary (Last 24 hours) at 10/4/2019 1017  Last data filed at 10/4/2019 0600  Gross per 24 hour   Intake 973.33 ml   Output 350 ml   Net 623.33 ml     No intake/output data recorded.    Wt Readings from Last 3 Encounters:   10/04/19 110 kg (242 lb)   10/01/19 107 kg (236 lb 11.2 oz)   09/20/19 101 kg (222 lb)         Physical Exam   Objective:  General Appearance:  Comfortable, well-appearing, in no acute distress and in pain.    Vital signs: (most recent): Blood pressure 151/88, pulse 76, temperature 97 °F (36.1 °C), temperature source Oral, resp. rate 16, height 188 cm (74\"), weight 110 kg (242 lb), SpO2 94 %.  Vital signs are normal.  No fever.    Output: Producing urine and producing stool.    HEENT: Normal HEENT exam.    Lungs:  Normal effort and normal respiratory rate.  There are decreased breath sounds.  (CTx2  removed)  Heart: Normal rate.  Regular rhythm.  S1 normal " and S2 normal.  (V wires I/T    NSR)  Chest: Symmetric chest wall expansion. Chest wall tenderness present.    Abdomen: Abdomen is soft.  Hypoactive bowel sounds.   There is no abdominal tenderness.     Extremities: Normal range of motion.  There is dependent edema.    Pulses: Distal pulses are intact.  There are no decreased pulses.    Neurological: Patient is alert and oriented to person, place and time.  GCS score is 15.    Pupils:  Pupils are equal, round, and reactive to light.    Skin:  Warm and dry.  (Midsternal incision CDI)              Results Review:    Lab Results   Component Value Date    WBC 10.20 10/04/2019    HGB 10.7 (L) 10/04/2019    HCT 30.7 (L) 10/04/2019    MCV 85.5 10/04/2019     10/04/2019     Lab Results   Component Value Date    GLUCOSE 77 10/04/2019    BUN 17 10/04/2019    CREATININE 0.73 (L) 10/04/2019    EGFRIFNONA 112 10/04/2019    BCR 23.3 10/04/2019    K 4.1 10/04/2019    CO2 27.0 10/04/2019    CALCIUM 8.8 10/04/2019    ALBUMIN 3.70 10/03/2019    AST 16 10/03/2019    ALT 27 10/03/2019       Calcium Calcium   Date/Time Value Ref Range Status   10/04/2019 0505 8.8 8.6 - 10.5 mg/dL Final   10/03/2019 1808 9.1 8.6 - 10.5 mg/dL Final      Magnesium Magnesium   Date/Time Value Ref Range Status   10/04/2019 0505 2.0 1.6 - 2.6 mg/dL Final   10/03/2019 1808 1.9 1.6 - 2.6 mg/dL Final        Imaging Results (last 24 hours)     Procedure Component Value Units Date/Time    CT Angiogram Chest With Contrast [[id number]] Collected:  10/04/19 0218     Updated:  10/04/19 0303    Narrative:       Exam: CT angiogram chest with contrast    INDICATION: Pneumonia    TECHNIQUE: Routine CT angiogram chest with contrast.  Computer-generated 3-D reconstruction/MIPS were obtained. The  exam is suboptimal due to patient motion and miss timing of the  contrast bolus resulting in diminished enhancement of the  pulmonary arteries. This exam was performed according to our  departmental dose-optimization program,  which includes automated  exposure control, adjustment of the mA and/or kV according to  patient size and/or use of iterative reconstruction technique.    FINDINGS: No mediastinal or hilar lymphadenopathy. The thoracic  aorta aneurysm or dissection. No obvious thrombus in the  pulmonary arterial tree. Evaluation of the the distal artery  segmental branches is limited. Heart is enlarged. Coronary artery  calcification is seen. Status post CABG. There is a moderate left  pleural effusion is small right pleural effusion. There is  bilateral lower lobe consolidation/atelectasis left greater  right. There is minimal infiltrate or atelectasis in the  posterior aspect of the left upper lobe. Tracheobronchial tree is  patent. No pneumothorax.    Status post cholecystectomy. Fatty infiltration of the liver.    Mild thoracic spondylosis.      Impression:       1. Limited exam for PE. No obvious pulmonary embolus.  2. Bilateral pleural effusions  3. Bilateral lower lobe consolidation/atelectasis left greater  than right. Recommend follow-up.  (    Electronically signed by:  Tomas Lawler MD  10/4/2019 3:02 AM  CDT Workstation: [id number]    XR Chest 1 View [[id number]] Collected:  10/03/19 1809     Updated:  10/03/19 1909    Narrative:       PROCEDURE: XR CHEST 1 VW    VIEWS:Single    INDICATION: Weakness, dizziness, altered mental status    COMPARISON: CXR: 9/28/2018    FINDINGS:       - lines/tubes: None    - cardiac: Mild stable cardiomegaly.    - mediastinum: Contour within normal limits.     - lungs: Ill-defined opacification in the left lung base, and  mild streaky patchy airspace opacification in the right lung  base, less prominent than right..     - pleura: Blunting of the costophrenic angles, left greater  than right.      - osseous: Unremarkable for age.        Impression:       Bibasilar airspace opacification, left greater than right,  possibly correlation with small bilateral effusions. This is  increased from the  prior study of 9/28/2019, and pneumonia is not  excluded. Interval follow-up to complete radiographic resolution  is suggested    Electronically signed by:  Yuliya Anders MD  10/3/2019 7:08 PM CDT  Workstation: [id number]                                  amiodarone 200 mg Oral Q12H   apixaban 5 mg Oral Q12H   atorvastatin 40 mg Oral Nightly   clopidogrel 75 mg Oral Daily   famotidine 20 mg Intravenous Q12H   furosemide 20 mg Oral Daily   insulin aspart 0-14 Units Subcutaneous 4x Daily AC & at Bedtime   metoprolol tartrate 12.5 mg Oral Q12H   pregabalin 50 mg Oral BID   sodium chloride 10 mL Intravenous Q12H       diltiaZEM 5-15 mg/hr Last Rate: Stopped (10/03/19 0196)         ASSESSMENT/PLAN     Pleural Effusion  Above pre-op weight.  D/C IVF, start lasix 20mg daily x1 week.      PAF with RVR  On amiodarone, eliquis for embolic prophylaxis, BB.  Converted NSR after cardizem push in ED.  Would discuss with Dr. Rice for further rate control recommendations prior to DC.      ASCVD  Medical management of coronary artery disease at this time includes ASA/PLAVIX, STATIN, and BB.  Currently does not report symptoms of angina or angina equivalents.  Patient is educated and understands the S/S of acute coronary syndrome and is aware to report to the nearest emergency department if these symptoms were to persist at rest.      Surgical Aftercare Post Op CABG  Progressing well with return of normal bodily functions.  Increasing activity and ambulation distance while following restrictions.  Pt is enrolled in cardiac rehab.  Follow up appointments as scheduled with PCP in 10 weeks PO and with Cardiology in 4 weeks.  Follow up in 1 week for CXR, CBC, CMP, EKG, Suture removal.          This document has been electronically signed by Oscar Fernando AGACNP-BC @  On October 4, 2019 10:17 AM

## 2019-10-04 NOTE — H&P
"      [facility] Medicine Admission      Date of Admission: 10/3/2019      Primary Care Physician: Artemio Sanchez MD      Chief Complaint: \"I felt lightheaded and dizzy\"    HPI:The patient is a 54-year-old  man who underwent a CABG 1 week ago and was apparently doing well at home when he developed sudden onset of lightheadedness and dizziness.  According to him, he had no chest pain but he experienced palpitations.  A family member reveals that the patient's blood pressure which was measured shortly before his symptoms started was 120/80 mmHg.  However when his blood pressure was measured shortly afterwards his systolic blood pressure had falling to 80 mg meters of mercury.  He was eased onto a chair and subsequently brought to the emergency department.    When he was evaluated in the emergency department, he was noted to be in atrial fibrillation with rapid ventricular response.  His initial measured pulse rate was 156 bpm, and his blood pressure was within normal limits of 122/55 at the time he was saturating well at 99% on nasal cannula and his respiratory rate was 18 pm.  The patient was commenced on Cardizem push and infusion.  This subsequently converted him back to normal sinus rhythm, with a pulse rate of 70 to 80 bpm.  He was subsequently admitted for overnight observation.    As of the time that I saw the patient, he was completely symptom-free.  His BNP was elevated.  There was also a minimal elevation of troponin as well.  EKG was normal sinus rhythm.    Past Medical History:  has a past medical history of Arthritis, Coronary artery disease, Diabetes mellitus (CMS/HCC), Hyperlipidemia, Hypertension, Retinopathy of both eyes, and Sleep apnea.    Past Surgical History:  has a past surgical history that includes Cholecystectomy; Tonsillectomy; and Cardiac catheterization (N/A, 9/3/2019).    Family History: family history includes Cancer in his father and " mother; Diabetes in his father and mother; Hypertension in his father and mother.    Social History:  reports that he quit smoking about 4 weeks ago. His smoking use included cigarettes. He has a 35.00 pack-year smoking history. He has never used smokeless tobacco. He reports that he does not drink alcohol or use drugs.    Allergies:   Allergies   Allergen Reactions   • Ace Inhibitors Anaphylaxis   • Wellbutrin [Bupropion] Anaphylaxis       Medications:   Prior to Admission medications    Medication Sig Start Date End Date Taking? Authorizing Provider   amiodarone (PACERONE) 200 MG tablet Take 1 tablet by mouth Every 12 (Twelve) Hours for 7 days, then 1 tablet once daily. 10/1/19   Zeferino Fernando APRN   apixaban (ELIQUIS) 5 MG tablet tablet Take 1 tablet by mouth Every 12 (Twelve) Hours. 10/1/19   Zeferino Fernando APRN   atorvastatin (LIPITOR) 40 MG tablet Take 1 tablet by mouth Every Night. 10/1/19   Zeferino Fernando APRN   clopidogrel (PLAVIX) 75 MG tablet Take 1 tablet by mouth Daily. 10/2/19   Zeferino Fernando APRN   Lancet Devices (ONE TOUCH DELICA LANCING DEV) misc delica lancing device if approved, otherwise use alternative 1/24/17   Alessandro Gan MD   magnesium oxide (MAGOX) 400 (241.3 Mg) MG tablet tablet Take 1 tablet by mouth 2 (Two) Times a Day. 10/1/19   Zeferino Fernando APRN   metFORMIN (GLUCOPHAGE) 500 MG tablet Take 500 mg by mouth 2 (Two) Times a Day With Meals.    Provider, MD Hai   metoprolol tartrate (LOPRESSOR) 25 MG tablet Take 1/2 tablet (12.5 mg) by mouth Every 12 (Twelve) Hours. 10/1/19   Zeferino Fernando APRN   nitroglycerin (NITROSTAT) 0.4 MG SL tablet Place 1 tablet under the tongue Every 5 (Five) Minutes As Needed for Chest Pain. Take no more than 3 doses in 15 minutes. 9/13/19   Marcelino Goode MD   ONETOUCH DELICA LANCETS 33G misc 1 each 4 (Four) Times a Day. delica if covered, use alternative if not covered 1/24/17   Alessandro Gan MD    oxyCODONE-acetaminophen (PERCOCET) 5-325 MG per tablet Take 1-2 tablets by mouth Every 4 (Four) Hours As Needed for Moderate Pain (Surgical Pain). 10/1/19   Lidia Sanderson APRN   pregabalin (LYRICA) 50 MG capsule Take 1 capsule by mouth 2 (Two) Times a Day. 8/5/19 8/4/20  Martinez Card APRN   Prenatal Vit-Fe Fumarate-FA (PRENATAL VITAMIN 27-0.8) 27-0.8 MG tablet tablet Take 1 tablet by mouth Daily. 10/2/19   Zeferino Fernando APRN   sennosides-docusate sodium (SENOKOT-S) 8.6-50 MG tablet Take 1 tablet by mouth 2 (Two) Times a Day. 10/1/19   Zeferino Fernando APRN   vitamin C (VITAMIN C) 500 MG tablet Take 1 tablet by mouth 2 (Two) Times a Day. 10/1/19   Zeferino Fernando APRN   vitamin D (ERGOCALCIFEROL) 31129 units capsule capsule Take 50,000 Units by mouth 2 (Two) Times a Week.    Provider, MD Hai       Review of Systems:  Review of Systems   Otherwise complete ROS is negative except as mentioned above.    Physical Exam:   Temp:  [99.1 °F (37.3 °C)] 99.1 °F (37.3 °C)  Heart Rate:  [] 124  Resp:  [18] 18  BP: (122-195)/(55-98) 143/75  Physical exam:  Constitutional:  Well-developed and well-nourished.  No respiratory distress.      HENT:  Head:  Normocephalic and atraumatic.  Mouth:  Moist mucous membranes.    Eyes:  Conjunctivae and EOM are normal.  Pupils are equal, round, and reactive to light.  No scleral icterus.    Neck:  Neck supple.  No JVD present.    Cardiovascular:  Normal rate, regular rhythm and normal heart sounds with no murmur.  Pulmonary/Chest:  No respiratory distress, no wheezes, no crackles, with normal breath sounds and good air movement.  Abdominal:  Soft.  Bowel sounds are normal.  No distension and no tenderness.   Musculoskeletal:  No edema, no tenderness, and no deformity.  No red or swollen joints anywhere.    Neurological:  Alert and oriented to person, place, and time.  No cranial nerve deficit.  No tongue deviation.  No facial droop.  No slurred  speech.   Skin:  Skin is warm and dry. No rash noted. No pallor.   Peripheral vascular: No clubbing, no cyanosis, no edema.    Results Reviewed:  I have personally reviewed current lab, radiology, and data and agree with results.  Lab Results (last 24 hours)     Procedure Component Value Units Date/Time    [place] Draw [[id number]] Collected:  10/03/19 1807    Specimen:  Blood Updated:  10/03/19 1935    Narrative:       The following orders were created for panel order [place] Draw.  Procedure                               Abnormality         Status                     ---------                               -----------         ------                     Light Blue Top[[id number]]                                   Final result               Green Top (Gel)[[id number]]                                  Final result               Lavender Top[[id number]]                                     Final result               Gold Top - SST[[id number]]                                   Final result                 Please view results for these tests on the individual orders.    Green Top (Gel) [[id number]] Collected:  10/03/19 1808    Specimen:  Blood Updated:  10/03/19 1935     Extra Tube Hold for add-ons.     Comment: Auto resulted.       Gold Top - SST [[id number]] Collected:  10/03/19 1808    Specimen:  Blood Updated:  10/03/19 1935     Extra Tube Hold for add-ons.     Comment: Auto resulted.       Light Blue Top [[id number]] Collected:  10/03/19 1807    Specimen:  Blood Updated:  10/03/19 1935     Extra Tube hold for add-on     Comment: Auto resulted       Lavender Top [[id number]] Collected:  10/03/19 1808    Specimen:  Blood Updated:  10/03/19 1935     Extra Tube hold for add-on     Comment: Auto resulted       Comprehensive Metabolic Panel [[id number]]  (Abnormal) Collected:  10/03/19 1808    Specimen:  Blood Updated:  10/03/19 1858     Glucose 187 mg/dL      BUN 13 mg/dL      Creatinine 0.72 mg/dL      Sodium 142 mmol/L       Potassium 4.5 mmol/L      Chloride 102 mmol/L      CO2 26.0 mmol/L      Calcium 9.1 mg/dL      Total Protein 6.8 g/dL      Albumin 3.70 g/dL      ALT (SGPT) 27 U/L      AST (SGOT) 16 U/L      Alkaline Phosphatase 201 U/L      Total Bilirubin 0.9 mg/dL      eGFR Non African Amer 113 mL/min/1.73      Globulin 3.1 gm/dL      A/G Ratio 1.2 g/dL      BUN/Creatinine Ratio 18.1     Anion Gap 14.0 mmol/L     Narrative:       GFR Normal >60  Chronic Kidney Disease <60  Kidney Failure <15    Magnesium [[id number]]  (Normal) Collected:  10/03/19 1808    Specimen:  Blood Updated:  10/03/19 1853     Magnesium 1.9 mg/dL     Troponin [[id number]]  (Abnormal) Collected:  10/03/19 1808    Specimen:  Blood Updated:  10/03/19 1852     Troponin T 0.031 ng/mL     Narrative:       Troponin T Reference Range:  <= 0.03 ng/mL-   Negative for AMI  >0.03 ng/mL-     Abnormal for myocardial necrosis.  Clinicians would have to utilize clinical acumen, EKG, Troponin and serial changes to determine if it is an Acute Myocardial Infarction or myocardial injury due to an underlying chronic condition.     BNP [[id number]]  (Abnormal) Collected:  10/03/19 1808    Specimen:  Blood Updated:  10/03/19 1850     proBNP 2,968.0 pg/mL     Narrative:       Among patients with dyspnea, NT-proBNP is highly sensitive for the detection of acute congestive heart failure. In addition NT-proBNP of <300 pg/ml effectively rules out acute congestive heart failure with 99% negative predictive value.    Urinalysis, Microscopic Only - Urine, Clean Catch [[id number]]  (Abnormal) Collected:  10/03/19 1820    Specimen:  Urine, Clean Catch Updated:  10/03/19 1846     RBC, UA None Seen /HPF      WBC, UA 6-12 /HPF      Bacteria, UA 1+ /HPF      Squamous Epithelial Cells, UA 3-5 /HPF      Hyaline Casts, UA 13-20 /LPF      Methodology Manual Light Microscopy    D-dimer, Quantitative [[id number]]  (Abnormal) Collected:  10/03/19 1807    Specimen:  Blood Updated:  10/03/19 1845      D-Dimer, Quantitative 1,604 ng/mL (FEU)     Narrative:       Dimer values <500 ng/ml FEU are FDA approved as aid in diagnosis of deep venous thrombosis and pulmonary embolism.  This test should not be used in an exclusion strategy with pretest probability alone.    A recent guideline regarding diagnosis for pulmonary thromboembolism recommends an adjusted exclusion criterion of age x 10 ng/ml FEU for patients >50 years of age ([employer] Intern Med 2015; 163: 701-711).    aPTT [[id number]]  (Normal) Collected:  10/03/19 1807    Specimen:  Blood Updated:  10/03/19 1843     PTT 33.3 seconds     Narrative:       The recommended Heparin therapeutic range is 68-97 seconds.    Protime-INR [[id number]]  (Abnormal) Collected:  10/03/19 1807    Specimen:  Blood Updated:  10/03/19 1842     Protime 15.3 Seconds      INR 1.23    Narrative:       Therapeutic range for most indications is 2.0-3.0 INR,  or 2.5-3.5 for mechanical heart valves.    Urinalysis With Microscopic If Indicated (No Culture) - Urine, Clean Catch [[id number]]  (Abnormal) Collected:  10/03/19 1820    Specimen:  Urine, Clean Catch Updated:  10/03/19 1835     Color, UA Dark Yellow     Appearance, UA Cloudy     pH, UA 7.0     Specific Gravity, UA 1.028     Glucose,  mg/dL (Trace)     Ketones, UA 40 mg/dL (2+)     Bilirubin, UA Moderate (2+)     Blood, UA Negative     Protein,  mg/dL (2+)     Leuk Esterase, UA Trace     Nitrite, UA Negative     Urobilinogen, UA 1.0 E.U./dL    CBC & Differential [[id number]] Collected:  10/03/19 1808    Specimen:  Blood Updated:  10/03/19 1820    Narrative:       The following orders were created for panel order CBC & Differential.  Procedure                               Abnormality         Status                     ---------                               -----------         ------                     CBC Auto Differential[[id number]]        Abnormal            Final result                 Please view results for these tests on  the individual orders.    CBC Auto Differential [[id number]]  (Abnormal) Collected:  10/03/19 1808    Specimen:  Blood Updated:  10/03/19 1820     WBC 11.83 10*3/mm3      RBC 4.15 10*6/mm3      Hemoglobin 12.0 g/dL      Hematocrit 35.6 %      MCV 85.8 fL      MCH 28.9 pg      MCHC 33.7 g/dL      RDW 12.8 %      RDW-SD 38.8 fl      MPV 9.9 fL      Platelets 398 10*3/mm3      Neutrophil % 84.4 %      Lymphocyte % 9.2 %      Monocyte % 4.4 %      Eosinophil % 0.3 %      Basophil % 0.3 %      Immature Grans % 1.4 %      Neutrophils, Absolute 9.98 10*3/mm3      Lymphocytes, Absolute 1.09 10*3/mm3      Monocytes, Absolute 0.52 10*3/mm3      Eosinophils, Absolute 0.04 10*3/mm3      Basophils, Absolute 0.03 10*3/mm3      Immature Grans, Absolute 0.17 10*3/mm3      nRBC 0.0 /100 WBC         Imaging Results (last 24 hours)     Procedure Component Value Units Date/Time    XR Chest 1 View [[id number]] Collected:  10/03/19 1809     Updated:  10/03/19 1909    Narrative:       PROCEDURE: XR CHEST 1 VW    VIEWS:Single    INDICATION: Weakness, dizziness, altered mental status    COMPARISON: CXR: 9/28/2018    FINDINGS:       - lines/tubes: None    - cardiac: Mild stable cardiomegaly.    - mediastinum: Contour within normal limits.     - lungs: Ill-defined opacification in the left lung base, and  mild streaky patchy airspace opacification in the right lung  base, less prominent than right..     - pleura: Blunting of the costophrenic angles, left greater  than right.      - osseous: Unremarkable for age.        Impression:       Bibasilar airspace opacification, left greater than right,  possibly correlation with small bilateral effusions. This is  increased from the prior study of 9/28/2019, and pneumonia is not  excluded. Interval follow-up to complete radiographic resolution  is suggested    Electronically signed by:  Yuliya Anders MD  10/3/2019 7:08 PM CDT  Workstation: [id number]        Assessment:    Active Hospital Problems     Diagnosis   • Pneumonia of both lower lobes due to infectious organism (CMS/HCC)   AF w/RVR      Plan:  The patient developed paroxysmal atrial fibrillation with rapid ventricular response.  He is now back in sinus rhythm.  Atrial feed may have been precipitated by his recent surgery.  We will resume the patient's home medications, admitting for overnight observation, and place him on telemetry.  Unless the patient develops a recurrence of his symptoms, additional work-up would not be undertaken at this time.  He may be discharged in the a.m. if he remains in sinus rhythm overnight.    I discussed the patients findings and my recommendations with the patient and family members.     Edgar Parra MD  10/03/19  9:20 PM

## 2019-10-04 NOTE — DISCHARGE SUMMARY
[facility]  DISCHARGE SUMMARY       Date of Admission: 10/3/2019  Date of Discharge:  10/4/2019  Primary Care Physician: Artemio Sanchez MD    Presenting Problem/History of Present Illness:  Atrial fibrillation with rapid ventricular response (CMS/HCC) [I48.91]  Pneumonia of both lower lobes due to infectious organism (CMS/HCC) [J18.1]  Pneumonia of both lower lobes due to infectious organism (CMS/HCC) [J18.1]       Final Discharge Diagnoses:  Active Hospital Problems    Diagnosis   • Pneumonia of both lower lobes due to infectious organism (CMS/HCC)   • Type 2 diabetes mellitus without complication, with long-term current use of insulin (CMS/HCC)   • Hypertension associated with diabetes (CMS/HCC)       Consults:   Consults     Date and Time Order Name Status Description    10/3/2019 2111 Hospitalist (on-call MD unless specified)      9/30/2019 0856 Inpatient Cardiology Consult Completed     9/13/2019 1213 Inpatient Cardiothoracic Surgery Consult Completed     9/12/2019 1645 Cardiology - Primary (on-call MD unless specified) Completed           Procedures Performed:                 Pertinent Test Results:   Lab Results (most recent)     Procedure Component Value Units Date/Time    Troponin [[id number]] Collected:  10/04/19 1106    Specimen:  Blood Updated:  10/04/19 1209    POC Glucose Once [[id number]]  (Abnormal) Collected:  10/04/19 1133    Specimen:  Blood Updated:  10/04/19 1146     Glucose 169 mg/dL      Comment: Sliding Scale AdminOperator: [id number] [facility] CenterM[facility]: [id number]       POC Glucose Once [[id number]]  (Normal) Collected:  10/04/19 0625    Specimen:  Blood Updated:  10/04/19 0643     Glucose 110 mg/dL      Comment: : [id number] VERONICA IYERYMeter ID: [id number]       Phosphorus [[id number]]  (Normal) Collected:  10/04/19 0505    Specimen:  Blood Updated:  10/04/19 0623     Phosphorus 3.0 mg/dL     Basic Metabolic Panel  [[id number]]  (Abnormal) Collected:  10/04/19 0505    Specimen:  Blood Updated:  10/04/19 0622     Glucose 77 mg/dL      BUN 17 mg/dL      Creatinine 0.73 mg/dL      Sodium 140 mmol/L      Potassium 4.1 mmol/L      Chloride 104 mmol/L      CO2 27.0 mmol/L      Calcium 8.8 mg/dL      eGFR Non African Amer 112 mL/min/1.73      BUN/Creatinine Ratio 23.3     Anion Gap 9.0 mmol/L     Narrative:       GFR Normal >60  Chronic Kidney Disease <60  Kidney Failure <15    Lipid Panel [[id number]]  (Abnormal) Collected:  10/04/19 0505    Specimen:  Blood Updated:  10/04/19 0612     Total Cholesterol 101 mg/dL      Triglycerides 100 mg/dL      HDL Cholesterol 27 mg/dL      LDL Cholesterol  54 mg/dL      VLDL Cholesterol 20 mg/dL      LDL/HDL Ratio 2.00    Narrative:       Cholesterol Reference Ranges  (U.S. Department of Health and Human Services ATP III Classifications)    Desirable          <200 mg/dL  Borderline High    200-239 mg/dL  High Risk          >240 mg/dL      Triglyceride Reference Ranges  (U.S. Department of Health and Human Services ATP III Classifications)    Normal           <150 mg/dL  Borderline High  150-199 mg/dL  High             200-499 mg/dL  Very High        >500 mg/dL    HDL Reference Ranges  (U.S. Department of Health and Human Services ATP III Classifcations)    Low     <40 mg/dl (major risk factor for CHD)  High    >60 mg/dl ('negative' risk factor for CHD)        LDL Reference Ranges  (U.S. Department of Health and Human Services ATP III Classifcations)    Optimal          <100 mg/dL  Near Optimal     100-129 mg/dL  Borderline High  130-159 mg/dL  High             160-189 mg/dL  Very High        >189 mg/dL    Magnesium [[id number]]  (Normal) Collected:  10/04/19 0505    Specimen:  Blood Updated:  10/04/19 0612     Magnesium 2.0 mg/dL     Troponin [[id number]]  (Abnormal) Collected:  10/04/19 0505    Specimen:  Blood Updated:  10/04/19 0610     Troponin T 0.047 ng/mL     Narrative:       Troponin T  Reference Range:  <= 0.03 ng/mL-   Negative for AMI  >0.03 ng/mL-     Abnormal for myocardial necrosis.  Clinicians would have to utilize clinical acumen, EKG, Troponin and serial changes to determine if it is an Acute Myocardial Infarction or myocardial injury due to an underlying chronic condition.     BNP [[id number]]  (Abnormal) Collected:  10/04/19 0505    Specimen:  Blood Updated:  10/04/19 0605     proBNP 2,502.0 pg/mL     Narrative:       Among patients with dyspnea, NT-proBNP is highly sensitive for the detection of acute congestive heart failure. In addition NT-proBNP of <300 pg/ml effectively rules out acute congestive heart failure with 99% negative predictive value.    CBC Auto Differential [[id number]]  (Abnormal) Collected:  10/04/19 0505    Specimen:  Blood Updated:  10/04/19 0544     WBC 10.20 10*3/mm3      RBC 3.59 10*6/mm3      Hemoglobin 10.7 g/dL      Hematocrit 30.7 %      MCV 85.5 fL      MCH 29.8 pg      MCHC 34.9 g/dL      RDW 12.7 %      RDW-SD 38.3 fl      MPV 9.7 fL      Platelets 393 10*3/mm3      Neutrophil % 70.2 %      Lymphocyte % 19.9 %      Monocyte % 7.2 %      Eosinophil % 1.1 %      Basophil % 0.3 %      Immature Grans % 1.3 %      Neutrophils, Absolute 7.17 10*3/mm3      Lymphocytes, Absolute 2.03 10*3/mm3      Monocytes, Absolute 0.73 10*3/mm3      Eosinophils, Absolute 0.11 10*3/mm3      Basophils, Absolute 0.03 10*3/mm3      Immature Grans, Absolute 0.13 10*3/mm3      nRBC 0.0 /100 WBC     Lactic Acid, Plasma [[id number]]  (Normal) Collected:  10/03/19 2207    Specimen:  Blood from Arm, Right Updated:  10/03/19 2242     Lactate 1.5 mmol/L     Blood Culture - Blood, Arm, Right [[id number]] Collected:  10/03/19 2208    Specimen:  Blood from Arm, Right Updated:  10/03/19 2222    Blood Culture - Blood, Arm, Right [[id number]] Collected:  10/03/19 2130    Specimen:  Blood from Arm, Right Updated:  10/03/19 2222    [place] Draw [[id number]] Collected:  10/03/19 1807    Specimen:  Blood  Updated:  10/03/19 1935    Narrative:       The following orders were created for panel order [place] Draw.  Procedure                               Abnormality         Status                     ---------                               -----------         ------                     Light Blue Top[[id number]]                                   Final result               Green Top (Gel)[[id number]]                                  Final result               Lavender Top[[id number]]                                     Final result               Gold Top - SST[[id number]]                                   Final result                 Please view results for these tests on the individual orders.    Green Top (Gel) [[id number]] Collected:  10/03/19 1808    Specimen:  Blood Updated:  10/03/19 1935     Extra Tube Hold for add-ons.     Comment: Auto resulted.       Gold Top - SST [[id number]] Collected:  10/03/19 1808    Specimen:  Blood Updated:  10/03/19 1935     Extra Tube Hold for add-ons.     Comment: Auto resulted.       Light Blue Top [[id number]] Collected:  10/03/19 1807    Specimen:  Blood Updated:  10/03/19 1935     Extra Tube hold for add-on     Comment: Auto resulted       Lavender Top [[id number]] Collected:  10/03/19 1808    Specimen:  Blood Updated:  10/03/19 1935     Extra Tube hold for add-on     Comment: Auto resulted       Comprehensive Metabolic Panel [[id number]]  (Abnormal) Collected:  10/03/19 1808    Specimen:  Blood Updated:  10/03/19 1858     Glucose 187 mg/dL      BUN 13 mg/dL      Creatinine 0.72 mg/dL      Sodium 142 mmol/L      Potassium 4.5 mmol/L      Chloride 102 mmol/L      CO2 26.0 mmol/L      Calcium 9.1 mg/dL      Total Protein 6.8 g/dL      Albumin 3.70 g/dL      ALT (SGPT) 27 U/L      AST (SGOT) 16 U/L      Alkaline Phosphatase 201 U/L      Total Bilirubin 0.9 mg/dL      eGFR Non African Amer 113 mL/min/1.73      Globulin 3.1 gm/dL      A/G Ratio 1.2 g/dL      BUN/Creatinine Ratio 18.1     Anion  Gap 14.0 mmol/L     Narrative:       GFR Normal >60  Chronic Kidney Disease <60  Kidney Failure <15    Magnesium [[id number]]  (Normal) Collected:  10/03/19 1808    Specimen:  Blood Updated:  10/03/19 1853     Magnesium 1.9 mg/dL     BNP [[id number]]  (Abnormal) Collected:  10/03/19 1808    Specimen:  Blood Updated:  10/03/19 1850     proBNP 2,968.0 pg/mL     Narrative:       Among patients with dyspnea, NT-proBNP is highly sensitive for the detection of acute congestive heart failure. In addition NT-proBNP of <300 pg/ml effectively rules out acute congestive heart failure with 99% negative predictive value.    Urinalysis, Microscopic Only - Urine, Clean Catch [[id number]]  (Abnormal) Collected:  10/03/19 1820    Specimen:  Urine, Clean Catch Updated:  10/03/19 1846     RBC, UA None Seen /HPF      WBC, UA 6-12 /HPF      Bacteria, UA 1+ /HPF      Squamous Epithelial Cells, UA 3-5 /HPF      Hyaline Casts, UA 13-20 /LPF      Methodology Manual Light Microscopy    D-dimer, Quantitative [[id number]]  (Abnormal) Collected:  10/03/19 1807    Specimen:  Blood Updated:  10/03/19 1845     D-Dimer, Quantitative 1,604 ng/mL (FEU)     Narrative:       Dimer values <500 ng/ml FEU are FDA approved as aid in diagnosis of deep venous thrombosis and pulmonary embolism.  This test should not be used in an exclusion strategy with pretest probability alone.    A recent guideline regarding diagnosis for pulmonary thromboembolism recommends an adjusted exclusion criterion of age x 10 ng/ml FEU for patients >50 years of age ([employer] Intern Med 2015; 163: 701-711).    aPTT [[id number]]  (Normal) Collected:  10/03/19 1807    Specimen:  Blood Updated:  10/03/19 1843     PTT 33.3 seconds     Narrative:       The recommended Heparin therapeutic range is 68-97 seconds.    Protime-INR [[id number]]  (Abnormal) Collected:  10/03/19 1807    Specimen:  Blood Updated:  10/03/19 1842     Protime 15.3 Seconds      INR 1.23    Narrative:       Therapeutic range  for most indications is 2.0-3.0 INR,  or 2.5-3.5 for mechanical heart valves.    Urinalysis With Microscopic If Indicated (No Culture) - Urine, Clean Catch [[id number]]  (Abnormal) Collected:  10/03/19 1820    Specimen:  Urine, Clean Catch Updated:  10/03/19 1835     Color, UA Dark Yellow     Appearance, UA Cloudy     pH, UA 7.0     Specific Gravity, UA 1.028     Glucose,  mg/dL (Trace)     Ketones, UA 40 mg/dL (2+)     Bilirubin, UA Moderate (2+)     Blood, UA Negative     Protein,  mg/dL (2+)     Leuk Esterase, UA Trace     Nitrite, UA Negative     Urobilinogen, UA 1.0 E.U./dL    CBC & Differential [[id number]] Collected:  10/03/19 1808    Specimen:  Blood Updated:  10/03/19 1820    Narrative:       The following orders were created for panel order CBC & Differential.  Procedure                               Abnormality         Status                     ---------                               -----------         ------                     CBC Auto Differential[[id number]]        Abnormal            Final result                 Please view results for these tests on the individual orders.    CBC Auto Differential [[id number]]  (Abnormal) Collected:  10/03/19 1808    Specimen:  Blood Updated:  10/03/19 1820     WBC 11.83 10*3/mm3      RBC 4.15 10*6/mm3      Hemoglobin 12.0 g/dL      Hematocrit 35.6 %      MCV 85.8 fL      MCH 28.9 pg      MCHC 33.7 g/dL      RDW 12.8 %      RDW-SD 38.8 fl      MPV 9.9 fL      Platelets 398 10*3/mm3      Neutrophil % 84.4 %      Lymphocyte % 9.2 %      Monocyte % 4.4 %      Eosinophil % 0.3 %      Basophil % 0.3 %      Immature Grans % 1.4 %      Neutrophils, Absolute 9.98 10*3/mm3      Lymphocytes, Absolute 1.09 10*3/mm3      Monocytes, Absolute 0.52 10*3/mm3      Eosinophils, Absolute 0.04 10*3/mm3      Basophils, Absolute 0.03 10*3/mm3      Immature Grans, Absolute 0.17 10*3/mm3      nRBC 0.0 /100 WBC         Imaging Results (most recent)     Procedure Component Value  Units Date/Time    CT Angiogram Chest With Contrast [[id number]] Collected:  10/04/19 0218     Updated:  10/04/19 0303    Narrative:       Exam: CT angiogram chest with contrast    INDICATION: Pneumonia    TECHNIQUE: Routine CT angiogram chest with contrast.  Computer-generated 3-D reconstruction/MIPS were obtained. The  exam is suboptimal due to patient motion and miss timing of the  contrast bolus resulting in diminished enhancement of the  pulmonary arteries. This exam was performed according to our  departmental dose-optimization program, which includes automated  exposure control, adjustment of the mA and/or kV according to  patient size and/or use of iterative reconstruction technique.    FINDINGS: No mediastinal or hilar lymphadenopathy. The thoracic  aorta aneurysm or dissection. No obvious thrombus in the  pulmonary arterial tree. Evaluation of the the distal artery  segmental branches is limited. Heart is enlarged. Coronary artery  calcification is seen. Status post CABG. There is a moderate left  pleural effusion is small right pleural effusion. There is  bilateral lower lobe consolidation/atelectasis left greater  right. There is minimal infiltrate or atelectasis in the  posterior aspect of the left upper lobe. Tracheobronchial tree is  patent. No pneumothorax.    Status post cholecystectomy. Fatty infiltration of the liver.    Mild thoracic spondylosis.      Impression:       1. Limited exam for PE. No obvious pulmonary embolus.  2. Bilateral pleural effusions  3. Bilateral lower lobe consolidation/atelectasis left greater  than right. Recommend follow-up.  (    Electronically signed by:  Tomas Lawler MD  10/4/2019 3:02 AM  CDT Workstation: [id number]    XR Chest 1 View [[id number]] Collected:  10/03/19 1809     Updated:  10/03/19 1909    Narrative:       PROCEDURE: XR CHEST 1 VW    VIEWS:Single    INDICATION: Weakness, dizziness, altered mental status    COMPARISON: CXR: 9/28/2018    FINDINGS:       -  "lines/tubes: None    - cardiac: Mild stable cardiomegaly.    - mediastinum: Contour within normal limits.     - lungs: Ill-defined opacification in the left lung base, and  mild streaky patchy airspace opacification in the right lung  base, less prominent than right..     - pleura: Blunting of the costophrenic angles, left greater  than right.      - osseous: Unremarkable for age.        Impression:       Bibasilar airspace opacification, left greater than right,  possibly correlation with small bilateral effusions. This is  increased from the prior study of 9/28/2019, and pneumonia is not  excluded. Interval follow-up to complete radiographic resolution  is suggested    Electronically signed by:  Yuliya Anders MD  10/3/2019 7:08 PM CDT  Workstation: [id number]          Chief Complaint on Day of Discharge: None    Hospital Course:  The patient is a 54 y.o. male who presented to [facility] with lightheadedness and dizziness.  Patient was found to be in atrial fibrillation with rapid ventricular response.  He was given IV Cardizem push and started on IV Cardizem drip in the emergency department.  He converted to normal sinus rhythm.  Case was discussed with CT surgery and electrophysiology.  The decision was made to add Cardizem  mg daily.  Patient has a propensity to revert back into atrial fibrillation.  He will be continued on amiodarone 200 mg.  No follow-up with his primary care provider in 1 week, Dr. Rice in 2 weeks, and CT surgery as scheduled.  He was discharged home in good condition.    Condition on Discharge: Stable    Physical Exam on Discharge:  /88 (BP Location: Left arm, Patient Position: Lying)   Pulse 76   Temp 97 °F (36.1 °C) (Oral)   Resp 16   Ht 188 cm (74\")   Wt 110 kg (242 lb)   SpO2 94%   BMI 31.07 kg/m²      Physical Exam   Constitutional: He appears well-developed and well-nourished.   HENT:   Head: Normocephalic and atraumatic.   Eyes: EOM are normal. Pupils " are equal, round, and reactive to light.   Neck: Normal range of motion. Neck supple.   Cardiovascular: Normal rate, regular rhythm, normal heart sounds and intact distal pulses. Exam reveals no gallop and no friction rub.   No murmur heard.  Pulmonary/Chest: Effort normal and breath sounds normal. No respiratory distress. He has no wheezes. He has no rales. He exhibits no tenderness.   Abdominal: Soft. Bowel sounds are normal. He exhibits no distension. There is no tenderness.   Psychiatric: He has a normal mood and affect. His behavior is normal.   Vitals reviewed.        Discharge Disposition:  Home or Self Care    Discharge Medications:     Discharge Medications      New Medications      Instructions Start Date   dilTIAZem  MG 24 hr capsule  Commonly known as:  CARDIZEM CD   180 mg, Oral, Daily         Continue These Medications      Instructions Start Date   amiodarone 200 MG tablet  Commonly known as:  PACERONE   200 mg, Oral, Every 12 Hours      ascorbic acid 500 MG tablet  Commonly known as:  VITAMIN C   500 mg, Oral, 2 Times Daily      atorvastatin 40 MG tablet  Commonly known as:  LIPITOR   40 mg, Oral, Nightly      clopidogrel 75 MG tablet  Commonly known as:  PLAVIX   75 mg, Oral, Daily      ELIQUIS 5 MG tablet tablet  Generic drug:  apixaban   5 mg, Oral, Every 12 Hours Scheduled      magnesium oxide 400 (241.3 Mg) MG tablet tablet  Commonly known as:  MAGOX   400 mg, Oral, 2 Times Daily      metFORMIN 500 MG tablet  Commonly known as:  GLUCOPHAGE   500 mg, Oral, 2 Times Daily With Meals      metoprolol tartrate 25 MG tablet  Commonly known as:  LOPRESSOR   12.5 mg, Oral, Every 12 Hours Scheduled      nitroglycerin 0.4 MG SL tablet  Commonly known as:  NITROSTAT   0.4 mg, Sublingual, Every 5 Minutes PRN, Take no more than 3 doses in 15 minutes.      ONE TOUCH DELICA LANCING DEV misc   delica lancing device if approved, otherwise use alternative      ONETOUCH DELICA LANCETS 33G misc   1 each, Does  not apply, 4 Times Daily, delica if covered, use alternative if not covered      oxyCODONE-acetaminophen 5-325 MG per tablet  Commonly known as:  PERCOCET   1-2 tablets, Oral, Every 4 Hours PRN      pregabalin 50 MG capsule  Commonly known as:  LYRICA   50 mg, Oral, 2 Times Daily      prenatal vitamin 27-0.8 27-0.8 MG tablet tablet   1 tablet, Oral, Daily      sennosides-docusate sodium 8.6-50 MG tablet  Commonly known as:  SENOKOT-S   1 tablet, Oral, 2 Times Daily      vitamin D 24370 units capsule capsule  Commonly known as:  ERGOCALCIFEROL   50,000 Units, Oral, 2 Times Weekly, tues and fridays             Discharge Diet:   Diet Instructions     Advance Diet As Tolerated            Activity at Discharge:   Activity Instructions     Activity as Tolerated              Follow-up Appointments:   Future Appointments   Date Time Provider Department Center   10/29/2019 11:15 AM Martinez Card APRN [facility] None   11/15/2019 10:15 AM Alice Rice MD [facility] None   12/23/2019  1:00 PM Artemio Sanchez MD [facility] None   Dr. Rice 2 weeks  Dr. Sanchez 1 week    Test Results Pending at Discharge:    Order Current Status    Blood Culture - Blood, Arm, Right In process    Blood Culture - Blood, Arm, Right In process    Troponin In process              This document has been electronically signed by Javon Vela MD on October 4, 2019 12:29 PM      Time: 35 min

## 2019-10-05 ENCOUNTER — READMISSION MANAGEMENT (OUTPATIENT)
Dept: CALL CENTER | Facility: HOSPITAL | Age: 54
End: 2019-10-05

## 2019-10-05 NOTE — OUTREACH NOTE
Prep Survey      Responses   Facility patient discharged from?  [place]   Is patient eligible?  Yes   Discharge diagnosis  PNA,  T2DM,  HTN   Does the patient have one of the following disease processes/diagnoses(primary or secondary)?  COPD/Pneumonia   Does the patient have Home health ordered?  No   Is there a DME ordered?  No   Comments regarding appointments  see AVS   Medication alerts for this patient  cardize   Prep survey completed?  Yes          Kendal Lilly RN

## 2019-10-07 ENCOUNTER — READMISSION MANAGEMENT (OUTPATIENT)
Dept: CALL CENTER | Facility: HOSPITAL | Age: 54
End: 2019-10-07

## 2019-10-07 NOTE — OUTREACH NOTE
COPD/PN Week 1 Survey      Responses   Facility patient discharged from?  [place]   Does the patient have one of the following disease processes/diagnoses(primary or secondary)?  COPD/Pneumonia   Is there a successful TCM telephone encounter documented?  No   Was the primary reason for admission:  Pneumonia   Week 1 attempt successful?  No   Unsuccessful attempts  Attempt 1          Catina Morales RN

## 2019-10-08 ENCOUNTER — READMISSION MANAGEMENT (OUTPATIENT)
Dept: CALL CENTER | Facility: HOSPITAL | Age: 54
End: 2019-10-08

## 2019-10-08 LAB
BACTERIA SPEC AEROBE CULT: NORMAL
BACTERIA SPEC AEROBE CULT: NORMAL

## 2019-10-08 NOTE — OUTREACH NOTE
COPD/PN Week 1 Survey      Responses   Facility patient discharged from?  [place]   Does the patient have one of the following disease processes/diagnoses(primary or secondary)?  COPD/Pneumonia   Is there a successful TCM telephone encounter documented?  No   Was the primary reason for admission:  Pneumonia   Week 1 attempt successful?  No   Unsuccessful attempts  Attempt 2          Radha James RN

## 2019-10-11 ENCOUNTER — LAB (OUTPATIENT)
Dept: LAB | Facility: HOSPITAL | Age: 54
End: 2019-10-11

## 2019-10-11 ENCOUNTER — OFFICE VISIT (OUTPATIENT)
Dept: CARDIAC SURGERY | Facility: CLINIC | Age: 54
End: 2019-10-11

## 2019-10-11 ENCOUNTER — HOSPITAL ENCOUNTER (OUTPATIENT)
Dept: GENERAL RADIOLOGY | Facility: HOSPITAL | Age: 54
Discharge: HOME | End: 2019-10-11
Admitting: NURSE PRACTITIONER

## 2019-10-11 VITALS
DIASTOLIC BLOOD PRESSURE: 65 MMHG | OXYGEN SATURATION: 94 % | SYSTOLIC BLOOD PRESSURE: 110 MMHG | TEMPERATURE: 97.7 F | HEIGHT: 74 IN | HEART RATE: 64 BPM | BODY MASS INDEX: 30.16 KG/M2 | WEIGHT: 235 LBS

## 2019-10-11 DIAGNOSIS — Z48.812 SURGICAL AFTERCARE, CIRCULATORY SYSTEM: ICD-10-CM

## 2019-10-11 DIAGNOSIS — E78.2 MIXED HYPERLIPIDEMIA: Primary | ICD-10-CM

## 2019-10-11 DIAGNOSIS — I10 ESSENTIAL HYPERTENSION: ICD-10-CM

## 2019-10-11 DIAGNOSIS — I25.10 CORONARY ARTERY DISEASE INVOLVING NATIVE CORONARY ARTERY OF NATIVE HEART WITHOUT ANGINA PECTORIS: ICD-10-CM

## 2019-10-11 DIAGNOSIS — I48.0 PAROXYSMAL ATRIAL FIBRILLATION (HCC): ICD-10-CM

## 2019-10-11 LAB
BASOPHILS # BLD AUTO: 0.04 10*3/MM3 (ref 0–0.2)
BASOPHILS NFR BLD AUTO: 0.4 % (ref 0–1.5)
DEPRECATED RDW RBC AUTO: 42.1 FL (ref 37–54)
EOSINOPHIL # BLD AUTO: 0.24 10*3/MM3 (ref 0–0.4)
EOSINOPHIL NFR BLD AUTO: 2.4 % (ref 0.3–6.2)
ERYTHROCYTE [DISTWIDTH] IN BLOOD BY AUTOMATED COUNT: 13.6 % (ref 12.3–15.4)
HCT VFR BLD AUTO: 38.6 % (ref 37.5–51)
HGB BLD-MCNC: 12.8 G/DL (ref 13–17.7)
IMM GRANULOCYTES # BLD AUTO: 0.05 10*3/MM3 (ref 0–0.05)
IMM GRANULOCYTES NFR BLD AUTO: 0.5 % (ref 0–0.5)
LYMPHOCYTES # BLD AUTO: 1.22 10*3/MM3 (ref 0.7–3.1)
LYMPHOCYTES NFR BLD AUTO: 12.3 % (ref 19.6–45.3)
MCH RBC QN AUTO: 29 PG (ref 26.6–33)
MCHC RBC AUTO-ENTMCNC: 33.2 G/DL (ref 31.5–35.7)
MCV RBC AUTO: 87.5 FL (ref 79–97)
MONOCYTES # BLD AUTO: 0.49 10*3/MM3 (ref 0.1–0.9)
MONOCYTES NFR BLD AUTO: 4.9 % (ref 5–12)
NEUTROPHILS # BLD AUTO: 7.91 10*3/MM3 (ref 1.7–7)
NEUTROPHILS NFR BLD AUTO: 79.5 % (ref 42.7–76)
NRBC BLD AUTO-RTO: 0 /100 WBC (ref 0–0.2)
PLATELET # BLD AUTO: 464 10*3/MM3 (ref 140–450)
PMV BLD AUTO: 9.4 FL (ref 6–12)
RBC # BLD AUTO: 4.41 10*6/MM3 (ref 4.14–5.8)
WBC NRBC COR # BLD: 9.95 10*3/MM3 (ref 3.4–10.8)

## 2019-10-11 PROCEDURE — 71046 X-RAY EXAM CHEST 2 VIEWS: CPT

## 2019-10-11 PROCEDURE — 93005 ELECTROCARDIOGRAM TRACING: CPT | Performed by: NURSE PRACTITIONER

## 2019-10-11 PROCEDURE — 93010 ELECTROCARDIOGRAM REPORT: CPT | Performed by: INTERNAL MEDICINE

## 2019-10-11 PROCEDURE — 85025 COMPLETE CBC W/AUTO DIFF WBC: CPT

## 2019-10-11 PROCEDURE — 36415 COLL VENOUS BLD VENIPUNCTURE: CPT

## 2019-10-11 PROCEDURE — 99024 POSTOP FOLLOW-UP VISIT: CPT | Performed by: NURSE PRACTITIONER

## 2019-10-11 NOTE — PATIENT INSTRUCTIONS
"Post-Operative Activities after heart surgery are as follows.  No driving until 2-3 weeks post-op, may ambulate as much as desired with periods of rest during the day.  Recreational walks outdoors are helpful for mood if weather permits.  No lifting anything over 10lbs until 6 weeks post op as this may strain the breast bone.  You should not lift anything exceeding 20lbs until 12 weeks post op.  Continue to shower daily, poor soap/rinse/and pat dry incision sites, do not scrub.  You can start household chores araound 2-3 weeks post op however, no vacuuming shoveling, gardening, mopping, or laundry until 12 weeks post op.  Use common sense in choosing activities, activities should not cause strain.  Alternate periods of activity with periods of rest.  Please refer to your \"Moving Right Along Book\" for specific details.  If leg swelling occurs elevate the extremity in a reclined position and use support hose as needed.      "

## 2019-10-14 ENCOUNTER — READMISSION MANAGEMENT (OUTPATIENT)
Dept: CALL CENTER | Facility: HOSPITAL | Age: 54
End: 2019-10-14

## 2019-10-14 NOTE — OUTREACH NOTE
COPD/PN Week 2 Survey      Responses   Facility patient discharged from?  [place]   Does the patient have one of the following disease processes/diagnoses(primary or secondary)?  COPD/Pneumonia   Was the primary reason for admission:  Pneumonia   Week 2 attempt successful?  No   Unsuccessful attempts  Attempt 1          Eliza Givens, RN

## 2019-10-15 ENCOUNTER — READMISSION MANAGEMENT (OUTPATIENT)
Dept: CALL CENTER | Facility: HOSPITAL | Age: 54
End: 2019-10-15

## 2019-10-15 DIAGNOSIS — I48.0 PAF (PAROXYSMAL ATRIAL FIBRILLATION) (HCC): Primary | ICD-10-CM

## 2019-10-15 NOTE — OUTREACH NOTE
COPD/PN Week 2 Survey      Responses   Facility patient discharged from?  [place]   Does the patient have one of the following disease processes/diagnoses(primary or secondary)?  COPD/Pneumonia   Was the primary reason for admission:  Pneumonia   Week 2 attempt successful?  No   Unsuccessful attempts  Attempt 2          Rosaura Cox RN

## 2019-10-16 ENCOUNTER — OFFICE VISIT (OUTPATIENT)
Dept: CARDIOLOGY | Facility: CLINIC | Age: 54
End: 2019-10-16

## 2019-10-16 VITALS
BODY MASS INDEX: 29.52 KG/M2 | SYSTOLIC BLOOD PRESSURE: 148 MMHG | DIASTOLIC BLOOD PRESSURE: 82 MMHG | WEIGHT: 230 LBS | HEART RATE: 70 BPM | HEIGHT: 74 IN

## 2019-10-16 DIAGNOSIS — I15.2 HYPERTENSION ASSOCIATED WITH DIABETES (HCC): ICD-10-CM

## 2019-10-16 DIAGNOSIS — Z79.4 TYPE 2 DIABETES MELLITUS WITHOUT COMPLICATION, WITH LONG-TERM CURRENT USE OF INSULIN (HCC): ICD-10-CM

## 2019-10-16 DIAGNOSIS — I10 ESSENTIAL HYPERTENSION: Primary | ICD-10-CM

## 2019-10-16 DIAGNOSIS — I48.0 PAROXYSMAL ATRIAL FIBRILLATION (HCC): ICD-10-CM

## 2019-10-16 DIAGNOSIS — E11.9 TYPE 2 DIABETES MELLITUS WITHOUT COMPLICATION, WITH LONG-TERM CURRENT USE OF INSULIN (HCC): ICD-10-CM

## 2019-10-16 DIAGNOSIS — I25.118 CORONARY ARTERY DISEASE OF NATIVE ARTERY OF NATIVE HEART WITH STABLE ANGINA PECTORIS (HCC): ICD-10-CM

## 2019-10-16 DIAGNOSIS — E78.00 PURE HYPERCHOLESTEROLEMIA: ICD-10-CM

## 2019-10-16 DIAGNOSIS — E11.59 HYPERTENSION ASSOCIATED WITH DIABETES (HCC): ICD-10-CM

## 2019-10-16 PROBLEM — I25.10 CORONARY ARTERY DISEASE: Status: RESOLVED | Noted: 2019-09-25 | Resolved: 2019-10-16

## 2019-10-16 PROCEDURE — 93000 ELECTROCARDIOGRAM COMPLETE: CPT | Performed by: INTERNAL MEDICINE

## 2019-10-16 PROCEDURE — 99214 OFFICE O/P EST MOD 30 MIN: CPT | Performed by: INTERNAL MEDICINE

## 2019-10-16 RX ORDER — AMIODARONE HYDROCHLORIDE 200 MG/1
200 TABLET ORAL EVERY 12 HOURS
Qty: 40 TABLET | Refills: 0 | Status: SHIPPED | OUTPATIENT
Start: 2019-10-16 | End: 2020-04-18 | Stop reason: SDUPTHER

## 2019-10-16 NOTE — PROGRESS NOTES
[facility]  54 y.o. male    10/16/2019  1. Essential hypertension    2. Type 2 diabetes mellitus without complication, with long-term current use of insulin (CMS/[facility])    3. Hypertension associated with diabetes (CMS/[facility])    4. Pure hypercholesterolemia    5. Coronary artery disease of native artery of native heart with stable angina pectoris (CMS/[facility])    6. Paroxysmal atrial fibrillation (CMS/[facility])        History of Present Illness:  Body mass index is 30.17 kg/m². BMI is above normal parameters. Recommendations include: exercise counseling, nutrition counseling and referral to primary care.    54 years old patient known to Dr. Fuentes and subsequent underwent cardiac catheterization with Dr. Guardado with a background history of diabetes, hypertension, history of smoking, hyperlipidemia noted multivessel coronary artery disease underwent bypass surgery by Dr. Ballard with history of PVCs and developed atrial fibrillation with rapid ventricular response patient started on oral amiodarone and subsequent discharge readmitted with atrial fibrillation with rapid ventricular response converted to sinus rhythm amiodarone was adjusted and discharged and presented post hospital follow-up.  No chest pain orthopnea PND reported.  Complaining of weakness and fatigability.  Echo normal left and systolic function with mild mitral regurgitation and relaxation abnormality consistent with diastolic dysfunction.  Potassium and magnesium within normal range        · Left ventricular wall thickness is consistent with mild concentric hypertrophy.  · Estimated EF = 61%.  · Left ventricular systolic function is normal.  · Left ventricular diastolic dysfunction (grade I) consistent with impaired relaxation.  · Mild mitral valve regurgitation is present  9/3/19  Conclusion:  1.  Severe three-vessel obstructive coronary artery disease involving mid LAD, diagonal, obtuse marginal and RPDA.  2.  Normal left-sided filling  pressures.  No gradient noted across aortic valve on pullback  3.  Patent left subclavian and LIMA     Complications:  None     Recommmendations:   1.  In the setting of long-standing diabetes, tobacco use and three-vessel disease in this 54-year-old patient I am referring him to CT surgery for potential CABG with a potential LIMA to LAD, SVG to diagonal, SVG to OM and SVG to RPDA.  2.  Aggressive risk factor modification for secondary prevention.   3.  Smoking cessation counseling  4.  Optimize his antianginal therapy with starting him on amlodipine 5 mg  5.  I calculated his syntax score which is 15, syntax score II score calculated 4-year mortality of 3.8% with PCI and 6.4% with CABG setting of his COPD.  We will plan on heart team approach.  I have told the cardiothoracic surgery for further evaluation so the patient can make an informed decision      SUBJECTIVE:    Allergies   Allergen Reactions   • Ace Inhibitors Anaphylaxis   • Wellbutrin [Bupropion] Anaphylaxis         Past Medical History:   Diagnosis Date   • Arthritis     c/o joint pain   • Coronary artery disease    • Diabetes mellitus (CMS/[facility])    • Hyperlipidemia    • Hypertension    • Retinopathy of both eyes    • Sleep apnea     not using c-pap         Past Surgical History:   Procedure Laterality Date   • CARDIAC CATHETERIZATION N/A 9/3/2019    Procedure: Coronary angiography/PCI if indicated;  Surgeon: Denia Dwyer MD;  Location: [facility] CATH INVASIVE LOCATION;  Service: Cardiology   • CHOLECYSTECTOMY     • CORONARY ARTERY BYPASS GRAFT N/A 9/25/2019    Procedure: CORONARY ARTERY BYPASS GRAFTING, ENDOSCOPIC VEIN HARVEST       (CELL SAVER);  Surgeon: Xavier Ballard MD;  Location: [facility] OR;  Service: Cardiothoracic   • TONSILLECTOMY           Family History   Problem Relation Age of Onset   • Diabetes Mother    • Hypertension Mother    • Cancer Mother    • Diabetes Father    • Hypertension Father    • Cancer Father          Social History      Socioeconomic History   • Marital status:      Spouse name: Not on file   • Number of children: Not on file   • Years of education: Not on file   • Highest education level: Not on file   Tobacco Use   • Smoking status: Former Smoker     Packs/day: 1.00     Years: 35.00     Pack years: 35.00     Types: Cigarettes     Last attempt to quit: 9/3/2019     Years since quittin.1   • Smokeless tobacco: Never Used   • Tobacco comment: quit after heart cath   Substance and Sexual Activity   • Alcohol use: No   • Drug use: No   • Sexual activity: Defer         Current Outpatient Medications   Medication Sig Dispense Refill   • amiodarone (PACERONE) 200 MG tablet Take 1 tablet by mouth Every 12 (Twelve) Hours for 7 days, then 1 tablet once daily. 40 tablet 0   • apixaban (ELIQUIS) 5 MG tablet tablet Take 1 tablet by mouth Every 12 (Twelve) Hours. 60 tablet 3   • atorvastatin (LIPITOR) 40 MG tablet Take 1 tablet by mouth Every Night. 30 tablet 3   • clopidogrel (PLAVIX) 75 MG tablet Take 1 tablet by mouth Daily. 30 tablet 11   • dilTIAZem CD (CARDIZEM CD) 180 MG 24 hr capsule Take 1 capsule by mouth Daily. 30 capsule 0   • Lancet Devices (ONE TOUCH DELICA LANCING DEV) misc delica lancing device if approved, otherwise use alternative 1 each 11   • magnesium oxide (MAGOX) 400 (241.3 Mg) MG tablet tablet Take 1 tablet by mouth 2 (Two) Times a Day. 60 each 3   • metFORMIN (GLUCOPHAGE) 500 MG tablet Take 500 mg by mouth 2 (Two) Times a Day With Meals.     • metoprolol tartrate (LOPRESSOR) 25 MG tablet Take 1/2 tablet (12.5 mg) by mouth Every 12 (Twelve) Hours. 60 tablet 0   • nitroglycerin (NITROSTAT) 0.4 MG SL tablet Place 1 tablet under the tongue Every 5 (Five) Minutes As Needed for Chest Pain. Take no more than 3 doses in 15 minutes. 30 tablet 1   • ONETOUCH DELICA LANCETS 33G misc 1 each 4 (Four) Times a Day. delica if covered, use alternative if not covered 120 each 11   • oxyCODONE-acetaminophen (PERCOCET) 5-325  "MG per tablet Take 1-2 tablets by mouth Every 4 (Four) Hours As Needed for Moderate Pain (Surgical Pain). 36 tablet 0   • Prenatal Vit-Fe Fumarate-FA (PRENATAL VITAMIN 27-0.8) 27-0.8 MG tablet tablet Take 1 tablet by mouth Daily.     • sennosides-docusate sodium (SENOKOT-S) 8.6-50 MG tablet Take 1 tablet by mouth 2 (Two) Times a Day.     • vitamin C (VITAMIN C) 500 MG tablet Take 1 tablet by mouth 2 (Two) Times a Day.     • vitamin D (ERGOCALCIFEROL) 89921 units capsule capsule Take 50,000 Units by mouth 2 (Two) Times a Week. tues and fridays       No current facility-administered medications for this visit.            Review of Systems:     Constitutional:  Denies recent weight loss, weight gain, fever or chills, no change in exercise tolerance.     HENT:  Denies any hearing loss, epistaxis, hoarseness, or difficulty speaking.     Eyes: No blurred    Respiratory:  Denies dyspnea with exertion,no cough, wheezing, or hemoptysis.     Cardiovascular: See H&P    Gastrointestinal:  Denies change in bowel habits, dyspepsia, ulcer disease, hematochezia, or melena.     Endocrine: Negative for cold intolerance, heat intolerance, polydipsia, polyphagia and polyuria. Denies any history of weight change, polydipsia, polyuria.     Genitourinary: Negative.      Musculoskeletal: Denies any history of arthritic symptoms or back problems.     Skin:  Denies any change in hair or nails, rashes, or skin lesions.     Allergic/Immunologic: Negative.  Negative for environmental allergies, food allergies and immunocompromised state.     Neurological:  Denies any history of recurrent headaches, strokes, TIA, or seizure disorder.     Hematological: Denies any food allergies, seasonal allergies, bleeding disorders, or lymphadenopathy.     Psychiatric/Behavioral: Denies any history of depression, substance abuse, or change in cognitive function.       OBJECTIVE:    Ht 188 cm (74\")   BMI 30.17 kg/m²       Physical Exam:     Constitutional: " Cooperative, alert and oriented, well-developed, well-nourished, in no acute distress.     HENT:   Head: Normocephalic, normal hair patterns, no masses or tenderness.  Ears, Nose, and Throat: No gross abnormalities. No pallor or cyanosis. Dentition good.   Eyes: EOMS intact, PERRL, conjunctivae and lids unremarkable. Fundoscopic exam and visual fields not performed.   Neck: No palpable masses or adenopathy, no thyromegaly, no JVD, carotid pulses are full and equal bilaterally and without  Bruits.     Cardiovascular: Regular rhythm, S1 and S2 normal, no S3 or S4. Apical impulse not displaced. No murmurs, gallops, or rubs detected.     Pulmonary/Chest: Chest: normal symmetry, no tenderness to palpation, normal respiratory excursion, no intercostal retraction, no use of accessory muscles. Pulmonary: Normal breath sounds. No rales or rhonchi.    Abdominal: Abdomen soft, bowel sounds normoactive, no masses, no hepatosplenomegaly, non-tender, no bruits.     Musculoskeletal: No deformities, clubbing, cyanosis, erythema, or edema observed. There are no spinal abnormalities noted. Normal muscle strength and tone. Pulses full and equal in all extremities, no bruits auscultated.     Neurological: No gross motor or sensory deficits noted, affect appropriate, oriented to time, person, place.     Skin: Warm and dry to the touch, no apparent skin lesions or masses noted.     Psychiatric: He has a normal mood and affect. His behavior is normal. Judgment and thought content normal.         Procedures      Lab Results   Component Value Date    WBC 9.95 10/11/2019    HGB 12.8 (L) 10/11/2019    HCT 38.6 10/11/2019    MCV 87.5 10/11/2019     (H) 10/11/2019     Lab Results   Component Value Date    GLUCOSE 152 (H) 10/04/2019    BUN 14 10/04/2019    CREATININE 0.63 (L) 10/04/2019    EGFRIFNONA 132 10/04/2019    BCR 22.2 10/04/2019    CO2 26.0 10/04/2019    CALCIUM 8.5 (L) 10/04/2019    ALBUMIN 2.90 (L) 10/04/2019    AST 13  10/04/2019    ALT 19 10/04/2019     Lab Results   Component Value Date    CHOL 101 10/04/2019    CHOL 137 09/13/2019    CHOL 218 (H) 06/07/2019     Lab Results   Component Value Date    TRIG 100 10/04/2019    TRIG 131 09/13/2019    TRIG 126 06/07/2019     Lab Results   Component Value Date    HDL 27 (L) 10/04/2019    HDL 34 (L) 09/13/2019    HDL 39 (L) 06/07/2019     No components found for: LDLCALC  Lab Results   Component Value Date    LDL 54 10/04/2019    LDL 77 09/13/2019     (H) 06/07/2019     No results found for: HDLLDLRATIO  No components found for: CHOLHDL  Lab Results   Component Value Date    HGBA1C 7.40 (H) 09/13/2019     Lab Results   Component Value Date    TSH 4.000 09/13/2019           ASSESSMENT AND PLAN:  #1 postop paroxysmal atrial fibrillation  #2 CAD status post CABG  #3 hypertension  #4 hyperlipidemia  #5 diabetes  54 years old patient with a background history of hypertension, hyperlipidemia, diabetes, metabolic syndrome subjective cardiac catheterization with multivessel CAD status post CABG postop developed atrial fibrillation with rapid ventricular response.  Initially treated with IV Cardizem changed to IV and oral amiodarone and subsequent discharged.  Patient readmitted with episode of atrial fibrillation with rapid ventricular response spontaneously converted amiodarone adjusted and subsequent discharge.  He presented post hospital follow-up he is maintaining sinus rhythm.  Recommend to continue amiodarone and oral anticoagulation for 4 to 6 months if there is no further recurrence can be discontinued given the postop atrial fibrillation.  Risk factor lifestyle modification discussed.  Recommend to continue atorvastatin for management hyperlipidemia Eliquis to decrease risk of cardio embolic phenomena given the chads vascular scoring system Plavix with his history history of CAD CAD and CABG and insulin along with oral hypoglycemic agent for management of diabetes.  The  significant of low carbohydrate, low-fat and DASH diet discussed with the patient.  EKG finding explained to the patient    Herbert was seen today for follow-up.    Diagnoses and all orders for this visit:    Essential hypertension    Type 2 diabetes mellitus without complication, with long-term current use of insulin (CMS/HCC)    Hypertension associated with diabetes (CMS/HCC)    Pure hypercholesterolemia    Coronary artery disease of native artery of native heart with stable angina pectoris (CMS/HCC)    Paroxysmal atrial fibrillation (CMS/HCC)        Alice Rice MD  10/16/2019  2:04 PM

## 2019-10-16 NOTE — PROGRESS NOTES
CVTS Office Progress Note     10/16/2019    Herbert White Sr.  1965    Chief Complaint:    Chief Complaint   Patient presents with   • Coronary Artery Disease     follow up (09/25/19) CABGx3       HPI:      PCP:  Artemio Sanchez MD  Cardiology: Dr. Rice      54 y.o. male with HTN(uncontrolled, increased risk stroke, rupture), Hyperlipidemia(stable, increased risk cardiovascular events), Diabetes Mellitus(stable, increased risk cardiovascular events) and Atrial fibrillation(stable, increased risk stroke) PVC's.  former smoker and quit 9/3/19.  Underwent LHC and sukhjinder-scan demonstrating MV CAD, subsequently underwent CTVS evaluation for CABG.  Underwent CABGx3 on 9/25/19, developed atrial fibrillation with RVR, subsequently converting back and forth on amio, Dr. Rice involved for PAF, recent observation admission for PAF with RVR placed on maintenance cardizem and no issues since.  No other associated signs, symptoms or modifying factors.    8/3/19 Echo: EF 61%, Mild MR  9/9/19 Carotid Duplex: DOMINGO 0-49%, LICA 0-49%    9/18/19 LHC: LAD 80%, DX70%, %, PDA 70%  9/3/19 CABGx3: LIMA-LAD, SVG-PDA, SVG-OM      The following portions of the patient's history were reviewed and updated as appropriate: allergies, current medications, past family history, past medical history, past social history, past surgical history and problem list.  Recent images independently reviewed.  Available laboratory values reviewed.    PMH:  Past Medical History:   Diagnosis Date   • Arthritis     c/o joint pain   • Coronary artery disease    • Diabetes mellitus (CMS/HCC)    • Hyperlipidemia    • Hypertension    • Retinopathy of both eyes    • Sleep apnea     not using c-pap     Past Surgical History:   Procedure Laterality Date   • CARDIAC CATHETERIZATION N/A 9/3/2019    Procedure: Coronary angiography/PCI if indicated;  Surgeon: Denia Dwyer MD;  Location: [facility] CATH INVASIVE LOCATION;  Service: Cardiology   •  CHOLECYSTECTOMY     • CORONARY ARTERY BYPASS GRAFT N/A 2019    Procedure: CORONARY ARTERY BYPASS GRAFTING, ENDOSCOPIC VEIN HARVEST       (CELL SAVER);  Surgeon: Xavier Ballard MD;  Location: [facility];  Service: Cardiothoracic   • TONSILLECTOMY       Family History   Problem Relation Age of Onset   • Diabetes Mother    • Hypertension Mother    • Cancer Mother    • Diabetes Father    • Hypertension Father    • Cancer Father      Social History     Tobacco Use   • Smoking status: Former Smoker     Packs/day: 1.00     Years: 35.00     Pack years: 35.00     Types: Cigarettes     Last attempt to quit: 9/3/2019     Years since quittin.1   • Smokeless tobacco: Never Used   • Tobacco comment: quit after heart cath   Substance Use Topics   • Alcohol use: No   • Drug use: No       ALLERGIES:  Allergies   Allergen Reactions   • Ace Inhibitors Anaphylaxis   • Wellbutrin [Bupropion] Anaphylaxis         MEDICATIONS:    Current Outpatient Medications:   •  amiodarone (PACERONE) 200 MG tablet, Take 1 tablet by mouth Every 12 (Twelve) Hours for 7 days, then 1 tablet once daily., Disp: 40 tablet, Rfl: 0  •  apixaban (ELIQUIS) 5 MG tablet tablet, Take 1 tablet by mouth Every 12 (Twelve) Hours., Disp: 60 tablet, Rfl: 3  •  atorvastatin (LIPITOR) 40 MG tablet, Take 1 tablet by mouth Every Night., Disp: 30 tablet, Rfl: 3  •  clopidogrel (PLAVIX) 75 MG tablet, Take 1 tablet by mouth Daily., Disp: 30 tablet, Rfl: 11  •  dilTIAZem CD (CARDIZEM CD) 180 MG 24 hr capsule, Take 1 capsule by mouth Daily., Disp: 30 capsule, Rfl: 0  •  Lancet Devices (ONE TOUCH DELICA LANCING DEV) misc, delica lancing device if approved, otherwise use alternative, Disp: 1 each, Rfl: 11  •  magnesium oxide (MAGOX) 400 (241.3 Mg) MG tablet tablet, Take 1 tablet by mouth 2 (Two) Times a Day., Disp: 60 each, Rfl: 3  •  metFORMIN (GLUCOPHAGE) 500 MG tablet, Take 500 mg by mouth 2 (Two) Times a Day With Meals., Disp: , Rfl:   •  metoprolol tartrate  (LOPRESSOR) 25 MG tablet, Take 1/2 tablet (12.5 mg) by mouth Every 12 (Twelve) Hours., Disp: 60 tablet, Rfl: 0  •  nitroglycerin (NITROSTAT) 0.4 MG SL tablet, Place 1 tablet under the tongue Every 5 (Five) Minutes As Needed for Chest Pain. Take no more than 3 doses in 15 minutes., Disp: 30 tablet, Rfl: 1  •  ONETOUCH DELICA LANCETS 33G misc, 1 each 4 (Four) Times a Day. delica if covered, use alternative if not covered, Disp: 120 each, Rfl: 11  •  oxyCODONE-acetaminophen (PERCOCET) 5-325 MG per tablet, Take 1-2 tablets by mouth Every 4 (Four) Hours As Needed for Moderate Pain (Surgical Pain)., Disp: 36 tablet, Rfl: 0  •  Prenatal Vit-Fe Fumarate-FA (PRENATAL VITAMIN 27-0.8) 27-0.8 MG tablet tablet, Take 1 tablet by mouth Daily., Disp: , Rfl:   •  sennosides-docusate sodium (SENOKOT-S) 8.6-50 MG tablet, Take 1 tablet by mouth 2 (Two) Times a Day., Disp: , Rfl:   •  vitamin C (VITAMIN C) 500 MG tablet, Take 1 tablet by mouth 2 (Two) Times a Day., Disp: , Rfl:   •  vitamin D (ERGOCALCIFEROL) 43376 units capsule capsule, Take 50,000 Units by mouth 2 (Two) Times a Week. tues and fridays, Disp: , Rfl:       Review of Systems   Constitution: Positive for malaise/fatigue and night sweats. Negative for decreased appetite, fever, weakness, weight gain and weight loss.   HENT: Negative for hearing loss, hoarse voice and sore throat.    Eyes: Negative for blurred vision, visual disturbance and visual halos.   Cardiovascular: Positive for chest pain (Sternal Discomfort). Negative for dyspnea on exertion, leg swelling and palpitations.        Patient does not report signs or symptoms of sternal malunion, denies popping, clicking, shifting, or uncontrolled pain.     Respiratory: Negative for cough, shortness of breath and sputum production.    Endocrine: Positive for cold intolerance. Negative for polyuria.   Hematologic/Lymphatic: Negative for bleeding problem. Bruises/bleeds easily.        Does not report S/S of adverse bleeding  "event including nose bleeds, hematuria, melena, gingival bleeding, or hematemesis.   Skin: Negative for color change, nail changes, poor wound healing and suspicious lesions.   Musculoskeletal: Positive for back pain. Negative for joint pain and myalgias.   Gastrointestinal: Negative for abdominal pain, constipation, diarrhea, hematemesis, melena, nausea and vomiting.   Genitourinary: Negative for flank pain, nocturia and urgency.   Neurological: Negative for brief paralysis, focal weakness, light-headedness, loss of balance, numbness and paresthesias.        No amaurosis fugax, TIA, or CVA.     Psychiatric/Behavioral: Negative for altered mental status, depression, suicidal ideas and thoughts of violence.   Allergic/Immunologic: Negative for hives and persistent infections.         Vitals:    10/11/19 1016   BP: 110/65   BP Location: Left arm   Pulse: 64   Temp: 97.7 °F (36.5 °C)   TempSrc: Temporal   SpO2: 94%   Weight: 107 kg (235 lb)   Height: 188 cm (74\")     Physical Exam   Constitutional: He is oriented to person, place, and time. He appears well-developed and well-nourished.   HENT:   Head: Normocephalic and atraumatic.   Mouth/Throat: Oropharynx is clear and moist.   Eyes: Conjunctivae and EOM are normal. Pupils are equal, round, and reactive to light.   Neck: Normal range of motion. Neck supple. No JVD present.   Cardiovascular: Normal rate, regular rhythm, normal heart sounds and intact distal pulses.   Sternum stable with moderate pressure and cough.  No popping, clicking, shifting, or play noted   Pulmonary/Chest: Effort normal and breath sounds normal. He has no wheezes.   Abdominal: Soft. Bowel sounds are normal. He exhibits no distension. There is no tenderness.   Musculoskeletal: Normal range of motion. He exhibits edema. He exhibits no tenderness.   Lymphadenopathy:     He has no cervical adenopathy.   Neurological: He is alert and oriented to person, place, and time. No cranial nerve deficit. " "Coordination normal.   Skin: Skin is warm and dry. Capillary refill takes less than 2 seconds.   EVH site, midsternal incision CDI   Psychiatric: He has a normal mood and affect. His behavior is normal. Judgment normal.   Nursing note and vitals reviewed.      CXR 10/11/2019    Assessment & Plan     Independent Review of Studies    1. Mixed hyperlipidemia  Lipid-lowering therapy has been proven beneficial in patients with vascular disease. Current guidelines recommend statin treatment for all patients with PAD and carotid stenosis. Statins are beneficial in preventing cardiovascular events, increasing functional capacity and lower the risk of adverse limb loss in PAD. Statins decrease the progression of plaque formation and may improve peripheral vessel lining, and aid in reversing atherosclerosis.    2. Coronary artery disease involving native coronary artery of native heart without angina pectoris  Medical management Statin, plavix, BB, Vid D.  Post op EKG no ischemic changes.  No reports of angina.    3. Essential hypertension  Controlled.  BB, ACE, CCB.    4. Surgical aftercare, circulatory system  Post-Operative Activities after heart surgery are as follows.  No driving until 2-3 weeks post-op, may ambulate as much as desired with periods of rest during the day.  Recreational walks outdoors are helpful for mood if weather permits.  No lifting anything over 10lbs until 6 weeks post op as this may strain the breast bone.  You should not lift anything exceeding 20lbs until 12 weeks post op.  Continue to shower daily, poor soap/rinse/and pat dry incision sites, do not scrub.  You can start household chores araound 2-3 weeks post op however, no vacuuming shoveling, gardening, mopping, or laundry until 12 weeks post op.  Use common sense in choosing activities, activities should not cause strain.  Alternate periods of activity with periods of rest.  Please refer to your \"Moving Right Along Book\" for specific details. "  If leg swelling occurs elevate the extremity in a reclined position and use support hose as needed.  Labs, CXR, EKG stable.      -Follow up in 4 weeks for recheck  - ECG 12 Lead    5. Paroxysmal atrial fibrillation (CMS/HCC)  Today NSR.  On cardizem, and amiodarone.  Follow up with Dr. Rice as scheduled.  Embolic prophylaxis with eliquis for the time being.      Detailed discussion regarding risks, benefits, and treatment plan. Images independently reviewed. Patient understands, agrees, and wishes to proceed with plan.       This document has been electronically signed by Oscar Fernando AGACNP-BC @  On October 16, 2019 1:12 PM      EMR Dragon/Transcription disclaimer:   Much of this encounter note is an electronic transcription/translation of spoken language to printed text. The electronic translation of spoken language may permit erroneous, or at times, nonsensical words or phrases to be inadvertently transcribed; Although I have reviewed the note for such errors, some may still exist.

## 2019-11-07 ENCOUNTER — OFFICE VISIT (OUTPATIENT)
Dept: CARDIAC SURGERY | Facility: CLINIC | Age: 54
End: 2019-11-07

## 2019-11-07 VITALS
TEMPERATURE: 97.6 F | DIASTOLIC BLOOD PRESSURE: 74 MMHG | OXYGEN SATURATION: 100 % | BODY MASS INDEX: 30.16 KG/M2 | HEIGHT: 74 IN | SYSTOLIC BLOOD PRESSURE: 126 MMHG | WEIGHT: 235 LBS | HEART RATE: 61 BPM

## 2019-11-07 DIAGNOSIS — E78.2 MIXED HYPERLIPIDEMIA: ICD-10-CM

## 2019-11-07 DIAGNOSIS — Z48.812 SURGICAL AFTERCARE, CIRCULATORY SYSTEM: ICD-10-CM

## 2019-11-07 DIAGNOSIS — I25.118 CORONARY ARTERY DISEASE OF NATIVE ARTERY OF NATIVE HEART WITH STABLE ANGINA PECTORIS (HCC): Primary | ICD-10-CM

## 2019-11-07 PROCEDURE — 99024 POSTOP FOLLOW-UP VISIT: CPT | Performed by: NURSE PRACTITIONER

## 2019-11-12 NOTE — PROGRESS NOTES
CVTS Office Progress Note     11/12/2019    Herbert White Sr.  1965    Chief Complaint:    Chief Complaint   Patient presents with   • Coronary Artery Disease     f/u       HPI:      PCP:  Artemio Sanchez MD  Cardiology: Dr. Rice      54 y.o. male with HTN(uncontrolled, increased risk stroke, rupture), Hyperlipidemia(stable, increased risk cardiovascular events), Diabetes Mellitus(stable, increased risk cardiovascular events) and Atrial fibrillation(stable, increased risk stroke) PVC's.  former smoker and quit 9/3/19.  Underwent LHC and sukhjindre-scan demonstrating MV CAD, subsequently underwent CTVS evaluation for CABG.  Underwent CABGx3 on 9/25/19, developed atrial fibrillation with RVR, subsequently converting back and forth on amio, Dr. Rice involved for PAF, recent observation admission for PAF with RVR placed on maintenance cardizem and no issues since.  Some insomnia with cardizem.  No other associated signs, symptoms or modifying factors.    8/3/19 Echo: EF 61%, Mild MR  9/9/19 Carotid Duplex: DOMINGO 0-49%, LICA 0-49%    9/18/19 LHC: LAD 80%, DX70%, %, PDA 70%  9/3/19 CABGx3: LIMA-LAD, SVG-PDA, SVG-OM      The following portions of the patient's history were reviewed and updated as appropriate: allergies, current medications, past family history, past medical history, past social history, past surgical history and problem list.  Recent images independently reviewed.  Available laboratory values reviewed.    PMH:  Past Medical History:   Diagnosis Date   • Arthritis     c/o joint pain   • Coronary artery disease    • Diabetes mellitus (CMS/HCC)    • Hyperlipidemia    • Hypertension    • Retinopathy of both eyes    • Sleep apnea     not using c-pap     Past Surgical History:   Procedure Laterality Date   • CARDIAC CATHETERIZATION N/A 9/3/2019    Procedure: Coronary angiography/PCI if indicated;  Surgeon: Denia Dwyer MD;  Location: [facility] CATH INVASIVE LOCATION;  Service: Cardiology   •  CHOLECYSTECTOMY     • CORONARY ARTERY BYPASS GRAFT N/A 2019    Procedure: CORONARY ARTERY BYPASS GRAFTING, ENDOSCOPIC VEIN HARVEST       (CELL SAVER);  Surgeon: Xavier Ballard MD;  Location: [facility];  Service: Cardiothoracic   • TONSILLECTOMY       Family History   Problem Relation Age of Onset   • Diabetes Mother    • Hypertension Mother    • Cancer Mother    • Diabetes Father    • Hypertension Father    • Cancer Father      Social History     Tobacco Use   • Smoking status: Former Smoker     Packs/day: 1.00     Years: 35.00     Pack years: 35.00     Types: Cigarettes     Last attempt to quit: 9/3/2019     Years since quittin.1   • Smokeless tobacco: Never Used   • Tobacco comment: quit after heart cath   Substance Use Topics   • Alcohol use: No   • Drug use: No       ALLERGIES:  Allergies   Allergen Reactions   • Ace Inhibitors Anaphylaxis   • Wellbutrin [Bupropion] Anaphylaxis         MEDICATIONS:    Current Outpatient Medications:   •  amiodarone (PACERONE) 200 MG tablet, Take 1 tablet by mouth Every 12 (Twelve) Hours for 7 days, then 1 tablet once daily., Disp: 40 tablet, Rfl: 0  •  apixaban (ELIQUIS) 5 MG tablet tablet, Take 1 tablet by mouth Every 12 (Twelve) Hours., Disp: 60 tablet, Rfl: 3  •  atorvastatin (LIPITOR) 40 MG tablet, Take 1 tablet by mouth Every Night., Disp: 30 tablet, Rfl: 3  •  clopidogrel (PLAVIX) 75 MG tablet, Take 1 tablet by mouth Daily., Disp: 30 tablet, Rfl: 11  •  dilTIAZem CD (CARDIZEM CD) 180 MG 24 hr capsule, Take 1 capsule by mouth Daily., Disp: 30 capsule, Rfl: 0  •  Lancet Devices (ONE TOUCH DELICA LANCING DEV) misc, delica lancing device if approved, otherwise use alternative, Disp: 1 each, Rfl: 11  •  magnesium oxide (MAGOX) 400 (241.3 Mg) MG tablet tablet, Take 1 tablet by mouth 2 (Two) Times a Day., Disp: 60 each, Rfl: 3  •  metFORMIN (GLUCOPHAGE) 500 MG tablet, Take 500 mg by mouth 2 (Two) Times a Day With Meals., Disp: , Rfl:   •  metoprolol tartrate  (LOPRESSOR) 25 MG tablet, Take 1/2 tablet (12.5 mg) by mouth Every 12 (Twelve) Hours., Disp: 60 tablet, Rfl: 0  •  nitroglycerin (NITROSTAT) 0.4 MG SL tablet, Place 1 tablet under the tongue Every 5 (Five) Minutes As Needed for Chest Pain. Take no more than 3 doses in 15 minutes., Disp: 30 tablet, Rfl: 1  •  ONETOUCH DELICA LANCETS 33G misc, 1 each 4 (Four) Times a Day. delica if covered, use alternative if not covered, Disp: 120 each, Rfl: 11  •  vitamin D (ERGOCALCIFEROL) 45191 units capsule capsule, Take 50,000 Units by mouth 2 (Two) Times a Week. tues and fridays, Disp: , Rfl:       Review of Systems   Constitution: Positive for malaise/fatigue. Negative for decreased appetite, fever, weakness, weight gain and weight loss.   HENT: Negative for hearing loss, hoarse voice and sore throat.    Eyes: Negative for blurred vision, visual disturbance and visual halos.   Cardiovascular: Positive for chest pain (Sternal Discomfort). Negative for dyspnea on exertion, leg swelling and palpitations.        Patient does not report signs or symptoms of sternal malunion, denies popping, clicking, shifting, or uncontrolled pain.     Respiratory: Negative for cough, shortness of breath and sputum production.    Endocrine: Positive for cold intolerance. Negative for polyuria.   Hematologic/Lymphatic: Negative for bleeding problem. Bruises/bleeds easily.        Does not report S/S of adverse bleeding event including nose bleeds, hematuria, melena, gingival bleeding, or hematemesis.   Skin: Negative for color change, nail changes, poor wound healing and suspicious lesions.   Musculoskeletal: Positive for back pain. Negative for joint pain and myalgias.   Gastrointestinal: Negative for abdominal pain, constipation, diarrhea, hematemesis, melena, nausea and vomiting.   Genitourinary: Negative for flank pain, nocturia and urgency.   Neurological: Negative for brief paralysis, focal weakness, light-headedness, loss of balance, numbness  "and paresthesias.        No amaurosis fugax, TIA, or CVA.     Psychiatric/Behavioral: Negative for altered mental status, depression, suicidal ideas and thoughts of violence.   Allergic/Immunologic: Negative for hives and persistent infections.         Vitals:    11/07/19 1307   BP: 126/74   BP Location: Left arm   Pulse: 61   Temp: 97.6 °F (36.4 °C)   TempSrc: Temporal   SpO2: 100%   Weight: 107 kg (235 lb)   Height: 188 cm (74\")     Physical Exam   Constitutional: He is oriented to person, place, and time. He appears well-developed and well-nourished.   HENT:   Head: Normocephalic and atraumatic.   Mouth/Throat: Oropharynx is clear and moist.   Eyes: Conjunctivae and EOM are normal. Pupils are equal, round, and reactive to light.   Neck: Normal range of motion. Neck supple. No JVD present.   Cardiovascular: Normal rate, regular rhythm, normal heart sounds and intact distal pulses.   Sternum stable with moderate pressure and cough.  No popping, clicking, shifting, or play noted   Pulmonary/Chest: Effort normal and breath sounds normal. He has no wheezes.   Abdominal: Soft. Bowel sounds are normal. He exhibits no distension. There is no tenderness.   Musculoskeletal: Normal range of motion. He exhibits edema. He exhibits no tenderness.   Lymphadenopathy:     He has no cervical adenopathy.   Neurological: He is alert and oriented to person, place, and time. No cranial nerve deficit. Coordination normal.   Skin: Skin is warm and dry. Capillary refill takes less than 2 seconds.   EVH site, midsternal incision CDI   Psychiatric: He has a normal mood and affect. His behavior is normal. Judgment normal.   Nursing note and vitals reviewed.      CXR 10/11/2019    Assessment & Plan     Independent Review of Studies    1. Mixed hyperlipidemia  Lipid-lowering therapy has been proven beneficial in patients with vascular disease. Current guidelines recommend statin treatment for all patients with PAD and carotid stenosis. " "Statins are beneficial in preventing cardiovascular events, increasing functional capacity and lower the risk of adverse limb loss in PAD. Statins decrease the progression of plaque formation and may improve peripheral vessel lining, and aid in reversing atherosclerosis.    2. Coronary artery disease involving native coronary artery of native heart without angina pectoris  Medical management Statin, plavix, BB, Vid D.  Post op EKG no ischemic changes.  No reports of angina.    3. Essential hypertension  Controlled.  BB, ACE, CCB.    4. Surgical aftercare, circulatory system  Post-Operative Activities after heart surgery are as follows.  No driving until 2-3 weeks post-op, may ambulate as much as desired with periods of rest during the day.  Recreational walks outdoors are helpful for mood if weather permits.  No lifting anything over 10lbs until 6 weeks post op as this may strain the breast bone.  You should not lift anything exceeding 20lbs until 12 weeks post op.  Continue to shower daily, poor soap/rinse/and pat dry incision sites, do not scrub.  You can start household chores araound 2-3 weeks post op however, no vacuuming shoveling, gardening, mopping, or laundry until 12 weeks post op.  Use common sense in choosing activities, activities should not cause strain.  Alternate periods of activity with periods of rest.  Please refer to your \"Moving Right Along Book\" for specific details.  If leg swelling occurs elevate the extremity in a reclined position and use support hose as needed.  Labs, CXR, EKG stable.      -Follow up in 4 weeks for release of surgical restrictions      5. Paroxysmal atrial fibrillation (CMS/HCC)  Today NSR.  On cardizem, and amiodarone.  Follow up with Dr. Rice as scheduled.  Embolic prophylaxis with eliquis for the time being.      Detailed discussion regarding risks, benefits, and treatment plan. Images independently reviewed. Patient understands, agrees, and wishes to proceed with " plan.       This document has been electronically signed by Oscar Fernando AGACNP-BC @  On November 12, 2019 11:01 AM      EMR Dragon/Transcription disclaimer:   Much of this encounter note is an electronic transcription/translation of spoken language to printed text. The electronic translation of spoken language may permit erroneous, or at times, nonsensical words or phrases to be inadvertently transcribed; Although I have reviewed the note for such errors, some may still exist.

## 2019-12-04 ENCOUNTER — OFFICE VISIT (OUTPATIENT)
Dept: CARDIAC SURGERY | Facility: CLINIC | Age: 54
End: 2019-12-04

## 2019-12-04 VITALS
HEART RATE: 91 BPM | DIASTOLIC BLOOD PRESSURE: 82 MMHG | OXYGEN SATURATION: 100 % | WEIGHT: 239 LBS | BODY MASS INDEX: 30.67 KG/M2 | SYSTOLIC BLOOD PRESSURE: 128 MMHG | HEIGHT: 74 IN

## 2019-12-04 DIAGNOSIS — I25.118 CORONARY ARTERY DISEASE OF NATIVE ARTERY OF NATIVE HEART WITH STABLE ANGINA PECTORIS (HCC): Primary | ICD-10-CM

## 2019-12-04 DIAGNOSIS — I65.23 CAROTID STENOSIS, ASYMPTOMATIC, BILATERAL: ICD-10-CM

## 2019-12-04 DIAGNOSIS — Z48.812 SURGICAL AFTERCARE, CIRCULATORY SYSTEM: ICD-10-CM

## 2019-12-04 DIAGNOSIS — E78.2 MIXED HYPERLIPIDEMIA: ICD-10-CM

## 2019-12-04 PROCEDURE — 99024 POSTOP FOLLOW-UP VISIT: CPT | Performed by: NURSE PRACTITIONER

## 2019-12-04 NOTE — PATIENT INSTRUCTIONS
Released from surgical restrictions continue cardiology follow up, increase weight bearing activity gradually.      Follow up with carotid duplex in 2 years.

## 2019-12-04 NOTE — PROGRESS NOTES
CVTS Office Progress Note     12/4/2019    Herbert White Sr.  1965    Chief Complaint:    Chief Complaint   Patient presents with   • Follow-up     CAD 4 wk       HPI:      PCP:  Artemio Sanchez MD  Cardiology: Dr. Rice      54 y.o. male with HTN(uncontrolled, increased risk stroke, rupture), Hyperlipidemia(stable, increased risk cardiovascular events), Diabetes Mellitus(stable, increased risk cardiovascular events) and Atrial fibrillation(stable, increased risk stroke) PVC's.  former smoker and quit 9/3/19.  Underwent LHC and sukhjinder-scan demonstrating MV CAD, subsequently underwent CTVS evaluation for CABG.  Underwent CABGx3 on 9/25/19, developed atrial fibrillation with RVR, subsequently converting back and forth on amio, Dr. Rice involved for PAF, remote observation admission for PAF with RVR placed on maintenance cardizem and no issues since.  Some insomnia with cardizem.  Returns today in follow up to be considered for release from surgical restrictions.  No other associated signs, symptoms or modifying factors.    8/3/19 Echo: EF 61%, Mild MR  9/9/19 Carotid Duplex: DOMINGO 0-49%, LICA 0-49%    9/18/19 LHC: LAD 80%, DX70%, %, PDA 70%  9/3/19 CABGx3: LIMA-LAD, SVG-PDA, SVG-OM      The following portions of the patient's history were reviewed and updated as appropriate: allergies, current medications, past family history, past medical history, past social history, past surgical history and problem list.  Recent images independently reviewed.  Available laboratory values reviewed.    PMH:  Past Medical History:   Diagnosis Date   • Arthritis     c/o joint pain   • Coronary artery disease    • Diabetes mellitus (CMS/HCC)    • Hyperlipidemia    • Hypertension    • Retinopathy of both eyes    • Sleep apnea     not using c-pap     Past Surgical History:   Procedure Laterality Date   • CARDIAC CATHETERIZATION N/A 9/3/2019    Procedure: Coronary angiography/PCI if indicated;  Surgeon: Denia Dwyer  MD;  Location: [facility] CATH INVASIVE LOCATION;  Service: Cardiology   • CHOLECYSTECTOMY     • CORONARY ARTERY BYPASS GRAFT N/A 2019    Procedure: CORONARY ARTERY BYPASS GRAFTING, ENDOSCOPIC VEIN HARVEST       (CELL SAVER);  Surgeon: Xavier Ballard MD;  Location: [facility] OR;  Service: Cardiothoracic   • TONSILLECTOMY       Family History   Problem Relation Age of Onset   • Diabetes Mother    • Hypertension Mother    • Cancer Mother    • Diabetes Father    • Hypertension Father    • Cancer Father      Social History     Tobacco Use   • Smoking status: Former Smoker     Packs/day: 1.00     Years: 35.00     Pack years: 35.00     Types: Cigarettes     Last attempt to quit: 9/3/2019     Years since quittin.2   • Smokeless tobacco: Never Used   • Tobacco comment: quit after heart cath   Substance Use Topics   • Alcohol use: No   • Drug use: No       ALLERGIES:  Allergies   Allergen Reactions   • Ace Inhibitors Anaphylaxis   • Wellbutrin [Bupropion] Anaphylaxis         MEDICATIONS:    Current Outpatient Medications:   •  amiodarone (PACERONE) 200 MG tablet, Take 1 tablet by mouth Every 12 (Twelve) Hours for 7 days, then 1 tablet once daily., Disp: 40 tablet, Rfl: 0  •  apixaban (ELIQUIS) 5 MG tablet tablet, Take 1 tablet by mouth Every 12 (Twelve) Hours., Disp: 60 tablet, Rfl: 3  •  atorvastatin (LIPITOR) 40 MG tablet, Take 1 tablet by mouth Every Night., Disp: 30 tablet, Rfl: 3  •  clopidogrel (PLAVIX) 75 MG tablet, Take 1 tablet by mouth Daily., Disp: 30 tablet, Rfl: 11  •  dilTIAZem CD (CARDIZEM CD) 180 MG 24 hr capsule, Take 1 capsule by mouth Daily., Disp: 30 capsule, Rfl: 0  •  Lancet Devices (ONE TOUCH DELICA LANCING DEV) misc, delica lancing device if approved, otherwise use alternative, Disp: 1 each, Rfl: 11  •  magnesium oxide (MAGOX) 400 (241.3 Mg) MG tablet tablet, Take 1 tablet by mouth 2 (Two) Times a Day., Disp: 60 each, Rfl: 3  •  metFORMIN (GLUCOPHAGE) 500 MG tablet, Take 500 mg by mouth 2 (Two)  Times a Day With Meals., Disp: , Rfl:   •  metoprolol tartrate (LOPRESSOR) 25 MG tablet, Take 1/2 tablet (12.5 mg) by mouth Every 12 (Twelve) Hours., Disp: 60 tablet, Rfl: 0  •  nitroglycerin (NITROSTAT) 0.4 MG SL tablet, Place 1 tablet under the tongue Every 5 (Five) Minutes As Needed for Chest Pain. Take no more than 3 doses in 15 minutes., Disp: 30 tablet, Rfl: 1  •  ONETOUCH DELICA LANCETS 33G misc, 1 each 4 (Four) Times a Day. delica if covered, use alternative if not covered, Disp: 120 each, Rfl: 11  •  vitamin D (ERGOCALCIFEROL) 09386 units capsule capsule, Take 50,000 Units by mouth 2 (Two) Times a Week. tues and fridays, Disp: , Rfl:       Review of Systems   Constitution: Positive for malaise/fatigue. Negative for decreased appetite, fever, weakness, weight gain and weight loss.   HENT: Negative for hearing loss, hoarse voice and sore throat.    Eyes: Negative for blurred vision, visual disturbance and visual halos.   Cardiovascular: Negative for chest pain, dyspnea on exertion, leg swelling and palpitations.        Patient does not report signs or symptoms of sternal malunion, denies popping, clicking, shifting, or uncontrolled pain.     Respiratory: Negative for cough, shortness of breath and sputum production.    Endocrine: Negative for cold intolerance and polyuria.   Hematologic/Lymphatic: Negative for bleeding problem. Bruises/bleeds easily.        Does not report S/S of adverse bleeding event including nose bleeds, hematuria, melena, gingival bleeding, or hematemesis.   Skin: Negative for color change, nail changes, poor wound healing and suspicious lesions.   Musculoskeletal: Positive for back pain. Negative for joint pain and myalgias.   Gastrointestinal: Negative for abdominal pain, constipation, diarrhea, hematemesis, melena, nausea and vomiting.   Genitourinary: Negative for flank pain, nocturia and urgency.   Neurological: Negative for brief paralysis, focal weakness, light-headedness, loss of  "balance, numbness and paresthesias.        No amaurosis fugax, TIA, or CVA.     Psychiatric/Behavioral: Negative for altered mental status, depression, suicidal ideas and thoughts of violence.   Allergic/Immunologic: Negative for hives and persistent infections.         Vitals:    12/04/19 1248   BP: 128/82   BP Location: Left arm   Patient Position: Sitting   Cuff Size: Adult   Pulse: 91   SpO2: 100%   Weight: 108 kg (239 lb)   Height: 188 cm (74\")     Physical Exam   Constitutional: He is oriented to person, place, and time. He appears well-developed and well-nourished.   HENT:   Head: Normocephalic and atraumatic.   Mouth/Throat: Oropharynx is clear and moist.   Eyes: Conjunctivae and EOM are normal. Pupils are equal, round, and reactive to light.   Neck: Normal range of motion. Neck supple. No JVD present.   Cardiovascular: Normal rate, regular rhythm, normal heart sounds and intact distal pulses.   Sternum stable with moderate pressure and cough.  No popping, clicking, shifting, or play noted   Pulmonary/Chest: Effort normal and breath sounds normal. He has no wheezes.   Abdominal: Soft. Bowel sounds are normal. He exhibits no distension. There is no tenderness.   Musculoskeletal: Normal range of motion. He exhibits no edema or tenderness.   Lymphadenopathy:     He has no cervical adenopathy.   Neurological: He is alert and oriented to person, place, and time. No cranial nerve deficit. Coordination normal.   Skin: Skin is warm and dry. Capillary refill takes less than 2 seconds.   EVH site, midsternal incision CDI   Psychiatric: He has a normal mood and affect. His behavior is normal. Judgment normal.   Nursing note and vitals reviewed.      CXR 10/11/2019    Assessment & Plan     Independent Review of Studies    1. Mixed hyperlipidemia  Lipid-lowering therapy has been proven beneficial in patients with vascular disease. Current guidelines recommend statin treatment for all patients with PAD and carotid " stenosis. Statins are beneficial in preventing cardiovascular events, increasing functional capacity and lower the risk of adverse limb loss in PAD. Statins decrease the progression of plaque formation and may improve peripheral vessel lining, and aid in reversing atherosclerosis.    2. Coronary artery disease involving native coronary artery of native heart without angina pectoris  Medical management Statin, plavix, BB, Vid D.  Post op EKG no ischemic changes.  No reports of angina.    3. Essential hypertension  Controlled.  BB, ACE, CCB.    4. Surgical aftercare, circulatory system  Released from surgical restrictions.  May return to work full duty.    5. Paroxysmal atrial fibrillation (CMS/HCC)  On cardizem, and amiodarone.  Follow up with Dr. Rice as scheduled.  Embolic prophylaxis with eliquis for the time being.      6. Asymptomatic bilateral carotid stenosis  Mild bilateral disease, medical management as above  Repeat duplex in 2 years    Detailed discussion regarding risks, benefits, and treatment plan. Images independently reviewed. Patient understands, agrees, and wishes to proceed with plan.       This document has been electronically signed by Oscar Fernando AGACNP-BC @  On December 4, 2019 12:59 PM      EMR Dragon/Transcription disclaimer:   Much of this encounter note is an electronic transcription/translation of spoken language to printed text. The electronic translation of spoken language may permit erroneous, or at times, nonsensical words or phrases to be inadvertently transcribed; Although I have reviewed the note for such errors, some may still exist.

## 2020-03-13 NOTE — TELEPHONE ENCOUNTER
JARVIS WHITE (Key: [id number])   Rx #: [id number]   Tresiba FlexTouch (insulin degludec injection) 200 Units/mL solution   Form  [facility] Electronic PA Form (NCPDP)   Created   2 hours ago   Sent to Plan   36 minutes ago   Plan Response   6 minutes ago   Submit Clinical Questions   Determination   N/A   Message from Plan  Your PA has been resolved, no additional PA is required. For further inquiries please contact the number on the back of the member prescription card. (Message [phone])      

## 2020-04-20 RX ORDER — AMIODARONE HYDROCHLORIDE 200 MG/1
200 TABLET ORAL EVERY 12 HOURS
Qty: 30 TABLET | Refills: 6 | Status: SHIPPED | OUTPATIENT
Start: 2020-04-20 | End: 2020-04-29

## 2020-04-29 ENCOUNTER — OFFICE VISIT (OUTPATIENT)
Dept: CARDIOLOGY | Facility: CLINIC | Age: 55
End: 2020-04-29

## 2020-04-29 DIAGNOSIS — I10 ESSENTIAL HYPERTENSION: Primary | ICD-10-CM

## 2020-04-29 DIAGNOSIS — I48.0 PAROXYSMAL ATRIAL FIBRILLATION (HCC): ICD-10-CM

## 2020-04-29 DIAGNOSIS — I15.2 HYPERTENSION ASSOCIATED WITH DIABETES (HCC): ICD-10-CM

## 2020-04-29 DIAGNOSIS — I25.118 CORONARY ARTERY DISEASE OF NATIVE ARTERY OF NATIVE HEART WITH STABLE ANGINA PECTORIS (HCC): ICD-10-CM

## 2020-04-29 DIAGNOSIS — E78.2 MIXED HYPERLIPIDEMIA: ICD-10-CM

## 2020-04-29 DIAGNOSIS — E11.59 HYPERTENSION ASSOCIATED WITH DIABETES (HCC): ICD-10-CM

## 2020-04-29 PROCEDURE — 99442 PR PHYS/QHP TELEPHONE EVALUATION 11-20 MIN: CPT | Performed by: INTERNAL MEDICINE

## 2020-04-29 RX ORDER — DILTIAZEM HYDROCHLORIDE 180 MG/1
180 CAPSULE, COATED, EXTENDED RELEASE ORAL DAILY
Qty: 30 CAPSULE | Refills: 0 | Status: SHIPPED | OUTPATIENT
Start: 2020-04-29 | End: 2020-06-19 | Stop reason: SDUPTHER

## 2020-04-29 NOTE — PROGRESS NOTES
Herbert Babcock Cindy .  54 y.o. male    10/16/2019  1. Essential hypertension    2. Hypertension associated with diabetes (CMS/[facility])    3. Mixed hyperlipidemia    4. Coronary artery disease of native artery of native heart with stable angina pectoris (CMS/[facility])    5. Paroxysmal atrial fibrillation (CMS/[facility])        History of Present Illness:  This visit has been rescheduled as a phone visit to comply with patient safety concerns in accordance with CDC recommendations. Total time of discussion was 15 minutes.    You have chosen to receive care through a telephone visit. Do you consent to use a telephone visit for your medical care today? Yes    54 years old patient presented with a history of post CABG atrial fibrillation managed with amiodarone now he is in sinus rhythm ,background history of diabetes, hypertension, history of smoking, hyperlipidemia noted multivessel coronary artery disease underwent bypass surgery by Dr. Ballard and previous  history of PVCs Complaining of weakness and fatigability.  Echo normal left and systolic function with mild mitral regurgitation and relaxation abnormality consistent with diastolic dysfunction.  .  The patient with orthopnea PND palpitation fluttering        · Left ventricular wall thickness is consistent with mild concentric hypertrophy.  · Estimated EF = 61%.  · Left ventricular systolic function is normal.  · Left ventricular diastolic dysfunction (grade I) consistent with impaired relaxation.  · Mild mitral valve regurgitation is present  9/3/19  Conclusion:  1.  Severe three-vessel obstructive coronary artery disease involving mid LAD, diagonal, obtuse marginal and RPDA.  2.  Normal left-sided filling pressures.  No gradient noted across aortic valve on pullback  3.  Patent left subclavian and LIMA     Complications:  None     Recommmendations:   1.  In the setting of long-standing diabetes, tobacco use and three-vessel disease in this 54-year-old patient I am referring  him to CT surgery for potential CABG with a potential LIMA to LAD, SVG to diagonal, SVG to OM and SVG to RPDA.  2.  Aggressive risk factor modification for secondary prevention.   3.  Smoking cessation counseling  4.  Optimize his antianginal therapy with starting him on amlodipine 5 mg  5.  I calculated his syntax score which is 15, syntax score II score calculated 4-year mortality of 3.8% with PCI and 6.4% with CABG setting of his COPD.  We will plan on heart team approach.  I have told the cardiothoracic surgery for further evaluation so the patient can make an informed decision      SUBJECTIVE:    Allergies   Allergen Reactions   • Ace Inhibitors Anaphylaxis   • Wellbutrin [Bupropion] Anaphylaxis         Past Medical History:   Diagnosis Date   • Arthritis     c/o joint pain   • Coronary artery disease    • Diabetes mellitus (CMS/HCC)    • Hyperlipidemia    • Hypertension    • Retinopathy of both eyes    • Sleep apnea     not using c-pap         Past Surgical History:   Procedure Laterality Date   • CARDIAC CATHETERIZATION N/A 9/3/2019    Procedure: Coronary angiography/PCI if indicated;  Surgeon: Denia Dwyer MD;  Location: [facility] CATH INVASIVE LOCATION;  Service: Cardiology   • CHOLECYSTECTOMY     • CORONARY ARTERY BYPASS GRAFT N/A 9/25/2019    Procedure: CORONARY ARTERY BYPASS GRAFTING, ENDOSCOPIC VEIN HARVEST       (CELL SAVER);  Surgeon: Xavier Ballard MD;  Location: [facility] OR;  Service: Cardiothoracic   • TONSILLECTOMY           Family History   Problem Relation Age of Onset   • Diabetes Mother    • Hypertension Mother    • Cancer Mother    • Diabetes Father    • Hypertension Father    • Cancer Father          Social History     Socioeconomic History   • Marital status:      Spouse name: Not on file   • Number of children: Not on file   • Years of education: Not on file   • Highest education level: Not on file   Tobacco Use   • Smoking status: Former Smoker     Packs/day: 1.00     Years: 35.00      Pack years: 35.00     Types: Cigarettes     Last attempt to quit: 9/3/2019     Years since quittin.6   • Smokeless tobacco: Never Used   • Tobacco comment: quit after heart cath   Substance and Sexual Activity   • Alcohol use: No   • Drug use: No   • Sexual activity: Defer         Current Outpatient Medications   Medication Sig Dispense Refill   • apixaban (ELIQUIS) 5 MG tablet tablet Take 1 tablet by mouth Every 12 (Twelve) Hours. 60 tablet 3   • atorvastatin (LIPITOR) 40 MG tablet Take 1 tablet by mouth Every Night. 30 tablet 3   • clopidogrel (PLAVIX) 75 MG tablet Take 1 tablet by mouth Daily. 30 tablet 11   • Lancet Devices (ONE TOUCH DELICA LANCING DEV) misc delica lancing device if approved, otherwise use alternative 1 each 11   • magnesium oxide (MAGOX) 400 (241.3 Mg) MG tablet tablet Take 1 tablet by mouth 2 (Two) Times a Day. 60 each 3   • metFORMIN (GLUCOPHAGE) 500 MG tablet Take 500 mg by mouth 2 (Two) Times a Day With Meals.     • metoprolol tartrate (LOPRESSOR) 25 MG tablet Take 1/2 tablet (12.5 mg) by mouth Every 12 (Twelve) Hours. 60 tablet 0   • nitroglycerin (NITROSTAT) 0.4 MG SL tablet Place 1 tablet under the tongue Every 5 (Five) Minutes As Needed for Chest Pain. Take no more than 3 doses in 15 minutes. 30 tablet 1   • ONETOUCH DELICA LANCETS 33G misc 1 each 4 (Four) Times a Day. delica if covered, use alternative if not covered 120 each 11   • vitamin D (ERGOCALCIFEROL) 07473 units capsule capsule Take 50,000 Units by mouth 2 (Two) Times a Week. tues and      • dilTIAZem CD (Cardizem CD) 180 MG 24 hr capsule Take 1 capsule by mouth Daily. 30 capsule 0     No current facility-administered medications for this visit.            Review of Systems:     Constitutional:  Denies recent weight loss, weight gain, fever or chills, no change in exercise tolerance.     HENT:  Denies any hearing loss, epistaxis, hoarseness, or difficulty speaking.     Eyes: No blurred    Respiratory:  Denies  dyspnea with exertion,no cough, wheezing, or hemoptysis.     Cardiovascular: See H&P    Gastrointestinal:  Denies change in bowel habits, dyspepsia, ulcer disease, hematochezia, or melena.     Endocrine: Negative for cold intolerance, heat intolerance, polydipsia, polyphagia and polyuria. Denies any history of weight change, polydipsia, polyuria.     Genitourinary: Negative.      Musculoskeletal: Denies any history of arthritic symptoms or back problems.     Skin:  Denies any change in hair or nails, rashes, or skin lesions.     Allergic/Immunologic: Negative.  Negative for environmental allergies, food allergies and immunocompromised state.     Neurological:  Denies any history of recurrent headaches, strokes, TIA, or seizure disorder.     Hematological: Denies any food allergies, seasonal allergies, bleeding disorders, or lymphadenopathy.     Psychiatric/Behavioral: Denies any history of depression, substance abuse, or change in cognitive function.       OBJECTIVE:    There were no vitals taken for this visit.      Physical Exam:   No physical exam due to telephone office visit        Procedures      Lab Results   Component Value Date    WBC 9.95 10/11/2019    HGB 12.8 (L) 10/11/2019    HCT 38.6 10/11/2019    MCV 87.5 10/11/2019     (H) 10/11/2019     Lab Results   Component Value Date    GLUCOSE 152 (H) 10/04/2019    BUN 14 10/04/2019    CREATININE 0.63 (L) 10/04/2019    EGFRIFNONA 132 10/04/2019    BCR 22.2 10/04/2019    CO2 26.0 10/04/2019    CALCIUM 8.5 (L) 10/04/2019    ALBUMIN 2.90 (L) 10/04/2019    AST 13 10/04/2019    ALT 19 10/04/2019     Lab Results   Component Value Date    CHOL 101 10/04/2019    CHOL 137 09/13/2019    CHOL 218 (H) 06/07/2019     Lab Results   Component Value Date    TRIG 100 10/04/2019    TRIG 131 09/13/2019    TRIG 126 06/07/2019     Lab Results   Component Value Date    HDL 27 (L) 10/04/2019    HDL 34 (L) 09/13/2019    HDL 39 (L) 06/07/2019     No components found for:  LDLCALC  Lab Results   Component Value Date    LDL 54 10/04/2019    LDL 77 09/13/2019     (H) 06/07/2019     No results found for: HDLLDLRATIO  No components found for: CHOLHDL  Lab Results   Component Value Date    HGBA1C 7.40 (H) 09/13/2019     Lab Results   Component Value Date    TSH 4.000 09/13/2019           ASSESSMENT AND PLAN:  #1 postop paroxysmal atrial fibrillation no further records will discontinue amiodarone but continue Eliquis  #2 CAD status post CABG no further recurrence of chest pain and is a pleased with her clinical outcome  #3 hypertension blood pressure and will continue diltiazem and beta-blocker  #4 hyperlipidemia on atorvastatin  #5 diabetes patient receiving metformin under care of the family doctor  54 years old patient with history of CAD status post CABG and postop developed atrial fibrillation with rapid ventricular spots managed with amiodarone no further recurrence.  Patient presented for telephone office visit.  Will discontinue amiodarone but continue oral anticoagulations.  If no further recurrence the anticoagulation can also be discontinued.  Significant for low carbohydrate, low-fat, DASH diet and graded exercise discussed  .  Recommend to continue atorvastatin for management hyperlipidemia Plavix with his history history of CAD CAD and CABG and insulin along with oral hypoglycemic agent for management of diabetes.  The significant of low carbohydrate, low-fat and DASH diet discussed with the patient.   Diagnoses and all orders for this visit:    Essential hypertension    Hypertension associated with diabetes (CMS/HCC)    Mixed hyperlipidemia    Coronary artery disease of native artery of native heart with stable angina pectoris (CMS/HCC)    Paroxysmal atrial fibrillation (CMS/HCC)    Other orders  -     dilTIAZem CD (Cardizem CD) 180 MG 24 hr capsule; Take 1 capsule by mouth Daily.  -     metoprolol tartrate (LOPRESSOR) 25 MG tablet; Take 1/2 tablet (12.5 mg) by mouth  Every 12 (Twelve) Hours.        Alice Rice MD  4/29/2020  11:20

## 2020-06-19 RX ORDER — DILTIAZEM HYDROCHLORIDE 180 MG/1
180 CAPSULE, COATED, EXTENDED RELEASE ORAL DAILY
Qty: 30 CAPSULE | Refills: 6 | Status: SHIPPED | OUTPATIENT
Start: 2020-06-19 | End: 2020-07-22 | Stop reason: SINTOL

## 2020-07-22 ENCOUNTER — OFFICE VISIT (OUTPATIENT)
Dept: FAMILY MEDICINE CLINIC | Facility: CLINIC | Age: 55
End: 2020-07-22

## 2020-07-22 ENCOUNTER — LAB (OUTPATIENT)
Dept: LAB | Facility: HOSPITAL | Age: 55
End: 2020-07-22

## 2020-07-22 VITALS
WEIGHT: 259 LBS | HEART RATE: 64 BPM | TEMPERATURE: 97.6 F | DIASTOLIC BLOOD PRESSURE: 70 MMHG | OXYGEN SATURATION: 99 % | SYSTOLIC BLOOD PRESSURE: 120 MMHG | BODY MASS INDEX: 33.24 KG/M2 | HEIGHT: 74 IN

## 2020-07-22 DIAGNOSIS — I10 ESSENTIAL HYPERTENSION: ICD-10-CM

## 2020-07-22 DIAGNOSIS — E11.42 TYPE 2 DIABETES MELLITUS WITH DIABETIC POLYNEUROPATHY, WITH LONG-TERM CURRENT USE OF INSULIN (HCC): Primary | ICD-10-CM

## 2020-07-22 DIAGNOSIS — M20.11 VALGUS DEFORMITY OF BOTH GREAT TOES: ICD-10-CM

## 2020-07-22 DIAGNOSIS — Z12.5 SCREENING FOR PROSTATE CANCER: ICD-10-CM

## 2020-07-22 DIAGNOSIS — R60.0 BILATERAL LEG EDEMA: ICD-10-CM

## 2020-07-22 DIAGNOSIS — T88.7XXA SIDE EFFECT OF MEDICATION: ICD-10-CM

## 2020-07-22 DIAGNOSIS — Z95.1 S/P CABG (CORONARY ARTERY BYPASS GRAFT): ICD-10-CM

## 2020-07-22 DIAGNOSIS — Z79.4 TYPE 2 DIABETES MELLITUS WITH DIABETIC POLYNEUROPATHY, WITH LONG-TERM CURRENT USE OF INSULIN (HCC): Primary | ICD-10-CM

## 2020-07-22 DIAGNOSIS — Q66.89 CLAW TOE, UNSPECIFIED LATERALITY: ICD-10-CM

## 2020-07-22 DIAGNOSIS — M20.12 VALGUS DEFORMITY OF BOTH GREAT TOES: ICD-10-CM

## 2020-07-22 PROCEDURE — 99214 OFFICE O/P EST MOD 30 MIN: CPT | Performed by: FAMILY MEDICINE

## 2020-07-22 PROCEDURE — G0103 PSA SCREENING: HCPCS | Performed by: FAMILY MEDICINE

## 2020-07-22 PROCEDURE — 83036 HEMOGLOBIN GLYCOSYLATED A1C: CPT | Performed by: FAMILY MEDICINE

## 2020-07-22 RX ORDER — METOPROLOL SUCCINATE 100 MG/1
100 TABLET, EXTENDED RELEASE ORAL DAILY
Qty: 30 TABLET | Refills: 0 | Status: SHIPPED | OUTPATIENT
Start: 2020-07-22 | End: 2020-08-21 | Stop reason: HOSPADM

## 2020-07-22 NOTE — PROGRESS NOTES
" Subjective   Herbert White . is a 55 y.o. male.     History of Present Illness     Cc diabetic shoes  Had recent cabg, ever since bilateral leg edema.  Due for diabetes check has been waiting for some reason?  Has sores on feet and lower legs.  Denies scratching them. Sore for weeks, edema now months.     Review of Systems   Constitutional: Negative for chills, fatigue and fever.   HENT: Negative for congestion, ear discharge, ear pain, facial swelling, hearing loss, postnasal drip, rhinorrhea, sinus pressure, sore throat, trouble swallowing and voice change.    Eyes: Negative for discharge, redness and visual disturbance.   Respiratory: Negative for cough, chest tightness, shortness of breath and wheezing.    Cardiovascular: Negative for chest pain and palpitations.   Gastrointestinal: Negative for abdominal pain, blood in stool, constipation, diarrhea, nausea and vomiting.   Endocrine: Negative for polydipsia and polyuria.   Genitourinary: Negative for dysuria, flank pain, hematuria and urgency.   Musculoskeletal: Negative for arthralgias, back pain, joint swelling and myalgias.   Skin: Positive for wound. Negative for rash.   Neurological: Negative for dizziness, weakness, numbness and headaches.   Hematological: Negative for adenopathy.   Psychiatric/Behavioral: Negative for confusion and sleep disturbance. The patient is not nervous/anxious.            /70 (BP Location: Left arm, Patient Position: Sitting, Cuff Size: Adult)   Pulse 64   Temp 97.6 °F (36.4 °C) (Temporal)   Ht 188 cm (74.02\")   Wt 117 kg (259 lb)   SpO2 99%   BMI 33.24 kg/m²       Objective     Physical Exam   Constitutional: He is oriented to person, place, and time. He appears well-developed and well-nourished.   HENT:   Head: Normocephalic and atraumatic.   Right Ear: External ear normal.   Left Ear: External ear normal.   Nose: Nose normal.   Eyes: Pupils are equal, round, and reactive to light. Conjunctivae and EOM are " normal.   Neck: Normal range of motion.   Pulmonary/Chest: Effort normal.   Musculoskeletal: Normal range of motion.    Herbert had a diabetic foot exam performed today.   Monofilament test performed: sensation intact everywhere touched with filiment.  Vascular Status -  His right foot exhibits abnormal foot edema. His right foot exhibits normal foot vasculature . His left foot exhibits abnormal foot edema. His left foot exhibits normal foot vasculature .  Skin Integrity  -  His right foot skin is intact.  Left foot skin intact: shallow 4mm ulcer left anterior prox foot..   Foot Structure and Biomechanics -  His right foot exhibits hallux valgus and claw toes.  His left foot exhibits hallux valgus and claw toes.  Neurological: He is alert and oriented to person, place, and time.   Skin:   1+ edema ankles/feet bilaterally  Excoriation both lower anterior legs    Psychiatric: He has a normal mood and affect. His behavior is normal. Judgment and thought content normal.   Nursing note and vitals reviewed.          PAST MEDICAL HISTORY     Past Medical History:   Diagnosis Date   • Arthritis     c/o joint pain   • Coronary artery disease    • Diabetes mellitus (CMS/HCC)    • Hyperlipidemia    • Hypertension    • Retinopathy of both eyes    • Sleep apnea     not using c-pap      PAST SURGICAL HISTORY     Past Surgical History:   Procedure Laterality Date   • CARDIAC CATHETERIZATION N/A 9/3/2019    Procedure: Coronary angiography/PCI if indicated;  Surgeon: Denia Dwyer MD;  Location: [facility] CATH INVASIVE LOCATION;  Service: Cardiology   • CHOLECYSTECTOMY     • CORONARY ARTERY BYPASS GRAFT N/A 9/25/2019    Procedure: CORONARY ARTERY BYPASS GRAFTING, ENDOSCOPIC VEIN HARVEST       (CELL SAVER);  Surgeon: Xavier Ballard MD;  Location: [facility] OR;  Service: Cardiothoracic   • TONSILLECTOMY        SOCIAL HISTORY     Social History     Socioeconomic History   • Marital status:      Spouse name: Not on file   • Number of  children: Not on file   • Years of education: Not on file   • Highest education level: Not on file   Tobacco Use   • Smoking status: Former Smoker     Packs/day: 1.00     Years: 35.00     Pack years: 35.00     Types: Cigarettes     Last attempt to quit: 9/3/2019     Years since quittin.8   • Smokeless tobacco: Never Used   • Tobacco comment: quit after heart cath   Substance and Sexual Activity   • Alcohol use: No   • Drug use: No   • Sexual activity: Defer      ALLERGIES   Ace inhibitors and Wellbutrin [bupropion]   MEDICATIONS     Current Outpatient Medications   Medication Sig Dispense Refill   • apixaban (ELIQUIS) 5 MG tablet tablet Take 1 tablet by mouth Every 12 (Twelve) Hours. 60 tablet 3   • atorvastatin (LIPITOR) 40 MG tablet Take 1 tablet by mouth Every Night. 30 tablet 3   • clopidogrel (PLAVIX) 75 MG tablet Take 1 tablet by mouth Daily. 30 tablet 11   • Lancet Devices (ONE TOUCH DELICA LANCING DEV) misc delica lancing device if approved, otherwise use alternative 1 each 11   • magnesium oxide (MAGOX) 400 (241.3 Mg) MG tablet tablet Take 1 tablet by mouth 2 (Two) Times a Day. 60 each 3   • metFORMIN (GLUCOPHAGE) 500 MG tablet Take 500 mg by mouth 2 (Two) Times a Day With Meals.     • nitroglycerin (NITROSTAT) 0.4 MG SL tablet Place 1 tablet under the tongue Every 5 (Five) Minutes As Needed for Chest Pain. Take no more than 3 doses in 15 minutes. 30 tablet 1   • ONETOUCH DELICA LANCETS 33G misc 1 each 4 (Four) Times a Day. delica if covered, use alternative if not covered 120 each 11   • vitamin D (ERGOCALCIFEROL) 88365 units capsule capsule Take 50,000 Units by mouth 2 (Two) Times a Week. tues and      • metoprolol succinate XL (Toprol XL) 100 MG 24 hr tablet Take 1 tablet by mouth Daily. 30 tablet 0   • mupirocin (Bactroban) 2 % ointment Apply  topically to the appropriate area as directed 3 (Three) Times a Day. 1 each 0     No current facility-administered medications for this visit.          The following portions of the patient's history were reviewed and updated as appropriate: allergies, current medications, past family history, past medical history, past social history, past surgical history and problem list.        Assessment/Plan   Herbert was seen today for sores on foot.    Diagnoses and all orders for this visit:    Type 2 diabetes mellitus with diabetic polyneuropathy, with long-term current use of insulin (CMS/[facility])  -     Hemoglobin A1c    Screening for prostate cancer  -     PSA Screen    Claw toe, unspecified laterality    Valgus deformity of both great toes    Bilateral leg edema    Side effect of medication    Essential hypertension    S/P CABG (coronary artery bypass graft)    Other orders  -     metoprolol succinate XL (Toprol XL) 100 MG 24 hr tablet; Take 1 tablet by mouth Daily.  -     mupirocin (Bactroban) 2 % ointment; Apply  topically to the appropriate area as directed 3 (Three) Times a Day.        Instructions written:    Stop diltazem cd, contributing to edema, stop metorpolol tartrate.  Start toprol xl 100mg qd  Return 1 week  Elevate legs    bactroban to open areas on legs/feet.  Getting rid of edema will help healing.    rx for diabetic shoes.   Deformities present but mild.                No follow-ups on file.                  This document has been electronically signed by Artemio Sanchez MD on July 22, 2020 13:27

## 2020-07-23 LAB
HBA1C MFR BLD: 8.47 % (ref 4.8–5.6)
PSA SERPL-MCNC: 0.25 NG/ML (ref 0–4)

## 2020-07-25 ENCOUNTER — APPOINTMENT (OUTPATIENT)
Dept: GENERAL RADIOLOGY | Facility: HOSPITAL | Age: 55
End: 2020-07-25

## 2020-07-25 ENCOUNTER — HOSPITAL ENCOUNTER (EMERGENCY)
Facility: HOSPITAL | Age: 55
Discharge: HOME | End: 2020-07-25
Attending: EMERGENCY MEDICINE | Admitting: EMERGENCY MEDICINE

## 2020-07-25 ENCOUNTER — APPOINTMENT (OUTPATIENT)
Dept: CT IMAGING | Facility: HOSPITAL | Age: 55
End: 2020-07-25

## 2020-07-25 VITALS
TEMPERATURE: 98.4 F | SYSTOLIC BLOOD PRESSURE: 157 MMHG | DIASTOLIC BLOOD PRESSURE: 78 MMHG | RESPIRATION RATE: 16 BRPM | HEART RATE: 52 BPM | WEIGHT: 250 LBS | BODY MASS INDEX: 32.08 KG/M2 | HEIGHT: 74 IN | OXYGEN SATURATION: 98 %

## 2020-07-25 DIAGNOSIS — K59.00 CONSTIPATION, UNSPECIFIED CONSTIPATION TYPE: ICD-10-CM

## 2020-07-25 DIAGNOSIS — R10.11 RUQ PAIN: Primary | ICD-10-CM

## 2020-07-25 LAB
ALBUMIN SERPL-MCNC: 3.8 G/DL (ref 3.5–5.2)
ALBUMIN/GLOB SERPL: 1.8 G/DL
ALP SERPL-CCNC: 67 U/L (ref 39–117)
ALT SERPL W P-5'-P-CCNC: 46 U/L (ref 1–41)
ANION GAP SERPL CALCULATED.3IONS-SCNC: 9 MMOL/L (ref 5–15)
AST SERPL-CCNC: 54 U/L (ref 1–40)
BASOPHILS # BLD AUTO: 0.03 10*3/MM3 (ref 0–0.2)
BASOPHILS NFR BLD AUTO: 0.4 % (ref 0–1.5)
BILIRUB SERPL-MCNC: 0.9 MG/DL (ref 0–1.2)
BILIRUB UR QL STRIP: NEGATIVE
BUN SERPL-MCNC: 16 MG/DL (ref 6–20)
BUN/CREAT SERPL: 22.9 (ref 7–25)
CALCIUM SPEC-SCNC: 8.5 MG/DL (ref 8.6–10.5)
CHLORIDE SERPL-SCNC: 104 MMOL/L (ref 98–107)
CLARITY UR: CLEAR
CO2 SERPL-SCNC: 24 MMOL/L (ref 22–29)
COLOR UR: YELLOW
CREAT SERPL-MCNC: 0.7 MG/DL (ref 0.76–1.27)
DEPRECATED RDW RBC AUTO: 37.6 FL (ref 37–54)
EOSINOPHIL # BLD AUTO: 0.16 10*3/MM3 (ref 0–0.4)
EOSINOPHIL NFR BLD AUTO: 2 % (ref 0.3–6.2)
ERYTHROCYTE [DISTWIDTH] IN BLOOD BY AUTOMATED COUNT: 12.5 % (ref 12.3–15.4)
GFR SERPL CREATININE-BSD FRML MDRD: 117 ML/MIN/1.73
GLOBULIN UR ELPH-MCNC: 2.1 GM/DL
GLUCOSE SERPL-MCNC: 260 MG/DL (ref 65–99)
GLUCOSE UR STRIP-MCNC: ABNORMAL MG/DL
HCT VFR BLD AUTO: 40.4 % (ref 37.5–51)
HGB BLD-MCNC: 14.5 G/DL (ref 13–17.7)
HGB UR QL STRIP.AUTO: NEGATIVE
HOLD SPECIMEN: NORMAL
HOLD SPECIMEN: NORMAL
IMM GRANULOCYTES # BLD AUTO: 0.05 10*3/MM3 (ref 0–0.05)
IMM GRANULOCYTES NFR BLD AUTO: 0.6 % (ref 0–0.5)
KETONES UR QL STRIP: NEGATIVE
LEUKOCYTE ESTERASE UR QL STRIP.AUTO: NEGATIVE
LIPASE SERPL-CCNC: 45 U/L (ref 13–60)
LYMPHOCYTES # BLD AUTO: 1.38 10*3/MM3 (ref 0.7–3.1)
LYMPHOCYTES NFR BLD AUTO: 17.1 % (ref 19.6–45.3)
MCH RBC QN AUTO: 30 PG (ref 26.6–33)
MCHC RBC AUTO-ENTMCNC: 35.9 G/DL (ref 31.5–35.7)
MCV RBC AUTO: 83.6 FL (ref 79–97)
MONOCYTES # BLD AUTO: 0.48 10*3/MM3 (ref 0.1–0.9)
MONOCYTES NFR BLD AUTO: 5.9 % (ref 5–12)
NEUTROPHILS NFR BLD AUTO: 5.98 10*3/MM3 (ref 1.7–7)
NEUTROPHILS NFR BLD AUTO: 74 % (ref 42.7–76)
NITRITE UR QL STRIP: NEGATIVE
NRBC BLD AUTO-RTO: 0 /100 WBC (ref 0–0.2)
NT-PROBNP SERPL-MCNC: 493.4 PG/ML (ref 0–900)
PH UR STRIP.AUTO: 6 [PH] (ref 5–9)
PLATELET # BLD AUTO: 188 10*3/MM3 (ref 140–450)
PMV BLD AUTO: 10.6 FL (ref 6–12)
POTASSIUM SERPL-SCNC: 4.3 MMOL/L (ref 3.5–5.2)
PROT SERPL-MCNC: 5.9 G/DL (ref 6–8.5)
PROT UR QL STRIP: NEGATIVE
RBC # BLD AUTO: 4.83 10*6/MM3 (ref 4.14–5.8)
SODIUM SERPL-SCNC: 137 MMOL/L (ref 136–145)
SP GR UR STRIP: 1.05 (ref 1–1.03)
TROPONIN T SERPL-MCNC: <0.01 NG/ML (ref 0–0.03)
TROPONIN T SERPL-MCNC: <0.01 NG/ML (ref 0–0.03)
UROBILINOGEN UR QL STRIP: ABNORMAL
WBC # BLD AUTO: 8.08 10*3/MM3 (ref 3.4–10.8)
WHOLE BLOOD HOLD SPECIMEN: NORMAL
WHOLE BLOOD HOLD SPECIMEN: NORMAL

## 2020-07-25 PROCEDURE — 74177 CT ABD & PELVIS W/CONTRAST: CPT

## 2020-07-25 PROCEDURE — 83880 ASSAY OF NATRIURETIC PEPTIDE: CPT | Performed by: EMERGENCY MEDICINE

## 2020-07-25 PROCEDURE — 25010000002 IOPAMIDOL 61 % SOLUTION: Performed by: EMERGENCY MEDICINE

## 2020-07-25 PROCEDURE — 99284 EMERGENCY DEPT VISIT MOD MDM: CPT

## 2020-07-25 PROCEDURE — 81003 URINALYSIS AUTO W/O SCOPE: CPT | Performed by: EMERGENCY MEDICINE

## 2020-07-25 PROCEDURE — 80053 COMPREHEN METABOLIC PANEL: CPT | Performed by: EMERGENCY MEDICINE

## 2020-07-25 PROCEDURE — 85025 COMPLETE CBC W/AUTO DIFF WBC: CPT | Performed by: EMERGENCY MEDICINE

## 2020-07-25 PROCEDURE — 84484 ASSAY OF TROPONIN QUANT: CPT | Performed by: EMERGENCY MEDICINE

## 2020-07-25 PROCEDURE — 93010 ELECTROCARDIOGRAM REPORT: CPT | Performed by: INTERNAL MEDICINE

## 2020-07-25 PROCEDURE — 93005 ELECTROCARDIOGRAM TRACING: CPT | Performed by: EMERGENCY MEDICINE

## 2020-07-25 PROCEDURE — 83690 ASSAY OF LIPASE: CPT | Performed by: EMERGENCY MEDICINE

## 2020-07-25 PROCEDURE — 71045 X-RAY EXAM CHEST 1 VIEW: CPT

## 2020-07-25 RX ORDER — SODIUM CHLORIDE 0.9 % (FLUSH) 0.9 %
10 SYRINGE (ML) INJECTION AS NEEDED
Status: DISCONTINUED | OUTPATIENT
Start: 2020-07-25 | End: 2020-07-25 | Stop reason: HOSPADM

## 2020-07-25 RX ADMIN — IOPAMIDOL 95 ML: 612 INJECTION, SOLUTION INTRAVENOUS at 16:29

## 2020-07-25 NOTE — ED PROVIDER NOTES
"Subjective   55-year-old male presents to the emergency department with complaint of right upper quadrant abdominal pain that he states \"my liver was hurting\" that started earlier today.  He then states he developed shortness of breath.  He states that he believes that this was likely due to the fact that he became concerned with the pain being something with his heart.  He reports that last year he had a 3 vessel CABG here at this facility. Reports his pain at that time was around his left breast. He reports he had a short bout of nausea but that has resolved. Denies cough, fever, chills, and no pain in his chest. Pain was in the RUQ and denied any radiation. Symptoms have since resolved, but reports that he told EMS the pain was \"20/10\".     Family history, surgical history, social history, current medications and allergies are reviewed with the patient and triage documentation and vitals are reviewed.        History provided by:  Patient and medical records   used: No        Review of Systems   Constitutional: Negative for chills and fever.   HENT: Negative for congestion, sinus pressure, sinus pain and sore throat.    Eyes: Negative for photophobia and visual disturbance.   Respiratory: Positive for shortness of breath. Negative for cough, chest tightness and wheezing.    Cardiovascular: Negative for chest pain, palpitations and leg swelling.   Gastrointestinal: Positive for abdominal pain and nausea. Negative for abdominal distention, blood in stool, constipation, diarrhea and vomiting.   Endocrine: Negative for polydipsia, polyphagia and polyuria.   Genitourinary: Negative for dysuria, frequency and urgency.   Musculoskeletal: Negative for arthralgias, back pain, myalgias and neck pain.   Skin: Negative for color change, pallor, rash and wound.   Allergic/Immunologic: Negative.    Neurological: Negative for light-headedness, numbness and headaches.   Hematological: Negative.  "   Psychiatric/Behavioral: Negative.        Past Medical History:   Diagnosis Date   • Arthritis     c/o joint pain   • Coronary artery disease    • Diabetes mellitus (CMS/HCC)    • Hyperlipidemia    • Hypertension    • Retinopathy of both eyes    • Sleep apnea     not using c-pap       Allergies   Allergen Reactions   • Ace Inhibitors Anaphylaxis   • Wellbutrin [Bupropion] Anaphylaxis       Past Surgical History:   Procedure Laterality Date   • CARDIAC CATHETERIZATION N/A 9/3/2019    Procedure: Coronary angiography/PCI if indicated;  Surgeon: Denia Dwyer MD;  Location: [facility] CATH INVASIVE LOCATION;  Service: Cardiology   • CHOLECYSTECTOMY     • CORONARY ARTERY BYPASS GRAFT N/A 2019    Procedure: CORONARY ARTERY BYPASS GRAFTING, ENDOSCOPIC VEIN HARVEST       (CELL SAVER);  Surgeon: Xavier Ballard MD;  Location: [facility] OR;  Service: Cardiothoracic   • TONSILLECTOMY         Family History   Problem Relation Age of Onset   • Diabetes Mother    • Hypertension Mother    • Cancer Mother    • Diabetes Father    • Hypertension Father    • Cancer Father        Social History     Socioeconomic History   • Marital status:      Spouse name: Not on file   • Number of children: Not on file   • Years of education: Not on file   • Highest education level: Not on file   Tobacco Use   • Smoking status: Former Smoker     Packs/day: 1.00     Years: 35.00     Pack years: 35.00     Types: Cigarettes     Last attempt to quit: 9/3/2019     Years since quittin.8   • Smokeless tobacco: Never Used   • Tobacco comment: quit after heart cath   Substance and Sexual Activity   • Alcohol use: No   • Drug use: No   • Sexual activity: Defer           Objective   Physical Exam   Constitutional: He is oriented to person, place, and time. He appears well-developed and well-nourished.  Non-toxic appearance. He does not appear ill. No distress.   HENT:   Head: Normocephalic.   Mouth/Throat: Oropharynx is clear and moist.   Eyes:  Pupils are equal, round, and reactive to light. No scleral icterus.   Bilateral subconjunctival hemorrhages    Neck: Normal range of motion.   Cardiovascular: Normal rate, regular rhythm, normal heart sounds and intact distal pulses.  No extrasystoles are present.   No murmur heard.  Pulmonary/Chest: Effort normal. No accessory muscle usage. No tachypnea. No respiratory distress. He has no decreased breath sounds. He has no wheezes. He has no rhonchi. He has no rales.   Abdominal: Soft. Bowel sounds are normal. He exhibits distension. He exhibits no ascites. There is no tenderness. There is no rebound and no guarding.   Musculoskeletal: Normal range of motion.        Right lower leg: Normal. He exhibits no tenderness and no edema.        Left lower leg: Normal. He exhibits no tenderness and no edema.   Neurological: He is alert and oriented to person, place, and time.   Skin: Skin is warm and dry. Capillary refill takes less than 2 seconds.   Psychiatric: He has a normal mood and affect. His behavior is normal.   Nursing note and vitals reviewed.      Procedures  none         ED Course      Labs Reviewed   COMPREHENSIVE METABOLIC PANEL - Abnormal; Notable for the following components:       Result Value    Glucose 260 (*)     Creatinine 0.70 (*)     Calcium 8.5 (*)     Total Protein 5.9 (*)     ALT (SGPT) 46 (*)     AST (SGOT) 54 (*)     All other components within normal limits    Narrative:     GFR Normal >60  Chronic Kidney Disease <60  Kidney Failure <15     URINALYSIS W/ MICROSCOPIC IF INDICATED (NO CULTURE) - Abnormal; Notable for the following components:    Specific Gravity, UA 1.050 (*)     Glucose, UA >=1000 mg/dL (3+) (*)     All other components within normal limits    Narrative:     Urine microscopic not indicated.   CBC WITH AUTO DIFFERENTIAL - Abnormal; Notable for the following components:    MCHC 35.9 (*)     Lymphocyte % 17.1 (*)     Immature Grans % 0.6 (*)     All other components within normal  limits   LIPASE - Normal   BNP (IN-HOUSE) - Normal    Narrative:     Among patients with dyspnea, NT-proBNP is highly sensitive for the detection of acute congestive heart failure. In addition NT-proBNP of <300 pg/ml effectively rules out acute congestive heart failure with 99% negative predictive value.    Results may be falsely decreased if patient taking Biotin.     TROPONIN (IN-HOUSE) - Normal    Narrative:     Troponin T Reference Range:  <= 0.03 ng/mL-   Negative for AMI  >0.03 ng/mL-     Abnormal for myocardial necrosis.  Clinicians would have to utilize clinical acumen, EKG, Troponin and serial changes to determine if it is an Acute Myocardial Infarction or myocardial injury due to an underlying chronic condition.       Results may be falsely decreased if patient taking Biotin.     TROPONIN (IN-HOUSE) - Normal    Narrative:     Troponin T Reference Range:  <= 0.03 ng/mL-   Negative for AMI  >0.03 ng/mL-     Abnormal for myocardial necrosis.  Clinicians would have to utilize clinical acumen, EKG, Troponin and serial changes to determine if it is an Acute Myocardial Infarction or myocardial injury due to an underlying chronic condition.       Results may be falsely decreased if patient taking Biotin.     RAINBOW DRAW    Narrative:     The following orders were created for panel order [place] Draw.  Procedure                               Abnormality         Status                     ---------                               -----------         ------                     Light Blue Top[[id number]]                                   Final result               Green Top (Gel)[[id number]]                                  Final result               Lavender Top[[id number]]                                     Final result               Gold Top - SST[[id number]]                                   Final result                 Please view results for these tests on the individual orders.   CBC AND DIFFERENTIAL    Narrative:      The following orders were created for panel order CBC & Differential.  Procedure                               Abnormality         Status                     ---------                               -----------         ------                     CBC Auto Differential[[id number]]        Abnormal            Final result                 Please view results for these tests on the individual orders.   LIGHT BLUE TOP   GREEN TOP   LAVENDER TOP   GOLD TOP - SST     Ct Abdomen Pelvis With Contrast    Result Date: 7/25/2020  Narrative: CT ABDOMEN AND PELVIS WITH CONTRAST HISTORY: Right upper quadrant pain Following the intravenous administration of contrast, axial spiral scans of the abdomen and pelvis were obtained.  Coronal and sagital reconstructions were preformed. This exam was performed according to our departmental dose-optimization program, which includes automated exposure control, adjustment of the mA and/or kV according to patient size and/or use of iterative reconstruction technique. DLP: 673.30 COMPARISON: January 13, 2018 FINDINGS: Scans of the lung bases demonstrate minimal dependent atelectasis. Coronary artery bypass. Cardiomegaly. No acute osseous abnormality. Degenerative changes thoracic and lumbar spine. Bilateral L5 spondylolysis without spondylolisthesis. The liver is unremarkable. Cholecystectomy. The spleen is unremarkable. The pancreas is unremarkable. Stable 1.6 cm left adrenal adenoma. Unremarkable right adrenal gland. No renal or ureteral calculi. No renal mass. No hydronephrosis. Unremarkable bladder. No pelvic mass. No abdominal aortic aneurysm. No adenopathy. Moderate to large amount retained feces throughout the colon. No bowel obstruction. Normal appendix. No free air. No free fluid. Unchanged 4.7 cm fat-containing midline ventral hernia just superior to the umbilicus.     Impression: CONCLUSION: Cholecystectomy. Stable 1.6 cm left adrenal adenoma. Moderate to large amount retained feces  throughout the colon. Unchanged 4.7 cm fat-containing midline ventral hernia just superior to the umbilicus. Coronary artery bypass. Cardiomegaly. Bilateral L5 spondylolysis without spondylolisthesis. [id number] Electronically signed by:  Hansel Card MD  7/25/2020 4:46 PM CDT Workstation: [id number]    Xr Chest 1 View    Result Date: 7/25/2020  Narrative: PROCEDURE: XR CHEST 1 VIEW Clinical History: short of breath Indication:  Same as above. Comparison:  10/11/2019. Technique: Single portable frontal projection of the chest was done. Findings: Note is made of median sternotomy. There are no discrete airspace infiltrates, pneumothoraces or pleural effusions. The pulmonary vascularity is normal. The cardiomediastinal contour is stable.     Impression: Impression: There is no acute pleural-parenchymal process seen in the imaged lung fields. Electronically signed by:  Javier Carvalho MD  7/25/2020 4:34 PM CDT Workstation: [id number]      EKG July 25, 2020 at 1512 reveals normal sinus rhythm rate of 62 bpm.  T wave inversion in aVL with no ST elevation or depression.  Unchanged from previous EKG.          MDM  Number of Diagnoses or Management Options  Constipation, unspecified constipation type:   RUQ pain:      Amount and/or Complexity of Data Reviewed  Clinical lab tests: reviewed  Tests in the radiology section of CPT®: reviewed  Tests in the medicine section of CPT®: reviewed    Patient Progress  Patient progress: stable    Laboratory studies including cardiac enzymes unremarkable.  EKG unchanged from previous.  Chest x-ray unremarkable.  CT imaging reveals constipation and stable adrenal adenoma as well as stable supraumbilical hernia with no evidence of incarceration or bowel obstruction.  Patient advised of findings, and follow-up.  Troponin negative x2.  Agreeable to discharge with outpatient follow-up.    Final diagnoses:   RUQ pain   Constipation, unspecified constipation type            Jluis Vela,  DO  07/25/20 2000

## 2020-07-26 NOTE — DISCHARGE INSTRUCTIONS
Please return with new or worsening symptoms.  Get MiraLAX over-the-counter and take as directed on the packaging to help with constipation.  Follow-up with primary care and cardiology as discussed.

## 2020-07-29 ENCOUNTER — OFFICE VISIT (OUTPATIENT)
Dept: FAMILY MEDICINE CLINIC | Facility: CLINIC | Age: 55
End: 2020-07-29

## 2020-07-29 VITALS
BODY MASS INDEX: 32.44 KG/M2 | HEART RATE: 72 BPM | DIASTOLIC BLOOD PRESSURE: 74 MMHG | OXYGEN SATURATION: 98 % | SYSTOLIC BLOOD PRESSURE: 142 MMHG | HEIGHT: 74 IN | TEMPERATURE: 98.2 F | WEIGHT: 252.8 LBS

## 2020-07-29 DIAGNOSIS — R60.0 BILATERAL LEG EDEMA: Primary | ICD-10-CM

## 2020-07-29 DIAGNOSIS — I10 ESSENTIAL HYPERTENSION: ICD-10-CM

## 2020-07-29 DIAGNOSIS — E11.65 TYPE 2 DIABETES MELLITUS WITH HYPERGLYCEMIA, WITHOUT LONG-TERM CURRENT USE OF INSULIN (HCC): ICD-10-CM

## 2020-07-29 PROCEDURE — 99214 OFFICE O/P EST MOD 30 MIN: CPT | Performed by: FAMILY MEDICINE

## 2020-07-29 RX ORDER — ATORVASTATIN CALCIUM 40 MG/1
40 TABLET, FILM COATED ORAL NIGHTLY
Qty: 30 TABLET | Refills: 3 | Status: ON HOLD | OUTPATIENT
Start: 2020-07-29 | End: 2022-01-25 | Stop reason: SDUPTHER

## 2020-07-29 NOTE — PROGRESS NOTES
" Subjective   Herbert White . is a 55 y.o. male.     History of Present Illness     Cc bp and leg edema  Rechecking edema and bp since changing meds to toprol xl 100mg qd.   Went to er ruq pain, decided it was due to constipation  Also  hga1c 8.47.  I increased his metformin to 1000mg bid    Review of Systems   Constitutional: Negative for chills, fatigue and fever.   HENT: Negative for congestion, ear discharge, ear pain, facial swelling, hearing loss, postnasal drip, rhinorrhea, sinus pressure, sore throat, trouble swallowing and voice change.    Eyes: Negative for discharge, redness and visual disturbance.   Respiratory: Negative for cough, chest tightness, shortness of breath and wheezing.    Cardiovascular: Negative for chest pain and palpitations.   Gastrointestinal: Negative for abdominal pain, blood in stool, constipation, diarrhea, nausea and vomiting.   Endocrine: Negative for polydipsia and polyuria.   Genitourinary: Negative for dysuria, flank pain, hematuria and urgency.   Musculoskeletal: Negative for arthralgias, back pain, joint swelling and myalgias.   Skin: Negative for rash.   Neurological: Negative for dizziness, weakness, numbness and headaches.   Hematological: Negative for adenopathy.   Psychiatric/Behavioral: Negative for confusion and sleep disturbance. The patient is not nervous/anxious.            /74 (BP Location: Left arm, Patient Position: Sitting, Cuff Size: Adult)   Pulse 72   Temp 98.2 °F (36.8 °C) (Temporal)   Ht 188 cm (74.02\")   Wt 115 kg (252 lb 12.8 oz)   SpO2 98%   BMI 32.44 kg/m²       Objective     Physical Exam   Constitutional: He is oriented to person, place, and time. He appears well-developed and well-nourished.   HENT:   Head: Normocephalic and atraumatic.   Right Ear: External ear normal.   Left Ear: External ear normal.   Nose: Nose normal.   Eyes: Pupils are equal, round, and reactive to light. Conjunctivae and EOM are normal.   Neck: Normal " range of motion.   Pulmonary/Chest: Effort normal.   Musculoskeletal: Normal range of motion.   Neurological: He is alert and oriented to person, place, and time.   Skin:   Trace edema both legs.   Scratches/abrasions look lighter.    Psychiatric: He has a normal mood and affect. His behavior is normal. Judgment and thought content normal.   Nursing note and vitals reviewed.          PAST MEDICAL HISTORY     Past Medical History:   Diagnosis Date   • Arthritis     c/o joint pain   • Coronary artery disease    • Diabetes mellitus (CMS/HCC)    • Hyperlipidemia    • Hypertension    • Retinopathy of both eyes    • Sleep apnea     not using c-pap      PAST SURGICAL HISTORY     Past Surgical History:   Procedure Laterality Date   • CARDIAC CATHETERIZATION N/A 9/3/2019    Procedure: Coronary angiography/PCI if indicated;  Surgeon: Denia Dwyer MD;  Location: [facility] INVASIVE LOCATION;  Service: Cardiology   • CHOLECYSTECTOMY     • CORONARY ARTERY BYPASS GRAFT N/A 2019    Procedure: CORONARY ARTERY BYPASS GRAFTING, ENDOSCOPIC VEIN HARVEST       (CELL SAVER);  Surgeon: Xavier Ballard MD;  Location: [facility] OR;  Service: Cardiothoracic   • TONSILLECTOMY        SOCIAL HISTORY     Social History     Socioeconomic History   • Marital status:      Spouse name: Not on file   • Number of children: Not on file   • Years of education: Not on file   • Highest education level: Not on file   Tobacco Use   • Smoking status: Former Smoker     Packs/day: 1.00     Years: 35.00     Pack years: 35.00     Types: Cigarettes     Last attempt to quit: 9/3/2019     Years since quittin.9   • Smokeless tobacco: Never Used   • Tobacco comment: quit after heart cath   Substance and Sexual Activity   • Alcohol use: No   • Drug use: No   • Sexual activity: Defer      ALLERGIES   Ace inhibitors and Wellbutrin [bupropion]   MEDICATIONS     Current Outpatient Medications   Medication Sig Dispense Refill   • clopidogrel (PLAVIX) 75 MG  tablet Take 1 tablet by mouth Daily. 30 tablet 11   • [employer] (ONE TOUCH DELICA LANCING DEV) misc delica lancing device if approved, otherwise use alternative 1 each 11   • metFORMIN (GLUCOPHAGE) 500 MG tablet Take 2 tablets by mouth 2 (Two) Times a Day With Meals. 120 tablet 2   • metoprolol succinate XL (Toprol XL) 100 MG 24 hr tablet Take 1 tablet by mouth Daily. 30 tablet 0   • mupirocin (Bactroban) 2 % ointment Apply  topically to the appropriate area as directed 3 (Three) Times a Day. 1 each 0   • nitroglycerin (NITROSTAT) 0.4 MG SL tablet Place 1 tablet under the tongue Every 5 (Five) Minutes As Needed for Chest Pain. Take no more than 3 doses in 15 minutes. 30 tablet 1   • ONETOUCH DELICA LANCETS 33G misc 1 each 4 (Four) Times a Day. delica if covered, use alternative if not covered 120 each 11   • vitamin D (ERGOCALCIFEROL) 16701 units capsule capsule Take 50,000 Units by mouth 2 (Two) Times a Week. tues and fridays     • apixaban (ELIQUIS) 5 MG tablet tablet Take 1 tablet by mouth Every 12 (Twelve) Hours. 60 tablet 3   • atorvastatin (LIPITOR) 40 MG tablet Take 1 tablet by mouth Every Night. 30 tablet 3   • magnesium oxide (MAGOX) 400 (241.3 Mg) MG tablet tablet Take 1 tablet by mouth 2 (Two) Times a Day. 60 each 3     No current facility-administered medications for this visit.         The following portions of the patient's history were reviewed and updated as appropriate: allergies, current medications, past family history, past medical history, past social history, past surgical history and problem list.        Assessment/Plan   Herbert was seen today for follow-up.    Diagnoses and all orders for this visit:    Bilateral leg edema    Essential hypertension    Type 2 diabetes mellitus with hyperglycemia, without long-term current use of insulin (CMS/[facility])    continue toprol xl 100mg qd and elevating legs    Follow up 3 months to recheck diabetes.                    No follow-ups on  file.                  This document has been electronically signed by Artemio Sanchez MD on July 29, 2020 11:36

## 2020-07-29 NOTE — PATIENT INSTRUCTIONS

## 2020-08-10 ENCOUNTER — OFFICE VISIT (OUTPATIENT)
Dept: FAMILY MEDICINE CLINIC | Facility: CLINIC | Age: 55
End: 2020-08-10

## 2020-08-10 DIAGNOSIS — R06.02 SOB (SHORTNESS OF BREATH): Primary | ICD-10-CM

## 2020-08-10 PROCEDURE — 99213 OFFICE O/P EST LOW 20 MIN: CPT | Performed by: FAMILY MEDICINE

## 2020-08-10 NOTE — PATIENT INSTRUCTIONS

## 2020-08-10 NOTE — PROGRESS NOTES
Subjective   Herbert White Sr. is a 55 y.o. male.     History of Present Illness     Cc sob  Saturday weed eating yard, hot, was sob.  Had to take frequent breaks.  No cp.   Has cardiac history.  New bp medicine is toprol xl 100mg qd.    Wants a work excuse for today.     Review of Systems   Respiratory: Positive for shortness of breath.    Cardiovascular: Negative for chest pain.           There were no vitals taken for this visit.      Objective     Physical Exam        PAST MEDICAL HISTORY     Past Medical History:   Diagnosis Date   • Arthritis     c/o joint pain   • Coronary artery disease    • Diabetes mellitus (CMS/HCC)    • Hyperlipidemia    • Hypertension    • Retinopathy of both eyes    • Sleep apnea     not using c-pap      PAST SURGICAL HISTORY     Past Surgical History:   Procedure Laterality Date   • CARDIAC CATHETERIZATION N/A 9/3/2019    Procedure: Coronary angiography/PCI if indicated;  Surgeon: Denia Dwyer MD;  Location: [facility] INVASIVE LOCATION;  Service: Cardiology   • CHOLECYSTECTOMY     • CORONARY ARTERY BYPASS GRAFT N/A 2019    Procedure: CORONARY ARTERY BYPASS GRAFTING, ENDOSCOPIC VEIN HARVEST       (CELL SAVER);  Surgeon: Xavier Ballard MD;  Location: [facility] OR;  Service: Cardiothoracic   • TONSILLECTOMY        SOCIAL HISTORY     Social History     Socioeconomic History   • Marital status:      Spouse name: Not on file   • Number of children: Not on file   • Years of education: Not on file   • Highest education level: Not on file   Tobacco Use   • Smoking status: Former Smoker     Packs/day: 1.00     Years: 35.00     Pack years: 35.00     Types: Cigarettes     Last attempt to quit: 9/3/2019     Years since quittin.9   • Smokeless tobacco: Never Used   • Tobacco comment: quit after heart cath   Substance and Sexual Activity   • Alcohol use: No   • Drug use: No   • Sexual activity: Defer      ALLERGIES   Ace inhibitors and Wellbutrin [bupropion]    MEDICATIONS     Current Outpatient Medications   Medication Sig Dispense Refill   • apixaban (ELIQUIS) 5 MG tablet tablet Take 1 tablet by mouth Every 12 (Twelve) Hours. 60 tablet 3   • atorvastatin (LIPITOR) 40 MG tablet Take 1 tablet by mouth Every Night. 30 tablet 3   • clopidogrel (PLAVIX) 75 MG tablet Take 1 tablet by mouth Daily. 30 tablet 11   • [employer] (ONE TOUCH DELICA LANCING DEV) misc delica lancing device if approved, otherwise use alternative 1 each 11   • magnesium oxide (MAGOX) 400 (241.3 Mg) MG tablet tablet Take 1 tablet by mouth 2 (Two) Times a Day. 60 each 3   • metFORMIN (GLUCOPHAGE) 500 MG tablet Take 2 tablets by mouth 2 (Two) Times a Day With Meals. 120 tablet 2   • metoprolol succinate XL (Toprol XL) 100 MG 24 hr tablet Take 1 tablet by mouth Daily. 30 tablet 0   • mupirocin (Bactroban) 2 % ointment Apply  topically to the appropriate area as directed 3 (Three) Times a Day. 1 each 0   • nitroglycerin (NITROSTAT) 0.4 MG SL tablet Place 1 tablet under the tongue Every 5 (Five) Minutes As Needed for Chest Pain. Take no more than 3 doses in 15 minutes. 30 tablet 1   • ONETOUCH DELICA LANCETS 33G misc 1 each 4 (Four) Times a Day. delica if covered, use alternative if not covered 120 each 11   • vitamin D (ERGOCALCIFEROL) 60673 units capsule capsule Take 50,000 Units by mouth 2 (Two) Times a Week. tues and fridays       No current facility-administered medications for this visit.         The following portions of the patient's history were reviewed and updated as appropriate: allergies, current medications, past family history, past medical history, past social history, past surgical history and problem list.        Assessment/Plan   Herbert was seen today for shortness of breath.    Diagnoses and all orders for this visit:    SOB (shortness of breath)  -     Ambulatory Referral to Cardiology      Suspect just needs to get use to the toprol xl 100mg qd new medicine  Should see cardiac since  cardiac history.     Work excuse.     This visit has been rescheduled as a phone visit to comply with patient safety concerns in accordance with CDC recommendations. Total time of discussion was 15 minutes.                 No follow-ups on file.                  This document has been electronically signed by Artemio Sanchez MD on August 10, 2020 11:15

## 2020-08-19 ENCOUNTER — APPOINTMENT (OUTPATIENT)
Dept: CT IMAGING | Facility: HOSPITAL | Age: 55
End: 2020-08-19

## 2020-08-19 ENCOUNTER — APPOINTMENT (OUTPATIENT)
Dept: GENERAL RADIOLOGY | Facility: HOSPITAL | Age: 55
End: 2020-08-19

## 2020-08-19 ENCOUNTER — HOSPITAL ENCOUNTER (OUTPATIENT)
Facility: HOSPITAL | Age: 55
Setting detail: OBSERVATION
Discharge: HOME | End: 2020-08-21
Attending: EMERGENCY MEDICINE | Admitting: FAMILY MEDICINE

## 2020-08-19 DIAGNOSIS — J32.2 ETHMOID SINUSITIS, UNSPECIFIED CHRONICITY: ICD-10-CM

## 2020-08-19 DIAGNOSIS — R07.9 CHEST PAIN, UNSPECIFIED TYPE: Primary | ICD-10-CM

## 2020-08-19 LAB
ALBUMIN SERPL-MCNC: 3.8 G/DL (ref 3.5–5.2)
ALBUMIN/GLOB SERPL: 1.7 G/DL
ALP SERPL-CCNC: 60 U/L (ref 39–117)
ALT SERPL W P-5'-P-CCNC: 24 U/L (ref 1–41)
ANION GAP SERPL CALCULATED.3IONS-SCNC: 10 MMOL/L (ref 5–15)
APTT PPP: 31.9 SECONDS (ref 20–40.3)
AST SERPL-CCNC: 17 U/L (ref 1–40)
BASOPHILS # BLD AUTO: 0.04 10*3/MM3 (ref 0–0.2)
BASOPHILS NFR BLD AUTO: 0.7 % (ref 0–1.5)
BILIRUB SERPL-MCNC: 1.1 MG/DL (ref 0–1.2)
BUN SERPL-MCNC: 19 MG/DL (ref 6–20)
BUN/CREAT SERPL: 27.9 (ref 7–25)
CALCIUM SPEC-SCNC: 9.1 MG/DL (ref 8.6–10.5)
CHLORIDE SERPL-SCNC: 99 MMOL/L (ref 98–107)
CO2 SERPL-SCNC: 27 MMOL/L (ref 22–29)
CREAT SERPL-MCNC: 0.68 MG/DL (ref 0.76–1.27)
D-DIMER, QUANTITATIVE (MAD,POW, STR): <270 NG/ML (FEU) (ref 0–470)
DEPRECATED RDW RBC AUTO: 38.3 FL (ref 37–54)
EOSINOPHIL # BLD AUTO: 0.19 10*3/MM3 (ref 0–0.4)
EOSINOPHIL NFR BLD AUTO: 3.5 % (ref 0.3–6.2)
ERYTHROCYTE [DISTWIDTH] IN BLOOD BY AUTOMATED COUNT: 12.7 % (ref 12.3–15.4)
GFR SERPL CREATININE-BSD FRML MDRD: 121 ML/MIN/1.73
GLOBULIN UR ELPH-MCNC: 2.3 GM/DL
GLUCOSE BLDC GLUCOMTR-MCNC: 199 MG/DL (ref 70–130)
GLUCOSE SERPL-MCNC: 292 MG/DL (ref 65–99)
HCT VFR BLD AUTO: 40.8 % (ref 37.5–51)
HGB BLD-MCNC: 14.7 G/DL (ref 13–17.7)
HOLD SPECIMEN: NORMAL
HOLD SPECIMEN: NORMAL
IMM GRANULOCYTES # BLD AUTO: 0.03 10*3/MM3 (ref 0–0.05)
IMM GRANULOCYTES NFR BLD AUTO: 0.5 % (ref 0–0.5)
INR PPP: 1.05 (ref 0.8–1.2)
LYMPHOCYTES # BLD AUTO: 1.66 10*3/MM3 (ref 0.7–3.1)
LYMPHOCYTES NFR BLD AUTO: 30.3 % (ref 19.6–45.3)
MCH RBC QN AUTO: 29.8 PG (ref 26.6–33)
MCHC RBC AUTO-ENTMCNC: 36 G/DL (ref 31.5–35.7)
MCV RBC AUTO: 82.8 FL (ref 79–97)
MONOCYTES # BLD AUTO: 0.6 10*3/MM3 (ref 0.1–0.9)
MONOCYTES NFR BLD AUTO: 11 % (ref 5–12)
NEUTROPHILS NFR BLD AUTO: 2.95 10*3/MM3 (ref 1.7–7)
NEUTROPHILS NFR BLD AUTO: 54 % (ref 42.7–76)
NRBC BLD AUTO-RTO: 0 /100 WBC (ref 0–0.2)
NT-PROBNP SERPL-MCNC: 391.3 PG/ML (ref 0–900)
PLATELET # BLD AUTO: 162 10*3/MM3 (ref 140–450)
PMV BLD AUTO: 10.6 FL (ref 6–12)
POTASSIUM SERPL-SCNC: 4 MMOL/L (ref 3.5–5.2)
PROT SERPL-MCNC: 6.1 G/DL (ref 6–8.5)
PROTHROMBIN TIME: 14.1 SECONDS (ref 11.1–15.3)
RBC # BLD AUTO: 4.93 10*6/MM3 (ref 4.14–5.8)
SARS-COV-2 N GENE RESP QL NAA+PROBE: NOT DETECTED
SODIUM SERPL-SCNC: 136 MMOL/L (ref 136–145)
TROPONIN T SERPL-MCNC: <0.01 NG/ML (ref 0–0.03)
WBC # BLD AUTO: 5.47 10*3/MM3 (ref 3.4–10.8)
WHOLE BLOOD HOLD SPECIMEN: NORMAL
WHOLE BLOOD HOLD SPECIMEN: NORMAL

## 2020-08-19 PROCEDURE — 96361 HYDRATE IV INFUSION ADD-ON: CPT

## 2020-08-19 PROCEDURE — G0378 HOSPITAL OBSERVATION PER HR: HCPCS

## 2020-08-19 PROCEDURE — 80053 COMPREHEN METABOLIC PANEL: CPT | Performed by: EMERGENCY MEDICINE

## 2020-08-19 PROCEDURE — 63710000001 INSULIN REGULAR HUMAN PER 5 UNITS: Performed by: EMERGENCY MEDICINE

## 2020-08-19 PROCEDURE — 25010000002 HYDRALAZINE PER 20 MG: Performed by: NURSE PRACTITIONER

## 2020-08-19 PROCEDURE — 93010 ELECTROCARDIOGRAM REPORT: CPT | Performed by: INTERNAL MEDICINE

## 2020-08-19 PROCEDURE — 71046 X-RAY EXAM CHEST 2 VIEWS: CPT

## 2020-08-19 PROCEDURE — 93005 ELECTROCARDIOGRAM TRACING: CPT

## 2020-08-19 PROCEDURE — 85025 COMPLETE CBC W/AUTO DIFF WBC: CPT | Performed by: EMERGENCY MEDICINE

## 2020-08-19 PROCEDURE — 85730 THROMBOPLASTIN TIME PARTIAL: CPT | Performed by: EMERGENCY MEDICINE

## 2020-08-19 PROCEDURE — 63710000001 INSULIN ASPART PER 5 UNITS: Performed by: NURSE PRACTITIONER

## 2020-08-19 PROCEDURE — 84484 ASSAY OF TROPONIN QUANT: CPT | Performed by: EMERGENCY MEDICINE

## 2020-08-19 PROCEDURE — 87635 SARS-COV-2 COVID-19 AMP PRB: CPT | Performed by: EMERGENCY MEDICINE

## 2020-08-19 PROCEDURE — C9803 HOPD COVID-19 SPEC COLLECT: HCPCS

## 2020-08-19 PROCEDURE — 36415 COLL VENOUS BLD VENIPUNCTURE: CPT | Performed by: EMERGENCY MEDICINE

## 2020-08-19 PROCEDURE — 85610 PROTHROMBIN TIME: CPT | Performed by: EMERGENCY MEDICINE

## 2020-08-19 PROCEDURE — 93005 ELECTROCARDIOGRAM TRACING: CPT | Performed by: EMERGENCY MEDICINE

## 2020-08-19 PROCEDURE — 36415 COLL VENOUS BLD VENIPUNCTURE: CPT

## 2020-08-19 PROCEDURE — 82962 GLUCOSE BLOOD TEST: CPT

## 2020-08-19 PROCEDURE — 99285 EMERGENCY DEPT VISIT HI MDM: CPT

## 2020-08-19 PROCEDURE — 93005 ELECTROCARDIOGRAM TRACING: CPT | Performed by: NURSE PRACTITIONER

## 2020-08-19 PROCEDURE — 85379 FIBRIN DEGRADATION QUANT: CPT | Performed by: EMERGENCY MEDICINE

## 2020-08-19 PROCEDURE — 70450 CT HEAD/BRAIN W/O DYE: CPT

## 2020-08-19 PROCEDURE — 83880 ASSAY OF NATRIURETIC PEPTIDE: CPT | Performed by: EMERGENCY MEDICINE

## 2020-08-19 PROCEDURE — 96374 THER/PROPH/DIAG INJ IV PUSH: CPT

## 2020-08-19 RX ORDER — ONDANSETRON 2 MG/ML
4 INJECTION INTRAMUSCULAR; INTRAVENOUS EVERY 6 HOURS PRN
Status: DISCONTINUED | OUTPATIENT
Start: 2020-08-19 | End: 2020-08-21 | Stop reason: HOSPADM

## 2020-08-19 RX ORDER — NITROGLYCERIN 0.4 MG/1
0.4 TABLET SUBLINGUAL
Status: DISCONTINUED | OUTPATIENT
Start: 2020-08-19 | End: 2020-08-21 | Stop reason: HOSPADM

## 2020-08-19 RX ORDER — ACETAMINOPHEN 650 MG/1
650 SUPPOSITORY RECTAL EVERY 4 HOURS PRN
Status: DISCONTINUED | OUTPATIENT
Start: 2020-08-19 | End: 2020-08-21 | Stop reason: HOSPADM

## 2020-08-19 RX ORDER — SODIUM CHLORIDE 9 MG/ML
125 INJECTION, SOLUTION INTRAVENOUS CONTINUOUS
Status: DISCONTINUED | OUTPATIENT
Start: 2020-08-19 | End: 2020-08-21 | Stop reason: HOSPADM

## 2020-08-19 RX ORDER — ACETAMINOPHEN 325 MG/1
650 TABLET ORAL EVERY 4 HOURS PRN
Status: DISCONTINUED | OUTPATIENT
Start: 2020-08-19 | End: 2020-08-21 | Stop reason: HOSPADM

## 2020-08-19 RX ORDER — SODIUM CHLORIDE 0.9 % (FLUSH) 0.9 %
10 SYRINGE (ML) INJECTION AS NEEDED
Status: DISCONTINUED | OUTPATIENT
Start: 2020-08-19 | End: 2020-08-21 | Stop reason: HOSPADM

## 2020-08-19 RX ORDER — AMOXICILLIN AND CLAVULANATE POTASSIUM 875; 125 MG/1; MG/1
1 TABLET, FILM COATED ORAL EVERY 12 HOURS SCHEDULED
Status: DISCONTINUED | OUTPATIENT
Start: 2020-08-20 | End: 2020-08-21 | Stop reason: HOSPADM

## 2020-08-19 RX ORDER — AMOXICILLIN AND CLAVULANATE POTASSIUM 875; 125 MG/1; MG/1
1 TABLET, FILM COATED ORAL ONCE
Status: COMPLETED | OUTPATIENT
Start: 2020-08-19 | End: 2020-08-19

## 2020-08-19 RX ORDER — SODIUM CHLORIDE 0.9 % (FLUSH) 0.9 %
10 SYRINGE (ML) INJECTION EVERY 12 HOURS SCHEDULED
Status: DISCONTINUED | OUTPATIENT
Start: 2020-08-19 | End: 2020-08-21 | Stop reason: HOSPADM

## 2020-08-19 RX ORDER — ASPIRIN 81 MG/1
324 TABLET, CHEWABLE ORAL ONCE
Status: COMPLETED | OUTPATIENT
Start: 2020-08-19 | End: 2020-08-19

## 2020-08-19 RX ORDER — ATORVASTATIN CALCIUM 40 MG/1
40 TABLET, FILM COATED ORAL NIGHTLY
Status: DISCONTINUED | OUTPATIENT
Start: 2020-08-19 | End: 2020-08-21 | Stop reason: HOSPADM

## 2020-08-19 RX ORDER — ACETAMINOPHEN 160 MG/5ML
650 SOLUTION ORAL EVERY 4 HOURS PRN
Status: DISCONTINUED | OUTPATIENT
Start: 2020-08-19 | End: 2020-08-21 | Stop reason: HOSPADM

## 2020-08-19 RX ORDER — DEXTROSE MONOHYDRATE 25 G/50ML
25 INJECTION, SOLUTION INTRAVENOUS
Status: DISCONTINUED | OUTPATIENT
Start: 2020-08-19 | End: 2020-08-21 | Stop reason: HOSPADM

## 2020-08-19 RX ORDER — NICOTINE POLACRILEX 4 MG
15 LOZENGE BUCCAL
Status: DISCONTINUED | OUTPATIENT
Start: 2020-08-19 | End: 2020-08-21 | Stop reason: HOSPADM

## 2020-08-19 RX ORDER — LANOLIN ALCOHOL/MO/W.PET/CERES
400 CREAM (GRAM) TOPICAL 2 TIMES DAILY
Status: DISCONTINUED | OUTPATIENT
Start: 2020-08-19 | End: 2020-08-21 | Stop reason: HOSPADM

## 2020-08-19 RX ORDER — HYDRALAZINE HYDROCHLORIDE 20 MG/ML
10 INJECTION INTRAMUSCULAR; INTRAVENOUS EVERY 6 HOURS PRN
Status: DISCONTINUED | OUTPATIENT
Start: 2020-08-19 | End: 2020-08-21 | Stop reason: HOSPADM

## 2020-08-19 RX ORDER — CLOPIDOGREL BISULFATE 75 MG/1
75 TABLET ORAL DAILY
Status: DISCONTINUED | OUTPATIENT
Start: 2020-08-20 | End: 2020-08-21 | Stop reason: HOSPADM

## 2020-08-19 RX ADMIN — INSULIN ASPART 2 UNITS: 100 INJECTION, SOLUTION INTRAVENOUS; SUBCUTANEOUS at 20:39

## 2020-08-19 RX ADMIN — ATORVASTATIN CALCIUM 40 MG: 40 TABLET, FILM COATED ORAL at 20:39

## 2020-08-19 RX ADMIN — MAGNESIUM OXIDE 400 MG (241.3 MG MAGNESIUM) TABLET 400 MG: TABLET at 20:39

## 2020-08-19 RX ADMIN — SODIUM CHLORIDE 125 ML/HR: 900 INJECTION, SOLUTION INTRAVENOUS at 20:49

## 2020-08-19 RX ADMIN — HUMAN INSULIN 4 UNITS: 100 INJECTION, SOLUTION SUBCUTANEOUS at 13:11

## 2020-08-19 RX ADMIN — HYDRALAZINE HYDROCHLORIDE 10 MG: 20 INJECTION INTRAMUSCULAR; INTRAVENOUS at 23:52

## 2020-08-19 RX ADMIN — ACETAMINOPHEN 650 MG: 325 TABLET, FILM COATED ORAL at 20:49

## 2020-08-19 RX ADMIN — SODIUM CHLORIDE, PRESERVATIVE FREE 10 ML: 5 INJECTION INTRAVENOUS at 20:49

## 2020-08-19 RX ADMIN — ASPIRIN 324 MG: 81 TABLET, CHEWABLE ORAL at 12:19

## 2020-08-19 RX ADMIN — SODIUM CHLORIDE 125 ML/HR: 900 INJECTION, SOLUTION INTRAVENOUS at 12:19

## 2020-08-19 RX ADMIN — APIXABAN 5 MG: 5 TABLET, FILM COATED ORAL at 20:39

## 2020-08-19 RX ADMIN — AMOXICILLIN AND CLAVULANATE POTASSIUM 1 TABLET: 875; 125 TABLET, FILM COATED ORAL at 15:39

## 2020-08-19 NOTE — H&P
[facility] Medicine Admission      Date of Admission: 8/19/2020      Primary Care Physician: Artemio Sanchez MD      Chief Complaint: Chest pain    HPI: This is a 55-year-old  male with past medical history of CAD (CABG 9/2019), DM 2, hyperlipidemia, hypertension and sleep apnea that presented to [facility] on 8/19/2020 with complaints of chest pain below the left nipple.  Patient states he has had shortness of air since his CABG last September.  Patient also reports a headache.  COVID-19 test pending.  CT of the head showed no acute abnormalities, with partial opacification of the left ethmoid air cells.    BNP is negative.  Cardiac enzymes x2 are negative.  Sinus bradycardia noted on EKG.  Pulse rate noted to be as low as the high 40s.    Concurrent Medical History:  has a past medical history of Arthritis, Coronary artery disease, Diabetes mellitus (CMS/HCC), Hyperlipidemia, Hypertension, Retinopathy of both eyes, and Sleep apnea.    Past Surgical History:  has a past surgical history that includes Cholecystectomy; Tonsillectomy; Cardiac catheterization (N/A, 9/3/2019); and Coronary artery bypass graft (N/A, 9/25/2019).    Family History: family history includes Cancer in his father and mother; Diabetes in his father and mother; Hypertension in his father and mother.    Social History:  reports that he quit smoking about a year ago. His smoking use included cigarettes. He has a 35.00 pack-year smoking history. He has never used smokeless tobacco. He reports that he does not drink alcohol or use drugs.    Allergies:   Allergies   Allergen Reactions   • Ace Inhibitors Anaphylaxis   • Wellbutrin [Bupropion] Anaphylaxis       Medications:   Prior to Admission medications    Medication Sig Start Date End Date Taking? Authorizing Provider   apixaban (ELIQUIS) 5 MG tablet tablet Take 1 tablet by mouth Every 12 (Twelve) Hours. 7/29/20  Yes Zeferino Fernando APRN    atorvastatin (LIPITOR) 40 MG tablet Take 1 tablet by mouth Every Night. 7/29/20  Yes Zeferino Fernando APRN   clopidogrel (PLAVIX) 75 MG tablet Take 1 tablet by mouth Daily. 10/2/19  Yes Zeferino Fernando APRN   magnesium oxide (MAGOX) 400 (241.3 Mg) MG tablet tablet Take 1 tablet by mouth 2 (Two) Times a Day. 7/29/20  Yes Zeferino Fernando APRN   metFORMIN (GLUCOPHAGE) 500 MG tablet Take 2 tablets by mouth 2 (Two) Times a Day With Meals. 7/24/20  Yes Aretmio Sanchez MD   metoprolol succinate XL (Toprol XL) 100 MG 24 hr tablet Take 1 tablet by mouth Daily. 7/22/20  Yes Artemio Sanchez MD   mupirocin (Bactroban) 2 % ointment Apply  topically to the appropriate area as directed 3 (Three) Times a Day. 7/22/20  Yes Artemio Sanchez MD   nitroglycerin (NITROSTAT) 0.4 MG SL tablet Place 1 tablet under the tongue Every 5 (Five) Minutes As Needed for Chest Pain. Take no more than 3 doses in 15 minutes. 9/13/19  Yes Marcelino Goode MD   vitamin D (ERGOCALCIFEROL) 48505 units capsule capsule Take 50,000 Units by mouth 2 (Two) Times a Week. tues and fridays   Yes Hai Rodriguez MD   Lancet Devices (ONE TOUCH DELICA LANCING DEV) misc delica lancing device if approved, otherwise use alternative 1/24/17   Alessandro Gan MD   ONETOUCH DELICA LANCETS 33G misc 1 each 4 (Four) Times a Day. delica if covered, use alternative if not covered 1/24/17   Alessandro Gna MD       Review of Systems:  Review of Systems   Constitutional: Negative for activity change and fatigue.   HENT: Negative for ear pain and sore throat.    Eyes: Negative for pain and discharge.   Respiratory: Positive for shortness of breath. Negative for cough.    Cardiovascular: Positive for chest pain. Negative for palpitations.   Gastrointestinal: Negative for abdominal pain and nausea.   Endocrine: Negative for cold intolerance and heat intolerance.   Genitourinary: Negative for difficulty urinating and dysuria.   Musculoskeletal:  Negative for arthralgias and gait problem.   Skin: Negative for color change and rash.   Neurological: Negative for dizziness and weakness.   Psychiatric/Behavioral: Negative for agitation and confusion.      Otherwise complete ROS is negative except as mentioned above.    Physical Exam:   Temp:  [97 °F (36.1 °C)-97.4 °F (36.3 °C)] 97 °F (36.1 °C)  Heart Rate:  [47-70] 47  Resp:  [17-18] 18  BP: (136-170)/(70-88) 165/73  Physical Exam   Constitutional: He is oriented to person, place, and time. He appears well-developed and well-nourished.   HENT:   Head: Normocephalic and atraumatic.   Eyes: Pupils are equal, round, and reactive to light. EOM are normal.   Neck: Normal range of motion. Neck supple.   Cardiovascular: Normal rate and regular rhythm.   Pulmonary/Chest: Effort normal and breath sounds normal.   Abdominal: Soft. Bowel sounds are normal.   Musculoskeletal: Normal range of motion.   Neurological: He is alert and oriented to person, place, and time.   Skin: Skin is warm and dry.   Psychiatric: He has a normal mood and affect. His behavior is normal.       Results Reviewed:  I have personally reviewed current lab, radiology, and data and agree with results.  Lab Results (last 24 hours)     Procedure Component Value Units Date/Time    Troponin [[id number]] Collected:  08/19/20 1756    Specimen:  Blood Updated:  08/19/20 1801    COVID PRE-OP / PRE-PROCEDURE SCREENING ORDER (NO ISOLATION) - Swab, Nasopharynx [[id number]] Collected:  08/19/20 1545    Specimen:  Swab from Nasopharynx Updated:  08/19/20 1552    Narrative:       The following orders were created for panel order COVID PRE-OP / PRE-PROCEDURE SCREENING ORDER (NO ISOLATION) - Swab, Nasopharynx.  Procedure                               Abnormality         Status                     ---------                               -----------         ------                     COVID-19,  [facility] IN-HOUS...[[id number]]                      In process                    Please view results for these tests on the individual orders.    COVID-19, [facility] IN-HOUSE, NP SWAB IN TRANSPORT MEDIA 8-10 HR TAT - Swab, Nasopharynx [[id number]] Collected:  08/19/20 1545    Specimen:  Swab from Nasopharynx Updated:  08/19/20 1552    Troponin [[id number]]  (Normal) Collected:  08/19/20 1404    Specimen:  Blood Updated:  08/19/20 1439     Troponin T <0.010 ng/mL     Narrative:       Troponin T Reference Range:  <= 0.03 ng/mL-   Negative for AMI  >0.03 ng/mL-     Abnormal for myocardial necrosis.  Clinicians would have to utilize clinical acumen, EKG, Troponin and serial changes to determine if it is an Acute Myocardial Infarction or myocardial injury due to an underlying chronic condition.       Results may be falsely decreased if patient taking Biotin.      Protime-INR [[id number]]  (Normal) Collected:  08/19/20 1136    Specimen:  Blood Updated:  08/19/20 1329     Protime 14.1 Seconds      INR 1.05    Narrative:       Therapeutic range for most indications is 2.0-3.0 INR,  or 2.5-3.5 for mechanical heart valves.    D-dimer, Quantitative [[id number]]  (Normal) Collected:  08/19/20 1136    Specimen:  Blood Updated:  08/19/20 1329     D-Dimer, Quantitative <270 ng/mL (FEU)     Narrative:       Dimer values <500 ng/ml FEU are FDA approved as aid in diagnosis of deep venous thrombosis and pulmonary embolism.  This test should not be used in an exclusion strategy with pretest probability alone.    A recent guideline regarding diagnosis for pulmonary thromboembolism recommends an adjusted exclusion criterion of age x 10 ng/ml FEU for patients >50 years of age ([employer] Intern Med 2015; 163: 701-711).      aPTT [[id number]]  (Normal) Collected:  08/19/20 1136    Specimen:  Blood Updated:  08/19/20 1329     PTT 31.9 seconds     Narrative:       The recommended Heparin therapeutic range is 68-97 seconds.    [place] Draw [[id number]] Collected:  08/19/20 1136    Specimen:  Blood Updated:  08/19/20 1245     Narrative:       The following orders were created for panel order [place] Draw.  Procedure                               Abnormality         Status                     ---------                               -----------         ------                     Light Blue Top[[id number]]                                   Final result               Green Top (Gel)[[id number]]                                  Final result               Lavender Top[[id number]]                                     Final result               Gold Top - SST[[id number]]                                   Final result                 Please view results for these tests on the individual orders.    Light Blue Top [[id number]] Collected:  08/19/20 1136    Specimen:  Blood Updated:  08/19/20 1245     Extra Tube hold for add-on     Comment: Auto resulted       Green Top (Gel) [[id number]] Collected:  08/19/20 1136    Specimen:  Blood Updated:  08/19/20 1245     Extra Tube Hold for add-ons.     Comment: Auto resulted.       Lavender Top [[id number]] Collected:  08/19/20 1136    Specimen:  Blood Updated:  08/19/20 1245     Extra Tube hold for add-on     Comment: Auto resulted       Gold Top - SST [[id number]] Collected:  08/19/20 1136    Specimen:  Blood Updated:  08/19/20 1245     Extra Tube Hold for add-ons.     Comment: Auto resulted.       Comprehensive Metabolic Panel [[id number]]  (Abnormal) Collected:  08/19/20 1136    Specimen:  Blood Updated:  08/19/20 1219     Glucose 292 mg/dL      BUN 19 mg/dL      Creatinine 0.68 mg/dL      Sodium 136 mmol/L      Potassium 4.0 mmol/L      Chloride 99 mmol/L      CO2 27.0 mmol/L      Calcium 9.1 mg/dL      Total Protein 6.1 g/dL      Albumin 3.80 g/dL      ALT (SGPT) 24 U/L      AST (SGOT) 17 U/L      Alkaline Phosphatase 60 U/L      Total Bilirubin 1.1 mg/dL      eGFR Non African Amer 121 mL/min/1.73      Globulin 2.3 gm/dL      A/G Ratio 1.7 g/dL      BUN/Creatinine Ratio 27.9     Anion Gap 10.0 mmol/L      Narrative:       GFR Normal >60  Chronic Kidney Disease <60  Kidney Failure <15      Troponin [[id number]]  (Normal) Collected:  08/19/20 1136    Specimen:  Blood Updated:  08/19/20 1219     Troponin T <0.010 ng/mL     Narrative:       Troponin T Reference Range:  <= 0.03 ng/mL-   Negative for AMI  >0.03 ng/mL-     Abnormal for myocardial necrosis.  Clinicians would have to utilize clinical acumen, EKG, Troponin and serial changes to determine if it is an Acute Myocardial Infarction or myocardial injury due to an underlying chronic condition.       Results may be falsely decreased if patient taking Biotin.      BNP [[id number]]  (Normal) Collected:  08/19/20 1136    Specimen:  Blood Updated:  08/19/20 1217     proBNP 391.3 pg/mL     Narrative:       Among patients with dyspnea, NT-proBNP is highly sensitive for the detection of acute congestive heart failure. In addition NT-proBNP of <300 pg/ml effectively rules out acute congestive heart failure with 99% negative predictive value.    Results may be falsely decreased if patient taking Biotin.      CBC & Differential [[id number]] Collected:  08/19/20 1136    Specimen:  Blood Updated:  08/19/20 1156    Narrative:       The following orders were created for panel order CBC & Differential.  Procedure                               Abnormality         Status                     ---------                               -----------         ------                     CBC Auto Differential[[id number]]        Abnormal            Final result                 Please view results for these tests on the individual orders.    CBC Auto Differential [[id number]]  (Abnormal) Collected:  08/19/20 1136    Specimen:  Blood Updated:  08/19/20 1156     WBC 5.47 10*3/mm3      RBC 4.93 10*6/mm3      Hemoglobin 14.7 g/dL      Hematocrit 40.8 %      MCV 82.8 fL      MCH 29.8 pg      MCHC 36.0 g/dL      RDW 12.7 %      RDW-SD 38.3 fl      MPV 10.6 fL      Platelets 162 10*3/mm3      Neutrophil % 54.0  %      Lymphocyte % 30.3 %      Monocyte % 11.0 %      Eosinophil % 3.5 %      Basophil % 0.7 %      Immature Grans % 0.5 %      Neutrophils, Absolute 2.95 10*3/mm3      Lymphocytes, Absolute 1.66 10*3/mm3      Monocytes, Absolute 0.60 10*3/mm3      Eosinophils, Absolute 0.19 10*3/mm3      Basophils, Absolute 0.04 10*3/mm3      Immature Grans, Absolute 0.03 10*3/mm3      nRBC 0.0 /100 WBC         Imaging Results (Last 24 Hours)     Procedure Component Value Units Date/Time    CT Head Without Contrast [[id number]] Collected:  08/19/20 1323     Updated:  08/19/20 1352    Narrative:       EXAMINATION:  CT SCAN OF THE HEAD WITHOUT INTRAVENOUS CONTRAST    CLINICAL INFORMATION:  headache x 2 weeks    This exam was performed using radiation doses that are as low as  reasonably achievable (ALARA).  This exam was performed according to our departmental dose  optimization program, which includes automated exposure control,  adjustment of the mA and/or KV according to patient size and/or  use of iterative reconstruction technique.    COMPARISON: None available.    TECHNIQUE:  Axial images from skull base to vertex.        FINDINGS:      There is no evidence of intracranial hemorrhage, parenchymal  mass, midline shift, or focal mass effect.  There is no hydrocephalus or effacement of the basilar cisterns.    There is no extra-axial hemorrhage or collection identified.  There is partial opacification of the left ethmoid air cells.  The mastoid air cells and visualized paranasal sinuses appear  otherwise clear.          Impression:       No evidence of intracranial hemorrhage, mass effect or large  acute infarct.      Electronically signed by:  Cuauhtemoc Chew MD  8/19/2020 1:51 PM CDT  Workstation: [id number]    XR Chest 2 View [[id number]] Collected:  08/19/20 1159     Updated:  08/19/20 1219    Narrative:       Chest x-ray PA and lateral       CLINICAL INDICATION: Shortness of breath    COMPARISON: Chest July 25,  2020    FINDINGS: Cardiac silhouette is borderline enlarged in size.  Pulmonary vascularity is unremarkable.     Sternal sutures from prior cardiac surgery. Lungs are otherwise  clear.  No focal infiltrate or consolidation.  No pleural effusion.  No  pneumothorax.      Impression:       Borderline cardiomegaly. Midline sternal sutures from prior  cardiac surgery. Otherwise unremarkable chest without change  since prior exam.    Electronically signed by:  Jerry An MD  8/19/2020 12:18 PM CDT  Workstation: [id number]            Assessment:        Active Hospital Problems    Diagnosis POA   • Chest pain [R07.9] Yes       Plan:  1.  Chest pain, rule out ACS: Serial cardiac enzymes, EKG and continuous telemetry.  Echocardiogram and CTA of the coronary arteries pending.  Will consult cardiology with any positive findings.  2.  CAD/hypertension: Continue home aspirin, Lipitor and Plavix.  Metoprolol held secondary to bradycardia.  Hydralazine 10 mg every 6 hours PRN systolic blood pressure greater than 160.  Patient is allergic to ACE inhibitors  3.  Diabetes mellitus, type II: SSI started.  4.  Hyperlipidemia: Continue home statin.  5.  Paroxysmal atrial fibrillation: Continue home Eliquis.  6.  Bradycardia: Hold metoprolol.  7.  Sinusitis, left ethmoid: Augmentin x5 days.  Tylenol for headache.    I discussed the patient's findings and my recommendations with: Patient    The patient wishes to be a full code.      This document has been electronically signed by NELSY Barber on August 19, 2020 18:02

## 2020-08-19 NOTE — ED PROVIDER NOTES
"Subjective   56yo male pmh significant htn/hyperlipidemia/cad/paroxysmal atrial fibrillation presents ED c/o 2 week hx nontraumatic generalized headache associated with soa/lyman/fatigue.  Pt additionally c/o onset left sided chest pain this am characterized as \"heaviness\"/radiating left shoulder/neg exac or relieve factors.      History provided by:  Patient  Illness   Severity:  Moderate  Duration:  2 weeks  Chronicity:  New  Associated symptoms: chest pain, fatigue, headaches and shortness of breath    Associated symptoms: no cough        Review of Systems   Constitutional: Positive for fatigue.   Eyes: Negative for redness.   Respiratory: Positive for shortness of breath. Negative for cough.    Cardiovascular: Positive for chest pain. Negative for palpitations and leg swelling.   Genitourinary: Negative.    Musculoskeletal: Negative.    Skin: Negative.    Allergic/Immunologic: Negative for immunocompromised state.   Neurological: Positive for headaches.   All other systems reviewed and are negative.      Past Medical History:   Diagnosis Date   • Arthritis     c/o joint pain   • Coronary artery disease    • Diabetes mellitus (CMS/HCC)    • Hyperlipidemia    • Hypertension    • Retinopathy of both eyes    • Sleep apnea     not using c-pap       Allergies   Allergen Reactions   • Ace Inhibitors Anaphylaxis   • Wellbutrin [Bupropion] Anaphylaxis       Past Surgical History:   Procedure Laterality Date   • CARDIAC CATHETERIZATION N/A 9/3/2019    Procedure: Coronary angiography/PCI if indicated;  Surgeon: Denia Dwyer MD;  Location: [facility] CATH INVASIVE LOCATION;  Service: Cardiology   • CHOLECYSTECTOMY     • CORONARY ARTERY BYPASS GRAFT N/A 9/25/2019    Procedure: CORONARY ARTERY BYPASS GRAFTING, ENDOSCOPIC VEIN HARVEST       (CELL SAVER);  Surgeon: Xavier Ballard MD;  Location: [facility] OR;  Service: Cardiothoracic   • TONSILLECTOMY         Family History   Problem Relation Age of Onset   • Diabetes Mother    • " Hypertension Mother    • Cancer Mother    • Diabetes Father    • Hypertension Father    • Cancer Father        Social History     Socioeconomic History   • Marital status:      Spouse name: Not on file   • Number of children: Not on file   • Years of education: Not on file   • Highest education level: Not on file   Tobacco Use   • Smoking status: Former Smoker     Packs/day: 1.00     Years: 35.00     Pack years: 35.00     Types: Cigarettes     Last attempt to quit: 9/3/2019     Years since quittin.9   • Smokeless tobacco: Never Used   • Tobacco comment: quit after heart cath   Substance and Sexual Activity   • Alcohol use: No   • Drug use: No   • Sexual activity: Defer           Objective   Physical Exam   Constitutional: He is oriented to person, place, and time. He appears well-developed and well-nourished.   HENT:   Head: Normocephalic and atraumatic.   Right Ear: External ear normal.   Left Ear: External ear normal.   Nose: Nose normal.   Mouth/Throat: Oropharynx is clear and moist.   Eyes: Pupils are equal, round, and reactive to light.   Neck: Normal range of motion and full passive range of motion without pain. Neck supple. No JVD present. No tracheal deviation present.   Cardiovascular: Normal rate, regular rhythm, normal heart sounds and intact distal pulses. Exam reveals no gallop and no friction rub.   No murmur heard.  Pulmonary/Chest: Effort normal and breath sounds normal. No stridor. He has no wheezes. He has no rales.   Abdominal: Soft. Bowel sounds are normal. He exhibits no distension and no mass. There is no tenderness. There is no rebound and no guarding.   Musculoskeletal: He exhibits no edema.   Lymphadenopathy:     He has no cervical adenopathy.   Neurological: He is alert and oriented to person, place, and time. He has normal strength. No sensory deficit. GCS eye subscore is 4. GCS verbal subscore is 5. GCS motor subscore is 6.   Skin: Skin is warm and dry.   Nursing note and  vitals reviewed.      ECG 12 Lead    Date/Time: 8/19/2020 1:04 PM  Performed by: Rob Beebe MD  Authorized by: Rob Beebe MD   Interpreted by physician  Rhythm: sinus bradycardia  Rate: bradycardic  BPM: 50  QRS axis: right  Conduction: conduction normal  ST Segments: ST segments normal  T flattening: aVL  Other findings: PRWP  Clinical impression: abnormal ECG                 ED Course      Labs Reviewed   COMPREHENSIVE METABOLIC PANEL - Abnormal; Notable for the following components:       Result Value    Glucose 292 (*)     Creatinine 0.68 (*)     BUN/Creatinine Ratio 27.9 (*)     All other components within normal limits    Narrative:     GFR Normal >60  Chronic Kidney Disease <60  Kidney Failure <15     CBC WITH AUTO DIFFERENTIAL - Abnormal; Notable for the following components:    MCHC 36.0 (*)     All other components within normal limits   BNP (IN-HOUSE) - Normal    Narrative:     Among patients with dyspnea, NT-proBNP is highly sensitive for the detection of acute congestive heart failure. In addition NT-proBNP of <300 pg/ml effectively rules out acute congestive heart failure with 99% negative predictive value.    Results may be falsely decreased if patient taking Biotin.     TROPONIN (IN-HOUSE) - Normal    Narrative:     Troponin T Reference Range:  <= 0.03 ng/mL-   Negative for AMI  >0.03 ng/mL-     Abnormal for myocardial necrosis.  Clinicians would have to utilize clinical acumen, EKG, Troponin and serial changes to determine if it is an Acute Myocardial Infarction or myocardial injury due to an underlying chronic condition.       Results may be falsely decreased if patient taking Biotin.     PROTIME-INR - Normal    Narrative:     Therapeutic range for most indications is 2.0-3.0 INR,  or 2.5-3.5 for mechanical heart valves.   APTT - Normal    Narrative:     The recommended Heparin therapeutic range is 68-97 seconds.   TROPONIN (IN-HOUSE) - Normal    Narrative:     Troponin T Reference  Range:  <= 0.03 ng/mL-   Negative for AMI  >0.03 ng/mL-     Abnormal for myocardial necrosis.  Clinicians would have to utilize clinical acumen, EKG, Troponin and serial changes to determine if it is an Acute Myocardial Infarction or myocardial injury due to an underlying chronic condition.       Results may be falsely decreased if patient taking Biotin.     D-DIMER, QUANTITATIVE - Normal    Narrative:     Dimer values <500 ng/ml FEU are FDA approved as aid in diagnosis of deep venous thrombosis and pulmonary embolism.  This test should not be used in an exclusion strategy with pretest probability alone.    A recent guideline regarding diagnosis for pulmonary thromboembolism recommends an adjusted exclusion criterion of age x 10 ng/ml FEU for patients >50 years of age ([employer] Intern Med 2015; 163: 701-711).     RAINBOW DRAW    Narrative:     The following orders were created for panel order [facility] Draw.  Procedure                               Abnormality         Status                     ---------                               -----------         ------                     Light Blue Top[[id number]]                                   Final result               Green Top (Gel)[[id number]]                                  Final result               Lavender Top[[id number]]                                     Final result               Gold Top - SST[[id number]]                                   Final result                 Please view results for these tests on the individual orders.   TROPONIN (IN-HOUSE)   CBC AND DIFFERENTIAL    Narrative:     The following orders were created for panel order CBC & Differential.  Procedure                               Abnormality         Status                     ---------                               -----------         ------                     CBC Auto Differential[[id number]]        Abnormal            Final result                 Please view results for these tests on the  individual orders.   LIGHT BLUE TOP   GREEN TOP   LAVENDER TOP   GOLD TOP - SST     Xr Chest 2 View    Result Date: 8/19/2020  Narrative: Chest x-ray PA and lateral CLINICAL INDICATION: Shortness of breath COMPARISON: Chest July 25, 2020 FINDINGS: Cardiac silhouette is borderline enlarged in size. Pulmonary vascularity is unremarkable. Sternal sutures from prior cardiac surgery. Lungs are otherwise clear. No focal infiltrate or consolidation.  No pleural effusion.  No pneumothorax.     Impression: Borderline cardiomegaly. Midline sternal sutures from prior cardiac surgery. Otherwise unremarkable chest without change since prior exam. Electronically signed by:  Jerry An MD  8/19/2020 12:18 PM CDT Workstation: [id number]    Ct Head Without Contrast    Result Date: 8/19/2020  Narrative: EXAMINATION:  CT SCAN OF THE HEAD WITHOUT INTRAVENOUS CONTRAST CLINICAL INFORMATION:  headache x 2 weeks This exam was performed using radiation doses that are as low as reasonably achievable (ALARA). This exam was performed according to our departmental dose optimization program, which includes automated exposure control, adjustment of the mA and/or KV according to patient size and/or use of iterative reconstruction technique. COMPARISON: None available. TECHNIQUE:  Axial images from skull base to vertex.  FINDINGS: There is no evidence of intracranial hemorrhage, parenchymal mass, midline shift, or focal mass effect. There is no hydrocephalus or effacement of the basilar cisterns. There is no extra-axial hemorrhage or collection identified. There is partial opacification of the left ethmoid air cells. The mastoid air cells and visualized paranasal sinuses appear otherwise clear.       Impression: No evidence of intracranial hemorrhage, mass effect or large acute infarct. Electronically signed by:  Cuauhtemoc Chew MD  8/19/2020 1:51 PM CDT Workstation: [id number]    Ct Abdomen Pelvis With Contrast    Result Date: 7/25/2020  Narrative:  CT ABDOMEN AND PELVIS WITH CONTRAST HISTORY: Right upper quadrant pain Following the intravenous administration of contrast, axial spiral scans of the abdomen and pelvis were obtained.  Coronal and sagital reconstructions were preformed. This exam was performed according to our departmental dose-optimization program, which includes automated exposure control, adjustment of the mA and/or kV according to patient size and/or use of iterative reconstruction technique. DLP: 673.30 COMPARISON: January 13, 2018 FINDINGS: Scans of the lung bases demonstrate minimal dependent atelectasis. Coronary artery bypass. Cardiomegaly. No acute osseous abnormality. Degenerative changes thoracic and lumbar spine. Bilateral L5 spondylolysis without spondylolisthesis. The liver is unremarkable. Cholecystectomy. The spleen is unremarkable. The pancreas is unremarkable. Stable 1.6 cm left adrenal adenoma. Unremarkable right adrenal gland. No renal or ureteral calculi. No renal mass. No hydronephrosis. Unremarkable bladder. No pelvic mass. No abdominal aortic aneurysm. No adenopathy. Moderate to large amount retained feces throughout the colon. No bowel obstruction. Normal appendix. No free air. No free fluid. Unchanged 4.7 cm fat-containing midline ventral hernia just superior to the umbilicus.     Impression: CONCLUSION: Cholecystectomy. Stable 1.6 cm left adrenal adenoma. Moderate to large amount retained feces throughout the colon. Unchanged 4.7 cm fat-containing midline ventral hernia just superior to the umbilicus. Coronary artery bypass. Cardiomegaly. Bilateral L5 spondylolysis without spondylolisthesis. [id number] Electronically signed by:  Hansel Card MD  7/25/2020 4:46 PM CDT Workstation: [id number]    Xr Chest 1 View    Result Date: 7/25/2020  Narrative: PROCEDURE: XR CHEST 1 VIEW Clinical History: short of breath Indication:  Same as above. Comparison:  10/11/2019. Technique: Single portable frontal projection of the chest was done.  Findings: Note is made of median sternotomy. There are no discrete airspace infiltrates, pneumothoraces or pleural effusions. The pulmonary vascularity is normal. The cardiomediastinal contour is stable.     Impression: Impression: There is no acute pleural-parenchymal process seen in the imaged lung fields. Electronically signed by:  Javier Carvalho MD  7/25/2020 4:34 PM CDT Workstation: -[id number]                                       HEART Score (for prediction of 6-week risk of major adverse cardiac event) reviewed and/or performed as part of the patient evaluation and treatment planning process.  The result associated with this review/performance is: 5       MDM    Final diagnoses:   Chest pain, unspecified type   Ethmoid sinusitis, unspecified chronicity            Rob Beebe MD  08/19/20 [id number]

## 2020-08-19 NOTE — ED TRIAGE NOTES
Pt c/o shortness of breath, mostly while lying, headaches, and chest pain. Pt also reports constantly feeling tired.

## 2020-08-20 ENCOUNTER — APPOINTMENT (OUTPATIENT)
Dept: CARDIOLOGY | Facility: HOSPITAL | Age: 55
End: 2020-08-20

## 2020-08-20 ENCOUNTER — APPOINTMENT (OUTPATIENT)
Dept: CT IMAGING | Facility: HOSPITAL | Age: 55
End: 2020-08-20

## 2020-08-20 LAB
ANION GAP SERPL CALCULATED.3IONS-SCNC: 10 MMOL/L (ref 5–15)
BASOPHILS # BLD AUTO: 0.04 10*3/MM3 (ref 0–0.2)
BASOPHILS NFR BLD AUTO: 0.7 % (ref 0–1.5)
BH CV ECHO MEAS - ACS: 2.1 CM
BH CV ECHO MEAS - AO ISTHMUS: 3.3 CM
BH CV ECHO MEAS - AO MAX PG (FULL): 2 MMHG
BH CV ECHO MEAS - AO MAX PG: 6.6 MMHG
BH CV ECHO MEAS - AO MEAN PG (FULL): 1 MMHG
BH CV ECHO MEAS - AO MEAN PG: 3 MMHG
BH CV ECHO MEAS - AO ROOT AREA (BSA CORRECTED): 1.4
BH CV ECHO MEAS - AO ROOT AREA: 9.1 CM^2
BH CV ECHO MEAS - AO ROOT DIAM: 3.4 CM
BH CV ECHO MEAS - AO V2 MAX: 128 CM/SEC
BH CV ECHO MEAS - AO V2 MEAN: 83.9 CM/SEC
BH CV ECHO MEAS - AO V2 VTI: 31.6 CM
BH CV ECHO MEAS - ASC AORTA: 3.3 CM
BH CV ECHO MEAS - AVA(I,A): 3 CM^2
BH CV ECHO MEAS - AVA(I,D): 3 CM^2
BH CV ECHO MEAS - AVA(V,A): 3.2 CM^2
BH CV ECHO MEAS - AVA(V,D): 3.2 CM^2
BH CV ECHO MEAS - BSA(HAYCOCK): 2.5 M^2
BH CV ECHO MEAS - BSA: 2.4 M^2
BH CV ECHO MEAS - BZI_BMI: 32.2 KILOGRAMS/M^2
BH CV ECHO MEAS - BZI_METRIC_HEIGHT: 188 CM
BH CV ECHO MEAS - BZI_METRIC_WEIGHT: 113.9 KG
BH CV ECHO MEAS - EDV(CUBED): 119.1 ML
BH CV ECHO MEAS - EDV(MOD-SP2): 96.3 ML
BH CV ECHO MEAS - EDV(MOD-SP4): 119 ML
BH CV ECHO MEAS - EDV(TEICH): 113.9 ML
BH CV ECHO MEAS - EF(CUBED): 68.1 %
BH CV ECHO MEAS - EF(MOD-SP2): 59.8 %
BH CV ECHO MEAS - EF(MOD-SP4): 59.9 %
BH CV ECHO MEAS - EF(TEICH): 59.5 %
BH CV ECHO MEAS - ESV(CUBED): 37.9 ML
BH CV ECHO MEAS - ESV(MOD-SP2): 38.7 ML
BH CV ECHO MEAS - ESV(MOD-SP4): 47.7 ML
BH CV ECHO MEAS - ESV(TEICH): 46.1 ML
BH CV ECHO MEAS - FS: 31.7 %
BH CV ECHO MEAS - IVS/LVPW: 0.91
BH CV ECHO MEAS - IVSD: 1.2 CM
BH CV ECHO MEAS - LA DIMENSION: 4.6 CM
BH CV ECHO MEAS - LA/AO: 1.4
BH CV ECHO MEAS - LV DIASTOLIC VOL/BSA (35-75): 49.7 ML/M^2
BH CV ECHO MEAS - LV MASS(C)D: 246.9 GRAMS
BH CV ECHO MEAS - LV MASS(C)DI: 103.2 GRAMS/M^2
BH CV ECHO MEAS - LV MAX PG: 4.6 MMHG
BH CV ECHO MEAS - LV MEAN PG: 2 MMHG
BH CV ECHO MEAS - LV SYSTOLIC VOL/BSA (12-30): 19.9 ML/M^2
BH CV ECHO MEAS - LV V1 MAX: 107 CM/SEC
BH CV ECHO MEAS - LV V1 MEAN: 65.1 CM/SEC
BH CV ECHO MEAS - LV V1 VTI: 24.7 CM
BH CV ECHO MEAS - LVIDD: 4.9 CM
BH CV ECHO MEAS - LVIDS: 3.4 CM
BH CV ECHO MEAS - LVLD AP2: 8.5 CM
BH CV ECHO MEAS - LVLD AP4: 9.5 CM
BH CV ECHO MEAS - LVLS AP2: 7.1 CM
BH CV ECHO MEAS - LVLS AP4: 7.8 CM
BH CV ECHO MEAS - LVOT AREA (M): 3.8 CM^2
BH CV ECHO MEAS - LVOT AREA: 3.8 CM^2
BH CV ECHO MEAS - LVOT DIAM: 2.2 CM
BH CV ECHO MEAS - LVPWD: 1.3 CM
BH CV ECHO MEAS - MV A MAX VEL: 45.8 CM/SEC
BH CV ECHO MEAS - MV DEC SLOPE: 602 CM/SEC^2
BH CV ECHO MEAS - MV E MAX VEL: 84.1 CM/SEC
BH CV ECHO MEAS - MV E/A: 1.8
BH CV ECHO MEAS - MV MAX PG: 3.8 MMHG
BH CV ECHO MEAS - MV MEAN PG: 1 MMHG
BH CV ECHO MEAS - MV P1/2T MAX VEL: 97.7 CM/SEC
BH CV ECHO MEAS - MV P1/2T: 47.5 MSEC
BH CV ECHO MEAS - MV V2 MAX: 97.9 CM/SEC
BH CV ECHO MEAS - MV V2 MEAN: 51.5 CM/SEC
BH CV ECHO MEAS - MV V2 VTI: 20.3 CM
BH CV ECHO MEAS - MVA P1/2T LCG: 2.3 CM^2
BH CV ECHO MEAS - MVA(P1/2T): 4.6 CM^2
BH CV ECHO MEAS - MVA(VTI): 4.6 CM^2
BH CV ECHO MEAS - PA MAX PG: 8.9 MMHG
BH CV ECHO MEAS - PA V2 MAX: 149 CM/SEC
BH CV ECHO MEAS - RAP SYSTOLE: 5 MMHG
BH CV ECHO MEAS - RVDD: 3.7 CM
BH CV ECHO MEAS - RVSP: 32.2 MMHG
BH CV ECHO MEAS - SI(AO): 119.9 ML/M^2
BH CV ECHO MEAS - SI(CUBED): 33.9 ML/M^2
BH CV ECHO MEAS - SI(LVOT): 39.2 ML/M^2
BH CV ECHO MEAS - SI(MOD-SP2): 24.1 ML/M^2
BH CV ECHO MEAS - SI(MOD-SP4): 29.8 ML/M^2
BH CV ECHO MEAS - SI(TEICH): 28.3 ML/M^2
BH CV ECHO MEAS - SV(AO): 286.9 ML
BH CV ECHO MEAS - SV(CUBED): 81.2 ML
BH CV ECHO MEAS - SV(LVOT): 93.9 ML
BH CV ECHO MEAS - SV(MOD-SP2): 57.6 ML
BH CV ECHO MEAS - SV(MOD-SP4): 71.3 ML
BH CV ECHO MEAS - SV(TEICH): 67.8 ML
BH CV ECHO MEAS - TR MAX VEL: 261 CM/SEC
BUN SERPL-MCNC: 15 MG/DL (ref 6–20)
BUN/CREAT SERPL: 24.6 (ref 7–25)
CALCIUM SPEC-SCNC: 9 MG/DL (ref 8.6–10.5)
CHLORIDE SERPL-SCNC: 105 MMOL/L (ref 98–107)
CO2 SERPL-SCNC: 24 MMOL/L (ref 22–29)
CREAT SERPL-MCNC: 0.61 MG/DL (ref 0.76–1.27)
DEPRECATED RDW RBC AUTO: 36.6 FL (ref 37–54)
EOSINOPHIL # BLD AUTO: 0.23 10*3/MM3 (ref 0–0.4)
EOSINOPHIL NFR BLD AUTO: 3.8 % (ref 0.3–6.2)
ERYTHROCYTE [DISTWIDTH] IN BLOOD BY AUTOMATED COUNT: 12.2 % (ref 12.3–15.4)
GFR SERPL CREATININE-BSD FRML MDRD: 137 ML/MIN/1.73
GLUCOSE BLDC GLUCOMTR-MCNC: 199 MG/DL (ref 70–130)
GLUCOSE BLDC GLUCOMTR-MCNC: 232 MG/DL (ref 70–130)
GLUCOSE BLDC GLUCOMTR-MCNC: 319 MG/DL (ref 70–130)
GLUCOSE SERPL-MCNC: 225 MG/DL (ref 65–99)
HCT VFR BLD AUTO: 44.2 % (ref 37.5–51)
HGB BLD-MCNC: 15.9 G/DL (ref 13–17.7)
IMM GRANULOCYTES # BLD AUTO: 0.03 10*3/MM3 (ref 0–0.05)
IMM GRANULOCYTES NFR BLD AUTO: 0.5 % (ref 0–0.5)
LV EF 2D ECHO EST: 61 %
LYMPHOCYTES # BLD AUTO: 2.11 10*3/MM3 (ref 0.7–3.1)
LYMPHOCYTES NFR BLD AUTO: 35.2 % (ref 19.6–45.3)
MAXIMAL PREDICTED HEART RATE: 164 BPM
MCH RBC QN AUTO: 29.7 PG (ref 26.6–33)
MCHC RBC AUTO-ENTMCNC: 36 G/DL (ref 31.5–35.7)
MCV RBC AUTO: 82.5 FL (ref 79–97)
MONOCYTES # BLD AUTO: 0.46 10*3/MM3 (ref 0.1–0.9)
MONOCYTES NFR BLD AUTO: 7.7 % (ref 5–12)
NEUTROPHILS NFR BLD AUTO: 3.13 10*3/MM3 (ref 1.7–7)
NEUTROPHILS NFR BLD AUTO: 52.1 % (ref 42.7–76)
NRBC BLD AUTO-RTO: 0 /100 WBC (ref 0–0.2)
PLATELET # BLD AUTO: 175 10*3/MM3 (ref 140–450)
PMV BLD AUTO: 10.6 FL (ref 6–12)
POTASSIUM SERPL-SCNC: 3.9 MMOL/L (ref 3.5–5.2)
RBC # BLD AUTO: 5.36 10*6/MM3 (ref 4.14–5.8)
SODIUM SERPL-SCNC: 139 MMOL/L (ref 136–145)
STRESS TARGET HR: 139 BPM
TROPONIN T SERPL-MCNC: <0.01 NG/ML (ref 0–0.03)
WBC # BLD AUTO: 6 10*3/MM3 (ref 3.4–10.8)

## 2020-08-20 PROCEDURE — 82962 GLUCOSE BLOOD TEST: CPT

## 2020-08-20 PROCEDURE — 80048 BASIC METABOLIC PNL TOTAL CA: CPT | Performed by: NURSE PRACTITIONER

## 2020-08-20 PROCEDURE — G0378 HOSPITAL OBSERVATION PER HR: HCPCS

## 2020-08-20 PROCEDURE — 99214 OFFICE O/P EST MOD 30 MIN: CPT | Performed by: INTERNAL MEDICINE

## 2020-08-20 PROCEDURE — 75574 CT ANGIO HRT W/3D IMAGE: CPT

## 2020-08-20 PROCEDURE — 93306 TTE W/DOPPLER COMPLETE: CPT | Performed by: INTERNAL MEDICINE

## 2020-08-20 PROCEDURE — 93010 ELECTROCARDIOGRAM REPORT: CPT | Performed by: INTERNAL MEDICINE

## 2020-08-20 PROCEDURE — 63710000001 INSULIN ASPART PER 5 UNITS: Performed by: NURSE PRACTITIONER

## 2020-08-20 PROCEDURE — 93306 TTE W/DOPPLER COMPLETE: CPT

## 2020-08-20 PROCEDURE — 85025 COMPLETE CBC W/AUTO DIFF WBC: CPT | Performed by: NURSE PRACTITIONER

## 2020-08-20 PROCEDURE — 0 IOPAMIDOL PER 1 ML: Performed by: FAMILY MEDICINE

## 2020-08-20 PROCEDURE — 84484 ASSAY OF TROPONIN QUANT: CPT | Performed by: FAMILY MEDICINE

## 2020-08-20 PROCEDURE — 93005 ELECTROCARDIOGRAM TRACING: CPT | Performed by: NURSE PRACTITIONER

## 2020-08-20 PROCEDURE — 96361 HYDRATE IV INFUSION ADD-ON: CPT

## 2020-08-20 RX ORDER — ISOSORBIDE MONONITRATE 30 MG/1
30 TABLET, EXTENDED RELEASE ORAL
Status: DISCONTINUED | OUTPATIENT
Start: 2020-08-20 | End: 2020-08-21 | Stop reason: HOSPADM

## 2020-08-20 RX ORDER — METOPROLOL SUCCINATE 25 MG/1
25 TABLET, EXTENDED RELEASE ORAL
Status: DISCONTINUED | OUTPATIENT
Start: 2020-08-20 | End: 2020-08-21 | Stop reason: HOSPADM

## 2020-08-20 RX ADMIN — INSULIN ASPART 7 UNITS: 100 INJECTION, SOLUTION INTRAVENOUS; SUBCUTANEOUS at 18:21

## 2020-08-20 RX ADMIN — MAGNESIUM OXIDE 400 MG (241.3 MG MAGNESIUM) TABLET 400 MG: TABLET at 13:10

## 2020-08-20 RX ADMIN — METOPROLOL SUCCINATE 25 MG: 25 TABLET, FILM COATED, EXTENDED RELEASE ORAL at 18:22

## 2020-08-20 RX ADMIN — APIXABAN 5 MG: 5 TABLET, FILM COATED ORAL at 13:10

## 2020-08-20 RX ADMIN — ISOSORBIDE MONONITRATE 30 MG: 30 TABLET, EXTENDED RELEASE ORAL at 18:22

## 2020-08-20 RX ADMIN — MAGNESIUM OXIDE 400 MG (241.3 MG MAGNESIUM) TABLET 400 MG: TABLET at 20:14

## 2020-08-20 RX ADMIN — ACETAMINOPHEN 650 MG: 325 TABLET, FILM COATED ORAL at 04:10

## 2020-08-20 RX ADMIN — INSULIN ASPART 4 UNITS: 100 INJECTION, SOLUTION INTRAVENOUS; SUBCUTANEOUS at 13:11

## 2020-08-20 RX ADMIN — ATORVASTATIN CALCIUM 40 MG: 40 TABLET, FILM COATED ORAL at 20:14

## 2020-08-20 RX ADMIN — APIXABAN 5 MG: 5 TABLET, FILM COATED ORAL at 20:14

## 2020-08-20 RX ADMIN — AMOXICILLIN AND CLAVULANATE POTASSIUM 1 TABLET: 875; 125 TABLET, FILM COATED ORAL at 13:09

## 2020-08-20 RX ADMIN — AMOXICILLIN AND CLAVULANATE POTASSIUM 1 TABLET: 875; 125 TABLET, FILM COATED ORAL at 20:14

## 2020-08-20 RX ADMIN — IOPAMIDOL 90 ML: 755 INJECTION, SOLUTION INTRAVENOUS at 11:30

## 2020-08-20 RX ADMIN — SODIUM CHLORIDE 125 ML/HR: 900 INJECTION, SOLUTION INTRAVENOUS at 13:14

## 2020-08-20 RX ADMIN — CLOPIDOGREL BISULFATE 75 MG: 75 TABLET ORAL at 13:10

## 2020-08-20 RX ADMIN — ACETAMINOPHEN 650 MG: 325 TABLET, FILM COATED ORAL at 13:09

## 2020-08-20 NOTE — PLAN OF CARE
Patient having CTA today.   Problem: Patient Care Overview  Goal: Plan of Care Review  Outcome: Ongoing (interventions implemented as appropriate)

## 2020-08-20 NOTE — PROGRESS NOTES
[facility] Medicine Services  INPATIENT PROGRESS NOTE    Length of Stay: 0  Date of Admission: 8/19/2020  Primary Care Physician: Artemio Sanchez MD    Subjective   Chief Complaint: headache     HPI:  The patient denies any further CP . He continues to have a headache related to his sinusitis.      Review of Systems   Constitutional: Negative for chills and fever.   HENT: Negative for congestion, rhinorrhea and sore throat.    Eyes: Negative for discharge.   Respiratory: Negative for cough and shortness of breath.    Cardiovascular: Positive for chest pain. Negative for palpitations and leg swelling.   Gastrointestinal: Negative for abdominal pain, nausea and vomiting.   Genitourinary: Negative for dysuria, frequency and urgency.   Musculoskeletal: Negative for arthralgias and myalgias.   Skin: Negative for rash.   Neurological: Positive for headaches. Negative for dizziness, weakness and light-headedness.          Objective    Temp:  [96.4 °F (35.8 °C)-97.6 °F (36.4 °C)] 96.9 °F (36.1 °C)  Heart Rate:  [45-73] 68  Resp:  [18] 18  BP: (127-170)/(70-89) 163/89    Physical Exam   Constitutional: He is oriented to person, place, and time. He appears well-developed and well-nourished.   HENT:   Head: Normocephalic and atraumatic.   Eyes: Pupils are equal, round, and reactive to light. EOM are normal.   Neck: Neck supple. No thyromegaly present.   Cardiovascular: Normal rate and regular rhythm.   No murmur heard.  Pulmonary/Chest: Effort normal and breath sounds normal. He has no wheezes.   Abdominal: Soft. Bowel sounds are normal. There is no tenderness.   Musculoskeletal: He exhibits no edema or tenderness.   Lymphadenopathy:     He has no cervical adenopathy.   Neurological: He is alert and oriented to person, place, and time. No cranial nerve deficit or sensory deficit. He exhibits normal muscle tone.   Skin: Skin is warm and dry. No rash noted. No erythema. No pallor.    Post-sternotomy scar           Results Review:  I have reviewed the labs, radiology results, and diagnostic studies.    Laboratory Data:   Results from last 7 days   Lab Units 08/20/20  0542 08/19/20  1136   SODIUM mmol/L 139 136   POTASSIUM mmol/L 3.9 4.0   CHLORIDE mmol/L 105 99   CO2 mmol/L 24.0 27.0   BUN mg/dL 15 19   CREATININE mg/dL 0.61* 0.68*   GLUCOSE mg/dL 225* 292*   CALCIUM mg/dL 9.0 9.1   BILIRUBIN mg/dL  --  1.1   ALK PHOS U/L  --  60   ALT (SGPT) U/L  --  24   AST (SGOT) U/L  --  17   ANION GAP mmol/L 10.0 10.0     Estimated Creatinine Clearance: 181.5 mL/min (A) (by C-G formula based on SCr of 0.61 mg/dL (L)).          Results from last 7 days   Lab Units 08/20/20  0542 08/19/20  1136   WBC 10*3/mm3 6.00 5.47   HEMOGLOBIN g/dL 15.9 14.7   HEMATOCRIT % 44.2 40.8   PLATELETS 10*3/mm3 175 162     Results from last 7 days   Lab Units 08/19/20  1136   INR  1.05       Culture Data:   No results found for: BLOODCX  No results found for: URINECX  No results found for: RESPCX  No results found for: WOUNDCX  No results found for: STOOLCX  No components found for: BODYFLD    Radiology Data:   Imaging Results (Last 24 Hours)     Procedure Component Value Units Date/Time    CT Angiogram Coronary [[id number]] Collected:  08/20/20 1021     Updated:  08/20/20 1240    Addenda:         ADDENDUM   ADDENDUM #1       Addendum: On call physician for referring clinician was notified  of findings by phone at 12:35 PM on 8/20/2020.    Electronically signed by:  Jacob Garcia MD  8/20/2020 12:39 PM CDT  Workstation: [id number]    Signed:  08/20/20 1239 by Jens Garcia MD    Narrative:       PROCEDURE: CT ANGIOGRAM CORONARY WITH CONTRAST      CLINICAL HISTORY: Chest pain. History of CABG.    COMPARISON: None.    Post processing was performed by the radiologist at the [email or web address]  workstation. Serial axial CT images were obtained through the  heart at 3 mm thickness without contrast for calcium scoring. 3D  images including  vessel probing technique were also obtained.  Subsequently, following the intravenous administration of 90 ml  of Isovue-370, serial axial CT images were obtained through the  heart at 0.6 mm thickness utilizing retrospective gating. This  exam was performed according to our departmental dose  optimization program, which includes automated exposure control,  adjustment of the mA and/or KV according to patient size and/or  use of iterative reconstruction technique.Full field of view  reconstructed images were used for evaluation of the extracardiac  tissues.    CALCIUM PLAQUE BURDEN:    REGION                                         CALCIUM SCORE  ([place])  Left Main                                                      0   Right Coronary Artery                                 86   Left Anterior Descending                           459   Circumflex                                                    152    Posterior Descending Artery                       0          Your Calcium Score is 697      This places the patent in the high likelihood of at least one  significant coronary narrowing risk for coronary artery disease.    CTA OF THE CORONARY ARTERIES: There is satisfactory visualization  of the left main, LAD, diagonals, circumflex, and RCA. The  patient is right dominant.    Left Main: No calcified or soft tissue density plaque and no  stenosis.  LAD: Proximal calcified atherosclerotic plaque causing mild  stenosis of less than 50%. Mid LAD soft and calcified  atherosclerotic plaque foci causing moderate stenosis of 50-70%.  LIMA to distal LAD bypass graft which appears patent.  Circumflex: Proximal calcified atherosclerotic plaque causing  significant stenosis of 70% or greater. No other calcified or  soft tissue density plaque and/or stenosis. Patent saphenous vein  bypass graft to the obtuse marginal second branch.  RCA: No calcified or soft tissue density plaque and no stenosis.  Patent saphenous vein bypass  graft to the PDA.    The aortic valve is tricuspid. There is no myocardial bridging.  On short axis views, the myocardium is homogeneous in thickness.    EXTRACARDIAC SOFT TISSUES:  Mediastinum: On the imaging, the ascending and descending aorta  are normal in caliber. There is no mediastinal or hilar  lymphadenopathy. The pericardium is normal.  Lungs: No consolidation or focal nodules are present. There is no  pneumothorax or effusion. The pulmonary arteries are normal in  appearance.  Abdomen: No evidence of hiatal hernia. The imaged liver and  spleen are unremarkable. There is no lymphadenopathy in the upper  abdomen.  Bones: There are no lytic or blastic lesions within the osseous  structures.    CT FUNCTIONAL ANALYSIS:  Ejection Fraction     74 %  Diastolic Volume     136 ml  Systolic Volume      35 ml  Stroke Volume        101 ml  Cardiac Output       4.6 L/minute      Impression:       CONCLUSION:   1.  Patient's calcium score is 697.This places the patent in the  high likelihood of at least one significant coronary narrowing  risk for coronary artery disease.  2.  LAD: Proximal calcified atherosclerotic plaque causing mild  stenosis of less than 50%. Mid LAD soft and calcified  atherosclerotic plaque foci causing moderate stenosis of 50-70%.  LIMA to distal LAD bypass graft which appears patent.  3.  Circumflex: Proximal calcified atherosclerotic plaque causing  significant stenosis of 70% or greater. No other calcified or  soft tissue density plaque and/or stenosis. Patent saphenous vein  bypass graft to the obtuse marginal second branch.  4.  RCA: No calcified or soft tissue density plaque and no  stenosis. Patent saphenous vein bypass graft to the PDA.  5.  Remainder CT angiogram coronary exam unremarkable.    Electronically signed by:  Jacob Garcia MD  8/20/2020 12:06 PM CDT  Workstation: [id number]          I have reviewed the patient's current medications.     Assessment/Plan     [facility]  Problems    Diagnosis   • Chest pain       Plan:        1.  Chest pain - trops and EKG negative.  CTA coronaries with open vessels per cardiology, Dr. Rice.  No further intervention or testing indicated at this time. Appreciate  recommendations.  Cont statin, plavix.    2.  CAD/CABG - as above.  Statin, plavix.  Imdur added by Dr. Rice.     3.  Bradycardia - BBlocker decreased to Toprol XL 25mg daily.    4.  PAF - cont BB and eliquis.   5.  DM2 - cont current mgmt.    6.  Acute ethmoid sinusitis - Augmentin                    Discharge Planning: I expect patient to be discharged to home tomorrow.     Emeka Groves MD

## 2020-08-20 NOTE — PLAN OF CARE
Problem: Patient Care Overview  Goal: Plan of Care Review  Outcome: Ongoing (interventions implemented as appropriate)  Flowsheets (Taken 8/20/2020 1924)  Progress: no change  Plan of Care Reviewed With: patient

## 2020-08-20 NOTE — CONSULTS
Cardiology at [facility]  Cardiovascular Consultation Note      Herbert White Sr.  319/1  [id number]  1965    DATE OF ADMISSION: 8/19/2020  DATE OF CONSULTATION:  8/20/2020    Artemio Sanchez MD  Treatment Team:   Attending Provider: Emeka Groves MD  Nurse Practitioner: Bri Wayne APRN  Consulting Physician: Alice Rice MD  Admitting Provider: Emeka Groves MD    Chief Complaint   Patient presents with   • Shortness of Breath   • Chest Pain   • Headache       Chief complaint/ Reason for Consultation; chest pain      History of Present Illness  Body mass index is 32.25 kg/m². BMI is above normal parameters. Recommendations include: exercise counseling, nutrition counseling and referral to primary care.          54 years old patient presented to [facility] for evaluations of chest pain below the left nipple area with mild shortness of breath which is baseline since the CABG and complaint of headache CT scan revealed no acute pathology except sinusitis EKG performed bradycardia the patient was on Toprol- mg and had history of paroxysmal atrial fibrillation postop amiodarone discontinued he is a pain-free at the time of evaluation, cardiac enzymes are negative and CT coronary angiogram reported patent graft with native coronary artery disease and fortunately stop smoking with background history of post CABG atrial fibrillation managed with amiodarone now he is in sinus rhythm ,background history of diabetes, hypertension, history of smoking, hyperlipidemia noted multivessel coronary artery disease underwent bypass surgery by Dr. Ballard and previous  history of PVCs Complaining of weakness and fatigability.  Echo normal left and systolic function with mild mitral regurgitation and relaxation abnormality consistent with diastolic dysfunction.  .  The patient with orthopnea PND palpitation fluttering      CT coronary angiogram 8/20/2020    CT  FUNCTIONAL ANALYSIS:  Ejection Fraction     74 %  Diastolic Volume     136 ml  Systolic Volume      35 ml  Stroke Volume        101 ml  Cardiac Output       4.6 L/minute     IMPRESSION:  CONCLUSION:   1.  Patient's calcium score is 697.This places the patent in the  high likelihood of at least one significant coronary narrowing  risk for coronary artery disease.  2.  LAD: Proximal calcified atherosclerotic plaque causing mild  stenosis of less than 50%. Mid LAD soft and calcified  atherosclerotic plaque foci causing moderate stenosis of 50-70%.  LIMA to distal LAD bypass graft which appears patent.  3.  Circumflex: Proximal calcified atherosclerotic plaque causing  significant stenosis of 70% or greater. No other calcified or  soft tissue density plaque and/or stenosis. Patent saphenous vein  bypass graft to the obtuse marginal second branch.  4.  RCA: No calcified or soft tissue density plaque and no  stenosis. Patent saphenous vein bypass graft to the PDA.  5.  Remainder CT angiogram coronary exam unremarkable.    September 2019 cardiac  Cath demonstrated severe CAD multivessel with lesions LAD 80% DX 70% OM2 60% PDA 70%.    CABG 9/25/2019   DISTAL BYPASS TARGETS:    1. LIMA to LAD.    2. Greater saphenous vein to OM2    3. Greater saphenous vein to PDA   · Left ventricular wall thickness is consistent with mild concentric hypertrophy.  · Estimated EF = 61%.  · Left ventricular systolic function is normal.  · Left ventricular diastolic dysfunction (grade I) consistent with impaired relaxation.  · Mild mitral valve regurgitation is present  9/3/19  Conclusion:  1.  Severe three-vessel obstructive coronary artery disease involving mid LAD, diagonal, obtuse marginal and RPDA.  2.  Normal left-sided filling pressures.  No gradient noted across aortic valve on pullback  3.  Patent left subclavian and LIMA  Past Medical History:   Diagnosis Date   • Arthritis     c/o joint pain   • Coronary artery disease    • Diabetes  mellitus (CMS/HCC)    • Hyperlipidemia    • Hypertension    • Retinopathy of both eyes    • Sleep apnea     not using c-pap       Past Surgical History:   Procedure Laterality Date   • CARDIAC CATHETERIZATION N/A 9/3/2019    Procedure: Coronary angiography/PCI if indicated;  Surgeon: Denia Dwyer MD;  Location: [facility] CATH INVASIVE LOCATION;  Service: Cardiology   • CHOLECYSTECTOMY     • CORONARY ARTERY BYPASS GRAFT N/A 9/25/2019    Procedure: CORONARY ARTERY BYPASS GRAFTING, ENDOSCOPIC VEIN HARVEST       (CELL SAVER);  Surgeon: Xavier Ballard MD;  Location: [facility] OR;  Service: Cardiothoracic   • TONSILLECTOMY         Allergies   Allergen Reactions   • Ace Inhibitors Anaphylaxis   • Wellbutrin [Bupropion] Anaphylaxis       No current facility-administered medications on file prior to encounter.      Current Outpatient Medications on File Prior to Encounter   Medication Sig Dispense Refill   • apixaban (ELIQUIS) 5 MG tablet tablet Take 1 tablet by mouth Every 12 (Twelve) Hours. 60 tablet 3   • atorvastatin (LIPITOR) 40 MG tablet Take 1 tablet by mouth Every Night. 30 tablet 3   • clopidogrel (PLAVIX) 75 MG tablet Take 1 tablet by mouth Daily. 30 tablet 11   • magnesium oxide (MAGOX) 400 (241.3 Mg) MG tablet tablet Take 1 tablet by mouth 2 (Two) Times a Day. 60 each 3   • metFORMIN (GLUCOPHAGE) 500 MG tablet Take 2 tablets by mouth 2 (Two) Times a Day With Meals. 120 tablet 2   • metoprolol succinate XL (Toprol XL) 100 MG 24 hr tablet Take 1 tablet by mouth Daily. 30 tablet 0   • mupirocin (Bactroban) 2 % ointment Apply  topically to the appropriate area as directed 3 (Three) Times a Day. 1 each 0   • nitroglycerin (NITROSTAT) 0.4 MG SL tablet Place 1 tablet under the tongue Every 5 (Five) Minutes As Needed for Chest Pain. Take no more than 3 doses in 15 minutes. 30 tablet 1   • vitamin D (ERGOCALCIFEROL) 72908 units capsule capsule Take 50,000 Units by mouth 2 (Two) Times a Week. tues and fridays     • Lancet  Devices (ONE TOUCH DELICA LANCING DEV) misc delica lancing device if approved, otherwise use alternative 1 each 11   • ONETOUCH DELICA LANCETS 33G misc 1 each 4 (Four) Times a Day. delica if covered, use alternative if not covered 120 each 11       Social History     Socioeconomic History   • Marital status:      Spouse name: Not on file   • Number of children: Not on file   • Years of education: Not on file   • Highest education level: Not on file   Tobacco Use   • Smoking status: Former Smoker     Packs/day: 1.00     Years: 35.00     Pack years: 35.00     Types: Cigarettes     Last attempt to quit: 9/3/2019     Years since quittin.9   • Smokeless tobacco: Never Used   • Tobacco comment: quit after heart cath   Substance and Sexual Activity   • Alcohol use: No   • Drug use: No   • Sexual activity: Defer           REVIEW OF SYSTEMS:   ROS      Constitutional: Negative for activity change and fatigue.   HENT: Negative for ear pain and sore throat.    Eyes: Negative for pain and discharge.   Respiratory: Positive for shortness of breath. Negative for cough.    Cardiovascular: Positive for chest pain. Negative for palpitations.   Gastrointestinal: Negative for abdominal pain and nausea.   Endocrine: Negative for cold intolerance and heat intolerance.   Genitourinary: Negative for difficulty urinating and dysuria.   Musculoskeletal: Negative for arthralgias and gait problem.   Skin: Negative for color change and rash.   Neurological: Negative for dizziness and weakness.   Psychiatric/Behavioral: Negative for agitation and confusion.      Otherwise complete ROS is negative except as mentioned above.  Objective:     Vitals:    20 1124 20 1202 20 1500 20 1526   BP:  161/74 158/84 163/89   BP Location:  Right arm Right arm Left arm   Patient Position:  Lying Lying Lying   Pulse:  (!) 48 54 52   Resp:  18 18 18   Temp:  96.4 °F (35.8 °C) 96.9 °F (36.1 °C)    TempSrc:  Oral Oral    SpO2:   "95% 98% 97%   Weight: 114 kg (251 lb 5.2 oz)      Height: 188 cm (74.02\")        Body mass index is 32.25 kg/m².  Flowsheet Rows      First Filed Value   Admission Height  182.9 cm (72\") Documented at 08/19/2020 1128   Admission Weight  113 kg (250 lb) Documented at 08/19/2020 1128        No intake or output data in the 24 hours ending 08/20/20 1329      Physical Exam   Physical Exam  Constitutional: He is oriented to person, place, and time. He appears well-developed and well-nourished.   HENT:   Head: Normocephalic and atraumatic.   Eyes: Pupils are equal, round, and reactive to light. EOM are normal.   Neck: Normal range of motion. Neck supple.   Cardiovascular: Normal rate and regular rhythm.   Pulmonary/Chest: Effort normal and breath sounds normal.   Abdominal: Soft. Bowel sounds are normal.   Musculoskeletal: Normal range of motion.   Neurological: He is alert and oriented to person, place, and time.   Skin: Skin is warm and dry.   Psychiatric: He has a normal mood and affect. His behavior is normal.   Radiology Review    CT Angiogram Coronary   Final Result   Addendum 1 of 1    ADDENDUM    ADDENDUM #1          Addendum: On call physician for referring clinician was notified   of findings by phone at 12:35 PM on 8/20/2020.      Electronically signed by:  Jacob Garcia MD  8/20/2020 12:39 PM CDT   Workstation: [id number]         Final      CT Head Without Contrast   Final Result   No evidence of intracranial hemorrhage, mass effect or large   acute infarct.         Electronically signed by:  Cuauhtemoc Chew MD  8/19/2020 1:51 PM CDT   Workstation: [id number]      XR Chest 2 View   Final Result   Borderline cardiomegaly. Midline sternal sutures from prior   cardiac surgery. Otherwise unremarkable chest without change   since prior exam.      Electronically signed by:  Jerry An MD  8/19/2020 12:18 PM CDT   Workstation: [id number]          Lab Results:  Lab Results (last 24 hours)     Procedure Component Value " Units Date/Time    Basic Metabolic Panel [[id number]]  (Abnormal) Collected:  08/20/20 0542    Specimen:  Blood Updated:  08/20/20 0633     Glucose 225 mg/dL      BUN 15 mg/dL      Creatinine 0.61 mg/dL      Sodium 139 mmol/L      Potassium 3.9 mmol/L      Chloride 105 mmol/L      CO2 24.0 mmol/L      Calcium 9.0 mg/dL      eGFR Non African Amer 137 mL/min/1.73      BUN/Creatinine Ratio 24.6     Anion Gap 10.0 mmol/L     Narrative:       GFR Normal >60  Chronic Kidney Disease <60  Kidney Failure <15      POC Glucose Once [[id number]]  (Abnormal) Collected:  08/20/20 0616    Specimen:  Blood Updated:  08/20/20 0630     Glucose 232 mg/dL      Comment: Result Not ConfirmedOperator: [id number] KUNAL TRAVISMeter ID: [id number]       CBC & Differential [[id number]] Collected:  08/20/20 0542    Specimen:  Blood Updated:  08/20/20 0618    Narrative:       The following orders were created for panel order CBC & Differential.  Procedure                               Abnormality         Status                     ---------                               -----------         ------                     CBC Auto Differential[[id number]]        Abnormal            Final result                 Please view results for these tests on the individual orders.    CBC Auto Differential [[id number]]  (Abnormal) Collected:  08/20/20 0542    Specimen:  Blood Updated:  08/20/20 0618     WBC 6.00 10*3/mm3      RBC 5.36 10*6/mm3      Hemoglobin 15.9 g/dL      Hematocrit 44.2 %      MCV 82.5 fL      MCH 29.7 pg      MCHC 36.0 g/dL      RDW 12.2 %      RDW-SD 36.6 fl      MPV 10.6 fL      Platelets 175 10*3/mm3      Neutrophil % 52.1 %      Lymphocyte % 35.2 %      Monocyte % 7.7 %      Eosinophil % 3.8 %      Basophil % 0.7 %      Immature Grans % 0.5 %      Neutrophils, Absolute 3.13 10*3/mm3      Lymphocytes, Absolute 2.11 10*3/mm3      Monocytes, Absolute 0.46 10*3/mm3      Eosinophils, Absolute 0.23 10*3/mm3      Basophils, Absolute 0.04  10*3/mm3      Immature Grans, Absolute 0.03 10*3/mm3      nRBC 0.0 /100 WBC     Troponin [[id number]]  (Normal) Collected:  08/20/20 0012    Specimen:  Blood Updated:  08/20/20 0048     Troponin T <0.010 ng/mL     Narrative:       Troponin T Reference Range:  <= 0.03 ng/mL-   Negative for AMI  >0.03 ng/mL-     Abnormal for myocardial necrosis.  Clinicians would have to utilize clinical acumen, EKG, Troponin and serial changes to determine if it is an Acute Myocardial Infarction or myocardial injury due to an underlying chronic condition.       Results may be falsely decreased if patient taking Biotin.      POC Glucose Once [[id number]]  (Abnormal) Collected:  08/19/20 1927    Specimen:  Blood Updated:  08/19/20 2022     Glucose 199 mg/dL      Comment: RN NotifiedOperator: [id number] KUNAL STOREYFERMeter ID: [id number]       COVID PRE-OP / PRE-PROCEDURE SCREENING ORDER (NO ISOLATION) - Swab, Nasopharynx [[id number]] Collected:  08/19/20 1545    Specimen:  Swab from Nasopharynx Updated:  08/19/20 1910    Narrative:       The following orders were created for panel order COVID PRE-OP / PRE-PROCEDURE SCREENING ORDER (NO ISOLATION) - Swab, Nasopharynx.  Procedure                               Abnormality         Status                     ---------                               -----------         ------                     COVID-19, [facility] IN-[facility]S...[[id number]]  Normal              Final result                 Please view results for these tests on the individual orders.    COVID-19, [facility] IN-HOUSE, NP SWAB IN TRANSPORT MEDIA 8-10 HR TAT - Swab, Nasopharynx [[id number]]  (Normal) Collected:  08/19/20 1545    Specimen:  Swab from Nasopharynx Updated:  08/19/20 1910     COVID19 Not Detected    Narrative:       Testing performed by Real Time RT-PCR  This test has not been approved by the [facility] but is authorized under the Emergency Use Act (EUA)    Fact sheet for providers: https://www.fda.gov/media/149015/download    Fact  sheet for patients: https://www.fda.gov/media/525638/download        Troponin [[id number]]  (Normal) Collected:  08/19/20 1756    Specimen:  Blood Updated:  08/19/20 1825     Troponin T <0.010 ng/mL     Narrative:       Troponin T Reference Range:  <= 0.03 ng/mL-   Negative for AMI  >0.03 ng/mL-     Abnormal for myocardial necrosis.  Clinicians would have to utilize clinical acumen, EKG, Troponin and serial changes to determine if it is an Acute Myocardial Infarction or myocardial injury due to an underlying chronic condition.       Results may be falsely decreased if patient taking Biotin.            I personally viewed and interpreted the patient's EKG/Telemetry data       Assessment/Plan:       #1  Chest pain atypical from clinical description  #2 CAD status post CABG no further recurrence of chest pain and is a pleased with her clinical outcome  #3 hypertension blood pressure and will continue diltiazem and beta-blocker  #4 hyperlipidemia on atorvastatin  #5 diabetes patient receiving metformin under care of the family doctor  #6 postop atrial fibrillation currently in sinus rhythm    54 years old patient morbidly obese with a background history of diabetes, hypertension, hyperlipidemia, non-Q wave myocardial infarction with multivessel CAD status post CABG in 2019 and postop complicated by atrial fibrillation managed with AV ramakrishna blocking drug and amiodarone.  Amiodarone subsequently discontinued presented for evaluation chest pain atypical from clinical description under the left nipple.  Patient with normal cardiac enzyme and EKG performed sinus bradycardia was on metoprolol 100 mg discontinued.  His heart rate at the time of evaluation 70 bpm I will start the patient's Toprol-XL 25 mg and add Imdur starting a dose of 30 mg.  Recommend to continue Plavix, statin and Eliquis as the patient have history of paroxysmal atrial fibrillation currently in sinus rhythm.  Given his body habit sleep study was  recommended can be contemplated as an outpatient.  Agree with the hospitalist regarding the management of diabetes.  Significantly low carbohydrate, low-fat, DASH diet and graded exercise discussed with the patient.  We will see on PRN basis and no further risk stratification needed      Thank you for allowing me to participate in the care of Herbert White Sr.. Feel free to contact me directly with any further questions or concerns.    Alice Rice MD  08/20/20  15:49.      Part of this note may be an electronic transcription/translation of spoken language to printed text using the Dragon Dictation system.

## 2020-08-21 ENCOUNTER — READMISSION MANAGEMENT (OUTPATIENT)
Dept: CALL CENTER | Facility: HOSPITAL | Age: 55
End: 2020-08-21

## 2020-08-21 ENCOUNTER — TELEPHONE (OUTPATIENT)
Dept: CARDIOLOGY | Facility: CLINIC | Age: 55
End: 2020-08-21

## 2020-08-21 VITALS
OXYGEN SATURATION: 98 % | HEART RATE: 71 BPM | WEIGHT: 255.8 LBS | TEMPERATURE: 96.4 F | SYSTOLIC BLOOD PRESSURE: 146 MMHG | RESPIRATION RATE: 18 BRPM | DIASTOLIC BLOOD PRESSURE: 74 MMHG | HEIGHT: 74 IN | BODY MASS INDEX: 32.83 KG/M2

## 2020-08-21 LAB
ANION GAP SERPL CALCULATED.3IONS-SCNC: 9 MMOL/L (ref 5–15)
BASOPHILS # BLD AUTO: 0.04 10*3/MM3 (ref 0–0.2)
BASOPHILS NFR BLD AUTO: 0.6 % (ref 0–1.5)
BUN SERPL-MCNC: 16 MG/DL (ref 6–20)
BUN/CREAT SERPL: 28.1 (ref 7–25)
CALCIUM SPEC-SCNC: 8.5 MG/DL (ref 8.6–10.5)
CHLORIDE SERPL-SCNC: 104 MMOL/L (ref 98–107)
CO2 SERPL-SCNC: 24 MMOL/L (ref 22–29)
CREAT SERPL-MCNC: 0.57 MG/DL (ref 0.76–1.27)
DEPRECATED RDW RBC AUTO: 37.4 FL (ref 37–54)
EOSINOPHIL # BLD AUTO: 0.26 10*3/MM3 (ref 0–0.4)
EOSINOPHIL NFR BLD AUTO: 4.1 % (ref 0.3–6.2)
ERYTHROCYTE [DISTWIDTH] IN BLOOD BY AUTOMATED COUNT: 12.5 % (ref 12.3–15.4)
GFR SERPL CREATININE-BSD FRML MDRD: 148 ML/MIN/1.73
GLUCOSE BLDC GLUCOMTR-MCNC: 232 MG/DL (ref 70–130)
GLUCOSE BLDC GLUCOMTR-MCNC: 248 MG/DL (ref 70–130)
GLUCOSE BLDC GLUCOMTR-MCNC: 356 MG/DL (ref 70–130)
GLUCOSE SERPL-MCNC: 292 MG/DL (ref 65–99)
HCT VFR BLD AUTO: 38.9 % (ref 37.5–51)
HGB BLD-MCNC: 14.3 G/DL (ref 13–17.7)
IMM GRANULOCYTES # BLD AUTO: 0.05 10*3/MM3 (ref 0–0.05)
IMM GRANULOCYTES NFR BLD AUTO: 0.8 % (ref 0–0.5)
LYMPHOCYTES # BLD AUTO: 2 10*3/MM3 (ref 0.7–3.1)
LYMPHOCYTES NFR BLD AUTO: 31.2 % (ref 19.6–45.3)
MCH RBC QN AUTO: 30.4 PG (ref 26.6–33)
MCHC RBC AUTO-ENTMCNC: 36.8 G/DL (ref 31.5–35.7)
MCV RBC AUTO: 82.6 FL (ref 79–97)
MONOCYTES # BLD AUTO: 0.42 10*3/MM3 (ref 0.1–0.9)
MONOCYTES NFR BLD AUTO: 6.6 % (ref 5–12)
NEUTROPHILS NFR BLD AUTO: 3.64 10*3/MM3 (ref 1.7–7)
NEUTROPHILS NFR BLD AUTO: 56.7 % (ref 42.7–76)
NRBC BLD AUTO-RTO: 0 /100 WBC (ref 0–0.2)
PLATELET # BLD AUTO: 169 10*3/MM3 (ref 140–450)
PMV BLD AUTO: 10.5 FL (ref 6–12)
POTASSIUM SERPL-SCNC: 3.9 MMOL/L (ref 3.5–5.2)
RBC # BLD AUTO: 4.71 10*6/MM3 (ref 4.14–5.8)
SODIUM SERPL-SCNC: 137 MMOL/L (ref 136–145)
WBC # BLD AUTO: 6.41 10*3/MM3 (ref 3.4–10.8)

## 2020-08-21 PROCEDURE — G0378 HOSPITAL OBSERVATION PER HR: HCPCS

## 2020-08-21 PROCEDURE — 85025 COMPLETE CBC W/AUTO DIFF WBC: CPT | Performed by: NURSE PRACTITIONER

## 2020-08-21 PROCEDURE — 82962 GLUCOSE BLOOD TEST: CPT

## 2020-08-21 PROCEDURE — 80048 BASIC METABOLIC PNL TOTAL CA: CPT | Performed by: NURSE PRACTITIONER

## 2020-08-21 PROCEDURE — 63710000001 INSULIN ASPART PER 5 UNITS: Performed by: NURSE PRACTITIONER

## 2020-08-21 PROCEDURE — 96361 HYDRATE IV INFUSION ADD-ON: CPT

## 2020-08-21 RX ORDER — FLUTICASONE PROPIONATE 50 MCG
2 SPRAY, SUSPENSION (ML) NASAL 2 TIMES DAILY
Qty: 16 G | Refills: 0 | Status: SHIPPED | OUTPATIENT
Start: 2020-08-21 | End: 2021-01-14

## 2020-08-21 RX ORDER — METOPROLOL SUCCINATE 25 MG/1
25 TABLET, EXTENDED RELEASE ORAL
Qty: 30 TABLET | Refills: 1 | Status: SHIPPED | OUTPATIENT
Start: 2020-08-22 | End: 2021-05-03 | Stop reason: SDUPTHER

## 2020-08-21 RX ORDER — AMOXICILLIN AND CLAVULANATE POTASSIUM 875; 125 MG/1; MG/1
1 TABLET, FILM COATED ORAL EVERY 12 HOURS SCHEDULED
Qty: 12 TABLET | Refills: 0 | Status: SHIPPED | OUTPATIENT
Start: 2020-08-21 | End: 2020-08-27

## 2020-08-21 RX ORDER — ISOSORBIDE MONONITRATE 30 MG/1
30 TABLET, EXTENDED RELEASE ORAL
Qty: 30 TABLET | Refills: 1 | Status: SHIPPED | OUTPATIENT
Start: 2020-08-22 | End: 2020-08-27 | Stop reason: SDUPTHER

## 2020-08-21 RX ADMIN — SODIUM CHLORIDE 125 ML/HR: 900 INJECTION, SOLUTION INTRAVENOUS at 01:31

## 2020-08-21 RX ADMIN — INSULIN ASPART 4 UNITS: 100 INJECTION, SOLUTION INTRAVENOUS; SUBCUTANEOUS at 12:36

## 2020-08-21 RX ADMIN — AMOXICILLIN AND CLAVULANATE POTASSIUM 1 TABLET: 875; 125 TABLET, FILM COATED ORAL at 08:24

## 2020-08-21 RX ADMIN — SODIUM CHLORIDE 125 ML/HR: 900 INJECTION, SOLUTION INTRAVENOUS at 11:04

## 2020-08-21 RX ADMIN — METOPROLOL SUCCINATE 25 MG: 25 TABLET, FILM COATED, EXTENDED RELEASE ORAL at 08:24

## 2020-08-21 RX ADMIN — ISOSORBIDE MONONITRATE 30 MG: 30 TABLET, EXTENDED RELEASE ORAL at 08:24

## 2020-08-21 RX ADMIN — INSULIN ASPART 4 UNITS: 100 INJECTION, SOLUTION INTRAVENOUS; SUBCUTANEOUS at 08:27

## 2020-08-21 RX ADMIN — SODIUM CHLORIDE, PRESERVATIVE FREE 10 ML: 5 INJECTION INTRAVENOUS at 08:28

## 2020-08-21 RX ADMIN — CLOPIDOGREL BISULFATE 75 MG: 75 TABLET ORAL at 08:24

## 2020-08-21 RX ADMIN — APIXABAN 5 MG: 5 TABLET, FILM COATED ORAL at 08:24

## 2020-08-21 RX ADMIN — ACETAMINOPHEN 650 MG: 325 TABLET, FILM COATED ORAL at 06:15

## 2020-08-21 RX ADMIN — MAGNESIUM OXIDE 400 MG (241.3 MG MAGNESIUM) TABLET 400 MG: TABLET at 08:24

## 2020-08-21 NOTE — PLAN OF CARE
Problem: Patient Care Overview  Goal: Plan of Care Review  Outcome: Ongoing (interventions implemented as appropriate)  Flowsheets (Taken 8/21/2020 0553)  Progress: no change  Plan of Care Reviewed With: patient

## 2020-08-21 NOTE — TELEPHONE ENCOUNTER
Called patient to let him know that Dr. Rice will see him Sept 25 at 8:15, left voicemail and will send appointment reminder in the mail     ----- Message from Michael Melendez sent at 8/21/2020 12:39 PM CDT -----  Regarding: hospital f/u  Contact: [phone]  [facility] [birth year] [facility] called said he needs a 2 week follow up with Dr Rice I could not find anything.     Thank you gh

## 2020-08-21 NOTE — DISCHARGE SUMMARY
[facility]  DISCHARGE SUMMARY       Date of Admission: 8/19/2020  Date of Discharge:  8/21/2020  Primary Care Physician: Artemio Sanchez MD    Presenting Problem/History of Present Illness:  Ethmoid sinusitis, unspecified chronicity [J32.2]  Chest pain, unspecified type [R07.9]       Final Discharge Diagnoses:  Active Hospital Problems    Diagnosis   • Chest pain   Acute ethmoid sinusitis    Consults:   Consults     Date and Time Order Name Status Description    8/20/2020 1529 Inpatient Cardiology Consult Completed           Procedures Performed:                 Pertinent Test Results:   Lab Results (most recent)     Procedure Component Value Units Date/Time    POC Glucose Once [[id number]]  (Abnormal) Collected:  08/21/20 1014    Specimen:  Blood Updated:  08/21/20 1045     Glucose 248 mg/dL      Comment: RN NotifiedOperator: [id number] COUTRNEY REICHYOBANYDavidalyssa ID: [id number]       POC Glucose Once [[id number]]  (Abnormal) Collected:  08/20/20 1929    Specimen:  Blood Updated:  08/21/20 0749     Glucose 356 mg/dL      Comment: RN NotifiedOperator: [id number] ROSA HAYWOODMetrahul ID: [id number]       Basic Metabolic Panel [[id number]]  (Abnormal) Collected:  08/21/20 0600    Specimen:  Blood Updated:  08/21/20 0635     Glucose 292 mg/dL      BUN 16 mg/dL      Creatinine 0.57 mg/dL      Sodium 137 mmol/L      Potassium 3.9 mmol/L      Chloride 104 mmol/L      CO2 24.0 mmol/L      Calcium 8.5 mg/dL      eGFR Non African Amer 148 mL/min/1.73      BUN/Creatinine Ratio 28.1     Anion Gap 9.0 mmol/L     Narrative:       GFR Normal >60  Chronic Kidney Disease <60  Kidney Failure <15      CBC & Differential [[id number]] Collected:  08/21/20 0600    Specimen:  Blood Updated:  08/21/20 0616    Narrative:       The following orders were created for panel order CBC & Differential.  Procedure                               Abnormality         Status                      ---------                               -----------         ------                     CBC Auto Differential[[id number]]        Abnormal            Final result                 Please view results for these tests on the individual orders.    CBC Auto Differential [[id number]]  (Abnormal) Collected:  08/21/20 0600    Specimen:  Blood Updated:  08/21/20 0616     WBC 6.41 10*3/mm3      RBC 4.71 10*6/mm3      Hemoglobin 14.3 g/dL      Hematocrit 38.9 %      MCV 82.6 fL      MCH 30.4 pg      MCHC 36.8 g/dL      RDW 12.5 %      RDW-SD 37.4 fl      MPV 10.5 fL      Platelets 169 10*3/mm3      Neutrophil % 56.7 %      Lymphocyte % 31.2 %      Monocyte % 6.6 %      Eosinophil % 4.1 %      Basophil % 0.6 %      Immature Grans % 0.8 %      Neutrophils, Absolute 3.64 10*3/mm3      Lymphocytes, Absolute 2.00 10*3/mm3      Monocytes, Absolute 0.42 10*3/mm3      Eosinophils, Absolute 0.26 10*3/mm3      Basophils, Absolute 0.04 10*3/mm3      Immature Grans, Absolute 0.05 10*3/mm3      nRBC 0.0 /100 WBC     Basic Metabolic Panel [[id number]]  (Abnormal) Collected:  08/20/20 0542    Specimen:  Blood Updated:  08/20/20 0633     Glucose 225 mg/dL      BUN 15 mg/dL      Creatinine 0.61 mg/dL      Sodium 139 mmol/L      Potassium 3.9 mmol/L      Chloride 105 mmol/L      CO2 24.0 mmol/L      Calcium 9.0 mg/dL      eGFR Non African Amer 137 mL/min/1.73      BUN/Creatinine Ratio 24.6     Anion Gap 10.0 mmol/L     Narrative:       GFR Normal >60  Chronic Kidney Disease <60  Kidney Failure <15      CBC & Differential [[id number]] Collected:  08/20/20 0542    Specimen:  Blood Updated:  08/20/20 0618    Narrative:       The following orders were created for panel order CBC & Differential.  Procedure                               Abnormality         Status                     ---------                               -----------         ------                     CBC Auto Differential[[id number]]        Abnormal            Final result                  Please view results for these tests on the individual orders.    CBC Auto Differential [[id number]]  (Abnormal) Collected:  08/20/20 0542    Specimen:  Blood Updated:  08/20/20 0618     WBC 6.00 10*3/mm3      RBC 5.36 10*6/mm3      Hemoglobin 15.9 g/dL      Hematocrit 44.2 %      MCV 82.5 fL      MCH 29.7 pg      MCHC 36.0 g/dL      RDW 12.2 %      RDW-SD 36.6 fl      MPV 10.6 fL      Platelets 175 10*3/mm3      Neutrophil % 52.1 %      Lymphocyte % 35.2 %      Monocyte % 7.7 %      Eosinophil % 3.8 %      Basophil % 0.7 %      Immature Grans % 0.5 %      Neutrophils, Absolute 3.13 10*3/mm3      Lymphocytes, Absolute 2.11 10*3/mm3      Monocytes, Absolute 0.46 10*3/mm3      Eosinophils, Absolute 0.23 10*3/mm3      Basophils, Absolute 0.04 10*3/mm3      Immature Grans, Absolute 0.03 10*3/mm3      nRBC 0.0 /100 WBC     Troponin [[id number]]  (Normal) Collected:  08/20/20 0012    Specimen:  Blood Updated:  08/20/20 0048     Troponin T <0.010 ng/mL     Narrative:       Troponin T Reference Range:  <= 0.03 ng/mL-   Negative for AMI  >0.03 ng/mL-     Abnormal for myocardial necrosis.  Clinicians would have to utilize clinical acumen, EKG, Troponin and serial changes to determine if it is an Acute Myocardial Infarction or myocardial injury due to an underlying chronic condition.       Results may be falsely decreased if patient taking Biotin.      COVID PRE-OP / PRE-PROCEDURE SCREENING ORDER (NO ISOLATION) - Swab, Nasopharynx [[id number]] Collected:  08/19/20 1545    Specimen:  Swab from Nasopharynx Updated:  08/19/20 1910    Narrative:       The following orders were created for panel order COVID PRE-OP / PRE-PROCEDURE SCREENING ORDER (NO ISOLATION) - Swab, Nasopharynx.  Procedure                               Abnormality         Status                     ---------                               -----------         ------                     COVID-19, [facility] IN-HOUS...[[id number]]  Normal              Final result                  Please view results for these tests on the individual orders.    COVID-19, [facility] IN-HOUSE, NP SWAB IN TRANSPORT MEDIA 8-10 HR TAT - Swab, Nasopharynx [[id number]]  (Normal) Collected:  08/19/20 1545    Specimen:  Swab from Nasopharynx Updated:  08/19/20 1910     COVID19 Not Detected    Narrative:       Testing performed by Real Time RT-PCR  This test has not been approved by the [facility] but is authorized under the Emergency Use Act (EUA)    Fact sheet for providers: https://www.fda.gov/media/493933/download    Fact sheet for patients: https://www.fda.gov/media/110749/download        Troponin [[id number]]  (Normal) Collected:  08/19/20 1756    Specimen:  Blood Updated:  08/19/20 1825     Troponin T <0.010 ng/mL     Narrative:       Troponin T Reference Range:  <= 0.03 ng/mL-   Negative for AMI  >0.03 ng/mL-     Abnormal for myocardial necrosis.  Clinicians would have to utilize clinical acumen, EKG, Troponin and serial changes to determine if it is an Acute Myocardial Infarction or myocardial injury due to an underlying chronic condition.       Results may be falsely decreased if patient taking Biotin.      Protime-INR [[id number]]  (Normal) Collected:  08/19/20 1136    Specimen:  Blood Updated:  08/19/20 1329     Protime 14.1 Seconds      INR 1.05    Narrative:       Therapeutic range for most indications is 2.0-3.0 INR,  or 2.5-3.5 for mechanical heart valves.    D-dimer, Quantitative [[id number]]  (Normal) Collected:  08/19/20 1136    Specimen:  Blood Updated:  08/19/20 1329     D-Dimer, Quantitative <270 ng/mL (FEU)     Narrative:       Dimer values <500 ng/ml FEU are FDA approved as aid in diagnosis of deep venous thrombosis and pulmonary embolism.  This test should not be used in an exclusion strategy with pretest probability alone.    A recent guideline regarding diagnosis for pulmonary thromboembolism recommends an adjusted exclusion criterion of age x 10 ng/ml FEU for patients >50 years of age ([employer] Intern  Med 2015; 163: 701-711).      aPTT [[id number]]  (Normal) Collected:  08/19/20 1136    Specimen:  Blood Updated:  08/19/20 1329     PTT 31.9 seconds     Narrative:       The recommended Heparin therapeutic range is 68-97 seconds.    [place] Draw [[id number]] Collected:  08/19/20 1136    Specimen:  Blood Updated:  08/19/20 1245    Narrative:       The following orders were created for panel order [place] Draw.  Procedure                               Abnormality         Status                     ---------                               -----------         ------                     Light Blue Top[[id number]]                                   Final result               Green Top (Gel)[[id number]]                                  Final result               Lavender Top[[id number]]                                     Final result               Gold Top - SST[[id number]]                                   Final result                 Please view results for these tests on the individual orders.    Light Blue Top [[id number]] Collected:  08/19/20 1136    Specimen:  Blood Updated:  08/19/20 1245     Extra Tube hold for add-on     Comment: Auto resulted       Green Top (Gel) [[id number]] Collected:  08/19/20 1136    Specimen:  Blood Updated:  08/19/20 1245     Extra Tube Hold for add-ons.     Comment: Auto resulted.       Lavender Top [[id number]] Collected:  08/19/20 1136    Specimen:  Blood Updated:  08/19/20 1245     Extra Tube hold for add-on     Comment: Auto resulted       Gold Top - SST [[id number]] Collected:  08/19/20 1136    Specimen:  Blood Updated:  08/19/20 1245     Extra Tube Hold for add-ons.     Comment: Auto resulted.       Comprehensive Metabolic Panel [[id number]]  (Abnormal) Collected:  08/19/20 1136    Specimen:  Blood Updated:  08/19/20 1219     Glucose 292 mg/dL      BUN 19 mg/dL      Creatinine 0.68 mg/dL      Sodium 136 mmol/L      Potassium 4.0 mmol/L      Chloride 99 mmol/L      CO2 27.0 mmol/L      Calcium  9.1 mg/dL      Total Protein 6.1 g/dL      Albumin 3.80 g/dL      ALT (SGPT) 24 U/L      AST (SGOT) 17 U/L      Alkaline Phosphatase 60 U/L      Total Bilirubin 1.1 mg/dL      eGFR Non African Amer 121 mL/min/1.73      Globulin 2.3 gm/dL      A/G Ratio 1.7 g/dL      BUN/Creatinine Ratio 27.9     Anion Gap 10.0 mmol/L     Narrative:       GFR Normal >60  Chronic Kidney Disease <60  Kidney Failure <15      BNP [[id number]]  (Normal) Collected:  08/19/20 1136    Specimen:  Blood Updated:  08/19/20 1217     proBNP 391.3 pg/mL     Narrative:       Among patients with dyspnea, NT-proBNP is highly sensitive for the detection of acute congestive heart failure. In addition NT-proBNP of <300 pg/ml effectively rules out acute congestive heart failure with 99% negative predictive value.    Results may be falsely decreased if patient taking Biotin.          Imaging Results (Most Recent)     Procedure Component Value Units Date/Time    CT Angiogram Coronary [[id number]] Collected:  08/20/20 1021     Updated:  08/20/20 1240    Addenda:         ADDENDUM   ADDENDUM #1       Addendum: On call physician for referring clinician was notified  of findings by phone at 12:35 PM on 8/20/2020.    Electronically signed by:  Jacob Garcia MD  8/20/2020 12:39 PM CDT  Workstation: [id number]    Signed:  08/20/20 1239 by Jens Garcia MD    Narrative:       PROCEDURE: CT ANGIOGRAM CORONARY WITH CONTRAST      CLINICAL HISTORY: Chest pain. History of CABG.    COMPARISON: None.    Post processing was performed by the radiologist at the [facility]  workstation. Serial axial CT images were obtained through the  heart at 3 mm thickness without contrast for calcium scoring. 3D  images including vessel probing technique were also obtained.  Subsequently, following the intravenous administration of 90 ml  of Isovue-370, serial axial CT images were obtained through the  heart at 0.6 mm thickness utilizing retrospective gating. This  exam was performed  according to our departmental dose  optimization program, which includes automated exposure control,  adjustment of the mA and/or KV according to patient size and/or  use of iterative reconstruction technique.Full field of view  reconstructed images were used for evaluation of the extracardiac  tissues.    CALCIUM PLAQUE BURDEN:    REGION                                         CALCIUM SCORE  ([place])  Left Main                                                      0   Right Coronary Artery                                 86   Left Anterior Descending                           459   Circumflex                                                    152    Posterior Descending Artery                       0          Your Calcium Score is 697      This places the patent in the high likelihood of at least one  significant coronary narrowing risk for coronary artery disease.    CTA OF THE CORONARY ARTERIES: There is satisfactory visualization  of the left main, LAD, diagonals, circumflex, and RCA. The  patient is right dominant.    Left Main: No calcified or soft tissue density plaque and no  stenosis.  LAD: Proximal calcified atherosclerotic plaque causing mild  stenosis of less than 50%. Mid LAD soft and calcified  atherosclerotic plaque foci causing moderate stenosis of 50-70%.  LIMA to distal LAD bypass graft which appears patent.  Circumflex: Proximal calcified atherosclerotic plaque causing  significant stenosis of 70% or greater. No other calcified or  soft tissue density plaque and/or stenosis. Patent saphenous vein  bypass graft to the obtuse marginal second branch.  RCA: No calcified or soft tissue density plaque and no stenosis.  Patent saphenous vein bypass graft to the PDA.    The aortic valve is tricuspid. There is no myocardial bridging.  On short axis views, the myocardium is homogeneous in thickness.    EXTRACARDIAC SOFT TISSUES:  Mediastinum: On the imaging, the ascending and descending aorta  are normal  in caliber. There is no mediastinal or hilar  lymphadenopathy. The pericardium is normal.  Lungs: No consolidation or focal nodules are present. There is no  pneumothorax or effusion. The pulmonary arteries are normal in  appearance.  Abdomen: No evidence of hiatal hernia. The imaged liver and  spleen are unremarkable. There is no lymphadenopathy in the upper  abdomen.  Bones: There are no lytic or blastic lesions within the osseous  structures.    CT FUNCTIONAL ANALYSIS:  Ejection Fraction     74 %  Diastolic Volume     136 ml  Systolic Volume      35 ml  Stroke Volume        101 ml  Cardiac Output       4.6 L/minute      Impression:       CONCLUSION:   1.  Patient's calcium score is 697.This places the patent in the  high likelihood of at least one significant coronary narrowing  risk for coronary artery disease.  2.  LAD: Proximal calcified atherosclerotic plaque causing mild  stenosis of less than 50%. Mid LAD soft and calcified  atherosclerotic plaque foci causing moderate stenosis of 50-70%.  LIMA to distal LAD bypass graft which appears patent.  3.  Circumflex: Proximal calcified atherosclerotic plaque causing  significant stenosis of 70% or greater. No other calcified or  soft tissue density plaque and/or stenosis. Patent saphenous vein  bypass graft to the obtuse marginal second branch.  4.  RCA: No calcified or soft tissue density plaque and no  stenosis. Patent saphenous vein bypass graft to the PDA.  5.  Remainder CT angiogram coronary exam unremarkable.    Electronically signed by:  Jacob Garcia MD  8/20/2020 12:06 PM CDT  Workstation: [id number]    CT Head Without Contrast [[id number]] Collected:  08/19/20 1323     Updated:  08/19/20 1352    Narrative:       EXAMINATION:  CT SCAN OF THE HEAD WITHOUT INTRAVENOUS CONTRAST    CLINICAL INFORMATION:  headache x 2 weeks    This exam was performed using radiation doses that are as low as  reasonably achievable (ALARA).  This exam was performed according to  our departmental dose  optimization program, which includes automated exposure control,  adjustment of the mA and/or KV according to patient size and/or  use of iterative reconstruction technique.    COMPARISON: None available.    TECHNIQUE:  Axial images from skull base to vertex.        FINDINGS:      There is no evidence of intracranial hemorrhage, parenchymal  mass, midline shift, or focal mass effect.  There is no hydrocephalus or effacement of the basilar cisterns.    There is no extra-axial hemorrhage or collection identified.  There is partial opacification of the left ethmoid air cells.  The mastoid air cells and visualized paranasal sinuses appear  otherwise clear.          Impression:       No evidence of intracranial hemorrhage, mass effect or large  acute infarct.      Electronically signed by:  Cuauhtemoc Chew MD  8/19/2020 1:51 PM CDT  Workstation: [id number]    XR Chest 2 View [[id number]] Collected:  08/19/20 1159     Updated:  08/19/20 1219    Narrative:       Chest x-ray PA and lateral       CLINICAL INDICATION: Shortness of breath    COMPARISON: Chest July 25, 2020    FINDINGS: Cardiac silhouette is borderline enlarged in size.  Pulmonary vascularity is unremarkable.     Sternal sutures from prior cardiac surgery. Lungs are otherwise  clear.  No focal infiltrate or consolidation.  No pleural effusion.  No  pneumothorax.      Impression:       Borderline cardiomegaly. Midline sternal sutures from prior  cardiac surgery. Otherwise unremarkable chest without change  since prior exam.    Electronically signed by:  Jerry An MD  8/19/2020 12:18 PM CDT  Workstation: [id number]          Chief Complaint on Day of Discharge: Headache    Hospital Course:  The patient is a 55 y.o. male who presented to [facility] with chest pain and headache.  The patient had a known history of coronary artery disease and history of CABG.  He was admitted for evaluation.  His troponins were trended.  CTA of  "the coronaries demonstrated patent vessels per his cardiologist, Dr. Benz.  No further invasive cardiac testing was indicated per Dr. Benz's recommendations.  His blood pressure medications were changed due to bradycardia.  His Toprol was decreased and Imdur was added for ongoing pain.  CT of the head revealed acute ethmoid sinusitis for which she was treated with Augmentin.  He was educated to avoid decongestants given his cardiac condition.  He was instructed in addition to the antibiotic to take a daily antihistamine daily, such as Zyrtec or Allegra, and an intranasal steroid spray, such as Flonase, twice daily for 1 week.  He was also encouraged to use a [place] pot for nasal irrigation.  He was educated in the overuse of Afrin nasal spray and its rebound effect.  He was educated to use the Afrin nasal spray for no more than 3 days in a row.    Condition on Discharge: Stable  Physical Exam on Discharge:  /74 (BP Location: Right arm, Patient Position: Lying)   Pulse 71   Temp 96.4 °F (35.8 °C) (Oral)   Resp 18   Ht 188 cm (74.02\")   Wt 116 kg (255 lb 12.8 oz)   SpO2 98%   BMI 32.83 kg/m²   Physical Exam   Constitutional: He is oriented to person, place, and time. He appears well-developed and well-nourished.   HENT:   Head: Normocephalic and atraumatic.   Eyes: Pupils are equal, round, and reactive to light. EOM are normal.   Neck: Neck supple. No thyromegaly present.   Cardiovascular: Normal rate and regular rhythm.   No murmur heard.  Pulmonary/Chest: Effort normal and breath sounds normal. He has no wheezes.   Abdominal: Soft. Bowel sounds are normal. There is no tenderness.   Musculoskeletal: Normal range of motion. He exhibits no edema or tenderness.   Lymphadenopathy:     He has no cervical adenopathy.   Neurological: He is alert and oriented to person, place, and time. No cranial nerve deficit or sensory deficit. He exhibits normal muscle tone.   Skin: Skin is warm and dry. Capillary refill " takes less than 2 seconds. No rash noted. No erythema. No pallor.   Psychiatric: He has a normal mood and affect.         Discharge Disposition:  Home or Self Care    Discharge Medications:     Discharge Medications      New Medications      Instructions Start Date   amoxicillin-clavulanate 875-125 MG per tablet  Commonly known as:  AUGMENTIN   1 tablet, Oral, Every 12 Hours Scheduled      fluticasone 50 MCG/ACT nasal spray  Commonly known as:  FLONASE   2 sprays, Nasal, 2 times daily      isosorbide mononitrate 30 MG 24 hr tablet  Commonly known as:  IMDUR   30 mg, Oral, Every 24 Hours Scheduled   Start Date:  August 22, 2020        Changes to Medications      Instructions Start Date   metoprolol succinate XL 25 MG 24 hr tablet  Commonly known as:  TOPROL-XL  What changed:    · medication strength  · how much to take  · when to take this   25 mg, Oral, Every 24 Hours Scheduled   Start Date:  August 22, 2020        Continue These Medications      Instructions Start Date   atorvastatin 40 MG tablet  Commonly known as:  LIPITOR   40 mg, Oral, Nightly      clopidogrel 75 MG tablet  Commonly known as:  PLAVIX   75 mg, Oral, Daily      Eliquis 5 MG tablet tablet  Generic drug:  apixaban   5 mg, Oral, Every 12 Hours Scheduled      magnesium oxide 400 (241.3 Mg) MG tablet tablet  Commonly known as:  MAGOX   400 mg, Oral, 2 Times Daily      metFORMIN 500 MG tablet  Commonly known as:  GLUCOPHAGE   1,000 mg, Oral, 2 Times Daily With Meals      mupirocin 2 % ointment  Commonly known as:  Bactroban   Topical, 3 Times Daily      nitroglycerin 0.4 MG SL tablet  Commonly known as:  NITROSTAT   0.4 mg, Sublingual, Every 5 Minutes PRN, Take no more than 3 doses in 15 minutes.      ONE TOUCH DELICA LANCING DEV misc   delica lancing device if approved, otherwise use alternative      OneTouch Delica Lancets 33G misc   1 each, Does not apply, 4 Times Daily, delica if covered, use alternative if not covered      vitamin D 1.25 MG (04401  UT) capsule capsule  Commonly known as:  ERGOCALCIFEROL   50,000 Units, Oral, 2 Times Weekly, tues and fridays             Discharge Diet:   Diet Instructions     Diet: Cardiac      Discharge Diet:  Cardiac          Activity at Discharge:   Activity Instructions     Activity as Tolerated            Discharge Care Plan/Instructions:   Follow-up Appointments:   Future Appointments   Date Time Provider Department Center   8/27/2020  9:00 AM Artemio Sanchez MD [facility] None   9/25/2020  8:15 AM Alice Rice MD [facility] None   10/29/2020  9:30 AM Artemio Sanchez MD [facility] None       Test Results Pending at Discharge:     Emeka Groves MD    Time: 37 min

## 2020-08-21 NOTE — OUTREACH NOTE
Prep Survey      Responses   [facility] facility patient discharged from?  [facility]   Is LACE score < 7 ?  No   Eligibility  [facility]   Date of Admission  08/19/20   Date of Discharge  08/21/20   Discharge Disposition  Home or Self Care   Discharge diagnosis  chest pain   COVID-19 Test Status  Negative   Does the patient have one of the following disease processes/diagnoses(primary or secondary)?  Other   Does the patient have Home health ordered?  No   Is there a DME ordered?  No   Prep survey completed?  Yes          Radha Stovall RN

## 2020-08-24 ENCOUNTER — TRANSITIONAL CARE MANAGEMENT TELEPHONE ENCOUNTER (OUTPATIENT)
Dept: CALL CENTER | Facility: HOSPITAL | Age: 55
End: 2020-08-24

## 2020-08-24 NOTE — OUTREACH NOTE
Call Center TCM Note      Responses   [facility] patient discharged from?  [place]   COVID-19 Test Status  Negative   Does the patient have one of the following disease processes/diagnoses(primary or secondary)?  Other   TCM attempt successful?  No   Unsuccessful attempts  Attempt 2          Radha James RN    8/24/2020, 13:37

## 2020-08-24 NOTE — OUTREACH NOTE
Call Center TCM Note      Responses   [facility] patient discharged from?  [place]   COVID-19 Test Status  Negative   Does the patient have one of the following disease processes/diagnoses(primary or secondary)?  Other   TCM attempt successful?  No   Unsuccessful attempts  Attempt 1          Radha James RN    8/24/2020, 09:33

## 2020-08-25 ENCOUNTER — TRANSITIONAL CARE MANAGEMENT TELEPHONE ENCOUNTER (OUTPATIENT)
Dept: CALL CENTER | Facility: HOSPITAL | Age: 55
End: 2020-08-25

## 2020-08-25 NOTE — OUTREACH NOTE
Call Center TCM Note      Responses   [facility] patient discharged from?  [place]   COVID-19 Test Status  Negative   Does the patient have one of the following disease processes/diagnoses(primary or secondary)?  Other   TCM attempt successful?  No   Unsuccessful attempts  Attempt 3          Alexis Lr RN    8/25/2020, 12:09

## 2020-08-27 ENCOUNTER — OFFICE VISIT (OUTPATIENT)
Dept: FAMILY MEDICINE CLINIC | Facility: CLINIC | Age: 55
End: 2020-08-27

## 2020-08-27 VITALS
HEIGHT: 74 IN | TEMPERATURE: 97.7 F | BODY MASS INDEX: 32.7 KG/M2 | OXYGEN SATURATION: 97 % | HEART RATE: 74 BPM | WEIGHT: 254.8 LBS | SYSTOLIC BLOOD PRESSURE: 160 MMHG | DIASTOLIC BLOOD PRESSURE: 90 MMHG

## 2020-08-27 DIAGNOSIS — R06.02 SOB (SHORTNESS OF BREATH): ICD-10-CM

## 2020-08-27 DIAGNOSIS — I10 ESSENTIAL HYPERTENSION: Primary | ICD-10-CM

## 2020-08-27 PROCEDURE — 99214 OFFICE O/P EST MOD 30 MIN: CPT | Performed by: FAMILY MEDICINE

## 2020-08-27 RX ORDER — ALBUTEROL SULFATE 90 UG/1
2 AEROSOL, METERED RESPIRATORY (INHALATION) EVERY 4 HOURS PRN
Qty: 18 G | Refills: 11 | Status: SHIPPED | OUTPATIENT
Start: 2020-08-27 | End: 2022-08-06

## 2020-08-27 RX ORDER — AMLODIPINE BESYLATE 5 MG/1
5 TABLET ORAL DAILY
Qty: 30 TABLET | Refills: 0 | Status: SHIPPED | OUTPATIENT
Start: 2020-08-27 | End: 2020-11-13 | Stop reason: SDUPTHER

## 2020-08-27 RX ORDER — ISOSORBIDE MONONITRATE 30 MG/1
30 TABLET, EXTENDED RELEASE ORAL
Qty: 30 TABLET | Refills: 11 | Status: SHIPPED | OUTPATIENT
Start: 2020-08-27 | End: 2022-09-13 | Stop reason: SDUPTHER

## 2020-08-27 NOTE — PATIENT INSTRUCTIONS

## 2020-08-27 NOTE — PROGRESS NOTES
" Subjective   Herbetr White . is a 55 y.o. male.     Chief Complaint   Patient presents with   • Follow-up     HOSPITAL             History of Present Illness     SOB AND CP.  Some troponin changes.  Bradycardia so toprol xl decreased and started on imdur 30mg qd.  bp elevated today.  Former smoker.   Needs fmla filled out.     Review of Systems   Constitutional: Negative for chills, fatigue and fever.   HENT: Negative for congestion, ear discharge, ear pain, facial swelling, hearing loss, postnasal drip, rhinorrhea, sinus pressure, sore throat, trouble swallowing and voice change.    Eyes: Negative for discharge, redness and visual disturbance.   Respiratory: Negative for cough, chest tightness, shortness of breath and wheezing.    Cardiovascular: Negative for chest pain and palpitations.   Gastrointestinal: Negative for abdominal pain, blood in stool, constipation, diarrhea, nausea and vomiting.   Endocrine: Negative for polydipsia and polyuria.   Genitourinary: Negative for dysuria, flank pain, hematuria and urgency.   Musculoskeletal: Negative for arthralgias, back pain, joint swelling and myalgias.   Skin: Negative for rash.   Neurological: Negative for dizziness, weakness, numbness and headaches.   Hematological: Negative for adenopathy.   Psychiatric/Behavioral: Negative for confusion and sleep disturbance. The patient is not nervous/anxious.            /90 (BP Location: Left arm, Patient Position: Sitting, Cuff Size: Adult)   Pulse 74   Temp 97.7 °F (36.5 °C) (Temporal)   Ht 188 cm (74.02\")   Wt 116 kg (254 lb 12.8 oz)   SpO2 97%   BMI 32.70 kg/m²       Objective     Physical Exam   Constitutional: He is oriented to person, place, and time. He appears well-developed and well-nourished.   HENT:   Head: Normocephalic and atraumatic.   Right Ear: External ear normal.   Left Ear: External ear normal.   Nose: Nose normal.   Eyes: Pupils are equal, round, and reactive to light. Conjunctivae " and EOM are normal.   Neck: Normal range of motion. Neck supple.   Cardiovascular: Normal rate and regular rhythm. Exam reveals no gallop and no friction rub.   No murmur heard.  Pulmonary/Chest: Effort normal.   Diminished some   Abdominal: Soft. Bowel sounds are normal. He exhibits no distension. There is no tenderness. There is no rebound and no guarding.   Musculoskeletal: Normal range of motion. He exhibits no edema or deformity.   Neurological: He is alert and oriented to person, place, and time. No cranial nerve deficit.   Skin: Skin is warm and dry. No rash noted. No erythema.   Psychiatric: He has a normal mood and affect. His behavior is normal. Judgment and thought content normal.   Nursing note and vitals reviewed.          PAST MEDICAL HISTORY     Past Medical History:   Diagnosis Date   • Arthritis     c/o joint pain   • Coronary artery disease    • Diabetes mellitus (CMS/HCC)    • Hyperlipidemia    • Hypertension    • Retinopathy of both eyes    • Sleep apnea     not using c-pap      PAST SURGICAL HISTORY     Past Surgical History:   Procedure Laterality Date   • CARDIAC CATHETERIZATION N/A 9/3/2019    Procedure: Coronary angiography/PCI if indicated;  Surgeon: Denia Dwyer MD;  Location: [facility] CATH INVASIVE LOCATION;  Service: Cardiology   • CHOLECYSTECTOMY     • CORONARY ARTERY BYPASS GRAFT N/A 9/25/2019    Procedure: CORONARY ARTERY BYPASS GRAFTING, ENDOSCOPIC VEIN HARVEST       (CELL SAVER);  Surgeon: Xavier Ballard MD;  Location: [facility] OR;  Service: Cardiothoracic   • TONSILLECTOMY        SOCIAL HISTORY     Social History     Socioeconomic History   • Marital status:      Spouse name: Not on file   • Number of children: Not on file   • Years of education: Not on file   • Highest education level: Not on file   Tobacco Use   • Smoking status: Former Smoker     Packs/day: 1.00     Years: 35.00     Pack years: 35.00     Types: Cigarettes     Start date: 8/27/1980     Last attempt to  quit: 9/3/2019     Years since quittin.9   • Smokeless tobacco: Never Used   • Tobacco comment: quit after heart cath   Substance and Sexual Activity   • Alcohol use: No   • Drug use: No   • Sexual activity: Defer      ALLERGIES   Ace inhibitors and Wellbutrin [bupropion]   MEDICATIONS     Current Outpatient Medications   Medication Sig Dispense Refill   • amoxicillin-clavulanate (AUGMENTIN) 875-125 MG per tablet Take 1 tablet by mouth Every 12 (Twelve) Hours for 6 days. 12 tablet 0   • apixaban (ELIQUIS) 5 MG tablet tablet Take 1 tablet by mouth Every 12 (Twelve) Hours. 60 tablet 3   • atorvastatin (LIPITOR) 40 MG tablet Take 1 tablet by mouth Every Night. 30 tablet 3   • clopidogrel (PLAVIX) 75 MG tablet Take 1 tablet by mouth Daily. 30 tablet 11   • fluticasone (Flonase) 50 MCG/ACT nasal spray Instill 2 sprays into the nostril(s) as directed by provider 2 (two) times a day for 7 days. 16 g 0   • isosorbide mononitrate (IMDUR) 30 MG 24 hr tablet Take 1 tablet by mouth Daily. 30 tablet 11   • Lancet Devices (ONE TOUCH DELICA LANCING DEV) misc delica lancing device if approved, otherwise use alternative 1 each 11   • magnesium oxide (MAGOX) 400 (241.3 Mg) MG tablet tablet Take 1 tablet by mouth 2 (Two) Times a Day. 60 each 3   • metFORMIN (GLUCOPHAGE) 500 MG tablet Take 2 tablets by mouth 2 (Two) Times a Day With Meals. 120 tablet 2   • metoprolol succinate XL (TOPROL-XL) 25 MG 24 hr tablet Take 1 tablet by mouth Daily. 30 tablet 1   • mupirocin (Bactroban) 2 % ointment Apply  topically to the appropriate area as directed 3 (Three) Times a Day. 1 each 0   • nitroglycerin (NITROSTAT) 0.4 MG SL tablet Place 1 tablet under the tongue Every 5 (Five) Minutes As Needed for Chest Pain. Take no more than 3 doses in 15 minutes. 30 tablet 1   • ONETOUCH DELICA LANCETS 33G misc 1 each 4 (Four) Times a Day. delica if covered, use alternative if not covered 120 each 11   • vitamin D (ERGOCALCIFEROL) 22315 units capsule  capsule Take 50,000 Units by mouth 2 (Two) Times a Week. tues and fridays     • amLODIPine (Norvasc) 5 MG tablet Take 1 tablet by mouth Daily. 30 tablet 0     No current facility-administered medications for this visit.         The following portions of the patient's history were reviewed and updated as appropriate: allergies, current medications, past family history, past medical history, past social history, past surgical history and problem list.        Assessment/Plan   Herbert was seen today for follow-up.    Diagnoses and all orders for this visit:    Essential hypertension    SOB (shortness of breath)    Other orders  -     amLODIPine (Norvasc) 5 MG tablet; Take 1 tablet by mouth Daily.  -     isosorbide mononitrate (IMDUR) 30 MG 24 hr tablet; Take 1 tablet by mouth Daily.  -     albuterol sulfate  (90 Base) MCG/ACT inhaler; Inhale 2 puffs Every 4 (Four) Hours As Needed for Wheezing.    despite history of leg edema, will try norvasc.  Has problems with ed anyway, this would be best choice of my choices.  Throat closes up with ace so cant use ace or arb.   Return 1 week.  If leg edema, may have to add diuretic or switch to diuretic.     Try inhaler for sob    Stay on imdur, some headaches he says    fmla filled out.                    No follow-ups on file.                  This document has been electronically signed by Artemio Sanchez MD on August 27, 2020 09:37

## 2020-08-31 ENCOUNTER — READMISSION MANAGEMENT (OUTPATIENT)
Dept: CALL CENTER | Facility: HOSPITAL | Age: 55
End: 2020-08-31

## 2020-08-31 NOTE — OUTREACH NOTE
Medical Week 2 Survey      Responses   [facility] patient discharged from?  [place]   COVID-19 Test Status  Negative   Does the patient have one of the following disease processes/diagnoses(primary or secondary)?  Other   Week 2 attempt successful?  No   Unsuccessful attempts  Attempt 1          Lauren Perez RN

## 2020-09-02 ENCOUNTER — READMISSION MANAGEMENT (OUTPATIENT)
Dept: CALL CENTER | Facility: HOSPITAL | Age: 55
End: 2020-09-02

## 2020-09-02 NOTE — OUTREACH NOTE
Medical Week 2 Survey      Responses   [facility] patient discharged from?  [place]   COVID-19 Test Status  Negative   Does the patient have one of the following disease processes/diagnoses(primary or secondary)?  Other   Week 2 attempt successful?  Yes   Call start time  0851   Discharge diagnosis  chest pain   Call end time  0856   Is patient permission given to speak with other caregiver?  No   Meds reviewed with patient/caregiver?  Yes   Is the patient having any side effects they believe may be caused by any medication additions or changes?  No   Does the patient have all medications ordered at discharge?  Yes   Is the patient taking all medications as directed (includes completed medication regime)?  Yes   Does the patient have a primary care provider?   Yes   Does the patient have an appointment with their PCP within 7 days of discharge?  Yes   Has the patient kept scheduled appointments due by today?  Yes   Has home health visited the patient within 72 hours of discharge?  N/A   Pulse Ox monitoring  Intermittent   Pulse Ox device source  Patient   O2 Sat comments  97%   Psychosocial issues?  No   Did the patient receive a copy of their discharge instructions?  Yes   Nursing interventions  Reviewed instructions with patient, Educated on MyChart   What is the patient's perception of their health status since discharge?  Improving   Is the patient/caregiver able to teach back signs and symptoms related to disease process for when to call PCP?  Yes   Is the patient/caregiver able to teach back signs and symptoms related to disease process for when to call 911?  Yes   Is the patient/caregiver able to teach back the hierarchy of who to call/visit for symptoms/problems? PCP, Specialist, Home health nurse, Urgent Care, ED, 911  Yes   Additional teach back comments  Reports no Cp.    Week 2 Call Completed?  Yes          Sweetie Davis RN

## 2020-09-03 ENCOUNTER — OFFICE VISIT (OUTPATIENT)
Dept: FAMILY MEDICINE CLINIC | Facility: CLINIC | Age: 55
End: 2020-09-03

## 2020-09-03 VITALS
DIASTOLIC BLOOD PRESSURE: 94 MMHG | WEIGHT: 252.2 LBS | TEMPERATURE: 97.3 F | HEART RATE: 52 BPM | BODY MASS INDEX: 32.37 KG/M2 | HEIGHT: 74 IN | SYSTOLIC BLOOD PRESSURE: 150 MMHG | OXYGEN SATURATION: 98 %

## 2020-09-03 DIAGNOSIS — I10 ESSENTIAL HYPERTENSION: Primary | ICD-10-CM

## 2020-09-03 PROCEDURE — 99214 OFFICE O/P EST MOD 30 MIN: CPT | Performed by: FAMILY MEDICINE

## 2020-09-03 RX ORDER — ASPIRIN 81 MG/1
81 TABLET, CHEWABLE ORAL DAILY
COMMUNITY
End: 2022-02-24 | Stop reason: HOSPADM

## 2020-09-03 RX ORDER — SPIRONOLACTONE 25 MG/1
25 TABLET ORAL DAILY
Qty: 30 TABLET | Refills: 11 | Status: SHIPPED | OUTPATIENT
Start: 2020-09-03 | End: 2021-11-09

## 2020-09-03 NOTE — PATIENT INSTRUCTIONS

## 2020-09-03 NOTE — PROGRESS NOTES
" Subjective   Herbert White . is a 55 y.o. male.     No chief complaint on file.            History of Present Illness     Cc blood pressure  During last hospitalization, bradycardia and toprol xl dose decreased from 100mg qd to 25mg qd.  Heart rate today is 52.  He feels fine.  I started on amlodipine again for his bp but only 5mg.  At 10mg qd in past he had a lot of leg edema.  He had angioedema form ace inhibitor.  His renal function looks ok.     Review of Systems   Constitutional: Negative for chills, fatigue and fever.   HENT: Negative for congestion, ear discharge, ear pain, facial swelling, hearing loss, postnasal drip, rhinorrhea, sinus pressure, sore throat, trouble swallowing and voice change.    Eyes: Negative for discharge, redness and visual disturbance.   Respiratory: Negative for cough, chest tightness, shortness of breath and wheezing.    Cardiovascular: Negative for chest pain and palpitations.   Gastrointestinal: Negative for abdominal pain, blood in stool, constipation, diarrhea, nausea and vomiting.   Endocrine: Negative for polydipsia and polyuria.   Genitourinary: Negative for dysuria, flank pain, hematuria and urgency.   Musculoskeletal: Negative for arthralgias, back pain, joint swelling and myalgias.   Skin: Negative for rash.   Neurological: Negative for dizziness, weakness, numbness and headaches.   Hematological: Negative for adenopathy.   Psychiatric/Behavioral: Negative for confusion and sleep disturbance. The patient is not nervous/anxious.            /94 (BP Location: Left arm, Patient Position: Sitting, Cuff Size: Adult)   Pulse 52   Temp 97.3 °F (36.3 °C) (Temporal)   Ht 188 cm (74.02\")   Wt 114 kg (252 lb 3.2 oz)   SpO2 98%   BMI 32.37 kg/m²       Objective     Physical Exam   Constitutional: He is oriented to person, place, and time. He appears well-developed and well-nourished.   HENT:   Head: Normocephalic and atraumatic.   Right Ear: External ear normal. "   Left Ear: External ear normal.   Nose: Nose normal.   Eyes: Pupils are equal, round, and reactive to light. Conjunctivae and EOM are normal.   Neck: Normal range of motion.   Pulmonary/Chest: Effort normal.   Musculoskeletal: Normal range of motion.   Neurological: He is alert and oriented to person, place, and time.   Skin:   Trace edema lower legs.    Psychiatric: He has a normal mood and affect. His behavior is normal. Judgment and thought content normal.   Nursing note and vitals reviewed.          PAST MEDICAL HISTORY     Past Medical History:   Diagnosis Date   • Arthritis     c/o joint pain   • Coronary artery disease    • Diabetes mellitus (CMS/HCC)    • Hyperlipidemia    • Hypertension    • Retinopathy of both eyes    • Sleep apnea     not using c-pap      PAST SURGICAL HISTORY     Past Surgical History:   Procedure Laterality Date   • CARDIAC CATHETERIZATION N/A 9/3/2019    Procedure: Coronary angiography/PCI if indicated;  Surgeon: Denia Dwyer MD;  Location: [facility] INVASIVE LOCATION;  Service: Cardiology   • CHOLECYSTECTOMY     • CORONARY ARTERY BYPASS GRAFT N/A 2019    Procedure: CORONARY ARTERY BYPASS GRAFTING, ENDOSCOPIC VEIN HARVEST       (CELL SAVER);  Surgeon: Xavier Ballard MD;  Location: [facility] OR;  Service: Cardiothoracic   • TONSILLECTOMY        SOCIAL HISTORY     Social History     Socioeconomic History   • Marital status:      Spouse name: Not on file   • Number of children: Not on file   • Years of education: Not on file   • Highest education level: Not on file   Tobacco Use   • Smoking status: Former Smoker     Packs/day: 1.00     Years: 35.00     Pack years: 35.00     Types: Cigarettes     Start date: 1980     Last attempt to quit: 9/3/2019     Years since quittin.0   • Smokeless tobacco: Never Used   • Tobacco comment: quit after heart cath   Substance and Sexual Activity   • Alcohol use: No   • Drug use: No   • Sexual activity: Defer      ALLERGIES   Ace  inhibitors and Wellbutrin [bupropion]   MEDICATIONS     Current Outpatient Medications   Medication Sig Dispense Refill   • albuterol sulfate  (90 Base) MCG/ACT inhaler Inhale 2 puffs Every 4 (Four) Hours As Needed for Wheezing. 18 g 11   • amLODIPine (Norvasc) 5 MG tablet Take 1 tablet by mouth Daily. 30 tablet 0   • apixaban (ELIQUIS) 5 MG tablet tablet Take 1 tablet by mouth Every 12 (Twelve) Hours. 60 tablet 3   • aspirin 81 MG chewable tablet Chew 81 mg Daily.     • atorvastatin (LIPITOR) 40 MG tablet Take 1 tablet by mouth Every Night. 30 tablet 3   • clopidogrel (PLAVIX) 75 MG tablet Take 1 tablet by mouth Daily. 30 tablet 11   • fluticasone (Flonase) 50 MCG/ACT nasal spray Instill 2 sprays into the nostril(s) as directed by provider 2 (two) times a day for 7 days. 16 g 0   • isosorbide mononitrate (IMDUR) 30 MG 24 hr tablet Take 1 tablet by mouth Daily. 30 tablet 11   • [employer] (ONE TOUCH DELICA LANCING DEV) misc delica lancing device if approved, otherwise use alternative 1 each 11   • magnesium oxide (MAGOX) 400 (241.3 Mg) MG tablet tablet Take 1 tablet by mouth 2 (Two) Times a Day. 60 each 3   • metFORMIN (GLUCOPHAGE) 500 MG tablet Take 2 tablets by mouth 2 (Two) Times a Day With Meals. 120 tablet 2   • metoprolol succinate XL (TOPROL-XL) 25 MG 24 hr tablet Take 1 tablet by mouth Daily. 30 tablet 1   • mupirocin (Bactroban) 2 % ointment Apply  topically to the appropriate area as directed 3 (Three) Times a Day. 1 each 0   • nitroglycerin (NITROSTAT) 0.4 MG SL tablet Place 1 tablet under the tongue Every 5 (Five) Minutes As Needed for Chest Pain. Take no more than 3 doses in 15 minutes. 30 tablet 1   • ONETOUCH DELICA LANCETS 33G misc 1 each 4 (Four) Times a Day. delica if covered, use alternative if not covered 120 each 11   • vitamin D (ERGOCALCIFEROL) 68925 units capsule capsule Take 50,000 Units by mouth 2 (Two) Times a Week. tues and fridays     • spironolactone (Aldactone) 25 MG tablet  Take 1 tablet by mouth Daily. 30 tablet 11     No current facility-administered medications for this visit.         The following portions of the patient's history were reviewed and updated as appropriate: allergies, current medications, past family history, past medical history, past social history, past surgical history and problem list.        Assessment/Plan   Diagnoses and all orders for this visit:    Essential hypertension  -     Basic Metabolic Panel; Future    Other orders  -     spironolactone (Aldactone) 25 MG tablet; Take 1 tablet by mouth Daily.    will try adding spironolactone for blood pressure control.      labwork 1 week and bp check     im and trying to use meds that wont cause ed problems but running out of options. Spironolactone may be helpful to his heart.                    No follow-ups on file.                  This document has been electronically signed by Artemio Sanchez MD on September 3, 2020 11:34

## 2020-09-25 ENCOUNTER — OFFICE VISIT (OUTPATIENT)
Dept: CARDIOLOGY | Facility: CLINIC | Age: 55
End: 2020-09-25

## 2020-09-25 VITALS
HEART RATE: 79 BPM | WEIGHT: 252 LBS | SYSTOLIC BLOOD PRESSURE: 152 MMHG | OXYGEN SATURATION: 98 % | HEIGHT: 74 IN | DIASTOLIC BLOOD PRESSURE: 88 MMHG | BODY MASS INDEX: 32.34 KG/M2

## 2020-09-25 DIAGNOSIS — I25.118 CORONARY ARTERY DISEASE OF NATIVE ARTERY OF NATIVE HEART WITH STABLE ANGINA PECTORIS (HCC): ICD-10-CM

## 2020-09-25 DIAGNOSIS — I48.0 PAROXYSMAL ATRIAL FIBRILLATION (HCC): Primary | ICD-10-CM

## 2020-09-25 DIAGNOSIS — E10.69 MIXED DIABETIC HYPERLIPIDEMIA ASSOCIATED WITH TYPE 1 DIABETES MELLITUS (HCC): ICD-10-CM

## 2020-09-25 DIAGNOSIS — E78.2 MIXED DIABETIC HYPERLIPIDEMIA ASSOCIATED WITH TYPE 1 DIABETES MELLITUS (HCC): ICD-10-CM

## 2020-09-25 DIAGNOSIS — E78.00 PURE HYPERCHOLESTEROLEMIA: ICD-10-CM

## 2020-09-25 DIAGNOSIS — I10 ESSENTIAL HYPERTENSION: ICD-10-CM

## 2020-09-25 PROCEDURE — 93000 ELECTROCARDIOGRAM COMPLETE: CPT | Performed by: INTERNAL MEDICINE

## 2020-09-25 PROCEDURE — 99213 OFFICE O/P EST LOW 20 MIN: CPT | Performed by: INTERNAL MEDICINE

## 2020-09-25 NOTE — PROGRESS NOTES
Herbert White .  55 y.o. male    09/25/2020  1. Paroxysmal atrial fibrillation (CMS/[facility])    2. Coronary artery disease of native artery of native heart with stable angina pectoris (CMS/[facility])    3. Essential hypertension    4. Pure hypercholesterolemia    5. Mixed diabetic hyperlipidemia associated with type 1 diabetes mellitus (CMS/[facility])        History of Present Illness:    Body mass index is 32.34 kg/m². BMI is above normal parameters. Recommendations include: exercise counseling, nutrition counseling and referral to primary care.          54 years old patient presented post hospital follow-up when admitted for evaluations of chest pain below the left nipple area with mild shortness of breath which is baseline since the CABG and complaint of headache CT scan revealed no acute pathology except sinusitis EKG performed bradycardia the patient was on Toprol- mg and had history of paroxysmal atrial fibrillation postop amiodarone discontinued he is a pain-free at the time of evaluation, cardiac enzymes are negative and CT coronary angiogram reported patent graft with native coronary artery disease and fortunately stop smoking with background history of post CABG atrial fibrillation managed with amiodarone now he is in sinus rhythm ,background history of diabetes, hypertension, history of smoking, hyperlipidemia noted multivessel coronary artery disease underwent bypass surgery by Dr. Ballard and previous  history of PVCs Complaining of weakness and fatigability.  Echo normal left systolic function with mild mitral regurgitation and relaxation abnormality consistent with diastolic dysfunction.  .  The patient with orthopnea PND palpitation fluttering        CT coronary angiogram 8/20/2020     CT FUNCTIONAL ANALYSIS:  Ejection Fraction     74 %  Diastolic Volume     136 ml  Systolic Volume      35 ml  Stroke Volume        101 ml  Cardiac Output       4.6 L/minute     IMPRESSION:  CONCLUSION:   1.  Patient's  calcium score is 697.This places the patent in the  high likelihood of at least one significant coronary narrowing  risk for coronary artery disease.  2.  LAD: Proximal calcified atherosclerotic plaque causing mild  stenosis of less than 50%. Mid LAD soft and calcified  atherosclerotic plaque foci causing moderate stenosis of 50-70%.  LIMA to distal LAD bypass graft which appears patent.  3.  Circumflex: Proximal calcified atherosclerotic plaque causing  significant stenosis of 70% or greater. No other calcified or  soft tissue density plaque and/or stenosis. Patent saphenous vein  bypass graft to the obtuse marginal second branch.  4.  RCA: No calcified or soft tissue density plaque and no  stenosis. Patent saphenous vein bypass graft to the PDA.  5.  Remainder CT angiogram coronary exam unremarkable.     September 2019 cardiac  Cath demonstrated severe CAD multivessel with lesions LAD 80% DX 70% OM2 60% PDA 70%.     CABG 9/25/2019   DISTAL BYPASS TARGETS:    1. LIMA to LAD.    2. Greater saphenous vein to OM2    3. Greater saphenous vein to PDA   · Left ventricular wall thickness is consistent with mild concentric hypertrophy.  · Estimated EF = 61%.  · Left ventricular systolic function is normal.  · Left ventricular diastolic dysfunction (grade I) consistent with impaired relaxation.  · Mild mitral valve regurgitation is present  9/3/19  Conclusion:  1.  Severe three-vessel obstructive coronary artery disease involving mid LAD, diagonal, obtuse marginal and RPDA.  2.  Normal left-sided filling pressures.  No gradient noted across aortic valve on pullback  3.  Patent left subclavian and LIMA    SUBJECTIVE:    Allergies   Allergen Reactions   • Ace Inhibitors Anaphylaxis   • Wellbutrin [Bupropion] Anaphylaxis         Past Medical History:   Diagnosis Date   • Arthritis     c/o joint pain   • Coronary artery disease    • Diabetes mellitus (CMS/[facility])    • Hyperlipidemia    • Hypertension    • Retinopathy of both eyes     • Sleep apnea     not using c-pap         Past Surgical History:   Procedure Laterality Date   • CARDIAC CATHETERIZATION N/A 9/3/2019    Procedure: Coronary angiography/PCI if indicated;  Surgeon: Denia Dwyer MD;  Location: [facility] INVASIVE LOCATION;  Service: Cardiology   • CHOLECYSTECTOMY     • CORONARY ARTERY BYPASS GRAFT N/A 2019    Procedure: CORONARY ARTERY BYPASS GRAFTING, ENDOSCOPIC VEIN HARVEST       (CELL SAVER);  Surgeon: Xvaier Ballard MD;  Location: [facility] OR;  Service: Cardiothoracic   • TONSILLECTOMY           Family History   Problem Relation Age of Onset   • Diabetes Mother    • Hypertension Mother    • Cancer Mother    • Diabetes Father    • Hypertension Father    • Cancer Father          Social History     Socioeconomic History   • Marital status:      Spouse name: Not on file   • Number of children: Not on file   • Years of education: Not on file   • Highest education level: Not on file   Tobacco Use   • Smoking status: Former Smoker     Packs/day: 1.00     Years: 35.00     Pack years: 35.00     Types: Cigarettes     Start date: 1980     Quit date: 9/3/2019     Years since quittin.0   • Smokeless tobacco: Never Used   • Tobacco comment: quit after heart cath   Substance and Sexual Activity   • Alcohol use: No   • Drug use: No   • Sexual activity: Defer         Current Outpatient Medications   Medication Sig Dispense Refill   • albuterol sulfate  (90 Base) MCG/ACT inhaler Inhale 2 puffs Every 4 (Four) Hours As Needed for Wheezing. 18 g 11   • amLODIPine (Norvasc) 5 MG tablet Take 1 tablet by mouth Daily. 30 tablet 0   • apixaban (ELIQUIS) 5 MG tablet tablet Take 1 tablet by mouth Every 12 (Twelve) Hours. 60 tablet 3   • aspirin 81 MG chewable tablet Chew 81 mg Daily.     • atorvastatin (LIPITOR) 40 MG tablet Take 1 tablet by mouth Every Night. 30 tablet 3   • clopidogrel (PLAVIX) 75 MG tablet Take 1 tablet by mouth Daily. 30 tablet 11   • isosorbide  mononitrate (IMDUR) 30 MG 24 hr tablet Take 1 tablet by mouth Daily. 30 tablet 11   • [employer] (ONE TOUCH DELICA LANCING DEV) misc delica lancing device if approved, otherwise use alternative 1 each 11   • magnesium oxide (MAGOX) 400 (241.3 Mg) MG tablet tablet Take 1 tablet by mouth 2 (Two) Times a Day. 60 each 3   • metFORMIN (GLUCOPHAGE) 500 MG tablet Take 2 tablets by mouth 2 (Two) Times a Day With Meals. 120 tablet 2   • metoprolol succinate XL (TOPROL-XL) 25 MG 24 hr tablet Take 1 tablet by mouth Daily. 30 tablet 1   • mupirocin (Bactroban) 2 % ointment Apply  topically to the appropriate area as directed 3 (Three) Times a Day. 1 each 0   • nitroglycerin (NITROSTAT) 0.4 MG SL tablet Place 1 tablet under the tongue Every 5 (Five) Minutes As Needed for Chest Pain. Take no more than 3 doses in 15 minutes. 30 tablet 1   • ONETOUCH DELICA LANCETS 33G misc 1 each 4 (Four) Times a Day. delica if covered, use alternative if not covered 120 each 11   • spironolactone (Aldactone) 25 MG tablet Take 1 tablet by mouth Daily. 30 tablet 11   • vitamin D (ERGOCALCIFEROL) 76526 units capsule capsule Take 50,000 Units by mouth 2 (Two) Times a Week. tues and fridays     • fluticasone (Flonase) 50 MCG/ACT nasal spray Instill 2 sprays into the nostril(s) as directed by provider 2 (two) times a day for 7 days. 16 g 0     No current facility-administered medications for this visit.            Review of Systems:     Constitutional:  Denies recent weight loss, weight gain, fever or chills, no change in exercise tolerance.     HENT:  Denies any hearing loss, epistaxis, hoarseness, or difficulty speaking.     Eyes: No blurred s.    Respiratory:  Denies dyspnea with exertion,no cough, wheezing, or hemoptysis.     Cardiovascular: See H&P  Gastrointestinal:  Denies change in bowel habits, dyspepsia, ulcer disease, hematochezia, or melena.     Endocrine: Negative for cold intolerance, heat intolerance, polydipsia, polyphagia and  "polyuria. Denies any history of weight change, polydipsia, polyuria.     Genitourinary: Negative.      Musculoskeletal: Denies any history of arthritic symptoms or back problems.     Skin:  Denies any change in hair or nails, rashes, or skin lesions.     Allergic/Immunologic: Negative.  Negative for environmental allergies, food allergies and immunocompromised state.     Neurological:  Denies any history of recurrent headaches, strokes, TIA, or seizure disorder.     Hematological: Denies any food allergies, seasonal allergies, bleeding disorders, or lymphadenopathy.     Psychiatric/Behavioral: Denies any history of depression, substance abuse, or change in cognitive function.       OBJECTIVE:    /88 (BP Location: Left arm, Patient Position: Sitting, Cuff Size: Adult)   Pulse 79   Ht 188 cm (74.02\")   Wt 114 kg (252 lb)   SpO2 98%   BMI 32.34 kg/m²       Physical Exam:     Constitutional: Cooperative, alert and oriented, well-developed, well-nourished, in no acute distress.     HENT:   Head: Normocephalic, normal hair patterns, no masses or tenderness.  Ears, Nose, and Throat: No gross abnormalities. No pallor or cyanosis. Dentition good.   Eyes: EOMS intact, PERRL, conjunctivae and lids unremarkable. Fundoscopic exam and visual fields not performed.   Neck: No palpable masses or adenopathy, no thyromegaly, no JVD, carotid pulses are full and equal bilaterally and without  Bruits.     Cardiovascular: Regular rhythm, S1 and S2 normal, no S3 or S4. Apical impulse not displaced. No murmurs, gallops, or rubs detected.     Pulmonary/Chest: Chest: normal symmetry, no tenderness to palpation, normal respiratory excursion, no intercostal retraction, no use of accessory muscles. Pulmonary: Normal breath sounds. No rales or rhonchi.    Abdominal: Abdomen soft, bowel sounds normoactive, no masses, no hepatosplenomegaly, non-tender, no bruits.     Musculoskeletal: No deformities, clubbing, cyanosis, erythema, or edema " observed. There are no spinal abnormalities noted. Normal muscle strength and tone. Pulses full and equal in all extremities, no bruits auscultated.     Neurological: No gross motor or sensory deficits noted, affect appropriate, oriented to time, person, place.     Skin: Warm and dry to the touch, no apparent skin lesions or masses noted.     Psychiatric: He has a normal mood and affect. His behavior is normal. Judgment and thought content normal.         Procedures      Lab Results   Component Value Date    WBC 6.41 08/21/2020    HGB 14.3 08/21/2020    HCT 38.9 08/21/2020    MCV 82.6 08/21/2020     08/21/2020     Lab Results   Component Value Date    GLUCOSE 292 (H) 08/21/2020    BUN 16 08/21/2020    CREATININE 0.57 (L) 08/21/2020    EGFRIFNONA 148 08/21/2020    BCR 28.1 (H) 08/21/2020    CO2 24.0 08/21/2020    CALCIUM 8.5 (L) 08/21/2020    ALBUMIN 3.80 08/19/2020    AST 17 08/19/2020    ALT 24 08/19/2020     Lab Results   Component Value Date    CHOL 101 10/04/2019    CHOL 137 09/13/2019    CHOL 218 (H) 06/07/2019     Lab Results   Component Value Date    TRIG 100 10/04/2019    TRIG 131 09/13/2019    TRIG 126 06/07/2019     Lab Results   Component Value Date    HDL 27 (L) 10/04/2019    HDL 34 (L) 09/13/2019    HDL 39 (L) 06/07/2019     No components found for: LDLCALC  Lab Results   Component Value Date    LDL 54 10/04/2019    LDL 77 09/13/2019     (H) 06/07/2019     No results found for: HDLLDLRATIO  No components found for: CHOLHDL  Lab Results   Component Value Date    HGBA1C 8.47 (H) 07/22/2020     Lab Results   Component Value Date    TSH 4.000 09/13/2019           ASSESSMENT AND PLAN:  #1  Chest pain atypical from clinical description  #2 CAD status post CABG no further recurrence of chest pain and is a pleased with her clinical outcome  #3 hypertension blood pressure and will continue diltiazem and beta-blocker  #4 hyperlipidemia on atorvastatin  #5 diabetes patient receiving metformin under  care of the family doctor  #6 postop atrial fibrillation currently in sinus rhythm     54 years old patient morbidly obese with a background history of diabetes, hypertension, hyperlipidemia, non-Q wave myocardial infarction with multivessel CAD status post CABG in 2019 and postop complicated by atrial fibrillation managed with AV ramakrishna blocking drug and amiodarone.  Amiodarone subsequently discontinued patient risk stratify with a CT coronary angiogram.  Leonidesger with him with native CAD.  Noted significant bradyarrhythmia was on Toprol- mg dose adjustment decreased to 25 mg and a significant provement in heart rate.  He had a history of postural dizziness and patient was educated regarding increasing tone of upper and lower extremity.  EKG in the office shows sinus rhythm with isolated premature ventricular complex rate is 79 bpm.  Patient will continue amlodipine, isosorbide, metoprolol and spironolactone for the management of hypertension with hypertensive heart disease.  I will not increase the dose of antihypertensive medication given history of postural dizziness and diabetes.  He will continue Plavix with history of CAD and CABG and Eliquis to decrease risk of cardiac embolization.  Significantly low carbohydrate, low-fat, DASH diet graded exercise discussed with the patient.    Herbert was seen today for follow-up.    Diagnoses and all orders for this visit:    Paroxysmal atrial fibrillation (CMS/HCC)  -     ECG 12 Lead    Coronary artery disease of native artery of native heart with stable angina pectoris (CMS/HCC)    Essential hypertension    Pure hypercholesterolemia    Mixed diabetic hyperlipidemia associated with type 1 diabetes mellitus (CMS/HCC)          Alice Rice MD  9/25/2020  08:43 CDT

## 2020-10-14 ENCOUNTER — OFFICE VISIT (OUTPATIENT)
Dept: FAMILY MEDICINE CLINIC | Facility: CLINIC | Age: 55
End: 2020-10-14

## 2020-10-14 ENCOUNTER — LAB (OUTPATIENT)
Dept: LAB | Facility: HOSPITAL | Age: 55
End: 2020-10-14

## 2020-10-14 VITALS
HEIGHT: 74 IN | TEMPERATURE: 98.4 F | SYSTOLIC BLOOD PRESSURE: 150 MMHG | DIASTOLIC BLOOD PRESSURE: 88 MMHG | HEART RATE: 74 BPM | OXYGEN SATURATION: 99 % | BODY MASS INDEX: 32.6 KG/M2 | WEIGHT: 254 LBS

## 2020-10-14 DIAGNOSIS — R53.83 FATIGUE, UNSPECIFIED TYPE: ICD-10-CM

## 2020-10-14 DIAGNOSIS — Z86.69 HISTORY OF SLEEP APNEA: ICD-10-CM

## 2020-10-14 DIAGNOSIS — R53.1 WEAKNESS: Primary | ICD-10-CM

## 2020-10-14 PROCEDURE — 99213 OFFICE O/P EST LOW 20 MIN: CPT | Performed by: NURSE PRACTITIONER

## 2020-10-14 PROCEDURE — 93005 ELECTROCARDIOGRAM TRACING: CPT | Performed by: NURSE PRACTITIONER

## 2020-10-14 PROCEDURE — 36415 COLL VENOUS BLD VENIPUNCTURE: CPT | Performed by: NURSE PRACTITIONER

## 2020-10-14 PROCEDURE — 80053 COMPREHEN METABOLIC PANEL: CPT | Performed by: NURSE PRACTITIONER

## 2020-10-14 PROCEDURE — 84443 ASSAY THYROID STIM HORMONE: CPT | Performed by: NURSE PRACTITIONER

## 2020-10-14 PROCEDURE — 93010 ELECTROCARDIOGRAM REPORT: CPT | Performed by: INTERNAL MEDICINE

## 2020-10-14 PROCEDURE — 85027 COMPLETE CBC AUTOMATED: CPT | Performed by: NURSE PRACTITIONER

## 2020-10-14 NOTE — PROGRESS NOTES
Subjective   Herbert White . is a 55 y.o. male. Fatigue    History of Present Illness   Patient presents today for fatigue and weakness. He says yesterday he started feeling fatigued with headache and backache. Backache he says is in his lower back and this pain is chronic. He denies any fever, chills, nausea, vomiting, diarrhea, cough,chest pain, or shortness of breath. Pertinent history: CABG 9/25/19, Afib, obstructive sleep apnea, Type II diabetes. He admits that he does not wear cpap anymore and feels he needs another sleep study since it has been so long ago.    The following portions of the patient's history were reviewed and updated as appropriate: allergies, current medications, past family history, past medical history, past social history, past surgical history and problem list.    Review of Systems   Constitutional: Positive for fatigue. Negative for chills and fever.   HENT: Negative for congestion, ear pain, rhinorrhea and sore throat.    Eyes: Negative for blurred vision, double vision and visual disturbance.   Respiratory: Negative for cough, chest tightness, shortness of breath and wheezing.    Cardiovascular: Negative for chest pain, palpitations and leg swelling.   Gastrointestinal: Negative for abdominal pain, diarrhea, nausea and vomiting.   Endocrine: Negative for cold intolerance and heat intolerance.   Genitourinary: Negative for difficulty urinating, dysuria, frequency and hematuria.   Musculoskeletal: Positive for back pain. Negative for arthralgias, neck pain and neck stiffness.   Skin: Negative for dry skin, pallor, rash, skin lesions and bruise.   Allergic/Immunologic: Negative for environmental allergies, food allergies and immunocompromised state.   Neurological: Positive for weakness and headache. Negative for dizziness, syncope, light-headedness and confusion.   Hematological: Negative for adenopathy. Does not bruise/bleed easily.   Psychiatric/Behavioral: Negative for  self-injury, sleep disturbance, suicidal ideas, depressed mood and stress. The patient is not nervous/anxious.        Objective   Physical Exam  Constitutional:       Appearance: He is well-developed.   HENT:      Head: Normocephalic.      Right Ear: External ear normal.      Left Ear: External ear normal.      Nose: Nose normal.      Mouth/Throat:      Pharynx: No oropharyngeal exudate.   Eyes:      Conjunctiva/sclera: Conjunctivae normal.      Pupils: Pupils are equal, round, and reactive to light.   Neck:      Musculoskeletal: Normal range of motion and neck supple.      Thyroid: No thyromegaly.   Cardiovascular:      Rate and Rhythm: Normal rate and regular rhythm.   Pulmonary:      Effort: Pulmonary effort is normal.      Breath sounds: Normal breath sounds.   Musculoskeletal: Normal range of motion.   Skin:     General: Skin is warm and dry.   Neurological:      Mental Status: He is alert and oriented to person, place, and time.   Psychiatric:         Behavior: Behavior normal.         Thought Content: Thought content normal.         Judgment: Judgment normal.       Vitals:    10/14/20 1549   BP: 150/88   Pulse: 74   Temp: 98.4 °F (36.9 °C)   SpO2: 99%       Assessment/Plan   Diagnoses and all orders for this visit:    1. Weakness (Primary)  -     Comprehensive metabolic panel  -     CBC (No Diff)  -     TSH  -     ECG 12 Lead    2. Fatigue, unspecified type  -     Comprehensive metabolic panel  -     CBC (No Diff)  -     TSH    3. History of sleep apnea  -     Ambulatory Referral to Sleep Medicine    EKG showed sinus rhythm with occasional PVC's, left axis deviation, possible anterior infarct. EKG almost idential to EKG from a few months ago.  Labs ordered to look for other causes of weakness and fatigue.  Referral to sleep medicine for sleep apnea.  I feel one of patients main problems at this time is noncompliance with sleep apnea. Stressed importance of compliance with cpap once he gets his new  supplies.  If symptoms do not improve or worsen, patient was instructed to return to clinic for further evaluation.         This document has been electronically signed by NELSY Morris on  October 15, 2020 09:00 CDT

## 2020-10-15 LAB
ALBUMIN SERPL-MCNC: 4.2 G/DL (ref 3.5–5.2)
ALBUMIN/GLOB SERPL: 1.6 G/DL
ALP SERPL-CCNC: 71 U/L (ref 39–117)
ALT SERPL W P-5'-P-CCNC: <5 U/L (ref 1–41)
ANION GAP SERPL CALCULATED.3IONS-SCNC: 8.7 MMOL/L (ref 5–15)
AST SERPL-CCNC: 28 U/L (ref 1–40)
BILIRUB SERPL-MCNC: 0.6 MG/DL (ref 0–1.2)
BUN SERPL-MCNC: 12 MG/DL (ref 6–20)
BUN/CREAT SERPL: 13.8 (ref 7–25)
CALCIUM SPEC-SCNC: 9.4 MG/DL (ref 8.6–10.5)
CHLORIDE SERPL-SCNC: 103 MMOL/L (ref 98–107)
CO2 SERPL-SCNC: 28.3 MMOL/L (ref 22–29)
CREAT SERPL-MCNC: 0.87 MG/DL (ref 0.76–1.27)
DEPRECATED RDW RBC AUTO: 40.9 FL (ref 37–54)
ERYTHROCYTE [DISTWIDTH] IN BLOOD BY AUTOMATED COUNT: 13.2 % (ref 12.3–15.4)
GFR SERPL CREATININE-BSD FRML MDRD: 91 ML/MIN/1.73
GLOBULIN UR ELPH-MCNC: 2.6 GM/DL
GLUCOSE SERPL-MCNC: 246 MG/DL (ref 65–99)
HCT VFR BLD AUTO: 47.5 % (ref 37.5–51)
HGB BLD-MCNC: 16.8 G/DL (ref 13–17.7)
MCH RBC QN AUTO: 30.6 PG (ref 26.6–33)
MCHC RBC AUTO-ENTMCNC: 35.4 G/DL (ref 31.5–35.7)
MCV RBC AUTO: 86.5 FL (ref 79–97)
PLATELET # BLD AUTO: 190 10*3/MM3 (ref 140–450)
PMV BLD AUTO: 11.3 FL (ref 6–12)
POTASSIUM SERPL-SCNC: 4.3 MMOL/L (ref 3.5–5.2)
PROT SERPL-MCNC: 6.8 G/DL (ref 6–8.5)
RBC # BLD AUTO: 5.49 10*6/MM3 (ref 4.14–5.8)
SODIUM SERPL-SCNC: 140 MMOL/L (ref 136–145)
TSH SERPL DL<=0.05 MIU/L-ACNC: 0.75 UIU/ML (ref 0.27–4.2)
WBC # BLD AUTO: 7.25 10*3/MM3 (ref 3.4–10.8)

## 2020-10-29 ENCOUNTER — OFFICE VISIT (OUTPATIENT)
Dept: FAMILY MEDICINE CLINIC | Facility: CLINIC | Age: 55
End: 2020-10-29

## 2020-10-29 ENCOUNTER — TELEPHONE (OUTPATIENT)
Dept: FAMILY MEDICINE CLINIC | Facility: CLINIC | Age: 55
End: 2020-10-29

## 2020-10-29 ENCOUNTER — LAB (OUTPATIENT)
Dept: LAB | Facility: HOSPITAL | Age: 55
End: 2020-10-29

## 2020-10-29 VITALS
TEMPERATURE: 96.8 F | OXYGEN SATURATION: 98 % | DIASTOLIC BLOOD PRESSURE: 80 MMHG | HEART RATE: 86 BPM | HEIGHT: 74 IN | WEIGHT: 252 LBS | SYSTOLIC BLOOD PRESSURE: 140 MMHG | BODY MASS INDEX: 32.34 KG/M2

## 2020-10-29 DIAGNOSIS — G89.29 CHRONIC BILATERAL LOW BACK PAIN WITH BILATERAL SCIATICA: ICD-10-CM

## 2020-10-29 DIAGNOSIS — E11.65 TYPE 2 DIABETES MELLITUS WITH HYPERGLYCEMIA, WITHOUT LONG-TERM CURRENT USE OF INSULIN (HCC): Primary | ICD-10-CM

## 2020-10-29 DIAGNOSIS — M54.41 CHRONIC BILATERAL LOW BACK PAIN WITH BILATERAL SCIATICA: ICD-10-CM

## 2020-10-29 DIAGNOSIS — E11.42 DIABETIC POLYNEUROPATHY ASSOCIATED WITH TYPE 2 DIABETES MELLITUS (HCC): ICD-10-CM

## 2020-10-29 DIAGNOSIS — M54.42 CHRONIC BILATERAL LOW BACK PAIN WITH BILATERAL SCIATICA: ICD-10-CM

## 2020-10-29 LAB — HBA1C MFR BLD: 9.21 % (ref 4.8–5.6)

## 2020-10-29 PROCEDURE — 99214 OFFICE O/P EST MOD 30 MIN: CPT | Performed by: FAMILY MEDICINE

## 2020-10-29 PROCEDURE — 82607 VITAMIN B-12: CPT | Performed by: FAMILY MEDICINE

## 2020-10-29 PROCEDURE — 82746 ASSAY OF FOLIC ACID SERUM: CPT | Performed by: FAMILY MEDICINE

## 2020-10-29 PROCEDURE — 84207 ASSAY OF VITAMIN B-6: CPT | Performed by: FAMILY MEDICINE

## 2020-10-29 PROCEDURE — 83036 HEMOGLOBIN GLYCOSYLATED A1C: CPT | Performed by: FAMILY MEDICINE

## 2020-10-29 PROCEDURE — 84425 ASSAY OF VITAMIN B-1: CPT | Performed by: FAMILY MEDICINE

## 2020-10-29 RX ORDER — NORTRIPTYLINE HYDROCHLORIDE 10 MG/1
10 CAPSULE ORAL 3 TIMES DAILY
Qty: 90 CAPSULE | Refills: 11 | Status: SHIPPED | OUTPATIENT
Start: 2020-10-29 | End: 2021-11-09

## 2020-10-29 NOTE — PATIENT INSTRUCTIONS
"BMI for Adults  What is BMI?  Body mass index (BMI) is a number that is calculated from a person's weight and height. BMI can help estimate how much of a person's weight is composed of fat. BMI does not measure body fat directly. Rather, it is an alternative to procedures that directly measure body fat, which can be difficult and expensive.  BMI can help identify people who may be at higher risk for certain medical problems.  What are BMI measurements used for?  BMI is used as a screening tool to identify possible weight problems. It helps determine whether a person is obese, overweight, a healthy weight, or underweight.  BMI is useful for:  · Identifying a weight problem that may be related to a medical condition or may increase the risk for medical problems.  · Promoting changes, such as changes in diet and exercise, to help reach a healthy weight. BMI screening can be repeated to see if these changes are working.  How is BMI calculated?  BMI involves measuring your weight in relation to your height. Both height and weight are measured, and the BMI is calculated from those numbers. This can be done either in English ([place] or metric measurements. Note that charts and online BMI calculators are available to help you find your BMI quickly and easily without having to do these calculations yourself.  To calculate your BMI in English ([place] measurements:    1. Measure your weight in pounds (lb).  2. Multiply the number of pounds by 703.  ? For example, for a person who weighs 180 lb, multiply that number by 703, which equals 126,540.  3. Measure your height in inches. Then multiply that number by itself to get a measurement called \"inches squared.\"  ? For example, for a person who is 70 inches tall, the \"inches squared\" measurement is 70 inches x 70 inches, which equals 4,900 inches squared.  4. Divide the total from step 2 (number of lb x 703) by the total from step 3 (inches squared): 126,540 ÷ 4,900 = 25.8. This is " "your BMI.  To calculate your BMI in metric measurements:  1. Measure your weight in kilograms (kg).  2. Measure your height in meters (m). Then multiply that number by itself to get a measurement called \"meters squared.\"  ? For example, for a person who is 1.75 m tall, the \"meters squared\" measurement is 1.75 m x 1.75 m, which is equal to 3.1 meters squared.  3. Divide the number of kilograms (your weight) by the meters squared number. In this example: 70 ÷ 3.1 = 22.6. This is your BMI.  What do the results mean?  BMI charts are used to identify whether you are underweight, normal weight, overweight, or obese. The following guidelines will be used:  · Underweight: BMI less than 18.5.  · Normal weight: BMI between 18.5 and 24.9.  · Overweight: BMI between 25 and 29.9.  · Obese: BMI of 30 or above.  Keep these notes in mind:  · Weight includes both fat and muscle, so someone with a muscular build, such as an athlete, may have a BMI that is higher than 24.9. In cases like these, BMI is not an accurate measure of body fat.  · To determine if excess body fat is the cause of a BMI of 25 or higher, further assessments may need to be done by a health care provider.  · BMI is usually interpreted in the same way for men and women.  Where to find more information  For more information about BMI, including tools to quickly calculate your BMI, go to these websites:  · Centers for Disease Control and Prevention: www.cdc.gov  · American Heart Association: www.heart.org  · National Heart, Lung, and [employer] [place]: www.nhlbi.nih.gov  Summary  · Body mass index (BMI) is a number that is calculated from a person's weight and height.  · BMI may help estimate how much of a person's weight is composed of fat. BMI can help identify those who may be at higher risk for certain medical problems.  · BMI can be measured using English measurements or metric measurements.  · BMI charts are used to identify whether you are underweight, normal " weight, overweight, or obese.  This information is not intended to replace advice given to you by your health care provider. Make sure you discuss any questions you have with your health care provider.  Document Released: 08/29/2005 Document Revised: 09/09/2020 Document Reviewed: 07/17/2020  Elsevier Patient Education © 2020 [employer]

## 2020-10-29 NOTE — PROGRESS NOTES
" Subjective   Herbert White . is a 55 y.o. male.     Chief Complaint   Patient presents with   • Diabetes   • Med Refill             History of Present Illness     Cc recheck diabetes  Has not started cpap yet, or had evaluation  Feels good today  History of back problems, dr ba did epidurals that helped. He would like that again  Neuropathy left foot more than right.  Gabapentin got to where it did not work     Review of Systems   Constitutional: Negative for chills, fatigue and fever.   HENT: Negative for congestion, ear discharge, ear pain, facial swelling, hearing loss, postnasal drip, rhinorrhea, sinus pressure, sore throat, trouble swallowing and voice change.    Eyes: Negative for discharge, redness and visual disturbance.   Respiratory: Negative for cough, chest tightness, shortness of breath and wheezing.    Cardiovascular: Negative for chest pain and palpitations.   Gastrointestinal: Negative for abdominal pain, blood in stool, constipation, diarrhea, nausea and vomiting.   Endocrine: Negative for polydipsia and polyuria.   Genitourinary: Negative for dysuria, flank pain, hematuria and urgency.   Musculoskeletal: Positive for back pain. Negative for arthralgias, joint swelling and myalgias.   Skin: Negative for rash.   Neurological: Negative for dizziness, weakness, numbness and headaches.   Hematological: Negative for adenopathy.   Psychiatric/Behavioral: Negative for confusion and sleep disturbance. The patient is not nervous/anxious.            /80 (BP Location: Left arm, Patient Position: Sitting, Cuff Size: Adult)   Pulse 86   Temp 96.8 °F (36 °C) (Temporal)   Ht 188 cm (74.02\")   Wt 114 kg (252 lb)   SpO2 98%   BMI 32.34 kg/m²       Objective     Physical Exam  Vitals signs and nursing note reviewed.   Constitutional:       Appearance: He is well-developed.   HENT:      Head: Normocephalic and atraumatic.      Right Ear: External ear normal.      Left Ear: External ear " normal.      Nose: Nose normal.   Eyes:      Conjunctiva/sclera: Conjunctivae normal.      Pupils: Pupils are equal, round, and reactive to light.   Neck:      Musculoskeletal: Normal range of motion.   Pulmonary:      Effort: Pulmonary effort is normal.   Musculoskeletal: Normal range of motion.      Right lower leg: Edema present.      Left lower leg: Edema present.      Comments: Right more than left, trace   Neurological:      Mental Status: He is alert and oriented to person, place, and time.   Psychiatric:         Behavior: Behavior normal.         Thought Content: Thought content normal.         Judgment: Judgment normal.             PAST MEDICAL HISTORY     Past Medical History:   Diagnosis Date   • Arthritis     c/o joint pain   • Coronary artery disease    • Diabetes mellitus (CMS/HCC)    • Hyperlipidemia    • Hypertension    • Retinopathy of both eyes    • Sleep apnea     not using c-pap      PAST SURGICAL HISTORY     Past Surgical History:   Procedure Laterality Date   • CARDIAC CATHETERIZATION N/A 9/3/2019    Procedure: Coronary angiography/PCI if indicated;  Surgeon: Denia Dwyer MD;  Location: [facility]VASIVE [facility];  Service: Cardiology   • CHOLECYSTECTOMY     • CORONARY ARTERY BYPASS GRAFT N/A 2019    Procedure: CORONARY ARTERY BYPASS GRAFTING, ENDOSCOPIC VEIN HARVEST       (CELL SAVER);  Surgeon: Xavier Ballard MD;  Location: Health system OR;  Service: Cardiothoracic   • TONSILLECTOMY        SOCIAL HISTORY     Social History     Socioeconomic History   • Marital status:      Spouse name: Not on file   • Number of children: Not on file   • Years of education: Not on file   • Highest education level: Not on file   Tobacco Use   • Smoking status: Former Smoker     Packs/day: 1.00     Years: 35.00     Pack years: 35.00     Types: Cigarettes     Start date: 1980     Quit date: 9/3/2019     Years since quittin.1   • Smokeless tobacco: Never Used   • Tobacco comment: quit after  heart cath   Substance and Sexual Activity   • Alcohol use: No   • Drug use: No   • Sexual activity: Defer      ALLERGIES   Ace inhibitors and Wellbutrin [bupropion]   MEDICATIONS     Current Outpatient Medications   Medication Sig Dispense Refill   • albuterol sulfate  (90 Base) MCG/ACT inhaler Inhale 2 puffs Every 4 (Four) Hours As Needed for Wheezing. 18 g 11   • amLODIPine (Norvasc) 5 MG tablet Take 1 tablet by mouth Daily. 30 tablet 0   • apixaban (ELIQUIS) 5 MG tablet tablet Take 1 tablet by mouth Every 12 (Twelve) Hours. 60 tablet 3   • aspirin 81 MG chewable tablet Chew 81 mg Daily.     • atorvastatin (LIPITOR) 40 MG tablet Take 1 tablet by mouth Every Night. 30 tablet 3   • clopidogrel (PLAVIX) 75 MG tablet Take 1 tablet by mouth Daily. 30 tablet 11   • isosorbide mononitrate (IMDUR) 30 MG 24 hr tablet Take 1 tablet by mouth Daily. 30 tablet 11   • Lancet Devices (ONE TOUCH DELICA LANCING DEV) misc delica lancing device if approved, otherwise use alternative 1 each 11   • magnesium oxide (MAGOX) 400 (241.3 Mg) MG tablet tablet Take 1 tablet by mouth 2 (Two) Times a Day. 60 each 3   • metFORMIN (GLUCOPHAGE) 500 MG tablet Take 2 tablets by mouth 2 (Two) Times a Day With Meals. 120 tablet 2   • metoprolol succinate XL (TOPROL-XL) 25 MG 24 hr tablet Take 1 tablet by mouth Daily. 30 tablet 1   • nitroglycerin (NITROSTAT) 0.4 MG SL tablet Place 1 tablet under the tongue Every 5 (Five) Minutes As Needed for Chest Pain. Take no more than 3 doses in 15 minutes. 30 tablet 1   • ONETOUCH DELICA LANCETS 33G misc 1 each 4 (Four) Times a Day. delica if covered, use alternative if not covered 120 each 11   • spironolactone (Aldactone) 25 MG tablet Take 1 tablet by mouth Daily. 30 tablet 11   • vitamin D (ERGOCALCIFEROL) 89491 units capsule capsule Take 50,000 Units by mouth 2 (Two) Times a Week. tues and fridays     • fluticasone (Flonase) 50 MCG/ACT nasal spray Instill 2 sprays into the nostril(s) as directed by  provider 2 (two) times a day for 7 days. 16 g 0   • nortriptyline (PAMELOR) 10 MG capsule Take 1 capsule by mouth 3 (Three) Times a Day. 90 capsule 11     No current facility-administered medications for this visit.         The following portions of the patient's history were reviewed and updated as appropriate: allergies, current medications, past family history, past medical history, past social history, past surgical history and problem list.        Assessment/Plan   Diagnoses and all orders for this visit:    1. Type 2 diabetes mellitus with hyperglycemia, without long-term current use of insulin (CMS/[facility]) (Primary)  -     Hemoglobin A1c    2. Chronic bilateral low back pain with bilateral sciatica  -     Ambulatory Referral to Orthopedic Surgery  -     Folate  -     Vitamin B1, Whole Blood  -     Vitamin B12  -     Vitamin B6    3. Diabetic polyneuropathy associated with type 2 diabetes mellitus (CMS/[facility])    Other orders  -     nortriptyline (PAMELOR) 10 MG capsule; Take 1 capsule by mouth 3 (Three) Times a Day.  Dispense: 90 capsule; Refill: 11        Try nortriptylline for his neuropathy.  Try hs first, may try bid or tid.  Warned drowsiness.  He may try 1-3 hs if wanted as well    Referral for back    Will call with labs.    tsh recently was fine.                No follow-ups on file.                  This document has been electronically signed by Artemio Sanchez MD on October 29, 2020 09:40 CDT

## 2020-10-30 LAB
FOLATE SERPL-MCNC: 15.9 NG/ML (ref 4.78–24.2)
VIT B12 BLD-MCNC: 230 PG/ML (ref 211–946)

## 2020-11-04 LAB — VIT B6 SERPL-MCNC: 19.8 UG/L (ref 5.3–46.7)

## 2020-11-05 ENCOUNTER — OFFICE VISIT (OUTPATIENT)
Dept: FAMILY MEDICINE CLINIC | Facility: CLINIC | Age: 55
End: 2020-11-05

## 2020-11-05 VITALS
WEIGHT: 250.2 LBS | DIASTOLIC BLOOD PRESSURE: 80 MMHG | HEART RATE: 81 BPM | HEIGHT: 74 IN | TEMPERATURE: 96.9 F | OXYGEN SATURATION: 98 % | BODY MASS INDEX: 32.11 KG/M2 | SYSTOLIC BLOOD PRESSURE: 142 MMHG

## 2020-11-05 DIAGNOSIS — E11.65 TYPE 2 DIABETES MELLITUS WITH HYPERGLYCEMIA, WITHOUT LONG-TERM CURRENT USE OF INSULIN (HCC): Primary | ICD-10-CM

## 2020-11-05 DIAGNOSIS — R30.0 DYSURIA: ICD-10-CM

## 2020-11-05 LAB
BILIRUB BLD-MCNC: ABNORMAL MG/DL
CLARITY, POC: CLEAR
COLOR UR: YELLOW
GLUCOSE UR STRIP-MCNC: NEGATIVE MG/DL
KETONES UR QL: NEGATIVE
LEUKOCYTE EST, POC: NEGATIVE
NITRITE UR-MCNC: NEGATIVE MG/ML
PH UR: 6 [PH] (ref 5–8)
PROT UR STRIP-MCNC: ABNORMAL MG/DL
RBC # UR STRIP: NEGATIVE /UL
SP GR UR: 1.02 (ref 1–1.03)
UROBILINOGEN UR QL: NORMAL
VIT B1 BLD-SCNC: 209.2 NMOL/L (ref 66.5–200)

## 2020-11-05 PROCEDURE — 99214 OFFICE O/P EST MOD 30 MIN: CPT | Performed by: FAMILY MEDICINE

## 2020-11-05 PROCEDURE — 81002 URINALYSIS NONAUTO W/O SCOPE: CPT | Performed by: FAMILY MEDICINE

## 2020-11-05 RX ORDER — PIOGLITAZONEHYDROCHLORIDE 30 MG/1
30 TABLET ORAL DAILY
Qty: 30 TABLET | Refills: 2 | Status: SHIPPED | OUTPATIENT
Start: 2020-11-05 | End: 2022-03-03 | Stop reason: SDUPTHER

## 2020-11-05 RX ORDER — GLIMEPIRIDE 1 MG/1
.5-1 TABLET ORAL
Qty: 30 TABLET | Refills: 2 | Status: SHIPPED | OUTPATIENT
Start: 2020-11-05 | End: 2022-03-03

## 2020-11-05 NOTE — PROGRESS NOTES
" Subjective   Herbert White . is a 55 y.o. male.     Chief Complaint   Patient presents with   • Results             History of Present Illness     Diabetes check  hga1c 9.21. and 3 months ago it was 8.47  He says he is watching his carbs  He does not want referral to dietician but will google search.   He is on metformin 1000mg bid  Also  Some dysuria off and on, worries about prostate    Review of Systems   Constitutional: Negative for chills, fatigue and fever.   HENT: Negative for congestion, ear discharge, ear pain, facial swelling, hearing loss, postnasal drip, rhinorrhea, sinus pressure, sore throat, trouble swallowing and voice change.    Eyes: Negative for discharge, redness and visual disturbance.   Respiratory: Negative for cough, chest tightness, shortness of breath and wheezing.    Cardiovascular: Negative for chest pain and palpitations.   Gastrointestinal: Negative for abdominal pain, blood in stool, constipation, diarrhea, nausea and vomiting.   Endocrine: Negative for polydipsia and polyuria.   Genitourinary: Negative for dysuria, flank pain, hematuria and urgency.   Musculoskeletal: Negative for arthralgias, back pain, joint swelling and myalgias.   Skin: Negative for rash.   Neurological: Negative for dizziness, weakness, numbness and headaches.   Hematological: Negative for adenopathy.   Psychiatric/Behavioral: Negative for confusion and sleep disturbance. The patient is not nervous/anxious.            /80 (BP Location: Left arm, Patient Position: Sitting, Cuff Size: Adult)   Pulse 81   Temp 96.9 °F (36.1 °C) (Temporal)   Ht 188 cm (74.02\")   Wt 113 kg (250 lb 3.2 oz)   SpO2 98%   BMI 32.11 kg/m²       Objective     Physical Exam  Vitals signs and nursing note reviewed.   Constitutional:       Appearance: Normal appearance. He is well-developed.   HENT:      Head: Normocephalic and atraumatic.      Right Ear: External ear normal.      Left Ear: External ear normal.      " Nose: Nose normal.   Eyes:      Extraocular Movements: Extraocular movements intact.      Conjunctiva/sclera: Conjunctivae normal.      Pupils: Pupils are equal, round, and reactive to light.   Neck:      Musculoskeletal: Normal range of motion.   Pulmonary:      Effort: Pulmonary effort is normal.   Musculoskeletal: Normal range of motion.   Neurological:      General: No focal deficit present.      Mental Status: He is alert and oriented to person, place, and time.   Psychiatric:         Behavior: Behavior normal.         Thought Content: Thought content normal.         Judgment: Judgment normal.             PAST MEDICAL HISTORY     Past Medical History:   Diagnosis Date   • Arthritis     c/o joint pain   • Coronary artery disease    • Diabetes mellitus (CMS/HCC)    • Hyperlipidemia    • Hypertension    • Retinopathy of both eyes    • Sleep apnea     not using c-pap      PAST SURGICAL HISTORY     Past Surgical History:   Procedure Laterality Date   • CARDIAC CATHETERIZATION N/A 9/3/2019    Procedure: Coronary angiography/PCI if indicated;  Surgeon: Denia Dwyer MD;  Location: [facility] CATH INVASIVE LOCATION;  Service: Cardiology   • CHOLECYSTECTOMY     • CORONARY ARTERY BYPASS GRAFT N/A 2019    Procedure: CORONARY ARTERY BYPASS GRAFTING, ENDOSCOPIC VEIN HARVEST       (CELL SAVER);  Surgeon: Xavier Ballard MD;  Location: [facility] OR;  Service: Cardiothoracic   • TONSILLECTOMY        SOCIAL HISTORY     Social History     Socioeconomic History   • Marital status:      Spouse name: Not on file   • Number of children: Not on file   • Years of education: Not on file   • Highest education level: Not on file   Tobacco Use   • Smoking status: Former Smoker     Packs/day: 1.00     Years: 35.00     Pack years: 35.00     Types: Cigarettes     Start date: 1980     Quit date: 9/3/2019     Years since quittin.1   • Smokeless tobacco: Never Used   • Tobacco comment: quit after heart cath   Substance and  Sexual Activity   • Alcohol use: No   • Drug use: No   • Sexual activity: Defer      ALLERGIES   Ace inhibitors and Wellbutrin [bupropion]   MEDICATIONS     Current Outpatient Medications   Medication Sig Dispense Refill   • albuterol sulfate  (90 Base) MCG/ACT inhaler Inhale 2 puffs Every 4 (Four) Hours As Needed for Wheezing. 18 g 11   • amLODIPine (Norvasc) 5 MG tablet Take 1 tablet by mouth Daily. 30 tablet 0   • apixaban (ELIQUIS) 5 MG tablet tablet Take 1 tablet by mouth Every 12 (Twelve) Hours. 60 tablet 3   • aspirin 81 MG chewable tablet Chew 81 mg Daily.     • atorvastatin (LIPITOR) 40 MG tablet Take 1 tablet by mouth Every Night. 30 tablet 3   • clopidogrel (PLAVIX) 75 MG tablet Take 1 tablet by mouth Daily. 30 tablet 11   • isosorbide mononitrate (IMDUR) 30 MG 24 hr tablet Take 1 tablet by mouth Daily. 30 tablet 11   • Lanc[employer] (ONE TOUCH DELICA LANCING DEV) misc delica lancing device if approved, otherwise use alternative 1 each 11   • magnesium oxide (MAGOX) 400 (241.3 Mg) MG tablet tablet Take 1 tablet by mouth 2 (Two) Times a Day. 60 each 3   • metFORMIN (GLUCOPHAGE) 500 MG tablet Take 2 tablets by mouth 2 (Two) Times a Day With Meals. 360 tablet 0   • metoprolol succinate XL (TOPROL-XL) 25 MG 24 hr tablet Take 1 tablet by mouth Daily. 30 tablet 1   • nitroglycerin (NITROSTAT) 0.4 MG SL tablet Place 1 tablet under the tongue Every 5 (Five) Minutes As Needed for Chest Pain. Take no more than 3 doses in 15 minutes. 30 tablet 1   • nortriptyline (PAMELOR) 10 MG capsule Take 1 capsule by mouth 3 (Three) Times a Day. 90 capsule 11   • ONETOUCH DELICA LANCETS 33G misc 1 each 4 (Four) Times a Day. delica if covered, use alternative if not covered 120 each 11   • spironolactone (Aldactone) 25 MG tablet Take 1 tablet by mouth Daily. 30 tablet 11   • vitamin D (ERGOCALCIFEROL) 99402 units capsule capsule Take 50,000 Units by mouth 2 (Two) Times a Week. tues and fridays     • fluticasone (Flonase)  50 MCG/ACT nasal spray Instill 2 sprays into the nostril(s) as directed by provider 2 (two) times a day for 7 days. 16 g 0   • glimepiride (Amaryl) 1 MG tablet Take 0.5-1 tablets by mouth Every Morning Before Breakfast. 30 tablet 2   • pioglitazone (Actos) 30 MG tablet Take 1 tablet by mouth Daily. 30 tablet 2     No current facility-administered medications for this visit.         The following portions of the patient's history were reviewed and updated as appropriate: allergies, current medications, past family history, past medical history, past social history, past surgical history and problem list.        Assessment/Plan   Diagnoses and all orders for this visit:    1. Type 2 diabetes mellitus with hyperglycemia, without long-term current use of insulin (CMS/[facility]) (Primary)    2. Dysuria  -     POCT urinalysis dipstick, manual    Other orders  -     pioglitazone (Actos) 30 MG tablet; Take 1 tablet by mouth Daily.  Dispense: 30 tablet; Refill: 2  -     glimepiride (Amaryl) 1 MG tablet; Take 0.5-1 tablets by mouth Every Morning Before Breakfast.  Dispense: 30 tablet; Refill: 2        Urine:  No clucose and indices normal, spec gravity 1.025.    Drink more water for dysuria.      Added actos and amaryl.  Start with half tablet amaryl.  Write down fsbs and bring to my office for me to review.   Explained lowering his blood sugar even to normal range will make him feel like it is low.  If it is 70, eat something.    Otherwise  Return 3 months.                    No follow-ups on file.                  This document has been electronically signed by Artemio Sanchez MD on November 5, 2020 15:12 CST

## 2020-11-05 NOTE — PATIENT INSTRUCTIONS
"BMI for Adults  What is BMI?  Body mass index (BMI) is a number that is calculated from a person's weight and height. BMI can help estimate how much of a person's weight is composed of fat. BMI does not measure body fat directly. Rather, it is an alternative to procedures that directly measure body fat, which can be difficult and expensive.  BMI can help identify people who may be at higher risk for certain medical problems.  What are BMI measurements used for?  BMI is used as a screening tool to identify possible weight problems. It helps determine whether a person is obese, overweight, a healthy weight, or underweight.  BMI is useful for:  · Identifying a weight problem that may be related to a medical condition or may increase the risk for medical problems.  · Promoting changes, such as changes in diet and exercise, to help reach a healthy weight. BMI screening can be repeated to see if these changes are working.  How is BMI calculated?  BMI involves measuring your weight in relation to your height. Both height and weight are measured, and the BMI is calculated from those numbers. This can be done either in English ([place] or metric measurements. Note that charts and online BMI calculators are available to help you find your BMI quickly and easily without having to do these calculations yourself.  To calculate your BMI in English ([place] measurements:    1. Measure your weight in pounds (lb).  2. Multiply the number of pounds by 703.  ? For example, for a person who weighs 180 lb, multiply that number by 703, which equals 126,540.  3. Measure your height in inches. Then multiply that number by itself to get a measurement called \"inches squared.\"  ? For example, for a person who is 70 inches tall, the \"inches squared\" measurement is 70 inches x 70 inches, which equals 4,900 inches squared.  4. Divide the total from step 2 (number of lb x 703) by the total from step 3 (inches squared): 126,540 ÷ 4,900 = 25.8. This is " "your BMI.  To calculate your BMI in metric measurements:  1. Measure your weight in kilograms (kg).  2. Measure your height in meters (m). Then multiply that number by itself to get a measurement called \"meters squared.\"  ? For example, for a person who is 1.75 m tall, the \"meters squared\" measurement is 1.75 m x 1.75 m, which is equal to 3.1 meters squared.  3. Divide the number of kilograms (your weight) by the meters squared number. In this example: 70 ÷ 3.1 = 22.6. This is your BMI.  What do the results mean?  BMI charts are used to identify whether you are underweight, normal weight, overweight, or obese. The following guidelines will be used:  · Underweight: BMI less than 18.5.  · Normal weight: BMI between 18.5 and 24.9.  · Overweight: BMI between 25 and 29.9.  · Obese: BMI of 30 or above.  Keep these notes in mind:  · Weight includes both fat and muscle, so someone with a muscular build, such as an athlete, may have a BMI that is higher than 24.9. In cases like these, BMI is not an accurate measure of body fat.  · To determine if excess body fat is the cause of a BMI of 25 or higher, further assessments may need to be done by a health care provider.  · BMI is usually interpreted in the same way for men and women.  Where to find more information  For more information about BMI, including tools to quickly calculate your BMI, go to these websites:  · Centers for Disease Control and Prevention: www.cdc.gov  · American Heart Association: www.heart.org  · National Heart, Lung, and Blood [employer]: www.nhlbi.nih.gov  Summary  · Body mass index (BMI) is a number that is calculated from a person's weight and height.  · BMI may help estimate how much of a person's weight is composed of fat. BMI can help identify those who may be at higher risk for certain medical problems.  · BMI can be measured using English measurements or metric measurements.  · BMI charts are used to identify whether you are underweight, normal " weight, overweight, or obese.  This information is not intended to replace advice given to you by your health care provider. Make sure you discuss any questions you have with your health care provider.  Document Released: 08/29/2005 Document Revised: 09/09/2020 Document Reviewed: 07/17/2020  [employer]vier Patient Education © 2020 [employer]

## 2020-11-09 DIAGNOSIS — M54.50 ACUTE LOW BACK PAIN, UNSPECIFIED BACK PAIN LATERALITY, UNSPECIFIED WHETHER SCIATICA PRESENT: Primary | ICD-10-CM

## 2020-11-10 ENCOUNTER — OFFICE VISIT (OUTPATIENT)
Dept: ORTHOPEDIC SURGERY | Facility: CLINIC | Age: 55
End: 2020-11-10

## 2020-11-10 VITALS — BODY MASS INDEX: 32.21 KG/M2 | HEIGHT: 74 IN | WEIGHT: 251 LBS

## 2020-11-10 DIAGNOSIS — M51.36 DDD (DEGENERATIVE DISC DISEASE), LUMBAR: ICD-10-CM

## 2020-11-10 DIAGNOSIS — G89.29 CHRONIC MIDLINE LOW BACK PAIN WITH BILATERAL SCIATICA: Primary | ICD-10-CM

## 2020-11-10 DIAGNOSIS — M43.16 SPONDYLOLISTHESIS OF LUMBAR REGION: ICD-10-CM

## 2020-11-10 DIAGNOSIS — M54.41 CHRONIC MIDLINE LOW BACK PAIN WITH BILATERAL SCIATICA: Primary | ICD-10-CM

## 2020-11-10 DIAGNOSIS — M54.42 CHRONIC MIDLINE LOW BACK PAIN WITH BILATERAL SCIATICA: Primary | ICD-10-CM

## 2020-11-10 PROCEDURE — 99214 OFFICE O/P EST MOD 30 MIN: CPT | Performed by: NURSE PRACTITIONER

## 2020-11-10 NOTE — PROGRESS NOTES
"Herbert White Sr. is a 55 y.o. male returns for     Chief Complaint   Patient presents with   • Lumbar Spine - Follow-up       HISTORY OF PRESENT ILLNESS: Patient presents to office for follow-up of chronic low back pain that radiates into his bilateral legs.  Onset of pain occurred several years ago with no known injury.  Patient was previously treated per Dr. Kiran and had a lumbar epidural injection on 7/24/2019, which offered him significant pain improvement for over one year.  Patient is interested in repeating this in an effort to improve his pain.  Patient has had previous MRI imaging of the lumbar spine in June 2019, which showed some mild degenerative disc disease, facet arthropathy and spondylolisthesis in the lower lumbar spine.  Patient complains of increased low back pain in recent months.  He has increased pain with activity.  Patient has chronic numbness in his bilateral lower legs and feet that he attributes to diabetic neuropathy.  Patient has a history of type 1 diabetes.  Patient describes pain that radiates into his upper thighs.  No injuries or falls reported.  Pain scale today is 7/10.  X-rays are repeated in office today.      CONCURRENT MEDICAL HISTORY:    The following portions of the patient's history were reviewed and updated as appropriate: allergies, current medications, past family history, past medical history, past social history, past surgical history and problem list.     ROS  No fevers or chills.  No chest pain or shortness of air.  No GI or  disturbances. Low back pain.     PHYSICAL EXAMINATION:       Ht 188 cm (74.02\")   Wt 114 kg (251 lb)   BMI 32.21 kg/m²     Physical Exam  Vitals signs reviewed.   Constitutional:       General: He is not in acute distress.     Appearance: He is well-developed. He is obese. He is not ill-appearing.   HENT:      Head: Normocephalic.   Pulmonary:      Effort: Pulmonary effort is normal. No respiratory distress.   Abdominal:      " General: There is no distension.      Palpations: Abdomen is soft.   Musculoskeletal:         General: Tenderness (Lumbar spine) present. No swelling, deformity or signs of injury.   Skin:     General: Skin is warm and dry.      Capillary Refill: Capillary refill takes less than 2 seconds.      Findings: No erythema.   Neurological:      Mental Status: He is alert and oriented to person, place, and time.      GCS: GCS eye subscore is 4. GCS verbal subscore is 5. GCS motor subscore is 6.   Psychiatric:         Speech: Speech normal.         Behavior: Behavior normal.         Thought Content: Thought content normal.         Judgment: Judgment normal.         GAIT:     []  Normal  [x]  Antalgic    Assistive device: [x]  None  []  Walker     []  Crutches  []  Cane     []  Wheelchair  []  Stretcher    Back Exam     Tenderness   The patient is experiencing tenderness in the lumbar.    Range of Motion   Extension: abnormal   Flexion: abnormal   Rotation right: abnormal   Rotation left: abnormal     Muscle Strength   The patient has normal back strength.    Tests   Straight leg raise right: negative  Straight leg raise left: negative    Reflexes   Patellar: normal    Other   Gait: antalgic   Erythema: no back redness    Comments:  Pain and limitations with range of motion.  Tenderness to palpation at the lower lumbar spine.  No focal neurological deficits noted.            Xr Spine Lumbar 2 Or 3 View    Result Date: 11/10/2020  Narrative: AP and lateral views of the lumbar spine reveal no evidence of acute fracture or subluxation.  There is straightening of the lumbar lordosis noted.  The spine appears well-aligned.  The previously noted minimal anterolisthesis at L5-S1 on prior MRI images from 6/13/2019 is not well appreciated on this exam.  Intervertebral disc spacing appears well-maintained throughout.  Vertebral body heights appear well-maintained throughout.  There is evidence of facet arthrosis noted in the lower  lumbar spine.  The bones appear well-mineralized.  No acute bony radiologic abnormalities are noted at this time.11/10/20 at 17:22 CST by NELSY Escobar     MRI lumbar spine     HISTORY: Low back pain.      Prior exam: CT abdomen pelvis January 13, 2018.     TECHNIQUE: Multiplanar multisequence noncontrast images.     There are pars defects, spondylolysis of the pars  interarticularis at L5, bilaterally. There is a minimal 0.27 cm  anterior spondylolisthesis of L5, with respect to S1. A  combination of the anterior spondylolisthesis and facet arthrosis  cause mild bilateral foraminal stenosis. A small posterior  annular tear is observed at L5-S1 without evidence of any focal  disc protrusion.     A small broad-based central disc protrusion is noted at L4-L5  causing minimal effacement anterior aspect of thecal sac. No  central canal stenosis.     MRI lumbar spine is otherwise unremarkable.     IMPRESSION:  CONCLUSION: As above.     Electronically signed by:  Jerry An MD  6/14/2019 8:36 PM CDT  Workstation: [id number]      ASSESSMENT:    Diagnoses and all orders for this visit:    Chronic midline low back pain with bilateral sciatica  -     IR epidural block injection lumbar spine; Future    Spondylolisthesis of lumbar region  -     IR epidural block injection lumbar spine; Future    DDD (degenerative disc disease), lumbar  -     IR epidural block injection lumbar spine; Future    PLAN    X-rays are repeated in office today of the lumbar spine reviewed with no acute findings noted.  Patient complains of progressively worsening chronic low back pain, which has been an ongoing issue for several years.  He has increased pain with activity.  Patient complains of pain that radiates into both his legs, primarily the upper legs, questionable radicular pain.  Patient has chronic numbness in his lower legs and feet that he attributes to diabetic neuropathy.  He previously was treated with Neurontin for this but took it  for a while and states it did not seem to be helping so he discontinued it.  Patient was evaluated previously per Dr. Dutta.  Patient had a lumbar epidural injection previously in July 2019, which offered him significant pain improvement for over 1 year.  Patient would like to repeat a lumbar epidural injection in an effort to manage his chronic low back pain.  We discussed that Dr. Dutta is no longer with this practice and there are no orthopedic spine surgeons here locally.  I have offered to refer him to a neurosurgeon or orthopedic spine surgeon in the surrounding area or if he wants to just repeat the lumbar epidural injection, I can refer him to Dr. Trujillo at [facility] to have this injection performed.  Patient wants to be referred to Dr. Trujillo to have a repeat lumbar epidural injection.  Patient is noted now to be on Eliquis and we discussed that they will provide him with instructions on how many days to stop his Eliquis prior to the injection.  Patient verbalized understanding.  Continue with rest and activity modification as needed during times of increased pain.  Recommend use of ice therapy and/or heat therapy to the low back as needed to minimize pain/inflammation/muscle tension.  I think it is best to avoid systemic steroids (oral, intramuscular) due to his type 1 diabetes and the likelihood of it causing hyperglycemia.  Patient should also avoid oral NSAIDs due to his use of Eliquis and the increased risk for bleeding.  Follow-up 6 weeks post injection for recheck as needed for any new, worsening or persistent symptoms.  If his pain significantly improves again, he can follow-up on an as-needed basis.  If pain persists or worsens, consider repeat MRI of the lumbar spine.    Patient's Body mass index is 32.21 kg/m². BMI is above normal parameters. Recommendations include: exercise counseling and nutrition counseling.    Return if symptoms worsen or fail to improve, for 6 weeks post injection.       This document has been electronically signed by NELSY Escobar on November 11, 2020 15:39 CST      NELSY Escobar

## 2020-11-13 RX ORDER — AMLODIPINE BESYLATE 5 MG/1
5 TABLET ORAL DAILY
Qty: 30 TABLET | Refills: 11 | Status: SHIPPED | OUTPATIENT
Start: 2020-11-13 | End: 2021-05-05 | Stop reason: HOSPADM

## 2020-11-13 RX ORDER — LANCING DEVICE/LANCETS
KIT MISCELLANEOUS
Qty: 1 EACH | Refills: 11 | Status: SHIPPED | OUTPATIENT
Start: 2020-11-13

## 2020-11-13 RX ORDER — CLOPIDOGREL BISULFATE 75 MG/1
75 TABLET ORAL DAILY
Qty: 30 TABLET | Refills: 11 | Status: SHIPPED | OUTPATIENT
Start: 2020-11-13 | End: 2021-05-04

## 2021-01-14 PROCEDURE — 87635 SARS-COV-2 COVID-19 AMP PRB: CPT | Performed by: NURSE PRACTITIONER

## 2021-01-18 ENCOUNTER — CONSULT (OUTPATIENT)
Dept: SLEEP MEDICINE | Facility: HOSPITAL | Age: 56
End: 2021-01-18

## 2021-01-18 VITALS
WEIGHT: 253.5 LBS | BODY MASS INDEX: 32.53 KG/M2 | HEIGHT: 74 IN | OXYGEN SATURATION: 98 % | HEART RATE: 88 BPM | SYSTOLIC BLOOD PRESSURE: 187 MMHG | DIASTOLIC BLOOD PRESSURE: 108 MMHG

## 2021-01-18 DIAGNOSIS — R06.83 SNORING: ICD-10-CM

## 2021-01-18 DIAGNOSIS — G47.19 EXCESSIVE DAYTIME SLEEPINESS: Primary | ICD-10-CM

## 2021-01-18 PROCEDURE — 99215 OFFICE O/P EST HI 40 MIN: CPT | Performed by: NURSE PRACTITIONER

## 2021-01-18 NOTE — PROGRESS NOTES
New Patient Sleep Medicine Consultation    Encounter Date: 2021         Patient's Primary Care Provider: Artemio Sanchez MD  Referring Provider: Sandra Sorto APRN  Reason for consultation/chief complaint: snoring, loud disruptive snoring, excessive daytime sleepiness and unrefreshing sleep    Herbert White Sr. is a 55 y.o. male who admits to snoring, unrestful sleep, High blod pressure, morning headaches, frequent unexplained arousals, irritability, sleeping less than 6 hours per night, Disturbed or restless sleep, Up to the bathroom at night, night sweats and difficulty staying asleep.     He denies cataplexy, sleep paralysis, or hypnagogic hallucinations. His bedtime is ~ 0000. He  falls asleep after 30 minutes, and is up 3-4 times per night. He wakes up ~ 9084-1046. He endorses 5-6 hours of sleep. He drinks 0 cups of coffee, 0 teas, and 5 sodas per day. He drinks 0 alcoholic beverages per week. He is not a current smoker. He does not take sedatives or hypnotics. He has no sleepiness with driving. He naps every few days.     Of note, patient was >400 lbs when he first had a sleep study done ~15 years ago. Patient also has a history of CAD 1 year S/P CABG with also a history of paroxysmal atrial fibrillation.    [place] - 15    Prior Sleep Testin. PSG ~15 years ago in [place], AHI of ?, was on PAP therapy until about 6 years ago    Past Medical History:   Diagnosis Date   • Arthritis     c/o joint pain   • Coronary artery disease    • Diabetes mellitus (CMS/[facility])    • Hyperlipidemia    • Hypertension    • Retinopathy of both eyes    • Sleep apnea     not using c-pap     Social History     Socioeconomic History   • Marital status:      Spouse name: Not on file   • Number of children: Not on file   • Years of education: Not on file   • Highest education level: Not on file   Tobacco Use   • Smoking status: Former Smoker     Packs/day: 1.00     Years: 35.00     Pack years: 35.00  "    Types: Cigarettes     Start date: 1980     Quit date: 9/3/2019     Years since quittin.3   • Smokeless tobacco: Never Used   • Tobacco comment: quit after heart cath   Substance and Sexual Activity   • Alcohol use: No   • Drug use: No   • Sexual activity: Defer     Family History   Problem Relation Age of Onset   • Diabetes Mother    • Hypertension Mother    • Cancer Mother    • Diabetes Father    • Hypertension Father    • Cancer Father      Prior T&A, UPPP, maxillofacial, or bariatric surgery: Tonsillectomy & adenoidectomy  Family history of sleep disorders: unknown  Other family history + for: As above  Occupation: [employer]  Marital status: Unmarried, in a relationship  Children: 2  Has 1 brothers and 0 sisters  Smoking history: smoked 2 ppds from age 15 until 54      Review of Systems:  Constitutional: negative  Eyes: negative  Ears, nose, mouth, throat, and face: negative  Respiratory: positive for dyspnea on exertion  Cardiovascular: positive for chest pressure/discomfort, dyspnea, fatigue, irregular heart beat and palpitations  Gastrointestinal: negative  Genitourinary:negative  Integument/breast: negative  Hematologic/lymphatic: negative  Musculoskeletal:negative  Neurological: negative  Behavioral/Psych: negative  Endocrine: negative  Allergic/Immunologic: negative   Patient advised to discuss any positive ROS with PCP.      Vitals:    21 0942   BP: (!) 187/108   Pulse: 88   SpO2: 98%           21  0942   Weight: 115 kg (253 lb 8 oz)       Body mass index is 32.53 kg/m². Patient's Body mass index is 32.53 kg/m². BMI is above normal parameters. Recommendations include: referral to primary care.      Physical Exam:        General: Alert. Cooperative. Well developed. No acute distress.   Head/Neck:  Normocephalic. Symmetrical. Atraumatic.     Neck circumference: 18.5\"             Eyes: Sclera clear. No icterus. PERRLA. Normal EOM.             Ears: No deformities. Normal " hearing.             Nose: No septal deviation. No drainage.          Throat: No oral lesions. No thrush. Moist mucous membranes. Trachea midline    Tongue is normal     Elongated uvula    Dentition is poor       Pharynx: Posterior pharyngeal pillars are narrow    Mallampati score of IV (only hard palate visible)    Pharynx is normal with unrermarkable tonsils   Chest Wall:  Normal shape. Symmetric expansion with respiration. No tenderness.          Lungs:  Clear to auscultation bilaterally. No wheezes. No rhonchi. No rales. Respirations regular, even and unlabored.            Heart:  Regular rhythm and normal rate. Normal S1 and S2. No murmur.     Abdomen:  Soft, non-tender and non-distended. Normal bowel sounds. No masses.  Extremities:  Moves all extremities well. No edema.           Pulses: Pulses palpable and equal bilaterally.               Skin: Dry. Intact. No bleeding. No rash.           Neuro: Moves all 4 extremities and cranial nerves grossly intact.  Psychiatric: Normal mood and affect.      Current Outpatient Medications:   •  albuterol sulfate  (90 Base) MCG/ACT inhaler, Inhale 2 puffs Every 4 (Four) Hours As Needed for Wheezing., Disp: 18 g, Rfl: 11  •  amLODIPine (Norvasc) 5 MG tablet, Take 1 tablet by mouth Daily., Disp: 30 tablet, Rfl: 11  •  apixaban (ELIQUIS) 5 MG tablet tablet, Take 1 tablet by mouth Every 12 (Twelve) Hours., Disp: 60 tablet, Rfl: 3  •  aspirin 81 MG chewable tablet, Chew 81 mg Daily., Disp: , Rfl:   •  atorvastatin (LIPITOR) 40 MG tablet, Take 1 tablet by mouth Every Night., Disp: 30 tablet, Rfl: 3  •  benzonatate (TESSALON) 200 MG capsule, Take 1 capsule by mouth 3 (Three) Times a Day As Needed for Cough., Disp: 30 capsule, Rfl: 0  •  clopidogrel (PLAVIX) 75 MG tablet, Take 1 tablet by mouth Daily., Disp: 30 tablet, Rfl: 11  •  glimepiride (Amaryl) 1 MG tablet, Take 0.5-1 tablets by mouth Every Morning Before Breakfast., Disp: 30 tablet, Rfl: 2  •  isosorbide  mononitrate (IMDUR) 30 MG 24 hr tablet, Take 1 tablet by mouth Daily., Disp: 30 tablet, Rfl: 11  •  Lancet Devices (ONE TOUCH DELICA LANCING DEV) misc, Use as directed to test blood sugar., Disp: 1 each, Rfl: 11  •  magnesium oxide (MAGOX) 400 (241.3 Mg) MG tablet tablet, Take 1 tablet by mouth 2 (Two) Times a Day., Disp: 60 each, Rfl: 3  •  metFORMIN (GLUCOPHAGE) 500 MG tablet, Take 2 tablets by mouth 2 (Two) Times a Day With Meals., Disp: 360 tablet, Rfl: 0  •  metoprolol succinate XL (TOPROL-XL) 25 MG 24 hr tablet, Take 1 tablet by mouth Daily., Disp: 30 tablet, Rfl: 1  •  nitroglycerin (NITROSTAT) 0.4 MG SL tablet, Place 1 tablet under the tongue Every 5 (Five) Minutes As Needed for Chest Pain. Take no more than 3 doses in 15 minutes., Disp: 30 tablet, Rfl: 1  •  nortriptyline (PAMELOR) 10 MG capsule, Take 1 capsule by mouth 3 (Three) Times a Day., Disp: 90 capsule, Rfl: 11  •  ONETOUCH DELICA LANCETS 33G misc, 1 each 4 (Four) Times a Day. delica if covered, use alternative if not covered, Disp: 120 each, Rfl: 11  •  pioglitazone (Actos) 30 MG tablet, Take 1 tablet by mouth Daily., Disp: 30 tablet, Rfl: 2  •  promethazine-dextromethorphan (PROMETHAZINE-DM) 6.25-15 MG/5ML syrup, Take 5 mL by mouth At Night As Needed for Cough., Disp: 120 mL, Rfl: 0  •  spironolactone (Aldactone) 25 MG tablet, Take 1 tablet by mouth Daily., Disp: 30 tablet, Rfl: 11    WBC   Date Value Ref Range Status   10/14/2020 7.25 3.40 - 10.80 10*3/mm3 Final     RBC   Date Value Ref Range Status   10/14/2020 5.49 4.14 - 5.80 10*6/mm3 Final     Hemoglobin   Date Value Ref Range Status   10/14/2020 16.8 13.0 - 17.7 g/dL Final     Hematocrit   Date Value Ref Range Status   10/14/2020 47.5 37.5 - 51.0 % Final     MCV   Date Value Ref Range Status   10/14/2020 86.5 79.0 - 97.0 fL Final     MCH   Date Value Ref Range Status   10/14/2020 30.6 26.6 - 33.0 pg Final     MCHC   Date Value Ref Range Status   10/14/2020 35.4 31.5 - 35.7 g/dL Final     RDW    Date Value Ref Range Status   10/14/2020 13.2 12.3 - 15.4 % Final     RDW-SD   Date Value Ref Range Status   10/14/2020 40.9 37.0 - 54.0 fl Final     MPV   Date Value Ref Range Status   10/14/2020 11.3 6.0 - 12.0 fL Final     Platelets   Date Value Ref Range Status   10/14/2020 190 140 - 450 10*3/mm3 Final     Neutrophil %   Date Value Ref Range Status   08/21/2020 56.7 42.7 - 76.0 % Final     Lymphocyte %   Date Value Ref Range Status   08/21/2020 31.2 19.6 - 45.3 % Final     Monocyte %   Date Value Ref Range Status   08/21/2020 6.6 5.0 - 12.0 % Final     Eosinophil %   Date Value Ref Range Status   08/21/2020 4.1 0.3 - 6.2 % Final     Basophil %   Date Value Ref Range Status   08/21/2020 0.6 0.0 - 1.5 % Final     Immature Grans %   Date Value Ref Range Status   08/21/2020 0.8 (H) 0.0 - 0.5 % Final     Neutrophils, Absolute   Date Value Ref Range Status   08/21/2020 3.64 1.70 - 7.00 10*3/mm3 Final     Lymphocytes, Absolute   Date Value Ref Range Status   08/21/2020 2.00 0.70 - 3.10 10*3/mm3 Final     Monocytes, Absolute   Date Value Ref Range Status   08/21/2020 0.42 0.10 - 0.90 10*3/mm3 Final     Eosinophils, Absolute   Date Value Ref Range Status   08/21/2020 0.26 0.00 - 0.40 10*3/mm3 Final     Basophils, Absolute   Date Value Ref Range Status   08/21/2020 0.04 0.00 - 0.20 10*3/mm3 Final     Immature Grans, Absolute   Date Value Ref Range Status   08/21/2020 0.05 0.00 - 0.05 10*3/mm3 Final     nRBC   Date Value Ref Range Status   08/21/2020 0.0 0.0 - 0.2 /100 WBC Final     Lab Results   Component Value Date    GLUCOSE 246 (H) 10/14/2020    BUN 12 10/14/2020    CREATININE 0.87 10/14/2020    EGFRIFNONA 91 10/14/2020    BCR 13.8 10/14/2020    K 4.3 10/14/2020    CO2 28.3 10/14/2020    CALCIUM 9.4 10/14/2020    ALBUMIN 4.20 10/14/2020    AST 28 10/14/2020    ALT <5 10/14/2020       Contraindications to home sleep test: Patient has a history of obstructive sleep apnea, and uncontrolled on current settings and  coronary artery disease    ASSESSMENT:  1. Excessive daytime sleepiness, presumed obstructive sleep apnea - New (to me), additional work-up planned (4)  1. Check diagnostic polysomnography   2. Snoring, presumed obstructive sleep apnea - New (to me), additional work-up planned (4)  1. As above      I spent 45 minutes caring for Herbert on this date of service. This time includes time spent by me in the following activities: preparing for the visit, obtaining and/or reviewing a separately obtained history, performing a medically appropriate examination and/or evaluation , counseling and educating the patient/family/caregiver, ordering medications, tests, or procedures and documenting information in the medical record; discussing Further testing    RTC 2 weeks after testing. Patient agrees to return sooner if changes in symptoms.           This document has been electronically signed by NELSY Granger on January 18, 2021 09:57 CST          CC: Artemio Sanchez MD Coyle, Maryssa J, APRN

## 2021-01-31 ENCOUNTER — HOSPITAL ENCOUNTER (OUTPATIENT)
Dept: SLEEP MEDICINE | Facility: HOSPITAL | Age: 56
Discharge: HOME | End: 2021-01-31
Admitting: NURSE PRACTITIONER

## 2021-01-31 DIAGNOSIS — G47.19 EXCESSIVE DAYTIME SLEEPINESS: ICD-10-CM

## 2021-01-31 DIAGNOSIS — R06.83 SNORING: ICD-10-CM

## 2021-01-31 PROCEDURE — 95810 POLYSOM 6/> YRS 4/> PARAM: CPT | Performed by: PSYCHIATRY & NEUROLOGY

## 2021-01-31 PROCEDURE — 95810 POLYSOM 6/> YRS 4/> PARAM: CPT

## 2021-02-10 ENCOUNTER — TELEPHONE (OUTPATIENT)
Dept: SLEEP MEDICINE | Facility: HOSPITAL | Age: 56
End: 2021-02-10

## 2021-02-10 DIAGNOSIS — I48.91 ATRIAL FIBRILLATION, UNSPECIFIED TYPE (HCC): ICD-10-CM

## 2021-02-10 DIAGNOSIS — G47.19 EXCESSIVE DAYTIME SLEEPINESS: ICD-10-CM

## 2021-02-10 DIAGNOSIS — G47.33 OSA (OBSTRUCTIVE SLEEP APNEA): Primary | ICD-10-CM

## 2021-02-10 DIAGNOSIS — E66.01 MORBID (SEVERE) OBESITY DUE TO EXCESS CALORIES (HCC): ICD-10-CM

## 2021-02-10 NOTE — TELEPHONE ENCOUNTER
Patient called and was given results of sleep study.  Patient understood and agreed to proceed with further testing as ordered by the physician.  In lab titration study was scheduled for March 7,2021 at 8pm with Covid screenscheduled prior to study.

## 2021-03-05 ENCOUNTER — LAB (OUTPATIENT)
Dept: LAB | Facility: HOSPITAL | Age: 56
End: 2021-03-05

## 2021-03-05 DIAGNOSIS — G47.33 OSA (OBSTRUCTIVE SLEEP APNEA): ICD-10-CM

## 2021-03-05 DIAGNOSIS — E66.01 MORBID (SEVERE) OBESITY DUE TO EXCESS CALORIES (HCC): ICD-10-CM

## 2021-03-05 DIAGNOSIS — I48.91 ATRIAL FIBRILLATION, UNSPECIFIED TYPE (HCC): ICD-10-CM

## 2021-03-05 DIAGNOSIS — G47.19 EXCESSIVE DAYTIME SLEEPINESS: ICD-10-CM

## 2021-03-05 LAB — SARS-COV-2 N GENE RESP QL NAA+PROBE: NOT DETECTED

## 2021-03-05 PROCEDURE — 87635 SARS-COV-2 COVID-19 AMP PRB: CPT

## 2021-03-05 PROCEDURE — C9803 HOPD COVID-19 SPEC COLLECT: HCPCS

## 2021-03-07 ENCOUNTER — APPOINTMENT (OUTPATIENT)
Dept: SLEEP MEDICINE | Facility: HOSPITAL | Age: 56
End: 2021-03-07

## 2021-03-09 ENCOUNTER — DOCUMENTATION (OUTPATIENT)
Dept: SLEEP MEDICINE | Facility: HOSPITAL | Age: 56
End: 2021-03-09

## 2021-03-09 DIAGNOSIS — G47.33 OSA (OBSTRUCTIVE SLEEP APNEA): Primary | ICD-10-CM

## 2021-03-09 NOTE — PROGRESS NOTES
[id number] occurred with patient's insurance.  They decline in lab titration.  Stated that given A. fib is controlled and paroxysmal it does not meet the criteria for in lab study.  I discussed the [facility] practice parameters and concern for treatment emergent central sleep apnea but this was refused by the reviewer.  They have approved him for an auto titrating machine.  Order will be placed we will plan for close clinic follow-up.     Ad Crews II, MD  03/09/21 @ 9:07 AM CST

## 2021-03-25 ENCOUNTER — TELEPHONE (OUTPATIENT)
Dept: FAMILY MEDICINE CLINIC | Facility: CLINIC | Age: 56
End: 2021-03-25

## 2021-03-25 DIAGNOSIS — R10.9 ABDOMINAL PAIN, UNSPECIFIED ABDOMINAL LOCATION: Primary | ICD-10-CM

## 2021-03-25 DIAGNOSIS — K59.00 CONSTIPATION, UNSPECIFIED CONSTIPATION TYPE: ICD-10-CM

## 2021-04-12 ENCOUNTER — HOSPITAL ENCOUNTER (EMERGENCY)
Facility: HOSPITAL | Age: 56
Discharge: HOME | End: 2021-04-12
Attending: EMERGENCY MEDICINE | Admitting: EMERGENCY MEDICINE

## 2021-04-12 VITALS
BODY MASS INDEX: 32.47 KG/M2 | SYSTOLIC BLOOD PRESSURE: 165 MMHG | WEIGHT: 253 LBS | RESPIRATION RATE: 17 BRPM | HEART RATE: 70 BPM | HEIGHT: 74 IN | DIASTOLIC BLOOD PRESSURE: 83 MMHG | OXYGEN SATURATION: 97 % | TEMPERATURE: 97.7 F

## 2021-04-12 DIAGNOSIS — M54.42 ACUTE LEFT-SIDED LOW BACK PAIN WITH LEFT-SIDED SCIATICA: Primary | ICD-10-CM

## 2021-04-12 PROCEDURE — 99283 EMERGENCY DEPT VISIT LOW MDM: CPT

## 2021-04-12 RX ORDER — HYDROCODONE BITARTRATE AND ACETAMINOPHEN 5; 325 MG/1; MG/1
1 TABLET ORAL EVERY 6 HOURS PRN
Qty: 8 TABLET | Refills: 0 | Status: SHIPPED | OUTPATIENT
Start: 2021-04-12 | End: 2021-05-21

## 2021-04-12 RX ORDER — HYDROCODONE BITARTRATE AND ACETAMINOPHEN 5; 325 MG/1; MG/1
1 TABLET ORAL ONCE
Status: COMPLETED | OUTPATIENT
Start: 2021-04-12 | End: 2021-04-12

## 2021-04-12 RX ADMIN — HYDROCODONE BITARTRATE AND ACETAMINOPHEN 1 TABLET: 5; 325 TABLET ORAL at 19:14

## 2021-04-12 NOTE — ED NOTES
Pt presents to the ED with c/o back pain that radiates down the leg. Pt reports chronic back pain but the pain is worse today.      Jo Ann Milner RN  04/12/21 [id number]

## 2021-04-12 NOTE — ED PROVIDER NOTES
Subjective   Patient presents to the ER with c/o ongoing low back pain and left leg numbness and weakness. He was seen for this about 2 months ago at the urgent care. Was given [place] 5 for the pain and referred back to the pain clinic where he was being treated for low back pain and right leg sciatica. No pain meds prescribed by pain clinic as he was referred there by ortho for LESI injections only. It was felt at his visit with pain clinic on 2/18/21 patient was not a good canadate for another steroid injection due to his DM and currently on Plavix and Eliquis. He was advised to complete MRI which was completed 3/18/21 and showed L4/L5 and L5/S1 disc protrusion without nerve root compression. He states his pain worsened today while at work and he took his last Norco that was provided to him by the  in February. He called the pain clinic for result on MRI and further treatment options and he was told they would refer him to neuro surgery. No further treatment was provided. He states his pain does improve with use of the Norco but he is now out and his pain starting to get worse. He reports his blood sugars run between 150-200 and his last A1C was in the 8s.           Review of Systems   Constitutional: Negative for chills and fever.   Respiratory: Negative for cough and shortness of breath.    Cardiovascular: Negative for chest pain and palpitations.   Gastrointestinal: Negative for abdominal pain, diarrhea, nausea and vomiting.        Denies loss of bowel   Genitourinary: Negative.         Denies loss of bladder   Musculoskeletal: Positive for back pain (left leg sciatica) and gait problem.   Skin: Negative.    Neurological: Positive for weakness (left leg - no change) and numbness (left leg - no change).       Past Medical History:   Diagnosis Date   • Arthritis     c/o joint pain   • Coronary artery disease    • Diabetes mellitus (CMS/[facility])    • Hyperlipidemia    • Hypertension    • Retinopathy of both eyes    •  "Sleep apnea     not using c-pap       Allergies   Allergen Reactions   • Ace Inhibitors Anaphylaxis   • Wellbutrin [Bupropion] Anaphylaxis       Past Surgical History:   Procedure Laterality Date   • CARDIAC CATHETERIZATION N/A 9/3/2019    Procedure: Coronary angiography/PCI if indicated;  Surgeon: Denia Dwyer MD;  Location: [facility] INVASIVE LOCATION;  Service: Cardiology   • CHOLECYSTECTOMY     • CORONARY ARTERY BYPASS GRAFT N/A 2019    Procedure: CORONARY ARTERY BYPASS GRAFTING, ENDOSCOPIC VEIN HARVEST       (CELL SAVER);  Surgeon: Xavier Ballard MD;  Location: [facility] OR;  Service: Cardiothoracic   • TONSILLECTOMY         Family History   Problem Relation Age of Onset   • Diabetes Mother    • Hypertension Mother    • Cancer Mother    • Diabetes Father    • Hypertension Father    • Cancer Father        Social History     Socioeconomic History   • Marital status:      Spouse name: Not on file   • Number of children: Not on file   • Years of education: Not on file   • Highest education level: Not on file   Tobacco Use   • Smoking status: Former Smoker     Packs/day: 1.00     Years: 35.00     Pack years: 35.00     Types: Cigarettes     Start date: 1980     Quit date: 9/3/2019     Years since quittin.6   • Smokeless tobacco: Never Used   • Tobacco comment: quit after heart cath   Substance and Sexual Activity   • Alcohol use: No   • Drug use: No   • Sexual activity: Defer           Objective    /83 (BP Location: Left arm, Patient Position: Sitting)   Pulse 70   Temp 97.7 °F (36.5 °C) (Tympanic)   Resp 17   Ht 188 cm (74\")   Wt 115 kg (253 lb)   SpO2 97%   BMI 32.48 kg/m²     Physical Exam  Vitals and nursing note reviewed.   Constitutional:       General: He is not in acute distress.     Appearance: He is well-developed. He is not ill-appearing.   HENT:      Head: Normocephalic and atraumatic.   Cardiovascular:      Rate and Rhythm: Normal rate and regular rhythm.      Heart " sounds: Normal heart sounds. No murmur heard.     Pulmonary:      Effort: Pulmonary effort is normal.      Breath sounds: Normal breath sounds.   Abdominal:      Palpations: Abdomen is soft.      Tenderness: There is no abdominal tenderness.   Musculoskeletal:         General: Normal range of motion.      Cervical back: Normal range of motion and neck supple.      Comments: Mild weakness noted to left leg, bilateral DTR equal, tenderness to left lower back/hip region with pain recreated.    Skin:     General: Skin is warm and dry.      Capillary Refill: Capillary refill takes less than 2 seconds.   Neurological:      Mental Status: He is alert and oriented to person, place, and time.      Motor: Weakness present.      Coordination: Coordination normal.      Gait: Gait (steady with use of cane) normal.      Deep Tendon Reflexes: Reflexes normal.   Psychiatric:         Behavior: Behavior normal.         Thought Content: Thought content normal.         Judgment: Judgment normal.         Procedures  MRI from 3/18/21 reviewed in Care Everywhere.         ED Course  ED Course as of Apr 12 1930 Mon Apr 12, 2021   [id number] [facility] report reviewed. One prescription in last 12 months filled on 2/13/21 for Norco 5 sixteen tablets.     [SH]      ED Course User Index  [SH] Tianna Murdock, APRN                                           [facility]    Final diagnoses:   Acute left-sided low back pain with left-sided sciatica       ED Disposition  ED Disposition     ED Disposition Condition Comment    Discharge Stable           Pain Clinic      Call tomorrow         Medication List      New Prescriptions    HYDROcodone-acetaminophen 5-325 MG per tablet  Commonly known as: NORCO  Take 1 tablet by mouth Every 6 (Six) Hours As Needed for Moderate Pain .           Where to Get Your Medications      These medications were sent to [facility] [address] [place] [address] [address] [phone] [facility] [phone]   [address] [facility] [address]    Phone: [phone]   · HYDROcodone-acetaminophen 5-325 MG per tablet          Tianna Murdock, APRN  04/15/21 0243

## 2021-04-13 NOTE — DISCHARGE INSTRUCTIONS
Call the pain clinic tomorrow to discuss pain management until appointment with neuro surgery can be completed. Do not drive or operate heavy machinery while taking the pain medication. Return to the ER as needed.

## 2021-04-21 ENCOUNTER — OFFICE VISIT (OUTPATIENT)
Dept: GASTROENTEROLOGY | Facility: CLINIC | Age: 56
End: 2021-04-21

## 2021-04-21 VITALS
HEART RATE: 89 BPM | WEIGHT: 262.2 LBS | SYSTOLIC BLOOD PRESSURE: 182 MMHG | HEIGHT: 74 IN | DIASTOLIC BLOOD PRESSURE: 100 MMHG | BODY MASS INDEX: 33.65 KG/M2

## 2021-04-21 DIAGNOSIS — Z12.11 ENCOUNTER FOR SCREENING COLONOSCOPY: Primary | ICD-10-CM

## 2021-04-21 PROBLEM — M50.20 HERNIATED CERVICAL INTERVERTEBRAL DISC: Status: ACTIVE | Noted: 2019-05-25

## 2021-04-21 PROBLEM — R20.0 NUMBNESS AND TINGLING OF BOTH FEET: Status: ACTIVE | Noted: 2017-01-25

## 2021-04-21 PROBLEM — E10.69 MIXED DIABETIC HYPERLIPIDEMIA ASSOCIATED WITH TYPE 1 DIABETES MELLITUS: Status: RESOLVED | Noted: 2017-01-31 | Resolved: 2021-04-21

## 2021-04-21 PROBLEM — Z72.0 NICOTINE ABUSE: Status: RESOLVED | Noted: 2019-07-24 | Resolved: 2021-04-21

## 2021-04-21 PROBLEM — R20.2 NUMBNESS AND TINGLING OF BOTH FEET: Status: ACTIVE | Noted: 2017-01-25

## 2021-04-21 PROBLEM — E78.2 MIXED DIABETIC HYPERLIPIDEMIA ASSOCIATED WITH TYPE 1 DIABETES MELLITUS: Status: RESOLVED | Noted: 2017-01-31 | Resolved: 2021-04-21

## 2021-04-21 PROCEDURE — S0285 CNSLT BEFORE SCREEN COLONOSC: HCPCS | Performed by: NURSE PRACTITIONER

## 2021-04-21 RX ORDER — DEXTROSE AND SODIUM CHLORIDE 5; .45 G/100ML; G/100ML
30 INJECTION, SOLUTION INTRAVENOUS CONTINUOUS PRN
Status: CANCELLED | OUTPATIENT
Start: 2021-05-24

## 2021-04-21 NOTE — PATIENT INSTRUCTIONS
Colorectal Cancer Screening    Colorectal cancer screening is a group of tests that are used to check for colorectal cancer before symptoms develop. Colorectal refers to the colon and rectum. The colon and rectum are located at the end of the digestive tract and carry bowel movements out of the body.  Who should have screening?  All adults starting at age 50 until age 75 should have screening. Your health care provider may recommend screening at age 45. You will have tests every 1-10 years, depending on your results and the type of screening test.  You may have screening tests starting at an earlier age, or more frequently than other people, if you have any of the following risk factors:  · A personal or family history of colorectal cancer or abnormal growths (polyps).  · Inflammatory bowel disease, such as ulcerative colitis or Crohn's disease.  · A history of having radiation treatment to the abdomen or pelvic area for cancer.  · Colorectal cancer symptoms, such as changes in bowel habits or blood in your stool.  · A type of colon cancer syndrome that is passed from parent to child (hereditary), such as:  ? Wray syndrome.  ? Familial adenomatous polyposis.  ? Turcot syndrome.  ? Peutz-Jeghers syndrome.  Screening recommendations for adults who are 75-85 years old vary depending on health.  How is screening done?  There are several types of colorectal screening tests. You may have one or more of the following:  · Guaiac-based fecal occult blood testing. For this test, a stool (feces) sample is checked for hidden (occult) blood, which could be a sign of colorectal cancer.  · Fecal immunochemical test (FIT). For this test, a stool sample is checked for blood, which could be a sign of colorectal cancer.  · Stool DNA test. For this test, a stool sample is checked for blood and changes in DNA that could lead to colorectal cancer.  · Sigmoidoscopy. During this test, a thin, flexible tube with a camera on the end  (sigmoidoscope) is used to examine the rectum and the lower colon.  · Colonoscopy. During this test, a long, flexible tube with a camera on the end (colonoscope) is used to examine the entire colon and rectum. With a colonoscopy, it is possible to take a sample of tissue (biopsy) and remove small polyps during the test.  · Virtual colonoscopy. Instead of a colonoscope, this type of colonoscopy uses X-rays (CT scan) and computers to produce images of the colon and rectum.  What are the benefits of screening?  Screening reduces your risk for colorectal cancer and can help identify cancer at an early stage, when the cancer can be removed or treated more easily. It is common for polyps to form in the lining of the colon, especially as you age. These polyps may be cancerous or become cancerous over time. Screening can identify these polyps.  What are the risks of screening?  Each screening test may have different risks.  · Stool sample tests have fewer risks than other types of screening tests. However, you may need more tests to confirm results from a stool sample test.  · Screening tests that involve X-rays expose you to low levels of radiation, which may slightly increase your cancer risk. The benefit of detecting cancer outweighs the slight increase in risk.  · Screening tests such as sigmoidoscopy and colonoscopy may place you at risk for bleeding, intestinal damage, infection, or a reaction to medicines given during the exam.  Talk with your health care provider to understand your risk for colorectal cancer and to make a screening plan that is right for you.  Questions to ask your health care provider  · When should I start colorectal cancer screening?  · What is my risk for colorectal cancer?  · How often do I need screening?  · Which screening tests do I need?  · How do I get my test results?  · What do my results mean?  Where to find more information  Learn more about colorectal cancer screening from:  · The  American Cancer Society: www.cancer.org  · The National Cancer Loiza: www.cancer.gov  Summary  · Colorectal cancer screening is a group of tests used to check for colorectal cancer before symptoms develop.  · Screening reduces your risk for colorectal cancer and can help identify cancer at an early stage, when the cancer can be removed or treated more easily.  · All adults starting at age 50 until age 75 should have screening. Your health care provider may recommend screening at age 45.  · You may have screening tests starting at an earlier age, or more frequently than other people, if you have certain risk factors.  · Talk with your health care provider to understand your risk for colorectal cancer and to make a screening plan that is right for you.  This information is not intended to replace advice given to you by your health care provider. Make sure you discuss any questions you have with your health care provider.  Document Revised: 04/08/2020 Document Reviewed: 09/19/2018  Elsevier Patient Education © 2021 [employer]

## 2021-04-21 NOTE — PROGRESS NOTES
Chief Complaint   Patient presents with   • Colon Cancer Screening       Subjective    Herbert White Sr. is a 55 y.o. male. he is being seen for consultation today at the request of Artemio Sanchez MD    55-year-old male presents to discuss screening colonoscopy.  He reports he has had some issues with constipation recently has a bowel movement once per day but does not feel like he is fully emptying and has some lower abdominal cramping and feels like he needs to go but cannot go as much as he would like to.  He was recently placed on a short-term course of pain medication.  He has been taking over-the-counter MiraLAX and Metamucil without great improvement in symptoms.  States he had normal colonoscopy 10 to 15 years ago unable to review those results as it was not done here.  Blood pressure is elevated in office today patient states he did not take his blood pressure medication yet he denies any headache, chest pain, pressure or  tightness.    Constipation  The current episode started more than 1 month ago. The problem has been gradually worsening since onset. His stool frequency is 1 time per day (has to strain and not a lot comes out ). The stool is described as pellet like. The patient is on a high fiber diet. He exercises regularly. There has been adequate water intake. Pertinent negatives include no abdominal pain (lower abdominal cramping with constipation ), anorexia, back pain, bloating, diarrhea, difficulty urinating, fecal incontinence, fever, flatus, hematochezia, hemorrhoids, nausea, rectal pain or vomiting.       Plan; schedule patient for screening colonoscopy.    Discussed with patient if he develops any severe headache chest pain pressure tightness or more elevation in blood pressure when he checks it at home after taking medication he should seek emergency medical attention he verbalized understanding.     The following portions of the patient's history were reviewed and updated as  appropriate:   Past Medical History:   Diagnosis Date   • Arthritis     c/o joint pain   • Coronary artery disease    • Diabetes mellitus (CMS/HCC)    • Hyperlipidemia    • Hypertension    • Retinopathy of both eyes    • Sleep apnea     not using c-pap     Past Surgical History:   Procedure Laterality Date   • CARDIAC CATHETERIZATION N/A 9/3/2019    Procedure: Coronary angiography/PCI if indicated;  Surgeon: Denia Dwyer MD;  Location: [facility] CATH INVASIVE LOCATION;  Service: Cardiology   • CHOLECYSTECTOMY     • CORONARY ARTERY BYPASS GRAFT N/A 9/25/2019    Procedure: CORONARY ARTERY BYPASS GRAFTING, ENDOSCOPIC VEIN HARVEST       (CELL SAVER);  Surgeon: Xavier Ballard MD;  Location: [facility] OR;  Service: Cardiothoracic   • TONSILLECTOMY       Family History   Problem Relation Age of Onset   • Diabetes Mother    • Hypertension Mother    • Cancer Mother    • Diabetes Father    • Hypertension Father    • Cancer Father        Prior to Admission medications    Medication Sig Start Date End Date Taking? Authorizing Provider   albuterol sulfate  (90 Base) MCG/ACT inhaler Inhale 2 puffs Every 4 (Four) Hours As Needed for Wheezing. 8/27/20   Artemio Sanchez MD   amLODIPine (Norvasc) 5 MG tablet Take 1 tablet by mouth Daily. 11/13/20   Artemio Sanchez MD   apixaban (ELIQUIS) 5 MG tablet tablet Take 1 tablet by mouth Every 12 (Twelve) Hours. 7/29/20   Zeferino Fernando APRN   aspirin 81 MG chewable tablet Chew 81 mg Daily.    Provider, MD Hai   atorvastatin (LIPITOR) 40 MG tablet Take 1 tablet by mouth Every Night. 7/29/20   Zeferino Fernando APRN   benzonatate (TESSALON) 200 MG capsule Take 1 capsule by mouth 3 (Three) Times a Day As Needed for Cough. 1/14/21   Claudia Hill APRN   clopidogrel (PLAVIX) 75 MG tablet Take 1 tablet by mouth Daily. 11/13/20   Zeferino Fernando APRN   glimepiride (Amaryl) 1 MG tablet Take 0.5-1 tablets by mouth Every Morning Before Breakfast. 11/5/20   Artemio Sanchez  MD   HYDROcodone-acetaminophen (NORCO) 5-325 MG per tablet Take 1 tablet by mouth Every 6 (Six) Hours As Needed for Moderate Pain . 4/12/21   Tianna Murdock APRN   isosorbide mononitrate (IMDUR) 30 MG 24 hr tablet Take 1 tablet by mouth Daily. 8/27/20   Artemio Snachez MD   Lancet Devices (ONE TOUCH DELICA LANCING DEV) misc Use as directed to test blood sugar. 11/13/20   Alessandro Gan MD   magnesium oxide (MAGOX) 400 (241.3 Mg) MG tablet tablet Take 1 tablet by mouth 2 (Two) Times a Day. 7/29/20   Zeferino Fernando APRN   metFORMIN (GLUCOPHAGE) 500 MG tablet Take 2 tablets by mouth 2 (Two) Times a Day With Meals. 10/30/20   Artemio Sanchez MD   metoprolol succinate XL (TOPROL-XL) 25 MG 24 hr tablet Take 1 tablet by mouth Daily. 8/22/20   Emeka Groves MD   nitroglycerin (NITROSTAT) 0.4 MG SL tablet Place 1 tablet under the tongue Every 5 (Five) Minutes As Needed for Chest Pain. Take no more than 3 doses in 15 minutes. 9/13/19   Marcelino Goode MD   nortriptyline (PAMELOR) 10 MG capsule Take 1 capsule by mouth 3 (Three) Times a Day. 10/29/20   Artemio Sanchez MD   ONETOUCH DELICA LANCETS 33G misc 1 each 4 (Four) Times a Day. delica if covered, use alternative if not covered 1/24/17   Alessandro Gan MD   pioglitazone (Actos) 30 MG tablet Take 1 tablet by mouth Daily. 11/5/20   Artemio Sanchez MD   promethazine-dextromethorphan (PROMETHAZINE-DM) 6.25-15 MG/5ML syrup Take 5 mL by mouth At Night As Needed for Cough. 1/14/21   Claudia Hill APRN   spironolactone (Aldactone) 25 MG tablet Take 1 tablet by mouth Daily. 9/3/20   Artemio Sanchez MD     Allergies   Allergen Reactions   • Ace Inhibitors Anaphylaxis   • Wellbutrin [Bupropion] Anaphylaxis     Social History     Socioeconomic History   • Marital status:      Spouse name: Not on file   • Number of children: Not on file   • Years of education: Not on file   • Highest education level: Not on file   Tobacco  "Use   • Smoking status: Former Smoker     Packs/day: 1.00     Years: 35.00     Pack years: 35.00     Types: Cigarettes     Start date: 1980     Quit date: 9/3/2019     Years since quittin.6   • Smokeless tobacco: Never Used   • Tobacco comment: quit after heart cath   Vaping Use   • Vaping Use: Never used   Substance and Sexual Activity   • Alcohol use: No   • Drug use: No   • Sexual activity: Defer       Review of Systems  Review of Systems   Constitutional: Negative for activity change, appetite change, chills, diaphoresis, fatigue and fever.   Respiratory: Negative for cough and shortness of breath.    Gastrointestinal: Positive for constipation. Negative for abdominal distention, abdominal pain (lower abdominal cramping with constipation ), anal bleeding, anorexia, bloating, blood in stool, diarrhea, flatus, hematochezia, hemorrhoids, nausea, rectal pain and vomiting.   Genitourinary: Negative for difficulty urinating.   Musculoskeletal: Negative for back pain.        BP (!) 182/100 (BP Location: Left arm)   Pulse 89   Ht 188 cm (74\")   Wt 119 kg (262 lb 3.2 oz)   BMI 33.66 kg/m²   BP rechecked manually right- 178/102  Objective    Physical Exam  Constitutional:       General: He is not in acute distress.     Appearance: Normal appearance. He is well-developed.   Neck:      Thyroid: No thyroid mass or thyromegaly.   Cardiovascular:      Rate and Rhythm: Normal rate and regular rhythm.      Heart sounds: Normal heart sounds.   Pulmonary:      Effort: Pulmonary effort is normal. No respiratory distress.      Breath sounds: Normal breath sounds.   Abdominal:      General: Bowel sounds are normal. There is no distension.      Palpations: Abdomen is soft. There is no mass.      Tenderness: There is no abdominal tenderness.      Hernia: No hernia is present.       Lab on 2021   Component Date Value Ref Range Status   • COVID19 2021 Not Detected  Not Detected - Ref. Range Final "     Assessment/Plan      1. Encounter for screening colonoscopy    .       Orders placed during this encounter include:  Orders Placed This Encounter   Procedures   • Follow Anesthesia Guidelines / Standing Orders     Standing Status:   Future   • Obtain Informed Consent     Standing Status:   Future     Order Specific Question:   Informed Consent Given For     Answer:   COLONOSCOPY       COLONOSCOPY (N/A)    Review and/or summary of lab tests, radiology, procedures, medications. Review and summary of old records and obtaining of history. The risks and benefits of my recommendations, as well as other treatment options were discussed with the patient today. Questions were answered.    New Medications Ordered This Visit   Medications   • Sod Picosulfate-Mag Ox-Cit Acd 10-3.5-12 MG-GM -GM/160ML solution     Sig: Take 1 bottle by mouth 1 (One) Time for 1 dose. See admin instructions     Dispense:  160 mL     Refill:  0       Follow-up: Return for Recheck, After test.          This document has been electronically signed by NELSY Escalera on April 21, 2021 09:54 CDT           I spent 14 minutes caring for Herbert on this date of service. This time includes time spent by me in the following activities:preparing for the visit, reviewing tests, obtaining and/or reviewing a separately obtained history, performing a medically appropriate examination and/or evaluation , counseling and educating the patient/family/caregiver, ordering medications, tests, or procedures, referring and communicating with other health care professionals , documenting information in the medical record and care coordination    Results for orders placed or performed in visit on 03/05/21   COVID-19, [facility] IN-HOUSE, NP SWAB IN TRANSPORT MEDIA 8-10 HR TAT - Swab, Nasopharynx    Specimen: Nasopharynx; Swab   Result Value Ref Range    COVID19 Not Detected Not Detected - Ref. Range   Results for orders placed or performed during the hospital encounter of  01/14/21   COVID-19,  [facility] IN-HOUSE, NP SWAB IN TRANSPORT MEDIA 8-10 HR TAT - Swab, Nasopharynx    Specimen: Nasopharynx; Swab   Result Value Ref Range    COVID19 Not Detected Not Detected - Ref. Range   Results for orders placed or performed in visit on 11/05/20   POCT urinalysis dipstick, manual    Specimen: Urine   Result Value Ref Range    Color Yellow Yellow, Straw, Dark Yellow, Kate    Clarity, UA Clear Clear    Glucose, UA Negative Negative, 1000 mg/dL (3+) mg/dL    Bilirubin Small (1+) (A) Negative    Ketones, UA Negative Negative    Specific Gravity  1.025 1.005 - 1.030    Blood, UA Negative Negative    pH, Urine 6.0 5.0 - 8.0    Protein, POC 1+ (A) Negative mg/dL    Urobilinogen, UA Normal Normal    Leukocytes Negative Negative    Nitrite, UA Negative Negative   Results for orders placed or performed in visit on 10/29/20   Vitamin B1, Whole Blood    Specimen: Blood   Result Value Ref Range    Vitamin B1, Whole Blood 209.2 (H) 66.5 - 200.0 nmol/L   Vitamin B6    Specimen: Blood   Result Value Ref Range    Vitamin B6 19.8 5.3 - 46.7 ug/L   Hemoglobin A1c    Specimen: Blood   Result Value Ref Range    Hemoglobin A1C 9.21 (H) 4.80 - 5.60 %   Folate    Specimen: Blood   Result Value Ref Range    Folate 15.90 4.78 - 24.20 ng/mL   Vitamin B12    Specimen: Blood   Result Value Ref Range    Vitamin B-12 230 211 - 946 pg/mL   Results for orders placed or performed in visit on 10/14/20   CBC (No Diff)    Specimen: Blood   Result Value Ref Range    WBC 7.25 3.40 - 10.80 10*3/mm3    RBC 5.49 4.14 - 5.80 10*6/mm3    Hemoglobin 16.8 13.0 - 17.7 g/dL    Hematocrit 47.5 37.5 - 51.0 %    MCV 86.5 79.0 - 97.0 fL    MCH 30.6 26.6 - 33.0 pg    MCHC 35.4 31.5 - 35.7 g/dL    RDW 13.2 12.3 - 15.4 %    RDW-SD 40.9 37.0 - 54.0 fl    MPV 11.3 6.0 - 12.0 fL    Platelets 190 140 - 450 10*3/mm3   TSH    Specimen: Blood   Result Value Ref Range    TSH 0.752 0.270 - 4.200 uIU/mL   Comprehensive metabolic panel    Specimen: Blood    Result Value Ref Range    Glucose 246 (H) 65 - 99 mg/dL    BUN 12 6 - 20 mg/dL    Creatinine 0.87 0.76 - 1.27 mg/dL    Sodium 140 136 - 145 mmol/L    Potassium 4.3 3.5 - 5.2 mmol/L    Chloride 103 98 - 107 mmol/L    CO2 28.3 22.0 - 29.0 mmol/L    Calcium 9.4 8.6 - 10.5 mg/dL    Total Protein 6.8 6.0 - 8.5 g/dL    Albumin 4.20 3.50 - 5.20 g/dL    ALT (SGPT) <5 1 - 41 U/L    AST (SGOT) 28 1 - 40 U/L    Alkaline Phosphatase 71 39 - 117 U/L    Total Bilirubin 0.6 0.0 - 1.2 mg/dL    eGFR Non African Amer 91 >60 mL/min/1.73    Globulin 2.6 gm/dL    A/G Ratio 1.6 g/dL    BUN/Creatinine Ratio 13.8 7.0 - 25.0    Anion Gap 8.7 5.0 - 15.0 mmol/L   Results for orders placed or performed during the hospital encounter of 08/19/20   Gold Top - SST   Result Value Ref Range    Extra Tube Hold for add-ons.    Green Top (Gel)   Result Value Ref Range    Extra Tube Hold for add-ons.    COVID-19, [facility]SE, NP SWAB IN TRANSPORT MEDIA 8-10 HR TAT - Swab, Nasopharynx    Specimen: Nasopharynx; Swab   Result Value Ref Range    COVID19 Not Detected Not Detected - Ref. Range   CBC Auto Differential    Specimen: Blood   Result Value Ref Range    WBC 6.41 3.40 - 10.80 10*3/mm3    RBC 4.71 4.14 - 5.80 10*6/mm3    Hemoglobin 14.3 13.0 - 17.7 g/dL    Hematocrit 38.9 37.5 - 51.0 %    MCV 82.6 79.0 - 97.0 fL    MCH 30.4 26.6 - 33.0 pg    MCHC 36.8 (H) 31.5 - 35.7 g/dL    RDW 12.5 12.3 - 15.4 %    RDW-SD 37.4 37.0 - 54.0 fl    MPV 10.5 6.0 - 12.0 fL    Platelets 169 140 - 450 10*3/mm3    Neutrophil % 56.7 42.7 - 76.0 %    Lymphocyte % 31.2 19.6 - 45.3 %    Monocyte % 6.6 5.0 - 12.0 %    Eosinophil % 4.1 0.3 - 6.2 %    Basophil % 0.6 0.0 - 1.5 %    Immature Grans % 0.8 (H) 0.0 - 0.5 %    Neutrophils, Absolute 3.64 1.70 - 7.00 10*3/mm3    Lymphocytes, Absolute 2.00 0.70 - 3.10 10*3/mm3    Monocytes, Absolute 0.42 0.10 - 0.90 10*3/mm3    Eosinophils, Absolute 0.26 0.00 - 0.40 10*3/mm3    Basophils, Absolute 0.04 0.00 - 0.20 10*3/mm3    Immature  Grans, Absolute 0.05 0.00 - 0.05 10*3/mm3    nRBC 0.0 0.0 - 0.2 /100 WBC   CBC Auto Differential    Specimen: Blood   Result Value Ref Range    WBC 6.00 3.40 - 10.80 10*3/mm3    RBC 5.36 4.14 - 5.80 10*6/mm3    Hemoglobin 15.9 13.0 - 17.7 g/dL    Hematocrit 44.2 37.5 - 51.0 %    MCV 82.5 79.0 - 97.0 fL    MCH 29.7 26.6 - 33.0 pg    MCHC 36.0 (H) 31.5 - 35.7 g/dL    RDW 12.2 (L) 12.3 - 15.4 %    RDW-SD 36.6 (L) 37.0 - 54.0 fl    MPV 10.6 6.0 - 12.0 fL    Platelets 175 140 - 450 10*3/mm3    Neutrophil % 52.1 42.7 - 76.0 %    Lymphocyte % 35.2 19.6 - 45.3 %    Monocyte % 7.7 5.0 - 12.0 %    Eosinophil % 3.8 0.3 - 6.2 %    Basophil % 0.7 0.0 - 1.5 %    Immature Grans % 0.5 0.0 - 0.5 %    Neutrophils, Absolute 3.13 1.70 - 7.00 10*3/mm3    Lymphocytes, Absolute 2.11 0.70 - 3.10 10*3/mm3    Monocytes, Absolute 0.46 0.10 - 0.90 10*3/mm3    Eosinophils, Absolute 0.23 0.00 - 0.40 10*3/mm3    Basophils, Absolute 0.04 0.00 - 0.20 10*3/mm3    Immature Grans, Absolute 0.03 0.00 - 0.05 10*3/mm3    nRBC 0.0 0.0 - 0.2 /100 WBC     *Note: Due to a large number of results and/or encounters for the requested time period, some results have not been displayed. A complete set of results can be found in Results Review.

## 2021-05-03 RX ORDER — METOPROLOL SUCCINATE 25 MG/1
25 TABLET, EXTENDED RELEASE ORAL
Qty: 90 TABLET | Refills: 3 | Status: SHIPPED | OUTPATIENT
Start: 2021-05-03 | End: 2021-12-20 | Stop reason: DRUGHIGH

## 2021-05-03 NOTE — TELEPHONE ENCOUNTER
----- Message from Sunitha Avilez sent at 2021  9:30 AM CDT -----  Regarding: REFILL  Herbert Cindy  [phone] wanted a refill on his Metoprolol succinate XL 25 mg @ [place], [place]. Thxs

## 2021-05-04 ENCOUNTER — APPOINTMENT (OUTPATIENT)
Dept: CARDIOLOGY | Facility: HOSPITAL | Age: 56
End: 2021-05-04

## 2021-05-04 ENCOUNTER — HOSPITAL ENCOUNTER (OUTPATIENT)
Facility: HOSPITAL | Age: 56
Setting detail: OBSERVATION
Discharge: HOME | End: 2021-05-05
Attending: FAMILY MEDICINE | Admitting: INTERNAL MEDICINE

## 2021-05-04 ENCOUNTER — APPOINTMENT (OUTPATIENT)
Dept: GENERAL RADIOLOGY | Facility: HOSPITAL | Age: 56
End: 2021-05-04

## 2021-05-04 ENCOUNTER — OFFICE VISIT (OUTPATIENT)
Dept: SLEEP MEDICINE | Facility: HOSPITAL | Age: 56
End: 2021-05-04

## 2021-05-04 VITALS
HEART RATE: 81 BPM | SYSTOLIC BLOOD PRESSURE: 170 MMHG | DIASTOLIC BLOOD PRESSURE: 110 MMHG | BODY MASS INDEX: 33.62 KG/M2 | HEIGHT: 74 IN | OXYGEN SATURATION: 98 % | WEIGHT: 262 LBS

## 2021-05-04 DIAGNOSIS — G47.33 OBSTRUCTIVE SLEEP APNEA, ADULT: Primary | ICD-10-CM

## 2021-05-04 DIAGNOSIS — R07.9 CHEST PAIN, UNSPECIFIED TYPE: Primary | ICD-10-CM

## 2021-05-04 DIAGNOSIS — R07.9 CHEST PAIN, UNSPECIFIED TYPE: ICD-10-CM

## 2021-05-04 DIAGNOSIS — I16.0 HYPERTENSIVE URGENCY: ICD-10-CM

## 2021-05-04 PROBLEM — I10 UNCONTROLLED HYPERTENSION: Status: ACTIVE | Noted: 2017-01-31

## 2021-05-04 LAB
ALBUMIN SERPL-MCNC: 4.5 G/DL (ref 3.5–5.2)
ALBUMIN/GLOB SERPL: 1.8 G/DL
ALP SERPL-CCNC: 85 U/L (ref 39–117)
ALT SERPL W P-5'-P-CCNC: 19 U/L (ref 1–41)
ANION GAP SERPL CALCULATED.3IONS-SCNC: 7 MMOL/L (ref 5–15)
AST SERPL-CCNC: 12 U/L (ref 1–40)
BASOPHILS # BLD AUTO: 0.04 10*3/MM3 (ref 0–0.2)
BASOPHILS NFR BLD AUTO: 0.6 % (ref 0–1.5)
BILIRUB SERPL-MCNC: 0.6 MG/DL (ref 0–1.2)
BUN SERPL-MCNC: 14 MG/DL (ref 6–20)
BUN/CREAT SERPL: 17.1 (ref 7–25)
CALCIUM SPEC-SCNC: 9.2 MG/DL (ref 8.6–10.5)
CHLORIDE SERPL-SCNC: 103 MMOL/L (ref 98–107)
CO2 SERPL-SCNC: 30 MMOL/L (ref 22–29)
CREAT SERPL-MCNC: 0.82 MG/DL (ref 0.76–1.27)
DEPRECATED RDW RBC AUTO: 36.8 FL (ref 37–54)
EOSINOPHIL # BLD AUTO: 0.17 10*3/MM3 (ref 0–0.4)
EOSINOPHIL NFR BLD AUTO: 2.7 % (ref 0.3–6.2)
ERYTHROCYTE [DISTWIDTH] IN BLOOD BY AUTOMATED COUNT: 12.5 % (ref 12.3–15.4)
FLUAV RNA RESP QL NAA+PROBE: NOT DETECTED
FLUBV RNA RESP QL NAA+PROBE: NOT DETECTED
GFR SERPL CREATININE-BSD FRML MDRD: 97 ML/MIN/1.73
GLOBULIN UR ELPH-MCNC: 2.5 GM/DL
GLUCOSE BLDC GLUCOMTR-MCNC: 292 MG/DL (ref 70–130)
GLUCOSE SERPL-MCNC: 359 MG/DL (ref 65–99)
HCT VFR BLD AUTO: 50.4 % (ref 37.5–51)
HGB BLD-MCNC: 18 G/DL (ref 13–17.7)
HOLD SPECIMEN: NORMAL
HOLD SPECIMEN: NORMAL
IMM GRANULOCYTES # BLD AUTO: 0.05 10*3/MM3 (ref 0–0.05)
IMM GRANULOCYTES NFR BLD AUTO: 0.8 % (ref 0–0.5)
LYMPHOCYTES # BLD AUTO: 1.48 10*3/MM3 (ref 0.7–3.1)
LYMPHOCYTES NFR BLD AUTO: 23.6 % (ref 19.6–45.3)
MCH RBC QN AUTO: 29.3 PG (ref 26.6–33)
MCHC RBC AUTO-ENTMCNC: 35.7 G/DL (ref 31.5–35.7)
MCV RBC AUTO: 82.1 FL (ref 79–97)
MONOCYTES # BLD AUTO: 0.41 10*3/MM3 (ref 0.1–0.9)
MONOCYTES NFR BLD AUTO: 6.5 % (ref 5–12)
NEUTROPHILS NFR BLD AUTO: 4.11 10*3/MM3 (ref 1.7–7)
NEUTROPHILS NFR BLD AUTO: 65.8 % (ref 42.7–76)
NRBC BLD AUTO-RTO: 0 /100 WBC (ref 0–0.2)
NT-PROBNP SERPL-MCNC: 282.2 PG/ML (ref 0–900)
PLATELET # BLD AUTO: 188 10*3/MM3 (ref 140–450)
PMV BLD AUTO: 10.3 FL (ref 6–12)
POTASSIUM SERPL-SCNC: 4.1 MMOL/L (ref 3.5–5.2)
PROT SERPL-MCNC: 7 G/DL (ref 6–8.5)
QT INTERVAL: 388 MS
QTC INTERVAL: 433 MS
RBC # BLD AUTO: 6.14 10*6/MM3 (ref 4.14–5.8)
SARS-COV-2 RNA RESP QL NAA+PROBE: NOT DETECTED
SODIUM SERPL-SCNC: 140 MMOL/L (ref 136–145)
TROPONIN T SERPL-MCNC: <0.01 NG/ML (ref 0–0.03)
TROPONIN T SERPL-MCNC: <0.01 NG/ML (ref 0–0.03)
WBC # BLD AUTO: 6.26 10*3/MM3 (ref 3.4–10.8)
WHOLE BLOOD HOLD SPECIMEN: NORMAL
WHOLE BLOOD HOLD SPECIMEN: NORMAL

## 2021-05-04 PROCEDURE — G0378 HOSPITAL OBSERVATION PER HR: HCPCS

## 2021-05-04 PROCEDURE — 99285 EMERGENCY DEPT VISIT HI MDM: CPT

## 2021-05-04 PROCEDURE — 85025 COMPLETE CBC W/AUTO DIFF WBC: CPT | Performed by: FAMILY MEDICINE

## 2021-05-04 PROCEDURE — C9803 HOPD COVID-19 SPEC COLLECT: HCPCS

## 2021-05-04 PROCEDURE — 94760 N-INVAS EAR/PLS OXIMETRY 1: CPT

## 2021-05-04 PROCEDURE — 99213 OFFICE O/P EST LOW 20 MIN: CPT | Performed by: NURSE PRACTITIONER

## 2021-05-04 PROCEDURE — 84484 ASSAY OF TROPONIN QUANT: CPT | Performed by: FAMILY MEDICINE

## 2021-05-04 PROCEDURE — 93010 ELECTROCARDIOGRAM REPORT: CPT | Performed by: INTERNAL MEDICINE

## 2021-05-04 PROCEDURE — 82962 GLUCOSE BLOOD TEST: CPT

## 2021-05-04 PROCEDURE — 80053 COMPREHEN METABOLIC PANEL: CPT | Performed by: FAMILY MEDICINE

## 2021-05-04 PROCEDURE — 93005 ELECTROCARDIOGRAM TRACING: CPT | Performed by: FAMILY MEDICINE

## 2021-05-04 PROCEDURE — 71045 X-RAY EXAM CHEST 1 VIEW: CPT

## 2021-05-04 PROCEDURE — 83880 ASSAY OF NATRIURETIC PEPTIDE: CPT | Performed by: FAMILY MEDICINE

## 2021-05-04 PROCEDURE — 87636 SARSCOV2 & INF A&B AMP PRB: CPT | Performed by: NURSE PRACTITIONER

## 2021-05-04 PROCEDURE — 63710000001 INSULIN ASPART PER 5 UNITS: Performed by: INTERNAL MEDICINE

## 2021-05-04 PROCEDURE — 93005 ELECTROCARDIOGRAM TRACING: CPT

## 2021-05-04 RX ORDER — CLOPIDOGREL BISULFATE 75 MG/1
75 TABLET ORAL DAILY
Status: DISCONTINUED | OUTPATIENT
Start: 2021-05-04 | End: 2021-05-05 | Stop reason: HOSPADM

## 2021-05-04 RX ORDER — SODIUM CHLORIDE 0.9 % (FLUSH) 0.9 %
10 SYRINGE (ML) INJECTION AS NEEDED
Status: DISCONTINUED | OUTPATIENT
Start: 2021-05-04 | End: 2021-05-05 | Stop reason: HOSPADM

## 2021-05-04 RX ORDER — BENZONATATE 100 MG/1
200 CAPSULE ORAL 3 TIMES DAILY PRN
Status: DISCONTINUED | OUTPATIENT
Start: 2021-05-04 | End: 2021-05-05 | Stop reason: HOSPADM

## 2021-05-04 RX ORDER — SODIUM CHLORIDE 0.9 % (FLUSH) 0.9 %
10 SYRINGE (ML) INJECTION EVERY 12 HOURS SCHEDULED
Status: DISCONTINUED | OUTPATIENT
Start: 2021-05-04 | End: 2021-05-05 | Stop reason: HOSPADM

## 2021-05-04 RX ORDER — ALBUTEROL SULFATE 90 UG/1
2 AEROSOL, METERED RESPIRATORY (INHALATION) EVERY 4 HOURS PRN
Status: DISCONTINUED | OUTPATIENT
Start: 2021-05-04 | End: 2021-05-04 | Stop reason: ALTCHOICE

## 2021-05-04 RX ORDER — DEXTROSE MONOHYDRATE 25 G/50ML
25 INJECTION, SOLUTION INTRAVENOUS
Status: DISCONTINUED | OUTPATIENT
Start: 2021-05-04 | End: 2021-05-05 | Stop reason: HOSPADM

## 2021-05-04 RX ORDER — NORTRIPTYLINE HYDROCHLORIDE 10 MG/1
10 CAPSULE ORAL 3 TIMES DAILY
Status: DISCONTINUED | OUTPATIENT
Start: 2021-05-04 | End: 2021-05-05 | Stop reason: HOSPADM

## 2021-05-04 RX ORDER — ATORVASTATIN CALCIUM 40 MG/1
40 TABLET, FILM COATED ORAL NIGHTLY
Status: DISCONTINUED | OUTPATIENT
Start: 2021-05-04 | End: 2021-05-05 | Stop reason: HOSPADM

## 2021-05-04 RX ORDER — METOPROLOL TARTRATE 50 MG/1
100 TABLET, FILM COATED ORAL ONCE AS NEEDED
Status: DISCONTINUED | OUTPATIENT
Start: 2021-05-05 | End: 2021-05-05

## 2021-05-04 RX ORDER — NITROGLYCERIN 0.4 MG/1
0.4 TABLET SUBLINGUAL
Status: DISCONTINUED | OUTPATIENT
Start: 2021-05-05 | End: 2021-05-05 | Stop reason: HOSPADM

## 2021-05-04 RX ORDER — ASPIRIN 325 MG
325 TABLET ORAL ONCE
Status: COMPLETED | OUTPATIENT
Start: 2021-05-04 | End: 2021-05-04

## 2021-05-04 RX ORDER — SPIRONOLACTONE 25 MG/1
25 TABLET ORAL DAILY
Status: DISCONTINUED | OUTPATIENT
Start: 2021-05-04 | End: 2021-05-05 | Stop reason: HOSPADM

## 2021-05-04 RX ORDER — NICOTINE POLACRILEX 4 MG
15 LOZENGE BUCCAL
Status: DISCONTINUED | OUTPATIENT
Start: 2021-05-04 | End: 2021-05-05 | Stop reason: HOSPADM

## 2021-05-04 RX ORDER — ASPIRIN 81 MG/1
81 TABLET, CHEWABLE ORAL DAILY
Status: DISCONTINUED | OUTPATIENT
Start: 2021-05-05 | End: 2021-05-05 | Stop reason: HOSPADM

## 2021-05-04 RX ORDER — SODIUM PICOSULFATE, MAGNESIUM OXIDE, AND ANHYDROUS CITRIC ACID 10; 3.5; 12 MG/160ML; G/160ML; G/160ML
LIQUID ORAL SEE ADMIN INSTRUCTIONS
COMMUNITY
Start: 2021-04-27 | End: 2021-05-04

## 2021-05-04 RX ORDER — SODIUM CHLORIDE 0.9 % (FLUSH) 0.9 %
3 SYRINGE (ML) INJECTION EVERY 12 HOURS SCHEDULED
Status: DISCONTINUED | OUTPATIENT
Start: 2021-05-04 | End: 2021-05-05 | Stop reason: HOSPADM

## 2021-05-04 RX ORDER — ALBUTEROL SULFATE 2.5 MG/3ML
2.5 SOLUTION RESPIRATORY (INHALATION) EVERY 4 HOURS PRN
Status: DISCONTINUED | OUTPATIENT
Start: 2021-05-04 | End: 2021-05-05 | Stop reason: HOSPADM

## 2021-05-04 RX ORDER — HYDROCODONE BITARTRATE AND ACETAMINOPHEN 5; 325 MG/1; MG/1
1 TABLET ORAL EVERY 6 HOURS PRN
Status: DISCONTINUED | OUTPATIENT
Start: 2021-05-04 | End: 2021-05-05 | Stop reason: HOSPADM

## 2021-05-04 RX ORDER — METOPROLOL TARTRATE 50 MG/1
50 TABLET, FILM COATED ORAL ONCE AS NEEDED
Status: DISCONTINUED | OUTPATIENT
Start: 2021-05-05 | End: 2021-05-05

## 2021-05-04 RX ORDER — AMLODIPINE BESYLATE 5 MG/1
5 TABLET ORAL DAILY
Status: DISCONTINUED | OUTPATIENT
Start: 2021-05-04 | End: 2021-05-05

## 2021-05-04 RX ORDER — LIDOCAINE HYDROCHLORIDE 10 MG/ML
5 INJECTION, SOLUTION EPIDURAL; INFILTRATION; INTRACAUDAL; PERINEURAL AS NEEDED
Status: DISCONTINUED | OUTPATIENT
Start: 2021-05-05 | End: 2021-05-05 | Stop reason: HOSPADM

## 2021-05-04 RX ORDER — METOPROLOL SUCCINATE 25 MG/1
25 TABLET, EXTENDED RELEASE ORAL
Status: DISCONTINUED | OUTPATIENT
Start: 2021-05-04 | End: 2021-05-05 | Stop reason: HOSPADM

## 2021-05-04 RX ORDER — CLOPIDOGREL BISULFATE 75 MG/1
75 TABLET ORAL DAILY
COMMUNITY
End: 2022-01-04 | Stop reason: ALTCHOICE

## 2021-05-04 RX ORDER — ISOSORBIDE MONONITRATE 30 MG/1
30 TABLET, EXTENDED RELEASE ORAL
Status: DISCONTINUED | OUTPATIENT
Start: 2021-05-04 | End: 2021-05-05 | Stop reason: HOSPADM

## 2021-05-04 RX ORDER — NITROGLYCERIN 0.4 MG/1
0.4 TABLET SUBLINGUAL
Status: DISCONTINUED | OUTPATIENT
Start: 2021-05-04 | End: 2021-05-05 | Stop reason: HOSPADM

## 2021-05-04 RX ADMIN — APIXABAN 5 MG: 5 TABLET, FILM COATED ORAL at 20:46

## 2021-05-04 RX ADMIN — ASPIRIN 325 MG: 325 TABLET ORAL at 11:25

## 2021-05-04 RX ADMIN — CLOPIDOGREL BISULFATE 75 MG: 75 TABLET ORAL at 15:47

## 2021-05-04 RX ADMIN — SODIUM CHLORIDE, PRESERVATIVE FREE 3 ML: 5 INJECTION INTRAVENOUS at 20:47

## 2021-05-04 RX ADMIN — NORTRIPTYLINE HYDROCHLORIDE 10 MG: 10 CAPSULE ORAL at 20:46

## 2021-05-04 RX ADMIN — NITROGLYCERIN 0.4 MG: 0.4 TABLET SUBLINGUAL at 14:05

## 2021-05-04 RX ADMIN — SODIUM CHLORIDE 500 ML: 9 INJECTION, SOLUTION INTRAVENOUS at 12:44

## 2021-05-04 RX ADMIN — HYDROCODONE BITARTRATE AND ACETAMINOPHEN 1 TABLET: 5; 325 TABLET ORAL at 19:31

## 2021-05-04 RX ADMIN — INSULIN ASPART 8 UNITS: 100 INJECTION, SOLUTION INTRAVENOUS; SUBCUTANEOUS at 17:16

## 2021-05-04 RX ADMIN — NORTRIPTYLINE HYDROCHLORIDE 10 MG: 10 CAPSULE ORAL at 15:47

## 2021-05-04 RX ADMIN — SODIUM CHLORIDE, PRESERVATIVE FREE 10 ML: 5 INJECTION INTRAVENOUS at 15:47

## 2021-05-04 RX ADMIN — SODIUM CHLORIDE, PRESERVATIVE FREE 10 ML: 5 INJECTION INTRAVENOUS at 20:46

## 2021-05-04 RX ADMIN — AMLODIPINE BESYLATE 5 MG: 5 TABLET ORAL at 15:47

## 2021-05-04 RX ADMIN — MAGNESIUM OXIDE 400 MG (241.3 MG MAGNESIUM) TABLET 400 MG: TABLET at 20:46

## 2021-05-04 RX ADMIN — METOPROLOL SUCCINATE 25 MG: 25 TABLET, EXTENDED RELEASE ORAL at 15:47

## 2021-05-04 RX ADMIN — NITROGLYCERIN 0.4 MG: 0.4 TABLET SUBLINGUAL at 11:51

## 2021-05-04 RX ADMIN — SPIRONOLACTONE 25 MG: 25 TABLET ORAL at 15:47

## 2021-05-04 RX ADMIN — ATORVASTATIN CALCIUM 40 MG: 40 TABLET, FILM COATED ORAL at 20:46

## 2021-05-04 RX ADMIN — ISOSORBIDE MONONITRATE 30 MG: 30 TABLET, EXTENDED RELEASE ORAL at 15:47

## 2021-05-04 NOTE — PROGRESS NOTES
Sleep Clinic Follow Up    Date: 2021  Primary Care Provider: Artemio Sanchez MD    Last office visit: 2021 (I reviewed this note)    CC: Follow up: GRAHAM started on CPAP      Interim History:  Since the last visit:    1) moderate GRAHAM -  Herbert White Sr. has remained compliant with CPAP. He denies mask and machine issues, dry mouth, headaches, or pressures intolerance. He denies abnormal dreams, sleep paralysis, nasal congestion, URI sx. Since starting PAP therapy, patient reports more refreshing sleep and less daytime sleepiness.    *Currently, patient is experiencing chest pain with substernal pressure and radiation to the left shoulder blade. He took a nitroglycerin tablet prior to arrival, which eased the pain, however, the pain is reoccurring. He also endorses cold sweats but denies palpitations, shortness of breath, vision changes, or headache.    2) Patient denies RLS symptoms.     Sleep Testin. PSG >15 years ago ([place]), AHI of ?   2. PSG on 2021, AHI of 15, supine AHI 50  3. Currently on 5-15 cm H2O    PAP Data:    Time frame: 2021-2021   Compliance: 100 %  Average use on days used: 8 hrs 17 min  Percent of days with usage greater than or equal to 4 hours: 97.1%  PAP range: 5-15 cm H2O  Average 90% pressure: 9 cmH2O  Leak: 0 minutes  Average AHI: 3.7 events/hr  Mask type: Nasal pillows  [facility] [place]: [place]    Bed time: 2330  Sleep latency: 15-20 minutes  Number of times awakens during the night: 2  Wake time: 2431-6643  Estimated total sleep time at night: 4-5 hours  Caffeine intake: 0 cups of coffee, 0 cups of tea, and 6 sodas per day  Alcohol intake: 0 drinks per week  Nap time: occasional   Sleepiness with Driving: none     [place] - 5    PMHx, FH, SH reviewed and pertinent changes are: Having chest/shoulder/back pain and trouble with blood pressure.      REVIEW OF SYSTEMS:   +Chest pain, cold sweat, shoulder/back pain  Negative for SOA, fever, chills, cough,  N/V/D, abdominal pain.    Smoking:none    Herbert Babcock Cindy Sr.  reports that he quit smoking about 20 months ago. His smoking use included cigarettes. He started smoking about 40 years ago. He has a 35.00 pack-year smoking history. He has never used smokeless tobacco.    Exam:  Vitals:    05/04/21 1006   BP: (!) 170/110   Pulse: 81   SpO2: 98%     *BP: 240/183, repeat 220/186  *Manual: 191/120, repeat 170/110        05/04/21  1006   Weight: 119 kg (262 lb)     Body mass index is 33.62 kg/m². Patient's (Body mass index is 33.62 kg/m².) indicates that they are morbidly obese (BMI > 40 or > 35 with obesity - related health condition) with obesity-related health conditions that include obstructive sleep apnea, hypertension and coronary heart disease . Obesity is unchanged. BMI is is above average; BMI management plan is completed. We discussed portion control and increasing exercise.      Gen:                Diaphoretic, appears uncomfortable, conversant, appears stated age, alert, oriented  Eyes:               Anicteric sclera, moist conjunctiva, no lid lag                           PERRL, EOMI   Heent:             NC/AT                          Oropharynx clear                          Normal hearing  Lungs:             Normal effort, non-labored breathing                          Clear to auscultation bilaterally          CV:                  Loud S1/S2, no murmur                          No lower extremity edema  ABD:               Soft, rounded, non-distended                          Normal bowel sounds                    Psych:             Appropriate affect  Neuro:             CN 2-12 appear intact    Past Medical History:   Diagnosis Date   • Arthritis     c/o joint pain   • Coronary artery disease    • Diabetes mellitus (CMS/[facility])    • Hyperlipidemia    • Hypertension    • Retinopathy of both eyes    • Sleep apnea     not using c-pap       Current Outpatient Medications:   •  albuterol sulfate  (90  Base) MCG/ACT inhaler, Inhale 2 puffs Every 4 (Four) Hours As Needed for Wheezing., Disp: 18 g, Rfl: 11  •  amLODIPine (Norvasc) 5 MG tablet, Take 1 tablet by mouth Daily., Disp: 30 tablet, Rfl: 11  •  apixaban (ELIQUIS) 5 MG tablet tablet, Take 1 tablet by mouth Every 12 (Twelve) Hours., Disp: 60 tablet, Rfl: 3  •  aspirin 81 MG chewable tablet, Chew 81 mg Daily., Disp: , Rfl:   •  atorvastatin (LIPITOR) 40 MG tablet, Take 1 tablet by mouth Every Night., Disp: 30 tablet, Rfl: 3  •  benzonatate (TESSALON) 200 MG capsule, Take 1 capsule by mouth 3 (Three) Times a Day As Needed for Cough., Disp: 30 capsule, Rfl: 0  •  Clenpiq 10-3.5-12 MG-GM -GM/160ML solution, See Admin Instructions., Disp: , Rfl:   •  clopidogrel (PLAVIX) 75 MG tablet, Take 1 tablet by mouth Daily., Disp: 30 tablet, Rfl: 11  •  glimepiride (Amaryl) 1 MG tablet, Take 0.5-1 tablets by mouth Every Morning Before Breakfast., Disp: 30 tablet, Rfl: 2  •  HYDROcodone-acetaminophen (NORCO) 5-325 MG per tablet, Take 1 tablet by mouth Every 6 (Six) Hours As Needed for Moderate Pain ., Disp: 8 tablet, Rfl: 0  •  isosorbide mononitrate (IMDUR) 30 MG 24 hr tablet, Take 1 tablet by mouth Daily., Disp: 30 tablet, Rfl: 11  •  Lancet Devices (ONE TOUCH DELICA LANCING DEV) misc, Use as directed to test blood sugar., Disp: 1 each, Rfl: 11  •  magnesium oxide (MAGOX) 400 (241.3 Mg) MG tablet tablet, Take 1 tablet by mouth 2 (Two) Times a Day., Disp: 60 each, Rfl: 3  •  metFORMIN (GLUCOPHAGE) 500 MG tablet, Take 2 tablets by mouth 2 (Two) Times a Day With Meals., Disp: 360 tablet, Rfl: 0  •  metoprolol succinate XL (TOPROL-XL) 25 MG 24 hr tablet, Take 1 tablet by mouth Daily., Disp: 90 tablet, Rfl: 3  •  nitroglycerin (NITROSTAT) 0.4 MG SL tablet, Place 1 tablet under the tongue Every 5 (Five) Minutes As Needed for Chest Pain. Take no more than 3 doses in 15 minutes., Disp: 30 tablet, Rfl: 1  •  nortriptyline (PAMELOR) 10 MG capsule, Take 1 capsule by mouth 3 (Three)  Times a Day., Disp: 90 capsule, Rfl: 11  •  ONETOUCH DELICA LANCETS 33G misc, 1 each 4 (Four) Times a Day. delica if covered, use alternative if not covered, Disp: 120 each, Rfl: 11  •  pioglitazone (Actos) 30 MG tablet, Take 1 tablet by mouth Daily., Disp: 30 tablet, Rfl: 2  •  promethazine-dextromethorphan (PROMETHAZINE-DM) 6.25-15 MG/5ML syrup, Take 5 mL by mouth At Night As Needed for Cough., Disp: 120 mL, Rfl: 0  •  spironolactone (Aldactone) 25 MG tablet, Take 1 tablet by mouth Daily., Disp: 30 tablet, Rfl: 11    WBC   Date Value Ref Range Status   10/14/2020 7.25 3.40 - 10.80 10*3/mm3 Final     RBC   Date Value Ref Range Status   10/14/2020 5.49 4.14 - 5.80 10*6/mm3 Final     Hemoglobin   Date Value Ref Range Status   10/14/2020 16.8 13.0 - 17.7 g/dL Final     Hematocrit   Date Value Ref Range Status   10/14/2020 47.5 37.5 - 51.0 % Final     MCV   Date Value Ref Range Status   10/14/2020 86.5 79.0 - 97.0 fL Final     MCH   Date Value Ref Range Status   10/14/2020 30.6 26.6 - 33.0 pg Final     MCHC   Date Value Ref Range Status   10/14/2020 35.4 31.5 - 35.7 g/dL Final     RDW   Date Value Ref Range Status   10/14/2020 13.2 12.3 - 15.4 % Final     RDW-SD   Date Value Ref Range Status   10/14/2020 40.9 37.0 - 54.0 fl Final     MPV   Date Value Ref Range Status   10/14/2020 11.3 6.0 - 12.0 fL Final     Platelets   Date Value Ref Range Status   10/14/2020 190 140 - 450 10*3/mm3 Final     Neutrophil %   Date Value Ref Range Status   08/21/2020 56.7 42.7 - 76.0 % Final     Lymphocyte %   Date Value Ref Range Status   08/21/2020 31.2 19.6 - 45.3 % Final     Monocyte %   Date Value Ref Range Status   08/21/2020 6.6 5.0 - 12.0 % Final     Eosinophil %   Date Value Ref Range Status   08/21/2020 4.1 0.3 - 6.2 % Final     Basophil %   Date Value Ref Range Status   08/21/2020 0.6 0.0 - 1.5 % Final     Immature Grans %   Date Value Ref Range Status   08/21/2020 0.8 (H) 0.0 - 0.5 % Final     Neutrophils, Absolute   Date  Value Ref Range Status   08/21/2020 3.64 1.70 - 7.00 10*3/mm3 Final     Lymphocytes, Absolute   Date Value Ref Range Status   08/21/2020 2.00 0.70 - 3.10 10*3/mm3 Final     Monocytes, Absolute   Date Value Ref Range Status   08/21/2020 0.42 0.10 - 0.90 10*3/mm3 Final     Eosinophils, Absolute   Date Value Ref Range Status   08/21/2020 0.26 0.00 - 0.40 10*3/mm3 Final     Basophils, Absolute   Date Value Ref Range Status   08/21/2020 0.04 0.00 - 0.20 10*3/mm3 Final     Immature Grans, Absolute   Date Value Ref Range Status   08/21/2020 0.05 0.00 - 0.05 10*3/mm3 Final     nRBC   Date Value Ref Range Status   08/21/2020 0.0 0.0 - 0.2 /100 WBC Final       Lab Results   Component Value Date    GLUCOSE 246 (H) 10/14/2020    BUN 12 10/14/2020    CREATININE 0.87 10/14/2020    EGFRIFNONA 91 10/14/2020    BCR 13.8 10/14/2020    K 4.3 10/14/2020    CO2 28.3 10/14/2020    CALCIUM 9.4 10/14/2020    ALBUMIN 4.20 10/14/2020    AST 28 10/14/2020    ALT <5 10/14/2020         Assessment and Plan:     1. Obstructive sleep apnea - Established, stable (1)  1. Compliant with PAP therapy  2. Continue PAP as prescribed  3. Script for PAP supplies  4. Return to clinic in 1 year with compliance report unless change in symptoms in interim period  2. Hypertensive urgency - New (to me), no additional work-up planned (3)   1. Recommended for patient to go to ER - transport called to take patient and wife downstairs  3. CAD S/P CABG, paroxysmal atrial fibrillation - Established, stable (1)   1. Recommended follow up with Cardiology      I spent 20 minutes caring for Herbert on this date of service. This time includes time spent by me in the following activities: preparing for the visit, reviewing tests, obtaining and/or reviewing a separately obtained history, performing a medically appropriate examination and/or evaluation , counseling and educating the patient/family/caregiver and documenting information in the medical record; discussing PAP  therapy, PAP compliance and PAP maintenance    RTC in 12 months. Patient agrees to return sooner if changes in symptoms.        This document has been electronically signed by NELSY Granger on May 4, 2021 11:00 CDT          CC: Artemio Sanchez MD          No ref. provider found

## 2021-05-05 ENCOUNTER — APPOINTMENT (OUTPATIENT)
Dept: CT IMAGING | Facility: HOSPITAL | Age: 56
End: 2021-05-05

## 2021-05-05 ENCOUNTER — APPOINTMENT (OUTPATIENT)
Dept: CARDIOLOGY | Facility: HOSPITAL | Age: 56
End: 2021-05-05

## 2021-05-05 VITALS
HEART RATE: 55 BPM | RESPIRATION RATE: 18 BRPM | OXYGEN SATURATION: 96 % | DIASTOLIC BLOOD PRESSURE: 90 MMHG | SYSTOLIC BLOOD PRESSURE: 157 MMHG | BODY MASS INDEX: 33.39 KG/M2 | HEIGHT: 74 IN | TEMPERATURE: 97.3 F | WEIGHT: 260.14 LBS

## 2021-05-05 PROBLEM — I10 UNCONTROLLED HYPERTENSION: Chronic | Status: ACTIVE | Noted: 2017-01-31

## 2021-05-05 LAB
ANION GAP SERPL CALCULATED.3IONS-SCNC: 6 MMOL/L (ref 5–15)
BH CV ECHO MEAS - ACS: 2.3 CM
BH CV ECHO MEAS - AO MAX PG (FULL): 0.89 MMHG
BH CV ECHO MEAS - AO MAX PG: 5.4 MMHG
BH CV ECHO MEAS - AO MEAN PG (FULL): 0 MMHG
BH CV ECHO MEAS - AO MEAN PG: 3 MMHG
BH CV ECHO MEAS - AO ROOT AREA (BSA CORRECTED): 1.4
BH CV ECHO MEAS - AO ROOT AREA: 9.6 CM^2
BH CV ECHO MEAS - AO ROOT DIAM: 3.5 CM
BH CV ECHO MEAS - AO V2 MAX: 116 CM/SEC
BH CV ECHO MEAS - AO V2 MEAN: 84.9 CM/SEC
BH CV ECHO MEAS - AO V2 VTI: 24.8 CM
BH CV ECHO MEAS - ASC AORTA: 3.4 CM
BH CV ECHO MEAS - AVA(I,A): 4 CM^2
BH CV ECHO MEAS - AVA(I,D): 4 CM^2
BH CV ECHO MEAS - AVA(V,A): 3.8 CM^2
BH CV ECHO MEAS - AVA(V,D): 3.8 CM^2
BH CV ECHO MEAS - BSA(HAYCOCK): 2.5 M^2
BH CV ECHO MEAS - BSA: 2.4 M^2
BH CV ECHO MEAS - BZI_BMI: 33.4 KILOGRAMS/M^2
BH CV ECHO MEAS - BZI_METRIC_HEIGHT: 188 CM
BH CV ECHO MEAS - BZI_METRIC_WEIGHT: 117.9 KG
BH CV ECHO MEAS - EDV(CUBED): 80.1 ML
BH CV ECHO MEAS - EDV(MOD-SP2): 71.5 ML
BH CV ECHO MEAS - EDV(MOD-SP4): 99.2 ML
BH CV ECHO MEAS - EDV(TEICH): 83.5 ML
BH CV ECHO MEAS - EF(CUBED): 72.3 %
BH CV ECHO MEAS - EF(MOD-SP2): 71.6 %
BH CV ECHO MEAS - EF(MOD-SP4): 59.5 %
BH CV ECHO MEAS - EF(TEICH): 64.3 %
BH CV ECHO MEAS - ESV(CUBED): 22.2 ML
BH CV ECHO MEAS - ESV(MOD-SP2): 20.3 ML
BH CV ECHO MEAS - ESV(MOD-SP4): 40.2 ML
BH CV ECHO MEAS - ESV(TEICH): 29.8 ML
BH CV ECHO MEAS - FS: 34.8 %
BH CV ECHO MEAS - IVS/LVPW: 0.95
BH CV ECHO MEAS - IVSD: 1.5 CM
BH CV ECHO MEAS - LA DIMENSION: 4.5 CM
BH CV ECHO MEAS - LA/AO: 1.3
BH CV ECHO MEAS - LV DIASTOLIC VOL/BSA (35-75): 40.8 ML/M^2
BH CV ECHO MEAS - LV MASS(C)D: 278 GRAMS
BH CV ECHO MEAS - LV MASS(C)DI: 114.4 GRAMS/M^2
BH CV ECHO MEAS - LV MAX PG: 4.5 MMHG
BH CV ECHO MEAS - LV MEAN PG: 3 MMHG
BH CV ECHO MEAS - LV SYSTOLIC VOL/BSA (12-30): 16.5 ML/M^2
BH CV ECHO MEAS - LV V1 MAX: 106 CM/SEC
BH CV ECHO MEAS - LV V1 MEAN: 74 CM/SEC
BH CV ECHO MEAS - LV V1 VTI: 23.6 CM
BH CV ECHO MEAS - LVIDD: 4.3 CM
BH CV ECHO MEAS - LVIDS: 2.8 CM
BH CV ECHO MEAS - LVLD AP2: 8.6 CM
BH CV ECHO MEAS - LVLD AP4: 9.1 CM
BH CV ECHO MEAS - LVLS AP2: 7.6 CM
BH CV ECHO MEAS - LVLS AP4: 8.3 CM
BH CV ECHO MEAS - LVOT AREA (M): 4.2 CM^2
BH CV ECHO MEAS - LVOT AREA: 4.2 CM^2
BH CV ECHO MEAS - LVOT DIAM: 2.3 CM
BH CV ECHO MEAS - LVPWD: 1.6 CM
BH CV ECHO MEAS - MV A MAX VEL: 49.3 CM/SEC
BH CV ECHO MEAS - MV DEC SLOPE: 650 CM/SEC^2
BH CV ECHO MEAS - MV E MAX VEL: 76.3 CM/SEC
BH CV ECHO MEAS - MV E/A: 1.5
BH CV ECHO MEAS - MV MAX PG: 3.6 MMHG
BH CV ECHO MEAS - MV MEAN PG: 1 MMHG
BH CV ECHO MEAS - MV P1/2T MAX VEL: 100 CM/SEC
BH CV ECHO MEAS - MV P1/2T: 45.1 MSEC
BH CV ECHO MEAS - MV V2 MAX: 95.5 CM/SEC
BH CV ECHO MEAS - MV V2 MEAN: 48.4 CM/SEC
BH CV ECHO MEAS - MV V2 VTI: 22 CM
BH CV ECHO MEAS - MVA P1/2T LCG: 2.2 CM^2
BH CV ECHO MEAS - MVA(P1/2T): 4.9 CM^2
BH CV ECHO MEAS - MVA(VTI): 4.5 CM^2
BH CV ECHO MEAS - PA MAX PG: 9.1 MMHG
BH CV ECHO MEAS - PA V2 MAX: 151 CM/SEC
BH CV ECHO MEAS - RAP SYSTOLE: 5 MMHG
BH CV ECHO MEAS - RVDD: 3.5 CM
BH CV ECHO MEAS - RVSP: 31.6 MMHG
BH CV ECHO MEAS - SI(AO): 98.2 ML/M^2
BH CV ECHO MEAS - SI(CUBED): 23.8 ML/M^2
BH CV ECHO MEAS - SI(LVOT): 40.4 ML/M^2
BH CV ECHO MEAS - SI(MOD-SP2): 21.1 ML/M^2
BH CV ECHO MEAS - SI(MOD-SP4): 24.3 ML/M^2
BH CV ECHO MEAS - SI(TEICH): 22.1 ML/M^2
BH CV ECHO MEAS - SV(AO): 238.6 ML
BH CV ECHO MEAS - SV(CUBED): 57.9 ML
BH CV ECHO MEAS - SV(LVOT): 98.1 ML
BH CV ECHO MEAS - SV(MOD-SP2): 51.2 ML
BH CV ECHO MEAS - SV(MOD-SP4): 59 ML
BH CV ECHO MEAS - SV(TEICH): 53.7 ML
BH CV ECHO MEAS - TR MAX VEL: 258 CM/SEC
BUN SERPL-MCNC: 11 MG/DL (ref 6–20)
BUN/CREAT SERPL: 15.5 (ref 7–25)
CALCIUM SPEC-SCNC: 8.9 MG/DL (ref 8.6–10.5)
CHLORIDE SERPL-SCNC: 102 MMOL/L (ref 98–107)
CO2 SERPL-SCNC: 29 MMOL/L (ref 22–29)
CREAT SERPL-MCNC: 0.71 MG/DL (ref 0.76–1.27)
GFR SERPL CREATININE-BSD FRML MDRD: 115 ML/MIN/1.73
GLUCOSE BLDC GLUCOMTR-MCNC: 261 MG/DL (ref 70–130)
GLUCOSE BLDC GLUCOMTR-MCNC: 264 MG/DL (ref 70–130)
GLUCOSE BLDC GLUCOMTR-MCNC: 317 MG/DL (ref 70–130)
GLUCOSE SERPL-MCNC: 238 MG/DL (ref 65–99)
LV EF 2D ECHO EST: 61 %
POTASSIUM SERPL-SCNC: 3.9 MMOL/L (ref 3.5–5.2)
QT INTERVAL: 422 MS
QTC INTERVAL: 422 MS
SODIUM SERPL-SCNC: 137 MMOL/L (ref 136–145)
TROPONIN T SERPL-MCNC: <0.01 NG/ML (ref 0–0.03)
WHOLE BLOOD HOLD SPECIMEN: NORMAL

## 2021-05-05 PROCEDURE — 0 IOPAMIDOL PER 1 ML: Performed by: INTERNAL MEDICINE

## 2021-05-05 PROCEDURE — 63710000001 DIPHENHYDRAMINE PER 50 MG: Performed by: INTERNAL MEDICINE

## 2021-05-05 PROCEDURE — 94760 N-INVAS EAR/PLS OXIMETRY 1: CPT

## 2021-05-05 PROCEDURE — 94799 UNLISTED PULMONARY SVC/PX: CPT

## 2021-05-05 PROCEDURE — 93306 TTE W/DOPPLER COMPLETE: CPT | Performed by: INTERNAL MEDICINE

## 2021-05-05 PROCEDURE — 93306 TTE W/DOPPLER COMPLETE: CPT

## 2021-05-05 PROCEDURE — G0378 HOSPITAL OBSERVATION PER HR: HCPCS

## 2021-05-05 PROCEDURE — 63710000001 INSULIN ASPART PER 5 UNITS: Performed by: INTERNAL MEDICINE

## 2021-05-05 PROCEDURE — 82962 GLUCOSE BLOOD TEST: CPT

## 2021-05-05 PROCEDURE — 93010 ELECTROCARDIOGRAM REPORT: CPT | Performed by: INTERNAL MEDICINE

## 2021-05-05 PROCEDURE — 99214 OFFICE O/P EST MOD 30 MIN: CPT | Performed by: INTERNAL MEDICINE

## 2021-05-05 PROCEDURE — 93005 ELECTROCARDIOGRAM TRACING: CPT | Performed by: INTERNAL MEDICINE

## 2021-05-05 PROCEDURE — 36415 COLL VENOUS BLD VENIPUNCTURE: CPT | Performed by: INTERNAL MEDICINE

## 2021-05-05 PROCEDURE — 80048 BASIC METABOLIC PNL TOTAL CA: CPT | Performed by: INTERNAL MEDICINE

## 2021-05-05 PROCEDURE — 84484 ASSAY OF TROPONIN QUANT: CPT | Performed by: INTERNAL MEDICINE

## 2021-05-05 PROCEDURE — 75574 CT ANGIO HRT W/3D IMAGE: CPT

## 2021-05-05 RX ORDER — SODIUM CHLORIDE 9 MG/ML
100 INJECTION, SOLUTION INTRAVENOUS
Status: COMPLETED | OUTPATIENT
Start: 2021-05-05 | End: 2021-05-05

## 2021-05-05 RX ORDER — AMLODIPINE BESYLATE 10 MG/1
10 TABLET ORAL DAILY
Qty: 30 TABLET | Refills: 1 | Status: SHIPPED | OUTPATIENT
Start: 2021-05-06 | End: 2022-01-25 | Stop reason: HOSPADM

## 2021-05-05 RX ORDER — RANOLAZINE 500 MG/1
500 TABLET, EXTENDED RELEASE ORAL EVERY 12 HOURS SCHEDULED
Qty: 60 TABLET | Refills: 1 | Status: SHIPPED | OUTPATIENT
Start: 2021-05-05 | End: 2022-08-30 | Stop reason: HOSPADM

## 2021-05-05 RX ORDER — AMLODIPINE BESYLATE 10 MG/1
10 TABLET ORAL DAILY
Status: DISCONTINUED | OUTPATIENT
Start: 2021-05-06 | End: 2021-05-05 | Stop reason: HOSPADM

## 2021-05-05 RX ORDER — RANOLAZINE 500 MG/1
500 TABLET, EXTENDED RELEASE ORAL EVERY 12 HOURS SCHEDULED
Status: DISCONTINUED | OUTPATIENT
Start: 2021-05-05 | End: 2021-05-05 | Stop reason: HOSPADM

## 2021-05-05 RX ORDER — DIPHENHYDRAMINE HCL 25 MG
25 CAPSULE ORAL EVERY 6 HOURS PRN
Status: DISCONTINUED | OUTPATIENT
Start: 2021-05-05 | End: 2021-05-05 | Stop reason: HOSPADM

## 2021-05-05 RX ORDER — METOPROLOL TARTRATE 5 MG/5ML
INJECTION INTRAVENOUS
Status: COMPLETED
Start: 2021-05-05 | End: 2021-05-05

## 2021-05-05 RX ADMIN — ASPIRIN 81 MG: 81 TABLET, CHEWABLE ORAL at 08:32

## 2021-05-05 RX ADMIN — APIXABAN 5 MG: 5 TABLET, FILM COATED ORAL at 08:33

## 2021-05-05 RX ADMIN — DIPHENHYDRAMINE HYDROCHLORIDE 25 MG: 25 CAPSULE ORAL at 13:15

## 2021-05-05 RX ADMIN — AMLODIPINE BESYLATE 5 MG: 5 TABLET ORAL at 08:32

## 2021-05-05 RX ADMIN — INSULIN ASPART 8 UNITS: 100 INJECTION, SOLUTION INTRAVENOUS; SUBCUTANEOUS at 11:09

## 2021-05-05 RX ADMIN — SODIUM CHLORIDE 100 ML: 9 INJECTION, SOLUTION INTRAVENOUS at 09:19

## 2021-05-05 RX ADMIN — MAGNESIUM OXIDE 400 MG (241.3 MG MAGNESIUM) TABLET 400 MG: TABLET at 08:33

## 2021-05-05 RX ADMIN — METOPROLOL TARTRATE 5 MG: 5 INJECTION INTRAVENOUS at 09:10

## 2021-05-05 RX ADMIN — IOPAMIDOL 90 ML: 755 INJECTION, SOLUTION INTRAVENOUS at 09:20

## 2021-05-05 RX ADMIN — ISOSORBIDE MONONITRATE 30 MG: 30 TABLET, EXTENDED RELEASE ORAL at 08:32

## 2021-05-05 RX ADMIN — NORTRIPTYLINE HYDROCHLORIDE 10 MG: 10 CAPSULE ORAL at 08:33

## 2021-05-05 RX ADMIN — SPIRONOLACTONE 25 MG: 25 TABLET ORAL at 08:32

## 2021-05-05 RX ADMIN — CLOPIDOGREL BISULFATE 75 MG: 75 TABLET ORAL at 08:33

## 2021-05-05 RX ADMIN — INSULIN ASPART 5 UNITS: 100 INJECTION, SOLUTION INTRAVENOUS; SUBCUTANEOUS at 08:31

## 2021-05-05 RX ADMIN — SODIUM CHLORIDE, PRESERVATIVE FREE 10 ML: 5 INJECTION INTRAVENOUS at 08:33

## 2021-05-05 RX ADMIN — METOPROLOL SUCCINATE 25 MG: 25 TABLET, EXTENDED RELEASE ORAL at 08:32

## 2021-05-06 ENCOUNTER — TRANSITIONAL CARE MANAGEMENT TELEPHONE ENCOUNTER (OUTPATIENT)
Dept: CALL CENTER | Facility: HOSPITAL | Age: 56
End: 2021-05-06

## 2021-05-06 ENCOUNTER — READMISSION MANAGEMENT (OUTPATIENT)
Dept: CALL CENTER | Facility: HOSPITAL | Age: 56
End: 2021-05-06

## 2021-05-06 NOTE — OUTREACH NOTE
Prep Survey      Responses   [facility] patient discharged from?  [place]   Is LACE score < 7 ?  No   Emergency Room discharge w/ pulse ox?  No   Eligibility  [facility]   Date of Admission  05/04/21   Date of Discharge  05/05/21   Discharge Disposition  Home or Self Care   Discharge diagnosis  Chest pain   Does the patient have one of the following disease processes/diagnoses(primary or secondary)?  Other   Does the patient have Home health ordered?  No   Is there a DME ordered?  No   Prep survey completed?  Yes          Connie Cali RN

## 2021-05-06 NOTE — OUTREACH NOTE
Call Center TCM Note      Responses   [facility] [place] patient discharged from?  [place]   Does the patient have one of the following disease processes/diagnoses(primary or secondary)?  Other   TCM attempt successful?  No [Nai-SO]   Unsuccessful attempts  Attempt 1          Abbie Kay RN    5/6/2021, 11:45 EDT

## 2021-05-06 NOTE — OUTREACH NOTE
Call Center TCM Note      Responses   [facility] patient discharged from?  [place]   Does the patient have one of the following disease processes/diagnoses(primary or secondary)?  Other   TCM attempt successful?  Yes   Call start time  1223   Call end time  1225   Discharge diagnosis  Chest pain   Person spoke with today (if not patient) and relationship  Nai-SO   Meds reviewed with patient/caregiver?  Yes   Is the patient having any side effects they believe may be caused by any medication additions or changes?  No   Does the patient have all medications ordered at discharge?  Yes   Is the patient taking all medications as directed (includes completed medication regime)?  Yes   Does the patient have a primary care provider?   Yes   Does the patient have an appointment with their PCP within 7 days of discharge?  Yes   Comments regarding PCP  Hospital d/c f/u appt is on 5/12/21 at 9:00 am    Has the patient kept scheduled appointments due by today?  N/A   Psychosocial issues?  No   Did the patient receive a copy of their discharge instructions?  Yes   Nursing interventions  Reviewed instructions with patient   What is the patient's perception of their health status since discharge?  Improving   Is the patient/caregiver able to teach back signs and symptoms related to disease process for when to call PCP?  Yes   Is the patient/caregiver able to teach back signs and symptoms related to disease process for when to call 911?  Yes   Is the patient/caregiver able to teach back the hierarchy of who to call/visit for symptoms/problems? PCP, Specialist, Home health nurse, Urgent Care, ED, 911  Yes   If the patient is a current smoker, are they able to teach back resources for cessation?  Not a smoker   TCM call completed?  Yes          Abbie Kay RN    5/6/2021, 12:25 EDT

## 2021-05-12 ENCOUNTER — OFFICE VISIT (OUTPATIENT)
Dept: FAMILY MEDICINE CLINIC | Facility: CLINIC | Age: 56
End: 2021-05-12

## 2021-05-12 VITALS
HEART RATE: 88 BPM | SYSTOLIC BLOOD PRESSURE: 120 MMHG | WEIGHT: 259.4 LBS | TEMPERATURE: 97.3 F | HEIGHT: 74 IN | OXYGEN SATURATION: 97 % | BODY MASS INDEX: 33.29 KG/M2 | DIASTOLIC BLOOD PRESSURE: 88 MMHG

## 2021-05-12 DIAGNOSIS — E11.65 TYPE 2 DIABETES MELLITUS WITH HYPERGLYCEMIA, WITHOUT LONG-TERM CURRENT USE OF INSULIN (HCC): ICD-10-CM

## 2021-05-12 DIAGNOSIS — R07.9 CHEST PAIN, UNSPECIFIED TYPE: ICD-10-CM

## 2021-05-12 DIAGNOSIS — I10 ESSENTIAL HYPERTENSION: Primary | ICD-10-CM

## 2021-05-12 PROCEDURE — 99214 OFFICE O/P EST MOD 30 MIN: CPT | Performed by: FAMILY MEDICINE

## 2021-05-12 RX ORDER — GLUCOSAMINE HCL/CHONDROITIN SU 500-400 MG
1 CAPSULE ORAL EVERY MORNING
Qty: 100 EACH | Refills: 3 | Status: ON HOLD | OUTPATIENT
Start: 2021-05-12 | End: 2022-02-24

## 2021-05-12 RX ORDER — PEN NEEDLE, DIABETIC 29 G X1/2"
1 NEEDLE, DISPOSABLE MISCELLANEOUS NIGHTLY
Qty: 100 EACH | Refills: 3 | Status: SHIPPED | OUTPATIENT
Start: 2021-05-12 | End: 2022-03-03 | Stop reason: SDUPTHER

## 2021-05-12 RX ORDER — INSULIN GLARGINE 100 [IU]/ML
5-50 INJECTION, SOLUTION SUBCUTANEOUS NIGHTLY
Qty: 5 PEN | Refills: 2 | Status: ON HOLD | OUTPATIENT
Start: 2021-05-12 | End: 2022-02-14

## 2021-05-12 NOTE — PROGRESS NOTES
" Subjective   Hrebert White Sr. is a 55 y.o. male.     Chief Complaint   Patient presents with   • Hospital Follow Up Visit             History of Present Illness     Ed to hospital for chest pain.  Improved with addition of ranexa, improving bp, and diabetic control    He is ready for insulin now.   Previous hga1c's  9 range.   Cardiac enzymes were negative.     Review of Systems   Constitutional: Negative for chills, fatigue and fever.   HENT: Negative for congestion, ear discharge, ear pain, facial swelling, hearing loss, postnasal drip, rhinorrhea, sinus pressure, sore throat, trouble swallowing and voice change.    Eyes: Negative for discharge, redness and visual disturbance.   Respiratory: Negative for cough, chest tightness, shortness of breath and wheezing.    Cardiovascular: Negative for chest pain and palpitations.   Gastrointestinal: Negative for abdominal pain, blood in stool, constipation, diarrhea, nausea and vomiting.   Endocrine: Negative for polydipsia and polyuria.   Genitourinary: Negative for dysuria, flank pain, hematuria and urgency.   Musculoskeletal: Negative for arthralgias, back pain, joint swelling and myalgias.   Skin: Negative for rash.   Neurological: Negative for dizziness, weakness, numbness and headaches.   Hematological: Negative for adenopathy.   Psychiatric/Behavioral: Negative for confusion and sleep disturbance. The patient is not nervous/anxious.            /88 (BP Location: Right arm, Patient Position: Sitting, Cuff Size: Adult)   Pulse 88   Temp 97.3 °F (36.3 °C) (Infrared)   Ht 188 cm (74.02\")   Wt 118 kg (259 lb 6.4 oz)   SpO2 97%   BMI 33.29 kg/m²       Objective     Physical Exam  Vitals and nursing note reviewed.   Constitutional:       Appearance: Normal appearance. He is well-developed.   HENT:      Head: Normocephalic and atraumatic.      Right Ear: External ear normal.      Left Ear: External ear normal.      Nose: Nose normal.   Eyes:      " Extraocular Movements: Extraocular movements intact.      Conjunctiva/sclera: Conjunctivae normal.      Pupils: Pupils are equal, round, and reactive to light.   Pulmonary:      Effort: Pulmonary effort is normal.   Musculoskeletal:         General: Normal range of motion.      Cervical back: Normal range of motion.   Neurological:      General: No focal deficit present.      Mental Status: He is alert and oriented to person, place, and time.   Psychiatric:         Behavior: Behavior normal.         Thought Content: Thought content normal.         Judgment: Judgment normal.             PAST MEDICAL HISTORY     Past Medical History:   Diagnosis Date   • Coronary artery disease    • Diabetes mellitus (CMS/[facility])    • Diabetic retinopathy (CMS/HCC)    • GERD (gastroesophageal reflux disease)    • Hyperlipidemia    • Hypertension    • Osteoarthritis    • Paroxysmal atrial fibrillation (CMS/[facility])    • Sleep apnea       PAST SURGICAL HISTORY     Past Surgical History:   Procedure Laterality Date   • CARDIAC CATHETERIZATION N/A 9/3/2019   • CHOLECYSTECTOMY     • CORONARY ARTERY BYPASS GRAFT N/A 2019      SOCIAL HISTORY     Social History     Socioeconomic History   • Marital status:      Spouse name: Not on file   • Number of children: Not on file   • Years of education: Not on file   • Highest education level: Not on file   Tobacco Use   • Smoking status: Former Smoker     Packs/day: 1.00     Years: 35.00     Pack years: 35.00     Types: Cigarettes     Start date: 1980     Quit date: 9/3/2019     Years since quittin.6   • Smokeless tobacco: Never Used   Vaping Use   • Vaping Use: Never used   Substance and Sexual Activity   • Alcohol use: No   • Drug use: No   • Sexual activity: Not Currently      ALLERGIES   Ace inhibitors and Wellbutrin [bupropion]   MEDICATIONS     Current Outpatient Medications   Medication Sig Dispense Refill   • albuterol sulfate  (90 Base) MCG/ACT inhaler Inhale 2 puffs  Every 4 (Four) Hours As Needed for Wheezing. 18 g 11   • amLODIPine (NORVASC) 10 MG tablet Take 1 tablet by mouth Daily. 30 tablet 1   • apixaban (ELIQUIS) 5 MG tablet tablet Take 1 tablet by mouth Every 12 (Twelve) Hours. 60 tablet 3   • aspirin 81 MG chewable tablet Chew 81 mg Daily.     • atorvastatin (LIPITOR) 40 MG tablet Take 1 tablet by mouth Every Night. 30 tablet 3   • benzonatate (TESSALON) 200 MG capsule Take 1 capsule by mouth 3 (Three) Times a Day As Needed for Cough. 30 capsule 0   • clopidogrel (PLAVIX) 75 MG tablet Take 75 mg by mouth Daily.     • glimepiride (Amaryl) 1 MG tablet Take 0.5-1 tablets by mouth Every Morning Before Breakfast. 30 tablet 2   • HYDROcodone-acetaminophen (NORCO) 5-325 MG per tablet Take 1 tablet by mouth Every 6 (Six) Hours As Needed for Moderate Pain . 8 tablet 0   • isosorbide mononitrate (IMDUR) 30 MG 24 hr tablet Take 1 tablet by mouth Daily. 30 tablet 11   • Lancet Devices (ONE TOUCH DELICA LANCING DEV) misc Use as directed to test blood sugar. 1 each 11   • magnesium oxide (MAGOX) 400 (241.3 Mg) MG tablet tablet Take 1 tablet by mouth 2 (Two) Times a Day. 60 each 3   • metFORMIN (GLUCOPHAGE) 500 MG tablet Take 2 tablets by mouth 2 (Two) Times a Day With Meals. 360 tablet 0   • metoprolol succinate XL (TOPROL-XL) 25 MG 24 hr tablet Take 1 tablet by mouth Daily. 90 tablet 3   • nitroglycerin (NITROSTAT) 0.4 MG SL tablet Place 1 tablet under the tongue Every 5 (Five) Minutes As Needed for Chest Pain. Take no more than 3 doses in 15 minutes. 30 tablet 1   • nortriptyline (PAMELOR) 10 MG capsule Take 1 capsule by mouth 3 (Three) Times a Day. 90 capsule 11   • ONETOUCH DELICA LANCETS 33G misc 1 each 4 (Four) Times a Day. delica if covered, use alternative if not covered 120 each 11   • pioglitazone (Actos) 30 MG tablet Take 1 tablet by mouth Daily. 30 tablet 2   • ranolazine (RANEXA) 500 MG 12 hr tablet Take 1 tablet by mouth Every 12 (Twelve) Hours. 60 tablet 1   •  "spironolactone (Aldactone) 25 MG tablet Take 1 tablet by mouth Daily. 30 tablet 11   • Alcohol Swabs 70 % pads 1 each Every Morning. 100 each 3   • Insulin Glargine (BASAGLAR KWIKPEN) 100 UNIT/ML injection pen Inject 5-50 Units under the skin into the appropriate area as directed Every Night. 5 pen 2   • Insulin Pen Needle (Pen Needles 1/2\") 29G X 12MM misc 1 each Every Night. 100 each 3     No current facility-administered medications for this visit.        The following portions of the patient's history were reviewed and updated as appropriate: allergies, current medications, past family history, past medical history, past social history, past surgical history and problem list.        Assessment/Plan   Diagnoses and all orders for this visit:    1. Essential hypertension (Primary)    2. Chest pain, unspecified type    3. Type 2 diabetes mellitus with hyperglycemia, without long-term current use of insulin (CMS/[facility])    Other orders  -     Insulin Glargine (BASAGLAR KWIKPEN) 100 UNIT/ML injection pen; Inject 5-50 Units under the skin into the appropriate area as directed Every Night.  Dispense: 5 pen; Refill: 2  -     Insulin Pen Needle (Pen Needles 1/2\") 29G X 12MM misc; 1 each Every Night.  Dispense: 100 each; Refill: 3  -     Alcohol Swabs 70 % pads; 1 each Every Morning.  Dispense: 100 each; Refill: 3      Htn.  Stable  Chest pain. Resolved.  Cardiac ruled out.     I added basaglar insulin and showed him how to adjust via a sliding scale.   Return 3 months, sooner if problems.  Warned of lows since on low dose amaryl.  I want to keep it on board to help with post prandial glucose.                No follow-ups on file.                  This document has been electronically signed by Artemio Sanchez MD on May 12, 2021 14:51 CDT     "

## 2021-05-14 ENCOUNTER — READMISSION MANAGEMENT (OUTPATIENT)
Dept: CALL CENTER | Facility: HOSPITAL | Age: 56
End: 2021-05-14

## 2021-05-14 NOTE — OUTREACH NOTE
Medical Week 2 Survey      Responses   [facility] patient discharged from?  [place]   Does the patient have one of the following disease processes/diagnoses(primary or secondary)?  Other   Week 2 attempt successful?  No   Unsuccessful attempts  Attempt 1          Rosaura Cox RN

## 2021-05-17 ENCOUNTER — READMISSION MANAGEMENT (OUTPATIENT)
Dept: CALL CENTER | Facility: HOSPITAL | Age: 56
End: 2021-05-17

## 2021-05-17 DIAGNOSIS — R07.9 CHEST PAIN, UNSPECIFIED TYPE: Primary | ICD-10-CM

## 2021-05-17 NOTE — OUTREACH NOTE
Medical Week 2 Survey      Responses   [facility] patient discharged from?  [place]   Does the patient have one of the following disease processes/diagnoses(primary or secondary)?  Other   Week 2 attempt successful?  No   Unsuccessful attempts  Attempt 2          Rosaura Cox RN

## 2021-05-21 ENCOUNTER — LAB (OUTPATIENT)
Dept: LAB | Facility: HOSPITAL | Age: 56
End: 2021-05-21

## 2021-05-21 DIAGNOSIS — Z01.818 PREOP TESTING: Primary | ICD-10-CM

## 2021-05-21 LAB — SARS-COV-2 N GENE RESP QL NAA+PROBE: NOT DETECTED

## 2021-05-21 PROCEDURE — 87635 SARS-COV-2 COVID-19 AMP PRB: CPT

## 2021-05-21 PROCEDURE — C9803 HOPD COVID-19 SPEC COLLECT: HCPCS

## 2021-05-24 ENCOUNTER — ANESTHESIA (OUTPATIENT)
Dept: GASTROENTEROLOGY | Facility: HOSPITAL | Age: 56
End: 2021-05-24

## 2021-05-24 ENCOUNTER — HOSPITAL ENCOUNTER (OUTPATIENT)
Facility: HOSPITAL | Age: 56
Setting detail: HOSPITAL OUTPATIENT SURGERY
Discharge: HOME | End: 2021-05-24
Attending: INTERNAL MEDICINE | Admitting: INTERNAL MEDICINE

## 2021-05-24 ENCOUNTER — ANESTHESIA EVENT (OUTPATIENT)
Dept: GASTROENTEROLOGY | Facility: HOSPITAL | Age: 56
End: 2021-05-24

## 2021-05-24 VITALS
HEART RATE: 72 BPM | WEIGHT: 257.94 LBS | RESPIRATION RATE: 16 BRPM | HEIGHT: 74 IN | SYSTOLIC BLOOD PRESSURE: 146 MMHG | BODY MASS INDEX: 33.1 KG/M2 | TEMPERATURE: 97.8 F | DIASTOLIC BLOOD PRESSURE: 76 MMHG | OXYGEN SATURATION: 100 %

## 2021-05-24 DIAGNOSIS — Z12.11 ENCOUNTER FOR SCREENING COLONOSCOPY: ICD-10-CM

## 2021-05-24 PROCEDURE — 45380 COLONOSCOPY AND BIOPSY: CPT | Performed by: INTERNAL MEDICINE

## 2021-05-24 PROCEDURE — 25010000002 PROPOFOL 10 MG/ML EMULSION: Performed by: NURSE ANESTHETIST, CERTIFIED REGISTERED

## 2021-05-24 RX ORDER — ONDANSETRON 2 MG/ML
4 INJECTION INTRAMUSCULAR; INTRAVENOUS ONCE AS NEEDED
Status: DISCONTINUED | OUTPATIENT
Start: 2021-05-24 | End: 2021-05-24 | Stop reason: HOSPADM

## 2021-05-24 RX ORDER — SODIUM CHLORIDE 9 MG/ML
30 INJECTION, SOLUTION INTRAVENOUS CONTINUOUS
Status: DISCONTINUED | OUTPATIENT
Start: 2021-05-24 | End: 2021-05-24 | Stop reason: HOSPADM

## 2021-05-24 RX ORDER — LIDOCAINE HYDROCHLORIDE 20 MG/ML
INJECTION, SOLUTION INTRAVENOUS AS NEEDED
Status: DISCONTINUED | OUTPATIENT
Start: 2021-05-24 | End: 2021-05-24 | Stop reason: SURG

## 2021-05-24 RX ORDER — PROPOFOL 10 MG/ML
VIAL (ML) INTRAVENOUS AS NEEDED
Status: DISCONTINUED | OUTPATIENT
Start: 2021-05-24 | End: 2021-05-24 | Stop reason: SURG

## 2021-05-24 RX ORDER — DEXTROSE AND SODIUM CHLORIDE 5; .45 G/100ML; G/100ML
30 INJECTION, SOLUTION INTRAVENOUS CONTINUOUS PRN
Status: DISCONTINUED | OUTPATIENT
Start: 2021-05-24 | End: 2021-05-24 | Stop reason: HOSPADM

## 2021-05-24 RX ADMIN — SODIUM CHLORIDE 30 ML/HR: 900 INJECTION, SOLUTION INTRAVENOUS at 09:53

## 2021-05-24 RX ADMIN — PROPOFOL 10 MG: 10 INJECTION, EMULSION INTRAVENOUS at 11:46

## 2021-05-24 RX ADMIN — PROPOFOL 10 MG: 10 INJECTION, EMULSION INTRAVENOUS at 11:44

## 2021-05-24 RX ADMIN — LIDOCAINE HYDROCHLORIDE 100 MG: 20 INJECTION, SOLUTION INTRAVENOUS at 11:41

## 2021-05-24 RX ADMIN — PROPOFOL 20 MG: 10 INJECTION, EMULSION INTRAVENOUS at 11:42

## 2021-05-24 RX ADMIN — PROPOFOL 60 MG: 10 INJECTION, EMULSION INTRAVENOUS at 11:41

## 2021-05-24 NOTE — ANESTHESIA PREPROCEDURE EVALUATION
Anesthesia Evaluation     Patient summary reviewed and Nursing notes reviewed   no history of anesthetic complications:  NPO Solid Status: > 8 hours  NPO Liquid Status: > 4 hours           Airway   Mallampati: I  TM distance: >3 FB  Large neck circumference and Possible difficult intubation  Dental    (+) poor dentition        Pulmonary - normal exam   (+) a smoker Former, sleep apnea on CPAP,   Cardiovascular - normal exam  Exercise tolerance: good (4-7 METS)    Patient on routine beta blocker    (+) hypertension less than 2 medications, CAD, CABG >6 Months, dysrhythmias Atrial Fib, angina with exertion, PVD, hyperlipidemia,  carotid artery disease  (-) [place]      Neuro/Psych  GI/Hepatic/Renal/Endo    (+) obesity,  GERD poorly controlled,  diabetes mellitus,     Musculoskeletal     (+) arthralgias,   Abdominal   (+) obese,    Substance History   (-) alcohol use, drug use     OB/GYN          Other   arthritis,                      Anesthesia Plan    ASA 3     MAC     intravenous induction     Anesthetic plan, all risks, benefits, and alternatives have been provided, discussed and informed consent has been obtained with: patient.

## 2021-05-24 NOTE — NURSING NOTE
Pt checked sugar at home   It was 200?   Not rechecking at this time   Will just hang normal saline       verified

## 2021-05-24 NOTE — ADDENDUM NOTE
Addendum  created 05/24/21 1152 by Shelby Brock, CRNA    Review and Sign - Ready for Procedure

## 2021-05-24 NOTE — ANESTHESIA POSTPROCEDURE EVALUATION
Patient: Herbert White SrMichoacano    Procedure Summary     Date: 05/24/21 Room / Location: [facility] ENDOSCOPY 1 / [facility]    Anesthesia Start: 1138 Anesthesia Stop: 1148    Procedure: COLONOSCOPY (N/A ) Diagnosis:       Encounter for screening colonoscopy      (Encounter for screening colonoscopy [Z12.11])    Surgeons: Dewey Antoine MD Provider: Shelby Brock CRNA    Anesthesia Type: MAC ASA Status: 3          Anesthesia Type: MAC    Vitals  No vitals data found for the desired time range.          Post Anesthesia Care and Evaluation    Patient location during evaluation: bedside  Patient participation: complete - patient participated  Level of consciousness: awake  Pain score: 0  Pain management: adequate  Airway patency: patent  Anesthetic complications: No anesthetic complications  PONV Status: none  Cardiovascular status: acceptable  Respiratory status: acceptable  Hydration status: acceptable    Comments: ---------------------------               05/24/21                      1148         ---------------------------   BP:          144/88         Pulse:         75           Resp:          16           Temp:   97.2 °F (36.2 °C)   SpO2:         100%         ---------------------------

## 2021-05-26 ENCOUNTER — READMISSION MANAGEMENT (OUTPATIENT)
Dept: CALL CENTER | Facility: HOSPITAL | Age: 56
End: 2021-05-26

## 2021-05-26 LAB
LAB AP CASE REPORT: NORMAL
PATH REPORT.FINAL DX SPEC: NORMAL

## 2021-05-26 NOTE — OUTREACH NOTE
Medical Week 3 Survey      Responses   [facility] patient discharged from?  [place]   Does the patient have one of the following disease processes/diagnoses(primary or secondary)?  Other   Week 3 attempt successful?  No   Unsuccessful attempts  Attempt 1          Jennifer Lee LPN

## 2021-06-11 ENCOUNTER — LAB (OUTPATIENT)
Dept: LAB | Facility: HOSPITAL | Age: 56
End: 2021-06-11

## 2021-06-11 ENCOUNTER — OFFICE VISIT (OUTPATIENT)
Dept: CARDIOLOGY | Facility: CLINIC | Age: 56
End: 2021-06-11

## 2021-06-11 VITALS
HEART RATE: 83 BPM | SYSTOLIC BLOOD PRESSURE: 130 MMHG | WEIGHT: 266 LBS | RESPIRATION RATE: 18 BRPM | BODY MASS INDEX: 34.14 KG/M2 | OXYGEN SATURATION: 97 % | HEIGHT: 74 IN | DIASTOLIC BLOOD PRESSURE: 82 MMHG

## 2021-06-11 DIAGNOSIS — I25.118 CORONARY ARTERY DISEASE OF NATIVE ARTERY OF NATIVE HEART WITH STABLE ANGINA PECTORIS (HCC): Primary | ICD-10-CM

## 2021-06-11 DIAGNOSIS — E78.2 MIXED HYPERLIPIDEMIA: ICD-10-CM

## 2021-06-11 DIAGNOSIS — I10 ESSENTIAL HYPERTENSION: ICD-10-CM

## 2021-06-11 DIAGNOSIS — I48.0 PAROXYSMAL ATRIAL FIBRILLATION (HCC): ICD-10-CM

## 2021-06-11 PROBLEM — E78.00 PURE HYPERCHOLESTEROLEMIA: Status: RESOLVED | Noted: 2019-07-24 | Resolved: 2021-06-11

## 2021-06-11 LAB
ALBUMIN SERPL-MCNC: 4 G/DL (ref 3.5–5.2)
ALP SERPL-CCNC: 78 U/L (ref 39–117)
ALT SERPL W P-5'-P-CCNC: 14 U/L (ref 1–41)
AST SERPL-CCNC: 11 U/L (ref 1–40)
BILIRUB CONJ SERPL-MCNC: 0.2 MG/DL (ref 0–0.3)
BILIRUB INDIRECT SERPL-MCNC: 0.8 MG/DL
BILIRUB SERPL-MCNC: 1 MG/DL (ref 0–1.2)
CHOLEST SERPL-MCNC: 211 MG/DL (ref 0–200)
HDLC SERPL-MCNC: 40 MG/DL (ref 40–60)
LDLC SERPL CALC-MCNC: 145 MG/DL (ref 0–100)
LDLC/HDLC SERPL: 3.56 {RATIO}
PROT SERPL-MCNC: 6.8 G/DL (ref 6–8.5)
TRIGL SERPL-MCNC: 144 MG/DL (ref 0–150)
VLDLC SERPL-MCNC: 26 MG/DL (ref 5–40)

## 2021-06-11 PROCEDURE — 80076 HEPATIC FUNCTION PANEL: CPT | Performed by: INTERNAL MEDICINE

## 2021-06-11 PROCEDURE — 80061 LIPID PANEL: CPT | Performed by: INTERNAL MEDICINE

## 2021-06-11 PROCEDURE — 36415 COLL VENOUS BLD VENIPUNCTURE: CPT | Performed by: INTERNAL MEDICINE

## 2021-06-11 PROCEDURE — 99213 OFFICE O/P EST LOW 20 MIN: CPT | Performed by: INTERNAL MEDICINE

## 2021-06-11 NOTE — PROGRESS NOTES
[facility]  55 y[facility]    09/25/2020  1. Coronary artery disease of native artery of native heart with stable angina pectoris (CMS/HCC)    2. Mixed hyperlipidemia    3. Essential hypertension    4. Paroxysmal atrial fibrillation (CMS/HCC)        History of Present Illness:    Body mass index is 34.15 kg/m². BMI is above normal parameters. Recommendations include: exercise counseling, nutrition counseling and referral to primary care.        55 years old patient with a background history of postop paroxysmal atrial fibrillation converted to sinus rhythm amiodarone discontinued, documented history of CAD s/p CABG with a LIMA to LAD vein graft to OM2 and PDA with a CT coronary angiogram patent graft nonobstructive disease in LAD 70% disease left circumflex with a patent graft to obtuse marginal and no significant disease RCA noted.  CT coronary angiogram was reviewed with Dr. Susan Rice and Dr. Dwyer and was of the.  Continue medical management patient presented for routine follow-up no symptom suggestive of cardiac decompensation such as orthopnea PND palpitation lightheaded dizziness or intermittent claudication reported.  Patient of normal left ventricle systolic function and a history of premature ventricular complex, hyperlipidemia, diabetes and hypertension patient was at some time metoprolol 100 mg decreased to 25 mg due to bradycardia arrhythmia and significant fatigue          CT coronary angiogram 9/25/2020     CT FUNCTIONAL ANALYSIS:  Ejection Fraction     74 %  Diastolic Volume     136 ml  Systolic Volume      35 ml  Stroke Volume        101 ml  Cardiac Output       4.6 L/minute     IMPRESSION:  CONCLUSION:   1.  Patient's calcium score is 697.This places the patent in the  high likelihood of at least one significant coronary narrowing  risk for coronary artery disease.  2.  LAD: Proximal calcified atherosclerotic plaque causing mild  stenosis of less than 50%. Mid LAD soft and  calcified  atherosclerotic plaque foci causing moderate stenosis of 50-70%.  LIMA to distal LAD bypass graft which appears patent.  3.  Circumflex: Proximal calcified atherosclerotic plaque causing  significant stenosis of 70% or greater. No other calcified or  soft tissue density plaque and/or stenosis. Patent saphenous vein  bypass graft to the obtuse marginal second branch.  4.  RCA: No calcified or soft tissue density plaque and no  stenosis. Patent saphenous vein bypass graft to the PDA.  5.  Remainder CT angiogram coronary exam unremarkable.     September 2019 cardiac  Cath demonstrated severe CAD multivessel with lesions LAD 80% DX 70% OM2 60% PDA 70%.     CABG 9/25/2019   DISTAL BYPASS TARGETS:    1. LIMA to LAD.    2. Greater saphenous vein to OM2    3. Greater saphenous vein to PDA   · Left ventricular wall thickness is consistent with mild concentric hypertrophy.  · Estimated EF = 61%.  · Left ventricular systolic function is normal.  · Left ventricular diastolic dysfunction (grade I) consistent with impaired relaxation.  · Mild mitral valve regurgitation is present  9/3/19  Conclusion:  1.  Severe three-vessel obstructive coronary artery disease involving mid LAD, diagonal, obtuse marginal and RPDA.  2.  Normal left-sided filling pressures.  No gradient noted across aortic valve on pullback  3.  Patent left subclavian and LIMA    SUBJECTIVE:    Allergies   Allergen Reactions   • Ace Inhibitors Anaphylaxis   • Wellbutrin [Bupropion] Anaphylaxis         Past Medical History:   Diagnosis Date   • Coronary artery disease    • Diabetes mellitus (CMS/[facility])    • Diabetic retinopathy (CMS/[facility])    • GERD (gastroesophageal reflux disease)    • Hyperlipidemia    • Hypertension    • Osteoarthritis    • Paroxysmal atrial fibrillation (CMS/[facility])    • Sleep apnea    • Wears glasses          Past Surgical History:   Procedure Laterality Date   • CARDIAC CATHETERIZATION N/A 9/3/2019   • CHOLECYSTECTOMY     • COLONOSCOPY  N/A 2021    Procedure: COLONOSCOPY;  Surgeon: Dewey Antoine MD;  Location: [facility] ENDOSCOPY;  Service: Gastroenterology;  Laterality: N/A;   • CORONARY ARTERY BYPASS GRAFT N/A 2019         Family History   Problem Relation Age of Onset   • Diabetes Mother    • Hypertension Father          Social History     Socioeconomic History   • Marital status: Single     Spouse name: Not on file   • Number of children: Not on file   • Years of education: Not on file   • Highest education level: Not on file   Tobacco Use   • Smoking status: Former Smoker     Packs/day: 1.00     Years: 35.00     Pack years: 35.00     Types: Cigarettes     Start date: 1980     Quit date: 9/3/2019     Years since quittin.7   • Smokeless tobacco: Never Used   Vaping Use   • Vaping Use: Never used   Substance and Sexual Activity   • Alcohol use: No   • Drug use: No   • Sexual activity: Not Currently         Current Outpatient Medications   Medication Sig Dispense Refill   • albuterol sulfate  (90 Base) MCG/ACT inhaler Inhale 2 puffs Every 4 (Four) Hours As Needed for Wheezing. 18 g 11   • Alcohol Swabs 70 % pads 1 each Every Morning. 100 each 3   • amLODIPine (NORVASC) 10 MG tablet Take 1 tablet by mouth Daily. 30 tablet 1   • apixaban (ELIQUIS) 5 MG tablet tablet Take 1 tablet by mouth Every 12 (Twelve) Hours. 60 tablet 3   • aspirin 81 MG chewable tablet Chew 81 mg Daily.     • atorvastatin (LIPITOR) 40 MG tablet Take 1 tablet by mouth Every Night. 30 tablet 3   • benzonatate (TESSALON) 200 MG capsule Take 1 capsule by mouth 3 (Three) Times a Day As Needed for Cough. 30 capsule 0   • clopidogrel (PLAVIX) 75 MG tablet Take 75 mg by mouth Daily.     • glimepiride (Amaryl) 1 MG tablet Take 0.5-1 tablets by mouth Every Morning Before Breakfast. 30 tablet 2   • Insulin Glargine (BASAGLAR KWIKPEN) 100 UNIT/ML injection pen Inject 5-50 Units under the skin into the appropriate area as directed Every Night. 5 pen 2   • Insulin  "Pen Needle (Pen Needles 1/2\") 29G X 12MM misc 1 each Every Night. 100 each 3   • isosorbide mononitrate (IMDUR) 30 MG 24 hr tablet Take 1 tablet by mouth Daily. 30 tablet 11   • Lancet Devices (ONE TOUCH DELICA LANCING DEV) misc Use as directed to test blood sugar. 1 each 11   • magnesium oxide (MAGOX) 400 (241.3 Mg) MG tablet tablet Take 1 tablet by mouth 2 (Two) Times a Day. 60 each 3   • metFORMIN (GLUCOPHAGE) 500 MG tablet Take 2 tablets by mouth 2 (Two) Times a Day With Meals. 360 tablet 0   • metoprolol succinate XL (TOPROL-XL) 25 MG 24 hr tablet Take 1 tablet by mouth Daily. 90 tablet 3   • nitroglycerin (NITROSTAT) 0.4 MG SL tablet Place 1 tablet under the tongue Every 5 (Five) Minutes As Needed for Chest Pain. Take no more than 3 doses in 15 minutes. 30 tablet 1   • nortriptyline (PAMELOR) 10 MG capsule Take 1 capsule by mouth 3 (Three) Times a Day. 90 capsule 11   • ONETOUCH DELICA LANCETS 33G misc 1 each 4 (Four) Times a Day. delica if covered, use alternative if not covered 120 each 11   • pioglitazone (Actos) 30 MG tablet Take 1 tablet by mouth Daily. 30 tablet 2   • ranolazine (RANEXA) 500 MG 12 hr tablet Take 1 tablet by mouth Every 12 (Twelve) Hours. 60 tablet 1   • spironolactone (Aldactone) 25 MG tablet Take 1 tablet by mouth Daily. 30 tablet 11     No current facility-administered medications for this visit.           Review of Systems:     Constitutional:  Denies recent weight loss, weight gain,no change in exercise tolerance.     HENT:  Denies any hearing loss, epistaxis, hoarseness    Eyes: No blurred s.    Respiratory:  Denies dyspnea with exertion,no cough,     Cardiovascular: See H&P  Gastrointestinal:  Denies change in bowel habits, dyspepsia, ulcer disease,    Endocrine: Negative for cold intolerance, heat intolerance, polydipsia, polyphagia and polyuria.polyuria.     Genitourinary: Negative.      Musculoskeletal: Denies any history of arthritic symptoms or back problems.     Skin:  Denies " "any change in hair or nails, rashes, or skin lesions.     Allergic/Immunologic: Negative.  Negative for environmental allergies,     Neurological:  Denies any history of recurrent headaches, strokes,     Hematological: Denies any food allergies, seasonal allergies, bleeding disorders,    Psychiatric/Behavioral: Denies any history of depression,      OBJECTIVE:    /82   Pulse 83   Resp 18   Ht 188 cm (74\")   Wt 121 kg (266 lb)   SpO2 97%   BMI 34.15 kg/m²       Physical Exam:     Constitutional: Cooperative, alert and oriented, well-developed, well-nourished, in no acute distress.     HENT:   Head: Normocephalic, conjunctive is pink thyroid is nonpalpable no jugular is distention    Cardiovascular: Regular rhythm, S1 and S2 normal, no S3 or S4. Apical impulse not displaced. No murmurs, gallops, or rubs detected.     Pulmonary/Chest: Chest: Clear to auscultation no rales or wheezing    Abdominal: Abdomen soft, bowel sounds normoactive, no masses,     Musculoskeletal: No deformities, strong peripheral pulses no    Neurological: No gross motor or sensory deficits noted, affect appropriate, oriented to time, person, place.     Skin: Warm and dry to the touch, no apparent skin lesions or masses noted.     Psychiatric: He has a normal mood and affect. His behavior is normal.         Procedures      Lab Results   Component Value Date    WBC 6.26 05/04/2021    HGB 18.0 (H) 05/04/2021    HCT 50.4 05/04/2021    MCV 82.1 05/04/2021     05/04/2021     Lab Results   Component Value Date    GLUCOSE 238 (H) 05/05/2021    BUN 11 05/05/2021    CREATININE 0.71 (L) 05/05/2021    EGFRIFNONA 115 05/05/2021    BCR 15.5 05/05/2021    CO2 29.0 05/05/2021    CALCIUM 8.9 05/05/2021    ALBUMIN 4.50 05/04/2021    AST 12 05/04/2021    ALT 19 05/04/2021     Lab Results   Component Value Date    CHOL 101 10/04/2019    CHOL 137 09/13/2019    CHOL 218 (H) 06/07/2019     Lab Results   Component Value Date    TRIG 100 10/04/2019    " TRIG 131 09/13/2019    TRIG 126 06/07/2019     Lab Results   Component Value Date    HDL 27 (L) 10/04/2019    HDL 34 (L) 09/13/2019    HDL 39 (L) 06/07/2019     No components found for: LDLCALC  Lab Results   Component Value Date    LDL 54 10/04/2019    LDL 77 09/13/2019     (H) 06/07/2019     No results found for: HDLLDLRATIO  No components found for: CHOLHDL  Lab Results   Component Value Date    HGBA1C 9.21 (H) 10/29/2020     Lab Results   Component Value Date    TSH 0.752 10/14/2020           ASSESSMENT AND PLAN:  #1    History of chest pain atypical from clinical description    #2 CAD status post CABG asymptomatic no further recurrence of chest pain and is a pleased with her clinical outcome  Continue aspirin Plavix, Ranexa and isosorbide    #2 hypertension with hypertensive heart disease.    Good blood pressure control will continue amlodipine, metoprolol and spironolactone    Patient was counseled educated regarding level of physical activity and food particularly with high sodium content    #3 hyperlipidemia  Continue atorvastatin we will arrange hepatic and lipid profile as a part of statin management    #4 postop atrial fibrillation    No further records patient was on amiodarone discontinued and will discontinue Eliquis during the postop atrial fibrillation        Preventive    Obesity with a BMI of 34    Significant low carb rate, low-fat, DASH diet and graded exercise discussed.      Diagnoses and all orders for this visit:    1. Coronary artery disease of native artery of native heart with stable angina pectoris (CMS/HCC) (Primary)    2. Mixed hyperlipidemia    3. Essential hypertension    4. Paroxysmal atrial fibrillation (CMS/HCC)          Alice Rice MD  6/11/2021  08:12 CDT

## 2021-06-15 RX ORDER — EZETIMIBE 10 MG/1
10 TABLET ORAL DAILY
Qty: 90 TABLET | Refills: 3 | Status: SHIPPED | OUTPATIENT
Start: 2021-06-15 | End: 2022-02-24 | Stop reason: HOSPADM

## 2021-06-15 NOTE — TELEPHONE ENCOUNTER
Called patient to let him know that   PER DR. JONES.   His lipid was abnormal.   His LDL is elevated at 145, he would like for it to be .   Dr. Jones would like to start him on zetia 10mg daily.     Left voicemail stating above and to confirm the pharamcy.

## 2021-06-16 ENCOUNTER — TELEPHONE (OUTPATIENT)
Dept: CARDIOLOGY | Facility: CLINIC | Age: 56
End: 2021-06-16

## 2021-08-02 ENCOUNTER — TELEPHONE (OUTPATIENT)
Dept: FAMILY MEDICINE CLINIC | Facility: CLINIC | Age: 56
End: 2021-08-02

## 2021-08-02 RX ORDER — AMOXICILLIN 875 MG/1
875 TABLET, COATED ORAL 2 TIMES DAILY
Qty: 20 TABLET | Refills: 0 | Status: SHIPPED | OUTPATIENT
Start: 2021-08-02 | End: 2021-08-12

## 2021-08-02 NOTE — TELEPHONE ENCOUNTER
Mr. White called and wanted to know if you could call him in an antibiotic for a bad tooth ache since Dr. Sanchez is not in.  [facility].

## 2021-08-09 ENCOUNTER — OFFICE VISIT (OUTPATIENT)
Dept: GASTROENTEROLOGY | Facility: CLINIC | Age: 56
End: 2021-08-09

## 2021-08-09 VITALS
WEIGHT: 263.8 LBS | HEIGHT: 74 IN | SYSTOLIC BLOOD PRESSURE: 162 MMHG | BODY MASS INDEX: 33.86 KG/M2 | HEART RATE: 57 BPM | TEMPERATURE: 96.8 F | DIASTOLIC BLOOD PRESSURE: 82 MMHG

## 2021-08-09 DIAGNOSIS — K59.04 CHRONIC IDIOPATHIC CONSTIPATION: ICD-10-CM

## 2021-08-09 DIAGNOSIS — Z86.010 ENCOUNTER FOR COLONOSCOPY DUE TO HISTORY OF COLONIC POLYP: Primary | ICD-10-CM

## 2021-08-09 DIAGNOSIS — Z12.11 ENCOUNTER FOR COLONOSCOPY DUE TO HISTORY OF COLONIC POLYP: Primary | ICD-10-CM

## 2021-08-09 PROCEDURE — 99212 OFFICE O/P EST SF 10 MIN: CPT | Performed by: NURSE PRACTITIONER

## 2021-08-09 RX ORDER — DEXTROSE AND SODIUM CHLORIDE 5; .45 G/100ML; G/100ML
30 INJECTION, SOLUTION INTRAVENOUS CONTINUOUS PRN
Status: CANCELLED | OUTPATIENT
Start: 2021-09-03

## 2021-08-09 RX ORDER — GABAPENTIN 300 MG/1
300 CAPSULE ORAL 3 TIMES DAILY
COMMUNITY
Start: 2021-06-28 | End: 2022-06-23

## 2021-08-09 RX ORDER — TIZANIDINE 4 MG/1
4 TABLET ORAL 3 TIMES DAILY
COMMUNITY
Start: 2021-06-28 | End: 2022-09-13 | Stop reason: SDUPTHER

## 2021-08-09 NOTE — PROGRESS NOTES
Chief Complaint   Patient presents with   • Colon Cancer Screening       Subjective    Herbert White . is a 56 y.o. male. he is here today for follow-up.    History of Present Illness  56-year-old male presents to reschedule colonoscopy due to poor prep.  He reports some intermittent abdominal pain constipation with bowel movements about every 1 to 2 days.  Denies any melena or hematochezia.  Weight stable.    Colonoscopy 5/24/2020 Noted unsatisfactory prep localized area of mildly erythematous mucosa which was biopsied.  Repeat recommended in 3 months for surveillance     Plan; schedule patient for repeat colonoscopy due to poor prep.  Discussed 2-day prep prior to colonoscopy.    The following portions of theBiopsy showed no significant histologic abnormality. patient's history were reviewed and updated as appropriate:   Past Medical History:   Diagnosis Date   • Coronary artery disease    • Diabetes mellitus (CMS/HCC)    • Diabetic retinopathy (CMS/HCC)    • GERD (gastroesophageal reflux disease)    • Hyperlipidemia    • Hypertension    • Osteoarthritis    • Paroxysmal atrial fibrillation (CMS/HCC)    • Sleep apnea    • Wears glasses      Past Surgical History:   Procedure Laterality Date   • CARDIAC CATHETERIZATION N/A 9/3/2019   • CHOLECYSTECTOMY     • COLONOSCOPY N/A 5/24/2021    Procedure: COLONOSCOPY;  Surgeon: Dewey Antoine MD;  Location: [facility] ENDOSCOPY;  Service: Gastroenterology;  Laterality: N/A;   • CORONARY ARTERY BYPASS GRAFT N/A 9/25/2019     Family History   Problem Relation Age of Onset   • Diabetes Mother    • Hypertension Father        Prior to Admission medications    Medication Sig Start Date End Date Taking? Authorizing Provider   albuterol sulfate  (90 Base) MCG/ACT inhaler Inhale 2 puffs Every 4 (Four) Hours As Needed for Wheezing. 8/27/20  Yes Artemio Sanchez MD   Alcohol Swabs 70 % pads 1 each Every Morning. 5/12/21  Yes Artemio Sanchez MD   amLODIPine (NORVASC) 10  "MG tablet Take 1 tablet by mouth Daily. 5/6/21  Yes Quinn Mathew MD   amoxicillin (AMOXIL) 875 MG tablet Take 1 tablet by mouth 2 (Two) Times a Day for 10 days. 8/2/21 8/12/21 Yes Sandra Sorto APRN   aspirin 81 MG chewable tablet Chew 81 mg Daily.   Yes Hai Rodriguez MD   atorvastatin (LIPITOR) 40 MG tablet Take 1 tablet by mouth Every Night. 7/29/20  Yes Zeferino Fernando APRN   benzonatate (TESSALON) 200 MG capsule Take 1 capsule by mouth 3 (Three) Times a Day As Needed for Cough. 1/14/21  Yes Claudia Hill APRN   clopidogrel (PLAVIX) 75 MG tablet Take 75 mg by mouth Daily.   Yes Hai Rodriguez MD   ezetimibe (ZETIA) 10 MG tablet Take 1 tablet by mouth Daily. 6/15/21  Yes Alice Rice MD   gabapentin (NEURONTIN) 300 MG capsule TAKE 1 CAPSULE BY MOUTH AT BEDTIME FOR 2 DAYS THEN 1 TWICE DAILY FOR 2 DAYS THEN 1 THREE TIMES DAILY THEREAFTER 6/28/21  Yes Hai Rodriguez MD   glimepiride (Amaryl) 1 MG tablet Take 0.5-1 tablets by mouth Every Morning Before Breakfast. 11/5/20  Yes Artemio Sanchez MD   Insulin Glargine (BASAGLAR KWIKPEN) 100 UNIT/ML injection pen Inject 5-50 Units under the skin into the appropriate area as directed Every Night. 5/12/21  Yes Artemio Sanchez MD   Insulin Pen Needle (Pen Needles 1/2\") 29G X 12MM misc 1 each Every Night. 5/12/21  Yes Artemio Sanchez MD   isosorbide mononitrate (IMDUR) 30 MG 24 hr tablet Take 1 tablet by mouth Daily. 8/27/20  Yes Artemio Sanchez MD   Lancet Devices (ONE TOUCH DELICA LANCING DEV) misc Use as directed to test blood sugar. 11/13/20  Yes Alessandro Gan MD   magnesium oxide (MAGOX) 400 (241.3 Mg) MG tablet tablet Take 1 tablet by mouth 2 (Two) Times a Day. 7/29/20  Yes Zeferino Fernando APRN   metFORMIN (GLUCOPHAGE) 500 MG tablet Take 2 tablets by mouth 2 (Two) Times a Day With Meals. 10/30/20  Yes Artemio Sanchez MD   metoprolol succinate XL (TOPROL-XL) 25 MG 24 hr tablet Take 1 tablet by mouth Daily. 5/3/21 "  Yes Alice Rice MD   nitroglycerin (NITROSTAT) 0.4 MG SL tablet Place 1 tablet under the tongue Every 5 (Five) Minutes As Needed for Chest Pain. Take no more than 3 doses in 15 minutes. 19  Yes Marcelino Goode MD   nortriptyline (PAMELOR) 10 MG capsule Take 1 capsule by mouth 3 (Three) Times a Day. 10/29/20  Yes Artemio Sanchez MD   ONETOUCH DELICA LANCETS 33G misc 1 each 4 (Four) Times a Day. delica if covered, use alternative if not covered 17  Yes Alessandro Gan MD   pioglitazone (Actos) 30 MG tablet Take 1 tablet by mouth Daily. 20  Yes Artemio Sanchez MD   ranolazine (RANEXA) 500 MG 12 hr tablet Take 1 tablet by mouth Every 12 (Twelve) Hours. 21  Yes Quinn Mathew MD   spironolactone (Aldactone) 25 MG tablet Take 1 tablet by mouth Daily. 9/3/20  Yes Artemio Sanchez MD   tiZANidine (ZANAFLEX) 4 MG tablet Take 4 mg by mouth 3 (Three) Times a Day. 21  Yes ProviderHai MD     Allergies   Allergen Reactions   • Ace Inhibitors Anaphylaxis   • Wellbutrin [Bupropion] Anaphylaxis     Social History     Socioeconomic History   • Marital status: Single     Spouse name: Not on file   • Number of children: Not on file   • Years of education: Not on file   • Highest education level: Not on file   Tobacco Use   • Smoking status: Former Smoker     Packs/day: 1.00     Years: 35.00     Pack years: 35.00     Types: Cigarettes     Start date: 1980     Quit date: 9/3/2019     Years since quittin.9   • Smokeless tobacco: Never Used   Vaping Use   • Vaping Use: Never used   Substance and Sexual Activity   • Alcohol use: No   • Drug use: No   • Sexual activity: Not Currently       Review of Systems  Review of Systems   Constitutional: Negative for activity change, appetite change, chills, diaphoresis, fatigue, fever and unexpected weight change.   HENT: Negative for trouble swallowing.    Gastrointestinal: Positive for abdominal pain and constipation (every other day  "kelsie hard ). Negative for abdominal distention, anal bleeding, blood in stool, diarrhea, nausea, rectal pain and vomiting.        /82 (BP Location: Left arm)   Pulse 57   Temp 96.8 °F (36 °C) (Temporal)   Ht 188 cm (74\")   Wt 120 kg (263 lb 12.8 oz)   BMI 33.87 kg/m²     Objective    Physical Exam  Constitutional:       General: He is not in acute distress.     Appearance: Normal appearance. He is well-developed.   Neck:      Thyroid: No thyroid mass or thyromegaly.   Cardiovascular:      Rate and Rhythm: Normal rate and regular rhythm.      Heart sounds: Normal heart sounds.   Pulmonary:      Effort: Pulmonary effort is normal. No respiratory distress.      Breath sounds: Normal breath sounds.   Abdominal:      General: Bowel sounds are normal. There is no distension.      Palpations: Abdomen is soft. There is no mass.      Tenderness: There is no abdominal tenderness.      Hernia: No hernia is present.       Office Visit on 06/11/2021   Component Date Value Ref Range Status   • Total Cholesterol 06/11/2021 211* 0 - 200 mg/dL Final   • Triglycerides 06/11/2021 144  0 - 150 mg/dL Final   • HDL Cholesterol 06/11/2021 40  40 - 60 mg/dL Final   • LDL Cholesterol  06/11/2021 145* 0 - 100 mg/dL Final   • VLDL Cholesterol 06/11/2021 26  5 - 40 mg/dL Final   • LDL/HDL Ratio 06/11/2021 3.56   Final   • Total Protein 06/11/2021 6.8  6.0 - 8.5 g/dL Final   • Albumin 06/11/2021 4.00  3.50 - 5.20 g/dL Final   • ALT (SGPT) 06/11/2021 14  1 - 41 U/L Final   • AST (SGOT) 06/11/2021 11  1 - 40 U/L Final   • Alkaline Phosphatase 06/11/2021 78  39 - 117 U/L Final   • Total Bilirubin 06/11/2021 1.0  0.0 - 1.2 mg/dL Final   • Bilirubin, Direct 06/11/2021 0.2  0.0 - 0.3 mg/dL Final   • Bilirubin, Indirect 06/11/2021 0.8  mg/dL Final     Assessment/Plan      1. Encounter for colonoscopy due to history of colonic polyp    2. Chronic idiopathic constipation    .       Orders placed during this encounter include:  Orders Placed " This Encounter   Procedures   • Follow Anesthesia Guidelines / Standing Orders     Standing Status:   Future   • Obtain Informed Consent     Standing Status:   Future     Order Specific Question:   Informed Consent Given For     Answer:   COLONOSCOPY       COLONOSCOPY (N/A)    Review and/or summary of lab tests, radiology, procedures, medications. Review and summary of old records and obtaining of history. The risks and benefits of my recommendations, as well as other treatment options were discussed with the patient today. Questions were answered.    New Medications Ordered This Visit   Medications   • polyethylene glycol (GoLYTELY) 236 g solution     Sig: Starting at noon on day prior to procedure, drink 8 ounces every 30 minutes until all gone or stools are clear. May add flavor packet.     Dispense:  4000 mL     Refill:  0       Follow-up: Return in about 4 weeks (around 9/6/2021) for Recheck, After test.          This document has been electronically signed by NELSY Escalera on August 9, 2021 10:46 CDT           I spent 12 minutes caring for Herbert on this date of service. This time includes time spent by me in the following activities:preparing for the visit, reviewing tests, obtaining and/or reviewing a separately obtained history, performing a medically appropriate examination and/or evaluation , counseling and educating the patient/family/caregiver, ordering medications, tests, or procedures, referring and communicating with other health care professionals , documenting information in the medical record and care coordination    Results for orders placed or performed in visit on 06/11/21   Hepatic Function Panel    Specimen: Blood   Result Value Ref Range    Total Protein 6.8 6.0 - 8.5 g/dL    Albumin 4.00 3.50 - 5.20 g/dL    ALT (SGPT) 14 1 - 41 U/L    AST (SGOT) 11 1 - 40 U/L    Alkaline Phosphatase 78 39 - 117 U/L    Total Bilirubin 1.0 0.0 - 1.2 mg/dL    Bilirubin, Direct 0.2 0.0 - 0.3 mg/dL     Bilirubin, Indirect 0.8 mg/dL   Lipid Panel    Specimen: Blood   Result Value Ref Range    Total Cholesterol 211 (H) 0 - 200 mg/dL    Triglycerides 144 0 - 150 mg/dL    HDL Cholesterol 40 40 - 60 mg/dL    LDL Cholesterol  145 (H) 0 - 100 mg/dL    VLDL Cholesterol 26 5 - 40 mg/dL    LDL/HDL Ratio 3.56    Results for orders placed or performed during the hospital encounter of 05/24/21   Tissue Pathology Exam    Specimen: Large Intestine, Rectum; Tissue   Result Value Ref Range    Case Report       Surgical Pathology Report                         Case: [id number]                                  Authorizing Provider:  Dewey Antoine MD        Collected:           05/24/2021 11:48 AM          Ordering Location:     [facility]             Received:            05/24/2021 01:32 PM                                 [facility]                                                     Pathologist:           Gregory Carmona MD                                                           Specimen:    Large Intestine, Rectum, rectum polyp                                                      Final Diagnosis       See Scanned Report       Results for orders placed or performed in visit on 05/21/21   COVID-19, [facility] IN-HOUSE, NP SWAB IN TRANSPORT MEDIA 8-10 HR TAT - Swab, Nasopharynx    Specimen: Nasopharynx; Swab   Result Value Ref Range    COVID19 Not Detected Not Detected - Ref. Range   Results for orders placed or performed during the hospital encounter of 05/04/21   Gold Top - SST   Result Value Ref Range    Extra Tube Hold for add-ons.    Green Top (Gel)   Result Value Ref Range    Extra Tube Hold for add-ons.    COVID-19 and FLU A/B PCR - Swab, Nasopharynx    Specimen: Nasopharynx; Swab   Result Value Ref Range    COVID19 Not Detected Not Detected - Ref. Range    Influenza A PCR Not Detected Not Detected    Influenza B PCR Not Detected Not Detected   CBC Auto Differential    Specimen: Blood   Result Value Ref  Range    WBC 6.26 3.40 - 10.80 10*3/mm3    RBC 6.14 (H) 4.14 - 5.80 10*6/mm3    Hemoglobin 18.0 (H) 13.0 - 17.7 g/dL    Hematocrit 50.4 37.5 - 51.0 %    MCV 82.1 79.0 - 97.0 fL    MCH 29.3 26.6 - 33.0 pg    MCHC 35.7 31.5 - 35.7 g/dL    RDW 12.5 12.3 - 15.4 %    RDW-SD 36.8 (L) 37.0 - 54.0 fl    MPV 10.3 6.0 - 12.0 fL    Platelets 188 140 - 450 10*3/mm3    Neutrophil % 65.8 42.7 - 76.0 %    Lymphocyte % 23.6 19.6 - 45.3 %    Monocyte % 6.5 5.0 - 12.0 %    Eosinophil % 2.7 0.3 - 6.2 %    Basophil % 0.6 0.0 - 1.5 %    Immature Grans % 0.8 (H) 0.0 - 0.5 %    Neutrophils, Absolute 4.11 1.70 - 7.00 10*3/mm3    Lymphocytes, Absolute 1.48 0.70 - 3.10 10*3/mm3    Monocytes, Absolute 0.41 0.10 - 0.90 10*3/mm3    Eosinophils, Absolute 0.17 0.00 - 0.40 10*3/mm3    Basophils, Absolute 0.04 0.00 - 0.20 10*3/mm3    Immature Grans, Absolute 0.05 0.00 - 0.05 10*3/mm3    nRBC 0.0 0.0 - 0.2 /100 WBC   Lavender Top   Result Value Ref Range    Extra Tube hold for add-on    Lavender Top   Result Value Ref Range    Extra Tube hold for add-on    Light Blue Top   Result Value Ref Range    Extra Tube hold for add-on    Troponin    Specimen: Blood   Result Value Ref Range    Troponin T <0.010 0.000 - 0.030 ng/mL   Troponin    Specimen: Blood   Result Value Ref Range    Troponin T <0.010 0.000 - 0.030 ng/mL   Troponin    Specimen: Blood   Result Value Ref Range    Troponin T <0.010 0.000 - 0.030 ng/mL   POC Glucose Once    Specimen: Blood   Result Value Ref Range    Glucose 264 (H) 70 - 130 mg/dL   POC Glucose Once    Specimen: Blood   Result Value Ref Range    Glucose 261 (H) 70 - 130 mg/dL   POC Glucose Once    Specimen: Blood   Result Value Ref Range    Glucose 317 (H) 70 - 130 mg/dL   POC Glucose Once    Specimen: Blood   Result Value Ref Range    Glucose 292 (H) 70 - 130 mg/dL   BNP    Specimen: Blood   Result Value Ref Range    proBNP 282.2 0.0 - 900.0 pg/mL   Comprehensive Metabolic Panel    Specimen: Blood   Result Value Ref Range     Glucose 359 (H) 65 - 99 mg/dL    BUN 14 6 - 20 mg/dL    Creatinine 0.82 0.76 - 1.27 mg/dL    Sodium 140 136 - 145 mmol/L    Potassium 4.1 3.5 - 5.2 mmol/L    Chloride 103 98 - 107 mmol/L    CO2 30.0 (H) 22.0 - 29.0 mmol/L    Calcium 9.2 8.6 - 10.5 mg/dL    Total Protein 7.0 6.0 - 8.5 g/dL    Albumin 4.50 3.50 - 5.20 g/dL    ALT (SGPT) 19 1 - 41 U/L    AST (SGOT) 12 1 - 40 U/L    Alkaline Phosphatase 85 39 - 117 U/L    Total Bilirubin 0.6 0.0 - 1.2 mg/dL    eGFR Non African Amer 97 >60 mL/min/1.73    Globulin 2.5 gm/dL    A/G Ratio 1.8 g/dL    BUN/Creatinine Ratio 17.1 7.0 - 25.0    Anion Gap 7.0 5.0 - 15.0 mmol/L   Basic Metabolic Panel    Specimen: Blood   Result Value Ref Range    Glucose 238 (H) 65 - 99 mg/dL    BUN 11 6 - 20 mg/dL    Creatinine 0.71 (L) 0.76 - 1.27 mg/dL    Sodium 137 136 - 145 mmol/L    Potassium 3.9 3.5 - 5.2 mmol/L    Chloride 102 98 - 107 mmol/L    CO2 29.0 22.0 - 29.0 mmol/L    Calcium 8.9 8.6 - 10.5 mg/dL    eGFR Non African Amer 115 >60 mL/min/1.73    BUN/Creatinine Ratio 15.5 7.0 - 25.0    Anion Gap 6.0 5.0 - 15.0 mmol/L   ECG 12 Lead   Result Value Ref Range    QT Interval 422 ms    QTC Interval 422 ms   ECG 12 Lead   Result Value Ref Range    QT Interval 388 ms    QTC Interval 433 ms   Adult Transthoracic Echo Complete W/ Cont if Necessary Per Protocol   Result Value Ref Range    BSA 2.4 m^2    RVIDd 3.5 cm    IVSd 1.5 cm    LVIDd 4.3 cm    LVIDs 2.8 cm    LVPWd 1.6 cm    IVS/LVPW 0.95     FS 34.8 %    EDV(Teich) 83.5 ml    ESV(Teich) 29.8 ml    EF(Teich) 64.3 %    EDV(cubed) 80.1 ml    ESV(cubed) 22.2 ml    EF(cubed) 72.3 %    LV mass(C)d 278.0 grams    LV mass(C)dI 114.4 grams/m^2    SV(Teich) 53.7 ml    SI(Teich) 22.1 ml/m^2    SV(cubed) 57.9 ml    SI(cubed) 23.8 ml/m^2    Ao root diam 3.5 cm    Ao root area 9.6 cm^2    ACS 2.3 cm    LA dimension 4.5 cm    asc Aorta Diam 3.4 cm    LA/Ao 1.3     LVOT diam 2.3 cm    LVOT area 4.2 cm^2    LVOT area(traced) 4.2 cm^2    LVLd ap4 9.1 cm     EDV(MOD-sp4) 99.2 ml    LVLs ap4 8.3 cm    ESV(MOD-sp4) 40.2 ml    EF(MOD-sp4) 59.5 %    LVLd ap2 8.6 cm    EDV(MOD-sp2) 71.5 ml    LVLs ap2 7.6 cm    ESV(MOD-sp2) 20.3 ml    EF(MOD-sp2) 71.6 %    SV(MOD-sp4) 59.0 ml    SI(MOD-sp4) 24.3 ml/m^2    SV(MOD-sp2) 51.2 ml    SI(MOD-sp2) 21.1 ml/m^2    Ao root area (BSA corrected) 1.4     LV [place] Vol (BSA corrected) 40.8 ml/m^2    LV Sys Vol (BSA corrected) 16.5 ml/m^2    MV E max husam 76.3 cm/sec    MV A max husam 49.3 cm/sec    MV E/A 1.5     MV V2 max 95.5 cm/sec    MV max PG 3.6 mmHg    MV V2 mean 48.4 cm/sec    MV mean PG 1.0 mmHg    MV V2 VTI 22.0 cm    MVA(VTI) 4.5 cm^2    MV P1/2t max husam 100.0 cm/sec    MV P1/2t 45.1 msec    MVA(P1/2t) 4.9 cm^2    MV dec slope 650.0 cm/sec^2    Ao pk husam 116.0 cm/sec    Ao max PG 5.4 mmHg    Ao max PG (full) 0.89 mmHg    Ao V2 mean 84.9 cm/sec    Ao mean PG 3.0 mmHg    Ao mean PG (full) 0 mmHg    Ao V2 VTI 24.8 cm    ABRAHAM(I,A) 4.0 cm^2    ABRAHAM(I,D) 4.0 cm^2    ABRAHAM(V,A) 3.8 cm^2    ABRAHAM(V,D) 3.8 cm^2    LV V1 max PG 4.5 mmHg    LV V1 mean PG 3.0 mmHg    LV V1 max 106.0 cm/sec    LV V1 mean 74.0 cm/sec    LV V1 VTI 23.6 cm    SV(Ao) 238.6 ml    SI(Ao) 98.2 ml/m^2    SV(LVOT) 98.1 ml    SI(LVOT) 40.4 ml/m^2    PA V2 max 151.0 cm/sec    PA max PG 9.1 mmHg    TR max husam 258.0 cm/sec    RVSP(TR) 31.6 mmHg    RAP systole 5.0 mmHg    MVA P1/2T LCG 2.2 cm^2    BH CV ECHO CINDY - BZI_BMI 33.4 kilograms/m^2    BH CV ECHO CINDY - BSA(HAYCOCK) 2.5 m^2     CV ECHO CINDY - BZI_METRIC_WEIGHT 117.9 kg     CV ECHO CINDY - BZI_METRIC_HEIGHT 188.0 cm    Echo EF Estimated 61 %     *Note: Due to a large number of results and/or encounters for the requested time period, some results have not been displayed. A complete set of results can be found in Results Review.

## 2021-08-09 NOTE — PATIENT INSTRUCTIONS
MyPlate from USDA    MyPlate is an outline of a general healthy diet based on the 2010 Dietary Guidelines for Americans, from the U.[place] Department of Agriculture (USDA). It sets guidelines for how much food you should eat from each food group based on your age, sex, and level of physical activity.  What are tips for following MyPlate?  To follow MyPlate recommendations:  · Eat a wide variety of fruits and vegetables, grains, and protein foods.  · Serve smaller portions and eat less food throughout the day.  · Limit portion sizes to avoid overeating.  · Enjoy your food.  · Get at least 150 minutes of exercise every week. This is about 30 minutes each day, 5 or more days per week.  It can be difficult to have every meal look like MyPlate. Think about MyPlate as eating guidelines for an entire day, rather than each individual meal.  Fruits and vegetables  · Make half of your plate fruits and vegetables.  · Eat many different colors of fruits and vegetables each day.  · For a 2,000 calorie daily food plan, eat:  ? 2½ cups of vegetables every day.  ? 2 cups of fruit every day.  · 1 cup is equal to:  ? 1 cup raw or cooked vegetables.  ? 1 cup raw fruit.  ? 1 medium-sized orange, apple, or banana.  ? 1 cup 100% fruit or vegetable juice.  ? 2 cups raw leafy greens, such as lettuce, spinach, or kale.  ? ½ cup dried fruit.  Grains  · One fourth of your plate should be grains.  · Make at least half of the grains you eat each day whole grains.  · For a 2,000 calorie daily food plan, eat 6 oz of grains every day.  · 1 oz is equal to:  ? 1 slice bread.  ? 1 cup cereal.  ? ½ cup cooked rice, cereal, or pasta.  Protein  · One fourth of your plate should be protein.  · Eat a wide variety of protein foods, including meat, poultry, fish, eggs, beans, nuts, and tofu.  · For a 2,000 calorie daily food plan, eat 5½ oz of protein every day.  · 1 oz is equal to:  ? 1 oz meat, poultry, or fish.  ? ¼ cup cooked beans.  ? 1 egg.  ? ½ oz nuts  or seeds.  ? 1 Tbsp peanut butter.  Dairy  · Drink fat-free or low-fat (1%) milk.  · Eat or drink dairy as a side to meals.  · For a 2,000 calorie daily food plan, eat or drink 3 cups of dairy every day.  · 1 cup is equal to:  ? 1 cup milk, yogurt, cottage cheese, or soy milk (soy beverage).  ? 2 oz processed cheese.  ? 1½ oz natural cheese.  Fats, oils, salt, and sugars  · Only small amounts of oils are recommended.  · Avoid foods that are high in calories and low in nutritional value (empty calories), like foods high in fat or added sugars.  · Choose foods that are low in salt (sodium). Choose foods that have less than 140 milligrams (mg) of sodium per serving.  · Drink water instead of sugary drinks. Drink enough water each day to keep your urine pale yellow.  Where to find support  · Work with your health care provider or a nutrition specialist (dietitian) to develop a customized eating plan that is right for you.  · Download an sarthak (mobile application) to help you track your daily food intake.  Where to find more information  · Go to ChooseMyPlate.gov for more information.  Summary  · MyPlate is a general guideline for healthy eating from the USDA. It is based on the 2010 Dietary Guidelines for Americans.  · In general, fruits and vegetables should take up ½ of your plate, grains should take up ¼ of your plate, and protein should take up ¼ of your plate.  This information is not intended to replace advice given to you by your health care provider. Make sure you discuss any questions you have with your health care provider.  Document Revised: 05/21/2020 Document Reviewed: 03/19/2018  [employer] Patient Education © 2021 [employer].    Colonoscopy, Adult  A colonoscopy is a procedure to look at the entire large intestine. This procedure is done using a long, thin, flexible tube that has a camera on the end.  You may have a colonoscopy:  · As a part of normal colorectal screening.  · If you have certain symptoms,  such as:  ? A low number of red blood cells in your blood (anemia).  ? Diarrhea that does not go away.  ? Pain in your abdomen.  ? Blood in your stool.  A colonoscopy can help screen for and diagnose medical problems, including:  · Tumors.  · Extra tissue that grows where mucus forms (polyps).  · Inflammation.  · Areas of bleeding.  Tell your health care provider about:  · Any allergies you have.  · All medicines you are taking, including vitamins, herbs, eye drops, creams, and over-the-counter medicines.  · Any problems you or family members have had with anesthetic medicines.  · Any blood disorders you have.  · Any surgeries you have had.  · Any medical conditions you have.  · Any problems you have had with having bowel movements.  · Whether you are pregnant or may be pregnant.  What are the risks?  Generally, this is a safe procedure. However, problems may occur, including:  · Bleeding.  · Damage to your intestine.  · Allergic reactions to medicines given during the procedure.  · Infection. This is rare.  What happens before the procedure?  Eating and drinking restrictions  Follow instructions from your health care provider about eating or drinking restrictions, which may include:  · A few days before the procedure:  ? Follow a low-fiber diet.  ? Avoid nuts, seeds, dried fruit, raw fruits, and vegetables.  · 1-3 days before the procedure:  ? Eat only gelatin dessert or ice pops.  ? Drink only clear liquids, such as water, clear juice, clear broth or bouillon, black coffee or tea, or clear soft drinks or sports drinks.  ? Avoid liquids that contain red or purple dye.  · The day of the procedure:  ? Do not eat solid foods. You may continue to drink clear liquids until up to 2 hours before the procedure.  ? Do not eat or drink anything starting 2 hours before the procedure, or within the time period that your health care provider recommends.  Bowel prep  If you were prescribed a bowel prep to take by mouth (orally)  to clean out your colon:  · Take it as told by your health care provider. Starting the day before your procedure, you will need to drink a large amount of liquid medicine. The liquid will cause you to have many bowel movements of loose stool until your stool becomes almost clear or light green.  · If your skin or the opening between the buttocks (anus) gets irritated from diarrhea, you may relieve the irritation using:  ? Wipes with medicine in them, such as adult wet wipes with aloe and vitamin E.  ? A product to soothe skin, such as petroleum jelly.  · If you vomit while drinking the bowel prep:  ? Take a break for up to 60 minutes.  ? Begin the bowel prep again.  ? Call your health care provider if you keep vomiting or you cannot take the bowel prep without vomiting.  · To clean out your colon, you may also be given:  ? Laxative medicines. These help you have a bowel movement.  ? Instructions for enema use. An enema is liquid medicine injected into your rectum.  Medicines  Ask your health care provider about:  · Changing or stopping your regular medicines or supplements. This is especially important if you are taking iron supplements, diabetes medicines, or blood thinners.  · Taking medicines such as aspirin and ibuprofen. These medicines can thin your blood. Do not take these medicines unless your health care provider tells you to take them.  · Taking over-the-counter medicines, vitamins, herbs, and supplements.  General instructions  · Ask your health care provider what steps will be taken to help prevent infection. These may include washing skin with a germ-killing soap.  · Plan to have someone take you home from the hospital or clinic.  What happens during the procedure?    · An IV will be inserted into one of your veins.  · You may be given one or more of the following:  ? A medicine to help you relax (sedative).  ? A medicine to numb the area (local anesthetic).  ? A medicine to make you fall asleep  (general anesthetic). This is rarely needed.  · You will lie on your side with your knees bent.  · The tube will:  ? Have oil or gel put on it (be lubricated).  ? Be inserted into your anus.  ? Be gently eased through all parts of your large intestine.  · Air will be sent into your colon to keep it open. This may cause some pressure or cramping.  · Images will be taken with the camera and will appear on a screen.  · A small tissue sample may be removed to be looked at under a microscope (biopsy). The tissue may be sent to a lab for testing if any signs of problems are found.  · If small polyps are found, they may be removed and checked for cancer cells.  · When the procedure is finished, the tube will be removed.  The procedure may vary among health care providers and hospitals.  What happens after the procedure?  · Your blood pressure, heart rate, breathing rate, and blood oxygen level will be monitored until you leave the hospital or clinic.  · You may have a small amount of blood in your stool.  · You may pass gas and have mild cramping or bloating in your abdomen. This is caused by the air that was used to open your colon during the exam.  · Do not drive for 24 hours after the procedure.  · It is up to you to get the results of your procedure. Ask your health care provider, or the department that is doing the procedure, when your results will be ready.  Summary  · A colonoscopy is a procedure to look at the entire large intestine.  · Follow instructions from your health care provider about eating and drinking before the procedure.  · If you were prescribed an oral bowel prep to clean out your colon, take it as told by your health care provider.  · During the colonoscopy, a flexible tube with a camera on its end is inserted into the anus and then passed into the other parts of the large intestine.  This information is not intended to replace advice given to you by your health care provider. Make sure you discuss  any questions you have with your health care provider.  Document Revised: 07/10/2020 Document Reviewed: 07/10/2020  Elsevier Patient Education © 2021 Elsevier Inc.

## 2021-10-18 ENCOUNTER — TELEPHONE (OUTPATIENT)
Dept: FAMILY MEDICINE CLINIC | Facility: CLINIC | Age: 56
End: 2021-10-18

## 2021-10-18 NOTE — TELEPHONE ENCOUNTER
Nai called regarding her meds and made an appt. For herself and Mr. White but wanted to know if you could fill out FMLA paper work for Mr. White?

## 2021-10-19 ENCOUNTER — OFFICE VISIT (OUTPATIENT)
Dept: FAMILY MEDICINE CLINIC | Facility: CLINIC | Age: 56
End: 2021-10-19

## 2021-10-19 VITALS
TEMPERATURE: 96.7 F | WEIGHT: 266 LBS | HEIGHT: 74 IN | DIASTOLIC BLOOD PRESSURE: 90 MMHG | OXYGEN SATURATION: 98 % | BODY MASS INDEX: 34.14 KG/M2 | HEART RATE: 80 BPM | SYSTOLIC BLOOD PRESSURE: 148 MMHG

## 2021-10-19 DIAGNOSIS — M54.42 CHRONIC BILATERAL LOW BACK PAIN WITH BILATERAL SCIATICA: Primary | ICD-10-CM

## 2021-10-19 DIAGNOSIS — G89.29 CHRONIC BILATERAL LOW BACK PAIN WITH BILATERAL SCIATICA: Primary | ICD-10-CM

## 2021-10-19 DIAGNOSIS — M54.41 CHRONIC BILATERAL LOW BACK PAIN WITH BILATERAL SCIATICA: Primary | ICD-10-CM

## 2021-10-19 DIAGNOSIS — R10.13 DYSPEPSIA: ICD-10-CM

## 2021-10-19 PROCEDURE — 90686 IIV4 VACC NO PRSV 0.5 ML IM: CPT | Performed by: FAMILY MEDICINE

## 2021-10-19 PROCEDURE — 90471 IMMUNIZATION ADMIN: CPT | Performed by: FAMILY MEDICINE

## 2021-10-19 PROCEDURE — 96372 THER/PROPH/DIAG INJ SC/IM: CPT | Performed by: FAMILY MEDICINE

## 2021-10-19 PROCEDURE — 99213 OFFICE O/P EST LOW 20 MIN: CPT | Performed by: FAMILY MEDICINE

## 2021-10-19 RX ORDER — TRIAMCINOLONE ACETONIDE 40 MG/ML
80 INJECTION, SUSPENSION INTRA-ARTICULAR; INTRAMUSCULAR ONCE
Status: DISCONTINUED | OUTPATIENT
Start: 2021-10-19 | End: 2021-10-19

## 2021-10-19 RX ORDER — KETOROLAC TROMETHAMINE 30 MG/ML
60 INJECTION, SOLUTION INTRAMUSCULAR; INTRAVENOUS ONCE
Status: COMPLETED | OUTPATIENT
Start: 2021-10-19 | End: 2021-10-19

## 2021-10-19 RX ADMIN — KETOROLAC TROMETHAMINE 60 MG: 30 INJECTION, SOLUTION INTRAMUSCULAR; INTRAVENOUS at 10:53

## 2021-10-19 NOTE — PROGRESS NOTES
" Subjective   Herbert White . is a 56 y.o. male.     Chief Complaint   Patient presents with   • GI Problem   • Back Pain             History of Present Illness     1)  Needs fmla filled out for his sob  2)  Having acute back pain.  Goes to pain clinic for back  3)  Having sour burps     Review of Systems   Constitutional: Negative for chills, fatigue and fever.   HENT: Negative for congestion, ear discharge, ear pain, facial swelling, hearing loss, postnasal drip, rhinorrhea, sinus pressure, sore throat, trouble swallowing and voice change.    Eyes: Negative for discharge, redness and visual disturbance.   Respiratory: Negative for cough, chest tightness, shortness of breath and wheezing.    Cardiovascular: Negative for chest pain and palpitations.   Gastrointestinal: Negative for abdominal pain, blood in stool, constipation, diarrhea, nausea and vomiting.   Endocrine: Negative for polydipsia and polyuria.   Genitourinary: Negative for dysuria, flank pain, hematuria and urgency.   Musculoskeletal: Positive for back pain. Negative for arthralgias, joint swelling and myalgias.   Skin: Negative for rash.   Neurological: Negative for dizziness, weakness, numbness and headaches.   Hematological: Negative for adenopathy.   Psychiatric/Behavioral: Negative for confusion and sleep disturbance. The patient is not nervous/anxious.            /90 (BP Location: Right arm, Patient Position: Sitting, Cuff Size: Adult)   Pulse 80   Temp 96.7 °F (35.9 °C) (Temporal)   Ht 188 cm (74\")   Wt 121 kg (266 lb)   SpO2 98%   BMI 34.15 kg/m²       Objective     Physical Exam  Vitals and nursing note reviewed.   Constitutional:       Appearance: Normal appearance. He is well-developed.   HENT:      Head: Normocephalic and atraumatic.      Right Ear: External ear normal.      Left Ear: External ear normal.      Nose: Nose normal.   Eyes:      Extraocular Movements: Extraocular movements intact.      Conjunctiva/sclera: " Conjunctivae normal.      Pupils: Pupils are equal, round, and reactive to light.   Pulmonary:      Effort: Pulmonary effort is normal.   Musculoskeletal:         General: Normal range of motion.      Cervical back: Normal range of motion.   Neurological:      General: No focal deficit present.      Mental Status: He is alert and oriented to person, place, and time.   Psychiatric:         Behavior: Behavior normal.         Thought Content: Thought content normal.         Judgment: Judgment normal.             PAST MEDICAL HISTORY     Past Medical History:   Diagnosis Date   • Coronary artery disease    • Diabetes mellitus (HCC)    • Diabetic retinopathy (HCC)    • GERD (gastroesophageal reflux disease)    • Hyperlipidemia    • Hypertension    • Osteoarthritis    • Paroxysmal atrial fibrillation (HCC)    • Sleep apnea    • Wears glasses       PAST SURGICAL HISTORY     Past Surgical History:   Procedure Laterality Date   • CARDIAC CATHETERIZATION N/A 9/3/2019   • CHOLECYSTECTOMY     • COLONOSCOPY N/A 2021    Procedure: COLONOSCOPY;  Surgeon: Dewey Antoine MD;  Location: [facility] ENDOSCOPY;  Service: Gastroenterology;  Laterality: N/A;   • CORONARY ARTERY BYPASS GRAFT N/A 2019      SOCIAL HISTORY     Social History     Socioeconomic History   • Marital status: Single   Tobacco Use   • Smoking status: Former Smoker     Packs/day: 1.00     Years: 35.00     Pack years: 35.00     Types: Cigarettes     Start date: 1980     Quit date: 9/3/2019     Years since quittin.1   • Smokeless tobacco: Never Used   Vaping Use   • Vaping Use: Never used   Substance and Sexual Activity   • Alcohol use: No   • Drug use: No   • Sexual activity: Not Currently      ALLERGIES   Ace inhibitors and Wellbutrin [bupropion]   MEDICATIONS     Current Outpatient Medications   Medication Sig Dispense Refill   • albuterol sulfate  (90 Base) MCG/ACT inhaler Inhale 2 puffs Every 4 (Four) Hours As Needed for Wheezing. 18 g 11  "  • Alcohol Swabs 70 % pads 1 each Every Morning. 100 each 3   • amLODIPine (NORVASC) 10 MG tablet Take 1 tablet by mouth Daily. 30 tablet 1   • aspirin 81 MG chewable tablet Chew 81 mg Daily.     • atorvastatin (LIPITOR) 40 MG tablet Take 1 tablet by mouth Every Night. 30 tablet 3   • ezetimibe (ZETIA) 10 MG tablet Take 1 tablet by mouth Daily. 90 tablet 3   • gabapentin (NEURONTIN) 300 MG capsule TAKE 1 CAPSULE BY MOUTH AT BEDTIME FOR 2 DAYS THEN 1 TWICE DAILY FOR 2 DAYS THEN 1 THREE TIMES DAILY THEREAFTER     • glimepiride (Amaryl) 1 MG tablet Take 0.5-1 tablets by mouth Every Morning Before Breakfast. 30 tablet 2   • Insulin Pen Needle (Pen Needles 1/2\") 29G X 12MM misc 1 each Every Night. 100 each 3   • isosorbide mononitrate (IMDUR) 30 MG 24 hr tablet Take 1 tablet by mouth Daily. 30 tablet 11   • Lancet Devices (ONE TOUCH DELICA LANCING DEV) misc Use as directed to test blood sugar. 1 each 11   • magnesium oxide (MAGOX) 400 (241.3 Mg) MG tablet tablet Take 1 tablet by mouth 2 (Two) Times a Day. 60 each 3   • metFORMIN (GLUCOPHAGE) 500 MG tablet Take 2 tablets by mouth 2 (Two) Times a Day With Meals. 360 tablet 0   • metoprolol succinate XL (TOPROL-XL) 25 MG 24 hr tablet Take 1 tablet by mouth Daily. 90 tablet 3   • nitroglycerin (NITROSTAT) 0.4 MG SL tablet Place 1 tablet under the tongue Every 5 (Five) Minutes As Needed for Chest Pain. Take no more than 3 doses in 15 minutes. 30 tablet 1   • nortriptyline (PAMELOR) 10 MG capsule Take 1 capsule by mouth 3 (Three) Times a Day. 90 capsule 11   • ONETOUCH DELICA LANCETS 33G misc 1 each 4 (Four) Times a Day. delica if covered, use alternative if not covered 120 each 11   • pioglitazone (Actos) 30 MG tablet Take 1 tablet by mouth Daily. 30 tablet 2   • polyethylene glycol (GoLYTELY) 236 g solution Starting at noon on day prior to procedure, drink 8 ounces every 30 minutes until all gone or stools are clear. May add flavor packet. 4000 mL 0   • ranolazine (RANEXA) " 500 MG 12 hr tablet Take 1 tablet by mouth Every 12 (Twelve) Hours. 60 tablet 1   • spironolactone (Aldactone) 25 MG tablet Take 1 tablet by mouth Daily. 30 tablet 11   • tiZANidine (ZANAFLEX) 4 MG tablet Take 4 mg by mouth 3 (Three) Times a Day.     • benzonatate (TESSALON) 200 MG capsule Take 1 capsule by mouth 3 (Three) Times a Day As Needed for Cough. 30 capsule 0   • clopidogrel (PLAVIX) 75 MG tablet Take 75 mg by mouth Daily.     • Insulin Glargine (BASAGLAR KWIKPEN) 100 UNIT/ML injection pen Inject 5-50 Units under the skin into the appropriate area as directed Every Night. 5 pen 2     No current facility-administered medications for this visit.        The following portions of the patient's history were reviewed and updated as appropriate: allergies, current medications, past family history, past medical history, past social history, past surgical history and problem list.        Assessment/Plan   Diagnoses and all orders for this visit:    1. Chronic bilateral low back pain with bilateral sciatica (Primary)  -     Discontinue: triamcinolone acetonide (KENALOG-40) injection 80 mg  -     ketorolac (TORADOL) injection 60 mg    2. Dyspepsia    Other orders  -     FluLaval/Fluarix/Fluzone >6 Months ([id number])      toradol for back, no steroid since flu shot    He see's honorio gallagher, he should contact her regarding his sour burps so can do egd if indicated when getting his colonoscopy in January.     Will fill out fmla for his sob problem after his cabg.                  No follow-ups on file.                  This document has been electronically signed by Artemio Sanchez MD on October 19, 2021 16:59 CDT

## 2021-11-08 ENCOUNTER — HOSPITAL ENCOUNTER (EMERGENCY)
Facility: HOSPITAL | Age: 56
Discharge: HOME | End: 2021-11-08
Attending: STUDENT IN AN ORGANIZED HEALTH CARE EDUCATION/TRAINING PROGRAM | Admitting: STUDENT IN AN ORGANIZED HEALTH CARE EDUCATION/TRAINING PROGRAM

## 2021-11-08 ENCOUNTER — APPOINTMENT (OUTPATIENT)
Dept: ULTRASOUND IMAGING | Facility: HOSPITAL | Age: 56
End: 2021-11-08

## 2021-11-08 ENCOUNTER — APPOINTMENT (OUTPATIENT)
Dept: CT IMAGING | Facility: HOSPITAL | Age: 56
End: 2021-11-08

## 2021-11-08 VITALS
SYSTOLIC BLOOD PRESSURE: 170 MMHG | DIASTOLIC BLOOD PRESSURE: 88 MMHG | BODY MASS INDEX: 34.01 KG/M2 | TEMPERATURE: 97.6 F | OXYGEN SATURATION: 100 % | WEIGHT: 265 LBS | RESPIRATION RATE: 18 BRPM | HEART RATE: 69 BPM | HEIGHT: 74 IN

## 2021-11-08 DIAGNOSIS — N45.1 EPIDIDYMITIS: Primary | ICD-10-CM

## 2021-11-08 LAB
ALBUMIN SERPL-MCNC: 4.3 G/DL (ref 3.5–5.2)
ALBUMIN/GLOB SERPL: 1.7 G/DL
ALP SERPL-CCNC: 85 U/L (ref 39–117)
ALT SERPL W P-5'-P-CCNC: 13 U/L (ref 1–41)
ANION GAP SERPL CALCULATED.3IONS-SCNC: 10 MMOL/L (ref 5–15)
AST SERPL-CCNC: 12 U/L (ref 1–40)
BACTERIA UR QL AUTO: ABNORMAL /HPF
BASOPHILS # BLD AUTO: 0.04 10*3/MM3 (ref 0–0.2)
BASOPHILS NFR BLD AUTO: 0.4 % (ref 0–1.5)
BILIRUB SERPL-MCNC: 0.9 MG/DL (ref 0–1.2)
BILIRUB UR QL STRIP: NEGATIVE
BUN SERPL-MCNC: 12 MG/DL (ref 6–20)
BUN/CREAT SERPL: 14.8 (ref 7–25)
CALCIUM SPEC-SCNC: 9.5 MG/DL (ref 8.6–10.5)
CHLORIDE SERPL-SCNC: 99 MMOL/L (ref 98–107)
CLARITY UR: CLEAR
CO2 SERPL-SCNC: 26 MMOL/L (ref 22–29)
COLOR UR: YELLOW
CREAT SERPL-MCNC: 0.81 MG/DL (ref 0.76–1.27)
DEPRECATED RDW RBC AUTO: 36.6 FL (ref 37–54)
EOSINOPHIL # BLD AUTO: 0.16 10*3/MM3 (ref 0–0.4)
EOSINOPHIL NFR BLD AUTO: 1.5 % (ref 0.3–6.2)
ERYTHROCYTE [DISTWIDTH] IN BLOOD BY AUTOMATED COUNT: 13.6 % (ref 12.3–15.4)
GFR SERPL CREATININE-BSD FRML MDRD: 98 ML/MIN/1.73
GLOBULIN UR ELPH-MCNC: 2.6 GM/DL
GLUCOSE SERPL-MCNC: 336 MG/DL (ref 65–99)
GLUCOSE UR STRIP-MCNC: ABNORMAL MG/DL
HCT VFR BLD AUTO: 51.8 % (ref 37.5–51)
HGB BLD-MCNC: 18.7 G/DL (ref 13–17.7)
HGB UR QL STRIP.AUTO: NEGATIVE
HOLD SPECIMEN: NORMAL
HYALINE CASTS UR QL AUTO: ABNORMAL /LPF
IMM GRANULOCYTES # BLD AUTO: 0.05 10*3/MM3 (ref 0–0.05)
IMM GRANULOCYTES NFR BLD AUTO: 0.5 % (ref 0–0.5)
KETONES UR QL STRIP: ABNORMAL
LEUKOCYTE ESTERASE UR QL STRIP.AUTO: NEGATIVE
LIPASE SERPL-CCNC: 20 U/L (ref 13–60)
LYMPHOCYTES # BLD AUTO: 1.63 10*3/MM3 (ref 0.7–3.1)
LYMPHOCYTES NFR BLD AUTO: 15.4 % (ref 19.6–45.3)
MCH RBC QN AUTO: 28.5 PG (ref 26.6–33)
MCHC RBC AUTO-ENTMCNC: 36.1 G/DL (ref 31.5–35.7)
MCV RBC AUTO: 79.1 FL (ref 79–97)
MONOCYTES # BLD AUTO: 0.62 10*3/MM3 (ref 0.1–0.9)
MONOCYTES NFR BLD AUTO: 5.9 % (ref 5–12)
NEUTROPHILS NFR BLD AUTO: 76.3 % (ref 42.7–76)
NEUTROPHILS NFR BLD AUTO: 8.07 10*3/MM3 (ref 1.7–7)
NITRITE UR QL STRIP: NEGATIVE
NRBC BLD AUTO-RTO: 0 /100 WBC (ref 0–0.2)
PH UR STRIP.AUTO: 5.5 [PH] (ref 5–9)
PLATELET # BLD AUTO: 214 10*3/MM3 (ref 140–450)
PMV BLD AUTO: 10.3 FL (ref 6–12)
POTASSIUM SERPL-SCNC: 3.9 MMOL/L (ref 3.5–5.2)
PROT SERPL-MCNC: 6.9 G/DL (ref 6–8.5)
PROT UR QL STRIP: ABNORMAL
RBC # BLD AUTO: 6.55 10*6/MM3 (ref 4.14–5.8)
RBC # UR: ABNORMAL /HPF
REF LAB TEST METHOD: ABNORMAL
SODIUM SERPL-SCNC: 135 MMOL/L (ref 136–145)
SP GR UR STRIP: 1.04 (ref 1–1.03)
SQUAMOUS #/AREA URNS HPF: ABNORMAL /HPF
UROBILINOGEN UR QL STRIP: ABNORMAL
WBC # BLD AUTO: 10.57 10*3/MM3 (ref 3.4–10.8)
WBC UR QL AUTO: ABNORMAL /HPF
WHOLE BLOOD HOLD SPECIMEN: NORMAL
WHOLE BLOOD HOLD SPECIMEN: NORMAL

## 2021-11-08 PROCEDURE — 99284 EMERGENCY DEPT VISIT MOD MDM: CPT

## 2021-11-08 PROCEDURE — 81001 URINALYSIS AUTO W/SCOPE: CPT | Performed by: PHYSICIAN ASSISTANT

## 2021-11-08 PROCEDURE — 80053 COMPREHEN METABOLIC PANEL: CPT | Performed by: PHYSICIAN ASSISTANT

## 2021-11-08 PROCEDURE — 85025 COMPLETE CBC W/AUTO DIFF WBC: CPT | Performed by: PHYSICIAN ASSISTANT

## 2021-11-08 PROCEDURE — 96374 THER/PROPH/DIAG INJ IV PUSH: CPT

## 2021-11-08 PROCEDURE — 74176 CT ABD & PELVIS W/O CONTRAST: CPT

## 2021-11-08 PROCEDURE — 96375 TX/PRO/DX INJ NEW DRUG ADDON: CPT

## 2021-11-08 PROCEDURE — 36415 COLL VENOUS BLD VENIPUNCTURE: CPT

## 2021-11-08 PROCEDURE — 83690 ASSAY OF LIPASE: CPT | Performed by: PHYSICIAN ASSISTANT

## 2021-11-08 PROCEDURE — 25010000002 ONDANSETRON PER 1 MG: Performed by: PHYSICIAN ASSISTANT

## 2021-11-08 PROCEDURE — 93976 VASCULAR STUDY: CPT

## 2021-11-08 PROCEDURE — 96376 TX/PRO/DX INJ SAME DRUG ADON: CPT

## 2021-11-08 PROCEDURE — 76870 US EXAM SCROTUM: CPT

## 2021-11-08 PROCEDURE — 25010000002 MORPHINE PER 10 MG: Performed by: STUDENT IN AN ORGANIZED HEALTH CARE EDUCATION/TRAINING PROGRAM

## 2021-11-08 RX ORDER — ONDANSETRON 2 MG/ML
4 INJECTION INTRAMUSCULAR; INTRAVENOUS ONCE
Status: COMPLETED | OUTPATIENT
Start: 2021-11-08 | End: 2021-11-08

## 2021-11-08 RX ORDER — LABETALOL HYDROCHLORIDE 5 MG/ML
20 INJECTION, SOLUTION INTRAVENOUS ONCE
Status: COMPLETED | OUTPATIENT
Start: 2021-11-08 | End: 2021-11-08

## 2021-11-08 RX ORDER — SODIUM CHLORIDE 0.9 % (FLUSH) 0.9 %
10 SYRINGE (ML) INJECTION AS NEEDED
Status: DISCONTINUED | OUTPATIENT
Start: 2021-11-08 | End: 2021-11-08 | Stop reason: HOSPADM

## 2021-11-08 RX ORDER — LEVOFLOXACIN 500 MG/1
500 TABLET, FILM COATED ORAL DAILY
Qty: 10 TABLET | Refills: 0 | Status: SHIPPED | OUTPATIENT
Start: 2021-11-08 | End: 2021-11-18

## 2021-11-08 RX ADMIN — LABETALOL HYDROCHLORIDE 20 MG: 5 INJECTION, SOLUTION INTRAVENOUS at 15:01

## 2021-11-08 RX ADMIN — SODIUM CHLORIDE 1000 ML: 9 INJECTION, SOLUTION INTRAVENOUS at 12:52

## 2021-11-08 RX ADMIN — LABETALOL HYDROCHLORIDE 20 MG: 5 INJECTION, SOLUTION INTRAVENOUS at 12:53

## 2021-11-08 RX ADMIN — MORPHINE SULFATE 4 MG: 4 INJECTION INTRAVENOUS at 15:02

## 2021-11-08 RX ADMIN — ONDANSETRON 4 MG: 2 INJECTION INTRAMUSCULAR; INTRAVENOUS at 12:52

## 2021-11-08 RX ADMIN — MORPHINE SULFATE 4 MG: 4 INJECTION INTRAVENOUS at 12:52

## 2021-11-08 NOTE — ED PROVIDER NOTES
Subjective   Patient presents to emergency department for right flank/RLQ abdomen/right groin pain x 3 weeks.  States it has gradually progressed from his right flank to his abdomen and groin.  Denies any urinary symptoms, testicular pain/swelling.        History provided by:  Patient   used: No        Review of Systems   Constitutional: Negative for chills and fever.   HENT: Negative for sore throat and trouble swallowing.    Respiratory: Negative for cough, shortness of breath and wheezing.    Cardiovascular: Negative for chest pain.   Gastrointestinal: Positive for constipation (chronic) and nausea. Negative for vomiting.   Genitourinary: Positive for flank pain. Negative for decreased urine volume and dysuria.   Skin: Negative for color change.   Allergic/Immunologic: Negative for immunocompromised state.   Neurological: Negative for syncope and weakness.   Hematological: Does not bruise/bleed easily.   Psychiatric/Behavioral: Negative for confusion.       Past Medical History:   Diagnosis Date   • Coronary artery disease    • Diabetes mellitus (HCC)    • Diabetic retinopathy (HCC)    • GERD (gastroesophageal reflux disease)    • Hyperlipidemia    • Hypertension    • Osteoarthritis    • Paroxysmal atrial fibrillation (HCC)    • Sleep apnea    • Wears glasses        Allergies   Allergen Reactions   • Ace Inhibitors Anaphylaxis   • Wellbutrin [Bupropion] Anaphylaxis       Past Surgical History:   Procedure Laterality Date   • CARDIAC CATHETERIZATION N/A 9/3/2019   • CHOLECYSTECTOMY     • COLONOSCOPY N/A 5/24/2021    Procedure: COLONOSCOPY;  Surgeon: Dewey Antoine MD;  Location: [facility] ENDOSCOPY;  Service: Gastroenterology;  Laterality: N/A;   • CORONARY ARTERY BYPASS GRAFT N/A 9/25/2019       Family History   Problem Relation Age of Onset   • Diabetes Mother    • Hypertension Father        Social History     Socioeconomic History   • Marital status:    Tobacco Use   • Smoking status:  "Former Smoker     Packs/day: 1.00     Years: 35.00     Pack years: 35.00     Types: Cigarettes     Start date: 1980     Quit date: 9/3/2019     Years since quittin.1   • Smokeless tobacco: Never Used   Vaping Use   • Vaping Use: Never used   Substance and Sexual Activity   • Alcohol use: No   • Drug use: No   • Sexual activity: Not Currently           Objective      /91   Pulse 80   Temp 97.6 °F (36.4 °C) (Infrared)   Resp 18   Ht 188 cm (74\")   Wt 120 kg (265 lb)   SpO2 96%   BMI 34.02 kg/m²     Physical Exam  Vitals and nursing note reviewed.   Constitutional:       Appearance: Normal appearance.   HENT:      Head: Normocephalic and atraumatic.   Eyes:      Conjunctiva/sclera: Conjunctivae normal.   Cardiovascular:      Rate and Rhythm: Normal rate and regular rhythm.      Pulses: Normal pulses.      Heart sounds: Normal heart sounds.   Pulmonary:      Effort: Pulmonary effort is normal. No respiratory distress.   Abdominal:      Tenderness: There is abdominal tenderness (RLQ). There is right CVA tenderness.   Skin:     General: Skin is warm.      Capillary Refill: Capillary refill takes less than 2 seconds.   Neurological:      Mental Status: He is alert. Mental status is at baseline.   Psychiatric:         Mood and Affect: Mood normal.         Behavior: Behavior normal.         Thought Content: Thought content normal.         Procedures           ED Course      Results for orders placed or performed during the hospital encounter of 21   Comprehensive Metabolic Panel    Specimen: Blood   Result Value Ref Range    Glucose 336 (H) 65 - 99 mg/dL    BUN 12 6 - 20 mg/dL    Creatinine 0.81 0.76 - 1.27 mg/dL    Sodium 135 (L) 136 - 145 mmol/L    Potassium 3.9 3.5 - 5.2 mmol/L    Chloride 99 98 - 107 mmol/L    CO2 26.0 22.0 - 29.0 mmol/L    Calcium 9.5 8.6 - 10.5 mg/dL    Total Protein 6.9 6.0 - 8.5 g/dL    Albumin 4.30 3.50 - 5.20 g/dL    ALT (SGPT) 13 1 - 41 U/L    AST (SGOT) 12 1 - 40 U/L "    Alkaline Phosphatase 85 39 - 117 U/L    Total Bilirubin 0.9 0.0 - 1.2 mg/dL    eGFR Non African Amer 98 >60 mL/min/1.73    Globulin 2.6 gm/dL    A/G Ratio 1.7 g/dL    BUN/Creatinine Ratio 14.8 7.0 - 25.0    Anion Gap 10.0 5.0 - 15.0 mmol/L   Lipase    Specimen: Blood   Result Value Ref Range    Lipase 20 13 - 60 U/L   Urinalysis With Culture If Indicated - Urine, Clean Catch    Specimen: Urine, Clean Catch   Result Value Ref Range    Color, UA Yellow Yellow, Straw, Dark Yellow, Kate    Appearance, UA Clear Clear    pH, UA 5.5 5.0 - 9.0    Specific Gravity, UA 1.042 (H) 1.003 - 1.030    Glucose, UA >=1000 mg/dL (3+) (A) Negative    Ketones, UA 15 mg/dL (1+) (A) Negative    Bilirubin, UA Negative Negative    Blood, UA Negative Negative    Protein, UA 30 mg/dL (1+) (A) Negative    Leuk Esterase, UA Negative Negative    Nitrite, UA Negative Negative    Urobilinogen, UA 1.0 E.U./dL 0.2 - 1.0 E.U./dL   CBC Auto Differential    Specimen: Blood   Result Value Ref Range    WBC 10.57 3.40 - 10.80 10*3/mm3    RBC 6.55 (H) 4.14 - 5.80 10*6/mm3    Hemoglobin 18.7 (H) 13.0 - 17.7 g/dL    Hematocrit 51.8 (H) 37.5 - 51.0 %    MCV 79.1 79.0 - 97.0 fL    MCH 28.5 26.6 - 33.0 pg    MCHC 36.1 (H) 31.5 - 35.7 g/dL    RDW 13.6 12.3 - 15.4 %    RDW-SD 36.6 (L) 37.0 - 54.0 fl    MPV 10.3 6.0 - 12.0 fL    Platelets 214 140 - 450 10*3/mm3    Neutrophil % 76.3 (H) 42.7 - 76.0 %    Lymphocyte % 15.4 (L) 19.6 - 45.3 %    Monocyte % 5.9 5.0 - 12.0 %    Eosinophil % 1.5 0.3 - 6.2 %    Basophil % 0.4 0.0 - 1.5 %    Immature Grans % 0.5 0.0 - 0.5 %    Neutrophils, Absolute 8.07 (H) 1.70 - 7.00 10*3/mm3    Lymphocytes, Absolute 1.63 0.70 - 3.10 10*3/mm3    Monocytes, Absolute 0.62 0.10 - 0.90 10*3/mm3    Eosinophils, Absolute 0.16 0.00 - 0.40 10*3/mm3    Basophils, Absolute 0.04 0.00 - 0.20 10*3/mm3    Immature Grans, Absolute 0.05 0.00 - 0.05 10*3/mm3    nRBC 0.0 0.0 - 0.2 /100 WBC   Urinalysis, Microscopic Only - Urine, Clean Catch     Specimen: Urine, Clean Catch   Result Value Ref Range    RBC, UA 0-2 (A) None Seen /HPF    WBC, UA 0-2 None Seen, 0-2, 3-5 /HPF    Bacteria, UA None Seen None Seen /HPF    Squamous Epithelial Cells, UA None Seen None Seen, 0-2 /HPF    Hyaline Casts, UA 0-2 None Seen /LPF    Methodology Automated Microscopy    Green Top (Gel)   Result Value Ref Range    Extra Tube Hold for add-ons.    Lavender Top   Result Value Ref Range    Extra Tube hold for add-on    Gold Top - SST   Result Value Ref Range    Extra Tube Hold for add-ons.    Light Blue Top   Result Value Ref Range    Extra Tube hold for add-on      US Scrotum & Testicles    Result Date: 11/8/2021  Narrative: Doppler scrotum testicles. Ultrasound scrotum testicles. HISTORY: Testicular pain. FINDINGS: Normal right and left testicles. No masses. No abnormalities of internal architecture. Normal arterial and venous flow inspected by color flow Doppler imaging. Bilateral epididymal enlargement and small epididymal cysts. This finding suggestive of epididymitis. Examination is otherwise unremarkable.     Impression: Normal testicles. No masses or torsion. Small bilateral epididymal cysts. Bilateral epididymal enlargement however suggesting epididymitis. Electronically signed by:  Jerry An MD  11/8/2021 3:50 PM CST Workstation: [id number]    US Testicular or Ovarian Vascular Limited    Result Date: 11/8/2021  Narrative: Doppler scrotum testicles. Ultrasound scrotum testicles. HISTORY: Testicular pain. FINDINGS: Normal right and left testicles. No masses. No abnormalities of internal architecture. Normal arterial and venous flow inspected by color flow Doppler imaging. Bilateral epididymal enlargement and small epididymal cysts. This finding suggestive of epididymitis. Examination is otherwise unremarkable.     Impression: Normal testicles. No masses or torsion. Small bilateral epididymal cysts. Bilateral epididymal enlargement however suggesting epididymitis.  Electronically signed by:  Jerry An MD  11/8/2021 3:50 PM CST Workstation: [id number]    CT Abdomen Pelvis Stone Protocol    Result Date: 11/8/2021  Narrative: PROCEDURE: CT ABDOMEN PELVIS STONE PROTOCOL COMPARISON: July 25, 2020 HISTORY: Flank pain, kidney stone suspected This exam was performed using radiation doses that are as low as reasonably achievable (ALARA).This exam was performed according to our departmental dose optimization program, which includes automated exposure control, adjustment of the mA and/or KV according to patient size and/or use of iterative reconstruction technique. TECHNIQUE: Axial images were obtained from the level of the lung bases through the ischial tuberosities at 5 mm intervals. No IV contrast was given. FINDINGS: LOWER CHEST: Unremarkable. HEPATOBILIARY: Nonspecific focal rounded calcifications are noted in the right lobe of the liver. The liver is otherwise unremarkable. The gallbladder is surgically absent. There is no evidence of intra or extrahepatic biliary dilatation. SPLEEN: Unremarkable. PANCREAS: Diffuse fatty infiltration of the pancreas is noted with no focal abnormality. ADRENAL GLANDS: There is a small stable 1.6 cm left adrenal adenoma. The right adrenal gland is unremarkable KIDNEYS/URETERS: No evidence of hydronephrosis or suspicious mass. GASTROINTESTINAL: No obstructive changes are seen within the bowel. The small and large intestine are normal in caliber. The appendix is well visualized and within normal. There is mild diverticulosis of the sigmoid colon. REPRODUCTIVE ORGANS: Unremarkable. URINARY BLADDER: Unremarkable VASCULAR: Diffuse mild atherosclerotic calcifications are noted throughout the abdominal aorta and iliac arteries without evidence of dilatation. LYMPH NODES: No pathologically enlarged nodes by size criteria. PERITONEUM/RETROPERITONEUM: Unremarkable. OSSEOUS STRUCTURES: There is hypertrophic spurring anteriorly throughout the lumbar spine with  no acute osseous abnormalities. There is evidence prior median sternotomy. Other: There is a fatty containing hernia redemonstrated.     Impression: 1. No radiographic evidence of acute intra-abdominal pathology. 2. Stable remote findings redemonstrated as above. Electronically signed by:  Velvet Austin MD  11/8/2021 1:32 PM [facility] Workstation: [id number]                                         [facility]    Final diagnoses:   Epididymitis       ED Disposition  ED Disposition     ED Disposition Condition Comment    Discharge Stable           Artemio Sanchez MD  [address] [id number] [phone]    Call in 1 day      Sanjeev Christensen MD  [address] [facility] [address]  [facility] [id number] [phone]    Call in 2 days  If symptoms worsen    [facility] EMERGENCY DEPARTMENT  [address]  [facility] [address] [phone]  Go to   if symptoms worsen.         Medication List      New Prescriptions    levoFLOXacin 500 MG tablet  Commonly known as: LEVAQUIN  Take 1 tablet by mouth Daily for 10 days.           Where to Get Your Medications      These medications were sent to [facility]note 3[facility] - [place], [place] - [facility].[address] [phone] [facility] [phone]   [address] [address]    Phone: [phone]   · levoFLOXacin 500 MG tablet          Curly Arenas PA-C  11/08/21 1640

## 2021-11-09 RX ORDER — SPIRONOLACTONE 25 MG/1
TABLET ORAL
Qty: 30 TABLET | Refills: 0 | Status: SHIPPED | OUTPATIENT
Start: 2021-11-09

## 2021-11-09 RX ORDER — NORTRIPTYLINE HYDROCHLORIDE 10 MG/1
CAPSULE ORAL
Qty: 90 CAPSULE | Refills: 0 | Status: SHIPPED | OUTPATIENT
Start: 2021-11-09 | End: 2022-09-13 | Stop reason: SDUPTHER

## 2021-11-16 ENCOUNTER — OFFICE VISIT (OUTPATIENT)
Dept: GASTROENTEROLOGY | Facility: CLINIC | Age: 56
End: 2021-11-16

## 2021-11-16 VITALS — WEIGHT: 265.8 LBS | BODY MASS INDEX: 34.11 KG/M2 | HEIGHT: 74 IN

## 2021-11-16 DIAGNOSIS — K59.04 CHRONIC IDIOPATHIC CONSTIPATION: Primary | ICD-10-CM

## 2021-11-16 PROCEDURE — 99213 OFFICE O/P EST LOW 20 MIN: CPT | Performed by: NURSE PRACTITIONER

## 2021-11-16 RX ORDER — ONDANSETRON 8 MG/1
8 TABLET, ORALLY DISINTEGRATING ORAL EVERY 8 HOURS PRN
Qty: 20 TABLET | Refills: 1 | Status: SHIPPED | OUTPATIENT
Start: 2021-11-16 | End: 2022-04-09

## 2021-11-16 NOTE — PATIENT INSTRUCTIONS
Epididymitis    Epididymitis is swelling (inflammation) or infection of the epididymis. The epididymis is a cord-like structure that is located along the top and back part of the testicle. It collects and stores sperm from the testicle.  This condition can also cause pain and swelling of the testicle and scrotum. Symptoms usually start suddenly (acute epididymitis). Sometimes epididymitis starts gradually and lasts for a while (chronic epididymitis). This type may be harder to treat.  What are the causes?  In men ages 20-40, this condition is usually caused by a bacterial infection or a sexually transmitted disease (STD), such as:  · Gonorrhea.  · Chlamydia.  In men 40 and older who do not have anal sex, this condition is usually caused by bacteria from a blockage or from abnormalities in the urinary system. These can result from:  · Having a tube placed into the bladder (urinary catheter).  · Having an enlarged or inflamed prostate gland.  · Having recently had urinary tract surgery.  · Having a problem with a backward flow of urine (retrograde).  In men who have a condition that weakens the body's defense system (immune system), such as HIV, this condition can be caused by:  · Other bacteria, including tuberculosis and syphilis.  · Viruses.  · Fungi.  Sometimes this condition occurs without infection. This may happen because of trauma or repetitive activities such as sports.  What increases the risk?  You are more likely to develop this condition if you have:  · Unprotected sex with more than one partner.  · Anal sex.  · Recently had surgery.  · A urinary catheter.  · Urinary problems.  · A suppressed immune system.  What are the signs or symptoms?  This condition usually begins suddenly with chills, fever, and pain behind the scrotum and in the testicle. Other symptoms include:  · Swelling of the scrotum, testicle, or both.  · Pain when ejaculating or urinating.  · Pain in the back or  abdomen.  · Nausea.  · Itching and discharge from the penis.  · A frequent need to pass urine.  · Redness, increased warmth, and tenderness of the scrotum.  How is this diagnosed?  Your health care provider can diagnose this condition based on your symptoms and medical history. Your health care provider will also do a physical exam to ask about your symptoms and check your scrotum and testicle for swelling, pain, and redness. You may also have other tests, including:  · Examination of discharge from the penis.  · Urine tests for infections, such as STDs.  · Ultrasound test for blood flow and inflammation.  Your health care provider may test you for other STDs, including HIV.  How is this treated?  Treatment for this condition depends on the cause. If your condition is caused by a bacterial infection, oral antibiotic medicine may be prescribed. If the bacterial infection has spread to your blood, you may need to receive IV antibiotics.  For both bacterial and nonbacterial epididymitis, you may be treated with:  · Rest.  · Elevation of the scrotum.  · Pain medicines.  · Anti-inflammatory medicines.  Surgery may be needed to treat:  · Bacterial epididymitis that causes pus to build up in the scrotum (abscess).  · Chronic epididymitis that has not responded to other treatments.  Follow these instructions at home:  Medicines  · Take over-the-counter and prescription medicines only as told by your health care provider.  · If you were prescribed an antibiotic medicine, take it as told by your health care provider. Do not stop taking the antibiotic even if your condition improves.  Sexual activity  · If your epididymitis was caused by an STD, avoid sexual activity until your treatment is complete.  · Inform your sexual partner or partners if you test positive for an STD. They may need to be treated. Do not engage in sexual activity with your partner or partners until their treatment is completed.  Managing pain and  swelling    · If directed, elevate your scrotum and apply ice.  ? Put ice in a plastic bag.  ? Place a small towel or pillow between your legs.  ? Rest your scrotum on the pillow or towel.  ? Place another towel between your skin and the plastic bag.  ? Leave the ice on for 20 minutes, 2-3 times a day.  · Try taking a sitz bath to help with discomfort. This is a warm water bath that is taken while you are sitting down. The water should only come up to your hips and should cover your buttocks. Do this 3-4 times per day or as told by your health care provider.  · Keep your scrotum elevated and supported while resting. Ask your health care provider if you should wear a scrotal support, such as a jockstrap. Wear it as told by your health care provider.    General instructions  · Return to your normal activities as told by your health care provider. Ask your health care provider what activities are safe for you.  · Drink enough fluid to keep your urine pale yellow.  · Keep all follow-up visits as told by your health care provider. This is important.  Contact a health care provider if:  · You have a fever.  · Your pain medicine is not helping.  · Your pain is getting worse.  · Your symptoms do not improve within 3 days.  Summary  · Epididymitis is swelling (inflammation) or infection of the epididymis. This condition can also cause pain and swelling of the testicle and scrotum.  · Treatment for this condition depends on the cause. If your condition is caused by a bacterial infection, oral antibiotic medicine may be prescribed.  · Inform your sexual partner or partners if you test positive for an STD. They may need to be treated. Do not engage in sexual activity with your partner or partners until their treatment is completed.  · Contact a health care provider if your symptoms do not improve within 3 days.  This information is not intended to replace advice given to you by your health care provider. Make sure you discuss  any questions you have with your health care provider.  Document Revised: 10/21/2019 Document Reviewed: 10/22/2019  [employer] Patient Education © 2021 [employer]  Constipation, Adult  Constipation is when a person has trouble pooping (having a bowel movement). When you have this condition, you may poop fewer than 3 times a week. Your poop (stool) may also be dry, hard, or bigger than normal.  Follow these instructions at home:  Eating and drinking    · Eat foods that have a lot of fiber, such as:  ? Fresh fruits and vegetables.  ? Whole grains.  ? Beans.  · Eat less of foods that are low in fiber and high in fat and sugar, such as:  ? French fries.  ? Hamburgers.  ? Cookies.  ? Candy.  ? Soda.  · Drink enough fluid to keep your pee (urine) pale yellow.    General instructions  · Exercise regularly or as told by your doctor. Try to do 150 minutes of exercise each week.  · Go to the restroom when you feel like you need to poop. Do not hold it in.  · Take over-the-counter and prescription medicines only as told by your doctor. These include any fiber supplements.  · When you poop:  ? Do deep breathing while relaxing your lower belly (abdomen).  ? Relax your pelvic floor. The pelvic floor is a group of muscles that support the rectum, bladder, and intestines (as well as the uterus in women).  · Watch your condition for any changes. Tell your doctor if you notice any.  · Keep all follow-up visits as told by your doctor. This is important.  Contact a doctor if:  · You have pain that gets worse.  · You have a fever.  · You have not pooped for 4 days.  · You vomit.  · You are not hungry.  · You lose weight.  · You are bleeding from the opening of the butt (anus).  · You have thin, pencil-like poop.  Get help right away if:  · You have a fever, and your symptoms suddenly get worse.  · You leak poop or have blood in your poop.  · Your belly feels hard or bigger than normal (bloated).  · You have very bad belly pain.  · You  feel dizzy or you faint.  Summary  · Constipation is when a person poops fewer than 3 times a week, has trouble pooping, or has poop that is dry, hard, or bigger than normal.  · Eat foods that have a lot of fiber.  · Drink enough fluid to keep your pee (urine) pale yellow.  · Take over-the-counter and prescription medicines only as told by your doctor. These include any fiber supplements.  This information is not intended to replace advice given to you by your health care provider. Make sure you discuss any questions you have with your health care provider.  Document Revised: 11/04/2020 Document Reviewed: 11/04/2020  [employer] Patient Education © 2021 [employer]

## 2021-11-16 NOTE — PROGRESS NOTES
Chief Complaint   Patient presents with   • [facility] ER 11/008/2021     Epididymitis.  Constipation.       Subjective    Herbert White Sr. is a 56 y.o. male. he is here today for follow-up.  56-year-old male presents for follow-up after recent ER visit due to abdominal pain related to constipation. States he was concerned was related to his appendix. To ER CT noted no acute abnormality states he has been taking MiraLAX, Colace and fiber and not able to have a bowel movement every 7 to 10 days. Reports he eats a good diet is physically active at his work.    Constipation  This is a chronic problem. The current episode started more than 1 year ago. His stool frequency is 1 time per week or less. The stool is described as formed (then goes to loose stool ). The patient is on a high fiber diet. He exercises regularly. There has been adequate water intake. Associated symptoms include abdominal pain, back pain, bloating, diarrhea and nausea. Pertinent negatives include no anorexia, difficulty urinating, fecal incontinence, fever, flatus, hematochezia, hemorrhoids, melena, rectal pain, vomiting or weight loss.            The following portions of the patient's history were reviewed and updated as appropriate:   Past Medical History:   Diagnosis Date   • Coronary artery disease    • Diabetes mellitus (HCC)    • Diabetic retinopathy (HCC)    • GERD (gastroesophageal reflux disease)    • Hyperlipidemia    • Hypertension    • Osteoarthritis    • Paroxysmal atrial fibrillation (HCC)    • Sleep apnea    • Wears glasses      Past Surgical History:   Procedure Laterality Date   • CARDIAC CATHETERIZATION N/A 9/3/2019   • CHOLECYSTECTOMY     • COLONOSCOPY N/A 5/24/2021    Procedure: COLONOSCOPY;  Surgeon: Dewey Antoine MD;  Location: [facility] ENDOSCOPY;  Service: Gastroenterology;  Laterality: N/A;   • CORONARY ARTERY BYPASS GRAFT N/A 9/25/2019     Family History   Problem Relation Age of Onset   • Diabetes Mother    •  Hypertension Father        Prior to Admission medications    Medication Sig Start Date End Date Taking? Authorizing Provider   albuterol sulfate  (90 Base) MCG/ACT inhaler Inhale 2 puffs Every 4 (Four) Hours As Needed for Wheezing. 8/27/20  Yes Artemio Sanchez MD   Alcohol Swabs 70 % pads 1 each Every Morning. 5/12/21  Yes Artemio Sanchez MD   amLODIPine (NORVASC) 10 MG tablet Take 1 tablet by mouth Daily. 5/6/21  Yes Quinn Mathew MD   aspirin 81 MG chewable tablet Chew 81 mg Daily.   Yes Hai Rodriguez MD   atorvastatin (LIPITOR) 40 MG tablet Take 1 tablet by mouth Every Night. 7/29/20  Yes Zeferino Fernando APRN   benzonatate (TESSALON) 200 MG capsule Take 1 capsule by mouth 3 (Three) Times a Day As Needed for Cough. 1/14/21  Yes Claudia Hill APRN   clopidogrel (PLAVIX) 75 MG tablet Take 75 mg by mouth Daily.   Yes Hai Rodriguez MD   ezetimibe (ZETIA) 10 MG tablet Take 1 tablet by mouth Daily. 6/15/21  Yes Alice Rice MD   gabapentin (NEURONTIN) 300 MG capsule TAKE 1 CAPSULE BY MOUTH AT BEDTIME FOR 2 DAYS THEN 1 TWICE DAILY FOR 2 DAYS THEN 1 THREE TIMES DAILY THEREAFTER 6/28/21  Yes Hai Rodriguez MD   glimepiride (Amaryl) 1 MG tablet Take 0.5-1 tablets by mouth Every Morning Before Breakfast. 11/5/20  Yes Artemio Sanchez MD   isosorbide mononitrate (IMDUR) 30 MG 24 hr tablet Take 1 tablet by mouth Daily. 8/27/20  Yes Artemio Sanchez MD   Lancet Devices (ONE TOUCH DELICA LANCING DEV) misc Use as directed to test blood sugar. 11/13/20  Yes Alessandro Gan MD   levoFLOXacin (LEVAQUIN) 500 MG tablet Take 1 tablet by mouth Daily for 10 days. 11/8/21 11/18/21 Yes Curly Arenas PA-C   magnesium oxide (MAGOX) 400 (241.3 Mg) MG tablet tablet Take 1 tablet by mouth 2 (Two) Times a Day. 7/29/20  Yes Zeferino Fernando APRN   metFORMIN (GLUCOPHAGE) 500 MG tablet Take 2 tablets by mouth 2 (Two) Times a Day With Meals. 10/30/20  Yes Artemio Sanchez MD  "  metoprolol succinate XL (TOPROL-XL) 25 MG 24 hr tablet Take 1 tablet by mouth Daily. 5/3/21  Yes Alice Rice MD   nitroglycerin (NITROSTAT) 0.4 MG SL tablet Place 1 tablet under the tongue Every 5 (Five) Minutes As Needed for Chest Pain. Take no more than 3 doses in 15 minutes. 19  Yes Marcelino Goode MD   nortriptyline (PAMELOR) 10 MG capsule TAKE 1 CAPSULE BY MOUTH THREE TIMES DAILY 21  Yes Sandra Sorto APRN   ONETOUCH DELICA LANCETS 33G misc 1 each 4 (Four) Times a Day. delica if covered, use alternative if not covered 17  Yes Alessandro Gan MD   pioglitazone (Actos) 30 MG tablet Take 1 tablet by mouth Daily. 20  Yes Artemio Sanchez MD   ranolazine (RANEXA) 500 MG 12 hr tablet Take 1 tablet by mouth Every 12 (Twelve) Hours. 21  Yes Quinn Mathew MD   spironolactone (ALDACTONE) 25 MG tablet Take 1 tablet by mouth once daily 21  Yes Sandra Sorto APRN   tiZANidine (ZANAFLEX) 4 MG tablet Take 4 mg by mouth 3 (Three) Times a Day. 21  Yes Hai Rodriguez MD   Insulin Glargine (BASAGLAR KWIKPEN) 100 UNIT/ML injection pen Inject 5-50 Units under the skin into the appropriate area as directed Every Night. 21   Artemio Sanchez MD   Insulin Pen Needle (Pen Needles 1/2\") 29G X 12MM misc 1 each Every Night. 21   Artemio Sanchez MD   polyethylene glycol (GoLYTELY) 236 g solution Starting at noon on day prior to procedure, drink 8 ounces every 30 minutes until all gone or stools are clear. May add flavor packet. 21   Renee Osorio APRN     Allergies   Allergen Reactions   • Ace Inhibitors Anaphylaxis   • Wellbutrin [Bupropion] Anaphylaxis     Social History     Socioeconomic History   • Marital status:    Tobacco Use   • Smoking status: Former Smoker     Packs/day: 1.00     Years: 35.00     Pack years: 35.00     Types: Cigarettes     Start date: 1980     Quit date: 9/3/2019     Years since quittin.2   • Smokeless tobacco: " "Never Used   Vaping Use   • Vaping Use: Never used   Substance and Sexual Activity   • Alcohol use: No   • Drug use: No   • Sexual activity: Defer     Comment: .       Review of Systems  Review of Systems   Constitutional: Positive for fatigue. Negative for activity change, appetite change, chills, diaphoresis, fever, unexpected weight change and weight loss.   HENT: Negative for sore throat and trouble swallowing.    Respiratory: Negative for shortness of breath.    Gastrointestinal: Positive for abdominal pain, bloating, constipation, diarrhea and nausea. Negative for abdominal distention, anal bleeding, anorexia, blood in stool, flatus, hematochezia, hemorrhoids, melena, rectal pain and vomiting.   Genitourinary: Negative for difficulty urinating.   Musculoskeletal: Positive for back pain. Negative for arthralgias.   Skin: Negative for pallor.   Neurological: Negative for light-headedness.        Ht 188 cm (74\")   Wt 121 kg (265 lb 12.8 oz)   BMI 34.13 kg/m²     Objective    Physical Exam  Constitutional:       General: He is not in acute distress.     Appearance: Normal appearance. He is normal weight. He is not ill-appearing.   HENT:      Head: Normocephalic and atraumatic.   Abdominal:      General: Bowel sounds are normal. There is no distension.      Palpations: Abdomen is soft. There is no mass.      Tenderness: There is generalized abdominal tenderness.   Neurological:      Mental Status: He is alert.       Admission on 11/08/2021, Discharged on 11/08/2021   Component Date Value Ref Range Status   • Glucose 11/08/2021 336* 65 - 99 mg/dL Final   • BUN 11/08/2021 12  6 - 20 mg/dL Final   • Creatinine 11/08/2021 0.81  0.76 - 1.27 mg/dL Final   • Sodium 11/08/2021 135* 136 - 145 mmol/L Final   • Potassium 11/08/2021 3.9  3.5 - 5.2 mmol/L Final   • Chloride 11/08/2021 99  98 - 107 mmol/L Final   • CO2 11/08/2021 26.0  22.0 - 29.0 mmol/L Final   • Calcium 11/08/2021 9.5  8.6 - 10.5 mg/dL Final   • Total " Protein 11/08/2021 6.9  6.0 - 8.5 g/dL Final   • Albumin 11/08/2021 4.30  3.50 - 5.20 g/dL Final   • ALT (SGPT) 11/08/2021 13  1 - 41 U/L Final   • AST (SGOT) 11/08/2021 12  1 - 40 U/L Final   • Alkaline Phosphatase 11/08/2021 85  39 - 117 U/L Final   • Total Bilirubin 11/08/2021 0.9  0.0 - 1.2 mg/dL Final   • eGFR Non  Amer 11/08/2021 98  >60 mL/min/1.73 Final   • Globulin 11/08/2021 2.6  gm/dL Final   • A/G Ratio 11/08/2021 1.7  g/dL Final   • BUN/Creatinine Ratio 11/08/2021 14.8  7.0 - 25.0 Final   • Anion Gap 11/08/2021 10.0  5.0 - 15.0 mmol/L Final   • Lipase 11/08/2021 20  13 - 60 U/L Final   • Color, UA 11/08/2021 Yellow  Yellow, Straw, Dark Yellow, Kate Final   • Appearance, UA 11/08/2021 Clear  Clear Final   • pH, UA 11/08/2021 5.5  5.0 - 9.0 Final   • Specific Gravity, UA 11/08/2021 1.042* 1.003 - 1.030 Final    Result obtained by Refractometer   • Glucose, UA 11/08/2021 >=1000 mg/dL (3+)* Negative Final   • Ketones, UA 11/08/2021 15 mg/dL (1+)* Negative Final   • Bilirubin, UA 11/08/2021 Negative  Negative Final   • Blood, UA 11/08/2021 Negative  Negative Final   • Protein, UA 11/08/2021 30 mg/dL (1+)* Negative Final   • Leuk Esterase, UA 11/08/2021 Negative  Negative Final   • Nitrite, UA 11/08/2021 Negative  Negative Final   • Urobilinogen, UA 11/08/2021 1.0 E.U./dL  0.2 - 1.0 E.U./dL Final   • Extra Tube 11/08/2021 Hold for add-ons.   Final    Auto resulted.   • Extra Tube 11/08/2021 hold for add-on   Final    Auto resulted   • Extra Tube 11/08/2021 Hold for add-ons.   Final    Auto resulted.   • Extra Tube 11/08/2021 hold for add-on   Final    Auto resulted   • WBC 11/08/2021 10.57  3.40 - 10.80 10*3/mm3 Final   • RBC 11/08/2021 6.55* 4.14 - 5.80 10*6/mm3 Final   • Hemoglobin 11/08/2021 18.7* 13.0 - 17.7 g/dL Final   • Hematocrit 11/08/2021 51.8* 37.5 - 51.0 % Final   • MCV 11/08/2021 79.1  79.0 - 97.0 fL Final   • MCH 11/08/2021 28.5  26.6 - 33.0 pg Final   • MCHC 11/08/2021 36.1* 31.5 - 35.7  g/dL Final   • RDW 11/08/2021 13.6  12.3 - 15.4 % Final   • RDW-SD 11/08/2021 36.6* 37.0 - 54.0 fl Final   • MPV 11/08/2021 10.3  6.0 - 12.0 fL Final   • Platelets 11/08/2021 214  140 - 450 10*3/mm3 Final   • Neutrophil % 11/08/2021 76.3* 42.7 - 76.0 % Final   • Lymphocyte % 11/08/2021 15.4* 19.6 - 45.3 % Final   • Monocyte % 11/08/2021 5.9  5.0 - 12.0 % Final   • Eosinophil % 11/08/2021 1.5  0.3 - 6.2 % Final   • Basophil % 11/08/2021 0.4  0.0 - 1.5 % Final   • Immature Grans % 11/08/2021 0.5  0.0 - 0.5 % Final   • Neutrophils, Absolute 11/08/2021 8.07* 1.70 - 7.00 10*3/mm3 Final   • Lymphocytes, Absolute 11/08/2021 1.63  0.70 - 3.10 10*3/mm3 Final   • Monocytes, Absolute 11/08/2021 0.62  0.10 - 0.90 10*3/mm3 Final   • Eosinophils, Absolute 11/08/2021 0.16  0.00 - 0.40 10*3/mm3 Final   • Basophils, Absolute 11/08/2021 0.04  0.00 - 0.20 10*3/mm3 Final   • Immature Grans, Absolute 11/08/2021 0.05  0.00 - 0.05 10*3/mm3 Final   • nRBC 11/08/2021 0.0  0.0 - 0.2 /100 WBC Final   • Extra Tube 11/08/2021 Hold for add-ons.   Final    Auto resulted.   • RBC, UA 11/08/2021 0-2* None Seen /HPF Final   • WBC, UA 11/08/2021 0-2  None Seen, 0-2, 3-5 /HPF Final   • Bacteria, UA 11/08/2021 None Seen  None Seen /HPF Final   • Squamous Epithelial Cells, UA 11/08/2021 None Seen  None Seen, 0-2 /HPF Final   • Hyaline Casts, UA 11/08/2021 0-2  None Seen /LPF Final   • Methodology 11/08/2021 Automated Microscopy   Final     Assessment/Plan      1. Chronic idiopathic constipation    .   Start Linzess daily increase dietary fiber water and physical activity as tolerated. Or recent colon prep patient states it was not ready at pharmacy when he checked follow-up in 1 month if persistent symptoms we will move up procedure.    Orders placed during this encounter include:  No orders of the defined types were placed in this encounter.      * Surgery not found *    Review and/or summary of lab tests, radiology, procedures, medications. Review and  summary of old records and obtaining of history. The risks and benefits of my recommendations, as well as other treatment options were discussed with the patient today. Questions were answered.    New Medications Ordered This Visit   Medications   • linaclotide (Linzess) 145 MCG capsule capsule     Sig: Take 1 capsule by mouth Every Morning Before Breakfast.     Dispense:  30 capsule     Refill:  5   • polyethylene glycol (GoLYTELY) 236 g solution     Sig: Starting at noon on day prior to procedure, drink 8 ounces every 30 minutes until all gone or stools are clear. May add flavor packet.     Dispense:  4000 mL     Refill:  0   • ondansetron ODT (Zofran ODT) 8 MG disintegrating tablet     Sig: Place 1 tablet on the tongue Every 8 (Eight) Hours As Needed for Nausea or Vomiting.     Dispense:  20 tablet     Refill:  1       Follow-up: Return in about 4 weeks (around 12/14/2021) for Recheck, After test.          This document has been electronically signed by NELSY Escalera on November 16, 2021 10:36 CST           I spent 15 minutes caring for Herbert on this date of service. This time includes time spent by me in the following activities:preparing for the visit, reviewing tests, obtaining and/or reviewing a separately obtained history, performing a medically appropriate examination and/or evaluation , counseling and educating the patient/family/caregiver, ordering medications, tests, or procedures, referring and communicating with other health care professionals , documenting information in the medical record and care coordination    Results for orders placed or performed during the hospital encounter of 11/08/21   Gray Top   Result Value Ref Range    Extra Tube Hold for add-ons.    Gold Top - SST   Result Value Ref Range    Extra Tube Hold for add-ons.    Green Top (Gel)   Result Value Ref Range    Extra Tube Hold for add-ons.    Urinalysis, Microscopic Only - Urine, Clean Catch    Specimen: Urine, Clean Catch    Result Value Ref Range    RBC, UA 0-2 (A) None Seen /HPF    WBC, UA 0-2 None Seen, 0-2, 3-5 /HPF    Bacteria, UA None Seen None Seen /HPF    Squamous Epithelial Cells, UA None Seen None Seen, 0-2 /HPF    Hyaline Casts, UA 0-2 None Seen /LPF    Methodology Automated Microscopy    Urinalysis With Culture If Indicated - Urine, Clean Catch    Specimen: Urine, Clean Catch   Result Value Ref Range    Color, UA Yellow Yellow, Straw, Dark Yellow, Kate    Appearance, UA Clear Clear    pH, UA 5.5 5.0 - 9.0    Specific Gravity, UA 1.042 (H) 1.003 - 1.030    Glucose, UA >=1000 mg/dL (3+) (A) Negative    Ketones, UA 15 mg/dL (1+) (A) Negative    Bilirubin, UA Negative Negative    Blood, UA Negative Negative    Protein, UA 30 mg/dL (1+) (A) Negative    Leuk Esterase, UA Negative Negative    Nitrite, UA Negative Negative    Urobilinogen, UA 1.0 E.U./dL 0.2 - 1.0 E.U./dL   CBC Auto Differential    Specimen: Blood   Result Value Ref Range    WBC 10.57 3.40 - 10.80 10*3/mm3    RBC 6.55 (H) 4.14 - 5.80 10*6/mm3    Hemoglobin 18.7 (H) 13.0 - 17.7 g/dL    Hematocrit 51.8 (H) 37.5 - 51.0 %    MCV 79.1 79.0 - 97.0 fL    MCH 28.5 26.6 - 33.0 pg    MCHC 36.1 (H) 31.5 - 35.7 g/dL    RDW 13.6 12.3 - 15.4 %    RDW-SD 36.6 (L) 37.0 - 54.0 fl    MPV 10.3 6.0 - 12.0 fL    Platelets 214 140 - 450 10*3/mm3    Neutrophil % 76.3 (H) 42.7 - 76.0 %    Lymphocyte % 15.4 (L) 19.6 - 45.3 %    Monocyte % 5.9 5.0 - 12.0 %    Eosinophil % 1.5 0.3 - 6.2 %    Basophil % 0.4 0.0 - 1.5 %    Immature Grans % 0.5 0.0 - 0.5 %    Neutrophils, Absolute 8.07 (H) 1.70 - 7.00 10*3/mm3    Lymphocytes, Absolute 1.63 0.70 - 3.10 10*3/mm3    Monocytes, Absolute 0.62 0.10 - 0.90 10*3/mm3    Eosinophils, Absolute 0.16 0.00 - 0.40 10*3/mm3    Basophils, Absolute 0.04 0.00 - 0.20 10*3/mm3    Immature Grans, Absolute 0.05 0.00 - 0.05 10*3/mm3    nRBC 0.0 0.0 - 0.2 /100 WBC   Lavender Top   Result Value Ref Range    Extra Tube hold for add-on    Light Blue Top   Result Value  Ref Range    Extra Tube hold for add-on    Lipase    Specimen: Blood   Result Value Ref Range    Lipase 20 13 - 60 U/L   Comprehensive Metabolic Panel    Specimen: Blood   Result Value Ref Range    Glucose 336 (H) 65 - 99 mg/dL    BUN 12 6 - 20 mg/dL    Creatinine 0.81 0.76 - 1.27 mg/dL    Sodium 135 (L) 136 - 145 mmol/L    Potassium 3.9 3.5 - 5.2 mmol/L    Chloride 99 98 - 107 mmol/L    CO2 26.0 22.0 - 29.0 mmol/L    Calcium 9.5 8.6 - 10.5 mg/dL    Total Protein 6.9 6.0 - 8.5 g/dL    Albumin 4.30 3.50 - 5.20 g/dL    ALT (SGPT) 13 1 - 41 U/L    AST (SGOT) 12 1 - 40 U/L    Alkaline Phosphatase 85 39 - 117 U/L    Total Bilirubin 0.9 0.0 - 1.2 mg/dL    eGFR Non African Amer 98 >60 mL/min/1.73    Globulin 2.6 gm/dL    A/G Ratio 1.7 g/dL    BUN/Creatinine Ratio 14.8 7.0 - 25.0    Anion Gap 10.0 5.0 - 15.0 mmol/L   Results for orders placed or performed in visit on 06/11/21   Hepatic Function Panel    Specimen: Blood   Result Value Ref Range    Total Protein 6.8 6.0 - 8.5 g/dL    Albumin 4.00 3.50 - 5.20 g/dL    ALT (SGPT) 14 1 - 41 U/L    AST (SGOT) 11 1 - 40 U/L    Alkaline Phosphatase 78 39 - 117 U/L    Total Bilirubin 1.0 0.0 - 1.2 mg/dL    Bilirubin, Direct 0.2 0.0 - 0.3 mg/dL    Bilirubin, Indirect 0.8 mg/dL   Lipid Panel    Specimen: Blood   Result Value Ref Range    Total Cholesterol 211 (H) 0 - 200 mg/dL    Triglycerides 144 0 - 150 mg/dL    HDL Cholesterol 40 40 - 60 mg/dL    LDL Cholesterol  145 (H) 0 - 100 mg/dL    VLDL Cholesterol 26 5 - 40 mg/dL    LDL/HDL Ratio 3.56    Results for orders placed or performed during the hospital encounter of 05/24/21   Tissue Pathology Exam    Specimen: Large Intestine, Rectum; Tissue   Result Value Ref Range    Case Report       Surgical Pathology Report                         Case: [id number]                                  Authorizing Provider:  Dewey Antoine MD        Collected:           05/24/2021 11:48 AM          Ordering Location:     [facility]              Received:            05/24/2021 01:32 PM                                 [facility]                                                     Pathologist:           Gregory Carmona MD                                                           Specimen:    Large Intestine, Rectum, rectum polyp                                                      Final Diagnosis       See Scanned Report       Results for orders placed or performed in visit on 05/21/21   COVID-19, [facility] IN-HOUSE, NP SWAB IN TRANSPORT MEDIA 8-10 HR TAT - Swab, Nasopharynx    Specimen: Nasopharynx; Swab   Result Value Ref Range    COVID19 Not Detected Not Detected - Ref. Range   Results for orders placed or performed during the hospital encounter of 05/04/21   Gold Top - SST   Result Value Ref Range    Extra Tube Hold for add-ons.    Green Top (Gel)   Result Value Ref Range    Extra Tube Hold for add-ons.    COVID-19 and FLU A/B PCR - Swab, Nasopharynx    Specimen: Nasopharynx; Swab   Result Value Ref Range    COVID19 Not Detected Not Detected - Ref. Range    Influenza A PCR Not Detected Not Detected    Influenza B PCR Not Detected Not Detected   CBC Auto Differential    Specimen: Blood   Result Value Ref Range    WBC 6.26 3.40 - 10.80 10*3/mm3    RBC 6.14 (H) 4.14 - 5.80 10*6/mm3    Hemoglobin 18.0 (H) 13.0 - 17.7 g/dL    Hematocrit 50.4 37.5 - 51.0 %    MCV 82.1 79.0 - 97.0 fL    MCH 29.3 26.6 - 33.0 pg    MCHC 35.7 31.5 - 35.7 g/dL    RDW 12.5 12.3 - 15.4 %    RDW-SD 36.8 (L) 37.0 - 54.0 fl    MPV 10.3 6.0 - 12.0 fL    Platelets 188 140 - 450 10*3/mm3    Neutrophil % 65.8 42.7 - 76.0 %    Lymphocyte % 23.6 19.6 - 45.3 %    Monocyte % 6.5 5.0 - 12.0 %    Eosinophil % 2.7 0.3 - 6.2 %    Basophil % 0.6 0.0 - 1.5 %    Immature Grans % 0.8 (H) 0.0 - 0.5 %    Neutrophils, Absolute 4.11 1.70 - 7.00 10*3/mm3    Lymphocytes, Absolute 1.48 0.70 - 3.10 10*3/mm3    Monocytes, Absolute 0.41 0.10 - 0.90 10*3/mm3    Eosinophils, Absolute 0.17 0.00 - 0.40  10*3/mm3    Basophils, Absolute 0.04 0.00 - 0.20 10*3/mm3    Immature Grans, Absolute 0.05 0.00 - 0.05 10*3/mm3    nRBC 0.0 0.0 - 0.2 /100 WBC     *Note: Due to a large number of results and/or encounters for the requested time period, some results have not been displayed. A complete set of results can be found in Results Review.

## 2021-12-08 ENCOUNTER — OFFICE VISIT (OUTPATIENT)
Dept: CARDIAC SURGERY | Facility: CLINIC | Age: 56
End: 2021-12-08

## 2021-12-08 VITALS
SYSTOLIC BLOOD PRESSURE: 168 MMHG | OXYGEN SATURATION: 98 % | WEIGHT: 267 LBS | HEIGHT: 74 IN | HEART RATE: 76 BPM | DIASTOLIC BLOOD PRESSURE: 90 MMHG | BODY MASS INDEX: 34.27 KG/M2

## 2021-12-08 DIAGNOSIS — I65.23 CAROTID STENOSIS, ASYMPTOMATIC, BILATERAL: ICD-10-CM

## 2021-12-08 DIAGNOSIS — E78.2 MIXED HYPERLIPIDEMIA: Primary | ICD-10-CM

## 2021-12-08 PROCEDURE — 99213 OFFICE O/P EST LOW 20 MIN: CPT | Performed by: NURSE PRACTITIONER

## 2021-12-08 NOTE — PROGRESS NOTES
CVTS Office Progress Note     12/8/2021    Herbert White Sr.  1965    Chief Complaint:    Chief Complaint   Patient presents with   • Coronary Artery Disease   • Carotid Artery Disease       HPI:      PCP:  Artemio Sanchez MD  Cardiology: Dr. Rice      56 y.o. male with HTN(uncontrolled, increased risk stroke, rupture), Hyperlipidemia(stable, increased risk cardiovascular events), Diabetes Mellitus(stable, increased risk cardiovascular events) and Atrial fibrillation(stable, increased risk stroke) PVC's.  former smoker and quit 9/3/19.  Underwent LHC and sukhjinder-scan demonstrating MV CAD, subsequently underwent CTVS evaluation for CABG.  Underwent CABGx3 on 9/25/19, developed atrial fibrillation with RVR, subsequently converting back and forth on amio, Dr. Rice involved for PAF, remote observation admission for PAF with RVR placed on maintenance cardizem and no issues since.  Some insomnia with cardizem. Returns today for carotid stenosis surveillance.  No issues over that past year.  Continues to follow up with Dr. Rice.  No other associated signs, symptoms or modifying factors.    8/3/19 Echo: EF 61%, Mild MR  9/9/19 Carotid Duplex: DOMINGO 0-49%, LICA 0-49%  12/2021 Carotid Duplex: DOMINGO 0-49% mPSV 85c/s Ratio 1.8, LICA 0-49% mPSV 70c/s Ratio 1.4, Antegrade vertebrals    9/18/19 LHC: LAD 80%, DX70%, %, PDA 70%  9/3/19 CABGx3: LIMA-LAD, SVG-PDA, SVG-OM      The following portions of the patient's history were reviewed and updated as appropriate: allergies, current medications, past family history, past medical history, past social history, past surgical history and problem list.  Recent images independently reviewed.  Available laboratory values reviewed.    PMH:  Past Medical History:   Diagnosis Date   • Coronary artery disease    • Diabetes mellitus (HCC)    • Diabetic retinopathy (HCC)    • GERD (gastroesophageal reflux disease)    • Hyperlipidemia    • Hypertension    • Osteoarthritis    •  Paroxysmal atrial fibrillation (HCC)    • Sleep apnea    • Wears glasses      Past Surgical History:   Procedure Laterality Date   • CARDIAC CATHETERIZATION N/A 9/3/2019   • CHOLECYSTECTOMY     • COLONOSCOPY N/A 2021    Procedure: COLONOSCOPY;  Surgeon: Dewey Antoine MD;  Location: [facility] ENDOSCOPY;  Service: Gastroenterology;  Laterality: N/A;   • CORONARY ARTERY BYPASS GRAFT N/A 2019     Family History   Problem Relation Age of Onset   • Diabetes Mother    • Hypertension Father      Social History     Tobacco Use   • Smoking status: Former Smoker     Packs/day: 1.00     Years: 35.00     Pack years: 35.00     Types: Cigarettes     Start date: 1980     Quit date: 9/3/2019     Years since quittin.2   • Smokeless tobacco: Never Used   Vaping Use   • Vaping Use: Never used   Substance Use Topics   • Alcohol use: No   • Drug use: No       ALLERGIES:  Allergies   Allergen Reactions   • Ace Inhibitors Anaphylaxis   • Wellbutrin [Bupropion] Anaphylaxis         MEDICATIONS:    Current Outpatient Medications:   •  albuterol sulfate  (90 Base) MCG/ACT inhaler, Inhale 2 puffs Every 4 (Four) Hours As Needed for Wheezing., Disp: 18 g, Rfl: 11  •  Alcohol Swabs 70 % pads, 1 each Every Morning., Disp: 100 each, Rfl: 3  •  amLODIPine (NORVASC) 10 MG tablet, Take 1 tablet by mouth Daily., Disp: 30 tablet, Rfl: 1  •  aspirin 81 MG chewable tablet, Chew 81 mg Daily., Disp: , Rfl:   •  atorvastatin (LIPITOR) 40 MG tablet, Take 1 tablet by mouth Every Night., Disp: 30 tablet, Rfl: 3  •  benzonatate (TESSALON) 200 MG capsule, Take 1 capsule by mouth 3 (Three) Times a Day As Needed for Cough., Disp: 30 capsule, Rfl: 0  •  clopidogrel (PLAVIX) 75 MG tablet, Take 75 mg by mouth Daily., Disp: , Rfl:   •  ezetimibe (ZETIA) 10 MG tablet, Take 1 tablet by mouth Daily., Disp: 90 tablet, Rfl: 3  •  gabapentin (NEURONTIN) 300 MG capsule, TAKE 1 CAPSULE BY MOUTH AT BEDTIME FOR 2 DAYS THEN 1 TWICE DAILY FOR 2 DAYS THEN 1  "THREE TIMES DAILY THEREAFTER, Disp: , Rfl:   •  glimepiride (Amaryl) 1 MG tablet, Take 0.5-1 tablets by mouth Every Morning Before Breakfast., Disp: 30 tablet, Rfl: 2  •  Insulin Glargine (BASAGLAR KWIKPEN) 100 UNIT/ML injection pen, Inject 5-50 Units under the skin into the appropriate area as directed Every Night., Disp: 5 pen, Rfl: 2  •  Insulin Pen Needle (Pen Needles 1/2\") 29G X 12MM misc, 1 each Every Night., Disp: 100 each, Rfl: 3  •  isosorbide mononitrate (IMDUR) 30 MG 24 hr tablet, Take 1 tablet by mouth Daily., Disp: 30 tablet, Rfl: 11  •  Lancet Devices (ONE TOUCH DELICA LANCING DEV) misc, Use as directed to test blood sugar., Disp: 1 each, Rfl: 11  •  linaclotide (Linzess) 145 MCG capsule capsule, Take 1 capsule by mouth Every Morning Before Breakfast., Disp: 30 capsule, Rfl: 5  •  metFORMIN (GLUCOPHAGE) 500 MG tablet, Take 2 tablets by mouth 2 (Two) Times a Day With Meals., Disp: 360 tablet, Rfl: 0  •  metoprolol succinate XL (TOPROL-XL) 25 MG 24 hr tablet, Take 1 tablet by mouth Daily., Disp: 90 tablet, Rfl: 3  •  nitroglycerin (NITROSTAT) 0.4 MG SL tablet, Place 1 tablet under the tongue Every 5 (Five) Minutes As Needed for Chest Pain. Take no more than 3 doses in 15 minutes., Disp: 30 tablet, Rfl: 1  •  nortriptyline (PAMELOR) 10 MG capsule, TAKE 1 CAPSULE BY MOUTH THREE TIMES DAILY, Disp: 90 capsule, Rfl: 0  •  ondansetron ODT (Zofran ODT) 8 MG disintegrating tablet, Place 1 tablet on the tongue Every 8 (Eight) Hours As Needed for Nausea or Vomiting., Disp: 20 tablet, Rfl: 1  •  ONETOUCH DELICA LANCETS 33G misc, 1 each 4 (Four) Times a Day. delica if covered, use alternative if not covered, Disp: 120 each, Rfl: 11  •  pioglitazone (Actos) 30 MG tablet, Take 1 tablet by mouth Daily., Disp: 30 tablet, Rfl: 2  •  polyethylene glycol (GoLYTELY) 236 g solution, Starting at noon on day prior to procedure, drink 8 ounces every 30 minutes until all gone or stools are clear. May add flavor packet., Disp: " 4000 mL, Rfl: 0  •  ranolazine (RANEXA) 500 MG 12 hr tablet, Take 1 tablet by mouth Every 12 (Twelve) Hours., Disp: 60 tablet, Rfl: 1  •  spironolactone (ALDACTONE) 25 MG tablet, Take 1 tablet by mouth once daily, Disp: 30 tablet, Rfl: 0  •  tiZANidine (ZANAFLEX) 4 MG tablet, Take 4 mg by mouth 3 (Three) Times a Day., Disp: , Rfl:       Review of Systems   Constitutional: Negative for decreased appetite, fever, malaise/fatigue, weight gain and weight loss.   HENT: Negative for hearing loss, hoarse voice and sore throat.    Eyes: Negative for blurred vision, visual disturbance and visual halos.   Cardiovascular: Negative for chest pain, dyspnea on exertion, leg swelling and palpitations.        Patient does not report signs or symptoms of sternal malunion, denies popping, clicking, shifting, or uncontrolled pain.     Respiratory: Negative for cough, shortness of breath and sputum production.    Endocrine: Negative for cold intolerance and polyuria.   Hematologic/Lymphatic: Negative for bleeding problem. Bruises/bleeds easily.        Does not report S/S of adverse bleeding event including nose bleeds, hematuria, melena, gingival bleeding, or hematemesis.   Skin: Negative for color change, nail changes, poor wound healing and suspicious lesions.   Musculoskeletal: Positive for back pain. Negative for joint pain and myalgias.   Gastrointestinal: Negative for abdominal pain, constipation, diarrhea, hematemesis, melena, nausea and vomiting.   Genitourinary: Negative for flank pain, nocturia and urgency.   Neurological: Negative for brief paralysis, focal weakness, light-headedness, loss of balance, numbness, paresthesias and weakness.        No amaurosis fugax, TIA, or CVA.     Psychiatric/Behavioral: Negative for altered mental status, depression, suicidal ideas and thoughts of violence.   Allergic/Immunologic: Negative for hives and persistent infections.         Vitals:    12/08/21 0837   BP: 168/90   BP Location: Left  "arm   Patient Position: Sitting   Cuff Size: Adult   Pulse: 76   SpO2: 98%   Weight: 121 kg (267 lb)   Height: 188 cm (74\")     Physical Exam   Constitutional: He is oriented to person, place, and time. He appears well-developed.   HENT:   Head: Normocephalic and atraumatic.   Eyes: Pupils are equal, round, and reactive to light. Conjunctivae are normal.   Neck: No JVD present.   Cardiovascular: Normal rate, regular rhythm and normal heart sounds.   Sternum stable with moderate pressure and cough.  No popping, clicking, shifting, or play noted   Pulmonary/Chest: Effort normal and breath sounds normal. He has no wheezes.   Abdominal: Soft. Bowel sounds are normal. He exhibits no distension. There is no abdominal tenderness.   Musculoskeletal: Normal range of motion. No tenderness.   Lymphadenopathy:     He has no cervical adenopathy.   Neurological: He is alert and oriented to person, place, and time. No cranial nerve deficit. Coordination normal.   Skin: Skin is warm and dry. Capillary refill takes less than 2 seconds.   EVH site, midsternal incision CDI   Psychiatric: His behavior is normal. Judgment normal.   Nursing note and vitals reviewed.      Assessment & Plan     Independent Review of Studies  12/2021 Carotid Duplex: DOMINGO 0-49% mPSV 85c/s Ratio 1.8, LICA 0-49% mPSV 70c/s Ratio 1.4, Antegrade vertebrals    1. Mixed hyperlipidemia  Lipid-lowering therapy has been proven beneficial in patients with cardio-vascular disease. Current guidelines recommend statin treatment for all patients with PAD,CAD and carotid stenosis. Statins are beneficial in preventing cardiovascular events, increasing functional capacity and lower the risk of adverse limb loss in PAD. Statins decrease the progression of plaque formation and may improve peripheral vessel lining, and aid in reversing atherosclerosis.    2. Carotid stenosis, asymptomatic, bilateral  Mild bilateral ICA disease.  Asymptomatic  Remains on ASA, Plavix, " Statin    Repeat duplex in 2 years      Detailed discussion regarding risks, benefits, and treatment plan. Images independently reviewed. Patient understands, agrees, and wishes to proceed with plan.       This document has been electronically signed by ARACELI Zhou @  On December 8, 2021 10:36 CST      EMR Dragon/Transcription disclaimer:   Much of this encounter note is an electronic transcription/translation of spoken language to printed text. The electronic translation of spoken language may permit erroneous, or at times, nonsensical words or phrases to be inadvertently transcribed; Although I have reviewed the note for such errors, some may still exist.

## 2021-12-09 ENCOUNTER — OFFICE VISIT (OUTPATIENT)
Dept: FAMILY MEDICINE CLINIC | Facility: CLINIC | Age: 56
End: 2021-12-09

## 2021-12-09 VITALS
BODY MASS INDEX: 33.06 KG/M2 | SYSTOLIC BLOOD PRESSURE: 180 MMHG | HEIGHT: 74 IN | HEART RATE: 90 BPM | OXYGEN SATURATION: 97 % | DIASTOLIC BLOOD PRESSURE: 90 MMHG | TEMPERATURE: 97.8 F | WEIGHT: 257.6 LBS

## 2021-12-09 DIAGNOSIS — M25.571 ACUTE RIGHT ANKLE PAIN: ICD-10-CM

## 2021-12-09 DIAGNOSIS — S96.911A STRAIN OF RIGHT ANKLE, INITIAL ENCOUNTER: Primary | ICD-10-CM

## 2021-12-09 PROCEDURE — 99213 OFFICE O/P EST LOW 20 MIN: CPT | Performed by: NURSE PRACTITIONER

## 2021-12-09 NOTE — PROGRESS NOTES
"Subjective   Herbert White . is a 56 y.o. male. Ankle pain (right)    History of Present Illness   Patient presents today for right ankle pain. He says this pain started around 2 weeks ago after he \"twisted\" it walking down steps. He says he was walking down steps when his right foot turned inward and he heard a \"pop\". Since that time he has experienced pain and swelling on the medial aspect of his ankle. Pain worsened with weight bearing. At home he has tried elevating and soaking in epsom salts with little relief. Blood jpressure is elevated, pt says he forgot to take his blood pressure medication.    The following portions of the patient's history were reviewed and updated as appropriate: allergies, current medications, past family history, past medical history, past social history, past surgical history, and problem list.    Review of Systems   Constitutional: Negative for chills, fatigue and fever.   HENT: Negative for congestion, ear pain, rhinorrhea and sore throat.    Eyes: Negative for blurred vision, double vision and visual disturbance.   Respiratory: Negative for cough, chest tightness, shortness of breath and wheezing.    Cardiovascular: Negative for chest pain, palpitations and leg swelling.   Gastrointestinal: Negative for abdominal pain, diarrhea, nausea and vomiting.   Endocrine: Negative for cold intolerance and heat intolerance.   Genitourinary: Negative for difficulty urinating, dysuria, frequency and hematuria.   Musculoskeletal: Positive for arthralgias (right ankle pain) and joint swelling (right ankle). Negative for back pain, neck pain and neck stiffness.   Skin: Negative for dry skin, pallor, rash, skin lesions and bruise.   Allergic/Immunologic: Negative for environmental allergies, food allergies and immunocompromised state.   Neurological: Negative for dizziness, syncope, weakness, light-headedness, headache and confusion.   Hematological: Negative for adenopathy. Does not " "bruise/bleed easily.   Psychiatric/Behavioral: Negative for self-injury, sleep disturbance, suicidal ideas, depressed mood and stress. The patient is not nervous/anxious.        Vitals:    12/09/21 1027 12/09/21 1043   BP: (!) 193/111 180/90   BP Location: Left arm    Patient Position: Sitting    Cuff Size: Adult    Pulse: 90    Temp: 97.8 °F (36.6 °C)    TempSrc: Temporal    SpO2: 97%    Weight: 117 kg (257 lb 9.6 oz)    Height: 188 cm (74\")    PainSc:   7    PainLoc: Ankle          Body mass index is 33.07 kg/m².    Objective   Physical Exam  Vitals and nursing note reviewed.   Constitutional:       Appearance: He is well-developed.   HENT:      Head: Normocephalic.   Eyes:      Conjunctiva/sclera: Conjunctivae normal.   Musculoskeletal:         General: Normal range of motion.      Cervical back: Full passive range of motion without pain, normal range of motion and neck supple.      Right ankle: Swelling (mild edema) present. Tenderness present over the medial malleolus.   Skin:     General: Skin is warm and dry.   Neurological:      Mental Status: He is alert and oriented to person, place, and time.      Coordination: Coordination normal.      Gait: Gait normal.   Psychiatric:         Speech: Speech normal.         Behavior: Behavior normal. Behavior is cooperative.         Thought Content: Thought content normal.         Judgment: Judgment normal.           Assessment/Plan   Diagnoses and all orders for this visit:    1. Strain of right ankle, initial encounter (Primary)    2. Acute right ankle pain  -     XR Ankle 3+ View Right (In Office)    Xray's negative for any fractures or dislocations.  Pt instructed to wear ankle brace next 6 weeks, rest, ice, elevate.  If symptoms do not improve, he will need to be further evaluate by Podiatry.  If symptoms do not improve or worsen, patient was instructed to return to clinic for further evaluation.         This document has been electronically signed by Sandra Sorto, " APRN on  December 12, 2021 07:42 CST

## 2021-12-10 NOTE — PROGRESS NOTES
Please let pt know xrays were negative for any fractures or dislocations. He should continue wearing ankle brace, if symptoms do no improve I want him to see Podiatry

## 2021-12-20 RX ORDER — METOPROLOL SUCCINATE 50 MG/1
50 TABLET, EXTENDED RELEASE ORAL DAILY
Qty: 30 TABLET | Refills: 0 | Status: SHIPPED | OUTPATIENT
Start: 2021-12-20 | End: 2022-01-25 | Stop reason: HOSPADM

## 2022-01-03 ENCOUNTER — LAB (OUTPATIENT)
Dept: LAB | Facility: HOSPITAL | Age: 57
End: 2022-01-03

## 2022-01-03 DIAGNOSIS — Z01.818 PREOP TESTING: Primary | ICD-10-CM

## 2022-01-03 LAB — SARS-COV-2 N GENE RESP QL NAA+PROBE: NOT DETECTED

## 2022-01-03 PROCEDURE — 87635 SARS-COV-2 COVID-19 AMP PRB: CPT

## 2022-01-03 PROCEDURE — C9803 HOPD COVID-19 SPEC COLLECT: HCPCS

## 2022-01-05 ENCOUNTER — ANESTHESIA EVENT (OUTPATIENT)
Dept: GASTROENTEROLOGY | Facility: HOSPITAL | Age: 57
End: 2022-01-05

## 2022-01-05 ENCOUNTER — ANESTHESIA (OUTPATIENT)
Dept: GASTROENTEROLOGY | Facility: HOSPITAL | Age: 57
End: 2022-01-05

## 2022-01-05 ENCOUNTER — HOSPITAL ENCOUNTER (OUTPATIENT)
Facility: HOSPITAL | Age: 57
Setting detail: HOSPITAL OUTPATIENT SURGERY
Discharge: HOME | End: 2022-01-05
Attending: INTERNAL MEDICINE | Admitting: INTERNAL MEDICINE

## 2022-01-05 VITALS
HEIGHT: 74 IN | BODY MASS INDEX: 33.24 KG/M2 | TEMPERATURE: 96.7 F | WEIGHT: 259 LBS | SYSTOLIC BLOOD PRESSURE: 135 MMHG | DIASTOLIC BLOOD PRESSURE: 75 MMHG | OXYGEN SATURATION: 92 % | HEART RATE: 76 BPM | RESPIRATION RATE: 20 BRPM

## 2022-01-05 DIAGNOSIS — Z86.010 ENCOUNTER FOR COLONOSCOPY DUE TO HISTORY OF COLONIC POLYP: ICD-10-CM

## 2022-01-05 DIAGNOSIS — Z12.11 ENCOUNTER FOR COLONOSCOPY DUE TO HISTORY OF COLONIC POLYP: ICD-10-CM

## 2022-01-05 LAB — GLUCOSE BLDC GLUCOMTR-MCNC: 111 MG/DL (ref 70–130)

## 2022-01-05 PROCEDURE — 25010000002 PROPOFOL 10 MG/ML EMULSION: Performed by: NURSE ANESTHETIST, CERTIFIED REGISTERED

## 2022-01-05 PROCEDURE — 45378 DIAGNOSTIC COLONOSCOPY: CPT | Performed by: INTERNAL MEDICINE

## 2022-01-05 PROCEDURE — 82962 GLUCOSE BLOOD TEST: CPT

## 2022-01-05 RX ORDER — PROPOFOL 10 MG/ML
VIAL (ML) INTRAVENOUS AS NEEDED
Status: DISCONTINUED | OUTPATIENT
Start: 2022-01-05 | End: 2022-01-05 | Stop reason: SURG

## 2022-01-05 RX ORDER — DEXTROSE AND SODIUM CHLORIDE 5; .45 G/100ML; G/100ML
30 INJECTION, SOLUTION INTRAVENOUS CONTINUOUS PRN
Status: DISCONTINUED | OUTPATIENT
Start: 2022-01-05 | End: 2022-01-05 | Stop reason: HOSPADM

## 2022-01-05 RX ADMIN — PROPOFOL 20 MG: 10 INJECTION, EMULSION INTRAVENOUS at 09:41

## 2022-01-05 RX ADMIN — PROPOFOL 20 MG: 10 INJECTION, EMULSION INTRAVENOUS at 09:45

## 2022-01-05 RX ADMIN — PROPOFOL 110 MG: 10 INJECTION, EMULSION INTRAVENOUS at 09:40

## 2022-01-05 RX ADMIN — DEXTROSE AND SODIUM CHLORIDE 30 ML/HR: 5; 450 INJECTION, SOLUTION INTRAVENOUS at 08:57

## 2022-01-05 RX ADMIN — PROPOFOL 20 MG: 10 INJECTION, EMULSION INTRAVENOUS at 09:43

## 2022-01-05 NOTE — ANESTHESIA PREPROCEDURE EVALUATION
Anesthesia Evaluation     Patient summary reviewed and Nursing notes reviewed   NPO Solid Status: > 8 hours  NPO Liquid Status: > 8 hours           Airway   Mallampati: II  TM distance: >3 FB  Neck ROM: full  No difficulty expected  Dental    (+) poor dentition        Pulmonary - normal exam   (+) pneumonia resolved , sleep apnea on CPAP,   Cardiovascular - normal exam    Rhythm: regular  Rate: normal    (+) hypertension well controlled 2 medications or greater, CAD, CABG >6 Months, dysrhythmias Paroxysmal Atrial Fib, PVD, hyperlipidemia,  carotid artery disease carotid bilateral  (-) angina      Neuro/Psych  (+) numbness,     GI/Hepatic/Renal/Endo    (+)  GERD well controlled,  diabetes mellitus type 2 well controlled using insulin,     Musculoskeletal     Abdominal  - normal exam    Abdomen: soft.  Bowel sounds: normal.   Substance History - negative use     OB/GYN negative ob/gyn ROS         Other   arthritis,                      Anesthesia Plan    ASA 3     MAC       Anesthetic plan, all risks, benefits, and alternatives have been provided, discussed and informed consent has been obtained with: patient.

## 2022-01-05 NOTE — H&P
No chief complaint on file.      Subjective    Herbert White [place]ano is a 56 y.o. male. he is here today for follow-up.    History of Present Illness  56-year-old male presents to reschedule colonoscopy due to poor prep.  He reports some intermittent abdominal pain constipation with bowel movements about every 1 to 2 days.  Denies any melena or hematochezia.  Weight stable.    Colonoscopy 5/24/2020 Noted unsatisfactory prep localized area of mildly erythematous mucosa which was biopsied.  Repeat recommended in 3 months for surveillance     Plan; schedule patient for repeat colonoscopy due to poor prep.  Discussed 2-day prep prior to colonoscopy.    The following portions of theBiopsy showed no significant histologic abnormality. patient's history were reviewed and updated as appropriate:   Past Medical History:   Diagnosis Date   • Coronary artery disease    • Diabetes mellitus (HCC)    • Diabetic retinopathy (HCC)    • GERD (gastroesophageal reflux disease)    • Hyperlipidemia    • Hypertension    • Osteoarthritis    • Paroxysmal atrial fibrillation (HCC)    • Sleep apnea    • Wears glasses      Past Surgical History:   Procedure Laterality Date   • CARDIAC CATHETERIZATION N/A 9/3/2019   • CHOLECYSTECTOMY     • COLONOSCOPY N/A 5/24/2021    Procedure: COLONOSCOPY;  Surgeon: Dewey Antoine MD;  Location: [facility] ENDOSCOPY;  Service: Gastroenterology;  Laterality: N/A;   • CORONARY ARTERY BYPASS GRAFT N/A 9/25/2019   • TONSILLECTOMY       Family History   Problem Relation Age of Onset   • Diabetes Mother    • Hypertension Father        Prior to Admission medications    Medication Sig Start Date End Date Taking? Authorizing Provider   albuterol sulfate  (90 Base) MCG/ACT inhaler Inhale 2 puffs Every 4 (Four) Hours As Needed for Wheezing. 8/27/20  Yes Artemio Sanchez MD   Alcohol Swabs 70 % pads 1 each Every Morning. 5/12/21  Yes Artemio Sanchez MD   amLODIPine (NORVASC) 10 MG tablet Take 1 tablet by mouth  "Daily. 5/6/21  Yes Quinn Mathew MD   amoxicillin (AMOXIL) 875 MG tablet Take 1 tablet by mouth 2 (Two) Times a Day for 10 days. 8/2/21 8/12/21 Yes Sandra Sorto APRN   aspirin 81 MG chewable tablet Chew 81 mg Daily.   Yes Hai Rodriguez MD   atorvastatin (LIPITOR) 40 MG tablet Take 1 tablet by mouth Every Night. 7/29/20  Yes Zeferino Fernando APRN   benzonatate (TESSALON) 200 MG capsule Take 1 capsule by mouth 3 (Three) Times a Day As Needed for Cough. 1/14/21  Yes Claudia Hill APRN   clopidogrel (PLAVIX) 75 MG tablet Take 75 mg by mouth Daily.   Yes Hai Rodriguez MD   ezetimibe (ZETIA) 10 MG tablet Take 1 tablet by mouth Daily. 6/15/21  Yes Alice Rice MD   gabapentin (NEURONTIN) 300 MG capsule TAKE 1 CAPSULE BY MOUTH AT BEDTIME FOR 2 DAYS THEN 1 TWICE DAILY FOR 2 DAYS THEN 1 THREE TIMES DAILY THEREAFTER 6/28/21  Yes Hai Rodriguez MD   glimepiride (Amaryl) 1 MG tablet Take 0.5-1 tablets by mouth Every Morning Before Breakfast. 11/5/20  Yes Artemio Sanchez MD   Insulin Glargine (BASAGLAR KWIKPEN) 100 UNIT/ML injection pen Inject 5-50 Units under the skin into the appropriate area as directed Every Night. 5/12/21  Yes Artemio Sanchez MD   Insulin Pen Needle (Pen Needles 1/2\") 29G X 12MM misc 1 each Every Night. 5/12/21  Yes Artemio Sanchez MD   isosorbide mononitrate (IMDUR) 30 MG 24 hr tablet Take 1 tablet by mouth Daily. 8/27/20  Yes Artemio Sanchez MD   Lancet Devices (ONE TOUCH DELICA LANCING DEV) misc Use as directed to test blood sugar. 11/13/20  Yes Alessandro Gan MD   magnesium oxide (MAGOX) 400 (241.3 Mg) MG tablet tablet Take 1 tablet by mouth 2 (Two) Times a Day. 7/29/20  Yes Zeferino Fernando APRN   metFORMIN (GLUCOPHAGE) 500 MG tablet Take 2 tablets by mouth 2 (Two) Times a Day With Meals. 10/30/20  Yes Artemio Sanchez MD   metoprolol succinate XL (TOPROL-XL) 25 MG 24 hr tablet Take 1 tablet by mouth Daily. 5/3/21  Yes Alice Rice MD "   nitroglycerin (NITROSTAT) 0.4 MG SL tablet Place 1 tablet under the tongue Every 5 (Five) Minutes As Needed for Chest Pain. Take no more than 3 doses in 15 minutes. 19  Yes Marcelino Goode MD   nortriptyline (PAMELOR) 10 MG capsule Take 1 capsule by mouth 3 (Three) Times a Day. 10/29/20  Yes Artemio Sanchez MD   ONETOUCH DELICA LANCETS 33G misc 1 each 4 (Four) Times a Day. delica if covered, use alternative if not covered 17  Yes Alessandro Gan MD   pioglitazone (Actos) 30 MG tablet Take 1 tablet by mouth Daily. 20  Yes Artemio Sanchez MD   ranolazine (RANEXA) 500 MG 12 hr tablet Take 1 tablet by mouth Every 12 (Twelve) Hours. 21  Yes Quinn Mathew MD   spironolactone (Aldactone) 25 MG tablet Take 1 tablet by mouth Daily. 9/3/20  Yes Artemio Sanchez MD   tiZANidine (ZANAFLEX) 4 MG tablet Take 4 mg by mouth 3 (Three) Times a Day. 21  Yes ProviderHai MD     Allergies   Allergen Reactions   • Ace Inhibitors Anaphylaxis   • Wellbutrin [Bupropion] Anaphylaxis     Social History     Socioeconomic History   • Marital status:    Tobacco Use   • Smoking status: Former Smoker     Packs/day: 1.00     Years: 35.00     Pack years: 35.00     Types: Cigarettes     Start date: 1980     Quit date: 9/3/2019     Years since quittin.3   • Smokeless tobacco: Never Used   Vaping Use   • Vaping Use: Never used   Substance and Sexual Activity   • Alcohol use: No   • Drug use: No   • Sexual activity: Defer     Comment: .       Review of Systems  Review of Systems   Constitutional: Negative for activity change, appetite change, chills, diaphoresis, fatigue, fever and unexpected weight change.   HENT: Negative for trouble swallowing.    Gastrointestinal: Positive for abdominal pain and constipation (every other day kelsie hard ). Negative for abdominal distention, anal bleeding, blood in stool, diarrhea, nausea, rectal pain and vomiting.        /97 (Patient  "Position: Sitting)   Pulse 84   Temp 97.8 °F (36.6 °C) (Temporal)   Resp 18   Ht 188 cm (74\")   Wt 117 kg (259 lb)   SpO2 99%   BMI 33.25 kg/m²     Objective    Physical Exam  Constitutional:       General: He is not in acute distress.     Appearance: Normal appearance. He is well-developed.   Neck:      Thyroid: No thyroid mass or thyromegaly.   Cardiovascular:      Rate and Rhythm: Normal rate and regular rhythm.      Heart sounds: Normal heart sounds.   Pulmonary:      Effort: Pulmonary effort is normal. No respiratory distress.      Breath sounds: Normal breath sounds.   Abdominal:      General: Bowel sounds are normal. There is no distension.      Palpations: Abdomen is soft. There is no mass.      Tenderness: There is no abdominal tenderness.      Hernia: No hernia is present.       Office Visit on 06/11/2021   Component Date Value Ref Range Status   • Total Cholesterol 06/11/2021 211* 0 - 200 mg/dL Final   • Triglycerides 06/11/2021 144  0 - 150 mg/dL Final   • HDL Cholesterol 06/11/2021 40  40 - 60 mg/dL Final   • LDL Cholesterol  06/11/2021 145* 0 - 100 mg/dL Final   • VLDL Cholesterol 06/11/2021 26  5 - 40 mg/dL Final   • LDL/HDL Ratio 06/11/2021 3.56   Final   • Total Protein 06/11/2021 6.8  6.0 - 8.5 g/dL Final   • Albumin 06/11/2021 4.00  3.50 - 5.20 g/dL Final   • ALT (SGPT) 06/11/2021 14  1 - 41 U/L Final   • AST (SGOT) 06/11/2021 11  1 - 40 U/L Final   • Alkaline Phosphatase 06/11/2021 78  39 - 117 U/L Final   • Total Bilirubin 06/11/2021 1.0  0.0 - 1.2 mg/dL Final   • Bilirubin, Direct 06/11/2021 0.2  0.0 - 0.3 mg/dL Final   • Bilirubin, Indirect 06/11/2021 0.8  mg/dL Final     Assessment/Plan      1. Encounter for colonoscopy due to history of colonic polyp    .       Orders placed during this encounter include:  Orders Placed This Encounter   Procedures   • Obtain Informed Consent     Standing Status:   Standing     Number of Occurrences:   1     Order Specific Question:   Informed Consent " Given For     Answer:   Colonoscopy   • POC Glucose Once     Prior to Procedure on ALL Diabetic Patients     Standing Status:   Standing     Number of Occurrences:   1   • POC Glucose Once     Standing Status:   Standing     Number of Occurrences:   1     Order Specific Question:   Release to patient     Answer:   Immediate   • Insert Peripheral IV     Standing Status:   Standing     Number of Occurrences:   1       COLONOSCOPY (N/A)    Review and/or summary of lab tests, radiology, procedures, medications. Review and summary of old records and obtaining of history. The risks and benefits of my recommendations, as well as other treatment options were discussed with the patient today. Questions were answered.    New Medications Ordered This Visit   Medications   • dextrose 5 % and sodium chloride 0.45 % infusion       Follow-up: No follow-ups on file.          This document has been electronically signed by Dewey Antoine MD on January 5, 2022 09:33 CST           I spent 12 minutes caring for Herbert on this date of service. This time includes time spent by me in the following activities:preparing for the visit, reviewing tests, obtaining and/or reviewing a separately obtained history, performing a medically appropriate examination and/or evaluation , counseling and educating the patient/family/caregiver, ordering medications, tests, or procedures, referring and communicating with other health care professionals , documenting information in the medical record and care coordination    Results for orders placed or performed during the hospital encounter of 01/05/22   POC Glucose Once    Specimen: Blood   Result Value Ref Range    Glucose 111 70 - 130 mg/dL   Results for orders placed or performed in visit on 01/03/22   COVID-19, [facility] IN-HOUSE, NP SWAB IN TRANSPORT MEDIA 8-10 HR TAT - Swab, Nasopharynx    Specimen: Nasopharynx; Swab   Result Value Ref Range    COVID19 Not Detected Not Detected - Ref. Range   Results  for orders placed or performed during the hospital encounter of 12/08/21   Duplex Carotid Ultrasound CAR   Result Value Ref Range    Prox CCA PSV 90.7 cm/sec    Prox CCA PSV 98.7 cm/sec    Prox CCA EDV 18.2 cm/sec    Prox CCA EDV 16.1 cm/sec    Dist CCA PSV 76.5 cm/sec    Dist CCA PSV 61.0 cm/sec    Dist CCA EDV 15.4 cm/sec    Dist CCA EDV 15.4 cm/sec    Prox ECA PSV 95.3 cm/sec    Prox ECA PSV 85.5 cm/sec    Prox ECA EDV 12.4 cm/sec    Prox ECA EDV 15.0 cm/sec    Prox ICA PSV 45.3 cm/sec    Prox ICA PSV 60.8 cm/sec    Prox ICA EDV 15.1 cm/sec    Prox ICA EDV 21.6 cm/sec    Mid ICA PSV 57.5 cm/sec    Mid ICA PSV 73.6 cm/sec    Mid ICA EDV 21.8 cm/sec    Mid ICA EDV 28.2 cm/sec    Dist ICA PSV 70.4 cm/sec    Dist ICA PSV 85.8 cm/sec    Dist ICA EDV 25.0 cm/sec    Dist ICA EDV 29.1 cm/sec    Vertebral A PSV 46 cm/sec    Vertebral A EDV 8 cm/sec    ICA/CCA ratio 0.9     Vertebral A PSV 49 cm/sec    Vertebral A EDV 13 cm/sec    ICA/CCA ratio 0.8     Diastolic ICA/CCA Ratio 1.8     ICA/CCA diastolic ratio 1.4    Results for orders placed or performed during the hospital encounter of 11/08/21   Gray Top   Result Value Ref Range    Extra Tube Hold for add-ons.    Gold Top - SST   Result Value Ref Range    Extra Tube Hold for add-ons.    Green Top (Gel)   Result Value Ref Range    Extra Tube Hold for add-ons.    Urinalysis, Microscopic Only - Urine, Clean Catch    Specimen: Urine, Clean Catch   Result Value Ref Range    RBC, UA 0-2 (A) None Seen /HPF    WBC, UA 0-2 None Seen, 0-2, 3-5 /HPF    Bacteria, UA None Seen None Seen /HPF    Squamous Epithelial Cells, UA None Seen None Seen, 0-2 /HPF    Hyaline Casts, UA 0-2 None Seen /LPF    Methodology Automated Microscopy    Urinalysis With Culture If Indicated - Urine, Clean Catch    Specimen: Urine, Clean Catch   Result Value Ref Range    Color, UA Yellow Yellow, Straw, Dark Yellow, Kate    Appearance, UA Clear Clear    pH, UA 5.5 5.0 - 9.0    Specific Gravity, UA 1.042 (H)  1.003 - 1.030    Glucose, UA >=1000 mg/dL (3+) (A) Negative    Ketones, UA 15 mg/dL (1+) (A) Negative    Bilirubin, UA Negative Negative    Blood, UA Negative Negative    Protein, UA 30 mg/dL (1+) (A) Negative    Leuk Esterase, UA Negative Negative    Nitrite, UA Negative Negative    Urobilinogen, UA 1.0 E.U./dL 0.2 - 1.0 E.U./dL   CBC Auto Differential    Specimen: Blood   Result Value Ref Range    WBC 10.57 3.40 - 10.80 10*3/mm3    RBC 6.55 (H) 4.14 - 5.80 10*6/mm3    Hemoglobin 18.7 (H) 13.0 - 17.7 g/dL    Hematocrit 51.8 (H) 37.5 - 51.0 %    MCV 79.1 79.0 - 97.0 fL    MCH 28.5 26.6 - 33.0 pg    MCHC 36.1 (H) 31.5 - 35.7 g/dL    RDW 13.6 12.3 - 15.4 %    RDW-SD 36.6 (L) 37.0 - 54.0 fl    MPV 10.3 6.0 - 12.0 fL    Platelets 214 140 - 450 10*3/mm3    Neutrophil % 76.3 (H) 42.7 - 76.0 %    Lymphocyte % 15.4 (L) 19.6 - 45.3 %    Monocyte % 5.9 5.0 - 12.0 %    Eosinophil % 1.5 0.3 - 6.2 %    Basophil % 0.4 0.0 - 1.5 %    Immature Grans % 0.5 0.0 - 0.5 %    Neutrophils, Absolute 8.07 (H) 1.70 - 7.00 10*3/mm3    Lymphocytes, Absolute 1.63 0.70 - 3.10 10*3/mm3    Monocytes, Absolute 0.62 0.10 - 0.90 10*3/mm3    Eosinophils, Absolute 0.16 0.00 - 0.40 10*3/mm3    Basophils, Absolute 0.04 0.00 - 0.20 10*3/mm3    Immature Grans, Absolute 0.05 0.00 - 0.05 10*3/mm3    nRBC 0.0 0.0 - 0.2 /100 WBC   Lavender Top   Result Value Ref Range    Extra Tube hold for add-on    Light Blue Top   Result Value Ref Range    Extra Tube hold for add-on    Lipase    Specimen: Blood   Result Value Ref Range    Lipase 20 13 - 60 U/L   Comprehensive Metabolic Panel    Specimen: Blood   Result Value Ref Range    Glucose 336 (H) 65 - 99 mg/dL    BUN 12 6 - 20 mg/dL    Creatinine 0.81 0.76 - 1.27 mg/dL    Sodium 135 (L) 136 - 145 mmol/L    Potassium 3.9 3.5 - 5.2 mmol/L    Chloride 99 98 - 107 mmol/L    CO2 26.0 22.0 - 29.0 mmol/L    Calcium 9.5 8.6 - 10.5 mg/dL    Total Protein 6.9 6.0 - 8.5 g/dL    Albumin 4.30 3.50 - 5.20 g/dL    ALT (SGPT) 13 1 -  41 U/L    AST (SGOT) 12 1 - 40 U/L    Alkaline Phosphatase 85 39 - 117 U/L    Total Bilirubin 0.9 0.0 - 1.2 mg/dL    eGFR Non African Amer 98 >60 mL/min/1.73    Globulin 2.6 gm/dL    A/G Ratio 1.7 g/dL    BUN/Creatinine Ratio 14.8 7.0 - 25.0    Anion Gap 10.0 5.0 - 15.0 mmol/L     *Note: Due to a large number of results and/or encounters for the requested time period, some results have not been displayed. A complete set of results can be found in Results Review.

## 2022-01-05 NOTE — ANESTHESIA POSTPROCEDURE EVALUATION
Patient: Herbert White     Procedure Summary     Date: 01/05/22 Room / Location: [facility] ENDOSCOPY 1 / [facility] ENDOSCOPY    Anesthesia Start: 0935 Anesthesia Stop: 0950    Procedure: COLONOSCOPY (N/A ) Diagnosis:       Encounter for colonoscopy due to history of colonic polyp      (Encounter for colonoscopy due to history of colonic polyp [Z12.11, Z86.010])    Surgeons: Dewey Antoine MD Provider: Zofia Horvath CRNA    Anesthesia Type: MAC ASA Status: 3          Anesthesia Type: MAC    Vitals  No vitals data found for the desired time range.          Post Anesthesia Care and Evaluation    Patient location during evaluation: bedside  Patient participation: waiting for patient participation  Level of consciousness: sleepy but conscious  Pain score: 0  Pain management: adequate  Airway patency: patent  Anesthetic complications: No anesthetic complications  PONV Status: none  Cardiovascular status: acceptable  Respiratory status: acceptable  Hydration status: acceptable

## 2022-01-11 ENCOUNTER — CLINICAL SUPPORT (OUTPATIENT)
Dept: FAMILY MEDICINE CLINIC | Facility: CLINIC | Age: 57
End: 2022-01-11

## 2022-01-11 ENCOUNTER — TELEPHONE (OUTPATIENT)
Dept: FAMILY MEDICINE CLINIC | Facility: CLINIC | Age: 57
End: 2022-01-11

## 2022-01-11 ENCOUNTER — LAB (OUTPATIENT)
Dept: LAB | Facility: HOSPITAL | Age: 57
End: 2022-01-11

## 2022-01-11 DIAGNOSIS — Z11.52 ENCOUNTER FOR SCREENING FOR COVID-19: ICD-10-CM

## 2022-01-11 DIAGNOSIS — R53.81 MALAISE: Primary | ICD-10-CM

## 2022-01-11 PROCEDURE — 99211 OFF/OP EST MAY X REQ PHY/QHP: CPT | Performed by: FAMILY MEDICINE

## 2022-01-11 PROCEDURE — 87635 SARS-COV-2 COVID-19 AMP PRB: CPT | Performed by: FAMILY MEDICINE

## 2022-01-11 NOTE — TELEPHONE ENCOUNTER
Nai called and said that Mr. White is having symptoms as well of COVID she is positive and she wanted to know if you would call him in a  Zpack as well to [facility].  He is coming in this afternoon for a Covid test. 1/11/22

## 2022-01-12 LAB — SARS-COV-2 N GENE RESP QL NAA+PROBE: DETECTED

## 2022-01-12 RX ORDER — METHYLPREDNISOLONE 4 MG/1
TABLET ORAL
Qty: 21 TABLET | Refills: 0 | Status: SHIPPED | OUTPATIENT
Start: 2022-01-12 | End: 2022-01-21

## 2022-01-12 RX ORDER — AZITHROMYCIN 250 MG/1
TABLET, FILM COATED ORAL
Qty: 6 TABLET | Refills: 0 | Status: SHIPPED | OUTPATIENT
Start: 2022-01-12 | End: 2022-01-21

## 2022-01-13 ENCOUNTER — TELEPHONE (OUTPATIENT)
Dept: FAMILY MEDICINE CLINIC | Facility: CLINIC | Age: 57
End: 2022-01-13

## 2022-01-13 DIAGNOSIS — B34.9 VIRAL ILLNESS: ICD-10-CM

## 2022-01-13 RX ORDER — BENZONATATE 200 MG/1
200 CAPSULE ORAL 3 TIMES DAILY PRN
Qty: 30 CAPSULE | Refills: 0 | Status: SHIPPED | OUTPATIENT
Start: 2022-01-13 | End: 2022-03-03

## 2022-01-13 RX ORDER — GUAIFENESIN/DEXTROMETHORPHAN 100-10MG/5
10 SYRUP ORAL 4 TIMES DAILY PRN
Qty: 240 ML | Refills: 1 | Status: SHIPPED | OUTPATIENT
Start: 2022-01-13 | End: 2022-08-06

## 2022-01-13 NOTE — TELEPHONE ENCOUNTER
Nai called and wanted to know if you would send in some cough medicine for [place] Cindy for his COVID?  [facility].

## 2022-01-21 ENCOUNTER — APPOINTMENT (OUTPATIENT)
Dept: GENERAL RADIOLOGY | Facility: HOSPITAL | Age: 57
End: 2022-01-21

## 2022-01-21 ENCOUNTER — HOSPITAL ENCOUNTER (INPATIENT)
Facility: HOSPITAL | Age: 57
LOS: 4 days | Discharge: HOME | End: 2022-01-25
Attending: FAMILY MEDICINE | Admitting: HOSPITALIST

## 2022-01-21 DIAGNOSIS — I48.91 ATRIAL FIBRILLATION WITH RVR: Primary | ICD-10-CM

## 2022-01-21 PROBLEM — U07.1 COVID-19 VIRUS DETECTED: Status: ACTIVE | Noted: 2022-01-21

## 2022-01-21 PROBLEM — I16.0 HYPERTENSIVE URGENCY: Status: ACTIVE | Noted: 2022-01-21

## 2022-01-21 LAB
ALBUMIN SERPL-MCNC: 3.9 G/DL (ref 3.5–5.2)
ALBUMIN/GLOB SERPL: 1.3 G/DL
ALP SERPL-CCNC: 96 U/L (ref 39–117)
ALT SERPL W P-5'-P-CCNC: 15 U/L (ref 1–41)
ANION GAP SERPL CALCULATED.3IONS-SCNC: 9 MMOL/L (ref 5–15)
AST SERPL-CCNC: 16 U/L (ref 1–40)
BASOPHILS # BLD AUTO: 0.01 10*3/MM3 (ref 0–0.2)
BASOPHILS NFR BLD AUTO: 0.2 % (ref 0–1.5)
BILIRUB SERPL-MCNC: 0.5 MG/DL (ref 0–1.2)
BUN SERPL-MCNC: 16 MG/DL (ref 6–20)
BUN/CREAT SERPL: 19.5 (ref 7–25)
CALCIUM SPEC-SCNC: 8.7 MG/DL (ref 8.6–10.5)
CHLORIDE SERPL-SCNC: 98 MMOL/L (ref 98–107)
CO2 SERPL-SCNC: 28 MMOL/L (ref 22–29)
CREAT SERPL-MCNC: 0.82 MG/DL (ref 0.76–1.27)
DEPRECATED RDW RBC AUTO: 38.6 FL (ref 37–54)
EOSINOPHIL # BLD AUTO: 0.13 10*3/MM3 (ref 0–0.4)
EOSINOPHIL NFR BLD AUTO: 2.4 % (ref 0.3–6.2)
ERYTHROCYTE [DISTWIDTH] IN BLOOD BY AUTOMATED COUNT: 12.9 % (ref 12.3–15.4)
FLUAV SUBTYP SPEC NAA+PROBE: NOT DETECTED
FLUBV RNA ISLT QL NAA+PROBE: NOT DETECTED
GFR SERPL CREATININE-BSD FRML MDRD: 97 ML/MIN/1.73
GLOBULIN UR ELPH-MCNC: 2.9 GM/DL
GLUCOSE BLDC GLUCOMTR-MCNC: 327 MG/DL (ref 70–130)
GLUCOSE SERPL-MCNC: 410 MG/DL (ref 65–99)
HBA1C MFR BLD: 11.4 % (ref 4.8–5.6)
HCT VFR BLD AUTO: 51.8 % (ref 37.5–51)
HGB BLD-MCNC: 18.1 G/DL (ref 13–17.7)
HOLD SPECIMEN: NORMAL
HOLD SPECIMEN: NORMAL
IMM GRANULOCYTES # BLD AUTO: 0.03 10*3/MM3 (ref 0–0.05)
IMM GRANULOCYTES NFR BLD AUTO: 0.6 % (ref 0–0.5)
LYMPHOCYTES # BLD AUTO: 1.26 10*3/MM3 (ref 0.7–3.1)
LYMPHOCYTES NFR BLD AUTO: 23.2 % (ref 19.6–45.3)
MCH RBC QN AUTO: 28.8 PG (ref 26.6–33)
MCHC RBC AUTO-ENTMCNC: 34.9 G/DL (ref 31.5–35.7)
MCV RBC AUTO: 82.4 FL (ref 79–97)
MONOCYTES # BLD AUTO: 0.35 10*3/MM3 (ref 0.1–0.9)
MONOCYTES NFR BLD AUTO: 6.4 % (ref 5–12)
NEUTROPHILS NFR BLD AUTO: 3.66 10*3/MM3 (ref 1.7–7)
NEUTROPHILS NFR BLD AUTO: 67.2 % (ref 42.7–76)
NRBC BLD AUTO-RTO: 0 /100 WBC (ref 0–0.2)
NT-PROBNP SERPL-MCNC: 571.5 PG/ML (ref 0–900)
PLATELET # BLD AUTO: 174 10*3/MM3 (ref 140–450)
PMV BLD AUTO: 10.8 FL (ref 6–12)
POTASSIUM SERPL-SCNC: 4.1 MMOL/L (ref 3.5–5.2)
PROT SERPL-MCNC: 6.8 G/DL (ref 6–8.5)
RBC # BLD AUTO: 6.29 10*6/MM3 (ref 4.14–5.8)
SARS-COV-2 RNA PNL SPEC NAA+PROBE: DETECTED
SODIUM SERPL-SCNC: 135 MMOL/L (ref 136–145)
TROPONIN T SERPL-MCNC: <0.01 NG/ML (ref 0–0.03)
TROPONIN T SERPL-MCNC: <0.01 NG/ML (ref 0–0.03)
WBC NRBC COR # BLD: 5.44 10*3/MM3 (ref 3.4–10.8)
WHOLE BLOOD HOLD SPECIMEN: NORMAL
WHOLE BLOOD HOLD SPECIMEN: NORMAL

## 2022-01-21 PROCEDURE — 85025 COMPLETE CBC W/AUTO DIFF WBC: CPT | Performed by: FAMILY MEDICINE

## 2022-01-21 PROCEDURE — 99284 EMERGENCY DEPT VISIT MOD MDM: CPT

## 2022-01-21 PROCEDURE — 84484 ASSAY OF TROPONIN QUANT: CPT | Performed by: FAMILY MEDICINE

## 2022-01-21 PROCEDURE — 63710000001 INSULIN DETEMIR PER 5 UNITS: Performed by: STUDENT IN AN ORGANIZED HEALTH CARE EDUCATION/TRAINING PROGRAM

## 2022-01-21 PROCEDURE — 82962 GLUCOSE BLOOD TEST: CPT

## 2022-01-21 PROCEDURE — 63710000001 INSULIN REGULAR HUMAN PER 5 UNITS: Performed by: STUDENT IN AN ORGANIZED HEALTH CARE EDUCATION/TRAINING PROGRAM

## 2022-01-21 PROCEDURE — 80053 COMPREHEN METABOLIC PANEL: CPT | Performed by: FAMILY MEDICINE

## 2022-01-21 PROCEDURE — 71045 X-RAY EXAM CHEST 1 VIEW: CPT

## 2022-01-21 PROCEDURE — 36415 COLL VENOUS BLD VENIPUNCTURE: CPT

## 2022-01-21 PROCEDURE — 83880 ASSAY OF NATRIURETIC PEPTIDE: CPT | Performed by: FAMILY MEDICINE

## 2022-01-21 PROCEDURE — 83036 HEMOGLOBIN GLYCOSYLATED A1C: CPT | Performed by: STUDENT IN AN ORGANIZED HEALTH CARE EDUCATION/TRAINING PROGRAM

## 2022-01-21 PROCEDURE — 93005 ELECTROCARDIOGRAM TRACING: CPT | Performed by: FAMILY MEDICINE

## 2022-01-21 PROCEDURE — 93010 ELECTROCARDIOGRAM REPORT: CPT | Performed by: INTERNAL MEDICINE

## 2022-01-21 PROCEDURE — 87636 SARSCOV2 & INF A&B AMP PRB: CPT | Performed by: FAMILY MEDICINE

## 2022-01-21 RX ORDER — ACETAMINOPHEN 325 MG/1
650 TABLET ORAL EVERY 4 HOURS PRN
Status: DISCONTINUED | OUTPATIENT
Start: 2022-01-21 | End: 2022-01-25 | Stop reason: HOSPADM

## 2022-01-21 RX ORDER — DEXTROSE MONOHYDRATE 25 G/50ML
25 INJECTION, SOLUTION INTRAVENOUS
Status: DISCONTINUED | OUTPATIENT
Start: 2022-01-21 | End: 2022-01-25 | Stop reason: HOSPADM

## 2022-01-21 RX ORDER — SODIUM CHLORIDE 0.9 % (FLUSH) 0.9 %
10 SYRINGE (ML) INJECTION AS NEEDED
Status: DISCONTINUED | OUTPATIENT
Start: 2022-01-21 | End: 2022-01-25 | Stop reason: HOSPADM

## 2022-01-21 RX ORDER — ASPIRIN 81 MG/1
81 TABLET, CHEWABLE ORAL DAILY
Status: DISCONTINUED | OUTPATIENT
Start: 2022-01-22 | End: 2022-01-25 | Stop reason: HOSPADM

## 2022-01-21 RX ORDER — ATORVASTATIN CALCIUM 40 MG/1
40 TABLET, FILM COATED ORAL NIGHTLY
Status: DISCONTINUED | OUTPATIENT
Start: 2022-01-21 | End: 2022-01-25 | Stop reason: HOSPADM

## 2022-01-21 RX ORDER — LANOLIN ALCOHOL/MO/W.PET/CERES
5 CREAM (GRAM) TOPICAL NIGHTLY PRN
Status: DISCONTINUED | OUTPATIENT
Start: 2022-01-21 | End: 2022-01-25 | Stop reason: HOSPADM

## 2022-01-21 RX ORDER — CALCIUM CARBONATE 200(500)MG
2 TABLET,CHEWABLE ORAL 2 TIMES DAILY PRN
Status: DISCONTINUED | OUTPATIENT
Start: 2022-01-21 | End: 2022-01-25 | Stop reason: HOSPADM

## 2022-01-21 RX ORDER — GABAPENTIN 300 MG/1
300 CAPSULE ORAL EVERY 8 HOURS SCHEDULED
Status: DISCONTINUED | OUTPATIENT
Start: 2022-01-21 | End: 2022-01-25 | Stop reason: HOSPADM

## 2022-01-21 RX ORDER — DILTIAZEM HYDROCHLORIDE 5 MG/ML
10 INJECTION INTRAVENOUS ONCE
Status: COMPLETED | OUTPATIENT
Start: 2022-01-21 | End: 2022-01-21

## 2022-01-21 RX ORDER — SPIRONOLACTONE 25 MG/1
25 TABLET ORAL DAILY
Status: DISCONTINUED | OUTPATIENT
Start: 2022-01-21 | End: 2022-01-25 | Stop reason: HOSPADM

## 2022-01-21 RX ORDER — BENZONATATE 100 MG/1
200 CAPSULE ORAL 3 TIMES DAILY PRN
Status: DISCONTINUED | OUTPATIENT
Start: 2022-01-21 | End: 2022-01-25 | Stop reason: HOSPADM

## 2022-01-21 RX ORDER — NICOTINE POLACRILEX 4 MG
15 LOZENGE BUCCAL
Status: DISCONTINUED | OUTPATIENT
Start: 2022-01-21 | End: 2022-01-25 | Stop reason: HOSPADM

## 2022-01-21 RX ORDER — RANOLAZINE 500 MG/1
500 TABLET, EXTENDED RELEASE ORAL EVERY 12 HOURS SCHEDULED
Status: DISCONTINUED | OUTPATIENT
Start: 2022-01-21 | End: 2022-01-25 | Stop reason: HOSPADM

## 2022-01-21 RX ORDER — ASPIRIN 325 MG
325 TABLET ORAL ONCE
Status: COMPLETED | OUTPATIENT
Start: 2022-01-21 | End: 2022-01-21

## 2022-01-21 RX ORDER — SODIUM CHLORIDE 0.9 % (FLUSH) 0.9 %
10 SYRINGE (ML) INJECTION EVERY 12 HOURS SCHEDULED
Status: DISCONTINUED | OUTPATIENT
Start: 2022-01-21 | End: 2022-01-25 | Stop reason: HOSPADM

## 2022-01-21 RX ORDER — AMLODIPINE BESYLATE 10 MG/1
10 TABLET ORAL DAILY
Status: DISCONTINUED | OUTPATIENT
Start: 2022-01-21 | End: 2022-01-23

## 2022-01-21 RX ORDER — ALBUTEROL SULFATE 90 UG/1
2 AEROSOL, METERED RESPIRATORY (INHALATION) EVERY 4 HOURS PRN
Status: DISCONTINUED | OUTPATIENT
Start: 2022-01-21 | End: 2022-01-25 | Stop reason: HOSPADM

## 2022-01-21 RX ORDER — NITROGLYCERIN 0.4 MG/1
0.4 TABLET SUBLINGUAL
Status: DISCONTINUED | OUTPATIENT
Start: 2022-01-21 | End: 2022-01-25 | Stop reason: HOSPADM

## 2022-01-21 RX ADMIN — METOPROLOL TARTRATE 25 MG: 25 TABLET, FILM COATED ORAL at 21:18

## 2022-01-21 RX ADMIN — INSULIN DETEMIR 10 UNITS: 100 INJECTION, SOLUTION SUBCUTANEOUS at 21:14

## 2022-01-21 RX ADMIN — SODIUM CHLORIDE 12.5 MG/HR: 900 INJECTION, SOLUTION INTRAVENOUS at 21:32

## 2022-01-21 RX ADMIN — SODIUM CHLORIDE 1000 ML: 9 INJECTION, SOLUTION INTRAVENOUS at 13:47

## 2022-01-21 RX ADMIN — HUMAN INSULIN 5 UNITS: 100 INJECTION, SOLUTION SUBCUTANEOUS at 21:19

## 2022-01-21 RX ADMIN — SODIUM CHLORIDE, PRESERVATIVE FREE 10 ML: 5 INJECTION INTRAVENOUS at 21:19

## 2022-01-21 RX ADMIN — AMLODIPINE BESYLATE 10 MG: 10 TABLET ORAL at 21:21

## 2022-01-21 RX ADMIN — RANOLAZINE 500 MG: 500 TABLET, FILM COATED, EXTENDED RELEASE ORAL at 21:18

## 2022-01-21 RX ADMIN — DILTIAZEM HYDROCHLORIDE 10 MG: 5 INJECTION INTRAVENOUS at 12:51

## 2022-01-21 RX ADMIN — APIXABAN 5 MG: 5 TABLET, FILM COATED ORAL at 12:52

## 2022-01-21 RX ADMIN — GABAPENTIN 300 MG: 300 CAPSULE ORAL at 21:18

## 2022-01-21 RX ADMIN — SPIRONOLACTONE 25 MG: 25 TABLET ORAL at 21:17

## 2022-01-21 RX ADMIN — ASPIRIN 325 MG: 325 TABLET ORAL at 12:08

## 2022-01-21 RX ADMIN — ATORVASTATIN CALCIUM 40 MG: 40 TABLET, FILM COATED ORAL at 21:18

## 2022-01-21 RX ADMIN — APIXABAN 5 MG: 5 TABLET, FILM COATED ORAL at 23:16

## 2022-01-21 RX ADMIN — SODIUM CHLORIDE 5 MG/HR: 900 INJECTION, SOLUTION INTRAVENOUS at 13:43

## 2022-01-22 LAB
ALBUMIN SERPL-MCNC: 3.5 G/DL (ref 3.5–5.2)
ALBUMIN/GLOB SERPL: 1.5 G/DL
ALP SERPL-CCNC: 74 U/L (ref 39–117)
ALT SERPL W P-5'-P-CCNC: 10 U/L (ref 1–41)
ANION GAP SERPL CALCULATED.3IONS-SCNC: 6 MMOL/L (ref 5–15)
AST SERPL-CCNC: 12 U/L (ref 1–40)
BILIRUB SERPL-MCNC: 0.8 MG/DL (ref 0–1.2)
BUN SERPL-MCNC: 12 MG/DL (ref 6–20)
BUN/CREAT SERPL: 17.6 (ref 7–25)
CALCIUM SPEC-SCNC: 8.4 MG/DL (ref 8.6–10.5)
CHLORIDE SERPL-SCNC: 104 MMOL/L (ref 98–107)
CO2 SERPL-SCNC: 31 MMOL/L (ref 22–29)
CREAT SERPL-MCNC: 0.68 MG/DL (ref 0.76–1.27)
DEPRECATED RDW RBC AUTO: 39 FL (ref 37–54)
ERYTHROCYTE [DISTWIDTH] IN BLOOD BY AUTOMATED COUNT: 13.2 % (ref 12.3–15.4)
GFR SERPL CREATININE-BSD FRML MDRD: 120 ML/MIN/1.73
GLOBULIN UR ELPH-MCNC: 2.4 GM/DL
GLUCOSE BLDC GLUCOMTR-MCNC: 230 MG/DL (ref 70–130)
GLUCOSE BLDC GLUCOMTR-MCNC: 278 MG/DL (ref 70–130)
GLUCOSE BLDC GLUCOMTR-MCNC: 334 MG/DL (ref 70–130)
GLUCOSE SERPL-MCNC: 236 MG/DL (ref 65–99)
HCT VFR BLD AUTO: 49.3 % (ref 37.5–51)
HGB BLD-MCNC: 17.2 G/DL (ref 13–17.7)
MAGNESIUM SERPL-MCNC: 2 MG/DL (ref 1.6–2.6)
MCH RBC QN AUTO: 28.7 PG (ref 26.6–33)
MCHC RBC AUTO-ENTMCNC: 34.9 G/DL (ref 31.5–35.7)
MCV RBC AUTO: 82.2 FL (ref 79–97)
PHOSPHATE SERPL-MCNC: 2.3 MG/DL (ref 2.5–4.5)
PLATELET # BLD AUTO: 172 10*3/MM3 (ref 140–450)
PMV BLD AUTO: 10.7 FL (ref 6–12)
POTASSIUM SERPL-SCNC: 3.8 MMOL/L (ref 3.5–5.2)
PROT SERPL-MCNC: 5.9 G/DL (ref 6–8.5)
RBC # BLD AUTO: 6 10*6/MM3 (ref 4.14–5.8)
SODIUM SERPL-SCNC: 141 MMOL/L (ref 136–145)
T4 FREE SERPL-MCNC: 1.24 NG/DL (ref 0.93–1.7)
TSH SERPL DL<=0.05 MIU/L-ACNC: 1.32 UIU/ML (ref 0.27–4.2)
WBC NRBC COR # BLD: 5.17 10*3/MM3 (ref 3.4–10.8)

## 2022-01-22 PROCEDURE — 63710000001 INSULIN DETEMIR PER 5 UNITS: Performed by: STUDENT IN AN ORGANIZED HEALTH CARE EDUCATION/TRAINING PROGRAM

## 2022-01-22 PROCEDURE — 84443 ASSAY THYROID STIM HORMONE: CPT | Performed by: STUDENT IN AN ORGANIZED HEALTH CARE EDUCATION/TRAINING PROGRAM

## 2022-01-22 PROCEDURE — 85027 COMPLETE CBC AUTOMATED: CPT | Performed by: STUDENT IN AN ORGANIZED HEALTH CARE EDUCATION/TRAINING PROGRAM

## 2022-01-22 PROCEDURE — 83735 ASSAY OF MAGNESIUM: CPT | Performed by: STUDENT IN AN ORGANIZED HEALTH CARE EDUCATION/TRAINING PROGRAM

## 2022-01-22 PROCEDURE — 94799 UNLISTED PULMONARY SVC/PX: CPT

## 2022-01-22 PROCEDURE — 84100 ASSAY OF PHOSPHORUS: CPT | Performed by: STUDENT IN AN ORGANIZED HEALTH CARE EDUCATION/TRAINING PROGRAM

## 2022-01-22 PROCEDURE — 84439 ASSAY OF FREE THYROXINE: CPT | Performed by: STUDENT IN AN ORGANIZED HEALTH CARE EDUCATION/TRAINING PROGRAM

## 2022-01-22 PROCEDURE — 36415 COLL VENOUS BLD VENIPUNCTURE: CPT | Performed by: STUDENT IN AN ORGANIZED HEALTH CARE EDUCATION/TRAINING PROGRAM

## 2022-01-22 PROCEDURE — 94660 CPAP INITIATION&MGMT: CPT

## 2022-01-22 PROCEDURE — 94760 N-INVAS EAR/PLS OXIMETRY 1: CPT

## 2022-01-22 PROCEDURE — 80053 COMPREHEN METABOLIC PANEL: CPT | Performed by: STUDENT IN AN ORGANIZED HEALTH CARE EDUCATION/TRAINING PROGRAM

## 2022-01-22 PROCEDURE — 82962 GLUCOSE BLOOD TEST: CPT

## 2022-01-22 PROCEDURE — 63710000001 INSULIN ASPART PER 5 UNITS: Performed by: STUDENT IN AN ORGANIZED HEALTH CARE EDUCATION/TRAINING PROGRAM

## 2022-01-22 RX ORDER — MAGNESIUM SULFATE HEPTAHYDRATE 40 MG/ML
2 INJECTION, SOLUTION INTRAVENOUS AS NEEDED
Status: DISCONTINUED | OUTPATIENT
Start: 2022-01-22 | End: 2022-01-25 | Stop reason: HOSPADM

## 2022-01-22 RX ORDER — DILTIAZEM HYDROCHLORIDE 5 MG/ML
10 INJECTION INTRAVENOUS ONCE AS NEEDED
Status: COMPLETED | OUTPATIENT
Start: 2022-01-22 | End: 2022-01-22

## 2022-01-22 RX ORDER — MAGNESIUM SULFATE HEPTAHYDRATE 40 MG/ML
4 INJECTION, SOLUTION INTRAVENOUS AS NEEDED
Status: DISCONTINUED | OUTPATIENT
Start: 2022-01-22 | End: 2022-01-25 | Stop reason: HOSPADM

## 2022-01-22 RX ORDER — POTASSIUM CHLORIDE 1.5 G/1.77G
40 POWDER, FOR SOLUTION ORAL AS NEEDED
Status: DISCONTINUED | OUTPATIENT
Start: 2022-01-22 | End: 2022-01-25 | Stop reason: HOSPADM

## 2022-01-22 RX ORDER — METOPROLOL TARTRATE 50 MG/1
50 TABLET, FILM COATED ORAL EVERY 12 HOURS SCHEDULED
Status: DISCONTINUED | OUTPATIENT
Start: 2022-01-22 | End: 2022-01-23

## 2022-01-22 RX ORDER — POTASSIUM CHLORIDE 7.45 MG/ML
10 INJECTION INTRAVENOUS
Status: DISCONTINUED | OUTPATIENT
Start: 2022-01-22 | End: 2022-01-25 | Stop reason: HOSPADM

## 2022-01-22 RX ORDER — POTASSIUM CHLORIDE 750 MG/1
40 CAPSULE, EXTENDED RELEASE ORAL AS NEEDED
Status: DISCONTINUED | OUTPATIENT
Start: 2022-01-22 | End: 2022-01-25 | Stop reason: HOSPADM

## 2022-01-22 RX ADMIN — INSULIN ASPART 3 UNITS: 100 INJECTION, SOLUTION INTRAVENOUS; SUBCUTANEOUS at 17:18

## 2022-01-22 RX ADMIN — METOPROLOL TARTRATE 25 MG: 25 TABLET, FILM COATED ORAL at 08:36

## 2022-01-22 RX ADMIN — SODIUM CHLORIDE, PRESERVATIVE FREE 10 ML: 5 INJECTION INTRAVENOUS at 11:10

## 2022-01-22 RX ADMIN — DILTIAZEM HYDROCHLORIDE 10 MG: 5 INJECTION INTRAVENOUS at 21:58

## 2022-01-22 RX ADMIN — APIXABAN 5 MG: 5 TABLET, FILM COATED ORAL at 08:36

## 2022-01-22 RX ADMIN — METOPROLOL TARTRATE 50 MG: 50 TABLET, FILM COATED ORAL at 20:55

## 2022-01-22 RX ADMIN — ACETAMINOPHEN 650 MG: 325 TABLET, FILM COATED ORAL at 07:15

## 2022-01-22 RX ADMIN — ASPIRIN 81 MG: 81 TABLET, CHEWABLE ORAL at 08:36

## 2022-01-22 RX ADMIN — ACETAMINOPHEN 650 MG: 325 TABLET, FILM COATED ORAL at 17:17

## 2022-01-22 RX ADMIN — RANOLAZINE 500 MG: 500 TABLET, FILM COATED, EXTENDED RELEASE ORAL at 08:36

## 2022-01-22 RX ADMIN — APIXABAN 5 MG: 5 TABLET, FILM COATED ORAL at 20:55

## 2022-01-22 RX ADMIN — ATORVASTATIN CALCIUM 40 MG: 40 TABLET, FILM COATED ORAL at 20:55

## 2022-01-22 RX ADMIN — INSULIN DETEMIR 10 UNITS: 100 INJECTION, SOLUTION SUBCUTANEOUS at 20:55

## 2022-01-22 RX ADMIN — AMLODIPINE BESYLATE 10 MG: 10 TABLET ORAL at 08:36

## 2022-01-22 RX ADMIN — RANOLAZINE 500 MG: 500 TABLET, FILM COATED, EXTENDED RELEASE ORAL at 20:55

## 2022-01-22 RX ADMIN — INSULIN ASPART 4 UNITS: 100 INJECTION, SOLUTION INTRAVENOUS; SUBCUTANEOUS at 07:28

## 2022-01-22 RX ADMIN — SODIUM CHLORIDE, PRESERVATIVE FREE 10 ML: 5 INJECTION INTRAVENOUS at 20:58

## 2022-01-22 RX ADMIN — GABAPENTIN 300 MG: 300 CAPSULE ORAL at 06:13

## 2022-01-22 RX ADMIN — GABAPENTIN 300 MG: 300 CAPSULE ORAL at 20:55

## 2022-01-23 ENCOUNTER — APPOINTMENT (OUTPATIENT)
Dept: CARDIOLOGY | Facility: HOSPITAL | Age: 57
End: 2022-01-23

## 2022-01-23 LAB
ALBUMIN SERPL-MCNC: 3.3 G/DL (ref 3.5–5.2)
ALBUMIN/GLOB SERPL: 1.4 G/DL
ALP SERPL-CCNC: 68 U/L (ref 39–117)
ALT SERPL W P-5'-P-CCNC: 11 U/L (ref 1–41)
ANION GAP SERPL CALCULATED.3IONS-SCNC: 7 MMOL/L (ref 5–15)
AST SERPL-CCNC: 11 U/L (ref 1–40)
BH CV ECHO MEAS - ACS: 2 CM
BH CV ECHO MEAS - AO MAX PG (FULL): 3.3 MMHG
BH CV ECHO MEAS - AO MAX PG: 6.3 MMHG
BH CV ECHO MEAS - AO MEAN PG (FULL): 2 MMHG
BH CV ECHO MEAS - AO MEAN PG: 4 MMHG
BH CV ECHO MEAS - AO ROOT AREA (BSA CORRECTED): 1.5
BH CV ECHO MEAS - AO ROOT AREA: 10.2 CM^2
BH CV ECHO MEAS - AO ROOT DIAM: 3.6 CM
BH CV ECHO MEAS - AO V2 MAX: 125 CM/SEC
BH CV ECHO MEAS - AO V2 MEAN: 89.9 CM/SEC
BH CV ECHO MEAS - AO V2 VTI: 21.8 CM
BH CV ECHO MEAS - ASC AORTA: 3.2 CM
BH CV ECHO MEAS - AVA(I,A): 3.3 CM^2
BH CV ECHO MEAS - AVA(I,D): 3.3 CM^2
BH CV ECHO MEAS - AVA(V,A): 2.9 CM^2
BH CV ECHO MEAS - AVA(V,D): 2.9 CM^2
BH CV ECHO MEAS - BSA(HAYCOCK): 2.5 M^2
BH CV ECHO MEAS - BSA: 2.4 M^2
BH CV ECHO MEAS - BZI_BMI: 33.6 KILOGRAMS/M^2
BH CV ECHO MEAS - BZI_METRIC_HEIGHT: 188 CM
BH CV ECHO MEAS - BZI_METRIC_WEIGHT: 118.8 KG
BH CV ECHO MEAS - EDV(CUBED): 62.6 ML
BH CV ECHO MEAS - EDV(MOD-SP2): 61 ML
BH CV ECHO MEAS - EDV(MOD-SP4): 78.8 ML
BH CV ECHO MEAS - EDV(TEICH): 68.8 ML
BH CV ECHO MEAS - EF(CUBED): 57.7 %
BH CV ECHO MEAS - EF(MOD-SP2): 62.6 %
BH CV ECHO MEAS - EF(MOD-SP4): 54.1 %
BH CV ECHO MEAS - EF(TEICH): 49.9 %
BH CV ECHO MEAS - ESV(CUBED): 26.5 ML
BH CV ECHO MEAS - ESV(MOD-SP2): 22.8 ML
BH CV ECHO MEAS - ESV(MOD-SP4): 36.2 ML
BH CV ECHO MEAS - ESV(TEICH): 34.4 ML
BH CV ECHO MEAS - FS: 24.9 %
BH CV ECHO MEAS - IVS/LVPW: 0.92
BH CV ECHO MEAS - IVSD: 1.5 CM
BH CV ECHO MEAS - LA DIMENSION: 5.1 CM
BH CV ECHO MEAS - LA/AO: 1.4
BH CV ECHO MEAS - LV DIASTOLIC VOL/BSA (35-75): 32.3 ML/M^2
BH CV ECHO MEAS - LV MASS(C)D: 236.4 GRAMS
BH CV ECHO MEAS - LV MASS(C)DI: 97 GRAMS/M^2
BH CV ECHO MEAS - LV MAX PG: 3 MMHG
BH CV ECHO MEAS - LV MEAN PG: 2 MMHG
BH CV ECHO MEAS - LV SYSTOLIC VOL/BSA (12-30): 14.9 ML/M^2
BH CV ECHO MEAS - LV V1 MAX: 86.2 CM/SEC
BH CV ECHO MEAS - LV V1 MEAN: 66.6 CM/SEC
BH CV ECHO MEAS - LV V1 VTI: 17.5 CM
BH CV ECHO MEAS - LVIDD: 4 CM
BH CV ECHO MEAS - LVIDS: 3 CM
BH CV ECHO MEAS - LVLD AP2: 8.4 CM
BH CV ECHO MEAS - LVLD AP4: 8.8 CM
BH CV ECHO MEAS - LVLS AP2: 7.4 CM
BH CV ECHO MEAS - LVLS AP4: 8 CM
BH CV ECHO MEAS - LVOT AREA (M): 4.2 CM^2
BH CV ECHO MEAS - LVOT AREA: 4.2 CM^2
BH CV ECHO MEAS - LVOT DIAM: 2.3 CM
BH CV ECHO MEAS - LVPWD: 1.6 CM
BH CV ECHO MEAS - MV MAX PG: 7.4 MMHG
BH CV ECHO MEAS - MV MEAN PG: 3 MMHG
BH CV ECHO MEAS - MV V2 MAX: 136 CM/SEC
BH CV ECHO MEAS - MV V2 MEAN: 83.6 CM/SEC
BH CV ECHO MEAS - MV V2 VTI: 19.6 CM
BH CV ECHO MEAS - MVA(VTI): 3.7 CM^2
BH CV ECHO MEAS - PA MAX PG: 4.6 MMHG
BH CV ECHO MEAS - PA V2 MAX: 107 CM/SEC
BH CV ECHO MEAS - RAP SYSTOLE: 10 MMHG
BH CV ECHO MEAS - RVDD: 3.4 CM
BH CV ECHO MEAS - RVSP: 27.6 MMHG
BH CV ECHO MEAS - SI(AO): 91 ML/M^2
BH CV ECHO MEAS - SI(CUBED): 14.8 ML/M^2
BH CV ECHO MEAS - SI(LVOT): 29.8 ML/M^2
BH CV ECHO MEAS - SI(MOD-SP2): 15.7 ML/M^2
BH CV ECHO MEAS - SI(MOD-SP4): 17.5 ML/M^2
BH CV ECHO MEAS - SI(TEICH): 14.1 ML/M^2
BH CV ECHO MEAS - SV(AO): 221.9 ML
BH CV ECHO MEAS - SV(CUBED): 36.1 ML
BH CV ECHO MEAS - SV(LVOT): 72.7 ML
BH CV ECHO MEAS - SV(MOD-SP2): 38.2 ML
BH CV ECHO MEAS - SV(MOD-SP4): 42.6 ML
BH CV ECHO MEAS - SV(TEICH): 34.3 ML
BH CV ECHO MEAS - TR MAX VEL: 210 CM/SEC
BILIRUB SERPL-MCNC: 1 MG/DL (ref 0–1.2)
BUN SERPL-MCNC: 18 MG/DL (ref 6–20)
BUN/CREAT SERPL: 20.7 (ref 7–25)
CALCIUM SPEC-SCNC: 8.3 MG/DL (ref 8.6–10.5)
CHLORIDE SERPL-SCNC: 104 MMOL/L (ref 98–107)
CO2 SERPL-SCNC: 28 MMOL/L (ref 22–29)
CREAT SERPL-MCNC: 0.87 MG/DL (ref 0.76–1.27)
D-DIMER, QUANTITATIVE (MAD,POW, STR): 689 NG/ML (FEU) (ref 0–470)
DEPRECATED RDW RBC AUTO: 38.3 FL (ref 37–54)
ERYTHROCYTE [DISTWIDTH] IN BLOOD BY AUTOMATED COUNT: 12.9 % (ref 12.3–15.4)
GFR SERPL CREATININE-BSD FRML MDRD: 90 ML/MIN/1.73
GLOBULIN UR ELPH-MCNC: 2.3 GM/DL
GLUCOSE BLDC GLUCOMTR-MCNC: 197 MG/DL (ref 70–130)
GLUCOSE BLDC GLUCOMTR-MCNC: 239 MG/DL (ref 70–130)
GLUCOSE BLDC GLUCOMTR-MCNC: 253 MG/DL (ref 70–130)
GLUCOSE BLDC GLUCOMTR-MCNC: 265 MG/DL (ref 70–130)
GLUCOSE SERPL-MCNC: 272 MG/DL (ref 65–99)
HCT VFR BLD AUTO: 49 % (ref 37.5–51)
HGB BLD-MCNC: 17.2 G/DL (ref 13–17.7)
HOLD SPECIMEN: NORMAL
LV EF 2D ECHO EST: 58 %
MAGNESIUM SERPL-MCNC: 1.8 MG/DL (ref 1.6–2.6)
MCH RBC QN AUTO: 28.8 PG (ref 26.6–33)
MCHC RBC AUTO-ENTMCNC: 35.1 G/DL (ref 31.5–35.7)
MCV RBC AUTO: 82.1 FL (ref 79–97)
PHOSPHATE SERPL-MCNC: 2.7 MG/DL (ref 2.5–4.5)
PLATELET # BLD AUTO: 198 10*3/MM3 (ref 140–450)
PMV BLD AUTO: 10.6 FL (ref 6–12)
POTASSIUM SERPL-SCNC: 4.1 MMOL/L (ref 3.5–5.2)
PROT SERPL-MCNC: 5.6 G/DL (ref 6–8.5)
RBC # BLD AUTO: 5.97 10*6/MM3 (ref 4.14–5.8)
SODIUM SERPL-SCNC: 139 MMOL/L (ref 136–145)
TROPONIN T SERPL-MCNC: <0.01 NG/ML (ref 0–0.03)
WBC NRBC COR # BLD: 5.64 10*3/MM3 (ref 3.4–10.8)

## 2022-01-23 PROCEDURE — 80053 COMPREHEN METABOLIC PANEL: CPT | Performed by: STUDENT IN AN ORGANIZED HEALTH CARE EDUCATION/TRAINING PROGRAM

## 2022-01-23 PROCEDURE — 84484 ASSAY OF TROPONIN QUANT: CPT | Performed by: STUDENT IN AN ORGANIZED HEALTH CARE EDUCATION/TRAINING PROGRAM

## 2022-01-23 PROCEDURE — 93306 TTE W/DOPPLER COMPLETE: CPT

## 2022-01-23 PROCEDURE — 0 MAGNESIUM SULFATE 4 GM/100ML SOLUTION: Performed by: STUDENT IN AN ORGANIZED HEALTH CARE EDUCATION/TRAINING PROGRAM

## 2022-01-23 PROCEDURE — 94660 CPAP INITIATION&MGMT: CPT

## 2022-01-23 PROCEDURE — 84100 ASSAY OF PHOSPHORUS: CPT | Performed by: STUDENT IN AN ORGANIZED HEALTH CARE EDUCATION/TRAINING PROGRAM

## 2022-01-23 PROCEDURE — 63710000001 INSULIN DETEMIR PER 5 UNITS: Performed by: STUDENT IN AN ORGANIZED HEALTH CARE EDUCATION/TRAINING PROGRAM

## 2022-01-23 PROCEDURE — 82962 GLUCOSE BLOOD TEST: CPT

## 2022-01-23 PROCEDURE — 85027 COMPLETE CBC AUTOMATED: CPT | Performed by: STUDENT IN AN ORGANIZED HEALTH CARE EDUCATION/TRAINING PROGRAM

## 2022-01-23 PROCEDURE — 93306 TTE W/DOPPLER COMPLETE: CPT | Performed by: INTERNAL MEDICINE

## 2022-01-23 PROCEDURE — 93005 ELECTROCARDIOGRAM TRACING: CPT | Performed by: STUDENT IN AN ORGANIZED HEALTH CARE EDUCATION/TRAINING PROGRAM

## 2022-01-23 PROCEDURE — 36415 COLL VENOUS BLD VENIPUNCTURE: CPT | Performed by: STUDENT IN AN ORGANIZED HEALTH CARE EDUCATION/TRAINING PROGRAM

## 2022-01-23 PROCEDURE — 83735 ASSAY OF MAGNESIUM: CPT | Performed by: STUDENT IN AN ORGANIZED HEALTH CARE EDUCATION/TRAINING PROGRAM

## 2022-01-23 PROCEDURE — 94799 UNLISTED PULMONARY SVC/PX: CPT

## 2022-01-23 PROCEDURE — 93010 ELECTROCARDIOGRAM REPORT: CPT | Performed by: INTERNAL MEDICINE

## 2022-01-23 PROCEDURE — 63710000001 INSULIN ASPART PER 5 UNITS: Performed by: STUDENT IN AN ORGANIZED HEALTH CARE EDUCATION/TRAINING PROGRAM

## 2022-01-23 PROCEDURE — 85379 FIBRIN DEGRADATION QUANT: CPT | Performed by: STUDENT IN AN ORGANIZED HEALTH CARE EDUCATION/TRAINING PROGRAM

## 2022-01-23 RX ORDER — DILTIAZEM HYDROCHLORIDE 5 MG/ML
10 INJECTION INTRAVENOUS ONCE AS NEEDED
Status: COMPLETED | OUTPATIENT
Start: 2022-01-23 | End: 2022-01-23

## 2022-01-23 RX ORDER — METOPROLOL TARTRATE 50 MG/1
50 TABLET, FILM COATED ORAL EVERY 8 HOURS
Status: DISCONTINUED | OUTPATIENT
Start: 2022-01-23 | End: 2022-01-25 | Stop reason: HOSPADM

## 2022-01-23 RX ORDER — MAGNESIUM SULFATE HEPTAHYDRATE 40 MG/ML
2 INJECTION, SOLUTION INTRAVENOUS ONCE
Status: DISCONTINUED | OUTPATIENT
Start: 2022-01-23 | End: 2022-01-25 | Stop reason: HOSPADM

## 2022-01-23 RX ADMIN — APIXABAN 5 MG: 5 TABLET, FILM COATED ORAL at 08:56

## 2022-01-23 RX ADMIN — INSULIN ASPART 4 UNITS: 100 INJECTION, SOLUTION INTRAVENOUS; SUBCUTANEOUS at 17:59

## 2022-01-23 RX ADMIN — INSULIN ASPART 4 UNITS: 100 INJECTION, SOLUTION INTRAVENOUS; SUBCUTANEOUS at 11:50

## 2022-01-23 RX ADMIN — ASPIRIN 81 MG: 81 TABLET, CHEWABLE ORAL at 08:56

## 2022-01-23 RX ADMIN — INSULIN DETEMIR 10 UNITS: 100 INJECTION, SOLUTION SUBCUTANEOUS at 20:49

## 2022-01-23 RX ADMIN — SODIUM CHLORIDE, PRESERVATIVE FREE 10 ML: 5 INJECTION INTRAVENOUS at 20:50

## 2022-01-23 RX ADMIN — SPIRONOLACTONE 25 MG: 25 TABLET ORAL at 08:56

## 2022-01-23 RX ADMIN — GABAPENTIN 300 MG: 300 CAPSULE ORAL at 05:58

## 2022-01-23 RX ADMIN — INSULIN ASPART 4 UNITS: 100 INJECTION, SOLUTION INTRAVENOUS; SUBCUTANEOUS at 08:54

## 2022-01-23 RX ADMIN — MAGNESIUM SULFATE 4 G: 4 INJECTION INTRAVENOUS at 13:30

## 2022-01-23 RX ADMIN — INSULIN ASPART 5 UNITS: 100 INJECTION, SOLUTION INTRAVENOUS; SUBCUTANEOUS at 17:59

## 2022-01-23 RX ADMIN — RANOLAZINE 500 MG: 500 TABLET, FILM COATED, EXTENDED RELEASE ORAL at 08:56

## 2022-01-23 RX ADMIN — METOPROLOL TARTRATE 50 MG: 50 TABLET, FILM COATED ORAL at 08:56

## 2022-01-23 RX ADMIN — RANOLAZINE 500 MG: 500 TABLET, FILM COATED, EXTENDED RELEASE ORAL at 20:50

## 2022-01-23 RX ADMIN — METOPROLOL TARTRATE 50 MG: 50 TABLET, FILM COATED ORAL at 17:59

## 2022-01-23 RX ADMIN — APIXABAN 5 MG: 5 TABLET, FILM COATED ORAL at 20:50

## 2022-01-23 RX ADMIN — ATORVASTATIN CALCIUM 40 MG: 40 TABLET, FILM COATED ORAL at 20:50

## 2022-01-23 RX ADMIN — DILTIAZEM HYDROCHLORIDE 10 MG: 5 INJECTION INTRAVENOUS at 06:41

## 2022-01-23 RX ADMIN — GABAPENTIN 300 MG: 300 CAPSULE ORAL at 13:33

## 2022-01-23 RX ADMIN — GABAPENTIN 300 MG: 300 CAPSULE ORAL at 22:50

## 2022-01-24 ENCOUNTER — APPOINTMENT (OUTPATIENT)
Dept: CT IMAGING | Facility: HOSPITAL | Age: 57
End: 2022-01-24

## 2022-01-24 LAB
ALBUMIN SERPL-MCNC: 3.5 G/DL (ref 3.5–5.2)
ALBUMIN/GLOB SERPL: 1.4 G/DL
ALP SERPL-CCNC: 72 U/L (ref 39–117)
ALT SERPL W P-5'-P-CCNC: 10 U/L (ref 1–41)
ANION GAP SERPL CALCULATED.3IONS-SCNC: 5 MMOL/L (ref 5–15)
AST SERPL-CCNC: 11 U/L (ref 1–40)
BILIRUB SERPL-MCNC: 1.1 MG/DL (ref 0–1.2)
BUN SERPL-MCNC: 20 MG/DL (ref 6–20)
BUN/CREAT SERPL: 18.2 (ref 7–25)
CALCIUM SPEC-SCNC: 8.8 MG/DL (ref 8.6–10.5)
CHLORIDE SERPL-SCNC: 101 MMOL/L (ref 98–107)
CO2 SERPL-SCNC: 31 MMOL/L (ref 22–29)
CREAT SERPL-MCNC: 1.1 MG/DL (ref 0.76–1.27)
DEPRECATED RDW RBC AUTO: 37.9 FL (ref 37–54)
ERYTHROCYTE [DISTWIDTH] IN BLOOD BY AUTOMATED COUNT: 12.8 % (ref 12.3–15.4)
GFR SERPL CREATININE-BSD FRML MDRD: 69 ML/MIN/1.73
GLOBULIN UR ELPH-MCNC: 2.5 GM/DL
GLUCOSE BLDC GLUCOMTR-MCNC: 123 MG/DL (ref 70–130)
GLUCOSE BLDC GLUCOMTR-MCNC: 160 MG/DL (ref 70–130)
GLUCOSE BLDC GLUCOMTR-MCNC: 162 MG/DL (ref 70–130)
GLUCOSE BLDC GLUCOMTR-MCNC: 166 MG/DL (ref 70–130)
GLUCOSE SERPL-MCNC: 145 MG/DL (ref 65–99)
HCT VFR BLD AUTO: 50 % (ref 37.5–51)
HGB BLD-MCNC: 17.4 G/DL (ref 13–17.7)
MAGNESIUM SERPL-MCNC: 2.1 MG/DL (ref 1.6–2.6)
MCH RBC QN AUTO: 28.7 PG (ref 26.6–33)
MCHC RBC AUTO-ENTMCNC: 34.8 G/DL (ref 31.5–35.7)
MCV RBC AUTO: 82.4 FL (ref 79–97)
PHOSPHATE SERPL-MCNC: 3.1 MG/DL (ref 2.5–4.5)
PLATELET # BLD AUTO: 198 10*3/MM3 (ref 140–450)
PMV BLD AUTO: 10.6 FL (ref 6–12)
POTASSIUM SERPL-SCNC: 4.2 MMOL/L (ref 3.5–5.2)
PROT SERPL-MCNC: 6 G/DL (ref 6–8.5)
RBC # BLD AUTO: 6.07 10*6/MM3 (ref 4.14–5.8)
SODIUM SERPL-SCNC: 137 MMOL/L (ref 136–145)
WBC NRBC COR # BLD: 6.79 10*3/MM3 (ref 3.4–10.8)

## 2022-01-24 PROCEDURE — 0 IOPAMIDOL PER 1 ML: Performed by: INTERNAL MEDICINE

## 2022-01-24 PROCEDURE — 63710000001 INSULIN DETEMIR PER 5 UNITS: Performed by: STUDENT IN AN ORGANIZED HEALTH CARE EDUCATION/TRAINING PROGRAM

## 2022-01-24 PROCEDURE — 84100 ASSAY OF PHOSPHORUS: CPT | Performed by: STUDENT IN AN ORGANIZED HEALTH CARE EDUCATION/TRAINING PROGRAM

## 2022-01-24 PROCEDURE — 71275 CT ANGIOGRAPHY CHEST: CPT

## 2022-01-24 PROCEDURE — 83735 ASSAY OF MAGNESIUM: CPT | Performed by: STUDENT IN AN ORGANIZED HEALTH CARE EDUCATION/TRAINING PROGRAM

## 2022-01-24 PROCEDURE — 82962 GLUCOSE BLOOD TEST: CPT

## 2022-01-24 PROCEDURE — 63710000001 INSULIN ASPART PER 5 UNITS: Performed by: STUDENT IN AN ORGANIZED HEALTH CARE EDUCATION/TRAINING PROGRAM

## 2022-01-24 PROCEDURE — 80053 COMPREHEN METABOLIC PANEL: CPT | Performed by: STUDENT IN AN ORGANIZED HEALTH CARE EDUCATION/TRAINING PROGRAM

## 2022-01-24 PROCEDURE — 85027 COMPLETE CBC AUTOMATED: CPT | Performed by: STUDENT IN AN ORGANIZED HEALTH CARE EDUCATION/TRAINING PROGRAM

## 2022-01-24 RX ORDER — DILTIAZEM HYDROCHLORIDE 5 MG/ML
10 INJECTION INTRAVENOUS ONCE AS NEEDED
Status: COMPLETED | OUTPATIENT
Start: 2022-01-24 | End: 2022-01-24

## 2022-01-24 RX ORDER — DILTIAZEM HYDROCHLORIDE 60 MG/1
60 TABLET, FILM COATED ORAL EVERY 8 HOURS SCHEDULED
Status: DISCONTINUED | OUTPATIENT
Start: 2022-01-24 | End: 2022-01-25 | Stop reason: HOSPADM

## 2022-01-24 RX ADMIN — ASPIRIN 81 MG: 81 TABLET, CHEWABLE ORAL at 09:51

## 2022-01-24 RX ADMIN — DILTIAZEM HYDROCHLORIDE 60 MG: 60 TABLET, FILM COATED ORAL at 22:28

## 2022-01-24 RX ADMIN — RANOLAZINE 500 MG: 500 TABLET, FILM COATED, EXTENDED RELEASE ORAL at 09:51

## 2022-01-24 RX ADMIN — SPIRONOLACTONE 25 MG: 25 TABLET ORAL at 09:51

## 2022-01-24 RX ADMIN — APIXABAN 5 MG: 5 TABLET, FILM COATED ORAL at 20:37

## 2022-01-24 RX ADMIN — Medication 5.25 MG: at 22:31

## 2022-01-24 RX ADMIN — GABAPENTIN 300 MG: 300 CAPSULE ORAL at 05:14

## 2022-01-24 RX ADMIN — RANOLAZINE 500 MG: 500 TABLET, FILM COATED, EXTENDED RELEASE ORAL at 20:37

## 2022-01-24 RX ADMIN — INSULIN ASPART 5 UNITS: 100 INJECTION, SOLUTION INTRAVENOUS; SUBCUTANEOUS at 17:31

## 2022-01-24 RX ADMIN — METOPROLOL TARTRATE 50 MG: 50 TABLET, FILM COATED ORAL at 09:51

## 2022-01-24 RX ADMIN — DILTIAZEM HYDROCHLORIDE 60 MG: 60 TABLET, FILM COATED ORAL at 13:11

## 2022-01-24 RX ADMIN — SODIUM CHLORIDE, PRESERVATIVE FREE 10 ML: 5 INJECTION INTRAVENOUS at 20:38

## 2022-01-24 RX ADMIN — APIXABAN 5 MG: 5 TABLET, FILM COATED ORAL at 09:51

## 2022-01-24 RX ADMIN — METOPROLOL TARTRATE 50 MG: 50 TABLET, FILM COATED ORAL at 17:31

## 2022-01-24 RX ADMIN — INSULIN ASPART 2 UNITS: 100 INJECTION, SOLUTION INTRAVENOUS; SUBCUTANEOUS at 16:34

## 2022-01-24 RX ADMIN — ATORVASTATIN CALCIUM 40 MG: 40 TABLET, FILM COATED ORAL at 20:37

## 2022-01-24 RX ADMIN — INSULIN ASPART 5 UNITS: 100 INJECTION, SOLUTION INTRAVENOUS; SUBCUTANEOUS at 07:49

## 2022-01-24 RX ADMIN — INSULIN ASPART 2 UNITS: 100 INJECTION, SOLUTION INTRAVENOUS; SUBCUTANEOUS at 11:36

## 2022-01-24 RX ADMIN — SODIUM CHLORIDE, PRESERVATIVE FREE 10 ML: 5 INJECTION INTRAVENOUS at 09:52

## 2022-01-24 RX ADMIN — INSULIN DETEMIR 10 UNITS: 100 INJECTION, SOLUTION SUBCUTANEOUS at 20:43

## 2022-01-24 RX ADMIN — INSULIN ASPART 5 UNITS: 100 INJECTION, SOLUTION INTRAVENOUS; SUBCUTANEOUS at 11:36

## 2022-01-24 RX ADMIN — GABAPENTIN 300 MG: 300 CAPSULE ORAL at 13:11

## 2022-01-24 RX ADMIN — GABAPENTIN 300 MG: 300 CAPSULE ORAL at 22:28

## 2022-01-24 RX ADMIN — METOPROLOL TARTRATE 50 MG: 50 TABLET, FILM COATED ORAL at 01:41

## 2022-01-24 RX ADMIN — DILTIAZEM HYDROCHLORIDE 10 MG: 5 INJECTION INTRAVENOUS at 02:32

## 2022-01-24 RX ADMIN — IOPAMIDOL 90 ML: 755 INJECTION, SOLUTION INTRAVENOUS at 14:13

## 2022-01-25 ENCOUNTER — READMISSION MANAGEMENT (OUTPATIENT)
Dept: CALL CENTER | Facility: HOSPITAL | Age: 57
End: 2022-01-25

## 2022-01-25 ENCOUNTER — TELEPHONE (OUTPATIENT)
Dept: FAMILY MEDICINE CLINIC | Facility: CLINIC | Age: 57
End: 2022-01-25

## 2022-01-25 VITALS
TEMPERATURE: 97 F | OXYGEN SATURATION: 96 % | HEIGHT: 74 IN | DIASTOLIC BLOOD PRESSURE: 59 MMHG | HEART RATE: 71 BPM | BODY MASS INDEX: 32.6 KG/M2 | SYSTOLIC BLOOD PRESSURE: 96 MMHG | RESPIRATION RATE: 18 BRPM | WEIGHT: 254 LBS

## 2022-01-25 LAB
ALBUMIN SERPL-MCNC: 3.2 G/DL (ref 3.5–5.2)
ALBUMIN/GLOB SERPL: 1.5 G/DL
ALP SERPL-CCNC: 61 U/L (ref 39–117)
ALT SERPL W P-5'-P-CCNC: 11 U/L (ref 1–41)
ANION GAP SERPL CALCULATED.3IONS-SCNC: 7 MMOL/L (ref 5–15)
AST SERPL-CCNC: 12 U/L (ref 1–40)
BILIRUB SERPL-MCNC: 0.9 MG/DL (ref 0–1.2)
BUN SERPL-MCNC: 20 MG/DL (ref 6–20)
BUN/CREAT SERPL: 21.5 (ref 7–25)
CALCIUM SPEC-SCNC: 8.6 MG/DL (ref 8.6–10.5)
CHLORIDE SERPL-SCNC: 104 MMOL/L (ref 98–107)
CO2 SERPL-SCNC: 25 MMOL/L (ref 22–29)
CREAT SERPL-MCNC: 0.93 MG/DL (ref 0.76–1.27)
DEPRECATED RDW RBC AUTO: 37.6 FL (ref 37–54)
ERYTHROCYTE [DISTWIDTH] IN BLOOD BY AUTOMATED COUNT: 12.9 % (ref 12.3–15.4)
GFR SERPL CREATININE-BSD FRML MDRD: 84 ML/MIN/1.73
GLOBULIN UR ELPH-MCNC: 2.2 GM/DL
GLUCOSE BLDC GLUCOMTR-MCNC: 129 MG/DL (ref 70–130)
GLUCOSE BLDC GLUCOMTR-MCNC: 291 MG/DL (ref 70–130)
GLUCOSE SERPL-MCNC: 144 MG/DL (ref 65–99)
HCT VFR BLD AUTO: 46.9 % (ref 37.5–51)
HGB BLD-MCNC: 16.5 G/DL (ref 13–17.7)
MAGNESIUM SERPL-MCNC: 1.9 MG/DL (ref 1.6–2.6)
MCH RBC QN AUTO: 28.7 PG (ref 26.6–33)
MCHC RBC AUTO-ENTMCNC: 35.2 G/DL (ref 31.5–35.7)
MCV RBC AUTO: 81.6 FL (ref 79–97)
PHOSPHATE SERPL-MCNC: 3.9 MG/DL (ref 2.5–4.5)
PLATELET # BLD AUTO: 212 10*3/MM3 (ref 140–450)
PMV BLD AUTO: 10.7 FL (ref 6–12)
POTASSIUM SERPL-SCNC: 4 MMOL/L (ref 3.5–5.2)
PROT SERPL-MCNC: 5.4 G/DL (ref 6–8.5)
RBC # BLD AUTO: 5.75 10*6/MM3 (ref 4.14–5.8)
SODIUM SERPL-SCNC: 136 MMOL/L (ref 136–145)
WBC NRBC COR # BLD: 7.08 10*3/MM3 (ref 3.4–10.8)

## 2022-01-25 PROCEDURE — 94799 UNLISTED PULMONARY SVC/PX: CPT

## 2022-01-25 PROCEDURE — 84100 ASSAY OF PHOSPHORUS: CPT | Performed by: STUDENT IN AN ORGANIZED HEALTH CARE EDUCATION/TRAINING PROGRAM

## 2022-01-25 PROCEDURE — 82962 GLUCOSE BLOOD TEST: CPT

## 2022-01-25 PROCEDURE — 63710000001 INSULIN ASPART PER 5 UNITS: Performed by: STUDENT IN AN ORGANIZED HEALTH CARE EDUCATION/TRAINING PROGRAM

## 2022-01-25 PROCEDURE — 80053 COMPREHEN METABOLIC PANEL: CPT | Performed by: STUDENT IN AN ORGANIZED HEALTH CARE EDUCATION/TRAINING PROGRAM

## 2022-01-25 PROCEDURE — 83735 ASSAY OF MAGNESIUM: CPT | Performed by: STUDENT IN AN ORGANIZED HEALTH CARE EDUCATION/TRAINING PROGRAM

## 2022-01-25 PROCEDURE — 85027 COMPLETE CBC AUTOMATED: CPT | Performed by: STUDENT IN AN ORGANIZED HEALTH CARE EDUCATION/TRAINING PROGRAM

## 2022-01-25 RX ORDER — ATORVASTATIN CALCIUM 40 MG/1
40 TABLET, FILM COATED ORAL NIGHTLY
Qty: 90 TABLET | Refills: 0 | Status: SHIPPED | OUTPATIENT
Start: 2022-01-25 | End: 2022-09-13 | Stop reason: SDUPTHER

## 2022-01-25 RX ORDER — METOPROLOL SUCCINATE 100 MG/1
100 TABLET, EXTENDED RELEASE ORAL DAILY
Qty: 90 TABLET | Refills: 0 | Status: ON HOLD | OUTPATIENT
Start: 2022-01-25 | End: 2022-02-15 | Stop reason: ALTCHOICE

## 2022-01-25 RX ORDER — DILTIAZEM HYDROCHLORIDE 180 MG/1
180 CAPSULE, COATED, EXTENDED RELEASE ORAL DAILY
Qty: 90 CAPSULE | Refills: 0 | Status: SHIPPED | OUTPATIENT
Start: 2022-01-25 | End: 2022-04-22

## 2022-01-25 RX ADMIN — SODIUM CHLORIDE, PRESERVATIVE FREE 10 ML: 5 INJECTION INTRAVENOUS at 08:19

## 2022-01-25 RX ADMIN — ASPIRIN 81 MG: 81 TABLET, CHEWABLE ORAL at 08:16

## 2022-01-25 RX ADMIN — METOPROLOL TARTRATE 50 MG: 50 TABLET, FILM COATED ORAL at 01:50

## 2022-01-25 RX ADMIN — INSULIN ASPART 4 UNITS: 100 INJECTION, SOLUTION INTRAVENOUS; SUBCUTANEOUS at 11:44

## 2022-01-25 RX ADMIN — SPIRONOLACTONE 25 MG: 25 TABLET ORAL at 08:16

## 2022-01-25 RX ADMIN — METOPROLOL TARTRATE 50 MG: 50 TABLET, FILM COATED ORAL at 08:16

## 2022-01-25 RX ADMIN — INSULIN ASPART 5 UNITS: 100 INJECTION, SOLUTION INTRAVENOUS; SUBCUTANEOUS at 08:19

## 2022-01-25 RX ADMIN — RANOLAZINE 500 MG: 500 TABLET, FILM COATED, EXTENDED RELEASE ORAL at 08:16

## 2022-01-25 RX ADMIN — INSULIN ASPART 5 UNITS: 100 INJECTION, SOLUTION INTRAVENOUS; SUBCUTANEOUS at 11:45

## 2022-01-25 RX ADMIN — APIXABAN 5 MG: 5 TABLET, FILM COATED ORAL at 08:15

## 2022-01-25 RX ADMIN — DILTIAZEM HYDROCHLORIDE 60 MG: 60 TABLET, FILM COATED ORAL at 06:16

## 2022-01-25 RX ADMIN — GABAPENTIN 300 MG: 300 CAPSULE ORAL at 13:10

## 2022-01-25 RX ADMIN — GABAPENTIN 300 MG: 300 CAPSULE ORAL at 06:16

## 2022-01-25 RX ADMIN — DILTIAZEM HYDROCHLORIDE 60 MG: 60 TABLET, FILM COATED ORAL at 13:10

## 2022-01-26 ENCOUNTER — TRANSITIONAL CARE MANAGEMENT TELEPHONE ENCOUNTER (OUTPATIENT)
Dept: CALL CENTER | Facility: HOSPITAL | Age: 57
End: 2022-01-26

## 2022-01-26 NOTE — OUTREACH NOTE
Prep Survey      Responses   [facility] patient discharged from? [place]   Is LACE score < 7 ? No   Emergency Room discharge w/ pulse ox? No   Eligibility [facility]   Date of Admission 01/21/22   Date of Discharge 01/25/22   Discharge Disposition Home or Self Care   Discharge diagnosis Covid   Does the patient have one of the following disease processes/diagnoses(primary or secondary)? COVID-19   Does the patient have Home health ordered? No   Is there a DME ordered? No   Prep survey completed? Yes          Luh Silva RN

## 2022-01-26 NOTE — OUTREACH NOTE
Call Center TCM Note      Responses   [facility] patient discharged from? [place]   Does the patient have one of the following disease processes/diagnoses(primary or secondary)? COVID-19   COVID-19 underlying condition? None   TCM attempt successful? Yes   Call start time 1421   Call end time 1423   Discharge diagnosis Covid   Meds reviewed with patient/caregiver? Yes   Is the patient having any side effects they believe may be caused by any medication additions or changes? No   Does the patient have all medications ordered at discharge? Yes   Is the patient taking all medications as directed (includes completed medication regime)? Yes   Does the patient have a primary care provider?  Yes   Comments regarding PCP hospital f/u appt with Dr. Sanchez on 2/1   Does the patient have an appointment with their PCP or specialist within 7 days of discharge? Yes   Has the patient kept scheduled appointments due by today? N/A   Has home health visited the patient within 72 hours of discharge? N/A   Psychosocial issues? No   Did the patient receive a copy of their discharge instructions? Yes   Did the patient receive a copy of COVID-19 specific instructions? Yes   Nursing interventions Reviewed instructions with patient   What is the patient's perception of their health status since discharge? Improving   Does the patient have any of the following symptoms? None   Nursing Interventions Nurse provided patient education   Is the patient/caregiver able to teach back steps to recovery at home? Set small, achievable goals for return to baseline health,  Rest and rebuild strength, gradually increase activity   Is the patient/caregiver able to teach back the hierarchy of who to call/visit for symptoms/problems? PCP, Specialist, Home health nurse, Urgent Care, ED, 911 Yes   TCM call completed? Yes   Wrap up additional comments Doing well, no questions at this time, confirmed appt with PCP for 2/1.          Dhara Christensen,  RN    1/26/2022, 14:23 EST

## 2022-01-27 ENCOUNTER — READMISSION MANAGEMENT (OUTPATIENT)
Dept: CALL CENTER | Facility: HOSPITAL | Age: 57
End: 2022-01-27

## 2022-01-27 NOTE — OUTREACH NOTE
"COVID-19 Week 1 Survey      Responses   [facility] patient discharged from? [place]   Does the patient have one of the following disease processes/diagnoses(primary or secondary)? COVID-19   COVID-19 underlying condition? None   Call Number Call 2   Week 1 Call successful? Yes   Call start time 1055   Call end time 1102   Discharge diagnosis Covid   Is patient permission given to speak with other caregiver? Yes   Person spoke with today (if not patient) and relationship Nai - SO   Meds reviewed with patient/caregiver? Yes   Is the patient having any side effects they believe may be caused by any medication additions or changes? No   Does the patient have all medications ordered at discharge? Yes   Is the patient taking all medications as directed (includes completed medication regime)? Yes   Does the patient have a primary care provider?  Yes   Comments regarding PCP Was speaking with wife and patient. They both mentioned needing a new PCP and Cardiac. Offered the HUB, While givign the contact the phone call was discconected. \" She said- Just forget it.\"   Does the patient have an appointment with their PCP or specialist within 7 days of discharge? Yes   Has the patient kept scheduled appointments due by today? N/A   COVID-19 call completed? Yes   Revoked No further contact(revokes)-requires comment   Is the patient interested in additional calls from an ambulatory ?  NOTE:  applies to high risk patients requiring additional follow-up. No   Wrap up additional comments Unable to finish call - The phone was hung up on the nurse, while giving hub information for new providers.           Sweetie Davis RN  "

## 2022-01-29 LAB
QT INTERVAL: 270 MS
QTC INTERVAL: 462 MS

## 2022-01-30 LAB
QT INTERVAL: 354 MS
QTC INTERVAL: 459 MS

## 2022-02-10 ENCOUNTER — APPOINTMENT (OUTPATIENT)
Dept: GENERAL RADIOLOGY | Facility: HOSPITAL | Age: 57
End: 2022-02-10

## 2022-02-10 ENCOUNTER — HOSPITAL ENCOUNTER (EMERGENCY)
Facility: HOSPITAL | Age: 57
Discharge: HOME | End: 2022-02-10
Attending: FAMILY MEDICINE | Admitting: FAMILY MEDICINE

## 2022-02-10 VITALS
BODY MASS INDEX: 33.11 KG/M2 | TEMPERATURE: 98.2 F | DIASTOLIC BLOOD PRESSURE: 87 MMHG | OXYGEN SATURATION: 96 % | HEART RATE: 98 BPM | SYSTOLIC BLOOD PRESSURE: 135 MMHG | HEIGHT: 74 IN | RESPIRATION RATE: 16 BRPM | WEIGHT: 258 LBS

## 2022-02-10 DIAGNOSIS — R06.02 SHORTNESS OF BREATH: Primary | ICD-10-CM

## 2022-02-10 LAB
ALBUMIN SERPL-MCNC: 4 G/DL (ref 3.5–5.2)
ALBUMIN/GLOB SERPL: 1.6 G/DL
ALP SERPL-CCNC: 88 U/L (ref 39–117)
ALT SERPL W P-5'-P-CCNC: 14 U/L (ref 1–41)
ANION GAP SERPL CALCULATED.3IONS-SCNC: 8 MMOL/L (ref 5–15)
APTT PPP: 34 SECONDS (ref 20–40.3)
AST SERPL-CCNC: 10 U/L (ref 1–40)
BASOPHILS # BLD AUTO: 0.03 10*3/MM3 (ref 0–0.2)
BASOPHILS NFR BLD AUTO: 0.4 % (ref 0–1.5)
BILIRUB SERPL-MCNC: 1.2 MG/DL (ref 0–1.2)
BUN SERPL-MCNC: 16 MG/DL (ref 6–20)
BUN/CREAT SERPL: 17.2 (ref 7–25)
CALCIUM SPEC-SCNC: 8.9 MG/DL (ref 8.6–10.5)
CHLORIDE SERPL-SCNC: 101 MMOL/L (ref 98–107)
CO2 SERPL-SCNC: 27 MMOL/L (ref 22–29)
CREAT SERPL-MCNC: 0.93 MG/DL (ref 0.76–1.27)
D-DIMER, QUANTITATIVE (MAD,POW, STR): 414 NG/ML (FEU) (ref 0–470)
DEPRECATED RDW RBC AUTO: 43.2 FL (ref 37–54)
EOSINOPHIL # BLD AUTO: 0.18 10*3/MM3 (ref 0–0.4)
EOSINOPHIL NFR BLD AUTO: 2.5 % (ref 0.3–6.2)
ERYTHROCYTE [DISTWIDTH] IN BLOOD BY AUTOMATED COUNT: 14.2 % (ref 12.3–15.4)
GFR SERPL CREATININE-BSD FRML MDRD: 84 ML/MIN/1.73
GLOBULIN UR ELPH-MCNC: 2.5 GM/DL
GLUCOSE SERPL-MCNC: 306 MG/DL (ref 65–99)
HCT VFR BLD AUTO: 43.6 % (ref 37.5–51)
HGB BLD-MCNC: 15.2 G/DL (ref 13–17.7)
HOLD SPECIMEN: NORMAL
HOLD SPECIMEN: NORMAL
IMM GRANULOCYTES # BLD AUTO: 0.02 10*3/MM3 (ref 0–0.05)
IMM GRANULOCYTES NFR BLD AUTO: 0.3 % (ref 0–0.5)
INR PPP: 1.17 (ref 0.8–1.2)
LYMPHOCYTES # BLD AUTO: 1.8 10*3/MM3 (ref 0.7–3.1)
LYMPHOCYTES NFR BLD AUTO: 25.1 % (ref 19.6–45.3)
MCH RBC QN AUTO: 29.5 PG (ref 26.6–33)
MCHC RBC AUTO-ENTMCNC: 34.9 G/DL (ref 31.5–35.7)
MCV RBC AUTO: 84.7 FL (ref 79–97)
MONOCYTES # BLD AUTO: 0.54 10*3/MM3 (ref 0.1–0.9)
MONOCYTES NFR BLD AUTO: 7.5 % (ref 5–12)
NEUTROPHILS NFR BLD AUTO: 4.6 10*3/MM3 (ref 1.7–7)
NEUTROPHILS NFR BLD AUTO: 64.2 % (ref 42.7–76)
NRBC BLD AUTO-RTO: 0 /100 WBC (ref 0–0.2)
NT-PROBNP SERPL-MCNC: 2462 PG/ML (ref 0–900)
PLATELET # BLD AUTO: 190 10*3/MM3 (ref 140–450)
PMV BLD AUTO: 11.2 FL (ref 6–12)
POTASSIUM SERPL-SCNC: 3.7 MMOL/L (ref 3.5–5.2)
PROT SERPL-MCNC: 6.5 G/DL (ref 6–8.5)
PROTHROMBIN TIME: 14.8 SECONDS (ref 11.1–15.3)
RBC # BLD AUTO: 5.15 10*6/MM3 (ref 4.14–5.8)
SODIUM SERPL-SCNC: 136 MMOL/L (ref 136–145)
TROPONIN T SERPL-MCNC: <0.01 NG/ML (ref 0–0.03)
TROPONIN T SERPL-MCNC: <0.01 NG/ML (ref 0–0.03)
WBC NRBC COR # BLD: 7.17 10*3/MM3 (ref 3.4–10.8)
WHOLE BLOOD HOLD SPECIMEN: NORMAL
WHOLE BLOOD HOLD SPECIMEN: NORMAL

## 2022-02-10 PROCEDURE — 96374 THER/PROPH/DIAG INJ IV PUSH: CPT

## 2022-02-10 PROCEDURE — 93005 ELECTROCARDIOGRAM TRACING: CPT

## 2022-02-10 PROCEDURE — 85610 PROTHROMBIN TIME: CPT | Performed by: NURSE PRACTITIONER

## 2022-02-10 PROCEDURE — 84484 ASSAY OF TROPONIN QUANT: CPT | Performed by: NURSE PRACTITIONER

## 2022-02-10 PROCEDURE — 80053 COMPREHEN METABOLIC PANEL: CPT

## 2022-02-10 PROCEDURE — 36415 COLL VENOUS BLD VENIPUNCTURE: CPT

## 2022-02-10 PROCEDURE — 93010 ELECTROCARDIOGRAM REPORT: CPT | Performed by: INTERNAL MEDICINE

## 2022-02-10 PROCEDURE — 71100 X-RAY EXAM RIBS UNI 2 VIEWS: CPT

## 2022-02-10 PROCEDURE — 85379 FIBRIN DEGRADATION QUANT: CPT | Performed by: NURSE PRACTITIONER

## 2022-02-10 PROCEDURE — 83880 ASSAY OF NATRIURETIC PEPTIDE: CPT

## 2022-02-10 PROCEDURE — 25010000002 ONDANSETRON PER 1 MG: Performed by: NURSE PRACTITIONER

## 2022-02-10 PROCEDURE — 84484 ASSAY OF TROPONIN QUANT: CPT

## 2022-02-10 PROCEDURE — 99283 EMERGENCY DEPT VISIT LOW MDM: CPT

## 2022-02-10 PROCEDURE — 93005 ELECTROCARDIOGRAM TRACING: CPT | Performed by: FAMILY MEDICINE

## 2022-02-10 PROCEDURE — 85730 THROMBOPLASTIN TIME PARTIAL: CPT | Performed by: NURSE PRACTITIONER

## 2022-02-10 PROCEDURE — 85025 COMPLETE CBC W/AUTO DIFF WBC: CPT

## 2022-02-10 PROCEDURE — 71045 X-RAY EXAM CHEST 1 VIEW: CPT

## 2022-02-10 RX ORDER — SODIUM CHLORIDE 0.9 % (FLUSH) 0.9 %
10 SYRINGE (ML) INJECTION AS NEEDED
Status: DISCONTINUED | OUTPATIENT
Start: 2022-02-10 | End: 2022-02-10 | Stop reason: HOSPADM

## 2022-02-10 RX ORDER — ONDANSETRON 2 MG/ML
4 INJECTION INTRAMUSCULAR; INTRAVENOUS ONCE
Status: COMPLETED | OUTPATIENT
Start: 2022-02-10 | End: 2022-02-10

## 2022-02-10 RX ORDER — ASPIRIN 325 MG
325 TABLET ORAL ONCE
Status: COMPLETED | OUTPATIENT
Start: 2022-02-10 | End: 2022-02-10

## 2022-02-10 RX ADMIN — ONDANSETRON 4 MG: 2 INJECTION INTRAMUSCULAR; INTRAVENOUS at 18:47

## 2022-02-10 RX ADMIN — SODIUM CHLORIDE 500 ML: 9 INJECTION, SOLUTION INTRAVENOUS at 15:56

## 2022-02-10 RX ADMIN — ASPIRIN 325 MG: 325 TABLET ORAL at 16:47

## 2022-02-10 NOTE — ED NOTES
Pt arrives with complaints of chest pain, accompanied by sob X2 days.  Pt states he has hx of CABG and COPD.  Pt rates pain 5/10, EKG completed in triage      Adkins, ERROL Villagomez  02/10/22 [id number]

## 2022-02-11 ENCOUNTER — TELEPHONE (OUTPATIENT)
Dept: FAMILY MEDICINE CLINIC | Facility: CLINIC | Age: 57
End: 2022-02-11

## 2022-02-11 ENCOUNTER — PREP FOR SURGERY (OUTPATIENT)
Dept: OTHER | Facility: HOSPITAL | Age: 57
End: 2022-02-11

## 2022-02-11 ENCOUNTER — OFFICE VISIT (OUTPATIENT)
Dept: CARDIOLOGY | Facility: CLINIC | Age: 57
End: 2022-02-11

## 2022-02-11 ENCOUNTER — LAB (OUTPATIENT)
Dept: LAB | Facility: HOSPITAL | Age: 57
End: 2022-02-11

## 2022-02-11 VITALS
DIASTOLIC BLOOD PRESSURE: 80 MMHG | HEART RATE: 110 BPM | OXYGEN SATURATION: 93 % | TEMPERATURE: 97.8 F | SYSTOLIC BLOOD PRESSURE: 124 MMHG | WEIGHT: 278 LBS | HEIGHT: 74 IN | BODY MASS INDEX: 35.68 KG/M2

## 2022-02-11 DIAGNOSIS — E78.2 MIXED HYPERLIPIDEMIA: ICD-10-CM

## 2022-02-11 DIAGNOSIS — I48.0 PAROXYSMAL ATRIAL FIBRILLATION: ICD-10-CM

## 2022-02-11 DIAGNOSIS — I73.9 PERIPHERAL ARTERIAL DISEASE: ICD-10-CM

## 2022-02-11 DIAGNOSIS — I25.118 CORONARY ARTERY DISEASE OF NATIVE ARTERY OF NATIVE HEART WITH STABLE ANGINA PECTORIS: Primary | ICD-10-CM

## 2022-02-11 DIAGNOSIS — I25.118 CORONARY ARTERY DISEASE OF NATIVE ARTERY OF NATIVE HEART WITH STABLE ANGINA PECTORIS: ICD-10-CM

## 2022-02-11 DIAGNOSIS — M79.671 PAIN OF RIGHT HEEL: Primary | ICD-10-CM

## 2022-02-11 DIAGNOSIS — I10 ESSENTIAL HYPERTENSION: Primary | ICD-10-CM

## 2022-02-11 LAB
QT INTERVAL: 352 MS
QTC INTERVAL: 476 MS

## 2022-02-11 PROCEDURE — 99214 OFFICE O/P EST MOD 30 MIN: CPT | Performed by: INTERNAL MEDICINE

## 2022-02-11 PROCEDURE — S0260 H&P FOR SURGERY: HCPCS | Performed by: INTERNAL MEDICINE

## 2022-02-11 PROCEDURE — 93000 ELECTROCARDIOGRAM COMPLETE: CPT | Performed by: INTERNAL MEDICINE

## 2022-02-11 RX ORDER — CLOPIDOGREL BISULFATE 75 MG/1
75 TABLET ORAL DAILY
Qty: 90 TABLET | Refills: 3 | Status: SHIPPED | OUTPATIENT
Start: 2022-02-11

## 2022-02-11 RX ORDER — SODIUM CHLORIDE 0.9 % (FLUSH) 0.9 %
3 SYRINGE (ML) INJECTION EVERY 12 HOURS SCHEDULED
Status: CANCELLED | OUTPATIENT
Start: 2022-02-14

## 2022-02-11 RX ORDER — SODIUM CHLORIDE 0.9 % (FLUSH) 0.9 %
10 SYRINGE (ML) INJECTION AS NEEDED
Status: CANCELLED | OUTPATIENT
Start: 2022-02-14
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
## 2022-02-11 NOTE — PROGRESS NOTES
[facility]  56 y.[facility]    09/25/2020  1. Essential hypertension    2. Peripheral arterial disease (HCC)    3. Mixed hyperlipidemia    4. Coronary artery disease of native artery of native heart with stable angina pectoris (HCC)    5. Paroxysmal atrial fibrillation (HCC)        History of Present Illness:    Body mass index is 35.69 kg/m². BMI is above normal parameters. Recommendations include: exercise counseling, nutrition counseling and referral to primary care.      Patient is a current tobacco user. I have educated the patient on the risks of continued tobacco use. Tobacco cessation education was given <3 min. Patient does not desire tobacco cessation at this time.       56 years old patient recently evaluated a year for chest pain discharged as a follow-up appointment with me today.  The patient has mild chest discomfort at the time of evaluation.  Patient is on oral anticoagulation with Eliquis.  Chest discomfort going for more than a week or 2.  Very predictable pattern set at the time of evaluation 1-2 scale of 10.  EKG underlying atrial fibrillation without acute ST-T wave changes.  Unfortunately the patient restarted to smoke.  The pain is nonradiating no associated symptom of lightheaded dizziness orthopnea or PND.  EKG finding discussed the patient.  CT coronary angiogram which was previously done at that time was reviewed by Dr. Arguelles and recommended medical management.  Given the change in her clinical condition multiple risk factors and previous history of CAD and CABG and abnormal CT coronary angiogram is seen for P2 risk stratify with cardiac catheterization.  Patient was briefly evaluated with Dr. Lewis who set of cardiac catheterizations on Monday will discontinue or hold Eliquis and start the patient on Plavix.  Dwayne and Dr. Dwyer documented history of CAD s/p CABG with a LIMA to LAD vein graft to OM2 and PDA with a CT coronary angiogram patent graft nonobstructive disease in  LAD 70% disease left circumflex with a patent graft to obtuse marginal and no significant disease RCA noted.  CT coronary angiogram was reviewed with Dr. Blane Rice and Dr. Dwyer and was of the Continue medical management patient presented for routine follow-up no symptom suggestive of cardiac decompensation such as orthopnea PND palpitation lightheaded dizziness or intermittent claudication reported.  Patient of normal left ventricle systolic function and a history of premature ventricular complex, hyperlipidemia, diabetes and hypertension patient was at some time metoprolol 100 mg decreased to 25 mg due to bradycardia arrhythmia and significant fatigue          CT coronary angiogram 9/25/2020     CT FUNCTIONAL ANALYSIS:  Ejection Fraction     74 %  Diastolic Volume     136 ml  Systolic Volume      35 ml  Stroke Volume        101 ml  Cardiac Output       4.6 L/minute     IMPRESSION:  CONCLUSION:   1.  Patient's calcium score is 697.This places the patent in the  high likelihood of at least one significant coronary narrowing  risk for coronary artery disease.  2.  LAD: Proximal calcified atherosclerotic plaque causing mild  stenosis of less than 50%. Mid LAD soft and calcified  atherosclerotic plaque foci causing moderate stenosis of 50-70%.  LIMA to distal LAD bypass graft which appears patent.  3.  Circumflex: Proximal calcified atherosclerotic plaque causing  significant stenosis of 70% or greater. No other calcified or  soft tissue density plaque and/or stenosis. Patent saphenous vein  bypass graft to the obtuse marginal second branch.  4.  RCA: No calcified or soft tissue density plaque and no  stenosis. Patent saphenous vein bypass graft to the PDA.  5.  Remainder CT angiogram coronary exam unremarkable.     September 2019 cardiac  Cath demonstrated severe CAD multivessel with lesions LAD 80% DX 70% OM2 60% PDA 70%.     CABG 9/25/2019   DISTAL BYPASS TARGETS:    1. LIMA to LAD.    2. Greater saphenous vein  to OM2    3. Greater saphenous vein to PDA   · Left ventricular wall thickness is consistent with mild concentric hypertrophy.  · Estimated EF = 61%.  · Left ventricular systolic function is normal.  · Left ventricular diastolic dysfunction (grade I) consistent with impaired relaxation.  · Mild mitral valve regurgitation is present  9/3/19  Conclusion:  1.  Severe three-vessel obstructive coronary artery disease involving mid LAD, diagonal, obtuse marginal and RPDA.  2.  Normal left-sided filling pressures.  No gradient noted across aortic valve on pullback  3.  Patent left subclavian and LIMA    SUBJECTIVE:    Allergies   Allergen Reactions   • Ace Inhibitors Anaphylaxis   • Wellbutrin [Bupropion] Anaphylaxis         Past Medical History:   Diagnosis Date   • Coronary artery disease    • Diabetes mellitus (HCC)    • Diabetic retinopathy (HCC)    • GERD (gastroesophageal reflux disease)    • Hyperlipidemia    • Hypertension    • Osteoarthritis    • Paroxysmal atrial fibrillation (HCC)    • Sleep apnea    • Wears glasses          Past Surgical History:   Procedure Laterality Date   • CARDIAC CATHETERIZATION N/A 9/3/2019   • CHOLECYSTECTOMY     • COLONOSCOPY N/A 2021    Procedure: COLONOSCOPY;  Surgeon: Dewey Antoine MD;  Location: [facility] ENDOSCOPY;  Service: Gastroenterology;  Laterality: N/A;   • COLONOSCOPY N/A 2022    Procedure: COLONOSCOPY;  Surgeon: Dewey Antoine MD;  Location: [facility] ENDOSCOPY;  Service: Gastroenterology;  Laterality: N/A;   • CORONARY ARTERY BYPASS GRAFT N/A 2019   • TONSILLECTOMY           Family History   Problem Relation Age of Onset   • Diabetes Mother    • Hypertension Father          Social History     Socioeconomic History   • Marital status:    Tobacco Use   • Smoking status: Former Smoker     Packs/day: 1.00     Years: 35.00     Pack years: 35.00     Types: Cigarettes     Start date: 1980     Quit date: 9/3/2019     Years since quittin.4   •  "Smokeless tobacco: Never Used   Vaping Use   • Vaping Use: Never used   Substance and Sexual Activity   • Alcohol use: No   • Drug use: No   • Sexual activity: Defer     Comment: .         Current Outpatient Medications   Medication Sig Dispense Refill   • albuterol sulfate  (90 Base) MCG/ACT inhaler Inhale 2 puffs Every 4 (Four) Hours As Needed for Wheezing. 18 g 11   • Alcohol Swabs 70 % pads 1 each Every Morning. 100 each 3   • apixaban (ELIQUIS) 5 MG tablet tablet Take 1 tablet by mouth Every 12 (Twelve) Hours. 60 tablet 2   • aspirin 81 MG chewable tablet Chew 81 mg Daily.     • atorvastatin (LIPITOR) 40 MG tablet Take 1 tablet by mouth Every Night for 90 days. 90 tablet 0   • benzonatate (TESSALON) 200 MG capsule Take 1 capsule by mouth 3 (Three) Times a Day As Needed for Cough. 30 capsule 0   • dilTIAZem CD (Cardizem CD) 180 MG 24 hr capsule Take 1 capsule by mouth Daily for 90 days. 90 capsule 0   • ezetimibe (ZETIA) 10 MG tablet Take 1 tablet by mouth Daily. 90 tablet 3   • gabapentin (NEURONTIN) 300 MG capsule TAKE 1 CAPSULE BY MOUTH AT BEDTIME FOR 2 DAYS THEN 1 TWICE DAILY FOR 2 DAYS THEN 1 THREE TIMES DAILY THEREAFTER     • glimepiride (Amaryl) 1 MG tablet Take 0.5-1 tablets by mouth Every Morning Before Breakfast. 30 tablet 2   • guaiFENesin-dextromethorphan (ROBITUSSIN DM) 100-10 MG/5ML syrup Take 10 mL by mouth 4 (Four) Times a Day As Needed for Cough. 240 mL 1   • Insulin Glargine (BASAGLAR KWIKPEN) 100 UNIT/ML injection pen Inject 5-50 Units under the skin into the appropriate area as directed Every Night. 5 pen 2   • Insulin Pen Needle (Pen Needles 1/2\") 29G X 12MM misc 1 each Every Night. 100 each 3   • isosorbide mononitrate (IMDUR) 30 MG 24 hr tablet Take 1 tablet by mouth Daily. 30 tablet 11   • Lancet Devices (ONE TOUCH DELICA LANCING DEV) misc Use as directed to test blood sugar. 1 each 11   • linaclotide (Linzess) 145 MCG capsule capsule Take 1 capsule by mouth Every Morning " Before Breakfast. 30 capsule 5   • metFORMIN (GLUCOPHAGE) 500 MG tablet Take 2 tablets by mouth 2 (Two) Times a Day With Meals. 360 tablet 0   • metoprolol succinate XL (Toprol XL) 100 MG 24 hr tablet Take 1 tablet by mouth Daily for 90 days. 90 tablet 0   • nitroglycerin (NITROSTAT) 0.4 MG SL tablet Place 1 tablet under the tongue Every 5 (Five) Minutes As Needed for Chest Pain. Take no more than 3 doses in 15 minutes. 30 tablet 1   • nortriptyline (PAMELOR) 10 MG capsule TAKE 1 CAPSULE BY MOUTH THREE TIMES DAILY 90 capsule 0   • ondansetron ODT (Zofran ODT) 8 MG disintegrating tablet Place 1 tablet on the tongue Every 8 (Eight) Hours As Needed for Nausea or Vomiting. 20 tablet 1   • ONETOUCH DELICA LANCETS 33G misc 1 each 4 (Four) Times a Day. delica if covered, use alternative if not covered 120 each 11   • pioglitazone (Actos) 30 MG tablet Take 1 tablet by mouth Daily. 30 tablet 2   • polyethylene glycol (GoLYTELY) 236 g solution Starting at noon on day prior to procedure, drink 8 ounces every 30 minutes until all gone or stools are clear. May add flavor packet. 4000 mL 0   • ranolazine (RANEXA) 500 MG 12 hr tablet Take 1 tablet by mouth Every 12 (Twelve) Hours. 60 tablet 1   • spironolactone (ALDACTONE) 25 MG tablet Take 1 tablet by mouth once daily 30 tablet 0   • tiZANidine (ZANAFLEX) 4 MG tablet Take 4 mg by mouth 3 (Three) Times a Day.       No current facility-administered medications for this visit.           Review of Systems:     Constitutional:  Denies recent weight loss, weight gain,no change in exercise tolerance.     HENT:  Denies any hearing loss, epistaxis, hoarseness    Eyes: No blurred s.    Respiratory:  Denies dyspnea with exertion,no cough,     Cardiovascular: See H&P  Gastrointestinal:  Denies change in bowel habits, dyspepsia, ulcer disease,    Endocrine: Negative for cold intolerance, heat intolerance, polydipsia, polyphagia and polyuria.polyuria.     Genitourinary: Negative.     "  Musculoskeletal: Denies any history of arthritic symptoms or back problems.     Skin:  Denies any change in hair or nails, rashes, or skin lesions.     Allergic/Immunologic: Negative.  Negative for environmental allergies,     Neurological:  Denies any history of recurrent headaches, strokes,     Hematological: Denies any food allergies, seasonal allergies, bleeding disorders,    Psychiatric/Behavioral: Denies any history of depression,      OBJECTIVE:    /80 (BP Location: Left arm, Patient Position: Sitting, Cuff Size: Adult)   Pulse 110   Temp 97.8 °F (36.6 °C)   Ht 188 cm (74\")   Wt 126 kg (278 lb)   SpO2 93%   BMI 35.69 kg/m²       Physical Exam:     Constitutional: Cooperative, alert and oriented, well-developed, well-nourished, in no acute distress.     HENT:   Head: Normocephalic, conjunctive is pink thyroid is nonpalpable no jugular is distention    Cardiovascular: Irregular rhythm, S1 and S2 normal, no S3 or S4. Apical impulse not displaced. No murmurs, gallops, or rubs detected.     Pulmonary/Chest: Chest: Clear to auscultation no rales or wheezing    Abdominal: Abdomen soft, bowel sounds normoactive, no masses,     Musculoskeletal: No deformities, strong peripheral pulses no    Neurological: No gross motor or sensory deficits noted, affect appropriate, oriented to time, person, place.     Skin: Warm and dry to the touch, no apparent skin lesions or masses noted.     Psychiatric: He has a normal mood and affect. His behavior is normal.         Procedures      Lab Results   Component Value Date    WBC 7.17 02/10/2022    HGB 15.2 02/10/2022    HCT 43.6 02/10/2022    MCV 84.7 02/10/2022     02/10/2022     Lab Results   Component Value Date    GLUCOSE 306 (H) 02/10/2022    BUN 16 02/10/2022    CREATININE 0.93 02/10/2022    EGFRIFNONA 84 02/10/2022    BCR 17.2 02/10/2022    CO2 27.0 02/10/2022    CALCIUM 8.9 02/10/2022    ALBUMIN 4.00 02/10/2022    AST 10 02/10/2022    ALT 14 02/10/2022 "     Lab Results   Component Value Date    CHOL 211 (H) 06/11/2021    CHOL 101 10/04/2019    CHOL 137 09/13/2019     Lab Results   Component Value Date    TRIG 144 06/11/2021    TRIG 100 10/04/2019    TRIG 131 09/13/2019     Lab Results   Component Value Date    HDL 40 06/11/2021    HDL 27 (L) 10/04/2019    HDL 34 (L) 09/13/2019     No components found for: LDLCALC  Lab Results   Component Value Date     (H) 06/11/2021    LDL 54 10/04/2019    LDL 77 09/13/2019     No results found for: HDLLDLRATIO  No components found for: CHOLHDL  Lab Results   Component Value Date    HGBA1C 11.40 (H) 01/21/2022     Lab Results   Component Value Date    TSH 1.320 01/22/2022           ASSESSMENT AND PLAN:  #1    Off-and-on chest pain for more than a week or so with the previous abnormal CT cholangiogram.  Patient recently evaluated in the ER and referred for follow-up for further evaluations management.  EKG in the office of atrial fibrillation with controlled ventricular rate and no acute ST-T wave changes.  Given the recurrent chest pain described as pressure-like feeling 1-2 on a scale of 10 nonradiating and multiple risk factor such as diabetes hypertension hyperlipidemia and previous history of CAD and CABG.  Seem appropriate to risk stratify with cardiac catheterization.  Patient was briefly evaluated by Dr. Lewis with set up cardiac catheterization was on Monday.  We will hold Eliquis and start the patient on Plavix he will continue aspirin continue statin continue beta-blocker patient is on Ranexa and Imdur  Procedure and risk pros and cons of elective cardiac catheterization discussed with the patient.  Risk included but not limited to infection, bleeding, stroke,, renal injury and not making to the procedure.  Risk range less than 1 to 5% understand willing to proceed forward.    #2 CAD status post CABG asymptomatic no further recurrence of chest pain and is a pleased with her clinical outcome  Continue aspirin  Plavix, Ranexa and isosorbide    #2 hypertension with hypertensive heart disease.    Good blood pressure control will continue amlodipine, metoprolol and spironolactone    Patient was counseled educated regarding level of physical activity and food particularly with high sodium content    #3 hyperlipidemia  Continue atorvastatin    #4  Patient with history of postop paroxysmal atrial fibrillation flipped back into A. fib previously treated with amiodarone rate is well controlled and will continue metoprolol          Preventive    Obesity with a BMI of 35.69 with history of diabetes hypertension hyperlipidemia and CAD.    Significant low-carb weight, low-fat, DASH diet graded exercise discussed with the patient.      Smoking    Future risk of continued smoking discussed with the patient with the multiple risk factor    Significant low carb rate, low-fat, DASH diet and graded exercise discussed.    I spent 35 minutes caring for Herbert on this date of service. This time includes time spent by me includes counseling/coordination of care as relates to the presenting problem and any ordered procedures/tests as outlined above.     Electronically signed by Alice Rice MD, 02/11/22, 9:28 AM CST.    Diagnoses and all orders for this visit:    1. Essential hypertension (Primary)  -     ECG 12 Lead    2. Peripheral arterial disease (HCC)    3. Mixed hyperlipidemia    4. Coronary artery disease of native artery of native heart with stable angina pectoris (HCC)    5. Paroxysmal atrial fibrillation (HCC)          Alice Rice MD  2/11/2022  08:28 CST

## 2022-02-11 NOTE — DISCHARGE INSTRUCTIONS
Keep your appointment with Dr. Rice tomorrow as scheduled. Follow up with your primary care provider regarding other medical concerns such as foot pain and constipation. Return back to the ER if your symptoms worsen or change in presentation.

## 2022-02-11 NOTE — H&P
[facility] Cardiology  OFFICE CONSULT NOTE    Patient Name: Herbert White Sr.  Age/Sex: 56 y.o. male  : 1965  MRN: [id number]      Referring Provider: Dr. Rice      Chief Complaint: Chest pain    History of Present Illness:  Herbert White Sr. is a 56 y.o. male status post coronary bypass grafting surgery.  He has been having increasing chest pain.  Was seen in by Dr. Rice in the office who then asked me to see the patient.  He had been having left sided chest pain.  He has history of atrial fibrillation is been on Eliquis.    Last Echo:    Last Cath:  2019 his grafts were open    Past Medical History:  Past Medical History:   Diagnosis Date   • Coronary artery disease    • Diabetes mellitus (HCC)    • Diabetic retinopathy (HCC)    • GERD (gastroesophageal reflux disease)    • Hyperlipidemia    • Hypertension    • Osteoarthritis    • Paroxysmal atrial fibrillation (HCC)    • Sleep apnea    • Wears glasses        PAST SURGERY   Past Surgical History:   Procedure Laterality Date   • CARDIAC CATHETERIZATION N/A 9/3/2019   • CHOLECYSTECTOMY     • COLONOSCOPY N/A 2021    Procedure: COLONOSCOPY;  Surgeon: Dewey Antoine MD;  Location: [facility] ENDOSCOPY;  Service: Gastroenterology;  Laterality: N/A;   • COLONOSCOPY N/A 2022    Procedure: COLONOSCOPY;  Surgeon: Dewey Antoine MD;  Location: [facility] ENDOSCOPY;  Service: Gastroenterology;  Laterality: N/A;   • CORONARY ARTERY BYPASS GRAFT N/A 2019   • TONSILLECTOMY         Home Medications:    No current facility-administered medications for this encounter.    Current Outpatient Medications:   •  albuterol sulfate  (90 Base) MCG/ACT inhaler, Inhale 2 puffs Every 4 (Four) Hours As Needed for Wheezing., Disp: 18 g, Rfl: 11  •  Alcohol Swabs 70 % pads, 1 each Every Morning., Disp: 100 each, Rfl: 3  •  apixaban (ELIQUIS) 5 MG tablet tablet, Take 1 tablet by mouth Every 12 (Twelve) Hours., Disp: 60 tablet, Rfl:  "2  •  aspirin 81 MG chewable tablet, Chew 81 mg Daily., Disp: , Rfl:   •  atorvastatin (LIPITOR) 40 MG tablet, Take 1 tablet by mouth Every Night for 90 days., Disp: 90 tablet, Rfl: 0  •  benzonatate (TESSALON) 200 MG capsule, Take 1 capsule by mouth 3 (Three) Times a Day As Needed for Cough., Disp: 30 capsule, Rfl: 0  •  clopidogrel (PLAVIX) 75 MG tablet, Take 1 tablet by mouth Daily., Disp: 90 tablet, Rfl: 3  •  dilTIAZem CD (Cardizem CD) 180 MG 24 hr capsule, Take 1 capsule by mouth Daily for 90 days., Disp: 90 capsule, Rfl: 0  •  ezetimibe (ZETIA) 10 MG tablet, Take 1 tablet by mouth Daily., Disp: 90 tablet, Rfl: 3  •  gabapentin (NEURONTIN) 300 MG capsule, TAKE 1 CAPSULE BY MOUTH AT BEDTIME FOR 2 DAYS THEN 1 TWICE DAILY FOR 2 DAYS THEN 1 THREE TIMES DAILY THEREAFTER, Disp: , Rfl:   •  glimepiride (Amaryl) 1 MG tablet, Take 0.5-1 tablets by mouth Every Morning Before Breakfast., Disp: 30 tablet, Rfl: 2  •  guaiFENesin-dextromethorphan (ROBITUSSIN DM) 100-10 MG/5ML syrup, Take 10 mL by mouth 4 (Four) Times a Day As Needed for Cough., Disp: 240 mL, Rfl: 1  •  Insulin Glargine (BASAGLAR KWIKPEN) 100 UNIT/ML injection pen, Inject 5-50 Units under the skin into the appropriate area as directed Every Night., Disp: 5 pen, Rfl: 2  •  Insulin Pen Needle (Pen Needles 1/2\") 29G X 12MM misc, 1 each Every Night., Disp: 100 each, Rfl: 3  •  isosorbide mononitrate (IMDUR) 30 MG 24 hr tablet, Take 1 tablet by mouth Daily., Disp: 30 tablet, Rfl: 11  •  Lancet Devices (ONE TOUCH DELICA LANCING DEV) misc, Use as directed to test blood sugar., Disp: 1 each, Rfl: 11  •  linaclotide (Linzess) 145 MCG capsule capsule, Take 1 capsule by mouth Every Morning Before Breakfast., Disp: 30 capsule, Rfl: 5  •  metFORMIN (GLUCOPHAGE) 500 MG tablet, Take 2 tablets by mouth 2 (Two) Times a Day With Meals., Disp: 360 tablet, Rfl: 0  •  metoprolol succinate XL (Toprol XL) 100 MG 24 hr tablet, Take 1 tablet by mouth Daily for 90 days., Disp: 90 " tablet, Rfl: 0  •  nitroglycerin (NITROSTAT) 0.4 MG SL tablet, Place 1 tablet under the tongue Every 5 (Five) Minutes As Needed for Chest Pain. Take no more than 3 doses in 15 minutes., Disp: 30 tablet, Rfl: 1  •  nortriptyline (PAMELOR) 10 MG capsule, TAKE 1 CAPSULE BY MOUTH THREE TIMES DAILY, Disp: 90 capsule, Rfl: 0  •  ondansetron ODT (Zofran ODT) 8 MG disintegrating tablet, Place 1 tablet on the tongue Every 8 (Eight) Hours As Needed for Nausea or Vomiting., Disp: 20 tablet, Rfl: 1  •  ONETOUCH DELICA LANCETS 33G misc, 1 each 4 (Four) Times a Day. delica if covered, use alternative if not covered, Disp: 120 each, Rfl: 11  •  pioglitazone (Actos) 30 MG tablet, Take 1 tablet by mouth Daily., Disp: 30 tablet, Rfl: 2  •  polyethylene glycol (GoLYTELY) 236 g solution, Starting at noon on day prior to procedure, drink 8 ounces every 30 minutes until all gone or stools are clear. May add flavor packet., Disp: 4000 mL, Rfl: 0  •  ranolazine (RANEXA) 500 MG 12 hr tablet, Take 1 tablet by mouth Every 12 (Twelve) Hours., Disp: 60 tablet, Rfl: 1  •  spironolactone (ALDACTONE) 25 MG tablet, Take 1 tablet by mouth once daily, Disp: 30 tablet, Rfl: 0  •  tiZANidine (ZANAFLEX) 4 MG tablet, Take 4 mg by mouth 3 (Three) Times a Day., Disp: , Rfl:     Allergies:  Allergies   Allergen Reactions   • Ace Inhibitors Anaphylaxis   • Wellbutrin [Bupropion] Anaphylaxis        Social History:  Social History     Socioeconomic History   • Marital status:    Tobacco Use   • Smoking status: Former Smoker     Packs/day: 1.00     Years: 35.00     Pack years: 35.00     Types: Cigarettes     Start date: 1980     Quit date: 9/3/2019     Years since quittin.4   • Smokeless tobacco: Never Used   Vaping Use   • Vaping Use: Never used   Substance and Sexual Activity   • Alcohol use: No   • Drug use: No   • Sexual activity: Defer     Comment: .        Family History:  Family History   Problem Relation Age of Onset   • Diabetes  Mother    • Hypertension Father          Review of Systems:   Constitution: No chills, no rigors, no unexplained weight loss or weight gain    Eyes:  No diplopia, no blurred vision, no loss of vision, conjunctiva is pink and sclera is anicteric    ENT:  No tinnitus, no otorrhea, no epistaxis, no sore throat   Respiratory: No cough, no hemoptysis    Cardiovascular:  As mentioned in HPI    Gastrointestinal: No nausea, no vomiting, no hematemesis, no diarrhea or constipation, no melena    Genitourinary: No frequency of dysuria no hematuria    Integument: positive for  No pruritis and  no skin rash    Hematologic / Lymphatic: No excessive bleeding, easy bruising, fatigue, lymphadenopathy and petechiae    Musculoskeletal: No joint pain, joint stiffness, joint swelling, muscle pain, muscle weakness and neck pain    Neurological: No dizziness, headaches, light headedness, seizures and vertigo or stroke    Behavioral/Psych: No depression, or bipolar disorder    Endocrine: no h/o diabetes, hyperlipidemia or thyroid problems        Temp:  [97.8 °F (36.6 °C)-98.2 °F (36.8 °C)] 97.8 °F (36.6 °C)  Heart Rate:  [] 110  Resp:  [16-18] 16  BP: (124-140)/(65-87) 124/80      Physical Exam:   General Appearance:    Alert, oriented, cooperative, in no acute distress     Head:    Normocephalic, atraumatic, without obvious abnormality     Eyes:            Lids and lashes normal, conjunctivae and sclerae normal, no   icterus, no pallor     Ears:    Ears appear intact with no abnormalities noted     Throat:   Mucous membranes pink and moist     Neck:   Supple, trachea midline, no carotid bruit, no JVD     Lungs:     Air enty equal,  Clear to auscultation, respirations regular, Unlabored. No wheezes, rales, rhonchi    Heart:    Regular rhythm and normal rate, normal S1 and S2, no            murmur, no gallop,      Abdomen:     Normal bowel sounds, no masses, liver and spleen nonpalpable, soft, non-tender, non-distended, no guarding,  no rebound tenderness       Extremities:   Moves all extremities well, no edema, no cyanosis, no              Redness, no clubbing     Pulses:   Pulses palpable and equal bilaterally       Neurologic:   Alert and oriented to person, place, and time. No focal neurological deficits       Lab Review:       Assessment plan  This patient with a history of coronary bypass grafting surgery and recurrent angina is having it increased.  Our plan is to change his Eliquis to Plavix today and plan on cardiac catheterization on Monday.      The indications, risks and benefits of diagnostic left heart cardiac catheterization, angiography, conscious sedation, and possible blood transfusion were discussed in detail with the patient. The potential complications of 1/2000 chance of death, 1/1000 chance of heart attack or stroke, 1/500 chance of bleeding or clotting of the femoral artery, and 1/500 chance of allergic reaction to contrast were discussed. We also reviewed possible complications of infection and kidney dysfunction. If PCI were performed and intracoronary stents indicated, we discussed the details about VINEET. This included a review of the risks of the infrequent, but relatively higher incidence of late thrombosis with VINEET. The importance of maintaining a consistent daily regimen of aspirin and an additional antiplatelet agent for as long as directed after implantation was emphasized. No contraindications were found.  The patient appeared to understand and agreed to the above.   ASA2 MAL2               No follow-ups on file.

## 2022-02-11 NOTE — TELEPHONE ENCOUNTER
Nai called and said that Mr. White is not doing very well.  His b/p has been elevated and having dizziness.  But, she called because he needs a referral to a Podiatrist he has severe pain in his R heel.  [phone]

## 2022-02-14 ENCOUNTER — HOSPITAL ENCOUNTER (OUTPATIENT)
Facility: HOSPITAL | Age: 57
Discharge: HOME | End: 2022-02-15
Attending: INTERNAL MEDICINE | Admitting: INTERNAL MEDICINE

## 2022-02-14 DIAGNOSIS — I25.118 CORONARY ARTERY DISEASE OF NATIVE ARTERY OF NATIVE HEART WITH STABLE ANGINA PECTORIS: ICD-10-CM

## 2022-02-14 LAB
ALBUMIN SERPL-MCNC: 3.7 G/DL (ref 3.5–5.2)
ALBUMIN/GLOB SERPL: 1.7 G/DL
ALP SERPL-CCNC: 79 U/L (ref 39–117)
ALT SERPL W P-5'-P-CCNC: 17 U/L (ref 1–41)
ANION GAP SERPL CALCULATED.3IONS-SCNC: 7 MMOL/L (ref 5–15)
AST SERPL-CCNC: 14 U/L (ref 1–40)
BILIRUB SERPL-MCNC: 0.9 MG/DL (ref 0–1.2)
BUN SERPL-MCNC: 13 MG/DL (ref 6–20)
BUN/CREAT SERPL: 13.8 (ref 7–25)
CALCIUM SPEC-SCNC: 8.9 MG/DL (ref 8.6–10.5)
CHLORIDE SERPL-SCNC: 106 MMOL/L (ref 98–107)
CO2 SERPL-SCNC: 27 MMOL/L (ref 22–29)
CREAT SERPL-MCNC: 0.94 MG/DL (ref 0.76–1.27)
DEPRECATED RDW RBC AUTO: 43.5 FL (ref 37–54)
ERYTHROCYTE [DISTWIDTH] IN BLOOD BY AUTOMATED COUNT: 14.1 % (ref 12.3–15.4)
GFR SERPL CREATININE-BSD FRML MDRD: 83 ML/MIN/1.73
GLOBULIN UR ELPH-MCNC: 2.2 GM/DL
GLUCOSE BLDC GLUCOMTR-MCNC: 167 MG/DL (ref 70–130)
GLUCOSE BLDC GLUCOMTR-MCNC: 174 MG/DL (ref 70–130)
GLUCOSE SERPL-MCNC: 234 MG/DL (ref 65–99)
HCT VFR BLD AUTO: 42.8 % (ref 37.5–51)
HGB BLD-MCNC: 14.7 G/DL (ref 13–17.7)
INR PPP: 1.17 (ref 0.8–1.2)
MCH RBC QN AUTO: 29.2 PG (ref 26.6–33)
MCHC RBC AUTO-ENTMCNC: 34.3 G/DL (ref 31.5–35.7)
MCV RBC AUTO: 85.1 FL (ref 79–97)
PLATELET # BLD AUTO: 167 10*3/MM3 (ref 140–450)
PMV BLD AUTO: 11.3 FL (ref 6–12)
POTASSIUM SERPL-SCNC: 4.2 MMOL/L (ref 3.5–5.2)
PROT SERPL-MCNC: 5.9 G/DL (ref 6–8.5)
PROTHROMBIN TIME: 14.8 SECONDS (ref 11.1–15.3)
RBC # BLD AUTO: 5.03 10*6/MM3 (ref 4.14–5.8)
SODIUM SERPL-SCNC: 140 MMOL/L (ref 136–145)
WBC NRBC COR # BLD: 6.75 10*3/MM3 (ref 3.4–10.8)

## 2022-02-14 PROCEDURE — 80053 COMPREHEN METABOLIC PANEL: CPT | Performed by: INTERNAL MEDICINE

## 2022-02-14 PROCEDURE — 25010000002 FENTANYL CITRATE (PF) 50 MCG/ML SOLUTION: Performed by: INTERNAL MEDICINE

## 2022-02-14 PROCEDURE — 93459 L HRT ART/GRFT ANGIO: CPT | Performed by: INTERNAL MEDICINE

## 2022-02-14 PROCEDURE — C1769 GUIDE WIRE: HCPCS | Performed by: INTERNAL MEDICINE

## 2022-02-14 PROCEDURE — 82962 GLUCOSE BLOOD TEST: CPT

## 2022-02-14 PROCEDURE — C1887 CATHETER, GUIDING: HCPCS | Performed by: INTERNAL MEDICINE

## 2022-02-14 PROCEDURE — 94799 UNLISTED PULMONARY SVC/PX: CPT

## 2022-02-14 PROCEDURE — 63710000001 INSULIN ASPART PER 5 UNITS: Performed by: INTERNAL MEDICINE

## 2022-02-14 PROCEDURE — C1874 STENT, COATED/COV W/DEL SYS: HCPCS | Performed by: INTERNAL MEDICINE

## 2022-02-14 PROCEDURE — 94760 N-INVAS EAR/PLS OXIMETRY 1: CPT

## 2022-02-14 PROCEDURE — 25010000002 MIDAZOLAM PER 1 MG: Performed by: INTERNAL MEDICINE

## 2022-02-14 PROCEDURE — 25010000002 BIVALIRUDIN TRIFLUOROACETATE 250 MG RECONSTITUTED SOLUTION 1 EACH VIAL: Performed by: INTERNAL MEDICINE

## 2022-02-14 PROCEDURE — 0 IOPAMIDOL PER 1 ML: Performed by: INTERNAL MEDICINE

## 2022-02-14 PROCEDURE — 85027 COMPLETE CBC AUTOMATED: CPT | Performed by: INTERNAL MEDICINE

## 2022-02-14 PROCEDURE — C1894 INTRO/SHEATH, NON-LASER: HCPCS | Performed by: INTERNAL MEDICINE

## 2022-02-14 PROCEDURE — 85610 PROTHROMBIN TIME: CPT | Performed by: INTERNAL MEDICINE

## 2022-02-14 PROCEDURE — C9600 PERC DRUG-EL COR STENT SING: HCPCS

## 2022-02-14 PROCEDURE — 25010000002 HEPARIN (PORCINE) PER 1000 UNITS: Performed by: INTERNAL MEDICINE

## 2022-02-14 PROCEDURE — C1760 CLOSURE DEV, VASC: HCPCS | Performed by: INTERNAL MEDICINE

## 2022-02-14 DEVICE — XIENCE SKYPOINT™ EVEROLIMUS ELUTING CORONARY STENT SYSTEM 3.25 MM X 18 MM / RAPID-EXCHANGE
Type: IMPLANTABLE DEVICE | Status: FUNCTIONAL
Brand: XIENCE SKYPOINT™

## 2022-02-14 RX ORDER — DILTIAZEM HYDROCHLORIDE 180 MG/1
180 CAPSULE, COATED, EXTENDED RELEASE ORAL DAILY
Status: DISCONTINUED | OUTPATIENT
Start: 2022-02-15 | End: 2022-02-15 | Stop reason: HOSPADM

## 2022-02-14 RX ORDER — ISOSORBIDE MONONITRATE 30 MG/1
30 TABLET, EXTENDED RELEASE ORAL
Status: DISCONTINUED | OUTPATIENT
Start: 2022-02-15 | End: 2022-02-15 | Stop reason: HOSPADM

## 2022-02-14 RX ORDER — BENZONATATE 100 MG/1
200 CAPSULE ORAL 3 TIMES DAILY PRN
Status: DISCONTINUED | OUTPATIENT
Start: 2022-02-14 | End: 2022-02-15 | Stop reason: HOSPADM

## 2022-02-14 RX ORDER — SODIUM CHLORIDE 0.9 % (FLUSH) 0.9 %
3 SYRINGE (ML) INJECTION EVERY 12 HOURS SCHEDULED
Status: DISCONTINUED | OUTPATIENT
Start: 2022-02-14 | End: 2022-02-14 | Stop reason: HOSPADM

## 2022-02-14 RX ORDER — SODIUM CHLORIDE 9 MG/ML
100 INJECTION, SOLUTION INTRAVENOUS CONTINUOUS
Status: DISCONTINUED | OUTPATIENT
Start: 2022-02-14 | End: 2022-02-15 | Stop reason: HOSPADM

## 2022-02-14 RX ORDER — NICOTINE POLACRILEX 4 MG
15 LOZENGE BUCCAL
Status: DISCONTINUED | OUTPATIENT
Start: 2022-02-14 | End: 2022-02-15 | Stop reason: HOSPADM

## 2022-02-14 RX ORDER — FENTANYL CITRATE 50 UG/ML
25 INJECTION, SOLUTION INTRAMUSCULAR; INTRAVENOUS
Status: DISCONTINUED | OUTPATIENT
Start: 2022-02-14 | End: 2022-02-15 | Stop reason: HOSPADM

## 2022-02-14 RX ORDER — GLIPIZIDE 5 MG/1
2.5 TABLET ORAL
Status: DISCONTINUED | OUTPATIENT
Start: 2022-02-15 | End: 2022-02-15 | Stop reason: HOSPADM

## 2022-02-14 RX ORDER — ONDANSETRON 4 MG/1
4 TABLET, ORALLY DISINTEGRATING ORAL EVERY 6 HOURS PRN
Status: DISCONTINUED | OUTPATIENT
Start: 2022-02-14 | End: 2022-02-14 | Stop reason: SDUPTHER

## 2022-02-14 RX ORDER — TIZANIDINE 4 MG/1
4 TABLET ORAL 3 TIMES DAILY
Status: DISCONTINUED | OUTPATIENT
Start: 2022-02-14 | End: 2022-02-15 | Stop reason: HOSPADM

## 2022-02-14 RX ORDER — ONDANSETRON 2 MG/ML
4 INJECTION INTRAMUSCULAR; INTRAVENOUS EVERY 6 HOURS PRN
Status: DISCONTINUED | OUTPATIENT
Start: 2022-02-14 | End: 2022-02-15 | Stop reason: HOSPADM

## 2022-02-14 RX ORDER — HYDROCODONE BITARTRATE AND ACETAMINOPHEN 5; 325 MG/1; MG/1
1 TABLET ORAL EVERY 4 HOURS PRN
Status: DISCONTINUED | OUTPATIENT
Start: 2022-02-14 | End: 2022-02-15 | Stop reason: HOSPADM

## 2022-02-14 RX ORDER — ACETAMINOPHEN 325 MG/1
650 TABLET ORAL EVERY 4 HOURS PRN
Status: DISCONTINUED | OUTPATIENT
Start: 2022-02-14 | End: 2022-02-15 | Stop reason: HOSPADM

## 2022-02-14 RX ORDER — CLOPIDOGREL BISULFATE 75 MG/1
TABLET ORAL AS NEEDED
Status: DISCONTINUED | OUTPATIENT
Start: 2022-02-14 | End: 2022-02-14 | Stop reason: HOSPADM

## 2022-02-14 RX ORDER — NALOXONE HCL 0.4 MG/ML
0.4 VIAL (ML) INJECTION
Status: DISCONTINUED | OUTPATIENT
Start: 2022-02-14 | End: 2022-02-15 | Stop reason: HOSPADM

## 2022-02-14 RX ORDER — ASPIRIN 81 MG/1
81 TABLET ORAL DAILY
Status: DISCONTINUED | OUTPATIENT
Start: 2022-02-14 | End: 2022-02-14 | Stop reason: SDUPTHER

## 2022-02-14 RX ORDER — ASPIRIN 81 MG/1
TABLET, CHEWABLE ORAL AS NEEDED
Status: DISCONTINUED | OUTPATIENT
Start: 2022-02-14 | End: 2022-02-14 | Stop reason: HOSPADM

## 2022-02-14 RX ORDER — GABAPENTIN 300 MG/1
300 CAPSULE ORAL EVERY 8 HOURS SCHEDULED
Status: DISCONTINUED | OUTPATIENT
Start: 2022-02-14 | End: 2022-02-15 | Stop reason: HOSPADM

## 2022-02-14 RX ORDER — LIDOCAINE HYDROCHLORIDE 20 MG/ML
INJECTION, SOLUTION INFILTRATION; PERINEURAL AS NEEDED
Status: DISCONTINUED | OUTPATIENT
Start: 2022-02-14 | End: 2022-02-14 | Stop reason: HOSPADM

## 2022-02-14 RX ORDER — DIPHENHYDRAMINE HCL 25 MG
25 CAPSULE ORAL EVERY 6 HOURS PRN
Status: DISCONTINUED | OUTPATIENT
Start: 2022-02-14 | End: 2022-02-15 | Stop reason: HOSPADM

## 2022-02-14 RX ORDER — TEMAZEPAM 15 MG/1
15 CAPSULE ORAL NIGHTLY PRN
Status: DISCONTINUED | OUTPATIENT
Start: 2022-02-14 | End: 2022-02-15 | Stop reason: HOSPADM

## 2022-02-14 RX ORDER — CLOPIDOGREL BISULFATE 75 MG/1
75 TABLET ORAL DAILY
Status: DISCONTINUED | OUTPATIENT
Start: 2022-02-14 | End: 2022-02-15 | Stop reason: HOSPADM

## 2022-02-14 RX ORDER — MIDAZOLAM HYDROCHLORIDE 1 MG/ML
INJECTION INTRAMUSCULAR; INTRAVENOUS AS NEEDED
Status: DISCONTINUED | OUTPATIENT
Start: 2022-02-14 | End: 2022-02-14 | Stop reason: HOSPADM

## 2022-02-14 RX ORDER — RANOLAZINE 500 MG/1
500 TABLET, EXTENDED RELEASE ORAL EVERY 12 HOURS SCHEDULED
Status: DISCONTINUED | OUTPATIENT
Start: 2022-02-14 | End: 2022-02-15 | Stop reason: HOSPADM

## 2022-02-14 RX ORDER — GUAIFENESIN/DEXTROMETHORPHAN 100-10MG/5
10 SYRUP ORAL 4 TIMES DAILY PRN
Status: DISCONTINUED | OUTPATIENT
Start: 2022-02-14 | End: 2022-02-15 | Stop reason: HOSPADM

## 2022-02-14 RX ORDER — DEXTROSE MONOHYDRATE 25 G/50ML
25 INJECTION, SOLUTION INTRAVENOUS
Status: DISCONTINUED | OUTPATIENT
Start: 2022-02-14 | End: 2022-02-15 | Stop reason: HOSPADM

## 2022-02-14 RX ORDER — SPIRONOLACTONE 25 MG/1
25 TABLET ORAL DAILY
Status: DISCONTINUED | OUTPATIENT
Start: 2022-02-15 | End: 2022-02-15 | Stop reason: HOSPADM

## 2022-02-14 RX ORDER — ALPRAZOLAM 0.25 MG/1
0.25 TABLET ORAL 3 TIMES DAILY PRN
Status: DISCONTINUED | OUTPATIENT
Start: 2022-02-14 | End: 2022-02-15 | Stop reason: HOSPADM

## 2022-02-14 RX ORDER — ASPIRIN 81 MG/1
81 TABLET, CHEWABLE ORAL DAILY
Status: DISCONTINUED | OUTPATIENT
Start: 2022-02-14 | End: 2022-02-15 | Stop reason: HOSPADM

## 2022-02-14 RX ORDER — ATORVASTATIN CALCIUM 40 MG/1
40 TABLET, FILM COATED ORAL NIGHTLY
Status: DISCONTINUED | OUTPATIENT
Start: 2022-02-14 | End: 2022-02-15 | Stop reason: HOSPADM

## 2022-02-14 RX ORDER — FENTANYL CITRATE 50 UG/ML
INJECTION, SOLUTION INTRAMUSCULAR; INTRAVENOUS AS NEEDED
Status: DISCONTINUED | OUTPATIENT
Start: 2022-02-14 | End: 2022-02-14 | Stop reason: HOSPADM

## 2022-02-14 RX ORDER — NITROGLYCERIN 0.4 MG/1
0.4 TABLET SUBLINGUAL
Status: DISCONTINUED | OUTPATIENT
Start: 2022-02-14 | End: 2022-02-15 | Stop reason: HOSPADM

## 2022-02-14 RX ORDER — ONDANSETRON 4 MG/1
4 TABLET, FILM COATED ORAL EVERY 6 HOURS PRN
Status: DISCONTINUED | OUTPATIENT
Start: 2022-02-14 | End: 2022-02-15 | Stop reason: HOSPADM

## 2022-02-14 RX ORDER — METOPROLOL SUCCINATE 50 MG/1
100 TABLET, EXTENDED RELEASE ORAL DAILY
Status: DISCONTINUED | OUTPATIENT
Start: 2022-02-15 | End: 2022-02-15

## 2022-02-14 RX ORDER — NORTRIPTYLINE HYDROCHLORIDE 10 MG/1
10 CAPSULE ORAL 3 TIMES DAILY
Status: DISCONTINUED | OUTPATIENT
Start: 2022-02-14 | End: 2022-02-15 | Stop reason: HOSPADM

## 2022-02-14 RX ORDER — SODIUM CHLORIDE 0.9 % (FLUSH) 0.9 %
10 SYRINGE (ML) INJECTION AS NEEDED
Status: DISCONTINUED | OUTPATIENT
Start: 2022-02-14 | End: 2022-02-14 | Stop reason: HOSPADM

## 2022-02-14 RX ORDER — ALBUTEROL SULFATE 2.5 MG/3ML
2.5 SOLUTION RESPIRATORY (INHALATION) EVERY 6 HOURS PRN
Status: DISCONTINUED | OUTPATIENT
Start: 2022-02-14 | End: 2022-02-15 | Stop reason: HOSPADM

## 2022-02-14 RX ADMIN — HYDROCODONE BITARTRATE AND ACETAMINOPHEN 1 TABLET: 5; 325 TABLET ORAL at 16:47

## 2022-02-14 RX ADMIN — SODIUM CHLORIDE 100 ML/HR: 9 INJECTION, SOLUTION INTRAVENOUS at 18:53

## 2022-02-14 RX ADMIN — NORTRIPTYLINE HYDROCHLORIDE 10 MG: 10 CAPSULE ORAL at 21:43

## 2022-02-14 RX ADMIN — GABAPENTIN 300 MG: 300 CAPSULE ORAL at 21:10

## 2022-02-14 RX ADMIN — ATORVASTATIN CALCIUM 40 MG: 40 TABLET, FILM COATED ORAL at 21:10

## 2022-02-14 RX ADMIN — INSULIN ASPART 2 UNITS: 100 INJECTION, SOLUTION INTRAVENOUS; SUBCUTANEOUS at 16:48

## 2022-02-14 RX ADMIN — GABAPENTIN 300 MG: 300 CAPSULE ORAL at 15:34

## 2022-02-14 RX ADMIN — NORTRIPTYLINE HYDROCHLORIDE 10 MG: 10 CAPSULE ORAL at 15:34

## 2022-02-14 RX ADMIN — Medication 10 ML: at 07:15

## 2022-02-14 RX ADMIN — TIZANIDINE 4 MG: 4 TABLET ORAL at 21:10

## 2022-02-14 RX ADMIN — RANOLAZINE 500 MG: 500 TABLET, FILM COATED, EXTENDED RELEASE ORAL at 21:10

## 2022-02-14 RX ADMIN — HYDROCODONE BITARTRATE AND ACETAMINOPHEN 1 TABLET: 5; 325 TABLET ORAL at 10:45

## 2022-02-14 RX ADMIN — TIZANIDINE 4 MG: 4 TABLET ORAL at 15:35

## 2022-02-15 VITALS
DIASTOLIC BLOOD PRESSURE: 71 MMHG | HEART RATE: 92 BPM | HEIGHT: 74 IN | TEMPERATURE: 97.9 F | RESPIRATION RATE: 20 BRPM | WEIGHT: 270.2 LBS | OXYGEN SATURATION: 96 % | BODY MASS INDEX: 34.68 KG/M2 | SYSTOLIC BLOOD PRESSURE: 132 MMHG

## 2022-02-15 LAB
ANION GAP SERPL CALCULATED.3IONS-SCNC: 8 MMOL/L (ref 5–15)
BUN SERPL-MCNC: 13 MG/DL (ref 6–20)
BUN/CREAT SERPL: 22 (ref 7–25)
CALCIUM SPEC-SCNC: 8.6 MG/DL (ref 8.6–10.5)
CHLORIDE SERPL-SCNC: 105 MMOL/L (ref 98–107)
CHOLEST SERPL-MCNC: 115 MG/DL (ref 0–200)
CO2 SERPL-SCNC: 24 MMOL/L (ref 22–29)
CREAT SERPL-MCNC: 0.59 MG/DL (ref 0.76–1.27)
DEPRECATED RDW RBC AUTO: 42.6 FL (ref 37–54)
ERYTHROCYTE [DISTWIDTH] IN BLOOD BY AUTOMATED COUNT: 14.2 % (ref 12.3–15.4)
GFR SERPL CREATININE-BSD FRML MDRD: 142 ML/MIN/1.73
GLUCOSE BLDC GLUCOMTR-MCNC: 131 MG/DL (ref 70–130)
GLUCOSE BLDC GLUCOMTR-MCNC: 243 MG/DL (ref 70–130)
GLUCOSE SERPL-MCNC: 162 MG/DL (ref 65–99)
HBA1C MFR BLD: 10.6 % (ref 4.8–5.6)
HCT VFR BLD AUTO: 44.2 % (ref 37.5–51)
HDLC SERPL-MCNC: 38 MG/DL (ref 40–60)
HGB BLD-MCNC: 15.4 G/DL (ref 13–17.7)
LDLC SERPL CALC-MCNC: 63 MG/DL (ref 0–100)
LDLC/HDLC SERPL: 1.69 {RATIO}
MCH RBC QN AUTO: 29.3 PG (ref 26.6–33)
MCHC RBC AUTO-ENTMCNC: 34.8 G/DL (ref 31.5–35.7)
MCV RBC AUTO: 84 FL (ref 79–97)
PLATELET # BLD AUTO: 144 10*3/MM3 (ref 140–450)
PMV BLD AUTO: 10.8 FL (ref 6–12)
POTASSIUM SERPL-SCNC: 3.6 MMOL/L (ref 3.5–5.2)
QT INTERVAL: 330 MS
QTC INTERVAL: 469 MS
RBC # BLD AUTO: 5.26 10*6/MM3 (ref 4.14–5.8)
SODIUM SERPL-SCNC: 137 MMOL/L (ref 136–145)
TRIGL SERPL-MCNC: 63 MG/DL (ref 0–150)
VLDLC SERPL-MCNC: 14 MG/DL (ref 5–40)
WBC NRBC COR # BLD: 6.18 10*3/MM3 (ref 3.4–10.8)

## 2022-02-15 PROCEDURE — 80048 BASIC METABOLIC PNL TOTAL CA: CPT | Performed by: INTERNAL MEDICINE

## 2022-02-15 PROCEDURE — 80061 LIPID PANEL: CPT | Performed by: INTERNAL MEDICINE

## 2022-02-15 PROCEDURE — 82962 GLUCOSE BLOOD TEST: CPT

## 2022-02-15 PROCEDURE — 99217 PR OBSERVATION CARE DISCHARGE MANAGEMENT: CPT | Performed by: NURSE PRACTITIONER

## 2022-02-15 PROCEDURE — 93005 ELECTROCARDIOGRAM TRACING: CPT | Performed by: INTERNAL MEDICINE

## 2022-02-15 PROCEDURE — 93010 ELECTROCARDIOGRAM REPORT: CPT | Performed by: INTERNAL MEDICINE

## 2022-02-15 PROCEDURE — 85027 COMPLETE CBC AUTOMATED: CPT | Performed by: INTERNAL MEDICINE

## 2022-02-15 PROCEDURE — 83036 HEMOGLOBIN GLYCOSYLATED A1C: CPT | Performed by: INTERNAL MEDICINE

## 2022-02-15 PROCEDURE — 25010000002 HYDRALAZINE PER 20 MG: Performed by: INTERNAL MEDICINE

## 2022-02-15 RX ORDER — METOPROLOL SUCCINATE 50 MG/1
150 TABLET, EXTENDED RELEASE ORAL DAILY
Status: DISCONTINUED | OUTPATIENT
Start: 2022-02-16 | End: 2022-02-15 | Stop reason: HOSPADM

## 2022-02-15 RX ORDER — HYDRALAZINE HYDROCHLORIDE 20 MG/ML
10 INJECTION INTRAMUSCULAR; INTRAVENOUS EVERY 6 HOURS PRN
Status: DISCONTINUED | OUTPATIENT
Start: 2022-02-15 | End: 2022-02-15 | Stop reason: HOSPADM

## 2022-02-15 RX ORDER — METOPROLOL SUCCINATE 100 MG/1
150 TABLET, EXTENDED RELEASE ORAL DAILY
Qty: 45 TABLET | Refills: 3 | Status: SHIPPED | OUTPATIENT
Start: 2022-02-16 | End: 2022-03-14 | Stop reason: SDUPTHER

## 2022-02-15 RX ORDER — ASPIRIN 81 MG/1
81 TABLET, CHEWABLE ORAL DAILY
Qty: 30 TABLET | Refills: 0 | Status: SHIPPED | OUTPATIENT
Start: 2022-02-16 | End: 2022-03-18

## 2022-02-15 RX ADMIN — DILTIAZEM HYDROCHLORIDE 180 MG: 180 CAPSULE, COATED, EXTENDED RELEASE ORAL at 08:28

## 2022-02-15 RX ADMIN — Medication 2.5 MG: at 10:41

## 2022-02-15 RX ADMIN — CLOPIDOGREL BISULFATE 75 MG: 75 TABLET ORAL at 08:28

## 2022-02-15 RX ADMIN — HYDRALAZINE HYDROCHLORIDE 10 MG: 20 INJECTION INTRAMUSCULAR; INTRAVENOUS at 03:54

## 2022-02-15 RX ADMIN — HYDROCODONE BITARTRATE AND ACETAMINOPHEN 1 TABLET: 5; 325 TABLET ORAL at 16:57

## 2022-02-15 RX ADMIN — TIZANIDINE 4 MG: 4 TABLET ORAL at 16:57

## 2022-02-15 RX ADMIN — HYDROCODONE BITARTRATE AND ACETAMINOPHEN 1 TABLET: 5; 325 TABLET ORAL at 08:50

## 2022-02-15 RX ADMIN — METOPROLOL SUCCINATE 100 MG: 50 TABLET, EXTENDED RELEASE ORAL at 08:50

## 2022-02-15 RX ADMIN — SPIRONOLACTONE 25 MG: 25 TABLET ORAL at 08:28

## 2022-02-15 RX ADMIN — TIZANIDINE 4 MG: 4 TABLET ORAL at 08:28

## 2022-02-15 RX ADMIN — NORTRIPTYLINE HYDROCHLORIDE 10 MG: 10 CAPSULE ORAL at 08:50

## 2022-02-15 RX ADMIN — RANOLAZINE 500 MG: 500 TABLET, FILM COATED, EXTENDED RELEASE ORAL at 08:28

## 2022-02-15 RX ADMIN — ASPIRIN 81 MG: 81 TABLET, CHEWABLE ORAL at 08:28

## 2022-02-15 RX ADMIN — ISOSORBIDE MONONITRATE 30 MG: 30 TABLET, EXTENDED RELEASE ORAL at 08:28

## 2022-02-15 RX ADMIN — GABAPENTIN 300 MG: 300 CAPSULE ORAL at 16:57

## 2022-02-15 RX ADMIN — GABAPENTIN 300 MG: 300 CAPSULE ORAL at 05:47

## 2022-02-15 NOTE — PLAN OF CARE
Goal Outcome Evaluation:           Progress: no change  Outcome Summary: Pt VSS.  PRN pain mediction effective for pt pain.  No complaints at this time.  Will continue to monitor.

## 2022-02-15 NOTE — DISCHARGE INSTRUCTIONS
Femoral Site Care  This sheet gives you information about how to care for yourself after your procedure. Your health care provider may also give you more specific instructions. If you have problems or questions, contact your health care provider.  What can I expect after the procedure?  After the procedure, it is common to have:  · Bruising that usually fades within 1-2 weeks.  · Tenderness at the site.  Follow these instructions at home:  Wound care  · Follow instructions from your health care provider about how to take care of your insertion site. Make sure you:  ? Wash your hands with soap and water before you change your bandage (dressing). If soap and water are not available, use hand .  ? Change your dressing as told by your health care provider.  ? Leave stitches (sutures), skin glue, or adhesive strips in place. These skin closures may need to stay in place for 2 weeks or longer. If adhesive strip edges start to loosen and curl up, you may trim the loose edges. Do not remove adhesive strips completely unless your health care provider tells you to do that.  · Do not take baths, swim, or use a hot tub until your health care provider approves.  · You may shower 24-48 hours after the procedure or as told by your health care provider.  ? Gently wash the site with plain soap and water.  ? Pat the area dry with a clean towel.  ? Do not rub the site. This may cause bleeding.  · Do not apply powder or lotion to the site. Keep the site clean and dry.  · Check your femoral site every day for signs of infection. Check for:  ? Redness, swelling, or pain.  ? Fluid or blood.  ? Warmth.  ? Pus or a bad smell.  Activity  · For the first 2-3 days after your procedure, or as long as directed:  ? Avoid climbing stairs as much as possible.  ? Do not squat.  · Do not lift anything that is heavier than 10 lb (4.5 kg), or the limit that you are told, until your health care provider says that it is safe.  · Rest as  directed.  ? Avoid sitting for a long time without moving. Get up to take short walks every 1-2 hours.  · Do not drive for 24 hours if you were given a medicine to help you relax (sedative).  General instructions  · Take over-the-counter and prescription medicines only as told by your health care provider.  · Keep all follow-up visits as told by your health care provider. This is important.  Contact a health care provider if you have:  · A fever or chills.  · You have redness, swelling, or pain around your insertion site.  Get help right away if:  · The catheter insertion area swells very fast.  · You pass out.  · You suddenly start to sweat or your skin gets clammy.  · The catheter insertion area is bleeding, and the bleeding does not stop when you hold steady pressure on the area.  · The area near or just beyond the catheter insertion site becomes pale, cool, tingly, or numb.  These symptoms may represent a serious problem that is an emergency. Do not wait to see if the symptoms will go away. Get medical help right away. Call your local emergency services (911 in the [place] Do not drive yourself to the hospital.  Summary  · After the procedure, it is common to have bruising that usually fades within 1-2 weeks.  · Check your femoral site every day for signs of infection.  · Do not lift anything that is heavier than 10 lb (4.5 kg), or the limit that you are told, until your health care provider says that it is safe.  This information is not intended to replace advice given to you by your health care provider. Make sure you discuss any questions you have with your health care provider.  Document Revised: 12/31/2018 Document Reviewed: 12/31/2018  Elsevier Patient Education © 2021 [employer]

## 2022-02-15 NOTE — NURSING NOTE
Pt is awaiting ride home. Discharge instructions were given to pt and medications and discharge medications were brought up from pharmacy.

## 2022-02-20 LAB
QT INTERVAL: 322 MS
QTC INTERVAL: 408 MS

## 2022-02-21 ENCOUNTER — APPOINTMENT (OUTPATIENT)
Dept: GENERAL RADIOLOGY | Facility: HOSPITAL | Age: 57
End: 2022-02-21

## 2022-02-21 ENCOUNTER — HOSPITAL ENCOUNTER (INPATIENT)
Facility: HOSPITAL | Age: 57
LOS: 2 days | Discharge: HOME | End: 2022-02-24
Attending: STUDENT IN AN ORGANIZED HEALTH CARE EDUCATION/TRAINING PROGRAM | Admitting: INTERNAL MEDICINE

## 2022-02-21 ENCOUNTER — OFFICE VISIT (OUTPATIENT)
Dept: PODIATRY | Facility: CLINIC | Age: 57
End: 2022-02-21

## 2022-02-21 VITALS — OXYGEN SATURATION: 97 % | BODY MASS INDEX: 34.65 KG/M2 | WEIGHT: 270 LBS | HEIGHT: 74 IN | HEART RATE: 70 BPM

## 2022-02-21 DIAGNOSIS — M79.671 RIGHT FOOT PAIN: Primary | ICD-10-CM

## 2022-02-21 DIAGNOSIS — M72.2 PLANTAR FASCIITIS: ICD-10-CM

## 2022-02-21 DIAGNOSIS — I48.91 ATRIAL FIBRILLATION WITH RVR: Primary | ICD-10-CM

## 2022-02-21 LAB
ALBUMIN SERPL-MCNC: 4 G/DL (ref 3.5–5.2)
ALBUMIN/GLOB SERPL: 1.7 G/DL
ALP SERPL-CCNC: 82 U/L (ref 39–117)
ALT SERPL W P-5'-P-CCNC: 17 U/L (ref 1–41)
ANION GAP SERPL CALCULATED.3IONS-SCNC: 9 MMOL/L (ref 5–15)
AST SERPL-CCNC: 12 U/L (ref 1–40)
BASOPHILS # BLD AUTO: 0.03 10*3/MM3 (ref 0–0.2)
BASOPHILS NFR BLD AUTO: 0.5 % (ref 0–1.5)
BILIRUB SERPL-MCNC: 0.6 MG/DL (ref 0–1.2)
BUN SERPL-MCNC: 15 MG/DL (ref 6–20)
BUN/CREAT SERPL: 15.8 (ref 7–25)
CALCIUM SPEC-SCNC: 9.1 MG/DL (ref 8.6–10.5)
CHLORIDE SERPL-SCNC: 101 MMOL/L (ref 98–107)
CO2 SERPL-SCNC: 25 MMOL/L (ref 22–29)
CREAT SERPL-MCNC: 0.95 MG/DL (ref 0.76–1.27)
DEPRECATED RDW RBC AUTO: 42.1 FL (ref 37–54)
EOSINOPHIL # BLD AUTO: 0.11 10*3/MM3 (ref 0–0.4)
EOSINOPHIL NFR BLD AUTO: 1.7 % (ref 0.3–6.2)
ERYTHROCYTE [DISTWIDTH] IN BLOOD BY AUTOMATED COUNT: 13.9 % (ref 12.3–15.4)
FLUAV RNA RESP QL NAA+PROBE: NOT DETECTED
FLUBV RNA RESP QL NAA+PROBE: NOT DETECTED
GFR SERPL CREATININE-BSD FRML MDRD: 82 ML/MIN/1.73
GLOBULIN UR ELPH-MCNC: 2.3 GM/DL
GLUCOSE BLDC GLUCOMTR-MCNC: 319 MG/DL (ref 70–130)
GLUCOSE SERPL-MCNC: 445 MG/DL (ref 65–99)
HCT VFR BLD AUTO: 43.8 % (ref 37.5–51)
HGB BLD-MCNC: 14.9 G/DL (ref 13–17.7)
HOLD SPECIMEN: NORMAL
HOLD SPECIMEN: NORMAL
IMM GRANULOCYTES # BLD AUTO: 0.05 10*3/MM3 (ref 0–0.05)
IMM GRANULOCYTES NFR BLD AUTO: 0.8 % (ref 0–0.5)
LYMPHOCYTES # BLD AUTO: 1 10*3/MM3 (ref 0.7–3.1)
LYMPHOCYTES NFR BLD AUTO: 15.2 % (ref 19.6–45.3)
MCH RBC QN AUTO: 28.3 PG (ref 26.6–33)
MCHC RBC AUTO-ENTMCNC: 34 G/DL (ref 31.5–35.7)
MCV RBC AUTO: 83.3 FL (ref 79–97)
MONOCYTES # BLD AUTO: 0.32 10*3/MM3 (ref 0.1–0.9)
MONOCYTES NFR BLD AUTO: 4.9 % (ref 5–12)
NEUTROPHILS NFR BLD AUTO: 5.08 10*3/MM3 (ref 1.7–7)
NEUTROPHILS NFR BLD AUTO: 76.9 % (ref 42.7–76)
NRBC BLD AUTO-RTO: 0 /100 WBC (ref 0–0.2)
NT-PROBNP SERPL-MCNC: 1328 PG/ML (ref 0–900)
PLATELET # BLD AUTO: 175 10*3/MM3 (ref 140–450)
PMV BLD AUTO: 11 FL (ref 6–12)
POTASSIUM SERPL-SCNC: 4.8 MMOL/L (ref 3.5–5.2)
PROT SERPL-MCNC: 6.3 G/DL (ref 6–8.5)
RBC # BLD AUTO: 5.26 10*6/MM3 (ref 4.14–5.8)
SARS-COV-2 RNA RESP QL NAA+PROBE: NOT DETECTED
SODIUM SERPL-SCNC: 135 MMOL/L (ref 136–145)
TROPONIN T SERPL-MCNC: <0.01 NG/ML (ref 0–0.03)
TROPONIN T SERPL-MCNC: <0.01 NG/ML (ref 0–0.03)
WBC NRBC COR # BLD: 6.59 10*3/MM3 (ref 3.4–10.8)
WHOLE BLOOD HOLD SPECIMEN: NORMAL
WHOLE BLOOD HOLD SPECIMEN: NORMAL

## 2022-02-21 PROCEDURE — 63710000001 INSULIN ASPART PER 5 UNITS: Performed by: INTERNAL MEDICINE

## 2022-02-21 PROCEDURE — 63710000001 INSULIN REGULAR HUMAN PER 5 UNITS: Performed by: INTERNAL MEDICINE

## 2022-02-21 PROCEDURE — 71045 X-RAY EXAM CHEST 1 VIEW: CPT

## 2022-02-21 PROCEDURE — 85025 COMPLETE CBC W/AUTO DIFF WBC: CPT | Performed by: STUDENT IN AN ORGANIZED HEALTH CARE EDUCATION/TRAINING PROGRAM

## 2022-02-21 PROCEDURE — 99203 OFFICE O/P NEW LOW 30 MIN: CPT | Performed by: PODIATRIST

## 2022-02-21 PROCEDURE — 93005 ELECTROCARDIOGRAM TRACING: CPT | Performed by: STUDENT IN AN ORGANIZED HEALTH CARE EDUCATION/TRAINING PROGRAM

## 2022-02-21 PROCEDURE — 99284 EMERGENCY DEPT VISIT MOD MDM: CPT

## 2022-02-21 PROCEDURE — 83880 ASSAY OF NATRIURETIC PEPTIDE: CPT | Performed by: STUDENT IN AN ORGANIZED HEALTH CARE EDUCATION/TRAINING PROGRAM

## 2022-02-21 PROCEDURE — 82962 GLUCOSE BLOOD TEST: CPT

## 2022-02-21 PROCEDURE — G0378 HOSPITAL OBSERVATION PER HR: HCPCS

## 2022-02-21 PROCEDURE — 25010000002 ONDANSETRON PER 1 MG: Performed by: INTERNAL MEDICINE

## 2022-02-21 PROCEDURE — 80053 COMPREHEN METABOLIC PANEL: CPT | Performed by: STUDENT IN AN ORGANIZED HEALTH CARE EDUCATION/TRAINING PROGRAM

## 2022-02-21 PROCEDURE — 20550 NJX 1 TENDON SHEATH/LIGAMENT: CPT | Performed by: PODIATRIST

## 2022-02-21 PROCEDURE — 93005 ELECTROCARDIOGRAM TRACING: CPT

## 2022-02-21 PROCEDURE — 93010 ELECTROCARDIOGRAM REPORT: CPT | Performed by: INTERNAL MEDICINE

## 2022-02-21 PROCEDURE — 87636 SARSCOV2 & INF A&B AMP PRB: CPT | Performed by: STUDENT IN AN ORGANIZED HEALTH CARE EDUCATION/TRAINING PROGRAM

## 2022-02-21 PROCEDURE — 84484 ASSAY OF TROPONIN QUANT: CPT | Performed by: STUDENT IN AN ORGANIZED HEALTH CARE EDUCATION/TRAINING PROGRAM

## 2022-02-21 RX ORDER — DILTIAZEM HYDROCHLORIDE 5 MG/ML
10 INJECTION INTRAVENOUS ONCE
Status: COMPLETED | OUTPATIENT
Start: 2022-02-21 | End: 2022-02-21

## 2022-02-21 RX ORDER — METOPROLOL SUCCINATE 50 MG/1
150 TABLET, EXTENDED RELEASE ORAL DAILY
Status: DISCONTINUED | OUTPATIENT
Start: 2022-02-21 | End: 2022-02-23

## 2022-02-21 RX ORDER — ACETAMINOPHEN 325 MG/1
650 TABLET ORAL EVERY 6 HOURS PRN
Status: DISCONTINUED | OUTPATIENT
Start: 2022-02-21 | End: 2022-02-24 | Stop reason: HOSPADM

## 2022-02-21 RX ORDER — ISOSORBIDE MONONITRATE 30 MG/1
30 TABLET, EXTENDED RELEASE ORAL
Status: DISCONTINUED | OUTPATIENT
Start: 2022-02-21 | End: 2022-02-21

## 2022-02-21 RX ORDER — ONDANSETRON 2 MG/ML
4 INJECTION INTRAMUSCULAR; INTRAVENOUS EVERY 6 HOURS PRN
Status: DISCONTINUED | OUTPATIENT
Start: 2022-02-21 | End: 2022-02-24 | Stop reason: HOSPADM

## 2022-02-21 RX ORDER — AMLODIPINE BESYLATE 5 MG/1
5 TABLET ORAL
Status: DISCONTINUED | OUTPATIENT
Start: 2022-02-22 | End: 2022-02-24

## 2022-02-21 RX ORDER — ASPIRIN 81 MG/1
81 TABLET, CHEWABLE ORAL DAILY
Status: DISCONTINUED | OUTPATIENT
Start: 2022-02-22 | End: 2022-02-24 | Stop reason: HOSPADM

## 2022-02-21 RX ORDER — GUAIFENESIN/DEXTROMETHORPHAN 100-10MG/5
5 SYRUP ORAL EVERY 4 HOURS PRN
Status: DISCONTINUED | OUTPATIENT
Start: 2022-02-21 | End: 2022-02-24 | Stop reason: HOSPADM

## 2022-02-21 RX ORDER — ASPIRIN 325 MG
325 TABLET ORAL ONCE
Status: COMPLETED | OUTPATIENT
Start: 2022-02-21 | End: 2022-02-21

## 2022-02-21 RX ORDER — SODIUM CHLORIDE 0.9 % (FLUSH) 0.9 %
10 SYRINGE (ML) INJECTION EVERY 12 HOURS SCHEDULED
Status: DISCONTINUED | OUTPATIENT
Start: 2022-02-21 | End: 2022-02-24 | Stop reason: HOSPADM

## 2022-02-21 RX ORDER — SODIUM CHLORIDE 0.9 % (FLUSH) 0.9 %
10 SYRINGE (ML) INJECTION AS NEEDED
Status: DISCONTINUED | OUTPATIENT
Start: 2022-02-21 | End: 2022-02-24 | Stop reason: HOSPADM

## 2022-02-21 RX ORDER — ISOSORBIDE MONONITRATE 60 MG/1
60 TABLET, EXTENDED RELEASE ORAL
Status: DISCONTINUED | OUTPATIENT
Start: 2022-02-22 | End: 2022-02-24 | Stop reason: HOSPADM

## 2022-02-21 RX ORDER — DEXTROSE MONOHYDRATE 25 G/50ML
25 INJECTION, SOLUTION INTRAVENOUS
Status: DISCONTINUED | OUTPATIENT
Start: 2022-02-21 | End: 2022-02-24 | Stop reason: HOSPADM

## 2022-02-21 RX ORDER — NORTRIPTYLINE HYDROCHLORIDE 10 MG/1
10 CAPSULE ORAL 3 TIMES DAILY
Status: DISCONTINUED | OUTPATIENT
Start: 2022-02-21 | End: 2022-02-24 | Stop reason: HOSPADM

## 2022-02-21 RX ORDER — BENZONATATE 100 MG/1
200 CAPSULE ORAL 3 TIMES DAILY PRN
Status: DISCONTINUED | OUTPATIENT
Start: 2022-02-21 | End: 2022-02-24 | Stop reason: HOSPADM

## 2022-02-21 RX ORDER — GABAPENTIN 100 MG/1
100 CAPSULE ORAL EVERY 8 HOURS SCHEDULED
Status: DISCONTINUED | OUTPATIENT
Start: 2022-02-21 | End: 2022-02-24 | Stop reason: HOSPADM

## 2022-02-21 RX ORDER — RANOLAZINE 500 MG/1
500 TABLET, EXTENDED RELEASE ORAL EVERY 12 HOURS SCHEDULED
Status: DISCONTINUED | OUTPATIENT
Start: 2022-02-21 | End: 2022-02-24 | Stop reason: HOSPADM

## 2022-02-21 RX ORDER — CLOPIDOGREL BISULFATE 75 MG/1
75 TABLET ORAL DAILY
Status: DISCONTINUED | OUTPATIENT
Start: 2022-02-21 | End: 2022-02-24 | Stop reason: HOSPADM

## 2022-02-21 RX ORDER — ALBUTEROL SULFATE 2.5 MG/3ML
2.5 SOLUTION RESPIRATORY (INHALATION) EVERY 6 HOURS PRN
Status: DISCONTINUED | OUTPATIENT
Start: 2022-02-21 | End: 2022-02-24 | Stop reason: HOSPADM

## 2022-02-21 RX ORDER — ATORVASTATIN CALCIUM 40 MG/1
40 TABLET, FILM COATED ORAL NIGHTLY
Status: DISCONTINUED | OUTPATIENT
Start: 2022-02-21 | End: 2022-02-24 | Stop reason: HOSPADM

## 2022-02-21 RX ORDER — NICOTINE POLACRILEX 4 MG
15 LOZENGE BUCCAL
Status: DISCONTINUED | OUTPATIENT
Start: 2022-02-21 | End: 2022-02-24 | Stop reason: HOSPADM

## 2022-02-21 RX ADMIN — GABAPENTIN 100 MG: 100 CAPSULE ORAL at 20:45

## 2022-02-21 RX ADMIN — METOPROLOL SUCCINATE 150 MG: 50 TABLET, EXTENDED RELEASE ORAL at 18:17

## 2022-02-21 RX ADMIN — METFORMIN HYDROCHLORIDE 1000 MG: 500 TABLET ORAL at 18:17

## 2022-02-21 RX ADMIN — ASPIRIN 325 MG: 325 TABLET ORAL at 13:39

## 2022-02-21 RX ADMIN — BENZONATATE 200 MG: 100 CAPSULE ORAL at 20:58

## 2022-02-21 RX ADMIN — ACETAMINOPHEN 650 MG: 325 TABLET, FILM COATED ORAL at 23:22

## 2022-02-21 RX ADMIN — NORTRIPTYLINE HYDROCHLORIDE 10 MG: 10 CAPSULE ORAL at 20:45

## 2022-02-21 RX ADMIN — CLOPIDOGREL BISULFATE 75 MG: 75 TABLET, FILM COATED ORAL at 18:17

## 2022-02-21 RX ADMIN — RANOLAZINE 500 MG: 500 TABLET, FILM COATED, EXTENDED RELEASE ORAL at 20:45

## 2022-02-21 RX ADMIN — ATORVASTATIN CALCIUM 40 MG: 40 TABLET, FILM COATED ORAL at 20:45

## 2022-02-21 RX ADMIN — HUMAN INSULIN 20 UNITS: 100 INJECTION, SOLUTION SUBCUTANEOUS at 15:22

## 2022-02-21 RX ADMIN — DILTIAZEM HYDROCHLORIDE 10 MG: 5 INJECTION INTRAVENOUS at 13:40

## 2022-02-21 RX ADMIN — ISOSORBIDE MONONITRATE 30 MG: 30 TABLET, EXTENDED RELEASE ORAL at 18:17

## 2022-02-21 RX ADMIN — INSULIN ASPART 10 UNITS: 100 INJECTION, SOLUTION INTRAVENOUS; SUBCUTANEOUS at 18:17

## 2022-02-21 RX ADMIN — APIXABAN 5 MG: 5 TABLET, FILM COATED ORAL at 20:45

## 2022-02-21 RX ADMIN — ACETAMINOPHEN 650 MG: 325 TABLET, FILM COATED ORAL at 15:22

## 2022-02-21 RX ADMIN — ONDANSETRON 4 MG: 2 INJECTION INTRAMUSCULAR; INTRAVENOUS at 15:22

## 2022-02-21 RX ADMIN — SODIUM CHLORIDE 5 MG/HR: 900 INJECTION, SOLUTION INTRAVENOUS at 14:11

## 2022-02-21 NOTE — PROGRESS NOTES
Herbert White Sr.  1965  56 y.o. male  PCP: Artemio Sanchez 2/1/22  BS: 220    Right heel pain    02/21/2022    Chief Complaint   Patient presents with   • Right Foot - Pain       History of Present Illness    Herbert White Sr. is a 56 y.o.male presents to clinic today with chief complaint of right heel pain.  Pain is localized to the bottom of the heel.  This started in November after stepping on the heel wrong.  It has not improved at all.  No other complaints.    Past Medical History:   Diagnosis Date   • Coronary artery disease    • Diabetes mellitus (HCC)    • Diabetic retinopathy (HCC)    • GERD (gastroesophageal reflux disease)    • Hyperlipidemia    • Hypertension    • Osteoarthritis    • Paroxysmal atrial fibrillation (HCC)    • Sleep apnea    • Wears glasses          Past Surgical History:   Procedure Laterality Date   • CARDIAC CATHETERIZATION N/A 9/3/2019   • CARDIAC CATHETERIZATION N/A 2/14/2022    Procedure: Left Heart Cath;  Surgeon: Torie Lewis MD;  Location: [facility] CATH INVASIVE LOCATION;  Service: Cardiology;  Laterality: N/A;   • CHOLECYSTECTOMY     • COLONOSCOPY N/A 5/24/2021    Procedure: COLONOSCOPY;  Surgeon: Dewey Antoine MD;  Location: [facility] ENDOSCOPY;  Service: Gastroenterology;  Laterality: N/A;   • COLONOSCOPY N/A 1/5/2022    Procedure: COLONOSCOPY;  Surgeon: Dewey Antoine MD;  Location: [facility] ENDOSCOPY;  Service: Gastroenterology;  Laterality: N/A;   • CORONARY ARTERY BYPASS GRAFT N/A 9/25/2019   • TONSILLECTOMY           Family History   Problem Relation Age of Onset   • Diabetes Mother    • Hypertension Father        Allergies   Allergen Reactions   • Ace Inhibitors Anaphylaxis   • Wellbutrin [Bupropion] Anaphylaxis       Social History     Socioeconomic History   • Marital status:    Tobacco Use   • Smoking status: Former Smoker     Packs/day: 1.00     Years: 35.00     Pack years: 35.00     Types: Cigarettes     Start date: 8/27/1980     Quit  "date: 9/3/2019     Years since quittin.4   • Smokeless tobacco: Never Used   Vaping Use   • Vaping Use: Never used   Substance and Sexual Activity   • Alcohol use: No   • Drug use: No   • Sexual activity: Defer     Comment: .         Current Outpatient Medications   Medication Sig Dispense Refill   • albuterol sulfate  (90 Base) MCG/ACT inhaler Inhale 2 puffs Every 4 (Four) Hours As Needed for Wheezing. 18 g 11   • Alcohol Swabs 70 % pads 1 each Every Morning. 100 each 3   • apixaban (ELIQUIS) 5 MG tablet tablet Take 1 tablet by mouth Every 12 (Twelve) Hours. 60 tablet 2   • aspirin 81 MG chewable tablet Chew 81 mg Daily.     • aspirin 81 MG chewable tablet Chew 1 tablet by mouth Daily 30 tablet 0   • atorvastatin (LIPITOR) 40 MG tablet Take 1 tablet by mouth Every Night for 90 days. 90 tablet 0   • benzonatate (TESSALON) 200 MG capsule Take 1 capsule by mouth 3 (Three) Times a Day As Needed for Cough. 30 capsule 0   • clopidogrel (PLAVIX) 75 MG tablet Take 1 tablet by mouth Daily. 90 tablet 3   • dilTIAZem CD (Cardizem CD) 180 MG 24 hr capsule Take 1 capsule by mouth Daily for 90 days. 90 capsule 0   • ezetimibe (ZETIA) 10 MG tablet Take 1 tablet by mouth Daily. 90 tablet 3   • gabapentin (NEURONTIN) 300 MG capsule TAKE 1 CAPSULE BY MOUTH AT BEDTIME FOR 2 DAYS THEN 1 TWICE DAILY FOR 2 DAYS THEN 1 THREE TIMES DAILY THEREAFTER     • glimepiride (Amaryl) 1 MG tablet Take 0.5-1 tablets by mouth Every Morning Before Breakfast. 30 tablet 2   • guaiFENesin-dextromethorphan (ROBITUSSIN DM) 100-10 MG/5ML syrup Take 10 mL by mouth 4 (Four) Times a Day As Needed for Cough. 240 mL 1   • Insulin Pen Needle (Pen Needles 1/2\") 29G X 12MM misc 1 each Every Night. 100 each 3   • isosorbide mononitrate (IMDUR) 30 MG 24 hr tablet Take 1 tablet by mouth Daily. 30 tablet 11   • Lancet Devices (ONE TOUCH DELICA LANCING DEV) misc Use as directed to test blood sugar. 1 each 11   • linaclotide (Linzess) 145 MCG capsule " "capsule Take 1 capsule by mouth Every Morning Before Breakfast. 30 capsule 5   • metFORMIN (GLUCOPHAGE) 500 MG tablet Take 2 tablets by mouth 2 (Two) Times a Day With Meals. 360 tablet 0   • metoprolol succinate XL (TOPROL-XL) 100 MG 24 hr tablet Take 1 and 1/2 tablets by mouth Daily. 45 tablet 3   • nitroglycerin (NITROSTAT) 0.4 MG SL tablet Place 1 tablet under the tongue Every 5 (Five) Minutes As Needed for Chest Pain. Take no more than 3 doses in 15 minutes. 30 tablet 1   • nortriptyline (PAMELOR) 10 MG capsule TAKE 1 CAPSULE BY MOUTH THREE TIMES DAILY 90 capsule 0   • ondansetron ODT (Zofran ODT) 8 MG disintegrating tablet Place 1 tablet on the tongue Every 8 (Eight) Hours As Needed for Nausea or Vomiting. 20 tablet 1   • ONETOUCH DELICA LANCETS 33G misc 1 each 4 (Four) Times a Day. delica if covered, use alternative if not covered 120 each 11   • pioglitazone (Actos) 30 MG tablet Take 1 tablet by mouth Daily. 30 tablet 2   • ranolazine (RANEXA) 500 MG 12 hr tablet Take 1 tablet by mouth Every 12 (Twelve) Hours. 60 tablet 1   • spironolactone (ALDACTONE) 25 MG tablet Take 1 tablet by mouth once daily 30 tablet 0   • tiZANidine (ZANAFLEX) 4 MG tablet Take 4 mg by mouth 3 (Three) Times a Day.       No current facility-administered medications for this visit.       Review of Systems   Constitutional: Positive for chills and fatigue.   Eyes: Positive for visual disturbance.   Respiratory: Negative.    Cardiovascular: Negative.         AFIB   Gastrointestinal: Negative.    Endocrine: Negative.    Genitourinary: Negative.    Musculoskeletal: Positive for back pain and joint swelling.   Allergic/Immunologic: Negative.    Neurological: Positive for dizziness.   Hematological: Negative.    Psychiatric/Behavioral: Negative.          OBJECTIVE    Pulse 70   Ht 188 cm (74\")   Wt 122 kg (270 lb)   SpO2 97%   BMI 34.67 kg/m²     Physical Exam  Vitals reviewed.   Constitutional:       General: He is not in acute " distress.     Appearance: He is well-developed.   HENT:      Head: Normocephalic and atraumatic.      Nose: Nose normal.   Eyes:      Conjunctiva/sclera: Conjunctivae normal.      Pupils: Pupils are equal, round, and reactive to light.   Pulmonary:      Effort: Pulmonary effort is normal. No respiratory distress.      Breath sounds: No wheezing.   Musculoskeletal:         General: Tenderness present. No deformity. Normal range of motion.   Skin:     General: Skin is warm and dry.      Capillary Refill: Capillary refill takes less than 2 seconds.   Neurological:      Mental Status: He is alert and oriented to person, place, and time.   Psychiatric:         Behavior: Behavior normal.         Thought Content: Thought content normal.          Right  Lower Extremity Exam:     Cardiovascular:    CFT immediate to distal digits  No erythema or edema  Musculoskeletal:  Muscle strength is WNL  ROM of the 1st MTP is decreased  ROM of the ankle joint is decreased  Pain on palpation to medial tubercle of the right calcaneus.  Negative squeeze test.  Dermatological:   Skin warm, dry and intact  Neurological:   Sensation intact to light touch      Foot/Ankle Exam        Procedures    Plantar Fasciits Injection  Date/Time: 02/21/2022  Performed by: JOSE DE JESUS PEOPLES  Authorized by: JOSE DE JESUS PEOPLES   Consent: Verbal consent obtained. Written consent obtained.  Risks and benefits: risks, benefits and alternatives were discussed  Consent given by: patient  Patient identity confirmed: verbally with patient  Indications: pain relief  Nerve block body site: right  heel.  Sedation:  Patient sedated: no    Patient position: sitting  Needle size: 27 G  Local anesthetic: 0.5% Marcaine plain, Kenalog 40 mg/ml , Decadron 4 mg/mL.   Outcome: pain improved  Patient tolerance: Patient tolerated the procedure well with no immediate complications   ASSESSMENT AND PLAN    Diagnoses and all orders for this visit:    1. Right foot pain (Primary)  -      XR Foot 3+ View Right    2. Plantar fasciitis        - Comprehensive foot and ankle exam performed  - X-rays taken and reviewed.  No acute osseous or articular abnormalities.  - Steroid injection right heel  - Patient advised to stretch, ice and to make appropriate shoe gear changes to include wearing athletic type shoes with supportive insoles. Patient was given written instructions on how to correctly perform the stretching of the Achilles tendon/calf stretches, and the heel spur/plantar fasciitis regimen. Limit bare foot walking.    - Recommended over-the-counter insole such as power steps, spenco or walk fit  to properly support the arch in order to alleviate the tension and stress on the plantar fascia associated with normal daily walking. Patient was educated on the break-in period for new arch supports.  - All questions were answered to the patient's satisfaction.  - RTC in 6-8 weeks as needed           This document has been electronically signed by Erick Gerardo DPM on February 21, 2022 10:12 CST     2/21/2022  10:12 CST

## 2022-02-22 PROBLEM — I48.91 ATRIAL FIBRILLATION WITH RVR: Chronic | Status: ACTIVE | Noted: 2022-01-21

## 2022-02-22 PROBLEM — I48.92 ATRIAL FIBRILLATION AND FLUTTER (HCC): Status: ACTIVE | Noted: 2022-01-21

## 2022-02-22 PROBLEM — I48.92 ATRIAL FIBRILLATION AND FLUTTER (HCC): Chronic | Status: ACTIVE | Noted: 2022-01-21

## 2022-02-22 LAB
ANION GAP SERPL CALCULATED.3IONS-SCNC: 11 MMOL/L (ref 5–15)
BUN SERPL-MCNC: 17 MG/DL (ref 6–20)
BUN/CREAT SERPL: 24.3 (ref 7–25)
CALCIUM SPEC-SCNC: 8.9 MG/DL (ref 8.6–10.5)
CHLORIDE SERPL-SCNC: 104 MMOL/L (ref 98–107)
CO2 SERPL-SCNC: 21 MMOL/L (ref 22–29)
CREAT SERPL-MCNC: 0.7 MG/DL (ref 0.76–1.27)
GFR SERPL CREATININE-BSD FRML MDRD: 116 ML/MIN/1.73
GLUCOSE SERPL-MCNC: 208 MG/DL (ref 65–99)
MAGNESIUM SERPL-MCNC: 2 MG/DL (ref 1.6–2.6)
POTASSIUM SERPL-SCNC: 4.2 MMOL/L (ref 3.5–5.2)
QT INTERVAL: 310 MS
QT INTERVAL: 384 MS
QTC INTERVAL: 457 MS
QTC INTERVAL: 492 MS
SODIUM SERPL-SCNC: 136 MMOL/L (ref 136–145)
TSH SERPL DL<=0.05 MIU/L-ACNC: 0.61 UIU/ML (ref 0.27–4.2)
WHOLE BLOOD HOLD SPECIMEN: NORMAL

## 2022-02-22 PROCEDURE — 80048 BASIC METABOLIC PNL TOTAL CA: CPT | Performed by: INTERNAL MEDICINE

## 2022-02-22 PROCEDURE — 84443 ASSAY THYROID STIM HORMONE: CPT | Performed by: INTERNAL MEDICINE

## 2022-02-22 PROCEDURE — 63710000001 INSULIN ASPART PER 5 UNITS: Performed by: INTERNAL MEDICINE

## 2022-02-22 PROCEDURE — 93010 ELECTROCARDIOGRAM REPORT: CPT | Performed by: INTERNAL MEDICINE

## 2022-02-22 PROCEDURE — 83735 ASSAY OF MAGNESIUM: CPT | Performed by: INTERNAL MEDICINE

## 2022-02-22 PROCEDURE — 93005 ELECTROCARDIOGRAM TRACING: CPT | Performed by: INTERNAL MEDICINE

## 2022-02-22 PROCEDURE — 82962 GLUCOSE BLOOD TEST: CPT

## 2022-02-22 RX ORDER — METOPROLOL SUCCINATE 50 MG/1
200 TABLET, EXTENDED RELEASE ORAL DAILY
Status: CANCELLED | OUTPATIENT
Start: 2022-02-22

## 2022-02-22 RX ADMIN — GUAIFENESIN AND DEXTROMETHORPHAN 5 ML: 100; 10 SYRUP ORAL at 12:56

## 2022-02-22 RX ADMIN — ACETAMINOPHEN 650 MG: 325 TABLET, FILM COATED ORAL at 06:32

## 2022-02-22 RX ADMIN — APIXABAN 5 MG: 5 TABLET, FILM COATED ORAL at 21:21

## 2022-02-22 RX ADMIN — RANOLAZINE 500 MG: 500 TABLET, FILM COATED, EXTENDED RELEASE ORAL at 08:27

## 2022-02-22 RX ADMIN — NORTRIPTYLINE HYDROCHLORIDE 10 MG: 10 CAPSULE ORAL at 08:27

## 2022-02-22 RX ADMIN — NORTRIPTYLINE HYDROCHLORIDE 10 MG: 10 CAPSULE ORAL at 15:20

## 2022-02-22 RX ADMIN — ISOSORBIDE MONONITRATE 60 MG: 60 TABLET, EXTENDED RELEASE ORAL at 08:28

## 2022-02-22 RX ADMIN — GABAPENTIN 100 MG: 100 CAPSULE ORAL at 15:20

## 2022-02-22 RX ADMIN — ACETAMINOPHEN 650 MG: 325 TABLET, FILM COATED ORAL at 19:59

## 2022-02-22 RX ADMIN — ASPIRIN 81 MG: 81 TABLET, CHEWABLE ORAL at 08:28

## 2022-02-22 RX ADMIN — RANOLAZINE 500 MG: 500 TABLET, FILM COATED, EXTENDED RELEASE ORAL at 21:21

## 2022-02-22 RX ADMIN — INSULIN ASPART 3 UNITS: 100 INJECTION, SOLUTION INTRAVENOUS; SUBCUTANEOUS at 17:04

## 2022-02-22 RX ADMIN — METOPROLOL SUCCINATE 150 MG: 50 TABLET, EXTENDED RELEASE ORAL at 08:29

## 2022-02-22 RX ADMIN — METFORMIN HYDROCHLORIDE 1000 MG: 500 TABLET ORAL at 17:04

## 2022-02-22 RX ADMIN — CLOPIDOGREL BISULFATE 75 MG: 75 TABLET, FILM COATED ORAL at 08:28

## 2022-02-22 RX ADMIN — Medication 10 ML: at 21:23

## 2022-02-22 RX ADMIN — INSULIN ASPART 5 UNITS: 100 INJECTION, SOLUTION INTRAVENOUS; SUBCUTANEOUS at 08:28

## 2022-02-22 RX ADMIN — INSULIN ASPART 8 UNITS: 100 INJECTION, SOLUTION INTRAVENOUS; SUBCUTANEOUS at 11:14

## 2022-02-22 RX ADMIN — METFORMIN HYDROCHLORIDE 1000 MG: 500 TABLET ORAL at 08:27

## 2022-02-22 RX ADMIN — NORTRIPTYLINE HYDROCHLORIDE 10 MG: 10 CAPSULE ORAL at 21:21

## 2022-02-22 RX ADMIN — GABAPENTIN 100 MG: 100 CAPSULE ORAL at 21:22

## 2022-02-22 RX ADMIN — ATORVASTATIN CALCIUM 40 MG: 40 TABLET, FILM COATED ORAL at 21:22

## 2022-02-22 RX ADMIN — GABAPENTIN 100 MG: 100 CAPSULE ORAL at 06:32

## 2022-02-22 RX ADMIN — AMLODIPINE BESYLATE 5 MG: 5 TABLET ORAL at 08:27

## 2022-02-22 RX ADMIN — APIXABAN 5 MG: 5 TABLET, FILM COATED ORAL at 08:28

## 2022-02-22 RX ADMIN — Medication 10 ML: at 08:32

## 2022-02-23 ENCOUNTER — PATIENT ROUNDING (BHMG ONLY) (OUTPATIENT)
Dept: PODIATRY | Facility: CLINIC | Age: 57
End: 2022-02-23

## 2022-02-23 PROBLEM — I25.10 CAD (CORONARY ARTERY DISEASE): Status: ACTIVE | Noted: 2019-09-03

## 2022-02-23 PROBLEM — I25.10 CAD (CORONARY ARTERY DISEASE): Chronic | Status: ACTIVE | Noted: 2019-09-03

## 2022-02-23 LAB
QT INTERVAL: 342 MS
QTC INTERVAL: 469 MS

## 2022-02-23 PROCEDURE — 63710000001 INSULIN ASPART PER 5 UNITS: Performed by: INTERNAL MEDICINE

## 2022-02-23 PROCEDURE — 93005 ELECTROCARDIOGRAM TRACING: CPT | Performed by: INTERNAL MEDICINE

## 2022-02-23 PROCEDURE — 25010000002 AMIODARONE IN DEXTROSE 5% 150-4.21 MG/100ML-% SOLUTION: Performed by: NURSE PRACTITIONER

## 2022-02-23 PROCEDURE — 99222 1ST HOSP IP/OBS MODERATE 55: CPT | Performed by: INTERNAL MEDICINE

## 2022-02-23 PROCEDURE — 25010000002 ONDANSETRON PER 1 MG: Performed by: INTERNAL MEDICINE

## 2022-02-23 PROCEDURE — 93010 ELECTROCARDIOGRAM REPORT: CPT | Performed by: INTERNAL MEDICINE

## 2022-02-23 PROCEDURE — 82962 GLUCOSE BLOOD TEST: CPT

## 2022-02-23 RX ORDER — AMIODARONE HYDROCHLORIDE 200 MG/1
200 TABLET ORAL 2 TIMES DAILY WITH MEALS
Status: DISCONTINUED | OUTPATIENT
Start: 2022-02-23 | End: 2022-02-24 | Stop reason: HOSPADM

## 2022-02-23 RX ORDER — DILTIAZEM HYDROCHLORIDE 180 MG/1
180 CAPSULE, COATED, EXTENDED RELEASE ORAL
Status: DISCONTINUED | OUTPATIENT
Start: 2022-02-23 | End: 2022-02-23

## 2022-02-23 RX ORDER — METOPROLOL SUCCINATE 50 MG/1
200 TABLET, EXTENDED RELEASE ORAL DAILY
Status: DISCONTINUED | OUTPATIENT
Start: 2022-02-24 | End: 2022-02-24

## 2022-02-23 RX ADMIN — GABAPENTIN 100 MG: 100 CAPSULE ORAL at 21:21

## 2022-02-23 RX ADMIN — METFORMIN HYDROCHLORIDE 1000 MG: 500 TABLET ORAL at 17:26

## 2022-02-23 RX ADMIN — ASPIRIN 81 MG: 81 TABLET, CHEWABLE ORAL at 08:09

## 2022-02-23 RX ADMIN — ONDANSETRON 4 MG: 2 INJECTION INTRAMUSCULAR; INTRAVENOUS at 15:40

## 2022-02-23 RX ADMIN — METFORMIN HYDROCHLORIDE 1000 MG: 500 TABLET ORAL at 08:09

## 2022-02-23 RX ADMIN — INSULIN ASPART 5 UNITS: 100 INJECTION, SOLUTION INTRAVENOUS; SUBCUTANEOUS at 08:10

## 2022-02-23 RX ADMIN — RANOLAZINE 500 MG: 500 TABLET, FILM COATED, EXTENDED RELEASE ORAL at 21:21

## 2022-02-23 RX ADMIN — INSULIN ASPART 5 UNITS: 100 INJECTION, SOLUTION INTRAVENOUS; SUBCUTANEOUS at 11:38

## 2022-02-23 RX ADMIN — ACETAMINOPHEN 650 MG: 325 TABLET, FILM COATED ORAL at 15:40

## 2022-02-23 RX ADMIN — NORTRIPTYLINE HYDROCHLORIDE 10 MG: 10 CAPSULE ORAL at 21:21

## 2022-02-23 RX ADMIN — ATORVASTATIN CALCIUM 40 MG: 40 TABLET, FILM COATED ORAL at 21:21

## 2022-02-23 RX ADMIN — METOPROLOL SUCCINATE 150 MG: 50 TABLET, EXTENDED RELEASE ORAL at 08:10

## 2022-02-23 RX ADMIN — GABAPENTIN 100 MG: 100 CAPSULE ORAL at 17:26

## 2022-02-23 RX ADMIN — RANOLAZINE 500 MG: 500 TABLET, FILM COATED, EXTENDED RELEASE ORAL at 08:09

## 2022-02-23 RX ADMIN — NORTRIPTYLINE HYDROCHLORIDE 10 MG: 10 CAPSULE ORAL at 17:26

## 2022-02-23 RX ADMIN — CLOPIDOGREL BISULFATE 75 MG: 75 TABLET, FILM COATED ORAL at 08:09

## 2022-02-23 RX ADMIN — AMIODARONE HYDROCHLORIDE 200 MG: 200 TABLET ORAL at 17:26

## 2022-02-23 RX ADMIN — Medication 10 ML: at 21:21

## 2022-02-23 RX ADMIN — GABAPENTIN 100 MG: 100 CAPSULE ORAL at 06:30

## 2022-02-23 RX ADMIN — AMIODARONE HYDROCHLORIDE 150 MG: 1.5 INJECTION, SOLUTION INTRAVENOUS at 11:37

## 2022-02-23 RX ADMIN — NORTRIPTYLINE HYDROCHLORIDE 10 MG: 10 CAPSULE ORAL at 08:09

## 2022-02-23 RX ADMIN — AMIODARONE HYDROCHLORIDE 200 MG: 200 TABLET ORAL at 11:37

## 2022-02-23 RX ADMIN — APIXABAN 5 MG: 5 TABLET, FILM COATED ORAL at 21:21

## 2022-02-23 RX ADMIN — AMLODIPINE BESYLATE 5 MG: 5 TABLET ORAL at 08:09

## 2022-02-23 RX ADMIN — ISOSORBIDE MONONITRATE 60 MG: 60 TABLET, EXTENDED RELEASE ORAL at 08:09

## 2022-02-23 RX ADMIN — INSULIN ASPART 8 UNITS: 100 INJECTION, SOLUTION INTRAVENOUS; SUBCUTANEOUS at 17:28

## 2022-02-23 RX ADMIN — Medication 10 ML: at 08:10

## 2022-02-23 RX ADMIN — ONDANSETRON 4 MG: 2 INJECTION INTRAMUSCULAR; INTRAVENOUS at 08:10

## 2022-02-23 RX ADMIN — APIXABAN 5 MG: 5 TABLET, FILM COATED ORAL at 08:09

## 2022-02-23 NOTE — PROGRESS NOTES
February 23, 2022    Hello, may I speak with Herbert White Sr.?    My name is ROBIN ALEJANDRE    I am  with [facility]  [address]   [place] [address] [phone].    Before we get started may I verify your date of birth? 1965    I am calling to officially welcome you to our practice and ask about your recent visit. Is this a good time to talk? yes    Tell me about your visit with us. What things went well?  EVERYTHING WENT WELL, EVERY ONE WAS NICE, HE GAVE ME INJECTION FOR PLANTAR FASCIITIS AND TOLD ME TO GET INSERTS AT [facility].       We're always looking for ways to make our patients' experiences even better. Do you have recommendations on ways we may improve?  no    Overall were you satisfied with your first visit to our practice? yes       I appreciate you taking the time to speak with me today. Is there anything else I can do for you? no      Thank you, and have a great day.

## 2022-02-24 ENCOUNTER — READMISSION MANAGEMENT (OUTPATIENT)
Dept: CALL CENTER | Facility: HOSPITAL | Age: 57
End: 2022-02-24

## 2022-02-24 VITALS
BODY MASS INDEX: 34.72 KG/M2 | HEART RATE: 92 BPM | HEIGHT: 74 IN | TEMPERATURE: 97.6 F | RESPIRATION RATE: 16 BRPM | SYSTOLIC BLOOD PRESSURE: 125 MMHG | OXYGEN SATURATION: 98 % | DIASTOLIC BLOOD PRESSURE: 76 MMHG | WEIGHT: 270.5 LBS

## 2022-02-24 LAB
ANION GAP SERPL CALCULATED.3IONS-SCNC: 8 MMOL/L (ref 5–15)
BUN SERPL-MCNC: 20 MG/DL (ref 6–20)
BUN/CREAT SERPL: 19.4 (ref 7–25)
CALCIUM SPEC-SCNC: 9.1 MG/DL (ref 8.6–10.5)
CHLORIDE SERPL-SCNC: 102 MMOL/L (ref 98–107)
CO2 SERPL-SCNC: 26 MMOL/L (ref 22–29)
CREAT SERPL-MCNC: 1.03 MG/DL (ref 0.76–1.27)
GFR SERPL CREATININE-BSD FRML MDRD: 74 ML/MIN/1.73
GLUCOSE BLDC GLUCOMTR-MCNC: 180 MG/DL (ref 70–130)
GLUCOSE BLDC GLUCOMTR-MCNC: 191 MG/DL (ref 70–130)
GLUCOSE BLDC GLUCOMTR-MCNC: 198 MG/DL (ref 70–130)
GLUCOSE BLDC GLUCOMTR-MCNC: 200 MG/DL (ref 70–130)
GLUCOSE BLDC GLUCOMTR-MCNC: 212 MG/DL (ref 70–130)
GLUCOSE BLDC GLUCOMTR-MCNC: 230 MG/DL (ref 70–130)
GLUCOSE BLDC GLUCOMTR-MCNC: 246 MG/DL (ref 70–130)
GLUCOSE BLDC GLUCOMTR-MCNC: 288 MG/DL (ref 70–130)
GLUCOSE BLDC GLUCOMTR-MCNC: 290 MG/DL (ref 70–130)
GLUCOSE SERPL-MCNC: 197 MG/DL (ref 65–99)
POTASSIUM SERPL-SCNC: 4.3 MMOL/L (ref 3.5–5.2)
SODIUM SERPL-SCNC: 136 MMOL/L (ref 136–145)

## 2022-02-24 PROCEDURE — 80048 BASIC METABOLIC PNL TOTAL CA: CPT | Performed by: INTERNAL MEDICINE

## 2022-02-24 PROCEDURE — 99233 SBSQ HOSP IP/OBS HIGH 50: CPT | Performed by: NURSE PRACTITIONER

## 2022-02-24 PROCEDURE — 63710000001 INSULIN ASPART PER 5 UNITS: Performed by: INTERNAL MEDICINE

## 2022-02-24 PROCEDURE — 82962 GLUCOSE BLOOD TEST: CPT

## 2022-02-24 PROCEDURE — 25010000002 ONDANSETRON PER 1 MG: Performed by: INTERNAL MEDICINE

## 2022-02-24 RX ORDER — ACETAMINOPHEN 325 MG/1
650 TABLET ORAL EVERY 6 HOURS PRN
Start: 2022-02-24 | End: 2022-09-13 | Stop reason: SDUPTHER

## 2022-02-24 RX ORDER — METOPROLOL SUCCINATE 50 MG/1
100 TABLET, EXTENDED RELEASE ORAL DAILY
Status: DISCONTINUED | OUTPATIENT
Start: 2022-02-25 | End: 2022-02-24 | Stop reason: HOSPADM

## 2022-02-24 RX ORDER — AMIODARONE HYDROCHLORIDE 200 MG/1
200 TABLET ORAL 2 TIMES DAILY WITH MEALS
Qty: 60 TABLET | Refills: 1 | Status: SHIPPED | OUTPATIENT
Start: 2022-02-24 | End: 2022-03-14 | Stop reason: SDUPTHER

## 2022-02-24 RX ADMIN — AMLODIPINE BESYLATE 5 MG: 5 TABLET ORAL at 08:24

## 2022-02-24 RX ADMIN — METFORMIN HYDROCHLORIDE 1000 MG: 500 TABLET ORAL at 08:24

## 2022-02-24 RX ADMIN — INSULIN ASPART 5 UNITS: 100 INJECTION, SOLUTION INTRAVENOUS; SUBCUTANEOUS at 12:56

## 2022-02-24 RX ADMIN — ACETAMINOPHEN 650 MG: 325 TABLET, FILM COATED ORAL at 08:34

## 2022-02-24 RX ADMIN — ONDANSETRON 4 MG: 2 INJECTION INTRAMUSCULAR; INTRAVENOUS at 08:34

## 2022-02-24 RX ADMIN — APIXABAN 5 MG: 5 TABLET, FILM COATED ORAL at 08:24

## 2022-02-24 RX ADMIN — ONDANSETRON 4 MG: 2 INJECTION INTRAMUSCULAR; INTRAVENOUS at 00:23

## 2022-02-24 RX ADMIN — AMIODARONE HYDROCHLORIDE 200 MG: 200 TABLET ORAL at 08:24

## 2022-02-24 RX ADMIN — NORTRIPTYLINE HYDROCHLORIDE 10 MG: 10 CAPSULE ORAL at 08:25

## 2022-02-24 RX ADMIN — METOPROLOL SUCCINATE 200 MG: 50 TABLET, EXTENDED RELEASE ORAL at 08:23

## 2022-02-24 RX ADMIN — ISOSORBIDE MONONITRATE 60 MG: 60 TABLET, EXTENDED RELEASE ORAL at 08:34

## 2022-02-24 RX ADMIN — INSULIN ASPART 3 UNITS: 100 INJECTION, SOLUTION INTRAVENOUS; SUBCUTANEOUS at 06:44

## 2022-02-24 RX ADMIN — ASPIRIN 81 MG: 81 TABLET, CHEWABLE ORAL at 08:24

## 2022-02-24 RX ADMIN — CLOPIDOGREL BISULFATE 75 MG: 75 TABLET, FILM COATED ORAL at 08:24

## 2022-02-24 RX ADMIN — RANOLAZINE 500 MG: 500 TABLET, FILM COATED, EXTENDED RELEASE ORAL at 08:24

## 2022-02-24 RX ADMIN — GABAPENTIN 100 MG: 100 CAPSULE ORAL at 06:44

## 2022-02-24 RX ADMIN — Medication 10 ML: at 08:25

## 2022-02-24 NOTE — OUTREACH NOTE
Prep Survey      Responses   [facility] patient discharged from? [place]   Is LACE score < 7 ? No   Emergency Room discharge w/ pulse ox? No   Eligibility [facility]   Date of Admission 02/21/22   Date of Discharge 02/24/22   Discharge Disposition Home or Self Care   Discharge diagnosis atrial fib   Does the patient have one of the following disease processes/diagnoses(primary or secondary)? Other   Does the patient have Home health ordered? No   Is there a DME ordered? No   Prep survey completed? Yes          Mei Murray RN

## 2022-02-25 ENCOUNTER — TRANSITIONAL CARE MANAGEMENT TELEPHONE ENCOUNTER (OUTPATIENT)
Dept: CALL CENTER | Facility: HOSPITAL | Age: 57
End: 2022-02-25

## 2022-02-25 NOTE — OUTREACH NOTE
Call Center TCM Note      Responses   [facility] patient discharged from? [place]   Does the patient have one of the following disease processes/diagnoses(primary or secondary)? Other   TCM attempt successful? No   Unsuccessful attempts Attempt 1          Yasmeen Fernandez RN    2/25/2022, 14:50 EST

## 2022-02-25 NOTE — OUTREACH NOTE
"Call Center TCM Note      Responses   [facility] patient discharged from? [place]   Does the patient have one of the following disease processes/diagnoses(primary or secondary)? Other   TCM attempt successful? Yes   Call start time 1550   Call end time 1602   Discharge diagnosis atrial fib   Person spoke with today (if not patient) and relationship Nai - SO   Meds reviewed with patient/caregiver? Yes   Is the patient having any side effects they believe may be caused by any medication additions or changes? No   Does the patient have all medications ordered at discharge? Yes   Is the patient taking all medications as directed (includes completed medication regime)? Yes   Medication comments Taking medications as ordered but wife concerned with the amount of medications that patient has to take--   Comments regarding appointments Encouraged wife to attend Cardiology appt with patient  on 3/4/22   Does the patient have a primary care provider?  Yes   Does the patient have an appointment with their PCP within 7 days of discharge? Yes   Comments regarding PCP Hospital PCP FOLLOW UP APPOINTMENT IS 3/3/22@0900am   Has the patient kept scheduled appointments due by today? N/A   Has home health visited the patient within 72 hours of discharge? N/A   Psychosocial issues? Yes   Did the patient receive a copy of their discharge instructions? Yes   Nursing interventions Reviewed instructions with patient   What is the patient's perception of their health status since discharge? Same  [Wife reports pt still very fatigued, has nausea at times, c/o feeling \"heart beating\" in chest.  Pt does not have a BP cuff to check Bp/pulse, encouraged to borrow one.  Discussed urgent s/s and need to seek care.]   Is the patient/caregiver able to teach back signs and symptoms related to disease process for when to call PCP? Yes   Is the patient/caregiver able to teach back signs and symptoms related to disease process for when to " call 911? Yes   Additional teach back comments REports pt does have edema to feet/ankles at times along with SOA w/exertion--encouraged to discuss this with MD at f/u, return if worsening s/s.    TCM call completed? Yes          Yasmeen Fernandez RN    2/25/2022, 16:07 EST

## 2022-02-28 RX ORDER — DEXAMETHASONE SODIUM PHOSPHATE 4 MG/ML
4 INJECTION, SOLUTION INTRA-ARTICULAR; INTRALESIONAL; INTRAMUSCULAR; INTRAVENOUS; SOFT TISSUE ONCE
Status: COMPLETED | OUTPATIENT
Start: 2022-02-28 | End: 2022-02-28

## 2022-02-28 RX ORDER — TRIAMCINOLONE ACETONIDE 40 MG/ML
40 INJECTION, SUSPENSION INTRA-ARTICULAR; INTRAMUSCULAR ONCE
Status: COMPLETED | OUTPATIENT
Start: 2022-02-28 | End: 2022-02-28

## 2022-02-28 RX ADMIN — TRIAMCINOLONE ACETONIDE 40 MG: 40 INJECTION, SUSPENSION INTRA-ARTICULAR; INTRAMUSCULAR at 07:40

## 2022-02-28 RX ADMIN — DEXAMETHASONE SODIUM PHOSPHATE 4 MG: 4 INJECTION, SOLUTION INTRA-ARTICULAR; INTRALESIONAL; INTRAMUSCULAR; INTRAVENOUS; SOFT TISSUE at 07:40

## 2022-03-03 ENCOUNTER — OFFICE VISIT (OUTPATIENT)
Dept: FAMILY MEDICINE CLINIC | Facility: CLINIC | Age: 57
End: 2022-03-03

## 2022-03-03 VITALS
WEIGHT: 273.5 LBS | HEIGHT: 74 IN | TEMPERATURE: 96.9 F | BODY MASS INDEX: 35.1 KG/M2 | DIASTOLIC BLOOD PRESSURE: 86 MMHG | SYSTOLIC BLOOD PRESSURE: 143 MMHG | HEART RATE: 85 BPM | OXYGEN SATURATION: 94 %

## 2022-03-03 DIAGNOSIS — I48.91 ATRIAL FIBRILLATION AND FLUTTER: ICD-10-CM

## 2022-03-03 DIAGNOSIS — E11.65 TYPE 2 DIABETES MELLITUS WITH HYPERGLYCEMIA, WITHOUT LONG-TERM CURRENT USE OF INSULIN: ICD-10-CM

## 2022-03-03 DIAGNOSIS — I48.92 ATRIAL FIBRILLATION AND FLUTTER: ICD-10-CM

## 2022-03-03 DIAGNOSIS — I25.10 CORONARY ARTERY DISEASE INVOLVING NATIVE HEART, UNSPECIFIED VESSEL OR LESION TYPE, UNSPECIFIED WHETHER ANGINA PRESENT: ICD-10-CM

## 2022-03-03 DIAGNOSIS — Z09 HOSPITAL DISCHARGE FOLLOW-UP: Primary | ICD-10-CM

## 2022-03-03 PROCEDURE — 99214 OFFICE O/P EST MOD 30 MIN: CPT | Performed by: FAMILY MEDICINE

## 2022-03-03 RX ORDER — INSULIN ASPART 100 [IU]/ML
INJECTION, SUSPENSION SUBCUTANEOUS
Qty: 5 PEN | Refills: 0 | Status: SHIPPED | OUTPATIENT
Start: 2022-03-03 | End: 2022-05-16

## 2022-03-03 RX ORDER — AMOXICILLIN 875 MG/1
875 TABLET, COATED ORAL 2 TIMES DAILY
Qty: 20 TABLET | Refills: 0 | Status: SHIPPED | OUTPATIENT
Start: 2022-03-03 | End: 2022-04-09

## 2022-03-03 RX ORDER — PIOGLITAZONEHYDROCHLORIDE 30 MG/1
30 TABLET ORAL DAILY
Qty: 90 TABLET | Refills: 0 | Status: SHIPPED | OUTPATIENT
Start: 2022-03-03 | End: 2022-05-23

## 2022-03-03 RX ORDER — PEN NEEDLE, DIABETIC 29 G X1/2"
1 NEEDLE, DISPOSABLE MISCELLANEOUS NIGHTLY
Qty: 100 EACH | Refills: 3 | Status: SHIPPED | OUTPATIENT
Start: 2022-03-03

## 2022-03-03 NOTE — PROGRESS NOTES
Transitional Care Follow Up Visit  Subjective     Herbert Galindodonald Garza is a 56 y.o. male who presents for a transitional care management visit.    Within 48 business hours after discharge our office contacted him via telephone to coordinate his care and needs.      I reviewed and discussed the details of that call along with the discharge summary, hospital problems, inpatient lab results, inpatient diagnostic studies, and consultation reports with Herbert.     Current outpatient and discharge medications have been reconciled for the patient.  Reviewed by: Artemio Sanchez MD      Date of TCM Phone Call 2/24/2022   [facility]   Date of Admission 2/21/2022   Date of Discharge 2/24/2022   Discharge Disposition Home or Self Care     Risk for Readmission (LACE) Score: 14 (2/24/2022  5:01 AM)      History of Present Illness   Course During Hospital Stay:  Adm 2/21/22 and d/c 2/24/22 for a fib with rvr, and adm 2/14/22 and d/c 2/15/22 for pci with mitesh.  To be on aspirin and plavix 30 days then plavix only.  He is also on eliquis for his a fib.   hga1c 10-11.  He is on insulin, he cant tell me what type.   Also  Injured tip of right great toe when cutting toenail.  Bleeding finally stopped yesterday.      The following portions of the patient's history were reviewed and updated as appropriate: allergies, current medications, past family history, past medical history, past social history, past surgical history and problem list.    Review of Systems   Constitutional: Negative for activity change.   Skin: Positive for wound.       Objective   Physical Exam  Vitals and nursing note reviewed.   Constitutional:       Appearance: Normal appearance. He is well-developed.   HENT:      Head: Normocephalic and atraumatic.      Right Ear: External ear normal.      Left Ear: External ear normal.      Nose: Nose normal.   Eyes:      Extraocular Movements: Extraocular movements intact.      Conjunctiva/sclera: Conjunctivae  "normal.      Pupils: Pupils are equal, round, and reactive to light.   Pulmonary:      Effort: Pulmonary effort is normal.   Musculoskeletal:         General: Normal range of motion.      Cervical back: Normal range of motion.   Skin:     Comments: Tip of right toe with 3mm lac, not bleeding.    Neurological:      General: No focal deficit present.      Mental Status: He is alert and oriented to person, place, and time.   Psychiatric:         Behavior: Behavior normal.         Thought Content: Thought content normal.         Judgment: Judgment normal.         Assessment/Plan   Diagnoses and all orders for this visit:    1. Hospital discharge follow-up (Primary)    2. Type 2 diabetes mellitus with hyperglycemia, without long-term current use of insulin (HCC)    3. Atrial fibrillation and flutter ([facility])    4. Coronary artery disease involving native heart, unspecified vessel or lesion type, unspecified whether angina present    Other orders  -     pioglitazone (Actos) 30 MG tablet; Take 1 tablet by mouth Daily.  Dispense: 90 tablet; Refill: 0  -     insulin aspart prot & aspart (NovoLOG Mix 70/30 FlexPen) (70-30) 100 UNIT/ML suspension pen-injector injection; Start 10u ac breakfast and 5u ac supper. I anticipate will need 50 units per day.  Dispense: 5 pen; Refill: 0  -     Insulin Pen Needle (Pen Needles 1/2\") 29G X 12MM misc; 1 each Every Night.  Dispense: 100 each; Refill: 3  -     amoxicillin (AMOXIL) 875 MG tablet; Take 1 tablet by mouth 2 (Two) Times a Day.  Dispense: 20 tablet; Refill: 0    stay on actos and metformin    Started novolog mix.  Start 10 u ac breakfast and 5 u ac supper.  Check fsbs bid and prn up to qid.     Return next week to adjust insulin, bring fsbs.     He has follow up with cardiology.     We put another dressing on his right great toe.              "

## 2022-03-03 NOTE — PATIENT INSTRUCTIONS
"BMI for Adults  What is BMI?  Body mass index (BMI) is a number that is calculated from a person's weight and height. BMI can help estimate how much of a person's weight is composed of fat. BMI does not measure body fat directly. Rather, it is an alternative to procedures that directly measure body fat, which can be difficult and expensive.  BMI can help identify people who may be at higher risk for certain medical problems.  What are BMI measurements used for?  BMI is used as a screening tool to identify possible weight problems. It helps determine whether a person is obese, overweight, a healthy weight, or underweight.  BMI is useful for:  · Identifying a weight problem that may be related to a medical condition or may increase the risk for medical problems.  · Promoting changes, such as changes in diet and exercise, to help reach a healthy weight. BMI screening can be repeated to see if these changes are working.  How is BMI calculated?  BMI involves measuring your weight in relation to your height. Both height and weight are measured, and the BMI is calculated from those numbers. This can be done either in English ([place] or metric measurements. Note that charts and online BMI calculators are available to help you find your BMI quickly and easily without having to do these calculations yourself.  To calculate your BMI in English ([place] measurements:    1. Measure your weight in pounds (lb).  2. Multiply the number of pounds by 703.  ? For example, for a person who weighs 180 lb, multiply that number by 703, which equals 126,540.  3. Measure your height in inches. Then multiply that number by itself to get a measurement called \"inches squared.\"  ? For example, for a person who is 70 inches tall, the \"inches squared\" measurement is 70 inches x 70 inches, which equals 4,900 inches squared.  4. Divide the total from step 2 (number of lb x 703) by the total from step 3 (inches squared): 126,540 ÷ 4,900 = 25.8. This is " "your BMI.    To calculate your BMI in metric measurements:  1. Measure your weight in kilograms (kg).  2. Measure your height in meters (m). Then multiply that number by itself to get a measurement called \"meters squared.\"  ? For example, for a person who is 1.75 m tall, the \"meters squared\" measurement is 1.75 m x 1.75 m, which is equal to 3.1 meters squared.  3. Divide the number of kilograms (your weight) by the meters squared number. In this example: 70 ÷ 3.1 = 22.6. This is your BMI.  What do the results mean?  BMI charts are used to identify whether you are underweight, normal weight, overweight, or obese. The following guidelines will be used:  · Underweight: BMI less than 18.5.  · Normal weight: BMI between 18.5 and 24.9.  · Overweight: BMI between 25 and 29.9.  · Obese: BMI of 30 or above.  Keep these notes in mind:  · Weight includes both fat and muscle, so someone with a muscular build, such as an athlete, may have a BMI that is higher than 24.9. In cases like these, BMI is not an accurate measure of body fat.  · To determine if excess body fat is the cause of a BMI of 25 or higher, further assessments may need to be done by a health care provider.  · BMI is usually interpreted in the same way for men and women.  Where to find more information  For more information about BMI, including tools to quickly calculate your BMI, go to these websites:  · Centers for Disease Control and Prevention: www.cdc.gov  · American Heart Association: www.heart.org  · National Heart, Lung, and Blood [place]: www.nhlbi.nih.gov  Summary  · Body mass index (BMI) is a number that is calculated from a person's weight and height.  · BMI may help estimate how much of a person's weight is composed of fat. BMI can help identify those who may be at higher risk for certain medical problems.  · BMI can be measured using English measurements or metric measurements.  · BMI charts are used to identify whether you are underweight, normal " weight, overweight, or obese.  This information is not intended to replace advice given to you by your health care provider. Make sure you discuss any questions you have with your health care provider.  Document Revised: 09/09/2020 Document Reviewed: 07/17/2020  [employer]vier Patient Education © 2021 [employer]

## 2022-03-04 ENCOUNTER — PREP FOR SURGERY (OUTPATIENT)
Dept: OTHER | Facility: HOSPITAL | Age: 57
End: 2022-03-04

## 2022-03-04 ENCOUNTER — OFFICE VISIT (OUTPATIENT)
Dept: CARDIOLOGY | Facility: CLINIC | Age: 57
End: 2022-03-04

## 2022-03-04 VITALS
WEIGHT: 280 LBS | HEIGHT: 74 IN | TEMPERATURE: 98.2 F | DIASTOLIC BLOOD PRESSURE: 92 MMHG | BODY MASS INDEX: 35.94 KG/M2 | OXYGEN SATURATION: 94 % | HEART RATE: 102 BPM | SYSTOLIC BLOOD PRESSURE: 160 MMHG

## 2022-03-04 DIAGNOSIS — I48.91 ATRIAL FIBRILLATION AND FLUTTER: Chronic | ICD-10-CM

## 2022-03-04 DIAGNOSIS — I48.91 ATRIAL FIBRILLATION WITH RVR: Primary | ICD-10-CM

## 2022-03-04 DIAGNOSIS — I73.9 PERIPHERAL ARTERIAL DISEASE: ICD-10-CM

## 2022-03-04 DIAGNOSIS — I10 ESSENTIAL HYPERTENSION: Primary | ICD-10-CM

## 2022-03-04 DIAGNOSIS — I48.92 ATRIAL FIBRILLATION AND FLUTTER: Chronic | ICD-10-CM

## 2022-03-04 DIAGNOSIS — I25.10 CORONARY ARTERY DISEASE INVOLVING NATIVE HEART, UNSPECIFIED VESSEL OR LESION TYPE, UNSPECIFIED WHETHER ANGINA PRESENT: Chronic | ICD-10-CM

## 2022-03-04 LAB
QT INTERVAL: 312 MS
QTC INTERVAL: 406 MS

## 2022-03-04 PROCEDURE — 93000 ELECTROCARDIOGRAM COMPLETE: CPT | Performed by: INTERNAL MEDICINE

## 2022-03-04 PROCEDURE — 99214 OFFICE O/P EST MOD 30 MIN: CPT | Performed by: INTERNAL MEDICINE

## 2022-03-04 RX ORDER — SODIUM CHLORIDE 9 MG/ML
50 INJECTION, SOLUTION INTRAVENOUS CONTINUOUS
Status: CANCELLED | OUTPATIENT
Start: 2022-03-04

## 2022-03-04 RX ORDER — SODIUM CHLORIDE 0.9 % (FLUSH) 0.9 %
3 SYRINGE (ML) INJECTION EVERY 12 HOURS SCHEDULED
Status: CANCELLED | OUTPATIENT
Start: 2022-03-04

## 2022-03-04 RX ORDER — SODIUM CHLORIDE 0.9 % (FLUSH) 0.9 %
10 SYRINGE (ML) INJECTION AS NEEDED
Status: CANCELLED | OUTPATIENT
Start: 2022-03-04

## 2022-03-04 NOTE — H&P (VIEW-ONLY)
[facility]  56 y.[facility]      1. Essential hypertension    2. Coronary artery disease involving native heart, unspecified vessel or lesion type, unspecified whether angina present    3. Atrial fibrillation and flutter (HCC)    4. Peripheral arterial disease (HCC)        History of Present Illness:    Body mass index is 35.95 kg/m². BMI is above normal parameters. Recommendations include: exercise counseling, nutrition counseling and referral to primary care.      Patient is a current tobacco user. I have educated the patient on the risks of continued tobacco use. Tobacco cessation education was given <3 min. Patient does not desire tobacco cessation at this time.       56 years old patient who underwent cardiac catheterization given ongoing chest pain and noted to have critical LAD disease PCI stent was performed mid February 2022 and had atrial fibrillation with variable ventricular rate recently hospitalized with rapid ventricular rate.  Patient was started on amiodarone and AV node blocking drug.  He remains symptomatic with exertional dyspnea lack of energy.  He presented office today still in atrial fibrillation resting heart rate 102 bpm.  I will discuss with the patient regarding electrical cardioversion.  Understand willing to proceed forward.   patient was briefly evaluated with Dr. Lewis who set of cardiac catheterizations on Monday will discontinue or hold Eliquis and start the patient on Plavix.  Dwayne and Dr. Dwyer documented history of CAD s/p CABG with a LIMA to LAD vein graft to OM2 and PDA with a CT coronary angiogram patent graft nonobstructive disease in LAD 70% disease left circumflex with a patent graft to obtuse marginal and no significant disease RCA noted.  CT coronary angiogram was reviewed with Dr. Blane Rice and Dr. Dwyer and was of the Continue medical management patient presented for routine follow-up no symptom suggestive of cardiac decompensation such as orthopnea PND  palpitation lightheaded dizziness or intermittent claudication reported.  Patient of normal left ventricle systolic function and a history of premature ventricular complex, hyperlipidemia, diabetes and hypertension patient was at some time metoprolol 100 mg decreased to 25 mg due to bradycardia arrhythmia and significant fatigue          CT coronary angiogram 9/25/2020     CT FUNCTIONAL ANALYSIS:  Ejection Fraction     74 %  Diastolic Volume     136 ml  Systolic Volume      35 ml  Stroke Volume        101 ml  Cardiac Output       4.6 L/minute     IMPRESSION:  CONCLUSION:   1.  Patient's calcium score is 697.This places the patent in the  high likelihood of at least one significant coronary narrowing  risk for coronary artery disease.  2.  LAD: Proximal calcified atherosclerotic plaque causing mild  stenosis of less than 50%. Mid LAD soft and calcified  atherosclerotic plaque foci causing moderate stenosis of 50-70%.  LIMA to distal LAD bypass graft which appears patent.  3.  Circumflex: Proximal calcified atherosclerotic plaque causing  significant stenosis of 70% or greater. No other calcified or  soft tissue density plaque and/or stenosis. Patent saphenous vein  bypass graft to the obtuse marginal second branch.  4.  RCA: No calcified or soft tissue density plaque and no  stenosis. Patent saphenous vein bypass graft to the PDA.  5.  Remainder CT angiogram coronary exam unremarkable.     September 2019 cardiac  Cath demonstrated severe CAD multivessel with lesions LAD 80% DX 70% OM2 60% PDA 70%.     CABG 9/25/2019   DISTAL BYPASS TARGETS:    1. LIMA to LAD.    2. Greater saphenous vein to OM2    3. Greater saphenous vein to PDA   · Left ventricular wall thickness is consistent with mild concentric hypertrophy.  · Estimated EF = 61%.  · Left ventricular systolic function is normal.  · Left ventricular diastolic dysfunction (grade I) consistent with impaired relaxation.  · Mild mitral valve regurgitation is  present  9/3/19  Conclusion:  1.  Severe three-vessel obstructive coronary artery disease involving mid LAD, diagonal, obtuse marginal and RPDA.  2.  Normal left-sided filling pressures.  No gradient noted across aortic valve on pullback  3.  Patent left subclavian and LIMA    SUBJECTIVE:    Allergies   Allergen Reactions   • Ace Inhibitors Anaphylaxis   • Wellbutrin [Bupropion] Anaphylaxis         Past Medical History:   Diagnosis Date   • Coronary artery disease    • COVID-19 virus detected 2022   • Diabetes mellitus (HCC)    • Diabetic retinopathy (HCC)    • GERD (gastroesophageal reflux disease)    • Hyperlipidemia    • Hypertension    • Osteoarthritis    • Paroxysmal atrial fibrillation (HCC)    • Sleep apnea          Past Surgical History:   Procedure Laterality Date   • CARDIAC CATHETERIZATION N/A 9/3/2019   • CARDIAC CATHETERIZATION N/A 2022   • CHOLECYSTECTOMY     • COLONOSCOPY N/A 2021   • COLONOSCOPY N/A 2022   • CORONARY ARTERY BYPASS GRAFT N/A 2019         Family History   Problem Relation Age of Onset   • Diabetes Mother    • Hypertension Father          Social History     Socioeconomic History   • Marital status:    Tobacco Use   • Smoking status: Former Smoker     Packs/day: 1.00     Years: 35.00     Pack years: 35.00     Types: Cigarettes     Start date: 1980     Quit date: 9/3/2019     Years since quittin.5   • Smokeless tobacco: Never Used   Vaping Use   • Vaping Use: Never used   Substance and Sexual Activity   • Alcohol use: No   • Drug use: No   • Sexual activity: Not Currently     Comment: .         Current Outpatient Medications   Medication Sig Dispense Refill   • acetaminophen (TYLENOL) 325 MG tablet Take 2 tablets by mouth Every 6 (Six) Hours As Needed for Mild Pain .     • albuterol sulfate  (90 Base) MCG/ACT inhaler Inhale 2 puffs Every 4 (Four) Hours As Needed for Wheezing. 18 g 11   • amiodarone (PACERONE) 200 MG tablet Take 1  "tablet by mouth 2 (Two) Times a Day With Meals. 60 tablet 1   • amoxicillin (AMOXIL) 875 MG tablet Take 1 tablet by mouth 2 (Two) Times a Day. 20 tablet 0   • apixaban (ELIQUIS) 5 MG tablet tablet Take 1 tablet by mouth Every 12 (Twelve) Hours. 60 tablet 2   • aspirin 81 MG chewable tablet Chew 1 tablet by mouth Daily 30 tablet 0   • atorvastatin (LIPITOR) 40 MG tablet Take 1 tablet by mouth Every Night for 90 days. 90 tablet 0   • clopidogrel (PLAVIX) 75 MG tablet Take 1 tablet by mouth Daily. 90 tablet 3   • gabapentin (NEURONTIN) 300 MG capsule TAKE 1 CAPSULE BY MOUTH AT BEDTIME FOR 2 DAYS THEN 1 TWICE DAILY FOR 2 DAYS THEN 1 THREE TIMES DAILY THEREAFTER     • guaiFENesin-dextromethorphan (ROBITUSSIN DM) 100-10 MG/5ML syrup Take 10 mL by mouth 4 (Four) Times a Day As Needed for Cough. 240 mL 1   • insulin aspart prot & aspart (NovoLOG Mix 70/30 FlexPen) (70-30) 100 UNIT/ML suspension pen-injector injection Start 10u ac breakfast and 5u ac supper. I anticipate will need 50 units per day. 5 pen 0   • Insulin Pen Needle (Pen Needles 1/2\") 29G X 12MM misc 1 each Every Night. 100 each 3   • isosorbide mononitrate (IMDUR) 30 MG 24 hr tablet Take 1 tablet by mouth Daily. 30 tablet 11   • Lancet Devices (ONE TOUCH DELICA LANCING DEV) misc Use as directed to test blood sugar. 1 each 11   • linaclotide (Linzess) 145 MCG capsule capsule Take 1 capsule by mouth Every Morning Before Breakfast. 30 capsule 5   • metFORMIN (GLUCOPHAGE) 500 MG tablet Take 2 tablets by mouth 2 (Two) Times a Day With Meals. 360 tablet 0   • metoprolol succinate XL (TOPROL-XL) 100 MG 24 hr tablet Take 1 and 1/2 tablets by mouth Daily. 45 tablet 3   • nitroglycerin (NITROSTAT) 0.4 MG SL tablet Place 1 tablet under the tongue Every 5 (Five) Minutes As Needed for Chest Pain. Take no more than 3 doses in 15 minutes. 30 tablet 1   • nortriptyline (PAMELOR) 10 MG capsule TAKE 1 CAPSULE BY MOUTH THREE TIMES DAILY 90 capsule 0   • ondansetron ODT (Zofran " "ODT) 8 MG disintegrating tablet Place 1 tablet on the tongue Every 8 (Eight) Hours As Needed for Nausea or Vomiting. 20 tablet 1   • ONETOUCH DELICA LANCETS 33G misc 1 each 4 (Four) Times a Day. delica if covered, use alternative if not covered 120 each 11   • pioglitazone (Actos) 30 MG tablet Take 1 tablet by mouth Daily. 90 tablet 0   • ranolazine (RANEXA) 500 MG 12 hr tablet Take 1 tablet by mouth Every 12 (Twelve) Hours. 60 tablet 1   • spironolactone (ALDACTONE) 25 MG tablet Take 1 tablet by mouth once daily 30 tablet 0   • tiZANidine (ZANAFLEX) 4 MG tablet Take 4 mg by mouth 3 (Three) Times a Day.     • dilTIAZem CD (Cardizem CD) 180 MG 24 hr capsule Take 1 capsule by mouth Daily for 90 days. 90 capsule 0     No current facility-administered medications for this visit.           Review of Systems:     Constitutional:  Denies recent weight loss, weight gain,no change in exercise tolerance.     HENT:  Denies any hearing loss, epistaxis, hoarseness    Eyes: No blurred s.    Respiratory: Exertional dyspnea    Cardiovascular: See H&P  Gastrointestinal:  Denies change in bowel habits, dyspepsia, ulcer disease,    Endocrine: Negative for cold intolerance, heat intolerance, polydipsia, polyphagia and polyuria.polyuria.     Genitourinary: Negative.      Musculoskeletal: Denies any history of arthritic symptoms or back problems.     Skin:  Denies any change in hair or nails, rashes, or skin lesions.     Allergic/Immunologic: Negative.  Negative for environmental allergies,     Neurological:  Denies any history of recurrent headaches, strokes,     Hematological: Denies any food allergies, seasonal allergies, bleeding disorders,    Psychiatric/Behavioral: Denies any history of depression,      OBJECTIVE:    /92 (BP Location: Left arm, Patient Position: Sitting, Cuff Size: Adult)   Pulse 102   Temp 98.2 °F (36.8 °C)   Ht 188 cm (74\")   Wt 127 kg (280 lb)   SpO2 94%   BMI 35.95 kg/m²       Physical Exam: "     Constitutional: Cooperative, alert and oriented, well-developed, well-nourished, in no acute distress.     HENT:   Head: Normocephalic, conjunctive is pink thyroid is nonpalpable no jugular is distention    Cardiovascular: Irregular rhythm, S1 and S2 normal, no S3 or S4. Apical impulse not displaced. No murmurs, gallops, or rubs detected.     Pulmonary/Chest: Chest: Clear to auscultation no rales or wheezing    Abdominal: Abdomen soft, bowel sounds normoactive, no masses,     Musculoskeletal: No deformities, strong peripheral pulses no    Neurological: No gross motor or sensory deficits noted, affect appropriate, oriented to time, person, place.     Skin: Warm and dry to the touch, no apparent skin lesions or masses noted.     Psychiatric: He has a normal mood and affect. His behavior is normal.         Procedures      Lab Results   Component Value Date    WBC 6.59 02/21/2022    HGB 14.9 02/21/2022    HCT 43.8 02/21/2022    MCV 83.3 02/21/2022     02/21/2022     Lab Results   Component Value Date    GLUCOSE 197 (H) 02/24/2022    BUN 20 02/24/2022    CREATININE 1.03 02/24/2022    EGFRIFNONA 74 02/24/2022    BCR 19.4 02/24/2022    CO2 26.0 02/24/2022    CALCIUM 9.1 02/24/2022    ALBUMIN 4.00 02/21/2022    AST 12 02/21/2022    ALT 17 02/21/2022     Lab Results   Component Value Date    CHOL 115 02/15/2022    CHOL 211 (H) 06/11/2021    CHOL 101 10/04/2019     Lab Results   Component Value Date    TRIG 63 02/15/2022    TRIG 144 06/11/2021    TRIG 100 10/04/2019     Lab Results   Component Value Date    HDL 38 (L) 02/15/2022    HDL 40 06/11/2021    HDL 27 (L) 10/04/2019     No components found for: LDLCALC  Lab Results   Component Value Date    LDL 63 02/15/2022     (H) 06/11/2021    LDL 54 10/04/2019     No results found for: HDLLDLRATIO  No components found for: CHOLHDL  Lab Results   Component Value Date    HGBA1C 10.60 (H) 02/15/2022     Lab Results   Component Value Date    TSH 0.609 02/22/2022            ASSESSMENT AND PLAN:    Symptomatic short-term persistent atrial fibrillation    56 years old patient eval history of CAD s/p CABG underwent cardiac catheterization in the mid February 2022 noted of critical LAD disease PCI stent was performed.  Patient has history of paroxysmal atrial fibrillation recently hospitalized for evaluation management of atrial fibrillation with rapid ventricular rate with associated symptom of fatigue lack of energy and shortness of breath.  No chest pain reported since PCI.  He subsequent discharge on AV ramakrishna and amiodarone.  He takes metoprolol 100 mg.  He remained in atrial fibrillation with resting heart rate 102 bpm and increased heart rate with activity that causing symptoms of fatigue lack of energy and short winded.  No further chest perverted.  Given the symptomatic atrial fibrillation have discussed with the patient to proceed with electrical cardioversion.  Pros and cons procedure risk explained to the patient and family.  Understand willing to proceed forward.  Risk included but not limited to arrhythmia and IV conscious sedation was      #2 CAD status post CABG asymptomatic no further recurrence of chest pain and is a pleased with her clinical outcome  Continue aspirin Plavix, Ranexa and isosorbide    #2 hypertension with hypertensive heart disease.    Good blood pressure control will continue amlodipine, metoprolol and spironolactone    Patient was counseled educated regarding level of physical activity and food particularly with high sodium content    #3 hyperlipidemia  Continue atorvastatin    #4  Patient with history of postop paroxysmal atrial fibrillation flipped back into A. fib previously treated with amiodarone rate is well controlled and will continue metoprolol          Preventive    Obesity with a BMI of 35.95 with history of diabetes hypertension hyperlipidemia and CAD.    Significant low-carb weight, low-fat, DASH diet graded exercise discussed with  the patient.      Smoking    Future risk of continued smoking discussed with the patient with the multiple risk factor    Significant low carb rate, low-fat, DASH diet and graded exercise discussed.    I spent 30 minutes caring for Herbert on this date of service. This time includes time spent by me includes counseling/coordination of care as relates to the presenting problem and any ordered procedures/tests as outlined above.       Electronically signed by Alice Rice MD, 03/04/22, 12:15 PM CST.      Diagnoses and all orders for this visit:    1. Essential hypertension (Primary)  -     ECG 12 Lead    2. Coronary artery disease involving native heart, unspecified vessel or lesion type, unspecified whether angina present    3. Atrial fibrillation and flutter (HCC)    4. Peripheral arterial disease (HCC)          Alice Rice MD  3/4/2022  12:01 CST

## 2022-03-04 NOTE — PROGRESS NOTES
[facility]  56 y[facility]      1. Essential hypertension    2. Coronary artery disease involving native heart, unspecified vessel or lesion type, unspecified whether angina present    3. Atrial fibrillation and flutter (HCC)    4. Peripheral arterial disease (HCC)        History of Present Illness:    Body mass index is 35.95 kg/m². BMI is above normal parameters. Recommendations include: exercise counseling, nutrition counseling and referral to primary care.      Patient is a current tobacco user. I have educated the patient on the risks of continued tobacco use. Tobacco cessation education was given <3 min. Patient does not desire tobacco cessation at this time.       56 years old patient who underwent cardiac catheterization given ongoing chest pain and noted to have critical LAD disease PCI stent was performed mid February 2022 and had atrial fibrillation with variable ventricular rate recently hospitalized with rapid ventricular rate.  Patient was started on amiodarone and AV node blocking drug.  He remains symptomatic with exertional dyspnea lack of energy.  He presented office today still in atrial fibrillation resting heart rate 102 bpm.  I will discuss with the patient regarding electrical cardioversion.  Understand willing to proceed forward.   patient was briefly evaluated with Dr. Lewis who set of cardiac catheterizations on Monday will discontinue or hold Eliquis and start the patient on Plavix.  Dwayne and Dr. Dwyer documented history of CAD s/p CABG with a LIMA to LAD vein graft to OM2 and PDA with a CT coronary angiogram patent graft nonobstructive disease in LAD 70% disease left circumflex with a patent graft to obtuse marginal and no significant disease RCA noted.  CT coronary angiogram was reviewed with Dr. Blane Rice and Dr. Dwyer and was of the Continue medical management patient presented for routine follow-up no symptom suggestive of cardiac decompensation such as orthopnea PND  palpitation lightheaded dizziness or intermittent claudication reported.  Patient of normal left ventricle systolic function and a history of premature ventricular complex, hyperlipidemia, diabetes and hypertension patient was at some time metoprolol 100 mg decreased to 25 mg due to bradycardia arrhythmia and significant fatigue          CT coronary angiogram 9/25/2020     CT FUNCTIONAL ANALYSIS:  Ejection Fraction     74 %  Diastolic Volume     136 ml  Systolic Volume      35 ml  Stroke Volume        101 ml  Cardiac Output       4.6 L/minute     IMPRESSION:  CONCLUSION:   1.  Patient's calcium score is 697.This places the patent in the  high likelihood of at least one significant coronary narrowing  risk for coronary artery disease.  2.  LAD: Proximal calcified atherosclerotic plaque causing mild  stenosis of less than 50%. Mid LAD soft and calcified  atherosclerotic plaque foci causing moderate stenosis of 50-70%.  LIMA to distal LAD bypass graft which appears patent.  3.  Circumflex: Proximal calcified atherosclerotic plaque causing  significant stenosis of 70% or greater. No other calcified or  soft tissue density plaque and/or stenosis. Patent saphenous vein  bypass graft to the obtuse marginal second branch.  4.  RCA: No calcified or soft tissue density plaque and no  stenosis. Patent saphenous vein bypass graft to the PDA.  5.  Remainder CT angiogram coronary exam unremarkable.     September 2019 cardiac  Cath demonstrated severe CAD multivessel with lesions LAD 80% DX 70% OM2 60% PDA 70%.     CABG 9/25/2019   DISTAL BYPASS TARGETS:    1. LIMA to LAD.    2. Greater saphenous vein to OM2    3. Greater saphenous vein to PDA   · Left ventricular wall thickness is consistent with mild concentric hypertrophy.  · Estimated EF = 61%.  · Left ventricular systolic function is normal.  · Left ventricular diastolic dysfunction (grade I) consistent with impaired relaxation.  · Mild mitral valve regurgitation is  present  9/3/19  Conclusion:  1.  Severe three-vessel obstructive coronary artery disease involving mid LAD, diagonal, obtuse marginal and RPDA.  2.  Normal left-sided filling pressures.  No gradient noted across aortic valve on pullback  3.  Patent left subclavian and LIMA    SUBJECTIVE:    Allergies   Allergen Reactions   • Ace Inhibitors Anaphylaxis   • Wellbutrin [Bupropion] Anaphylaxis         Past Medical History:   Diagnosis Date   • Coronary artery disease    • COVID-19 virus detected 2022   • Diabetes mellitus (HCC)    • Diabetic retinopathy (HCC)    • GERD (gastroesophageal reflux disease)    • Hyperlipidemia    • Hypertension    • Osteoarthritis    • Paroxysmal atrial fibrillation (HCC)    • Sleep apnea          Past Surgical History:   Procedure Laterality Date   • CARDIAC CATHETERIZATION N/A 9/3/2019   • CARDIAC CATHETERIZATION N/A 2022   • CHOLECYSTECTOMY     • COLONOSCOPY N/A 2021   • COLONOSCOPY N/A 2022   • CORONARY ARTERY BYPASS GRAFT N/A 2019         Family History   Problem Relation Age of Onset   • Diabetes Mother    • Hypertension Father          Social History     Socioeconomic History   • Marital status:    Tobacco Use   • Smoking status: Former Smoker     Packs/day: 1.00     Years: 35.00     Pack years: 35.00     Types: Cigarettes     Start date: 1980     Quit date: 9/3/2019     Years since quittin.5   • Smokeless tobacco: Never Used   Vaping Use   • Vaping Use: Never used   Substance and Sexual Activity   • Alcohol use: No   • Drug use: No   • Sexual activity: Not Currently     Comment: .         Current Outpatient Medications   Medication Sig Dispense Refill   • acetaminophen (TYLENOL) 325 MG tablet Take 2 tablets by mouth Every 6 (Six) Hours As Needed for Mild Pain .     • albuterol sulfate  (90 Base) MCG/ACT inhaler Inhale 2 puffs Every 4 (Four) Hours As Needed for Wheezing. 18 g 11   • amiodarone (PACERONE) 200 MG tablet Take 1  "tablet by mouth 2 (Two) Times a Day With Meals. 60 tablet 1   • amoxicillin (AMOXIL) 875 MG tablet Take 1 tablet by mouth 2 (Two) Times a Day. 20 tablet 0   • apixaban (ELIQUIS) 5 MG tablet tablet Take 1 tablet by mouth Every 12 (Twelve) Hours. 60 tablet 2   • aspirin 81 MG chewable tablet Chew 1 tablet by mouth Daily 30 tablet 0   • atorvastatin (LIPITOR) 40 MG tablet Take 1 tablet by mouth Every Night for 90 days. 90 tablet 0   • clopidogrel (PLAVIX) 75 MG tablet Take 1 tablet by mouth Daily. 90 tablet 3   • gabapentin (NEURONTIN) 300 MG capsule TAKE 1 CAPSULE BY MOUTH AT BEDTIME FOR 2 DAYS THEN 1 TWICE DAILY FOR 2 DAYS THEN 1 THREE TIMES DAILY THEREAFTER     • guaiFENesin-dextromethorphan (ROBITUSSIN DM) 100-10 MG/5ML syrup Take 10 mL by mouth 4 (Four) Times a Day As Needed for Cough. 240 mL 1   • insulin aspart prot & aspart (NovoLOG Mix 70/30 FlexPen) (70-30) 100 UNIT/ML suspension pen-injector injection Start 10u ac breakfast and 5u ac supper. I anticipate will need 50 units per day. 5 pen 0   • Insulin Pen Needle (Pen Needles 1/2\") 29G X 12MM misc 1 each Every Night. 100 each 3   • isosorbide mononitrate (IMDUR) 30 MG 24 hr tablet Take 1 tablet by mouth Daily. 30 tablet 11   • Lancet Devices (ONE TOUCH DELICA LANCING DEV) misc Use as directed to test blood sugar. 1 each 11   • linaclotide (Linzess) 145 MCG capsule capsule Take 1 capsule by mouth Every Morning Before Breakfast. 30 capsule 5   • metFORMIN (GLUCOPHAGE) 500 MG tablet Take 2 tablets by mouth 2 (Two) Times a Day With Meals. 360 tablet 0   • metoprolol succinate XL (TOPROL-XL) 100 MG 24 hr tablet Take 1 and 1/2 tablets by mouth Daily. 45 tablet 3   • nitroglycerin (NITROSTAT) 0.4 MG SL tablet Place 1 tablet under the tongue Every 5 (Five) Minutes As Needed for Chest Pain. Take no more than 3 doses in 15 minutes. 30 tablet 1   • nortriptyline (PAMELOR) 10 MG capsule TAKE 1 CAPSULE BY MOUTH THREE TIMES DAILY 90 capsule 0   • ondansetron ODT (Zofran " "ODT) 8 MG disintegrating tablet Place 1 tablet on the tongue Every 8 (Eight) Hours As Needed for Nausea or Vomiting. 20 tablet 1   • ONETOUCH DELICA LANCETS 33G misc 1 each 4 (Four) Times a Day. delica if covered, use alternative if not covered 120 each 11   • pioglitazone (Actos) 30 MG tablet Take 1 tablet by mouth Daily. 90 tablet 0   • ranolazine (RANEXA) 500 MG 12 hr tablet Take 1 tablet by mouth Every 12 (Twelve) Hours. 60 tablet 1   • spironolactone (ALDACTONE) 25 MG tablet Take 1 tablet by mouth once daily 30 tablet 0   • tiZANidine (ZANAFLEX) 4 MG tablet Take 4 mg by mouth 3 (Three) Times a Day.     • dilTIAZem CD (Cardizem CD) 180 MG 24 hr capsule Take 1 capsule by mouth Daily for 90 days. 90 capsule 0     No current facility-administered medications for this visit.           Review of Systems:     Constitutional:  Denies recent weight loss, weight gain,no change in exercise tolerance.     HENT:  Denies any hearing loss, epistaxis, hoarseness    Eyes: No blurred s.    Respiratory: Exertional dyspnea    Cardiovascular: See H&P  Gastrointestinal:  Denies change in bowel habits, dyspepsia, ulcer disease,    Endocrine: Negative for cold intolerance, heat intolerance, polydipsia, polyphagia and polyuria.polyuria.     Genitourinary: Negative.      Musculoskeletal: Denies any history of arthritic symptoms or back problems.     Skin:  Denies any change in hair or nails, rashes, or skin lesions.     Allergic/Immunologic: Negative.  Negative for environmental allergies,     Neurological:  Denies any history of recurrent headaches, strokes,     Hematological: Denies any food allergies, seasonal allergies, bleeding disorders,    Psychiatric/Behavioral: Denies any history of depression,      OBJECTIVE:    /92 (BP Location: Left arm, Patient Position: Sitting, Cuff Size: Adult)   Pulse 102   Temp 98.2 °F (36.8 °C)   Ht 188 cm (74\")   Wt 127 kg (280 lb)   SpO2 94%   BMI 35.95 kg/m²       Physical Exam: "     Constitutional: Cooperative, alert and oriented, well-developed, well-nourished, in no acute distress.     HENT:   Head: Normocephalic, conjunctive is pink thyroid is nonpalpable no jugular is distention    Cardiovascular: Irregular rhythm, S1 and S2 normal, no S3 or S4. Apical impulse not displaced. No murmurs, gallops, or rubs detected.     Pulmonary/Chest: Chest: Clear to auscultation no rales or wheezing    Abdominal: Abdomen soft, bowel sounds normoactive, no masses,     Musculoskeletal: No deformities, strong peripheral pulses no    Neurological: No gross motor or sensory deficits noted, affect appropriate, oriented to time, person, place.     Skin: Warm and dry to the touch, no apparent skin lesions or masses noted.     Psychiatric: He has a normal mood and affect. His behavior is normal.         Procedures      Lab Results   Component Value Date    WBC 6.59 02/21/2022    HGB 14.9 02/21/2022    HCT 43.8 02/21/2022    MCV 83.3 02/21/2022     02/21/2022     Lab Results   Component Value Date    GLUCOSE 197 (H) 02/24/2022    BUN 20 02/24/2022    CREATININE 1.03 02/24/2022    EGFRIFNONA 74 02/24/2022    BCR 19.4 02/24/2022    CO2 26.0 02/24/2022    CALCIUM 9.1 02/24/2022    ALBUMIN 4.00 02/21/2022    AST 12 02/21/2022    ALT 17 02/21/2022     Lab Results   Component Value Date    CHOL 115 02/15/2022    CHOL 211 (H) 06/11/2021    CHOL 101 10/04/2019     Lab Results   Component Value Date    TRIG 63 02/15/2022    TRIG 144 06/11/2021    TRIG 100 10/04/2019     Lab Results   Component Value Date    HDL 38 (L) 02/15/2022    HDL 40 06/11/2021    HDL 27 (L) 10/04/2019     No components found for: LDLCALC  Lab Results   Component Value Date    LDL 63 02/15/2022     (H) 06/11/2021    LDL 54 10/04/2019     No results found for: HDLLDLRATIO  No components found for: CHOLHDL  Lab Results   Component Value Date    HGBA1C 10.60 (H) 02/15/2022     Lab Results   Component Value Date    TSH 0.609 02/22/2022            ASSESSMENT AND PLAN:    Symptomatic short-term persistent atrial fibrillation    56 years old patient eval history of CAD s/p CABG underwent cardiac catheterization in the mid February 2022 noted of critical LAD disease PCI stent was performed.  Patient has history of paroxysmal atrial fibrillation recently hospitalized for evaluation management of atrial fibrillation with rapid ventricular rate with associated symptom of fatigue lack of energy and shortness of breath.  No chest pain reported since PCI.  He subsequent discharge on AV ramakrishna and amiodarone.  He takes metoprolol 100 mg.  He remained in atrial fibrillation with resting heart rate 102 bpm and increased heart rate with activity that causing symptoms of fatigue lack of energy and short winded.  No further chest perverted.  Given the symptomatic atrial fibrillation have discussed with the patient to proceed with electrical cardioversion.  Pros and cons procedure risk explained to the patient and family.  Understand willing to proceed forward.  Risk included but not limited to arrhythmia and IV conscious sedation was      #2 CAD status post CABG asymptomatic no further recurrence of chest pain and is a pleased with her clinical outcome  Continue aspirin Plavix, Ranexa and isosorbide    #2 hypertension with hypertensive heart disease.    Good blood pressure control will continue amlodipine, metoprolol and spironolactone    Patient was counseled educated regarding level of physical activity and food particularly with high sodium content    #3 hyperlipidemia  Continue atorvastatin    #4  Patient with history of postop paroxysmal atrial fibrillation flipped back into A. fib previously treated with amiodarone rate is well controlled and will continue metoprolol          Preventive    Obesity with a BMI of 35.95 with history of diabetes hypertension hyperlipidemia and CAD.    Significant low-carb weight, low-fat, DASH diet graded exercise discussed with  the patient.      Smoking    Future risk of continued smoking discussed with the patient with the multiple risk factor    Significant low carb rate, low-fat, DASH diet and graded exercise discussed.    I spent 30 minutes caring for Herbert on this date of service. This time includes time spent by me includes counseling/coordination of care as relates to the presenting problem and any ordered procedures/tests as outlined above.       Electronically signed by Alice Rice MD, 03/04/22, 12:15 PM CST.      Diagnoses and all orders for this visit:    1. Essential hypertension (Primary)  -     ECG 12 Lead    2. Coronary artery disease involving native heart, unspecified vessel or lesion type, unspecified whether angina present    3. Atrial fibrillation and flutter (HCC)    4. Peripheral arterial disease (HCC)          Alice Rice MD  3/4/2022  12:01 CST

## 2022-03-07 ENCOUNTER — READMISSION MANAGEMENT (OUTPATIENT)
Dept: CALL CENTER | Facility: HOSPITAL | Age: 57
End: 2022-03-07

## 2022-03-07 NOTE — OUTREACH NOTE
Medical Week 2 Survey    Flowsheet Row Responses   [facility] patient discharged from? [facility]   Does the patient have one of the following disease processes/diagnoses(primary or secondary)? Other   Week 2 attempt successful? Yes   Call start time 1528   Discharge diagnosis atrial fib   Call end time 1529   Is the patient taking all medications as directed (includes completed medication regime)? Yes   Comments regarding appointments will be doing a cardioversion on 3/14   Does the patient have a primary care provider?  Yes   Has the patient kept scheduled appointments due by today? Yes   Has home health visited the patient within 72 hours of discharge? N/A   Psychosocial issues? No   What is the patient's perception of their health status since discharge? Same  [still really tired]   Is the patient/caregiver able to teach back signs and symptoms related to disease process for when to call PCP? Yes   Is the patient/caregiver able to teach back signs and symptoms related to disease process for when to call 911? Yes   Is the patient/caregiver able to teach back the hierarchy of who to call/visit for symptoms/problems? PCP, Specialist, Home health nurse, Urgent Care, ED, 911 Yes   Week 2 Call Completed? Yes   Wrap up additional comments Doing well but is still really tired, will be doing a cardioversion on 3/14.          RAMON WORLEY - Registered Nurse

## 2022-03-09 ENCOUNTER — APPOINTMENT (OUTPATIENT)
Dept: CT IMAGING | Facility: HOSPITAL | Age: 57
End: 2022-03-09

## 2022-03-09 ENCOUNTER — PREP FOR SURGERY (OUTPATIENT)
Dept: OTHER | Facility: HOSPITAL | Age: 57
End: 2022-03-09

## 2022-03-09 ENCOUNTER — HOSPITAL ENCOUNTER (OUTPATIENT)
Facility: HOSPITAL | Age: 57
Setting detail: OBSERVATION
Discharge: HOME | End: 2022-03-11
Attending: STUDENT IN AN ORGANIZED HEALTH CARE EDUCATION/TRAINING PROGRAM | Admitting: STUDENT IN AN ORGANIZED HEALTH CARE EDUCATION/TRAINING PROGRAM

## 2022-03-09 ENCOUNTER — APPOINTMENT (OUTPATIENT)
Dept: GENERAL RADIOLOGY | Facility: HOSPITAL | Age: 57
End: 2022-03-09

## 2022-03-09 ENCOUNTER — READMISSION MANAGEMENT (OUTPATIENT)
Dept: CALL CENTER | Facility: HOSPITAL | Age: 57
End: 2022-03-09

## 2022-03-09 DIAGNOSIS — Z98.61 HISTORY OF PTCA: ICD-10-CM

## 2022-03-09 DIAGNOSIS — E11.00 TYPE 2 DIABETES MELLITUS WITH HYPEROSMOLARITY WITHOUT COMA, WITHOUT LONG-TERM CURRENT USE OF INSULIN: ICD-10-CM

## 2022-03-09 DIAGNOSIS — R53.1 WEAKNESS: ICD-10-CM

## 2022-03-09 DIAGNOSIS — I48.91 ATRIAL FIBRILLATION WITH RVR: ICD-10-CM

## 2022-03-09 DIAGNOSIS — R20.0 NUMBNESS: Primary | ICD-10-CM

## 2022-03-09 DIAGNOSIS — I25.10 ATHEROSCLEROSIS OF NATIVE CORONARY ARTERY OF NATIVE HEART, UNSPECIFIED WHETHER ANGINA PRESENT: ICD-10-CM

## 2022-03-09 DIAGNOSIS — I48.92 ATRIAL FIBRILLATION AND FLUTTER: Primary | ICD-10-CM

## 2022-03-09 DIAGNOSIS — R13.10 DYSPHAGIA, UNSPECIFIED TYPE: ICD-10-CM

## 2022-03-09 DIAGNOSIS — Z74.09 IMPAIRED FUNCTIONAL MOBILITY, BALANCE, GAIT, AND ENDURANCE: ICD-10-CM

## 2022-03-09 DIAGNOSIS — I25.810 ATHEROSCLEROSIS OF AUTOLOGOUS VEIN CORONARY ARTERY BYPASS GRAFT, UNSPECIFIED WHETHER ANGINA PRESENT: ICD-10-CM

## 2022-03-09 DIAGNOSIS — I48.0 PAF (PAROXYSMAL ATRIAL FIBRILLATION): ICD-10-CM

## 2022-03-09 DIAGNOSIS — I63.9 ISCHEMIC STROKE: ICD-10-CM

## 2022-03-09 DIAGNOSIS — I63.9 CEREBROVASCULAR ACCIDENT (CVA), UNSPECIFIED MECHANISM: ICD-10-CM

## 2022-03-09 DIAGNOSIS — Z74.09 IMPAIRED MOBILITY AND ADLS: ICD-10-CM

## 2022-03-09 DIAGNOSIS — I48.91 ATRIAL FIBRILLATION AND FLUTTER: Primary | ICD-10-CM

## 2022-03-09 DIAGNOSIS — Z78.9 IMPAIRED MOBILITY AND ADLS: ICD-10-CM

## 2022-03-09 LAB
ABO GROUP BLD: NORMAL
ALBUMIN SERPL-MCNC: 4.2 G/DL (ref 3.5–5.2)
ALBUMIN/GLOB SERPL: 1.7 G/DL
ALP SERPL-CCNC: 87 U/L (ref 39–117)
ALT SERPL W P-5'-P-CCNC: 20 U/L (ref 1–41)
ANION GAP SERPL CALCULATED.3IONS-SCNC: 11 MMOL/L (ref 5–15)
APTT PPP: 37.6 SECONDS (ref 20–40.3)
AST SERPL-CCNC: 13 U/L (ref 1–40)
BASOPHILS # BLD AUTO: 0.04 10*3/MM3 (ref 0–0.2)
BASOPHILS NFR BLD AUTO: 0.4 % (ref 0–1.5)
BILIRUB SERPL-MCNC: 0.6 MG/DL (ref 0–1.2)
BLD GP AB SCN SERPL QL: NEGATIVE
BUN SERPL-MCNC: 14 MG/DL (ref 6–20)
BUN/CREAT SERPL: 11.4 (ref 7–25)
CALCIUM SPEC-SCNC: 9.1 MG/DL (ref 8.6–10.5)
CHLORIDE SERPL-SCNC: 103 MMOL/L (ref 98–107)
CO2 SERPL-SCNC: 26 MMOL/L (ref 22–29)
CREAT SERPL-MCNC: 1.23 MG/DL (ref 0.76–1.27)
DEPRECATED RDW RBC AUTO: 42.6 FL (ref 37–54)
EGFRCR SERPLBLD CKD-EPI 2021: 68.5 ML/MIN/1.73
EOSINOPHIL # BLD AUTO: 0.17 10*3/MM3 (ref 0–0.4)
EOSINOPHIL NFR BLD AUTO: 1.8 % (ref 0.3–6.2)
ERYTHROCYTE [DISTWIDTH] IN BLOOD BY AUTOMATED COUNT: 13.7 % (ref 12.3–15.4)
FLUAV RNA RESP QL NAA+PROBE: NOT DETECTED
FLUBV RNA RESP QL NAA+PROBE: NOT DETECTED
GLOBULIN UR ELPH-MCNC: 2.5 GM/DL
GLUCOSE BLDC GLUCOMTR-MCNC: 229 MG/DL (ref 70–130)
GLUCOSE BLDC GLUCOMTR-MCNC: 259 MG/DL (ref 70–130)
GLUCOSE SERPL-MCNC: 221 MG/DL (ref 65–99)
HCT VFR BLD AUTO: 45.4 % (ref 37.5–51)
HGB BLD-MCNC: 15.3 G/DL (ref 13–17.7)
HOLD SPECIMEN: NORMAL
IMM GRANULOCYTES # BLD AUTO: 0.05 10*3/MM3 (ref 0–0.05)
IMM GRANULOCYTES NFR BLD AUTO: 0.5 % (ref 0–0.5)
INR PPP: 1.2 (ref 0.8–1.2)
LYMPHOCYTES # BLD AUTO: 1.6 10*3/MM3 (ref 0.7–3.1)
LYMPHOCYTES NFR BLD AUTO: 16.5 % (ref 19.6–45.3)
MCH RBC QN AUTO: 29.1 PG (ref 26.6–33)
MCHC RBC AUTO-ENTMCNC: 33.7 G/DL (ref 31.5–35.7)
MCV RBC AUTO: 86.5 FL (ref 79–97)
MONOCYTES # BLD AUTO: 0.68 10*3/MM3 (ref 0.1–0.9)
MONOCYTES NFR BLD AUTO: 7 % (ref 5–12)
NEUTROPHILS NFR BLD AUTO: 7.16 10*3/MM3 (ref 1.7–7)
NEUTROPHILS NFR BLD AUTO: 73.8 % (ref 42.7–76)
NRBC BLD AUTO-RTO: 0 /100 WBC (ref 0–0.2)
PLATELET # BLD AUTO: 184 10*3/MM3 (ref 140–450)
PMV BLD AUTO: 11.1 FL (ref 6–12)
POTASSIUM SERPL-SCNC: 4.4 MMOL/L (ref 3.5–5.2)
PROT SERPL-MCNC: 6.7 G/DL (ref 6–8.5)
PROTHROMBIN TIME: 15.1 SECONDS (ref 11.1–15.3)
RBC # BLD AUTO: 5.25 10*6/MM3 (ref 4.14–5.8)
RH BLD: POSITIVE
SARS-COV-2 RNA RESP QL NAA+PROBE: NOT DETECTED
SODIUM SERPL-SCNC: 140 MMOL/L (ref 136–145)
T&S EXPIRATION DATE: NORMAL
TROPONIN T SERPL-MCNC: <0.01 NG/ML (ref 0–0.03)
WBC NRBC COR # BLD: 9.7 10*3/MM3 (ref 3.4–10.8)
WHOLE BLOOD HOLD SPECIMEN: NORMAL
WHOLE BLOOD HOLD SPECIMEN: NORMAL

## 2022-03-09 PROCEDURE — 70450 CT HEAD/BRAIN W/O DYE: CPT

## 2022-03-09 PROCEDURE — 70496 CT ANGIOGRAPHY HEAD: CPT

## 2022-03-09 PROCEDURE — 0 IOPAMIDOL PER 1 ML: Performed by: STUDENT IN AN ORGANIZED HEALTH CARE EDUCATION/TRAINING PROGRAM

## 2022-03-09 PROCEDURE — 87636 SARSCOV2 & INF A&B AMP PRB: CPT | Performed by: STUDENT IN AN ORGANIZED HEALTH CARE EDUCATION/TRAINING PROGRAM

## 2022-03-09 PROCEDURE — G0378 HOSPITAL OBSERVATION PER HR: HCPCS

## 2022-03-09 PROCEDURE — 86901 BLOOD TYPING SEROLOGIC RH(D): CPT | Performed by: STUDENT IN AN ORGANIZED HEALTH CARE EDUCATION/TRAINING PROGRAM

## 2022-03-09 PROCEDURE — 71045 X-RAY EXAM CHEST 1 VIEW: CPT

## 2022-03-09 PROCEDURE — 80053 COMPREHEN METABOLIC PANEL: CPT | Performed by: STUDENT IN AN ORGANIZED HEALTH CARE EDUCATION/TRAINING PROGRAM

## 2022-03-09 PROCEDURE — 86900 BLOOD TYPING SEROLOGIC ABO: CPT | Performed by: STUDENT IN AN ORGANIZED HEALTH CARE EDUCATION/TRAINING PROGRAM

## 2022-03-09 PROCEDURE — 96374 THER/PROPH/DIAG INJ IV PUSH: CPT

## 2022-03-09 PROCEDURE — 0042T HC CT CEREBRAL PERFUSION W/WO CONTRAST: CPT

## 2022-03-09 PROCEDURE — 86850 RBC ANTIBODY SCREEN: CPT | Performed by: STUDENT IN AN ORGANIZED HEALTH CARE EDUCATION/TRAINING PROGRAM

## 2022-03-09 PROCEDURE — 85730 THROMBOPLASTIN TIME PARTIAL: CPT | Performed by: STUDENT IN AN ORGANIZED HEALTH CARE EDUCATION/TRAINING PROGRAM

## 2022-03-09 PROCEDURE — 82962 GLUCOSE BLOOD TEST: CPT

## 2022-03-09 PROCEDURE — 63710000001 INSULIN ASPART PER 5 UNITS: Performed by: STUDENT IN AN ORGANIZED HEALTH CARE EDUCATION/TRAINING PROGRAM

## 2022-03-09 PROCEDURE — 99214 OFFICE O/P EST MOD 30 MIN: CPT | Performed by: NURSE PRACTITIONER

## 2022-03-09 PROCEDURE — 70498 CT ANGIOGRAPHY NECK: CPT

## 2022-03-09 PROCEDURE — 99284 EMERGENCY DEPT VISIT MOD MDM: CPT

## 2022-03-09 PROCEDURE — 85025 COMPLETE CBC W/AUTO DIFF WBC: CPT | Performed by: STUDENT IN AN ORGANIZED HEALTH CARE EDUCATION/TRAINING PROGRAM

## 2022-03-09 PROCEDURE — 93010 ELECTROCARDIOGRAM REPORT: CPT | Performed by: INTERNAL MEDICINE

## 2022-03-09 PROCEDURE — 84484 ASSAY OF TROPONIN QUANT: CPT | Performed by: STUDENT IN AN ORGANIZED HEALTH CARE EDUCATION/TRAINING PROGRAM

## 2022-03-09 PROCEDURE — 85610 PROTHROMBIN TIME: CPT | Performed by: STUDENT IN AN ORGANIZED HEALTH CARE EDUCATION/TRAINING PROGRAM

## 2022-03-09 PROCEDURE — 93005 ELECTROCARDIOGRAM TRACING: CPT | Performed by: STUDENT IN AN ORGANIZED HEALTH CARE EDUCATION/TRAINING PROGRAM

## 2022-03-09 PROCEDURE — C9803 HOPD COVID-19 SPEC COLLECT: HCPCS

## 2022-03-09 RX ORDER — SODIUM CHLORIDE 0.9 % (FLUSH) 0.9 %
10 SYRINGE (ML) INJECTION AS NEEDED
Status: CANCELLED | OUTPATIENT
Start: 2022-03-09

## 2022-03-09 RX ORDER — SODIUM CHLORIDE 0.9 % (FLUSH) 0.9 %
3 SYRINGE (ML) INJECTION EVERY 12 HOURS SCHEDULED
Status: CANCELLED | OUTPATIENT
Start: 2022-03-09

## 2022-03-09 RX ORDER — RANOLAZINE 500 MG/1
500 TABLET, EXTENDED RELEASE ORAL EVERY 12 HOURS SCHEDULED
Status: DISCONTINUED | OUTPATIENT
Start: 2022-03-09 | End: 2022-03-11 | Stop reason: HOSPADM

## 2022-03-09 RX ORDER — CLOPIDOGREL BISULFATE 75 MG/1
75 TABLET ORAL DAILY
Status: DISCONTINUED | OUTPATIENT
Start: 2022-03-10 | End: 2022-03-11 | Stop reason: HOSPADM

## 2022-03-09 RX ORDER — DEXTROSE MONOHYDRATE 25 G/50ML
25 INJECTION, SOLUTION INTRAVENOUS
Status: DISCONTINUED | OUTPATIENT
Start: 2022-03-09 | End: 2022-03-11 | Stop reason: HOSPADM

## 2022-03-09 RX ORDER — SODIUM CHLORIDE 0.9 % (FLUSH) 0.9 %
10 SYRINGE (ML) INJECTION AS NEEDED
Status: DISCONTINUED | OUTPATIENT
Start: 2022-03-09 | End: 2022-03-11 | Stop reason: HOSPADM

## 2022-03-09 RX ORDER — GABAPENTIN 100 MG/1
100 CAPSULE ORAL EVERY 12 HOURS SCHEDULED
Status: DISCONTINUED | OUTPATIENT
Start: 2022-03-09 | End: 2022-03-11 | Stop reason: HOSPADM

## 2022-03-09 RX ORDER — AMIODARONE HYDROCHLORIDE 200 MG/1
200 TABLET ORAL 2 TIMES DAILY WITH MEALS
Status: DISCONTINUED | OUTPATIENT
Start: 2022-03-09 | End: 2022-03-11 | Stop reason: HOSPADM

## 2022-03-09 RX ORDER — SPIRONOLACTONE 25 MG/1
25 TABLET ORAL DAILY
Status: DISCONTINUED | OUTPATIENT
Start: 2022-03-10 | End: 2022-03-11 | Stop reason: HOSPADM

## 2022-03-09 RX ORDER — NORTRIPTYLINE HYDROCHLORIDE 10 MG/1
10 CAPSULE ORAL 3 TIMES DAILY
Status: DISCONTINUED | OUTPATIENT
Start: 2022-03-09 | End: 2022-03-11 | Stop reason: HOSPADM

## 2022-03-09 RX ORDER — SODIUM CHLORIDE 0.9 % (FLUSH) 0.9 %
10 SYRINGE (ML) INJECTION AS NEEDED
Status: DISCONTINUED | OUTPATIENT
Start: 2022-03-09 | End: 2022-03-10

## 2022-03-09 RX ORDER — SODIUM CHLORIDE 9 MG/ML
50 INJECTION, SOLUTION INTRAVENOUS CONTINUOUS
Status: CANCELLED | OUTPATIENT
Start: 2022-03-09

## 2022-03-09 RX ORDER — SODIUM CHLORIDE 0.9 % (FLUSH) 0.9 %
10 SYRINGE (ML) INJECTION EVERY 12 HOURS SCHEDULED
Status: DISCONTINUED | OUTPATIENT
Start: 2022-03-09 | End: 2022-03-10

## 2022-03-09 RX ORDER — ATORVASTATIN CALCIUM 40 MG/1
80 TABLET, FILM COATED ORAL NIGHTLY
Status: DISCONTINUED | OUTPATIENT
Start: 2022-03-09 | End: 2022-03-10

## 2022-03-09 RX ORDER — DILTIAZEM HYDROCHLORIDE 180 MG/1
180 CAPSULE, COATED, EXTENDED RELEASE ORAL DAILY
Status: DISCONTINUED | OUTPATIENT
Start: 2022-03-10 | End: 2022-03-11 | Stop reason: HOSPADM

## 2022-03-09 RX ORDER — ASPIRIN 81 MG/1
81 TABLET, CHEWABLE ORAL DAILY
Status: DISCONTINUED | OUTPATIENT
Start: 2022-03-10 | End: 2022-03-11 | Stop reason: HOSPADM

## 2022-03-09 RX ORDER — SODIUM CHLORIDE 9 MG/ML
81 INJECTION, SOLUTION INTRAVENOUS ONCE
Status: COMPLETED | OUTPATIENT
Start: 2022-03-09 | End: 2022-03-09

## 2022-03-09 RX ORDER — METOPROLOL SUCCINATE 50 MG/1
150 TABLET, EXTENDED RELEASE ORAL DAILY
Status: DISCONTINUED | OUTPATIENT
Start: 2022-03-10 | End: 2022-03-11 | Stop reason: HOSPADM

## 2022-03-09 RX ORDER — DILTIAZEM HYDROCHLORIDE 5 MG/ML
10 INJECTION INTRAVENOUS ONCE
Status: COMPLETED | OUTPATIENT
Start: 2022-03-09 | End: 2022-03-09

## 2022-03-09 RX ORDER — HYDROCODONE BITARTRATE AND ACETAMINOPHEN 5; 325 MG/1; MG/1
1 TABLET ORAL EVERY 6 HOURS PRN
Status: DISCONTINUED | OUTPATIENT
Start: 2022-03-09 | End: 2022-03-11 | Stop reason: HOSPADM

## 2022-03-09 RX ORDER — TIZANIDINE 4 MG/1
4 TABLET ORAL EVERY 8 HOURS PRN
Status: DISCONTINUED | OUTPATIENT
Start: 2022-03-09 | End: 2022-03-11 | Stop reason: HOSPADM

## 2022-03-09 RX ORDER — ALBUTEROL SULFATE 2.5 MG/3ML
2.5 SOLUTION RESPIRATORY (INHALATION) EVERY 4 HOURS PRN
Status: DISCONTINUED | OUTPATIENT
Start: 2022-03-09 | End: 2022-03-11 | Stop reason: HOSPADM

## 2022-03-09 RX ORDER — NICOTINE POLACRILEX 4 MG
15 LOZENGE BUCCAL
Status: DISCONTINUED | OUTPATIENT
Start: 2022-03-09 | End: 2022-03-11 | Stop reason: HOSPADM

## 2022-03-09 RX ORDER — ACETAMINOPHEN 325 MG/1
650 TABLET ORAL EVERY 6 HOURS PRN
Status: DISCONTINUED | OUTPATIENT
Start: 2022-03-09 | End: 2022-03-11 | Stop reason: HOSPADM

## 2022-03-09 RX ADMIN — SODIUM CHLORIDE 81 ML/HR: 9 INJECTION, SOLUTION INTRAVENOUS at 17:19

## 2022-03-09 RX ADMIN — GABAPENTIN 100 MG: 100 CAPSULE ORAL at 20:25

## 2022-03-09 RX ADMIN — RANOLAZINE 500 MG: 500 TABLET, FILM COATED, EXTENDED RELEASE ORAL at 20:24

## 2022-03-09 RX ADMIN — NORTRIPTYLINE HYDROCHLORIDE 10 MG: 10 CAPSULE ORAL at 21:08

## 2022-03-09 RX ADMIN — INSULIN ASPART 4 UNITS: 100 INJECTION, SOLUTION INTRAVENOUS; SUBCUTANEOUS at 21:17

## 2022-03-09 RX ADMIN — Medication 10 ML: at 21:14

## 2022-03-09 RX ADMIN — DILTIAZEM HYDROCHLORIDE 10 MG: 5 INJECTION INTRAVENOUS at 17:19

## 2022-03-09 RX ADMIN — HYDROCODONE BITARTRATE AND ACETAMINOPHEN 1 TABLET: 5; 325 TABLET ORAL at 20:24

## 2022-03-09 RX ADMIN — IOPAMIDOL 50 ML: 755 INJECTION, SOLUTION INTRAVENOUS at 16:31

## 2022-03-09 RX ADMIN — AMIODARONE HYDROCHLORIDE 200 MG: 200 TABLET ORAL at 20:24

## 2022-03-09 RX ADMIN — TIZANIDINE 4 MG: 4 TABLET ORAL at 21:08

## 2022-03-09 RX ADMIN — ATORVASTATIN CALCIUM 80 MG: 40 TABLET, FILM COATED ORAL at 20:25

## 2022-03-09 RX ADMIN — APIXABAN 5 MG: 5 TABLET, FILM COATED ORAL at 20:24

## 2022-03-09 RX ADMIN — IOPAMIDOL 90 ML: 755 INJECTION, SOLUTION INTRAVENOUS at 16:26

## 2022-03-09 NOTE — ED PROVIDER NOTES
Subjective   56-year-old male PMH of hypertension, hyperlipidemia, tobacco abuse, diabetes, CAD, A. fib comes to the ER chief complaint of chest pain with left-sided weakness and numbness.  Symptoms started about 45 minutes prior to arrival.  Chest pain does not go anywhere.  Patient is able to ambulate, but is having difficulty due to the weakness in the left leg.  He denies having a prior stroke or MI in the past.  He is on Plavix and aspirin.      History provided by:  Patient and spouse   used: No        Review of Systems   Constitutional: Negative for chills and fever.   HENT: Negative for congestion and rhinorrhea.    Eyes: Negative for visual disturbance.   Respiratory: Negative for cough and shortness of breath.    Cardiovascular: Positive for chest pain. Negative for palpitations.   Gastrointestinal: Negative for abdominal pain and nausea.   Genitourinary: Negative for dysuria and flank pain.   Musculoskeletal: Negative for arthralgias, back pain and neck stiffness.   Skin: Negative for color change and rash.   Neurological: Positive for weakness and numbness. Negative for dizziness, syncope, facial asymmetry, speech difficulty, light-headedness and headaches.   Psychiatric/Behavioral: Negative for agitation. The patient is not nervous/anxious.        Past Medical History:   Diagnosis Date   • Coronary artery disease    • COVID-19 virus detected 1/21/2022   • Diabetes mellitus (HCC)    • Diabetic retinopathy (HCC)    • GERD (gastroesophageal reflux disease)    • Hyperlipidemia    • Hypertension    • Osteoarthritis    • Paroxysmal atrial fibrillation (HCC)    • Sleep apnea        Allergies   Allergen Reactions   • Ace Inhibitors Anaphylaxis   • Wellbutrin [Bupropion] Anaphylaxis       Past Surgical History:   Procedure Laterality Date   • CARDIAC CATHETERIZATION N/A 9/3/2019   • CARDIAC CATHETERIZATION N/A 2/14/2022   • CHOLECYSTECTOMY     • COLONOSCOPY N/A 5/24/2021   • COLONOSCOPY N/A  "2022   • CORONARY ARTERY BYPASS GRAFT N/A 2019       Family History   Problem Relation Age of Onset   • Diabetes Mother    • Hypertension Father        Social History     Socioeconomic History   • Marital status:    Tobacco Use   • Smoking status: Former Smoker     Packs/day: 1.00     Years: 35.00     Pack years: 35.00     Types: Cigarettes     Start date: 1980     Quit date: 9/3/2019     Years since quittin.5   • Smokeless tobacco: Never Used   Vaping Use   • Vaping Use: Never used   Substance and Sexual Activity   • Alcohol use: No   • Drug use: No   • Sexual activity: Not Currently     Comment: .           Objective    Vitals:    22 1602 22 1607 22 1635 22 1717   BP: 144/92  156/96 144/92   BP Location: Right arm  Left arm Left arm   Patient Position: Sitting  Lying Lying   Pulse: 111  102 104   Resp: 20  20 20   Temp: 97.7 °F (36.5 °C)      TempSrc: Infrared      SpO2: 96%  96% 100%   Weight:  122 kg (270 lb)     Height:  188 cm (74\")         Physical Exam  Vitals and nursing note reviewed.   Constitutional:       General: He is not in acute distress.     Appearance: He is well-developed. He is obese. He is ill-appearing. He is not toxic-appearing or diaphoretic.   HENT:      Head: Normocephalic.      Right Ear: External ear normal.      Left Ear: External ear normal.   Eyes:      General: No visual field deficit.     Extraocular Movements: Extraocular movements intact.      Conjunctiva/sclera: Conjunctivae normal.      Pupils: Pupils are equal, round, and reactive to light.   Cardiovascular:      Rate and Rhythm: Tachycardia present. Rhythm irregular.   Pulmonary:      Effort: Pulmonary effort is normal. No accessory muscle usage or respiratory distress.      Breath sounds: No wheezing.   Chest:      Chest wall: No tenderness.   Abdominal:      General: Bowel sounds are normal.      Palpations: Abdomen is soft.      Tenderness: There is no abdominal " tenderness (deep palpation).   Skin:     General: Skin is warm and dry.   Neurological:      Mental Status: He is alert and oriented to person, place, and time.      GCS: GCS eye subscore is 4. GCS verbal subscore is 5. GCS motor subscore is 6.      Cranial Nerves: No dysarthria or facial asymmetry.      Sensory: Sensory deficit (left side) present.      Motor: No weakness.      Coordination: Coordination is intact.   Psychiatric:         Behavior: Behavior normal.         ECG 12 Lead      Date/Time: 3/9/2022 4:16 PM  Performed by: Gaudencio Mata MD  Authorized by: Gaudencio Mata MD   Interpreted by physician  Rhythm: atrial fibrillation  Rate: tachycardic  QRS axis: normal  ST segment elevation noted on lead: none.  Clinical impression: abnormal ECG                 ED Course      Results for orders placed or performed during the hospital encounter of 03/09/22   Comprehensive Metabolic Panel    Specimen: Blood   Result Value Ref Range    Glucose 221 (H) 65 - 99 mg/dL    BUN 14 6 - 20 mg/dL    Creatinine 1.23 0.76 - 1.27 mg/dL    Sodium 140 136 - 145 mmol/L    Potassium 4.4 3.5 - 5.2 mmol/L    Chloride 103 98 - 107 mmol/L    CO2 26.0 22.0 - 29.0 mmol/L    Calcium 9.1 8.6 - 10.5 mg/dL    Total Protein 6.7 6.0 - 8.5 g/dL    Albumin 4.20 3.50 - 5.20 g/dL    ALT (SGPT) 20 1 - 41 U/L    AST (SGOT) 13 1 - 40 U/L    Alkaline Phosphatase 87 39 - 117 U/L    Total Bilirubin 0.6 0.0 - 1.2 mg/dL    Globulin 2.5 gm/dL    A/G Ratio 1.7 g/dL    BUN/Creatinine Ratio 11.4 7.0 - 25.0    Anion Gap 11.0 5.0 - 15.0 mmol/L    eGFR 68.5 >60.0 mL/min/1.73   Protime-INR    Specimen: Blood   Result Value Ref Range    Protime 15.1 11.1 - 15.3 Seconds    INR 1.20 0.80 - 1.20   aPTT    Specimen: Blood   Result Value Ref Range    PTT 37.6 20.0 - 40.3 seconds   Troponin    Specimen: Blood   Result Value Ref Range    Troponin T <0.010 0.000 - 0.030 ng/mL   CBC Auto Differential    Specimen: Blood   Result Value Ref Range    WBC 9.70 3.40 -  10.80 10*3/mm3    RBC 5.25 4.14 - 5.80 10*6/mm3    Hemoglobin 15.3 13.0 - 17.7 g/dL    Hematocrit 45.4 37.5 - 51.0 %    MCV 86.5 79.0 - 97.0 fL    MCH 29.1 26.6 - 33.0 pg    MCHC 33.7 31.5 - 35.7 g/dL    RDW 13.7 12.3 - 15.4 %    RDW-SD 42.6 37.0 - 54.0 fl    MPV 11.1 6.0 - 12.0 fL    Platelets 184 140 - 450 10*3/mm3    Neutrophil % 73.8 42.7 - 76.0 %    Lymphocyte % 16.5 (L) 19.6 - 45.3 %    Monocyte % 7.0 5.0 - 12.0 %    Eosinophil % 1.8 0.3 - 6.2 %    Basophil % 0.4 0.0 - 1.5 %    Immature Grans % 0.5 0.0 - 0.5 %    Neutrophils, Absolute 7.16 (H) 1.70 - 7.00 10*3/mm3    Lymphocytes, Absolute 1.60 0.70 - 3.10 10*3/mm3    Monocytes, Absolute 0.68 0.10 - 0.90 10*3/mm3    Eosinophils, Absolute 0.17 0.00 - 0.40 10*3/mm3    Basophils, Absolute 0.04 0.00 - 0.20 10*3/mm3    Immature Grans, Absolute 0.05 0.00 - 0.05 10*3/mm3    nRBC 0.0 0.0 - 0.2 /100 WBC   POC Glucose Once    Specimen: Blood   Result Value Ref Range    Glucose 259 (H) 70 - 130 mg/dL   Type & Screen    Specimen: Blood   Result Value Ref Range    ABO Type A     RH type Positive     Antibody Screen Negative     T&S Expiration Date 3/12/2022 11:59:59 PM    Green Top (Gel)   Result Value Ref Range    Extra Tube Hold for add-ons.    Lavender Top   Result Value Ref Range    Extra Tube hold for add-on    Gold Top - SST   Result Value Ref Range    Extra Tube Hold for add-ons.    Light Blue Top   Result Value Ref Range    Extra Tube hold for add-on      XR Chest 1 View   Final Result   Cardiomegaly. Central vascular congestion.   Central opacities which may reflect central edema.      Electronically signed by:  Kumar Bernard MD  3/9/2022 5:24 PM CST   Workstation: [id number]      CT CEREBRAL PERFUSION WITH & WITHOUT CONTRAST   Final Result   Small area of subcortical ischemia within the right occipital   lobe, with a penumbra/potentially reversible ischemic volume of 6   mL. Associated subcortical hypoattenuation on concurrent head CT   which had developed relative to  2020 exam      The above findings were communicated via telephone by Dr. Bernard to   Dr. GAVIN BUSTOS at 3/9/2022 5:29 PM CST.         Electronically signed by:  Kumar Bernard MD  3/9/2022 5:31 PM CST   Workstation: [id number]      CT Head Without Contrast Stroke Protocol   Final Result   1.  No acute intracranial process.   2.  Chronic ischemia of the right basal ganglia extending to the   right corona radiata anteriorly.   3.  Scattered chronic microangiopathic changes.               Electronically signed by:  Eric Dooley MD  3/9/2022 4:37 PM CST   Workstation: [id number]      CT Angiogram Head w AI Analysis of LVO    (Results Pending)   CT Angiogram Neck    (Results Pending)                                                MDM  Number of Diagnoses or Management Options  Atrial fibrillation with RVR (HCC): new and requires workup  Ischemic stroke (HCC): new and requires workup  Numbness: new and requires workup  Weakness: new and requires workup  Diagnosis management comments: Vital signs are stable, afebrile.  Neuro deficits noted on exam.  Neurology consulted.  CT head, CTA, CT perfusion obtained.  CT head negative for acute intracranial abnormalities.  CT perfusion shows ischemic changes in the occipital lobe.  Patient's not candidate for TPA given that he is on Eliquis.  Patient noted to be in A. fib RVR.  Bolus of diltiazem given which decreased the heart rate to the upper 90s.  Hospitalist agrees to admit.       Amount and/or Complexity of Data Reviewed  Independent visualization of images, tracings, or specimens: yes        Final diagnoses:   Numbness   Weakness   Ischemic stroke (HCC)   Atrial fibrillation with RVR (HCC)       ED Disposition  ED Disposition     ED Disposition   Decision to Admit    Condition   --    Comment   Level of Care: Stepdown [25]   Diagnosis: Numbness [256817]   Admitting Physician: ROSIO CASANOVA [[id number]]   Attending Physician: ROSIO CASANOVA [[id number]]               No follow-up  provider specified.       Medication List      No changes were made to your prescriptions during this visit.             Gaudencio Maat MD  03/09/22 [id number]

## 2022-03-09 NOTE — CONSULTS
Stroke Consult Note    Patient Name: Herbert White Sr.   MRN: [id number]  Age: 56 y.o.  Sex: male  : 1965    Primary Care Physician: Artemio Sanchez MD  Referring Physician:  Gaudencio Mata MD    TIME STROKE TEAM CALLED: 16:05 CST     TIME PATIENT SEEN: 16:15 CST    Handedness: Right  Race: White    Chief Complaint/Reason for Consultation: Left-sided weakness and numbness    HPI: Pt is a 56-yr-old right-handed white male with known diagnosis of hypertension, hyperlipidemia, Afib on Eliquis, CAD on aspirin and plavix, and DM who presented after sudden onset of left-sided weakness and numbness about 45 minutes prior to coming to the ER. He arrived in the TPA window but was not a candidate d/t being on anticoagulant. Upon exam he is A&O X4, left side strength is 4/5, states numbness to left arm and leg, and visual field is intact.     Last Known Normal Date/Time: 15:15     Review of Systems   HENT: Negative.    Eyes: Negative.    Respiratory: Negative.    Cardiovascular: Positive for palpitations.   Gastrointestinal: Negative.    Genitourinary: Negative.    Musculoskeletal: Negative.    Neurological: Positive for weakness and numbness.   Psychiatric/Behavioral: Negative.         Temp:  [97.7 °F (36.5 °C)] 97.7 °F (36.5 °C)  Heart Rate:  [102-111] 102  Resp:  [20] 20  BP: (144-156)/(92-96) 156/96    Neurological Exam  Mental Status  Awake and alert. Oriented to person, place, time and situation. Speech is normal. Language is fluent with no aphasia. Attention and concentration are normal.    Cranial Nerves  CN II: Visual acuity is normal. Visual fields full to confrontation.  CN III, IV, VI: Extraocular movements intact bilaterally. Normal lids and orbits bilaterally. Pupils equal round and reactive to light bilaterally.  CN V: Facial sensation is normal.  CN VII: Full and symmetric facial movement.  CN IX, X: Palate elevates symmetrically. Normal gag reflex.  CN XI: Shoulder shrug strength is  normal.  CN XII: Tongue midline without atrophy or fasciculations.    Motor  Normal muscle bulk throughout. No fasciculations present. Strength is 5/5 in all four extremities except as noted.  Left side strength 4/5.    Sensory  Light touch abnormality: Left arm and leg.     Reflexes  Not assessed.    Coordination    Finger-to-nose, rapid alternating movements and heel-to-shin normal bilaterally without dysmetria.    Gait    Not assessed.      Physical Exam  Vitals and nursing note reviewed.   Constitutional:       General: He is awake.      Appearance: Normal appearance.   HENT:      Head: Normocephalic and atraumatic.   Eyes:      General: Lids are normal.      Extraocular Movements: Extraocular movements intact.      Pupils: Pupils are equal, round, and reactive to light.   Cardiovascular:      Rate and Rhythm: Tachycardia present. Rhythm irregular.   Pulmonary:      Effort: Pulmonary effort is normal.   Musculoskeletal:         General: Normal range of motion.      Cervical back: Normal range of motion.   Skin:     General: Skin is warm and dry.   Neurological:      Mental Status: He is alert.      Sensory: Sensory deficit present.      Motor: Weakness present.      Coordination: Coordination is intact.   Psychiatric:         Mood and Affect: Mood normal.         Speech: Speech normal.         Acute Stroke Data    Alteplase (tPA) Inclusion / Exclusion Criteria    Time: 17:04 CST  Person Administering Scale: NELSY Roberts    Inclusion Criteria  [x]   18 years of age or greater   []   Onset of symptoms < 4.5 hours before beginning treatment (stroke onset = time patient was last seen well or without symptoms).   []   Diagnosis of acute ischemic stroke causing measurable disabling deficit (Complete Hemianopia, Any Aphasia, Visual or Sensory Extinction, Any weakness limiting sustained effort against gravity)   []   Any remaining deficit considered potentially disabling in view of patient and practitioner    Exclusion criteria (Do not proceed with Alteplase if any are checked under exclusion criteria)  []   Onset unknown or GREATER than 4.5 hours   []   ICH on CT/MRI   []   CT demonstrates hypodensity representing acute or subacute infarct   []   Significant head trauma or prior stroke in the previous 3 months   []   Symptoms suggestive of subarachnoid hemorrhage   []   History of un-ruptured intracranial aneurysm GREATER than 10 mm   []   Recent intracranial or intraspinal surgery within the last 3 months   []   Arterial puncture at a non-compressible site in the previous 7 days   []   Active internal bleeding   []   Acute bleeding tendency   []   Platelet count LESS than 100,000 for known hematological diseases such as leukemia, thrombocytopenia or chronic cirrhosis   []   Current use of anticoagulant with INR GREATER than 1.7 or PT GREATER than 15 seconds, aPTT GREATER than 40 seconds   []   Heparin received within 48 hours, resulting in abnormally elevated aPTT GREATER than upper limit of normal   []   Current use of direct thrombin inhibitors or direct factor Xa inhibitors in the past 48 hours   []   Elevated blood pressure refractory to treatment (systolic GREATER than 185 mm/Hg or diastolic  GREATER than 110 mm/Hg   []   Suspected infective endocarditis and aortic arch dissection   []   Current use of therapeutic treatment dose of low-molecular-weight heparin (LMWH) within the previous 24 hours   []   Structural GI malignancy or bleed   Relative exclusion for all patients  []   Only minor non-disabling symptoms   []   Pregnancy   []   Seizure at onset with postictal residual neurological impairments   []   Major surgery or previous trauma within past 14 days   []   History of previous spontaneous ICH, intracranial neoplasm, or AV malformation   []   Postpartum (within previous 14 days)   []   Recent GI or urinary tract hemorrhage (within previous 21 days)   []   Recent acute MI (within previous 3 months)   []    History of un-ruptured intracranial aneurysm LESS than 10 mm   []   History of ruptured intracranial aneurysm   []   Blood glucose LESS than 50 mg/dL (2.7 mmol/L)   []   Dural puncture within the last 7 days   []   Known GREATER than 10 cerebral microbleeds   Additional exclusions for patients with symptoms onset between 3 and 4.5 hours.  []   Age > 80.   [x]   On any anticoagulants regardless of INR  >>> Warfarin (Coumadin), Heparin, Enoxaparin (Lovenox), fondaparinux (Arixtra), bivalirudin (Angiomax), Argatroban, dabigatran (Pradaxa), rivaroxaban (Xarelto), or apixaban (Eliquis)   []   Severe stroke (NIHSS > 25).   []   History of BOTH diabetes and previous ischemic stroke.   []   The risks and benefits have been discussed with the patient or family related to the administration of IV Alteplase for stroke symptoms.   []   I have discussed and reviewed the patient's case and imaging with the attending prior to IV Alteplase.   N/A Time Alteplase administered       Past Medical History:   Diagnosis Date   • Coronary artery disease    • COVID-19 virus detected 1/21/2022   • Diabetes mellitus (HCC)    • Diabetic retinopathy (HCC)    • GERD (gastroesophageal reflux disease)    • Hyperlipidemia    • Hypertension    • Osteoarthritis    • Paroxysmal atrial fibrillation (HCC)    • Sleep apnea      Past Surgical History:   Procedure Laterality Date   • CARDIAC CATHETERIZATION N/A 9/3/2019   • CARDIAC CATHETERIZATION N/A 2/14/2022   • CHOLECYSTECTOMY     • COLONOSCOPY N/A 5/24/2021   • COLONOSCOPY N/A 1/5/2022   • CORONARY ARTERY BYPASS GRAFT N/A 9/25/2019     Family History   Problem Relation Age of Onset   • Diabetes Mother    • Hypertension Father      Social History     Socioeconomic History   • Marital status:    Tobacco Use   • Smoking status: Former Smoker     Packs/day: 1.00     Years: 35.00     Pack years: 35.00     Types: Cigarettes     Start date: 8/27/1980     Quit date: 9/3/2019     Years since quitting:  2.5   • Smokeless tobacco: Never Used   Vaping Use   • Vaping Use: Never used   Substance and Sexual Activity   • Alcohol use: No   • Drug use: No   • Sexual activity: Not Currently     Comment: .     Allergies   Allergen Reactions   • Ace Inhibitors Anaphylaxis   • Wellbutrin [Bupropion] Anaphylaxis     Prior to Admission medications    Medication Sig Start Date End Date Taking? Authorizing Provider   acetaminophen (TYLENOL) 325 MG tablet Take 2 tablets by mouth Every 6 (Six) Hours As Needed for Mild Pain . 2/24/22   Quinn Mathew MD   albuterol sulfate  (90 Base) MCG/ACT inhaler Inhale 2 puffs Every 4 (Four) Hours As Needed for Wheezing. 8/27/20   Artemio Sanchez MD   amiodarone (PACERONE) 200 MG tablet Take 1 tablet by mouth 2 (Two) Times a Day With Meals. 2/24/22   Quinn Mathew MD   amoxicillin (AMOXIL) 875 MG tablet Take 1 tablet by mouth 2 (Two) Times a Day. 3/3/22   Artemio Sanchez MD   apixaban (ELIQUIS) 5 MG tablet tablet Take 1 tablet by mouth Every 12 (Twelve) Hours. 2/16/22 3/18/22  Kelsey Merchant APRN   aspirin 81 MG chewable tablet Chew 1 tablet by mouth Daily 2/16/22 3/18/22  Kelsey Merchant APRN   atorvastatin (LIPITOR) 40 MG tablet Take 1 tablet by mouth Every Night for 90 days. 1/25/22 4/25/22  Lucio Coyle MD   clopidogrel (PLAVIX) 75 MG tablet Take 1 tablet by mouth Daily. 2/11/22   Alice Rice MD   dilTIAZem CD (Cardizem CD) 180 MG 24 hr capsule Take 1 capsule by mouth Daily for 90 days. 1/25/22 4/25/22  Lucio Coyle MD   gabapentin (NEURONTIN) 300 MG capsule TAKE 1 CAPSULE BY MOUTH AT BEDTIME FOR 2 DAYS THEN 1 TWICE DAILY FOR 2 DAYS THEN 1 THREE TIMES DAILY THEREAFTER 6/28/21   ProviderHai MD   guaiFENesin-dextromethorphan (ROBITUSSIN DM) 100-10 MG/5ML syrup Take 10 mL by mouth 4 (Four) Times a Day As Needed for Cough. 1/13/22   Artemio Sanchez MD   insulin aspart prot & aspart (NovoLOG Mix 70/30 FlexPen) (70-30) 100 UNIT/ML suspension  "pen-injector injection Start 10u ac breakfast and 5u ac supper. I anticipate will need 50 units per day. 3/3/22   Artemio Sanchez MD   Insulin Pen Needle (Pen Needles 1/2\") 29G X 12MM misc 1 each Every Night. 3/3/22   Artemio Sanchez MD   isosorbide mononitrate (IMDUR) 30 MG 24 hr tablet Take 1 tablet by mouth Daily. 8/27/20   Artemio Sanchez MD   Lancet Devices (ONE TOUCH DELICA LANCING DEV) misc Use as directed to test blood sugar. 11/13/20   Alessandro Gan MD   linaclotide (Linzess) 145 MCG capsule capsule Take 1 capsule by mouth Every Morning Before Breakfast. 11/16/21   Renee Osorio APRN   metFORMIN (GLUCOPHAGE) 500 MG tablet Take 2 tablets by mouth 2 (Two) Times a Day With Meals. 2/17/22   Kelsey Merchant APRN   metoprolol succinate XL (TOPROL-XL) 100 MG 24 hr tablet Take 1 and 1/2 tablets by mouth Daily. 2/16/22   Kelsey Merchant APRN   nitroglycerin (NITROSTAT) 0.4 MG SL tablet Place 1 tablet under the tongue Every 5 (Five) Minutes As Needed for Chest Pain. Take no more than 3 doses in 15 minutes. 9/13/19   Marcelino Goode MD   nortriptyline (PAMELOR) 10 MG capsule TAKE 1 CAPSULE BY MOUTH THREE TIMES DAILY 11/9/21   Sandra Sorto APRN   ondansetron ODT (Zofran ODT) 8 MG disintegrating tablet Place 1 tablet on the tongue Every 8 (Eight) Hours As Needed for Nausea or Vomiting. 11/16/21   Renee Osorio APRN   ONETOUCH DELICA LANCETS 33G misc 1 each 4 (Four) Times a Day. delica if covered, use alternative if not covered 1/24/17   Alessandro Gan MD   pioglitazone (Actos) 30 MG tablet Take 1 tablet by mouth Daily. 3/3/22   Artemio Sanchez MD   ranolazine (RANEXA) 500 MG 12 hr tablet Take 1 tablet by mouth Every 12 (Twelve) Hours. 5/5/21   Quinn Mathew MD   spironolactone (ALDACTONE) 25 MG tablet Take 1 tablet by mouth once daily 11/9/21   Sandra Sorto APRN   tiZANidine (ZANAFLEX) 4 MG tablet Take 4 mg by mouth 3 (Three) Times a Day. 6/28/21   Provider, Historical, " [facility] Meds:  Scheduled- alteplase, 81 mg, Intravenous, Once  alteplase, 9 mg, Intravenous, Once  dilTIAZem, 10 mg, Intravenous, Once  sodium chloride, 81 mL/hr, Intravenous, Once      Infusions-     PRNs- sodium chloride    Functional Status Prior to Current Stroke/Linda Score: 0    NIH Stroke Scale  Time: 17:04 CST  Person Administering Scale: NELSY Roberts    1a  Level of consciousness: 0=alert; keenly responsive   1b. LOC questions:  0=Performs both tasks correctly   1c. LOC commands: 0=Performs both tasks correctly   2.  Best Gaze: 0=normal   3.  Visual: 0=No visual loss   4. Facial Palsy: 0=Normal symmetric movement   5a.  Motor left arm: 1=Drift, limb holds 90 (or 45) degrees but drifts down before full 10 seconds: does not hit bed   5b.  Motor right arm: 0=No drift, limb holds 90 (or 45) degrees for full 10 seconds   6a. motor left le=No drift, limb holds 90 (or 45) degrees for full 10 seconds   6b  Motor right le=No drift, limb holds 90 (or 45) degrees for full 10 seconds   7. Limb Ataxia: 0=Absent   8.  Sensory: 1=Mild to moderate sensory loss; patient feels pinprick is less sharp or is dull on the affected side; there is a loss of superficial pain with pinprick but patient is aware He is being touched   9. Best Language:  0=No aphasia, normal   10. Dysarthria: 0=Normal   11. Extinction and Inattention: 0=No abnormality    Total:   2       Results Reviewed:  I have personally reviewed current lab, radiology, and data and agree with results.  Lab Results (last 24 hours)     Procedure Component Value Units Date/Time    Comprehensive Metabolic Panel [[id number]]  (Abnormal) Collected: 22 1618    Specimen: Blood Updated: 22 1649     Glucose 221 mg/dL      BUN 14 mg/dL      Creatinine 1.23 mg/dL      Sodium 140 mmol/L      Potassium 4.4 mmol/L      Chloride 103 mmol/L      CO2 26.0 mmol/L      Calcium 9.1 mg/dL      Total Protein 6.7 g/dL      Albumin 4.20 g/dL      ALT  (SGPT) 20 U/L      AST (SGOT) 13 U/L      Alkaline Phosphatase 87 U/L      Total Bilirubin 0.6 mg/dL      Globulin 2.5 gm/dL      A/G Ratio 1.7 g/dL      BUN/Creatinine Ratio 11.4     Anion Gap 11.0 mmol/L      eGFR 68.5 mL/min/1.73      Comment: National Kidney Foundation and American Society of Nephrology (ASN) Task Force recommended calculation based on the Chronic Kidney Disease Epidemiology Collaboration (CKD-EPI) equation refit without adjustment for race.       Narrative:      GFR Normal >60  Chronic Kidney Disease <60  Kidney Failure <15      Troponin [[id number]]  (Normal) Collected: 03/09/22 1618    Specimen: Blood Updated: 03/09/22 1649     Troponin T <0.010 ng/mL     Narrative:      Troponin T Reference Range:  <= 0.03 ng/mL-   Negative for AMI  >0.03 ng/mL-     Abnormal for myocardial necrosis.  Clinicians would have to utilize clinical acumen, EKG, Troponin and serial changes to determine if it is an Acute Myocardial Infarction or myocardial injury due to an underlying chronic condition.       Results may be falsely decreased if patient taking Biotin.      Protime-INR [[id number]]  (Normal) Collected: 03/09/22 1618    Specimen: Blood Updated: 03/09/22 1643     Protime 15.1 Seconds      INR 1.20    Narrative:      Therapeutic range for most indications is 2.0-3.0 INR,  or 2.5-3.5 for mechanical heart valves.    aPTT [[id number]]  (Normal) Collected: 03/09/22 1618    Specimen: Blood Updated: 03/09/22 1643     PTT 37.6 seconds     Narrative:      The recommended Heparin therapeutic range is 68-97 seconds.    CBC & Differential [[id number]]  (Abnormal) Collected: 03/09/22 1618    Specimen: Blood Updated: 03/09/22 1624    Narrative:      The following orders were created for panel order CBC & Differential.  Procedure                               Abnormality         Status                     ---------                               -----------         ------                     CBC Auto Differential[[id number]]         Abnormal            Final result                 Please view results for these tests on the individual orders.    CBC Auto Differential [[id number]]  (Abnormal) Collected: 03/09/22 1618    Specimen: Blood Updated: 03/09/22 1624     WBC 9.70 10*3/mm3      RBC 5.25 10*6/mm3      Hemoglobin 15.3 g/dL      Hematocrit 45.4 %      MCV 86.5 fL      MCH 29.1 pg      MCHC 33.7 g/dL      RDW 13.7 %      RDW-SD 42.6 fl      MPV 11.1 fL      Platelets 184 10*3/mm3      Neutrophil % 73.8 %      Lymphocyte % 16.5 %      Monocyte % 7.0 %      Eosinophil % 1.8 %      Basophil % 0.4 %      Immature Grans % 0.5 %      Neutrophils, Absolute 7.16 10*3/mm3      Lymphocytes, Absolute 1.60 10*3/mm3      Monocytes, Absolute 0.68 10*3/mm3      Eosinophils, Absolute 0.17 10*3/mm3      Basophils, Absolute 0.04 10*3/mm3      Immature Grans, Absolute 0.05 10*3/mm3      nRBC 0.0 /100 WBC     Extra Tubes [[id number]] Collected: 03/09/22 1618    Specimen: Blood, Venous Line Updated: 03/09/22 1620    Narrative:      The following orders were created for panel order Extra Tubes.  Procedure                               Abnormality         Status                     ---------                               -----------         ------                     P[place] Top[[id number]]                                         In process                   Please view results for these tests on the individual orders.    Gray Top [[id number]] Collected: 03/09/22 1618    Specimen: Blood Updated: 03/09/22 1620    [place] Draw [[id number]] Collected: 03/09/22 1618    Specimen: Blood Updated: 03/09/22 1618    Narrative:      The following orders were created for panel order [place] Draw.  Procedure                               Abnormality         Status                     ---------                               -----------         ------                     Green Top (Gel)[[id number]]                                  In process                 Lavender Top[[id number]]                                      In process                 Gold Top - SST[[id number]]                                   In process                 Light Blue Top[[id number]]                                   In process                   Please view results for these tests on the individual orders.    Gold Top - SST [[id number]] Collected: 03/09/22 1618    Specimen: Blood Updated: 03/09/22 1618    POC Glucose Once [[id number]]  (Abnormal) Collected: 03/09/22 1605    Specimen: Blood Updated: 03/09/22 1618     Glucose 259 mg/dL      Comment: Result Not ConfirmedOperator: [id number] VERONICA Hayden ID: [id number]       Lavender Top [[id number]] Collected: 03/09/22 1618    Specimen: Blood Updated: 03/09/22 1618    Green Top (Gel) [[id number]] Collected: 03/09/22 1618    Specimen: Blood Updated: 03/09/22 1618    Light Blue Top [[id number]] Collected: 03/09/22 1618    Specimen: Blood Updated: 03/09/22 1618        Imaging Results (Last 24 Hours)     Procedure Component Value Units Date/Time    XR Chest 1 View [[id number]] Resulted: 03/09/22 1645     Updated: 03/09/22 1652    CT Head Without Contrast Stroke Protocol [[id number]] Collected: 03/09/22 1617     Updated: 03/09/22 1639    Narrative:      EXAM:  CT HEAD WITHOUT IV CONTRAST    ORDERING PROVIDER:  GAVIN BUSTOS    CLINICAL HISTORY:   Stroke    COMPARISON:      TECHNIQUE:   Nonenhanced CT of the head was performed and reformatted in the  sagittal and coronal planes.     This examination was performed according to our departmental dose  optimization program which includes automated exposure control,  adjustment of the MA and kV according to patient size, and/or use  of iterative reconstruction technique.    FINDINGS:     CEREBRAL PARENCHYMA:  Chronic microangiopathic changes. No  hemorrhage.  No intracranial mass or mass effect. Age-appropriate  cerebral atrophy. Chronic ischemia of the right basal ganglia  extending to the right corona radiata  anteriorly.    POSTERIOR FOSSA:  Age-appropriate atrophy of cerebellum and  brainstem.  No cerebellar tonsillar ectopia.    EXTRA-AXIAL SPACES:  Normal size and configuration.  No mass,  fluid collection or hemorrhage.    ORBITS: No mass. Unremarkable extraocular muscles, globe and  optic nerve.    CALVARIA AND SOFT TISSUES:  No mass or adenopathy, lytic or  sclerotic lesion.    TEMPORAL BONE AND SKULL BASE:  Unremarkable middle and inner ear  , and mastoid air cells.    PARANASAL SINUSES AND FACIAL BONES: Unremarkable.     VASCULAR STRUCTURES:  Unremarkable.      Impression:      1.  No acute intracranial process.  2.  Chronic ischemia of the right basal ganglia extending to the  right corona radiata anteriorly.  3.  Scattered chronic microangiopathic changes.          Electronically signed by:  Eric Dooley MD  3/9/2022 4:37 PM CST  Workstation: [id number]    CT CEREBRAL PERFUSION WITH & WITHOUT CONTRAST [[id number]] Resulted: 03/09/22 1620     Updated: 03/09/22 1631    CT Angiogram Head w AI Analysis of LVO [[id number]] Resulted: 03/09/22 1619     Updated: 03/09/22 1626    CT Angiogram Neck [[id number]] Resulted: 03/09/22 1619     Updated: 03/09/22 1626        Results for orders placed during the hospital encounter of 01/21/22    Adult Transthoracic Echo Complete W/ Cont if Necessary Per Protocol    Interpretation Summary  · Left ventricular wall thickness is consistent with moderate concentric hypertrophy.  · Estimated left ventricular EF = 58% Left ventricular ejection fraction appears to be 56 - 60%. Left ventricular systolic function is normal.  · Mild tricuspid valve regurgitation is present.  · Estimated right ventricular systolic pressure from tricuspid regurgitation is normal (<35 mmHg).  · The right ventricular cavity is borderline dilated.  · There is no evidence of pericardial effusion.      Assessment/Plan: Pt is a 56-yr-old right-handed white male with known diagnosis of hypertension, hyperlipidemia,  chronic Afib on Eliquis, CAD on aspirin and plavix, and DM who presented after sudden onset of left-sided weakness and numbness about 45 minutes prior to coming to the ER. He arrived in the TPA window but was not a candidate d/t being on anticoagulant. Upon exam he is A&O X4, left side strength is 4/5, states numbness to left arm and leg, and visual field is intact.   1. Left-sided weakness and numbness- CT shows chronic ischemia of the right basal ganglia extending to the right corona radiata anteriorly. CTA and CT perfusion pending, will get MRI, and start stroke order set.  2. Hypertension- Allow permissive hypertension. Okay to normalize tomorrow.  3. Hyperlipidemia- Will check LDL, start Lipitor 80 mg.   4. Chronic Afib- On Eliquis, pt states he is scheduled for cardioversion Monday.  5. Activity- Bedrest today, can work with PT/OT tomorrow.  6. Diet- ADA heart-healthy diet if he passes bedside dysphagia screening.  Case was discussed with pt, pt's wife, Dr. Wilhelm, ER physician, and nursing. Thank you for the consult.              Rashaad Christensen, NELSY  March 9, 2022  17:04 CST            I spent 60 minutes caring for Herbert  on this date of service. This time includes time spent by me in the following activities: reviewing tests, obtaining and/or reviewing a separately obtained history, performing a medically appropriate examination and/or evaluation, counseling and educating the patient/family/caregiver, ordering medications, tests, or procedures, referring and communicating with other health care professionals, documenting information in the medical record, independently interpreting results and communicating that information with the patient/family/caregiver and care coordination

## 2022-03-10 ENCOUNTER — APPOINTMENT (OUTPATIENT)
Dept: MRI IMAGING | Facility: HOSPITAL | Age: 57
End: 2022-03-10

## 2022-03-10 ENCOUNTER — APPOINTMENT (OUTPATIENT)
Dept: CARDIOLOGY | Facility: HOSPITAL | Age: 57
End: 2022-03-10

## 2022-03-10 LAB
BH CV ECHO MEAS - AO MAX PG: 3.3 MMHG
BH CV ECHO MEAS - AO V2 MAX: 91.5 CM/SEC
BH CV ECHO MEAS - BSA(HAYCOCK): 2.6 M^2
BH CV ECHO MEAS - BSA: 2.5 M^2
BH CV ECHO MEAS - BZI_BMI: 36.3 KILOGRAMS/M^2
BH CV ECHO MEAS - BZI_METRIC_HEIGHT: 188 CM
BH CV ECHO MEAS - BZI_METRIC_WEIGHT: 128.4 KG
BH CV ECHO MEAS - EDV(CUBED): 119.1 ML
BH CV ECHO MEAS - EDV(TEICH): 113.9 ML
BH CV ECHO MEAS - EF(CUBED): 67.3 %
BH CV ECHO MEAS - EF(TEICH): 58.6 %
BH CV ECHO MEAS - ESV(CUBED): 39 ML
BH CV ECHO MEAS - ESV(TEICH): 47.1 ML
BH CV ECHO MEAS - FS: 31.1 %
BH CV ECHO MEAS - IVS/LVPW: 1.1
BH CV ECHO MEAS - IVSD: 1.2 CM
BH CV ECHO MEAS - LA DIMENSION: 5.7 CM
BH CV ECHO MEAS - LV MASS(C)D: 226.5 GRAMS
BH CV ECHO MEAS - LV MASS(C)DI: 89.9 GRAMS/M^2
BH CV ECHO MEAS - LVIDD: 4.9 CM
BH CV ECHO MEAS - LVIDS: 3.4 CM
BH CV ECHO MEAS - LVPWD: 1.2 CM
BH CV ECHO MEAS - RAP SYSTOLE: 10 MMHG
BH CV ECHO MEAS - RVDD: 3.5 CM
BH CV ECHO MEAS - RVSP: 37.7 MMHG
BH CV ECHO MEAS - SI(CUBED): 31.8 ML/M^2
BH CV ECHO MEAS - SI(TEICH): 26.5 ML/M^2
BH CV ECHO MEAS - SV(CUBED): 80.1 ML
BH CV ECHO MEAS - SV(TEICH): 66.8 ML
BH CV ECHO MEAS - TR MAX VEL: 263 CM/SEC
CHOLEST SERPL-MCNC: 94 MG/DL (ref 0–200)
GLUCOSE BLDC GLUCOMTR-MCNC: 165 MG/DL (ref 70–130)
GLUCOSE BLDC GLUCOMTR-MCNC: 175 MG/DL (ref 70–130)
GLUCOSE BLDC GLUCOMTR-MCNC: 223 MG/DL (ref 70–130)
GLUCOSE BLDC GLUCOMTR-MCNC: 236 MG/DL (ref 70–130)
GLUCOSE BLDC GLUCOMTR-MCNC: 308 MG/DL (ref 70–130)
HBA1C MFR BLD: 10.3 % (ref 4.8–5.6)
HDLC SERPL-MCNC: 42 MG/DL (ref 40–60)
LDLC SERPL CALC-MCNC: 42 MG/DL (ref 0–100)
LDLC/HDLC SERPL: 1.08 {RATIO}
Lab: NORMAL
MAXIMAL PREDICTED HEART RATE: 163 BPM
QT INTERVAL: 330 MS
QTC INTERVAL: 440 MS
STRESS TARGET HR: 139 BPM
TRIGL SERPL-MCNC: 34 MG/DL (ref 0–150)
VLDLC SERPL-MCNC: 10 MG/DL (ref 5–40)

## 2022-03-10 PROCEDURE — G0378 HOSPITAL OBSERVATION PER HR: HCPCS

## 2022-03-10 PROCEDURE — 36415 COLL VENOUS BLD VENIPUNCTURE: CPT | Performed by: NURSE PRACTITIONER

## 2022-03-10 PROCEDURE — 93308 TTE F-UP OR LMTD: CPT

## 2022-03-10 PROCEDURE — 70551 MRI BRAIN STEM W/O DYE: CPT

## 2022-03-10 PROCEDURE — 25010000002 LORAZEPAM PER 2 MG: Performed by: STUDENT IN AN ORGANIZED HEALTH CARE EDUCATION/TRAINING PROGRAM

## 2022-03-10 PROCEDURE — 99213 OFFICE O/P EST LOW 20 MIN: CPT | Performed by: NURSE PRACTITIONER

## 2022-03-10 PROCEDURE — 97162 PT EVAL MOD COMPLEX 30 MIN: CPT

## 2022-03-10 PROCEDURE — 80061 LIPID PANEL: CPT | Performed by: NURSE PRACTITIONER

## 2022-03-10 PROCEDURE — 96375 TX/PRO/DX INJ NEW DRUG ADDON: CPT

## 2022-03-10 PROCEDURE — 83036 HEMOGLOBIN GLYCOSYLATED A1C: CPT | Performed by: NURSE PRACTITIONER

## 2022-03-10 PROCEDURE — 92610 EVALUATE SWALLOWING FUNCTION: CPT | Performed by: SPEECH-LANGUAGE PATHOLOGIST

## 2022-03-10 PROCEDURE — 97166 OT EVAL MOD COMPLEX 45 MIN: CPT

## 2022-03-10 PROCEDURE — 96376 TX/PRO/DX INJ SAME DRUG ADON: CPT

## 2022-03-10 PROCEDURE — 93325 DOPPLER ECHO COLOR FLOW MAPG: CPT

## 2022-03-10 PROCEDURE — 63710000001 INSULIN ASPART PER 5 UNITS: Performed by: STUDENT IN AN ORGANIZED HEALTH CARE EDUCATION/TRAINING PROGRAM

## 2022-03-10 PROCEDURE — 93321 DOPPLER ECHO F-UP/LMTD STD: CPT

## 2022-03-10 PROCEDURE — 82962 GLUCOSE BLOOD TEST: CPT

## 2022-03-10 RX ORDER — LORAZEPAM 2 MG/ML
1 INJECTION INTRAMUSCULAR EVERY 4 HOURS PRN
Status: DISCONTINUED | OUTPATIENT
Start: 2022-03-10 | End: 2022-03-11 | Stop reason: HOSPADM

## 2022-03-10 RX ORDER — ATORVASTATIN CALCIUM 40 MG/1
40 TABLET, FILM COATED ORAL NIGHTLY
Status: DISCONTINUED | OUTPATIENT
Start: 2022-03-10 | End: 2022-03-11 | Stop reason: HOSPADM

## 2022-03-10 RX ADMIN — GABAPENTIN 100 MG: 100 CAPSULE ORAL at 09:18

## 2022-03-10 RX ADMIN — ASPIRIN 81 MG: 81 TABLET, CHEWABLE ORAL at 09:18

## 2022-03-10 RX ADMIN — CLOPIDOGREL BISULFATE 75 MG: 75 TABLET ORAL at 09:19

## 2022-03-10 RX ADMIN — GABAPENTIN 100 MG: 100 CAPSULE ORAL at 20:04

## 2022-03-10 RX ADMIN — DILTIAZEM HYDROCHLORIDE 180 MG: 180 CAPSULE, COATED, EXTENDED RELEASE ORAL at 09:18

## 2022-03-10 RX ADMIN — ATORVASTATIN CALCIUM 40 MG: 40 TABLET, FILM COATED ORAL at 20:04

## 2022-03-10 RX ADMIN — TIZANIDINE 4 MG: 4 TABLET ORAL at 20:04

## 2022-03-10 RX ADMIN — LORAZEPAM 1 MG: 2 INJECTION INTRAMUSCULAR; INTRAVENOUS at 21:12

## 2022-03-10 RX ADMIN — INSULIN ASPART 2 UNITS: 100 INJECTION, SOLUTION INTRAVENOUS; SUBCUTANEOUS at 09:30

## 2022-03-10 RX ADMIN — AMIODARONE HYDROCHLORIDE 200 MG: 200 TABLET ORAL at 09:19

## 2022-03-10 RX ADMIN — NORTRIPTYLINE HYDROCHLORIDE 10 MG: 10 CAPSULE ORAL at 20:03

## 2022-03-10 RX ADMIN — APIXABAN 5 MG: 5 TABLET, FILM COATED ORAL at 09:18

## 2022-03-10 RX ADMIN — HYDROCODONE BITARTRATE AND ACETAMINOPHEN 1 TABLET: 5; 325 TABLET ORAL at 10:37

## 2022-03-10 RX ADMIN — INSULIN ASPART 7 UNITS: 100 INJECTION, SOLUTION INTRAVENOUS; SUBCUTANEOUS at 11:59

## 2022-03-10 RX ADMIN — METOPROLOL SUCCINATE 150 MG: 50 TABLET, EXTENDED RELEASE ORAL at 09:18

## 2022-03-10 RX ADMIN — RANOLAZINE 500 MG: 500 TABLET, FILM COATED, EXTENDED RELEASE ORAL at 20:04

## 2022-03-10 RX ADMIN — AMIODARONE HYDROCHLORIDE 200 MG: 200 TABLET ORAL at 16:48

## 2022-03-10 RX ADMIN — SPIRONOLACTONE 25 MG: 25 TABLET ORAL at 09:18

## 2022-03-10 RX ADMIN — HYDROCODONE BITARTRATE AND ACETAMINOPHEN 1 TABLET: 5; 325 TABLET ORAL at 03:57

## 2022-03-10 RX ADMIN — RANOLAZINE 500 MG: 500 TABLET, FILM COATED, EXTENDED RELEASE ORAL at 09:18

## 2022-03-10 RX ADMIN — HYDROCODONE BITARTRATE AND ACETAMINOPHEN 1 TABLET: 5; 325 TABLET ORAL at 20:04

## 2022-03-10 RX ADMIN — LORAZEPAM 1 MG: 2 INJECTION INTRAMUSCULAR; INTRAVENOUS at 08:52

## 2022-03-10 RX ADMIN — NORTRIPTYLINE HYDROCHLORIDE 10 MG: 10 CAPSULE ORAL at 09:20

## 2022-03-10 RX ADMIN — APIXABAN 5 MG: 5 TABLET, FILM COATED ORAL at 20:04

## 2022-03-10 RX ADMIN — INSULIN ASPART 4 UNITS: 100 INJECTION, SOLUTION INTRAVENOUS; SUBCUTANEOUS at 16:49

## 2022-03-10 RX ADMIN — NORTRIPTYLINE HYDROCHLORIDE 10 MG: 10 CAPSULE ORAL at 16:48

## 2022-03-10 NOTE — OUTREACH NOTE
Medical Week 3 Survey    Flowsheet Row Responses   [facility] patient discharged from? [place]   Does the patient have one of the following disease processes/diagnoses(primary or secondary)? Other   Week 3 attempt successful? No   Revoke Readmitted          TYLER TAPIA - Registered Nurse

## 2022-03-10 NOTE — THERAPY EVALUATION
Patient Name: Herbert White .  : 1965    MRN: [id number]                              Today's Date: 3/10/2022       Admit Date: 3/9/2022    Visit Dx:     ICD-10-CM ICD-9-CM   1. Numbness  R20.0 782.0   2. Weakness  R53.1 780.79   3. Ischemic stroke ([facility])  I63.9 434.91   4. Atrial fibrillation with RVR ([facility])  I48.91 427.31   5. Type 2 diabetes mellitus with hyperosmolarity without coma, without long-term current use of insulin ([facility])  E11.00 250.20   6. Dysphagia, unspecified type  R13.10 787.20   7. Impaired functional mobility, balance, gait, and endurance  Z74.09 V49.89   8. Impaired mobility and ADLs  Z74.09 V49.89    Z78.9      Patient Active Problem List   Diagnosis   • Cervical strain, acute   • Diabetes mellitus with ketoacidosis, uncontrolled ([facility])   • Essential hypertension   • Diabetic polyneuropathy associated with type 2 diabetes mellitus (HCC)   • Type 2 diabetes mellitus without complication, with long-term current use of insulin ([facility])   • Acute bilateral low back pain   • Spondylolisthesis of lumbar region   • DDD (degenerative disc disease), lumbar   • Chest pain   • Peripheral arterial disease (HCC)   • Mixed hyperlipidemia   • CAD (coronary artery disease)   • Pneumonia of both lower lobes due to infectious organism   • Surgical aftercare, circulatory system   • Paroxysmal atrial fibrillation (HCC)   • Carotid stenosis, asymptomatic, bilateral   • Herniated cervical intervertebral disc   • Numbness and tingling of both feet   • Encounter for screening colonoscopy   • Diabetes mellitus (HCC)   • Atrial fibrillation and flutter (HCC)   • COVID-19 virus detected   • Hypertensive urgency   • Numbness     Past Medical History:   Diagnosis Date   • Coronary artery disease    • COVID-19 virus detected 2022   • Diabetes mellitus (HCC)    • Diabetic retinopathy (HCC)    • GERD (gastroesophageal reflux disease)    • Hyperlipidemia    • Hypertension    • Osteoarthritis    • Paroxysmal  atrial fibrillation (HCC)    • Sleep apnea      Past Surgical History:   Procedure Laterality Date   • CARDIAC CATHETERIZATION N/A 9/3/2019   • CARDIAC CATHETERIZATION N/A 2/14/2022   • CHOLECYSTECTOMY     • COLONOSCOPY N/A 5/24/2021   • COLONOSCOPY N/A 1/5/2022   • CORONARY ARTERY BYPASS GRAFT N/A 9/25/2019      General Information     Row Name 03/10/22 [facility]          OT Time and Intention    Document Type evaluation  -     Mode of Treatment physical therapy;occupational therapy;co-treatment  -     Row Name 03/10/22 [facility]          General Information    Patient Profile Reviewed yes  -     Prior Level of Function independent:;ADL's;bed mobility;feeding;grooming;home management;dressing;bathing;cooking;cleaning;driving;shopping;using stairs;work  -     Existing Precautions/Restrictions fall  -     Row Name 03/10/22 1305          Living Environment    People in Home spouse  -     Row Name 03/10/22 [facility]          Home Main Entrance    Number of Stairs, Main Entrance one  -     Stair Railings, Main Entrance none  -     Row Name 03/10/22 130          Stairs Within Home, Primary    Stairs, Within Home, Primary regular toilet, tub with shower, has a tub transfer bench  (doesn't use). Has a 4WW.  -     Row Name 03/10/22 1305          Cognition    Orientation Status (Cognition) oriented x 4  -     Row Name 03/10/22 1305          Safety Issues, Functional Mobility    Safety Issues Affecting Function (Mobility) safety precaution awareness  -     Impairments Affecting Function (Mobility) endurance/activity tolerance;shortness of breath;balance;pain  -           User Key  (r) = Recorded By, (t) = Taken By, (c) = Cosigned By    Initials Name Provider Type     Anum Chinchilla OT Occupational Therapist                 Mobility/ADL's     Row Name 03/10/22 1305          Bed Mobility    Bed Mobility supine-sit;sit-supine  -     Supine-Sit [place] (Bed Mobility) standby assist  -     Sit-Supine  [place] (Bed Mobility) standby assist  -     Assistive Device (Bed Mobility) bed rails;head of bed elevated  -[facility] 03/10/22 1305          Transfers    Transfers sit-stand transfer;stand-sit transfer;toilet transfer  -     Sit-Stand [place] (Transfers) contact guard  -     Stand-Sit [place] (Transfers) contact guard  -     [place] Level (Toilet Transfer) contact guard  -SJ     Row Name 03/10/22 1305          Toilet Transfer    Type (Toilet Transfer) sit-stand;stand-sit  -SJ     Row Name 03/10/22 1305          Activities of Daily Living    BADL Assessment/Intervention lower body dressing;toileting;grooming  -[facility] 03/10/22 1305          Lower Body Dressing Assessment/Training    [place] Level (Lower Body Dressing) doff;don;socks;moderate assist (50% patient effort)  -SJ     Row Name 03/10/22 1305          Toileting Assessment/Training    [place] Level (Toileting) standby assist  -     Position (Toileting) unsupported sitting;unsupported standing  -SJ     Row Name 03/10/22 1305          Grooming Assessment/Training    [place] Level (Grooming) standby assist  -     Comment, (Grooming) washing hands standing at sink  -           User Key  (r) = Recorded By, (t) = Taken By, (c) = Cosigned By    Initials Name Provider Type    Anum Tomlinson OT Occupational Therapist               Obj/Interventions     [facility] Name 03/10/22 1305          Sensory Assessment (Somatosensory)    Sensory Assessment (Somatosensory) UE sensation intact  -[facility] 03/10/22 1305          Range of Motion Comprehensive    General Range of Motion bilateral upper extremity ROM WFL  -SJ     Row Name 03/10/22 1305          Strength Comprehensive (MMT)    Comment, General Manual Muscle Testing (MMT) Assessment BUE 4/5 grossly  -           User Key  (r) = Recorded By, (t) = Taken By, (c) = Cosigned By    Initials Name Provider Type    Anum Tomlinson OT Occupational  Therapist               Goals/Plan     [facility] Name 03/10/22 1305          Transfer Goal 1 (OT)    Activity/Assistive Device (Transfer Goal 1, OT) toilet  -     [place] Level/Cues Needed (Transfer Goal 1, OT) modified independence  -SJ     Time Frame (Transfer Goal 1, OT) long term goal (LTG);by discharge  -     Progress/Outcome (Transfer Goal 1, OT) goal not met  -[facility] Name 03/10/22 1305          Bathing Goal 1 (OT)    Activity/Device (Bathing Goal 1, OT) bathing skills, all  -SJ     [place] Level/Cues Needed (Bathing Goal 1, OT) modified independence  -SJ     Time Frame (Bathing Goal 1, OT) long term goal (LTG);by discharge  -     Progress/Outcomes (Bathing Goal 1, OT) goal not met  -[facility] Name 03/10/22 1305          Dressing Goal 1 (OT)    Activity/Device (Dressing Goal 1, OT) dressing skills, all  -SJ     [place]/Cues Needed (Dressing Goal 1, OT) modified independence  -SJ     Time Frame (Dressing Goal 1, OT) long term goal (LTG);by discharge  -     Progress/Outcome (Dressing Goal 1, OT) goal not met  -[facility] Name 03/10/22 1305          Therapy Assessment/Plan (OT)    Planned Therapy Interventions (OT) activity tolerance training;adaptive equipment training;BADL retraining;cognitive/visual perception retraining;edema control/reduction;functional balance retraining;IADL retraining;manual therapy/joint mobilization;neuromuscular control/coordination retraining;occupation/activity based interventions;passive ROM/stretching;patient/caregiver education/training;ROM/therapeutic exercise;strengthening exercise;transfer/mobility retraining  -           User Key  (r) = Recorded By, (t) = Taken By, (c) = Cosigned By    Initials Name Provider Type    SJ Anum Chinchilla, OT Occupational Therapist               Clinical Impression     Row Name 03/10/22 1305          Pain Assessment    Pretreatment Pain Rating 8/10  -SJ     Posttreatment Pain Rating 8/10  -SJ     Pain Location - head  -      Pain Intervention(s) Ambulation/increased activity;Repositioned  -     Row Name 03/10/22 1305          Plan of Care Review    Plan of Care Reviewed With patient  -     Outcome Evaluation OT eval complete, co-eval with PT. SUpine in bed upon arrival. Supine <> sit with SBA. Sit to stand and toilet transfer with CGA, patient with some instability without funcitonal mobility, patient states he was not using AD prior to admission, also states instability is from big toe pain. Toileting with SBA, grooming standing at sink with SBA. Mod A for LE dressing. With activity patient with SOA. Patient with decreased independence in ADLs, transfers, and decreased activity tolerance. Cont inpatient OT to maximize independence in ADLs and transfers. Recommend home with assist.  -     Row Name 03/10/22 1305          Therapy Assessment/Plan (OT)    Patient/Family Therapy Goal Statement (OT) return home  -     Rehab Potential (OT) good, to achieve stated therapy goals  -     Criteria for Skilled Therapeutic Interventions Met (OT) yes;skilled treatment is necessary  -     Therapy Frequency (OT) other (see comments)  3-7 d/wk  -     Predicted Duration of Therapy Intervention (OT) until d/c or all goals met  -     Row Name 03/10/22 1305          Therapy Plan Review/Discharge Plan (OT)    Anticipated Discharge Disposition (OT) home with assist  -     Row Name 03/10/22 1303          Vital Signs    Pre Systolic BP Rehab 156  -SJ     Pre Treatment Diastolic   -SJ     Post Systolic BP Rehab 156  -SJ     Post Treatment Diastolic   -SJ     Pretreatment Heart Rate (beats/min) 88  -SJ     Posttreatment Heart Rate (beats/min) 91  -SJ           User Key  (r) = Recorded By, (t) = Taken By, (c) = Cosigned By    Initials Name Provider Type    SJ Anum Chinchilla, OT Occupational Therapist               Outcome Measures     Row Name 03/10/22 1320 03/10/22 1305       How much help from another is currently needed...     Putting on and taking off regular lower body clothing? --  -SJ 2  -SJ    Bathing (including washing, rinsing, and drying) --  -SJ 3  -SJ    Toileting (which includes using toilet bed pan or urinal) --  -SJ 3  -SJ    Putting on and taking off regular upper body clothing --  -SJ 4  -SJ    Taking care of personal grooming (such as brushing teeth) --  -SJ 4  -SJ    Eating meals --  -SJ 4  -SJ    AM-PAC 6 Clicks Score (OT) --  -SJ 20  -SJ    Row Name 03/10/22 1306          How much help from another person do you currently need...    Turning from your back to your side while in flat bed without using bedrails? 3  -LR     Moving from lying on back to sitting on the side of a flat bed without bedrails? 3  -LR     Moving to and from a bed to a chair (including a wheelchair)? 3  -LR     Standing up from a chair using your arms (e.g., wheelchair, bedside chair)? 3  -LR     Climbing 3-5 steps with a railing? 3  -LR     To walk in hospital room? 3  -LR     AM-PAC 6 Clicks Score (PT) 18  -LR     Row Name 03/10/22 1306 03/10/22 1305       Modified [place] Scale    Pre-Stroke Modified [place] Scale 1 - No significant disability despite symptoms.  Able to carry out all usual duties and activities.  -LR 1 - No significant disability despite symptoms.  Able to carry out all usual duties and activities.  -SJ    Modified [place] Scale 3 - Moderate disability.  Requiring some help, but able to walk without assistance.  -LR 3 - Moderate disability.  Requiring some help, but able to walk without assistance.  -    Row Name 03/10/22 1320 03/10/22 1306       Functional Assessment    Outcome Measure Options AM-PAC 6 Clicks Daily Activity (OT)  -SJ Modified [place];AM-PAC 6 Clicks Basic Mobility (PT)  -LR    Row Name 03/10/22 1305          Functional Assessment    Outcome Measure Options Modified [place]  -SJ           User Key  (r) = Recorded By, (t) = Taken By, (c) = Cosigned By    Initials Name Provider Type    [place]  Therapist    Anum Tomlinson, OT Occupational Therapist              The [place] Index    Feeding          ___10__            0= Unable  5= Needs help cutting, spreading butter, etc. Or requires modified diet  10= Independent    Bathing         ___5__  0= Dependent  5= Independent (or in shower)    Grooming          _5____  0= Needs to help with personal care  5= Independent face/hair/teehth/shaving (implements provided)    Dressing          __5___  0= Dependent  5= Needs help but can do about half unaided  10= Independent (including buttons, zippers, laces, etc.)    Bowels          __10___  0= Incontinent  5= Occasional accident  10= Continent    Bladder          ___10__  0= Incontinent, or catheterized and unable to manage alone  5= Occasional accident  10= Continent    Toilet Use          __5____  0= Unable  5= Needs some help, but can do something alone  10= Independent    Transfers           __10____  0= Unable, no sitting balance  5= Major help (one or two people, physical), can sit  10= Minor help  15= Independent    Mobility          ___10__  0= Immobile or < 50 yards  5= Wheelchair independent, including corners, >50 yards  10= Walks with help of one person (verbal or physical) > 50 yards  15= Independent (but may use any aid, for example, stick ) > 50 yards    Stairs          __5___  0= Unable   5= Needs helps  10= Independent     Total Score: 75               Occupational Therapy Education                 Title: PT OT SLP Therapies (In Progress)     Topic: Occupational Therapy (In Progress)     Point: ADL training (Not Started)     Description:   Instruct learner(s) on proper safety adaptation and remediation techniques during self care or transfers.   Instruct in proper use of assistive devices.              Learner Progress:  Not documented in this visit.          Point: Home exercise program (Not Started)     Description:   Instruct learner(s) on appropriate technique for monitoring, assisting  and/or progressing therapeutic exercises/activities.              Learner Progress:  Not documented in this visit.          Point: Precautions (Done)     Description:   Instruct learner(s) on prescribed precautions during self-care and functional transfers.              Learning Progress Summary           Patient Acceptance, E,TB, VU by  at 3/10/2022 1654    Comment: POC, role of OT, transfer training                   Point: Body mechanics (Done)     Description:   Instruct learner(s) on proper positioning and spine alignment during self-care, functional mobility activities and/or exercises.              Learning Progress Summary           Patient Acceptance, E,TB, VU by  at 3/10/2022 1654    Comment: POC, role of OT, transfer training                               User Key     Initials Effective Dates Name Provider Type Discipline     06/14/21 -  Anum Chinchilla OT Occupational Therapist OT              OT Recommendation and Plan  Planned Therapy Interventions (OT): activity tolerance training, adaptive equipment training, BADL retraining, cognitive/visual perception retraining, edema control/reduction, functional balance retraining, IADL retraining, manual therapy/joint mobilization, neuromuscular control/coordination retraining, occupation/activity based interventions, passive ROM/stretching, patient/caregiver education/training, ROM/therapeutic exercise, strengthening exercise, transfer/mobility retraining  Therapy Frequency (OT): other (see comments) (3-7 d/wk)  Plan of Care Review  Plan of Care Reviewed With: patient  Outcome Evaluation: OT eval complete, co-eval with PT. SUpine in bed upon arrival. Supine <> sit with SBA. Sit to stand and toilet transfer with CGA, patient with some instability without funcitonal mobility, patient states he was not using AD prior to admission, also states instability is from big toe pain. Toileting with SBA, grooming standing at sink with SBA. Mod A for LE dressing.  With activity patient with SOA. Patient with decreased independence in ADLs, transfers, and decreased activity tolerance. Cont inpatient OT to maximize independence in ADLs and transfers. Recommend home with assist.     Time Calculation:    Time Calculation- OT     Row Name 03/10/22 1655             Time Calculation- OT    OT Start Time 1305  -      OT Stop Time 1335  -      OT Time Calculation (min) 30 min  -SJ      OT Received On 03/10/22  -      OT Goal Re-Cert Due Date 03/23/22  -              Untimed Charges    OT Eval/Re-eval Minutes 30  -SJ              Total Minutes    Untimed Charges Total Minutes 30  -SJ       Total Minutes 30  -SJ            User Key  (r) = Recorded By, (t) = Taken By, (c) = Cosigned By    Initials Name Provider Type     Anum Chinchilla OT Occupational Therapist              Therapy Charges for Today     Code Description Service Date Service Provider Modifiers Qty    [id number] HC OT EVAL MOD COMPLEXITY 2 3/10/2022 Anum Chinchilla OT GO 1               Anum Chinchilla OT  3/10/2022

## 2022-03-10 NOTE — PROGRESS NOTES
[facility] Medicine Services  INPATIENT PROGRESS NOTE    Length of Stay: 0  Date of Admission: 3/9/2022  Primary Care Physician: Artemio Sanchez MD    Subjective   Chief Complaint: weakness   HPI:      Headache is better  His weakness in his left UE and left LE are improving, not at baseline yet     Review of Systems   Neurological: Positive for weakness and headaches.        All pertinent negatives and positives are as above. All other systems have been reviewed and are negative unless otherwise stated.     Objective    Temp:  [97.3 °F (36.3 °C)-98.1 °F (36.7 °C)] 98.1 °F (36.7 °C)  Heart Rate:  [] 102  Resp:  [18-26] 22  BP: (124-165)/() 157/111    Physical Exam  Vitals reviewed.   Constitutional:       General: He is not in acute distress.     Appearance: He is well-developed.   HENT:      Head: Normocephalic and atraumatic.      Nose: Nose normal.   Eyes:      Conjunctiva/sclera: Conjunctivae normal.   Cardiovascular:      Rate and Rhythm: Normal rate and regular rhythm.   Pulmonary:      Effort: Pulmonary effort is normal. No respiratory distress.      Breath sounds: Normal breath sounds. No wheezing or rales.   Musculoskeletal:         General: Normal range of motion.      Cervical back: Normal range of motion and neck supple.   Skin:     General: Skin is warm and dry.   Neurological:      Mental Status: He is alert and oriented to person, place, and time.      Comments: Weakness in RLE and RUE much improved today    Psychiatric:         Behavior: Behavior normal.             Results Review:  I have reviewed the labs, radiology results, and diagnostic studies.    Laboratory Data:   Results from last 7 days   Lab Units 03/09/22  1618   SODIUM mmol/L 140   POTASSIUM mmol/L 4.4   CHLORIDE mmol/L 103   CO2 mmol/L 26.0   BUN mg/dL 14   CREATININE mg/dL 1.23   GLUCOSE mg/dL 221*   CALCIUM mg/dL 9.1   BILIRUBIN mg/dL 0.6   ALK PHOS U/L 87   ALT (SGPT) U/L 20    AST (SGOT) U/L 13   ANION GAP mmol/L 11.0     Estimated Creatinine Clearance: 94.7 mL/min (by C-G formula based on SCr of 1.23 mg/dL).          Results from last 7 days   Lab Units 03/09/22  1618   WBC 10*3/mm3 9.70   HEMOGLOBIN g/dL 15.3   HEMATOCRIT % 45.4   PLATELETS 10*3/mm3 184     Results from last 7 days   Lab Units 03/09/22  1618   INR  1.20       Culture Data:   No results found for: BLOODCX  No results found for: URINECX  No results found for: RESPCX  No results found for: WOUNDCX  No results found for: STOOLCX  No components found for: BODYFLD    Radiology Data:   Imaging Results (Last 24 Hours)     Procedure Component Value Units Date/Time    MRI Brain Without Contrast [[id number]] Collected: 03/10/22 0826     Updated: 03/10/22 0944    Narrative:      MRI brain without IV contrast March 10, 2022    INDICATION: Acute neurologic deficit. Acute cerebrovascular  disease    Pulse sequences: Sagittal, coronal and multisequence axial  imaging without IV contrast.    FINDINGS:  No mass effect, midline shift, hemorrhage, hydrocephalus or  extra-axial fluid collections.  Old right-sided infarcts involving the occipital and  temporoparietal regions.  Atrophy with probable very mild chronic small vessel white matter  ischemic change.  No evidence of acute ischemia noted on diffusion-weighted images.  No abnormal vascular structures.  There may be a very small amount of mastoid fluid on the right.  Sinuses grossly clear.      Impression:      Old right-sided infarcts.  No acute abnormality.  Mild atrophy with probable mild chronic small vessel white matter  ischemic change.  There may be a small amount of right mastoid fluid present.    Electronically signed by:  Valeriy Morales MD  3/10/2022 9:41 AM  [facility] Workstation: [id number]    CT Angiogram Neck [[id number]] Collected: 03/09/22 1618     Updated: 03/09/22 1824    Narrative:      EXAM:  CT NECK ANGIOGRAPHY WITHOUT THEN WITH IV CONTRAST    ORDERING PROVIDER:  GAVIN  JULI    CLINICAL HISTORY:       COMPARISON:   None.    TECHNIQUE:    CT of the neck was obtained from the skullbase to the aortic arch  with sagittal and coronal reformats  after administration of ml  of Omnipaque 350 contrast. 3-D MIP images were created.      This examination was performed according to our departmental dose  optimization program which includes automated exposure control,  adjustment of the MA and kV according to patient size, and/or use  of iterative reconstruction technique.    Degree of stenoses in the cervical carotid systems determined  using NASCET criteria.    FINDINGS:    AORTIC ARCH:  Scattered calcified plaque in visualized aortic arch.  Unremarkable origins of the brachiocephalic, left common carotid,  and left subclavian arteries.    RIGHT CAROTID ARTERIES:  Normal right common carotid artery (CCA). Normal right common  carotid bulb.  Mild calcified plaque in origin of the right internal carotid  (ICA) artery without a  hemodynamically significant stenosis. Normal visualized cervical  portion  of the right internal carotid artery.  Normal origin of the right external carotid artery (ECA).    LEFT CAROTID ARTERIES:  Normal left common carotid artery (CCA). Mild calcified plaque in  left common carotid bulb.  Mild calcified plaque in origin of the left internal carotid  (ICA) artery without a hemodynamically significant stenosis.  Normal visualized cervical portion of the left internal carotid  artery.  Normal origin of the left external carotid artery (ECA).    VERTEBRAL ARTERIES:  Normal bilateral vertebral arteries. Left vertebral artery is  dominant.    PARANASAL SINUSES: Unremarkable.    POSTERIOR FOSSA: Unremarkable.     AND BUCCAL SPACE: Unremarkable.    PARAPHARYNGEAL SPACE: Unremarkable.    PAROTID SPACE: Unremarkable. No mass    MUCOSAL SPACE: Mucosal surfaces of the posterior nasopharynx,  oropharynx, hypopharynx and larynx are unremarkable.    PREVERTEBRAL AND  PERIVERTEBRAL SPACE: Unremarkable.    LYMPH NODES: No enlargement and normal architecture.    THYROID: No nodules.      MUSCULOSKELETAL AND SUBCUTANEOUS TISSUES: Multilevel degenerative  disc disease..    LUNG APICES AND UPPER MEDIASTINUM: Unremarkable.      Impression:      No evidence of hemodynamically significant stenosis of the  visualized ICAs and bilateral vertebral arteries.  No evidence of dissection within the bilateral carotid or  vertebral arteries.   Left vertebral artery is dominant.    Electronically signed by:  Eric Dooley MD  3/9/2022 6:22 PM CST  Workstation: [id number]    CT Angiogram Head w AI Analysis of LVO [[id number]] Collected: 03/09/22 1618     Updated: 03/09/22 1821    Narrative:      EXAM:  CT HEAD ANGIOGRAPHY WITHOUT THEN WITH IV CONTRAST    ORDERING PROVIDER:  GAVIN BUSTOS    CLINICAL HISTORY:   Stroke    COMPARISON:      TECHNIQUE:   Nonenhanced CT of the head was performed and reformatted in the  sagittal and coronal planes, followed by high-resolution CT head  after administration of 80 mL of Isovue-370 as IV contrast. 3-D  MIP images were created.     This examination was performed according to our departmental dose  optimization program which includes automated exposure control,  adjustment of the MA and kV according to patient size, and/or use  of iterative reconstruction technique.    FINDINGS:     Normal bilateral petrous carotid arteries.   Mild atherosclerotic change of right cavernous carotid artery  with a normal supraclinoid bifurcation.   Mild to moderate atherosclerotic change of left cavernous carotid  artery with a normal supraclinoid bifurcation.    Normal right A1 segments of the anterior cerebral artery.   Normal left A1 segments of the anterior cerebral artery.   Normal intact anterior communicating artery (ACOM). Normal  bilateral A2 segments of the anterior cerebral arteries.    Normal right M1 and M2 segments of the middle cerebral arteries,  with a normal M1  bifurcation. Normal left M1 and M2 segments of  the middle cerebral arteries, with a normal M1 bifurcation.    Normal right posterior communicating artery (PCOM). Normal left  posterior communicating artery (PCOM).    Normal bilateral vertebral arteries. The left vertebral artery is  dominant. Normal basilar artery with a normal basilar  bifurcation. The visualized bilateral superior cerebellar (SCA)  arteries are normal.    Normal bilateral P1, P2 and visualized P3 segments of the  posterior cerebral arteries.    There is no demonstrated aneurysm of the [place].       Impression:      No evidence of hemodynamically significant stenosis of the  visualized intracranial arteries.  No evidence of aneurysm within the [place].  The left vertebral artery is dominant.    Electronically signed by:  Eric Dooley MD  3/9/2022 6:19 PM CST  Workstation: [id number]    CT CEREBRAL PERFUSION WITH & WITHOUT CONTRAST [[id number]] Collected: 03/09/22 1619     Updated: 03/09/22 1733    Narrative:      STUDY: CT HEAD PERFUSION WITHOUT AND WITH IV CONTRAST    ACCESSION NUMBER: [id number]    DATE: 3/9/2022 4:19 PM CST    PROVIDED INDICATION: Neuro deficit, acute, stroke suspected    COMPARISON: 8/19/2020, 3/9/2022    TECHNIQUE:      CT PERFUSION:  Ten thick sections acquired for approximately 40  acquisitions during rapid bolus IV administration with 40  milliliters of contrast.  The patient had no adverse reaction.   Time to Start, Time to Peak, Mean Transit Time, Cerebral Blood  Flow and Cerebral Blood Volume maps were post processed.    All CT scans at this location are performed using dose modulation  techniques as appropriate to a performed exam including the  following: automated exposure control; adjustment of the mA  and/or kV according to patient size (this includes techniques or  standardized protocols for targeted exams where dose is matched  to indication / reason for exam; i.e. extremities or head); use  of  iterative reconstruction technique.      FINDINGS:     Noncontrast head CT: This is reportedly separately.      Total hyperperfusion: Using a threshold of Tmax > 6 seconds,  there is elevation of Tmax at the level of the right occipital  lobe.. The total volume of hypoperfusion is six mL.    Core infarct: Using a threshold of CBF <30%, there is total core  infarct volume measuring zero mL.    Ischemic penumbra/Potentially reversible ischemia: The penumbra  volume (mismatch volume) is 6 mL. Mismatch ratio infinite      Impression:      Small area of subcortical ischemia within the right occipital  lobe, with a penumbra/potentially reversible ischemic volume of 6  mL. Associated subcortical hypoattenuation on concurrent head CT  which had developed relative to 2020 exam    The above findings were communicated via telephone by Dr. Bernard to  Dr. GAVIN BUSTOS at 3/9/2022 5:29 PM CST.      Electronically signed by:  Kumar Bernard MD  3/9/2022 5:31 PM CST  Workstation: [id number]    XR Chest 1 View [[id number]] Collected: 03/09/22 1645     Updated: 03/09/22 1726    Narrative:          COMPARISON:     2/21/2022    INDICATION:     Acute stroke protocol on several less than 12  hours    FINDINGS:     Portable AP chest radiograph is obtained.  Cardiomegaly. Prominent central vasculature. Central interstitial  and alveolar opacities. No large effusion. No pneumothorax.          Impression:      Cardiomegaly. Central vascular congestion.  Central opacities which may reflect central edema.    Electronically signed by:  Kumar Bernard MD  3/9/2022 5:24 PM CST  Workstation: [id number]    CT Head Without Contrast Stroke Protocol [[id number]] Collected: 03/09/22 1617     Updated: 03/09/22 1639    Narrative:      EXAM:  CT HEAD WITHOUT IV CONTRAST    ORDERING PROVIDER:  GAVIN BUSTOS    CLINICAL HISTORY:   Stroke    COMPARISON:      TECHNIQUE:   Nonenhanced CT of the head was performed and reformatted in the  sagittal and coronal planes.      This examination was performed according to our departmental dose  optimization program which includes automated exposure control,  adjustment of the MA and kV according to patient size, and/or use  of iterative reconstruction technique.    FINDINGS:     CEREBRAL PARENCHYMA:  Chronic microangiopathic changes. No  hemorrhage.  No intracranial mass or mass effect. Age-appropriate  cerebral atrophy. Chronic ischemia of the right basal ganglia  extending to the right corona radiata anteriorly.    POSTERIOR FOSSA:  Age-appropriate atrophy of cerebellum and  brainstem.  No cerebellar tonsillar ectopia.    EXTRA-AXIAL SPACES:  Normal size and configuration.  No mass,  fluid collection or hemorrhage.    ORBITS: No mass. Unremarkable extraocular muscles, globe and  optic nerve.    CALVARIA AND SOFT TISSUES:  No mass or adenopathy, lytic or  sclerotic lesion.    TEMPORAL BONE AND SKULL BASE:  Unremarkable middle and inner ear  , and mastoid air cells.    PARANASAL SINUSES AND FACIAL BONES: Unremarkable.     VASCULAR STRUCTURES:  Unremarkable.      Impression:      1.  No acute intracranial process.  2.  Chronic ischemia of the right basal ganglia extending to the  right corona radiata anteriorly.  3.  Scattered chronic microangiopathic changes.          Electronically signed by:  Eric Dooley MD  3/9/2022 4:37 PM CST  Workstation: [id number]          I have reviewed the patient current medications.     Assessment/Plan     Active Hospital Problems    Diagnosis  POA   • **Numbness [R20.0]  Yes   • Atrial fibrillation and flutter (HCC) [I48.91, I48.92]  Yes   • Diabetes mellitus (HCC) [E11.9]  Yes   • Carotid stenosis, asymptomatic, bilateral [I65.23]  Yes   • Paroxysmal atrial fibrillation (HCC) [I48.0]  Yes   • CAD (coronary artery disease) [I25.10]  Yes   • Mixed hyperlipidemia [E78.2]  Yes   • Peripheral arterial disease (HCC) [I73.9]  Yes   • Diabetic polyneuropathy associated with type 2 diabetes mellitus (HCC) [E11.42]   Yes   • Essential hypertension [I10]  Yes       Plan:      Stroke-like symptoms, numbness, TIA?   -neurology consulted  -CT showed chronic ischemia of the right basal ganglia extending into right   -MRI ordered - no acute abnormality      HTN  -resume home meds today      Type 2 Diabetes Mellitus  -SSI  -hold oral meds      Chronic Atrial Fibrillation  -scheduled for cardioversion Monday  -Eliquis      PT/OT  Cardiac diet.   Full Code.             Discharge Planning: I expect patient to be discharged to home in 1-2 days     I confirmed that the patient's Advance Care Plan is present, code status is documented, or surrogate decision maker is listed in the patient's medical record.           This document has been electronically signed by Che Dove MD on March 10, 2022 10:36 CST

## 2022-03-10 NOTE — THERAPY DISCHARGE NOTE
Acute Care - Speech Language Pathology Initial Evaluation/Discharge  [facility]     Patient Name: Herbert White Sr.  : 1965  MRN: [id number]  Today's Date: 3/10/2022               Admit Date: 3/9/2022     Visit Dx:    ICD-10-CM ICD-9-CM   1. Numbness  R20.0 782.0   2. Weakness  R53.1 780.79   3. Ischemic stroke ([facility])  I63.9 434.91   4. Atrial fibrillation with RVR ([facility])  I48.91 427.31   5. Type 2 diabetes mellitus with hyperosmolarity without coma, without long-term current use of insulin ([facility])  E11.00 250.20   6. Dysphagia, unspecified type  R13.10 787.20     Patient Active Problem List   Diagnosis   • Cervical strain, acute   • Diabetes mellitus with ketoacidosis, uncontrolled ([facility])   • Essential hypertension   • Diabetic polyneuropathy associated with type 2 diabetes mellitus ([facility])   • Type 2 diabetes mellitus without complication, with long-term current use of insulin ([facility])   • Acute bilateral low back pain   • Spondylolisthesis of lumbar region   • DDD (degenerative disc disease), lumbar   • Chest pain   • Peripheral arterial disease (HCC)   • Mixed hyperlipidemia   • CAD (coronary artery disease)   • Pneumonia of both lower lobes due to infectious organism   • Surgical aftercare, circulatory system   • Paroxysmal atrial fibrillation (HCC)   • Carotid stenosis, asymptomatic, bilateral   • Herniated cervical intervertebral disc   • Numbness and tingling of both feet   • Encounter for screening colonoscopy   • Diabetes mellitus ([facility])   • Atrial fibrillation and flutter (HCC)   • COVID-19 virus detected   • Hypertensive urgency   • Numbness     Past Medical History:   Diagnosis Date   • Coronary artery disease    • COVID-19 virus detected 2022   • Diabetes mellitus (HCC)    • Diabetic retinopathy (HCC)    • GERD (gastroesophageal reflux disease)    • Hyperlipidemia    • Hypertension    • Osteoarthritis    • Paroxysmal atrial fibrillation (HCC)    • Sleep apnea      Past Surgical History:    Procedure Laterality Date   • CARDIAC CATHETERIZATION N/A 9/3/2019   • CARDIAC CATHETERIZATION N/A 2/14/2022   • CHOLECYSTECTOMY     • COLONOSCOPY N/A 5/24/2021   • COLONOSCOPY N/A 1/5/2022   • CORONARY ARTERY BYPASS GRAFT N/A 9/25/2019       SLP Recommendation and Plan  SLP Diagnosis: WFL (03/10/22 1005)           Therapy Frequency (Swallow): evaluation only (03/10/22 1005)                               Plan of Care Reviewed With: patient (03/10/22 1309)  Progress: improving (03/10/22 1309)  Outcome Evaluation: Swallow evaluation completed. Pt with no s/s of aspiration on regular solids and regular liquids. Pt denies any difficulty swallowing. Speech and cognition are WFL as well. Pt denies any difficulty with speech/language/or cognition. Wife arrived and did not report any concerns or difficulty in regards to speech. (03/10/22 1309)    SLP EVALUATION (last 72 hours)     SLP SLC Evaluation     Row Name 03/10/22 1005                   Communication Assessment/Intervention    Document Type evaluation  -EK        Subjective Information no complaints  -EK        Patient Observations alert;cooperative;agree to therapy  -EK        Patient Effort good  -EK                  General Information    Patient Profile Reviewed yes  -EK        Precautions/Limitations, Vision WFL  -EK        Precautions/Limitations, Hearing WFL  -EK        Prior Level of Function-Communication WFL  -EK        Plans/Goals Discussed with patient;family  -EK                  Pain    Additional Documentation Pain Scale: Numbers Pre/Post-Treatment (Group)  -EK                  Pain Scale: Numbers Pre/Post-Treatment    Pretreatment Pain Rating 8/10  -EK        Posttreatment Pain Rating 8/10  -EK        Pain Location - head;back  -EK                  Comprehension Assessment/Intervention    Comprehension Assessment/Intervention Auditory Comprehension  -EK                  Auditory Comprehension Assessment/Intervention    Auditory Comprehension  (Communication) WFL  -EK        Able to Identify Objects/Pictures (Communication) WFL  -EK        Answers Questions (Communication) WFL  -EK        Able to Follow Commands (Communication) WFL  -EK        Narrative Discourse WFL  -EK                  Expression Assessment/Intervention    Expression Assessment/Intervention verbal expression  -EK                  Verbal Expression Assessment/Intervention    Verbal Expression WFL  -EK        Automatic Speech (Communication) WFL  -EK        Repetition WFL  -EK        Confrontational Naming WFL  -EK        Spontaneous/Functional Words WFL  -EK        Sentence Formulation WFL  -EK        Conversational Discourse/Fluency WFL  -EK                  Oral Motor Structure and Function    Oral Motor Structure and Function WFL  -EK                  Oral Musculature and Cranial Nerve Assessment    Oral Motor General Assessment WFL  -EK                  Motor Speech Assessment/Intervention    Motor Speech Function WFL  -EK                  Cognitive Assessment Intervention- SLP    Orientation Status (Cognition) WFL  -EK        Memory (Cognitive) WFL  -EK        Thought Organization (Cognitive) WFL  -EK        Reasoning (Cognitive) WFL  -EK        Problem Solving (Cognitive) WFL  -EK                  SLP Evaluation Clinical Impressions    SLP Diagnosis WFL  -EK                  SLP Treatment Clinical Impressions    Care Plan Review evaluation/treatment results reviewed  -EK                  Recommendations    Therapy Frequency (SLP SLC) evaluation only  -EK              User Key  (r) = Recorded By, (t) = Taken By, (c) = Cosigned By    Initials Name Effective Dates    EK Che Tomlinson CCC-SLP 06/16/21 -                    EDUCATION  The patient has been educated in the following areas:   Dysphagia (Swallowing Impairment).                  Time Calculation:    Time Calculation- SLP     Row Name 03/10/22 9128             Time Calculation- SLP    SLP Start Time 1005  -EK       SLP Stop Time 1028  -EK      SLP Time Calculation (min) 23 min  -EK      Total Timed Code Minutes- SLP 23 minute(s)  -EK      SLP Received On 03/10/22  -EK            User Key  (r) = Recorded By, (t) = Taken By, (c) = Cosigned By    Initials Name Provider Type    Che Hein CCC-SLP Speech and Language Pathologist                Therapy Charges for Today     Code Description Service Date Service Provider Modifiers Qty    92673212576 HC ST EVAL ORAL PHARYNG SWALLOW 2 3/10/2022 Che Tomlinson CCC-SLP GN 1                        YOVANI Morales  3/10/2022 and Acute Care - Speech Language Pathology   Swallow Initial Evaluation/Discharge [facility]     Patient Name: Herbert White .  : 1965  MRN: [id number]  Today's Date: 3/10/2022               Admit Date: 3/9/2022    Visit Dx:    ICD-10-CM ICD-9-CM   1. Numbness  R20.0 782.0   2. Weakness  R53.1 780.79   3. Ischemic stroke ([facility])  I63.9 434.91   4. Atrial fibrillation with RVR ([facility])  I48.91 427.31   5. Type 2 diabetes mellitus with hyperosmolarity without coma, without long-term current use of insulin ([facility])  E11.00 250.20   6. Dysphagia, unspecified type  R13.10 787.20     Patient Active Problem List   Diagnosis   • Cervical strain, acute   • Diabetes mellitus with ketoacidosis, uncontrolled ([facility])   • Essential hypertension   • Diabetic polyneuropathy associated with type 2 diabetes mellitus ([facility])   • Type 2 diabetes mellitus without complication, with long-term current use of insulin ([facility])   • Acute bilateral low back pain   • Spondylolisthesis of lumbar region   • DDD (degenerative disc disease), lumbar   • Chest pain   • Peripheral arterial disease (HCC)   • Mixed hyperlipidemia   • CAD (coronary artery disease)   • Pneumonia of both lower lobes due to infectious organism   • Surgical aftercare, circulatory system   • Paroxysmal atrial fibrillation (HCC)   • Carotid stenosis,  asymptomatic, bilateral   • Herniated cervical intervertebral disc   • Numbness and tingling of both feet   • Encounter for screening colonoscopy   • Diabetes mellitus (HCC)   • Atrial fibrillation and flutter (HCC)   • COVID-19 virus detected   • Hypertensive urgency   • Numbness     Past Medical History:   Diagnosis Date   • Coronary artery disease    • COVID-19 virus detected 1/21/2022   • Diabetes mellitus (HCC)    • Diabetic retinopathy (HCC)    • GERD (gastroesophageal reflux disease)    • Hyperlipidemia    • Hypertension    • Osteoarthritis    • Paroxysmal atrial fibrillation (HCC)    • Sleep apnea      Past Surgical History:   Procedure Laterality Date   • CARDIAC CATHETERIZATION N/A 9/3/2019   • CARDIAC CATHETERIZATION N/A 2/14/2022   • CHOLECYSTECTOMY     • COLONOSCOPY N/A 5/24/2021   • COLONOSCOPY N/A 1/5/2022   • CORONARY ARTERY BYPASS GRAFT N/A 9/25/2019       SLP Recommendation and Plan  SLP Swallowing Diagnosis: swallow WFL (03/10/22 1005)  SLP Diet Recommendation: regular textures, thin liquids (03/10/22 1005)     Monitor for Signs of Aspiration: yes, notify SLP if any concerns (03/10/22 1005)              Therapy Frequency (Swallow): evaluation only (03/10/22 1005)                                     Plan of Care Reviewed With: patient (03/10/22 1309)  Progress: improving (03/10/22 1309)  Outcome Evaluation: Swallow evaluation completed. Pt with no s/s of aspiration on regular solids and regular liquids. Pt denies any difficulty swallowing. Speech and cognition are WFL as well. Pt denies any difficulty with speech/language/or cognition. Wife arrived and did not report any concerns or difficulty in regards to speech. (03/10/22 1309)    SWALLOW EVALUATION (last 72 hours)     SLP Adult Swallow Evaluation     Row Name 03/10/22 1005                   Rehab Evaluation    Comment wife arrived  -EK                  General Information    Current Method of Nutrition regular textures;thin liquids  -EK                   Oral Motor Structure and Function    Dentition Assessment natural, present and adequate  -EK        Mucosal Quality moist, healthy  -EK        Volitional Swallow WFL  -EK        Volitional Cough WFL  -EK                  General Eating/Swallowing Observations    Respiratory Support Currently in Use room air  -EK        Eating/Swallowing Skills self-fed  -EK        Positioning During Eating upright 90 degree;upright in bed  -EK        Utensils Used spoon;cup;straw  -EK        Consistencies Trialed regular textures;thin liquids  -EK                  Clinical Swallow Eval    Oral Prep Phase WFL  -EK        Oral Transit WFL  -EK        Oral Residue WFL  -EK        Pharyngeal Phase WFL  -EK        Clinical Swallow Evaluation Summary Swallow evaluation completed. Pt with no s/s of aspiration on regular solids and regular liquids. Pt denies any difficulty swallowing. Speech and cognition are WFL as well. Pt denies any difficulty with speech/language/or cognition. Wife arrived and did not report any concerns or difficulty in regards to speech.  -EK                  SLP Evaluation Clinical Impression    SLP Swallowing Diagnosis swallow WFL  -EK        Functional Impact no impact on function  -EK                  Recommendations    Therapy Frequency (Swallow) evaluation only  -EK        SLP Diet Recommendation regular textures;thin liquids  -EK        Recommended Precautions and Strategies upright posture during/after eating;small bites of food and sips of liquid;multiple swallows per bite of food;multiple swallows per sip of liquid;alternate between small bites of food and sips of liquid  -EK        Oral Care Recommendations Oral Care BID/PRN  -EK        SLP Rec. for Method of Medication Administration meds whole;with thin liquids  -EK        Monitor for Signs of Aspiration yes;notify SLP if any concerns  -EK              User Key  (r) = Recorded By, (t) = Taken By, (c) = Cosigned By    Initials Name Effective Dates     Che Hein CCC-SLP 06/16/21 -                 EDUCATION  The patient has been educated in the following areas:   Cognitive Impairment Communication Impairment Dysphagia (Swallowing Impairment).                 Time Calculation:    Time Calculation- SLP     Row Name 03/10/22 1309             Time Calculation- SLP    SLP Start Time 1005  -EK      SLP Stop Time 1028  -EK      SLP Time Calculation (min) 23 min  -EK      Total Timed Code Minutes- SLP 23 minute(s)  -EK      SLP Received On 03/10/22  -EK            User Key  (r) = Recorded By, (t) = Taken By, (c) = Cosigned By    Initials Name Provider Type    Che Hein CCC-SLP Speech and Language Pathologist                Therapy Charges for Today     Code Description Service Date Service Provider Modifiers Qty    [id number] HC ST EVAL ORAL PHARYNG SWALLOW 2 3/10/2022 Che Tomlinson CCC-SLP GN 1                    YOVANI Morales  3/10/2022

## 2022-03-10 NOTE — PLAN OF CARE
Goal Outcome Evaluation:  Plan of Care Reviewed With: patient           Outcome Evaluation: PT eval completed. Patient does not report any visual deficits at baseline but with peripheral field testing he was able to see the pen in both upper quadrants at 50 degrees and the botton 80 degrees. Patient also seems to have L ptosis at times. Patient dizzy throughout session. Bed mobility: SBA for supine<>sit with HOB up, bed rails, and increased timed. Transfer: CGA for sit<>stand Gait: CGA for ambulation 120'x1, 15'x1. Patient with antalgic gait pattern and consistent mild L knee buckling. L knee buckling occurs during stance phase on LLE. Patient denies any history of LBP or radicular symptoms at this time. Patient did presents with slight reduction in light touch sensation with dermatome testing in L>R. No coordination or dysmetria noted in BLE. Recommend home with assist and OPPT.

## 2022-03-10 NOTE — THERAPY EVALUATION
Patient Name: Herbert White .  : 1965    MRN: [id number]                              Today's Date: 3/10/2022       Admit Date: 3/9/2022    Visit Dx:     ICD-10-CM ICD-9-CM   1. Numbness  R20.0 782.0   2. Weakness  R53.1 780.79   3. Ischemic stroke (Formerly [facility])  I63.9 434.91   4. Atrial fibrillation with RVR (Formerly [facility])  I48.91 427.31   5. Type 2 diabetes mellitus with hyperosmolarity without coma, without long-term current use of insulin (Formerly [facility])  E11.00 250.20   6. Dysphagia, unspecified type  R13.10 787.20   7. Impaired functional mobility, balance, gait, and endurance  Z74.09 V49.89     Patient Active Problem List   Diagnosis   • Cervical strain, acute   • Diabetes mellitus with ketoacidosis, uncontrolled (Formerly [facility])   • Essential hypertension   • Diabetic polyneuropathy associated with type 2 diabetes mellitus (Formerly [facility])   • Type 2 diabetes mellitus without complication, with long-term current use of insulin (Formerly [facility])   • Acute bilateral low back pain   • Spondylolisthesis of lumbar region   • DDD (degenerative disc disease), lumbar   • Chest pain   • Peripheral arterial disease (HCC)   • Mixed hyperlipidemia   • CAD (coronary artery disease)   • Pneumonia of both lower lobes due to infectious organism   • Surgical aftercare, circulatory system   • Paroxysmal atrial fibrillation (HCC)   • Carotid stenosis, asymptomatic, bilateral   • Herniated cervical intervertebral disc   • Numbness and tingling of both feet   • Encounter for screening colonoscopy   • Diabetes mellitus (HCC)   • Atrial fibrillation and flutter (HCC)   • COVID-19 virus detected   • Hypertensive urgency   • Numbness     Past Medical History:   Diagnosis Date   • Coronary artery disease    • COVID-19 virus detected 2022   • Diabetes mellitus (HCC)    • Diabetic retinopathy (HCC)    • GERD (gastroesophageal reflux disease)    • Hyperlipidemia    • Hypertension    • Osteoarthritis    • Paroxysmal atrial fibrillation (HCC)    • Sleep apnea      Past  Surgical History:   Procedure Laterality Date   • CARDIAC CATHETERIZATION N/A 9/3/2019   • CARDIAC CATHETERIZATION N/A 2/14/2022   • CHOLECYSTECTOMY     • COLONOSCOPY N/A 5/24/2021   • COLONOSCOPY N/A 1/5/2022   • CORONARY ARTERY BYPASS GRAFT N/A 9/25/2019      General Information     Row Name 03/10/22 1306          Physical Therapy Time and Intention    Document Type evaluation  -LR     Mode of Treatment physical therapy;occupational therapy;co-treatment  -LR     Row Name 03/10/22 1306          General Information    Patient Profile Reviewed yes  -LR     Prior Level of Function independent:;all household mobility;community mobility;bed mobility  -LR     Existing Precautions/Restrictions fall  -LR     Row Name 03/10/22 1306          Living Environment    People in Home spouse  -LR     Row Name 03/10/22 1306          Home Main Entrance    Number of Stairs, Main Entrance one  -LR     Stair Railings, Main Entrance none  -LR     Row Name 03/10/22 1306          Stairs Within Home, Primary    Stairs, Within Home, Primary regular toilet, tub with shower, has a tub transfer bench (doesn't use). Has a 4WW unsure if it will fit his height  -LR     Row Name 03/10/22 1306          Cognition    Orientation Status (Cognition) oriented x 4  -LR           User Key  (r) = Recorded By, (t) = Taken By, (c) = Cosigned By    Initials Name Provider Type    LR Km Lee Physical Therapist               Mobility     Row Name 03/10/22 1306          Bed Mobility    Bed Mobility supine-sit;sit-supine  -LR     Supine-Sit [place] (Bed Mobility) standby assist  -LR     Sit-Supine [place] (Bed Mobility) standby assist  -LR     Assistive Device (Bed Mobility) bed rails;head of bed elevated  -LR     Comment, (Bed Mobility) increased time  -LR     Row Name 03/10/22 1306          Sit-Stand Transfer    Sit-Stand [place] (Transfers) contact guard  -LR     Row Name 03/10/22 1306          Gait/Stairs (Locomotion)    [place]  Level (Gait) contact guard  -LR     Distance in Feet (Gait) 120'x1  -LR     Deviations/Abnormal Patterns (Gait) gait speed decreased;festinating/shuffling  -LR     Left Sided Gait Deviations knee buckling, left side  -LR           User Key  (r) = Recorded By, (t) = Taken By, (c) = Cosigned By    Initials Name Provider Type    LR Km Lee Physical Therapist               Obj/Interventions     Row Name 03/10/22 1306          Range of Motion Comprehensive    General Range of Motion no range of motion deficits identified  -LR     Comment, General Range of Motion BLE grossly WFL  -LR     Row Name 03/10/22 1306          Strength Comprehensive (MMT)    Comment, General Manual Muscle Testing (MMT) Assessment BLE grossly 5/5  -LR     Row Name 03/10/22 1306          Sensory Assessment (Somatosensory)    Sensory Assessment (Somatosensory) sensation intact;LE sensation intact  slight reduction in sensation with dermatome testing in L>R.  -LR           User Key  (r) = Recorded By, (t) = Taken By, (c) = Cosigned By    Initials Name Provider Type    LR Km Lee Physical Therapist               Goals/Plan     Row Name 03/10/22 1306          Bed Mobility Goal 1 (PT)    Activity/Assistive Device (Bed Mobility Goal 1, PT) sit to supine;supine to sit  -LR     [place] Level/Cues Needed (Bed Mobility Goal 1, PT) independent  -LR     Time Frame (Bed Mobility Goal 1, PT) by discharge  -LR     Progress/Outcomes (Bed Mobility Goal 1, PT) goal not met  -LR     Row Name 03/10/22 1306          Transfer Goal 1 (PT)    Activity/Assistive Device (Transfer Goal 1, PT) sit-to-stand/stand-to-sit;bed-to-chair/chair-to-bed  -LR     [place] Level/Cues Needed (Transfer Goal 1, PT) modified independence  -LR     Time Frame (Transfer Goal 1, PT) by discharge  -LR     Progress/Outcome (Transfer Goal 1, PT) goal not met  -LR     Row Name 03/10/22 1306          Gait Training Goal 1 (PT)    Activity/Assistive Device (Gait Training Goal  1, PT) gait (walking locomotion);assistive device use  -LR     [place] Level (Gait Training Goal 1, PT) modified independence  -LR     Distance (Gait Training Goal 1, PT) 150'x1  -LR     Time Frame (Gait Training Goal 1, PT) by discharge  -LR     Progress/Outcome (Gait Training Goal 1, PT) goal not met  -LR           User Key  (r) = Recorded By, (t) = Taken By, (c) = Cosigned By    Initials Name Provider Type    LR Km Lee Physical Therapist               Clinical Impression     Row Name 03/10/22 1306          Pain    Pretreatment Pain Rating 8/10  -LR     Posttreatment Pain Rating 8/10  -LR     Pain Location - back  -LR     Pain Intervention(s) Repositioned;Ambulation/increased activity  -LR     Row Name 03/10/22 1306          Plan of Care Review    Plan of Care Reviewed With patient  -LR     Outcome Evaluation PT eval completed. Patient does not report any visual deficits at baseline but with peripheral field testing he was able to see the pen in both upper quadrants at 50 degrees and the botton 80 degrees. Patient also seems to have L ptosis at times. Patient dizzy throughout session. Bed mobility: SBA for supine<>sit with HOB up, bed rails, and increased timed. Transfer: CGA for sit<>stand Gait: CGA for ambulation 120'x1, 15'x1. Patient with antalgic gait pattern and consistent mild L knee buckling. L knee buckling occurs during stance phase on LLE. Patient denies any history of LBP or radicular symptoms at this time. Patient did presents with slight reduction in light touch sensation with dermatome testing in L>R. No coordination or dysmetria noted in BLE. Recommend home with assist and OPPT.  -LR     Row Name 03/10/22 1309          Therapy Assessment/Plan (PT)    Patient/Family Therapy Goals Statement (PT) Patient wants to return back to work.  -LR     Rehab Potential (PT) good, to achieve stated therapy goals  -LR     Criteria for Skilled Interventions Met (PT) yes;meets criteria;skilled  treatment is necessary  -LR     Predicted Duration of Therapy Intervention (PT) Until d/c or until all goals met  -LR     Row Name 03/10/22 1306          Vital Signs    Pre Systolic BP Rehab 156  -LR     Pre Treatment Diastolic   -LR     Post Systolic BP Rehab 156  -LR     Post Treatment Diastolic   -LR     Pretreatment Heart Rate (beats/min) 88  -LR     Posttreatment Heart Rate (beats/min) 91  -LR     Pre SpO2 (%) 97  -LR     O2 Delivery Pre Treatment room air  -LR     Post SpO2 (%) 98  -LR     Pre Patient Position Supine  -LR     Post Patient Position Sitting  post ambulation  -LR     Row Name 03/10/22 1306          Positioning and Restraints    Pre-Treatment Position in bed  -LR     Post Treatment Position bed  -LR     In Bed notified nsg;call light within reach;encouraged to call for assist;exit alarm on  -LR           User Key  (r) = Recorded By, (t) = Taken By, (c) = Cosigned By    Initials Name Provider Type    LR Km Lee Physical Therapist               Outcome Measures     Row Name 03/10/22 1306          How much help from another person do you currently need...    Turning from your back to your side while in flat bed without using bedrails? 3  -LR     Moving from lying on back to sitting on the side of a flat bed without bedrails? 3  -LR     Moving to and from a bed to a chair (including a wheelchair)? 3  -LR     Standing up from a chair using your arms (e.g., wheelchair, bedside chair)? 3  -LR     Climbing 3-5 steps with a railing? 3  -LR     To walk in hospital room? 3  -LR     AM-PAC 6 Clicks Score (PT) 18  -LR     Row Name 03/10/22 1306          Modified [place] Scale    Pre-Stroke Modified [place] Scale 1 - No significant disability despite symptoms.  Able to carry out all usual duties and activities.  -LR     Modified [place] Scale 3 - Moderate disability.  Requiring some help, but able to walk without assistance.  -LR     Row Name 03/10/22 1320 03/10/22 1306       Functional  Assessment    Outcome Measure Options AM-PAC 6 Clicks Daily Activity (OT)  -[place] Modified [place];AM-PAC 6 Clicks Basic Mobility (PT)  -LR          User Key  (r) = Recorded By, (t) = Taken By, (c) = Cosigned By    Initials Name Provider Type    LR Km Lee Physical Therapist    SJ Anum Chinchilla, OT Occupational Therapist                             Physical Therapy Education                 Title: PT OT SLP Therapies (Done)     Topic: Physical Therapy (Done)     Point: Mobility training (Done)     Learning Progress Summary           Patient Acceptance, E,TB, VU,NR by LR at 3/10/2022 1624    Comment: Educated on PT POC and goals.                   Point: Home exercise program (Done)     Learning Progress Summary           Patient Acceptance, E,TB, VU,NR by LR at 3/10/2022 1624    Comment: Educated on PT POC and goals.                   Point: Body mechanics (Done)     Learning Progress Summary           Patient Acceptance, E,TB, VU,NR by LR at 3/10/2022 1624    Comment: Educated on PT POC and goals.                   Point: Precautions (Done)     Learning Progress Summary           Patient Acceptance, E,TB, VU,NR by LR at 3/10/2022 1624    Comment: Educated on PT POC and goals.                               User Key     Initials Effective Dates Name Provider Type Discipline    LR 06/16/21 -  Km Lee Physical Therapist PT              PT Recommendation and Plan  Planned Therapy Interventions (PT): balance training, bed mobility training, neuromuscular re-education, home exercise program, gait training, orthotic fitting/training, patient/family education, joint mobilization, lumbar stabilization, postural re-education, manual therapy techniques, transfer training, stretching, stair training, strengthening, ROM (range of motion), prosthetic fitting/training, vestibular therapy, wheelchair management/propulsion training  Plan of Care Reviewed With: patient  Outcome Evaluation: PT eval completed. Patient  does not report any visual deficits at baseline but with peripheral field testing he was able to see the pen in both upper quadrants at 50 degrees and the botton 80 degrees. Patient also seems to have L ptosis at times. Patient dizzy throughout session. Bed mobility: SBA for supine<>sit with HOB up, bed rails, and increased timed. Transfer: CGA for sit<>stand Gait: CGA for ambulation 120'x1, 15'x1. Patient with antalgic gait pattern and consistent mild L knee buckling. L knee buckling occurs during stance phase on LLE. Patient denies any history of LBP or radicular symptoms at this time. Patient did presents with slight reduction in light touch sensation with dermatome testing in L>R. No coordination or dysmetria noted in BLE. Recommend home with assist and OPPT.     Time Calculation:    PT Charges     Row Name 03/10/22 1626             Time Calculation    Start Time 1305  -LR      Stop Time 1335  -LR      Time Calculation (min) 30 min  -LR      PT Received On 03/10/22  -LR      PT Goal Re-Cert Due Date 03/23/22  -LR              Untimed Charges    PT Eval/Re-eval Minutes 30  -LR              Total Minutes    Untimed Charges Total Minutes 30  -LR       Total Minutes 30  -LR            User Key  (r) = Recorded By, (t) = Taken By, (c) = Cosigned By    Initials Name Provider Type    LR Km Lee Physical Therapist              Therapy Charges for Today     Code Description Service Date Service Provider Modifiers Qty    88812594832 HC PT EVAL MOD COMPLEXITY 2 3/10/2022 Km Lee GP 1          PT G-Codes  Outcome Measure Options: AM-PAC 6 Clicks Daily Activity (OT)  AM-PAC 6 Clicks Score (PT): 18  AM-PAC 6 Clicks Score (OT): 24  Modified [place] Scale: 3 - Moderate disability.  Requiring some help, but able to walk without assistance.    Km Lee  3/10/2022

## 2022-03-10 NOTE — H&P
[facility] Medicine Admission      Date of Admission: 3/9/2022      Primary Care Physician: Artemio Sanchez MD      Chief Complaint: numbness    HPI:    He is a 56 year old with CAD, paroxysmal atrial fibrillation, GERD, DM who presented to ED with left arm and leg weakness that started today. He has a history of atrial fibrillation and has planned cardioversion on Monday. He does say he has a headache. No chest pain or shortness of breath. Neurology evaluated him.     Concurrent Medical History:  has a past medical history of Coronary artery disease, COVID-19 virus detected (1/21/2022), Diabetes mellitus (HCC), Diabetic retinopathy (HCC), GERD (gastroesophageal reflux disease), Hyperlipidemia, Hypertension, Osteoarthritis, Paroxysmal atrial fibrillation (HCC), and Sleep apnea.    Past Surgical History:  has a past surgical history that includes Cholecystectomy; Cardiac catheterization (N/A, 9/3/2019); Coronary artery bypass graft (N/A, 9/25/2019); Colonoscopy (N/A, 5/24/2021); Colonoscopy (N/A, 1/5/2022); and Cardiac catheterization (N/A, 2/14/2022).    Family History: family history includes Diabetes in his mother; Hypertension in his father.     Social History:  reports that he quit smoking about 2 years ago. His smoking use included cigarettes. He started smoking about 41 years ago. He has a 35.00 pack-year smoking history. He has never used smokeless tobacco. He reports that he does not drink alcohol and does not use drugs.    Allergies:   Allergies   Allergen Reactions   • Ace Inhibitors Anaphylaxis   • Wellbutrin [Bupropion] Anaphylaxis       Medications:   Prior to Admission medications    Medication Sig Start Date End Date Taking? Authorizing Provider   acetaminophen (TYLENOL) 325 MG tablet Take 2 tablets by mouth Every 6 (Six) Hours As Needed for Mild Pain . 2/24/22   Quinn Mathew MD   albuterol sulfate  (90 Base) MCG/ACT inhaler Inhale 2 puffs Every 4  "(Four) Hours As Needed for Wheezing. 8/27/20   Artemio Sanchez MD   amiodarone (PACERONE) 200 MG tablet Take 1 tablet by mouth 2 (Two) Times a Day With Meals. 2/24/22   Quinn Mathew MD   amoxicillin (AMOXIL) 875 MG tablet Take 1 tablet by mouth 2 (Two) Times a Day. 3/3/22   Artemio Sanchez MD   apixaban (ELIQUIS) 5 MG tablet tablet Take 1 tablet by mouth Every 12 (Twelve) Hours. 2/16/22 3/18/22  Kelsey Merchant APRN   aspirin 81 MG chewable tablet Chew 1 tablet by mouth Daily 2/16/22 3/18/22  Kelsey Merchant APRN   atorvastatin (LIPITOR) 40 MG tablet Take 1 tablet by mouth Every Night for 90 days. 1/25/22 4/25/22  Lucio Coyle MD   clopidogrel (PLAVIX) 75 MG tablet Take 1 tablet by mouth Daily. 2/11/22   Alice Rice MD   dilTIAZem CD (Cardizem CD) 180 MG 24 hr capsule Take 1 capsule by mouth Daily for 90 days. 1/25/22 4/25/22  Lucio Coyle MD   gabapentin (NEURONTIN) 300 MG capsule TAKE 1 CAPSULE BY MOUTH AT BEDTIME FOR 2 DAYS THEN 1 TWICE DAILY FOR 2 DAYS THEN 1 THREE TIMES DAILY THEREAFTER 6/28/21   Provider, MD Hai   guaiFENesin-dextromethorphan (ROBITUSSIN DM) 100-10 MG/5ML syrup Take 10 mL by mouth 4 (Four) Times a Day As Needed for Cough. 1/13/22   Artemio Sanchez MD   insulin aspart prot & aspart (NovoLOG Mix 70/30 FlexPen) (70-30) 100 UNIT/ML suspension pen-injector injection Start 10u ac breakfast and 5u ac supper. I anticipate will need 50 units per day. 3/3/22   Artemio Sanchez MD   Insulin Pen Needle (Pen Needles 1/2\") 29G X 12MM misc 1 each Every Night. 3/3/22   Artemio Sanchez MD   isosorbide mononitrate (IMDUR) 30 MG 24 hr tablet Take 1 tablet by mouth Daily. 8/27/20   Artemio Sanchez MD   Lancet Devices (ONE TOUCH DELICA LANCING DEV) misc Use as directed to test blood sugar. 11/13/20   Alessandro Gan MD   linaclotide (Linzess) 145 MCG capsule capsule Take 1 capsule by mouth Every Morning Before Breakfast. 11/16/21   Renee Osorio APRN   metFORMIN " (GLUCOPHAGE) 500 MG tablet Take 2 tablets by mouth 2 (Two) Times a Day With Meals. 2/17/22   Kelsey Merchant APRN   metoprolol succinate XL (TOPROL-XL) 100 MG 24 hr tablet Take 1 and 1/2 tablets by mouth Daily. 2/16/22   Kelsey Merchant APRN   nitroglycerin (NITROSTAT) 0.4 MG SL tablet Place 1 tablet under the tongue Every 5 (Five) Minutes As Needed for Chest Pain. Take no more than 3 doses in 15 minutes. 9/13/19   Marcelino Goode MD   nortriptyline (PAMELOR) 10 MG capsule TAKE 1 CAPSULE BY MOUTH THREE TIMES DAILY 11/9/21   Sandra Sorto APRN   ondansetron ODT (Zofran ODT) 8 MG disintegrating tablet Place 1 tablet on the tongue Every 8 (Eight) Hours As Needed for Nausea or Vomiting. 11/16/21   Renee Osorio APRN   ONETOUCH DELICA LANCETS 33G misc 1 each 4 (Four) Times a Day. delica if covered, use alternative if not covered 1/24/17   Alessandro Gan MD   pioglitazone (Actos) 30 MG tablet Take 1 tablet by mouth Daily. 3/3/22   Artemio Sanchez MD   ranolazine (RANEXA) 500 MG 12 hr tablet Take 1 tablet by mouth Every 12 (Twelve) Hours. 5/5/21   Quinn Mathew MD   spironolactone (ALDACTONE) 25 MG tablet Take 1 tablet by mouth once daily 11/9/21   Sandra Sorto APRN   tiZANidine (ZANAFLEX) 4 MG tablet Take 4 mg by mouth 3 (Three) Times a Day. 6/28/21   Provider, MD Hai       Review of Systems:  Review of Systems   Constitutional: Negative for chills, diaphoresis and fever.   HENT: Negative for sneezing and sore throat.    Eyes: Negative for pain and discharge.   Respiratory: Negative for cough and shortness of breath.    Gastrointestinal: Negative for constipation, diarrhea, nausea and vomiting.   Endocrine: Negative for cold intolerance and heat intolerance.   Genitourinary: Negative for difficulty urinating, dysuria, frequency and urgency.   Musculoskeletal: Negative for arthralgias and myalgias.   Skin: Negative for color change and pallor.   Allergic/Immunologic: Negative for  environmental allergies and food allergies.   Neurological: Positive for weakness and headaches. Negative for dizziness and syncope.   Psychiatric/Behavioral: Negative for confusion and sleep disturbance.      Otherwise complete ROS is negative except as mentioned above.    Physical Exam:   Temp:  [97.7 °F (36.5 °C)] 97.7 °F (36.5 °C)  Heart Rate:  [102-111] 104  Resp:  [20] 20  BP: (144-156)/(92-96) 144/92  Physical Exam  Vitals reviewed.   Constitutional:       General: He is not in acute distress.     Appearance: He is well-developed.   HENT:      Head: Normocephalic and atraumatic.      Nose: Nose normal.   Eyes:      Conjunctiva/sclera: Conjunctivae normal.   Cardiovascular:      Rate and Rhythm: Normal rate and regular rhythm.   Pulmonary:      Effort: Pulmonary effort is normal. No respiratory distress.      Breath sounds: Normal breath sounds. No wheezing or rales.   Musculoskeletal:         General: Normal range of motion.      Cervical back: Normal range of motion and neck supple.   Skin:     General: Skin is warm and dry.   Neurological:      Mental Status: He is alert and oriented to person, place, and time.      Motor: Weakness present.   Psychiatric:         Behavior: Behavior normal.           Results Reviewed:  I have personally reviewed current lab, radiology, and data and agree with results.  Lab Results (last 24 hours)     Procedure Component Value Units Date/Time    COVID-19 and FLU A/B PCR - Swab, Nasopharynx [[id number]]  (Normal) Collected: 03/09/22 1724    Specimen: Swab from Nasopharynx Updated: 03/09/22 1755     COVID19 Not Detected     Influenza A PCR Not Detected     Influenza B PCR Not Detected    Narrative:      Fact sheet for providers: https://www.fda.gov/media/938977/download    Fact sheet for patients: https://www.fda.gov/media/649118/download    Test performed by PCR.    [place] Draw [[id number]] Collected: 03/09/22 1618    Specimen: Blood Updated: 03/09/22 1733    Narrative:       The following orders were created for panel order [place] Draw.  Procedure                               Abnormality         Status                     ---------                               -----------         ------                     Green Top (Gel)[[id number]]                                  Final result               Lavender Top[[id number]]                                     Final result               Gold Top - SST[[id number]]                                   Final result               Light Blue Top[[id number]]                                   Final result                 Please view results for these tests on the individual orders.    Green Top (Gel) [[id number]] Collected: 03/09/22 1618    Specimen: Blood Updated: 03/09/22 1733     Extra Tube Hold for add-ons.     Comment: Auto resulted.       Lavender Top [[id number]] Collected: 03/09/22 1618    Specimen: Blood Updated: 03/09/22 1733     Extra Tube hold for add-on     Comment: Auto resulted       Gold Top - SST [[id number]] Collected: 03/09/22 1618    Specimen: Blood Updated: 03/09/22 1733     Extra Tube Hold for add-ons.     Comment: Auto resulted.       Light Blue Top [[id number]] Collected: 03/09/22 1618    Specimen: Blood Updated: 03/09/22 1733     Extra Tube hold for add-on     Comment: Auto resulted       Comprehensive Metabolic Panel [[id number]]  (Abnormal) Collected: 03/09/22 1618    Specimen: Blood Updated: 03/09/22 1649     Glucose 221 mg/dL      BUN 14 mg/dL      Creatinine 1.23 mg/dL      Sodium 140 mmol/L      Potassium 4.4 mmol/L      Chloride 103 mmol/L      CO2 26.0 mmol/L      Calcium 9.1 mg/dL      Total Protein 6.7 g/dL      Albumin 4.20 g/dL      ALT (SGPT) 20 U/L      AST (SGOT) 13 U/L      Alkaline Phosphatase 87 U/L      Total Bilirubin 0.6 mg/dL      Globulin 2.5 gm/dL      A/G Ratio 1.7 g/dL      BUN/Creatinine Ratio 11.4     Anion Gap 11.0 mmol/L      eGFR 68.5 mL/min/1.73      Comment: National Kidney Foundation and American  Society of Nephrology (ASN) Task Force recommended calculation based on the Chronic Kidney Disease Epidemiology Collaboration (CKD-EPI) equation refit without adjustment for race.       Narrative:      GFR Normal >60  Chronic Kidney Disease <60  Kidney Failure <15      Troponin [[id number]]  (Normal) Collected: 03/09/22 1618    Specimen: Blood Updated: 03/09/22 1649     Troponin T <0.010 ng/mL     Narrative:      Troponin T Reference Range:  <= 0.03 ng/mL-   Negative for AMI  >0.03 ng/mL-     Abnormal for myocardial necrosis.  Clinicians would have to utilize clinical acumen, EKG, Troponin and serial changes to determine if it is an Acute Myocardial Infarction or myocardial injury due to an underlying chronic condition.       Results may be falsely decreased if patient taking Biotin.      Protime-INR [[id number]]  (Normal) Collected: 03/09/22 1618    Specimen: Blood Updated: 03/09/22 1643     Protime 15.1 Seconds      INR 1.20    Narrative:      Therapeutic range for most indications is 2.0-3.0 INR,  or 2.5-3.5 for mechanical heart valves.    aPTT [[id number]]  (Normal) Collected: 03/09/22 1618    Specimen: Blood Updated: 03/09/22 1643     PTT 37.6 seconds     Narrative:      The recommended Heparin therapeutic range is 68-97 seconds.    CBC & Differential [[id number]]  (Abnormal) Collected: 03/09/22 1618    Specimen: Blood Updated: 03/09/22 1624    Narrative:      The following orders were created for panel order CBC & Differential.  Procedure                               Abnormality         Status                     ---------                               -----------         ------                     CBC Auto Differential[[id number]]        Abnormal            Final result                 Please view results for these tests on the individual orders.    CBC Auto Differential [[id number]]  (Abnormal) Collected: 03/09/22 1618    Specimen: Blood Updated: 03/09/22 1624     WBC 9.70 10*3/mm3      RBC 5.25 10*6/mm3       Hemoglobin 15.3 g/dL      Hematocrit 45.4 %      MCV 86.5 fL      MCH 29.1 pg      MCHC 33.7 g/dL      RDW 13.7 %      RDW-SD 42.6 fl      MPV 11.1 fL      Platelets 184 10*3/mm3      Neutrophil % 73.8 %      Lymphocyte % 16.5 %      Monocyte % 7.0 %      Eosinophil % 1.8 %      Basophil % 0.4 %      Immature Grans % 0.5 %      Neutrophils, Absolute 7.16 10*3/mm3      Lymphocytes, Absolute 1.60 10*3/mm3      Monocytes, Absolute 0.68 10*3/mm3      Eosinophils, Absolute 0.17 10*3/mm3      Basophils, Absolute 0.04 10*3/mm3      Immature Grans, Absolute 0.05 10*3/mm3      nRBC 0.0 /100 WBC     Extra Tubes [[id number]] Collected: 03/09/22 1618    Specimen: Blood, Venous Line Updated: 03/09/22 1620    Narrative:      The following orders were created for panel order Extra Tubes.  Procedure                               Abnormality         Status                     ---------                               -----------         ------                     P[place]a Top[[id number]]                                         In process                   Please view results for these tests on the individual orders.    Gray Top [[id number]] Collected: 03/09/22 1618    Specimen: Blood Updated: 03/09/22 1620    POC Glucose Once [[id number]]  (Abnormal) Collected: 03/09/22 1605    Specimen: Blood Updated: 03/09/22 1618     Glucose 259 mg/dL      Comment: Result Not ConfirmedOperator: [id number] VERONICA Hayden ID: [id number]           Imaging Results (Last 24 Hours)     Procedure Component Value Units Date/Time    CT CEREBRAL PERFUSION WITH & WITHOUT CONTRAST [[id number]] Collected: 03/09/22 1619     Updated: 03/09/22 9208    Narrative:      STUDY: CT HEAD PERFUSION WITHOUT AND WITH IV CONTRAST    ACCESSION NUMBER: [id number]    DATE: 3/9/2022 4:19 PM CST    PROVIDED INDICATION: Neuro deficit, acute, stroke suspected    COMPARISON: 8/19/2020, 3/9/2022    TECHNIQUE:      CT PERFUSION:  Ten thick sections acquired for approximately  40  acquisitions during rapid bolus IV administration with 40  milliliters of contrast.  The patient had no adverse reaction.   Time to Start, Time to Peak, Mean Transit Time, Cerebral Blood  Flow and Cerebral Blood Volume maps were post processed.    All CT scans at this location are performed using dose modulation  techniques as appropriate to a performed exam including the  following: automated exposure control; adjustment of the mA  and/or kV according to patient size (this includes techniques or  standardized protocols for targeted exams where dose is matched  to indication / reason for exam; i.e. extremities or head); use  of iterative reconstruction technique.      FINDINGS:     Noncontrast head CT: This is reportedly separately.      Total hyperperfusion: Using a threshold of Tmax > 6 seconds,  there is elevation of Tmax at the level of the right occipital  lobe.. The total volume of hypoperfusion is six mL.    Core infarct: Using a threshold of CBF <30%, there is total core  infarct volume measuring zero mL.    Ischemic penumbra/Potentially reversible ischemia: The penumbra  volume (mismatch volume) is 6 mL. Mismatch ratio infinite      Impression:      Small area of subcortical ischemia within the right occipital  lobe, with a penumbra/potentially reversible ischemic volume of 6  mL. Associated subcortical hypoattenuation on concurrent head CT  which had developed relative to 2020 exam    The above findings were communicated via telephone by Dr. Bernard to  Dr. GAVIN BUSTOS at 3/9/2022 5:29 PM CST.      Electronically signed by:  Kumar Bernard MD  3/9/2022 5:31 PM CST  Workstation: [id number]    XR Chest 1 View [[id number]] Collected: 03/09/22 1645     Updated: 03/09/22 1726    Narrative:          COMPARISON:     2/21/2022    INDICATION:     Acute stroke protocol on several less than 12  hours    FINDINGS:     Portable AP chest radiograph is obtained.  Cardiomegaly. Prominent central vasculature. Central  interstitial  and alveolar opacities. No large effusion. No pneumothorax.          Impression:      Cardiomegaly. Central vascular congestion.  Central opacities which may reflect central edema.    Electronically signed by:  Kumar Bernard MD  3/9/2022 5:24 PM CST  Workstation: [id number]    CT Head Without Contrast Stroke Protocol [[id number]] Collected: 03/09/22 1617     Updated: 03/09/22 1639    Narrative:      EXAM:  CT HEAD WITHOUT IV CONTRAST    ORDERING PROVIDER:  GAVIN BUSTOS    CLINICAL HISTORY:   Stroke    COMPARISON:      TECHNIQUE:   Nonenhanced CT of the head was performed and reformatted in the  sagittal and coronal planes.     This examination was performed according to our departmental dose  optimization program which includes automated exposure control,  adjustment of the MA and kV according to patient size, and/or use  of iterative reconstruction technique.    FINDINGS:     CEREBRAL PARENCHYMA:  Chronic microangiopathic changes. No  hemorrhage.  No intracranial mass or mass effect. Age-appropriate  cerebral atrophy. Chronic ischemia of the right basal ganglia  extending to the right corona radiata anteriorly.    POSTERIOR FOSSA:  Age-appropriate atrophy of cerebellum and  brainstem.  No cerebellar tonsillar ectopia.    EXTRA-AXIAL SPACES:  Normal size and configuration.  No mass,  fluid collection or hemorrhage.    ORBITS: No mass. Unremarkable extraocular muscles, globe and  optic nerve.    CALVARIA AND SOFT TISSUES:  No mass or adenopathy, lytic or  sclerotic lesion.    TEMPORAL BONE AND SKULL BASE:  Unremarkable middle and inner ear  , and mastoid air cells.    PARANASAL SINUSES AND FACIAL BONES: Unremarkable.     VASCULAR STRUCTURES:  Unremarkable.      Impression:      1.  No acute intracranial process.  2.  Chronic ischemia of the right basal ganglia extending to the  right corona radiata anteriorly.  3.  Scattered chronic microangiopathic changes.          Electronically signed by:  Eric Dooley MD   3/9/2022 4:37 PM CST  Workstation: [id number]    CT Angiogram Head w AI Analysis of LVO [[id number]] Resulted: 03/09/22 1619     Updated: 03/09/22 1626    CT Angiogram Neck [[id number]] Resulted: 03/09/22 1619     Updated: 03/09/22 1626            Assessment:    Active Hospital Problems    Diagnosis    • **Numbness    • Atrial fibrillation and flutter (HCC)    • Diabetes mellitus (HCC)    • Carotid stenosis, asymptomatic, bilateral    • Paroxysmal atrial fibrillation (HCC)    • CAD (coronary artery disease)    • Mixed hyperlipidemia    • Peripheral arterial disease (HCC)    • Diabetic polyneuropathy associated with type 2 diabetes mellitus (HCC)    • Essential hypertension              Plan:    Stroke-like symptoms, numbness  -neurology consulted  -CT showed chronic ischemia of the right basal ganglia extending into right   -MRI ordered    HTN  -permissive HTN overnight   -resume home HTN meds tomorrow    Type 2 Diabetes Mellitus  -SSI  -hold oral meds     Chronic Atrial Fibrillation  -scheduled for cardioversion Monday  -Eliquis     PT/OT  Cardiac diet.   Full Code.     I confirmed that the patient's Advance Care Plan is present, code status is documented, or surrogate decision maker is listed in the patient's medical record.     I have utilized all available immediate resources to obtain, update, or review the patient's current medications.       I discussed the patients findings and my recommendations with: patient         This document has been electronically signed by Che Dove MD on March 9, 2022 18:03 CST

## 2022-03-10 NOTE — PROGRESS NOTES
Stroke Progress Note       Chief Complaint: Left-sided weakness and nurnbness    Subjective     HPI:   Pt is a 56-yr-old right-handed white male with known diagnosis of hypertension, hyperlipidemia, Afib on Eliquis, CAD on aspirin and plavix, and DM who presented after sudden onset of left-sided weakness and numbness about 45 minutes prior to coming to the ER. This morning he states feeling back to baseline. Strengths are equal 5/5, denies numbness, and visual field is intact.     Review of Systems   HENT: Negative.    Eyes: Negative.    Respiratory: Negative.    Cardiovascular:        Afib   Gastrointestinal: Negative.    Genitourinary: Negative.    Musculoskeletal: Negative.    Skin: Negative.    Neurological: Negative.    Psychiatric/Behavioral: Negative.         Objective      Temp:  [97.3 °F (36.3 °C)-97.8 °F (36.6 °C)] 97.3 °F (36.3 °C)  Heart Rate:  [] 95  Resp:  [18-26] 26  BP: (124-165)/() 147/95    Neurological Exam  Mental Status  Awake, alert and oriented to person, place and time.Alert. Oriented to person, place, and time. Recent and remote memory are intact. Speech is normal. Language is fluent with no aphasia. Attention and concentration are normal.    Cranial Nerves  CN II: Visual acuity is normal. Visual fields full to confrontation.  CN III, IV, VI: Extraocular movements intact bilaterally. Normal lids and orbits bilaterally. Pupils equal round and reactive to light bilaterally.  CN V: Facial sensation is normal.  CN VII: Full and symmetric facial movement.  CN IX, X: Palate elevates symmetrically. Normal gag reflex.  CN XI: Shoulder shrug strength is normal.  CN XII: Tongue midline without atrophy or fasciculations.    Motor  Normal muscle bulk throughout. No fasciculations present. Strength is 5/5 throughout all four extremities.    Sensory  Sensation is intact to light touch, pinprick, vibration and proprioception in all four extremities.    Reflexes  Not  assessed.    Coordination    Finger-to-nose, rapid alternating movements and heel-to-shin normal bilaterally without dysmetria.    Gait    Not tested.      Physical Exam  Vitals and nursing note reviewed.   Constitutional:       Appearance: Normal appearance.   HENT:      Head: Normocephalic and atraumatic.   Eyes:      General: Lids are normal.      Extraocular Movements: Extraocular movements intact.      Pupils: Pupils are equal, round, and reactive to light.   Cardiovascular:      Rate and Rhythm: Rhythm irregular.   Pulmonary:      Effort: Pulmonary effort is normal.   Musculoskeletal:         General: Normal range of motion.      Cervical back: Normal range of motion.   Skin:     General: Skin is warm and dry.   Neurological:      Mental Status: He is alert and oriented to person, place, and time. Mental status is at baseline.      Coordination: Coordination is intact.      Deep Tendon Reflexes: Strength normal.   Psychiatric:         Mood and Affect: Mood normal.         Speech: Speech normal.         Results Review:    I reviewed the patient's new clinical results.    Lab Results (last 24 hours)     Procedure Component Value Units Date/Time    Lipid Panel [[id number]] Collected: 03/10/22 0351    Specimen: Blood Updated: 03/10/22 0447     Total Cholesterol 94 mg/dL      Triglycerides 34 mg/dL      HDL Cholesterol 42 mg/dL      LDL Cholesterol  42 mg/dL      VLDL Cholesterol 10 mg/dL      LDL/HDL Ratio 1.08    Narrative:      Cholesterol Reference Ranges  (U.S. Department of Health and Human Services ATP III Classifications)    Desirable          <200 mg/dL  Borderline High    200-239 mg/dL  High Risk          >240 mg/dL      Triglyceride Reference Ranges  (U.S. Department of Health and Human Services ATP III Classifications)    Normal           <150 mg/dL  Borderline High  150-199 mg/dL  High             200-499 mg/dL  Very High        >500 mg/dL    HDL Reference Ranges  (U.S. Department of Health and Human  Services ATP III Classifications)    Low     <40 mg/dl (major risk factor for CHD)  High    >60 mg/dl ('negative' risk factor for CHD)        LDL Reference Ranges  (U.S. Department of Health and Human Services ATP III Classifications)    Optimal          <100 mg/dL  Near Optimal     100-129 mg/dL  Borderline High  130-159 mg/dL  High             160-189 mg/dL  Very High        >189 mg/dL    Hemoglobin A1c [[id number]]  (Abnormal) Collected: 03/10/22 0351    Specimen: Blood Updated: 03/10/22 0439     Hemoglobin A1C 10.30 %     Narrative:      Hemoglobin A1C Ranges:    Increased Risk for Diabetes  5.7% to 6.4%  Diabetes                     >= 6.5%  Diabetic Goal                < 7.0%    POC Glucose Once [[id number]]  (Abnormal) Collected: 03/09/22 2058    Specimen: Blood Updated: 03/09/22 2146     Glucose 229 mg/dL      Comment: RN NotifiedOperator: [id number] BRISA TAYLORMeter ID: [id number]       Extra Tubes [[id number]] Collected: 03/09/22 1618    Specimen: Blood, Venous Line Updated: 03/09/22 2033    Narrative:      The following orders were created for panel order Extra Tubes.  Procedure                               Abnormality         Status                     ---------                               -----------         ------                     [place] Top[[id number]]                                         Final result                 Please view results for these tests on the individual orders.    Gray Top [[id number]] Collected: 03/09/22 1618    Specimen: Blood Updated: 03/09/22 2033     Extra Tube Hold for add-ons.     Comment: Auto resulted.       COVID-19 and FLU A/B PCR - Swab, Nasopharynx [[id number]]  (Normal) Collected: 03/09/22 1724    Specimen: Swab from Nasopharynx Updated: 03/09/22 1755     COVID19 Not Detected     Influenza A PCR Not Detected     Influenza B PCR Not Detected    Narrative:      Fact sheet for providers: https://www.fda.gov/media/639685/download    Fact sheet for patients:  https://www.fda.gov/media/787471/download    Test performed by PCR.    [place] Draw [[id number]] Collected: 03/09/22 1618    Specimen: Blood Updated: 03/09/22 1733    Narrative:      The following orders were created for panel order [place] Draw.  Procedure                               Abnormality         Status                     ---------                               -----------         ------                     Green Top (Gel)[[id number]]                                  Final result               Lavender Top[[id number]]                                     Final result               Gold Top - SST[[id number]]                                   Final result               Light Blue Top[[id number]]                                   Final result                 Please view results for these tests on the individual orders.    Green Top (Gel) [[id number]] Collected: 03/09/22 1618    Specimen: Blood Updated: 03/09/22 1733     Extra Tube Hold for add-ons.     Comment: Auto resulted.       Lavender Top [[id number]] Collected: 03/09/22 1618    Specimen: Blood Updated: 03/09/22 1733     Extra Tube hold for add-on     Comment: Auto resulted       Gold Top - SST [[id number]] Collected: 03/09/22 1618    Specimen: Blood Updated: 03/09/22 1733     Extra Tube Hold for add-ons.     Comment: Auto resulted.       Light Blue Top [[id number]] Collected: 03/09/22 1618    Specimen: Blood Updated: 03/09/22 1733     Extra Tube hold for add-on     Comment: Auto resulted       Comprehensive Metabolic Panel [[id number]]  (Abnormal) Collected: 03/09/22 1618    Specimen: Blood Updated: 03/09/22 1649     Glucose 221 mg/dL      BUN 14 mg/dL      Creatinine 1.23 mg/dL      Sodium 140 mmol/L      Potassium 4.4 mmol/L      Chloride 103 mmol/L      CO2 26.0 mmol/L      Calcium 9.1 mg/dL      Total Protein 6.7 g/dL      Albumin 4.20 g/dL      ALT (SGPT) 20 U/L      AST (SGOT) 13 U/L      Alkaline Phosphatase 87 U/L      Total Bilirubin 0.6 mg/dL       Globulin 2.5 gm/dL      A/G Ratio 1.7 g/dL      BUN/Creatinine Ratio 11.4     Anion Gap 11.0 mmol/L      eGFR 68.5 mL/min/1.73      Comment: National Kidney Foundation and American Society of Nephrology (ASN) Task Force recommended calculation based on the Chronic Kidney Disease Epidemiology Collaboration (CKD-EPI) equation refit without adjustment for race.       Narrative:      GFR Normal >60  Chronic Kidney Disease <60  Kidney Failure <15      Troponin [[id number]]  (Normal) Collected: 03/09/22 1618    Specimen: Blood Updated: 03/09/22 1649     Troponin T <0.010 ng/mL     Narrative:      Troponin T Reference Range:  <= 0.03 ng/mL-   Negative for AMI  >0.03 ng/mL-     Abnormal for myocardial necrosis.  Clinicians would have to utilize clinical acumen, EKG, Troponin and serial changes to determine if it is an Acute Myocardial Infarction or myocardial injury due to an underlying chronic condition.       Results may be falsely decreased if patient taking Biotin.      Protime-INR [[id number]]  (Normal) Collected: 03/09/22 1618    Specimen: Blood Updated: 03/09/22 1643     Protime 15.1 Seconds      INR 1.20    Narrative:      Therapeutic range for most indications is 2.0-3.0 INR,  or 2.5-3.5 for mechanical heart valves.    aPTT [[id number]]  (Normal) Collected: 03/09/22 1618    Specimen: Blood Updated: 03/09/22 1643     PTT 37.6 seconds     Narrative:      The recommended Heparin therapeutic range is 68-97 seconds.    CBC & Differential [[id number]]  (Abnormal) Collected: 03/09/22 1618    Specimen: Blood Updated: 03/09/22 1624    Narrative:      The following orders were created for panel order CBC & Differential.  Procedure                               Abnormality         Status                     ---------                               -----------         ------                     CBC Auto Differential[[id number]]        Abnormal            Final result                 Please view results for these tests on the  individual orders.    CBC Auto Differential [[id number]]  (Abnormal) Collected: 03/09/22 1618    Specimen: Blood Updated: 03/09/22 1624     WBC 9.70 10*3/mm3      RBC 5.25 10*6/mm3      Hemoglobin 15.3 g/dL      Hematocrit 45.4 %      MCV 86.5 fL      MCH 29.1 pg      MCHC 33.7 g/dL      RDW 13.7 %      RDW-SD 42.6 fl      MPV 11.1 fL      Platelets 184 10*3/mm3      Neutrophil % 73.8 %      Lymphocyte % 16.5 %      Monocyte % 7.0 %      Eosinophil % 1.8 %      Basophil % 0.4 %      Immature Grans % 0.5 %      Neutrophils, Absolute 7.16 10*3/mm3      Lymphocytes, Absolute 1.60 10*3/mm3      Monocytes, Absolute 0.68 10*3/mm3      Eosinophils, Absolute 0.17 10*3/mm3      Basophils, Absolute 0.04 10*3/mm3      Immature Grans, Absolute 0.05 10*3/mm3      nRBC 0.0 /100 WBC     POC Glucose Once [[id number]]  (Abnormal) Collected: 03/09/22 1605    Specimen: Blood Updated: 03/09/22 1618     Glucose 259 mg/dL      Comment: Result Not ConfirmedOperator: [id number] VERONICA ACUNAzi ID: [id number]           CT Angiogram Neck    Result Date: 3/9/2022  No evidence of hemodynamically significant stenosis of the visualized ICAs and bilateral vertebral arteries. No evidence of dissection within the bilateral carotid or vertebral arteries.  Left vertebral artery is dominant. Electronically signed by:  Eric Dooley MD  3/9/2022 6:22 PM CST Workstation: [id number]    XR Chest 1 View    Result Date: 3/9/2022  Cardiomegaly. Central vascular congestion. Central opacities which may reflect central edema. Electronically signed by:  Kumar Bernard MD  3/9/2022 5:24 PM CST Workstation: [id number]    CT Head Without Contrast Stroke Protocol    Result Date: 3/9/2022  1.  No acute intracranial process. 2.  Chronic ischemia of the right basal ganglia extending to the right corona radiata anteriorly. 3.  Scattered chronic microangiopathic changes. Electronically signed by:  Eric Dooley MD  3/9/2022 4:37 PM CST Workstation: [id number]    CT Angiogram Head w  AI Analysis of LVO    Result Date: 3/9/2022  No evidence of hemodynamically significant stenosis of the visualized intracranial arteries. No evidence of aneurysm within the [place]. The left vertebral artery is dominant. Electronically signed by:  Eric Dooley MD  3/9/2022 6:19 PM CST Workstation: [id number]    CT CEREBRAL PERFUSION WITH & WITHOUT CONTRAST    Result Date: 3/9/2022  Small area of subcortical ischemia within the right occipital lobe, with a penumbra/potentially reversible ischemic volume of 6 mL. Associated subcortical hypoattenuation on concurrent head CT which had developed relative to 2020 exam The above findings were communicated via telephone by Dr. Bernard to Dr. GAVIN BUSTOS at 3/9/2022 5:29 PM CST. Electronically signed by:  uKmar Bernard MD  3/9/2022 5:31 PM CST Workstation: [id number]    Results for orders placed during the hospital encounter of 01/21/22    Adult Transthoracic Echo Complete W/ Cont if Necessary Per Protocol    Interpretation Summary  · Left ventricular wall thickness is consistent with moderate concentric hypertrophy.  · Estimated left ventricular EF = 58% Left ventricular ejection fraction appears to be 56 - 60%. Left ventricular systolic function is normal.  · Mild tricuspid valve regurgitation is present.  · Estimated right ventricular systolic pressure from tricuspid regurgitation is normal (<35 mmHg).  · The right ventricular cavity is borderline dilated.  · There is no evidence of pericardial effusion.        Assessment/Plan     Assessment/Plan: Pt is a 56-yr-old right-handed white male with known diagnosis of hypertension, hyperlipidemia, Afib on Eliquis, CAD on aspirin and plavix, and DM who presented after sudden onset of left-sided weakness and numbness about 45 minutes prior to coming to the ER. This morning he states feeling back to baseline. Strengths are equal 5/5, denies numbness, and visual field is intact.    1. Left-sided weakness and numbness- Resolved, MRI  is neg for stroke, likely TIA. Continue dapt and Eliquis along with Lipitor 40 mg. Reviewed TIA/stroke booklet, risk factors, prevention, and s/s of stroke and to call 911. Pt verbalized understanding.   2. Hypertension- Normal BP goals. Instructed on the importance of daily BP monitoring and control to reduce stroke risk.  3. Hyperlipidemia- LDL is 44. Continue Lipitor 40 mg/day.  4. Chronic Afib-  Remains in afib. On Eliquis, pt states he is scheduled for cardioversion Monday.  5. DM2- A1Cis 10.3, instructed on the importance of glucose control to reduce stroke risk.   5. Activity- Okay to work with PT/OT today  6. Diet- ADA heart-healthy diet.  Case was discussed with pt, Dr. Wilhelm, Hospitalist team, and nursing. Neurology will sign off.          Patient Active Problem List   Diagnosis   • Cervical strain, acute   • Diabetes mellitus with ketoacidosis, uncontrolled (HCC)   • Essential hypertension   • Diabetic polyneuropathy associated with type 2 diabetes mellitus (HCC)   • Type 2 diabetes mellitus without complication, with long-term current use of insulin (HCC)   • Acute bilateral low back pain   • Spondylolisthesis of lumbar region   • DDD (degenerative disc disease), lumbar   • Chest pain   • Peripheral arterial disease (HCC)   • Mixed hyperlipidemia   • CAD (coronary artery disease)   • Pneumonia of both lower lobes due to infectious organism   • Surgical aftercare, circulatory system   • Paroxysmal atrial fibrillation (HCC)   • Carotid stenosis, asymptomatic, bilateral   • Herniated cervical intervertebral disc   • Numbness and tingling of both feet   • Encounter for screening colonoscopy   • Diabetes mellitus (HCC)   • Atrial fibrillation and flutter (HCC)   • COVID-19 virus detected   • Hypertensive urgency   • Numbness           NELSY Roberts  03/10/22  08:58 CST

## 2022-03-10 NOTE — PLAN OF CARE
Goal Outcome Evaluation:  Plan of Care Reviewed With: patient        Progress: improving  Outcome Evaluation: Swallow evaluation completed. Pt with no s/s of aspiration on regular solids and regular liquids. Pt denies any difficulty swallowing. Speech and cognition are WFL as well. Pt denies any difficulty with speech/language/or cognition. Wife arrived and did not report any concerns or difficulty in regards to speech.

## 2022-03-10 NOTE — PLAN OF CARE
Goal Outcome Evaluation:  Plan of Care Reviewed With: patient           Outcome Evaluation: OT eval complete, co-eval with PT. SUpine in bed upon arrival. Supine <> sit with SBA. Sit to stand and toilet transfer with CGA, patient with some instability without funcitonal mobility, patient states he was not using AD prior to admission, also states instability is from big toe pain. Toileting with SBA, grooming standing at sink with SBA. Mod A for LE dressing. With activity patient with SOA. Patient with decreased independence in ADLs, transfers, and decreased activity tolerance. Cont inpatient OT to maximize independence in ADLs and transfers. Recommend home with assist.

## 2022-03-10 NOTE — PLAN OF CARE
Problem: Adult Inpatient Plan of Care  Goal: Plan of Care Review  Outcome: Ongoing, Progressing  Flowsheets  Taken 3/10/2022 1731 by Mónica Kimball RN  Outcome Evaluation: Transferred from CCU.  Alert and oriented. Blood surgars treated per order.  Taken 3/10/2022 1309 by Che Tomlinson CCC-SLP  Progress: improving  Plan of Care Reviewed With: patient   Goal Outcome Evaluation:              Outcome Evaluation: Transferred from CCU.  Alert and oriented. Blood surgars treated per order.

## 2022-03-11 ENCOUNTER — READMISSION MANAGEMENT (OUTPATIENT)
Dept: CALL CENTER | Facility: HOSPITAL | Age: 57
End: 2022-03-11

## 2022-03-11 VITALS
DIASTOLIC BLOOD PRESSURE: 94 MMHG | WEIGHT: 284.39 LBS | TEMPERATURE: 97.4 F | HEIGHT: 74 IN | BODY MASS INDEX: 36.5 KG/M2 | OXYGEN SATURATION: 97 % | RESPIRATION RATE: 20 BRPM | HEART RATE: 94 BPM | SYSTOLIC BLOOD PRESSURE: 164 MMHG

## 2022-03-11 LAB
GLUCOSE BLDC GLUCOMTR-MCNC: 196 MG/DL (ref 70–130)
GLUCOSE BLDC GLUCOMTR-MCNC: 324 MG/DL (ref 70–130)

## 2022-03-11 PROCEDURE — 82962 GLUCOSE BLOOD TEST: CPT

## 2022-03-11 PROCEDURE — G0378 HOSPITAL OBSERVATION PER HR: HCPCS

## 2022-03-11 PROCEDURE — 63710000001 INSULIN ASPART PER 5 UNITS: Performed by: STUDENT IN AN ORGANIZED HEALTH CARE EDUCATION/TRAINING PROGRAM

## 2022-03-11 PROCEDURE — 97110 THERAPEUTIC EXERCISES: CPT

## 2022-03-11 PROCEDURE — 97116 GAIT TRAINING THERAPY: CPT

## 2022-03-11 RX ADMIN — DILTIAZEM HYDROCHLORIDE 180 MG: 180 CAPSULE, COATED, EXTENDED RELEASE ORAL at 08:26

## 2022-03-11 RX ADMIN — GABAPENTIN 100 MG: 100 CAPSULE ORAL at 08:26

## 2022-03-11 RX ADMIN — INSULIN ASPART 7 UNITS: 100 INJECTION, SOLUTION INTRAVENOUS; SUBCUTANEOUS at 11:31

## 2022-03-11 RX ADMIN — AMIODARONE HYDROCHLORIDE 200 MG: 200 TABLET ORAL at 07:54

## 2022-03-11 RX ADMIN — CLOPIDOGREL BISULFATE 75 MG: 75 TABLET ORAL at 08:26

## 2022-03-11 RX ADMIN — ACETAMINOPHEN 650 MG: 325 TABLET, FILM COATED ORAL at 07:54

## 2022-03-11 RX ADMIN — NORTRIPTYLINE HYDROCHLORIDE 10 MG: 10 CAPSULE ORAL at 08:27

## 2022-03-11 RX ADMIN — RANOLAZINE 500 MG: 500 TABLET, FILM COATED, EXTENDED RELEASE ORAL at 08:27

## 2022-03-11 RX ADMIN — INSULIN ASPART 2 UNITS: 100 INJECTION, SOLUTION INTRAVENOUS; SUBCUTANEOUS at 07:55

## 2022-03-11 RX ADMIN — APIXABAN 5 MG: 5 TABLET, FILM COATED ORAL at 08:26

## 2022-03-11 RX ADMIN — METOPROLOL SUCCINATE 150 MG: 50 TABLET, EXTENDED RELEASE ORAL at 11:31

## 2022-03-11 RX ADMIN — HYDROCODONE BITARTRATE AND ACETAMINOPHEN 1 TABLET: 5; 325 TABLET ORAL at 11:34

## 2022-03-11 RX ADMIN — SPIRONOLACTONE 25 MG: 25 TABLET ORAL at 08:27

## 2022-03-11 RX ADMIN — ASPIRIN 81 MG: 81 TABLET, CHEWABLE ORAL at 08:27

## 2022-03-11 NOTE — PLAN OF CARE
Goal Outcome Evaluation:  Plan of Care Reviewed With: patient           Outcome Evaluation: pt sup<>sit & sit<>stand with SBA, pt ambulated 130` without AD & CGA, pt held to HR on the ~70`. pt would benefit from home with assistance & continued PT services

## 2022-03-11 NOTE — DISCHARGE SUMMARY
[facility] Medicine Services  DISCHARGE SUMMARY       Date of Admission: 3/9/2022  Date of Discharge:  3/11/2022  Primary Care Physician: Artemio Sanchez MD    Presenting Problem/History of Present Illness:  Numbness [R20.0]  Weakness [R53.1]  Atrial fibrillation with RVR (HCC) [I48.91]  Ischemic stroke (HCC) [I63.9]       Final Discharge Diagnoses:  Active Hospital Problems    Diagnosis    • **Numbness    • Atrial fibrillation and flutter (HCC)    • Diabetes mellitus (HCC)    • Carotid stenosis, asymptomatic, bilateral    • Paroxysmal atrial fibrillation (HCC)    • CAD (coronary artery disease)    • Mixed hyperlipidemia    • Peripheral arterial disease (HCC)    • Diabetic polyneuropathy associated with type 2 diabetes mellitus (HCC)    • Essential hypertension        Consults:   Consults     Date and Time Order Name Status Description    3/9/2022  5:43 PM Inpatient Neurology Consult Stroke Completed     3/9/2022  4:09 PM Inpatient Neurology Consult Stroke Completed     2/22/2022 11:37 AM Inpatient Cardiology Consult Completed           Procedures Performed:                 Pertinent Test Results:   Imaging Results (All)     Procedure Component Value Units Date/Time    MRI Brain Without Contrast [[id number]] Collected: 03/10/22 0826     Updated: 03/10/22 0944    Narrative:      MRI brain without IV contrast March 10, 2022    INDICATION: Acute neurologic deficit. Acute cerebrovascular  disease    Pulse sequences: Sagittal, coronal and multisequence axial  imaging without IV contrast.    FINDINGS:  No mass effect, midline shift, hemorrhage, hydrocephalus or  extra-axial fluid collections.  Old right-sided infarcts involving the occipital and  temporoparietal regions.  Atrophy with probable very mild chronic small vessel white matter  ischemic change.  No evidence of acute ischemia noted on diffusion-weighted images.  No abnormal vascular structures.  There may be a very small  amount of mastoid fluid on the right.  Sinuses grossly clear.      Impression:      Old right-sided infarcts.  No acute abnormality.  Mild atrophy with probable mild chronic small vessel white matter  ischemic change.  There may be a small amount of right mastoid fluid present.    Electronically signed by:  Valeriy Morales MD  3/10/2022 9:41 AM  CST Workstation: [id number]    CT Angiogram Neck [[id number]] Collected: 03/09/22 1618     Updated: 03/09/22 1824    Narrative:      EXAM:  CT NECK ANGIOGRAPHY WITHOUT THEN WITH IV CONTRAST    ORDERING PROVIDER:  GAVIN BUSTOS    CLINICAL HISTORY:       COMPARISON:   None.    TECHNIQUE:    CT of the neck was obtained from the skullbase to the aortic arch  with sagittal and coronal reformats  after administration of ml  of Omnipaque 350 contrast. 3-D MIP images were created.      This examination was performed according to our departmental dose  optimization program which includes automated exposure control,  adjustment of the MA and kV according to patient size, and/or use  of iterative reconstruction technique.    Degree of stenoses in the cervical carotid systems determined  using NASCET criteria.    FINDINGS:    AORTIC ARCH:  Scattered calcified plaque in visualized aortic arch.  Unremarkable origins of the brachiocephalic, left common carotid,  and left subclavian arteries.    RIGHT CAROTID ARTERIES:  Normal right common carotid artery (CCA). Normal right common  carotid bulb.  Mild calcified plaque in origin of the right internal carotid  (ICA) artery without a  hemodynamically significant stenosis. Normal visualized cervical  portion  of the right internal carotid artery.  Normal origin of the right external carotid artery (ECA).    LEFT CAROTID ARTERIES:  Normal left common carotid artery (CCA). Mild calcified plaque in  left common carotid bulb.  Mild calcified plaque in origin of the left internal carotid  (ICA) artery without a hemodynamically significant  stenosis.  Normal visualized cervical portion of the left internal carotid  artery.  Normal origin of the left external carotid artery (ECA).    VERTEBRAL ARTERIES:  Normal bilateral vertebral arteries. Left vertebral artery is  dominant.    PARANASAL SINUSES: Unremarkable.    POSTERIOR FOSSA: Unremarkable.     AND BUCCAL SPACE: Unremarkable.    PARAPHARYNGEAL SPACE: Unremarkable.    PAROTID SPACE: Unremarkable. No mass    MUCOSAL SPACE: Mucosal surfaces of the posterior nasopharynx,  oropharynx, hypopharynx and larynx are unremarkable.    PREVERTEBRAL AND PERIVERTEBRAL SPACE: Unremarkable.    LYMPH NODES: No enlargement and normal architecture.    THYROID: No nodules.      MUSCULOSKELETAL AND SUBCUTANEOUS TISSUES: Multilevel degenerative  disc disease..    LUNG APICES AND UPPER MEDIASTINUM: Unremarkable.      Impression:      No evidence of hemodynamically significant stenosis of the  visualized ICAs and bilateral vertebral arteries.  No evidence of dissection within the bilateral carotid or  vertebral arteries.   Left vertebral artery is dominant.    Electronically signed by:  Eric Dooley MD  3/9/2022 6:22 PM CST  Workstation: [id number]    CT Angiogram Head w AI Analysis of LVO [[id number]] Collected: 03/09/22 1618     Updated: 03/09/22 1821    Narrative:      EXAM:  CT HEAD ANGIOGRAPHY WITHOUT THEN WITH IV CONTRAST    ORDERING PROVIDER:  GAVIN BUSTOS    CLINICAL HISTORY:   Stroke    COMPARISON:      TECHNIQUE:   Nonenhanced CT of the head was performed and reformatted in the  sagittal and coronal planes, followed by high-resolution CT head  after administration of 80 mL of Isovue-370 as IV contrast. 3-D  MIP images were created.     This examination was performed according to our departmental dose  optimization program which includes automated exposure control,  adjustment of the MA and kV according to patient size, and/or use  of iterative reconstruction technique.    FINDINGS:     Normal bilateral  petrous carotid arteries.   Mild atherosclerotic change of right cavernous carotid artery  with a normal supraclinoid bifurcation.   Mild to moderate atherosclerotic change of left cavernous carotid  artery with a normal supraclinoid bifurcation.    Normal right A1 segments of the anterior cerebral artery.   Normal left A1 segments of the anterior cerebral artery.   Normal intact anterior communicating artery (ACOM). Normal  bilateral A2 segments of the anterior cerebral arteries.    Normal right M1 and M2 segments of the middle cerebral arteries,  with a normal M1 bifurcation. Normal left M1 and M2 segments of  the middle cerebral arteries, with a normal M1 bifurcation.    Normal right posterior communicating artery (PCOM). Normal left  posterior communicating artery (PCOM).    Normal bilateral vertebral arteries. The left vertebral artery is  dominant. Normal basilar artery with a normal basilar  bifurcation. The visualized bilateral superior cerebellar (SCA)  arteries are normal.    Normal bilateral P1, P2 and visualized P3 segments of the  posterior cerebral arteries.    There is no demonstrated aneurysm of the [place].       Impression:      No evidence of hemodynamically significant stenosis of the  visualized intracranial arteries.  No evidence of aneurysm within the [place].  The left vertebral artery is dominant.    Electronically signed by:  Eric Dooley MD  3/9/2022 6:19 PM CST  Workstation: [id number]    CT CEREBRAL PERFUSION WITH & WITHOUT CONTRAST [[id number]] Collected: 03/09/22 1619     Updated: 03/09/22 1733    Narrative:      STUDY: CT HEAD PERFUSION WITHOUT AND WITH IV CONTRAST    ACCESSION NUMBER: [id number]    DATE: 3/9/2022 4:19 PM CST    PROVIDED INDICATION: Neuro deficit, acute, stroke suspected    COMPARISON: 8/19/2020, 3/9/2022    TECHNIQUE:      CT PERFUSION:  Ten thick sections acquired for approximately 40  acquisitions during rapid bolus IV administration with  40  milliliters of contrast.  The patient had no adverse reaction.   Time to Start, Time to Peak, Mean Transit Time, Cerebral Blood  Flow and Cerebral Blood Volume maps were post processed.    All CT scans at this location are performed using dose modulation  techniques as appropriate to a performed exam including the  following: automated exposure control; adjustment of the mA  and/or kV according to patient size (this includes techniques or  standardized protocols for targeted exams where dose is matched  to indication / reason for exam; i.e. extremities or head); use  of iterative reconstruction technique.      FINDINGS:     Noncontrast head CT: This is reportedly separately.      Total hyperperfusion: Using a threshold of Tmax > 6 seconds,  there is elevation of Tmax at the level of the right occipital  lobe.. The total volume of hypoperfusion is six mL.    Core infarct: Using a threshold of CBF <30%, there is total core  infarct volume measuring zero mL.    Ischemic penumbra/Potentially reversible ischemia: The penumbra  volume (mismatch volume) is 6 mL. Mismatch ratio infinite      Impression:      Small area of subcortical ischemia within the right occipital  lobe, with a penumbra/potentially reversible ischemic volume of 6  mL. Associated subcortical hypoattenuation on concurrent head CT  which had developed relative to 2020 exam    The above findings were communicated via telephone by Dr. Bernard to  Dr. GAVIN BUSTOS at 3/9/2022 5:29 PM CST.      Electronically signed by:  Kumar Bernard MD  3/9/2022 5:31 PM CST  Workstation: [id number]    XR Chest 1 View [[id number]] Collected: 03/09/22 1645     Updated: 03/09/22 1726    Narrative:          COMPARISON:     2/21/2022    INDICATION:     Acute stroke protocol on several less than 12  hours    FINDINGS:     Portable AP chest radiograph is obtained.  Cardiomegaly. Prominent central vasculature. Central interstitial  and alveolar opacities. No large effusion. No  pneumothorax.          Impression:      Cardiomegaly. Central vascular congestion.  Central opacities which may reflect central edema.    Electronically signed by:  Kumar Bernard MD  3/9/2022 5:24 PM CST  Workstation: [id number]    CT Head Without Contrast Stroke Protocol [[id number]] Collected: 03/09/22 1617     Updated: 03/09/22 1639    Narrative:      EXAM:  CT HEAD WITHOUT IV CONTRAST    ORDERING PROVIDER:  GAVIN BUSTOS    CLINICAL HISTORY:   Stroke    COMPARISON:      TECHNIQUE:   Nonenhanced CT of the head was performed and reformatted in the  sagittal and coronal planes.     This examination was performed according to our departmental dose  optimization program which includes automated exposure control,  adjustment of the MA and kV according to patient size, and/or use  of iterative reconstruction technique.    FINDINGS:     CEREBRAL PARENCHYMA:  Chronic microangiopathic changes. No  hemorrhage.  No intracranial mass or mass effect. Age-appropriate  cerebral atrophy. Chronic ischemia of the right basal ganglia  extending to the right corona radiata anteriorly.    POSTERIOR FOSSA:  Age-appropriate atrophy of cerebellum and  brainstem.  No cerebellar tonsillar ectopia.    EXTRA-AXIAL SPACES:  Normal size and configuration.  No mass,  fluid collection or hemorrhage.    ORBITS: No mass. Unremarkable extraocular muscles, globe and  optic nerve.    CALVARIA AND SOFT TISSUES:  No mass or adenopathy, lytic or  sclerotic lesion.    TEMPORAL BONE AND SKULL BASE:  Unremarkable middle and inner ear  , and mastoid air cells.    PARANASAL SINUSES AND FACIAL BONES: Unremarkable.     VASCULAR STRUCTURES:  Unremarkable.      Impression:      1.  No acute intracranial process.  2.  Chronic ischemia of the right basal ganglia extending to the  right corona radiata anteriorly.  3.  Scattered chronic microangiopathic changes.          Electronically signed by:  Eric Dooley MD  3/9/2022 4:37 PM CST  Workstation: [id number]         Lab  Results (last 48 hours)     Procedure Component Value Units Date/Time    POC Glucose Once [[id number]]  (Abnormal) Collected: 03/11/22 1019    Specimen: Blood Updated: 03/11/22 1043     Glucose 324 mg/dL      Comment: RN NotifiedOperator: [id number] IDANIA DE JESUSSAMeter ID: [id number]       POC Glucose Once [[id number]]  (Abnormal) Collected: 03/11/22 0618    Specimen: Blood Updated: 03/11/22 0640     Glucose 196 mg/dL      Comment: RN NotifiedOperator: [id number] AGENT CHRISTOPHERMeter ID: [id number]       POC Glucose Once [[id number]]  (Abnormal) Collected: 03/10/22 1910    Specimen: Blood Updated: 03/10/22 2044     Glucose 165 mg/dL      Comment: RN NotifiedOperator: [id number] AGENT BRITTANYOPHERMeter ID: [id number]       POC Glucose Once [[id number]]  (Abnormal) Collected: 03/10/22 1619    Specimen: Blood Updated: 03/10/22 1645     Glucose 236 mg/dL      Comment: RN NotifiedOperator: [id number] ROSA WES ANNMeter ID: [id number]       POC Glucose Once [[id number]]  (Abnormal) Collected: 03/10/22 1438    Specimen: Blood Updated: 03/10/22 1453     Glucose 223 mg/dL      Comment: RN NotifiedOperator: [id number] MIRYAM REBAMeter ID: [id number]       POC Glucose Once [[id number]]  (Abnormal) Collected: 03/10/22 1157    Specimen: Blood Updated: 03/10/22 1220     Glucose 308 mg/dL      Comment: RN NotifiedOperator: [id number] CRISTO Hayden ID: [id number]       POC Glucose Once [[id number]]  (Abnormal) Collected: 03/10/22 0922    Specimen: Blood Updated: 03/10/22 0938     Glucose 175 mg/dL      Comment: RN NotifiedOperator: [id number] CRISTO ACUNAHMeter ID: [id number]       Lipid Panel [[id number]] Collected: 03/10/22 0351    Specimen: Blood Updated: 03/10/22 0447     Total Cholesterol 94 mg/dL      Triglycerides 34 mg/dL      HDL Cholesterol 42 mg/dL      LDL Cholesterol  42 mg/dL      VLDL Cholesterol 10 mg/dL      LDL/HDL Ratio 1.08    Narrative:      Cholesterol Reference Ranges  (U.S. Department of Health and  Human Services ATP III Classifications)    Desirable          <200 mg/dL  Borderline High    200-239 mg/dL  High Risk          >240 mg/dL      Triglyceride Reference Ranges  (U.S. Department of Health and Human Services ATP III Classifications)    Normal           <150 mg/dL  Borderline High  150-199 mg/dL  High             200-499 mg/dL  Very High        >500 mg/dL    HDL Reference Ranges  (U.S. Department of Health and Human Services ATP III Classifications)    Low     <40 mg/dl (major risk factor for CHD)  High    >60 mg/dl ('negative' risk factor for CHD)        LDL Reference Ranges  (U.S. Department of Health and Human Services ATP III Classifications)    Optimal          <100 mg/dL  Near Optimal     100-129 mg/dL  Borderline High  130-159 mg/dL  High             160-189 mg/dL  Very High        >189 mg/dL    Hemoglobin A1c [[id number]]  (Abnormal) Collected: 03/10/22 0351    Specimen: Blood Updated: 03/10/22 0439     Hemoglobin A1C 10.30 %     Narrative:      Hemoglobin A1C Ranges:    Increased Risk for Diabetes  5.7% to 6.4%  Diabetes                     >= 6.5%  Diabetic Goal                < 7.0%    POC Glucose Once [[id number]]  (Abnormal) Collected: 03/09/22 2058    Specimen: Blood Updated: 03/09/22 2146     Glucose 229 mg/dL      Comment: RN NotifiedOperator: [id number] BRISA [facility] ID: [id number]       Extra Tubes [[id number]] Collected: 03/09/22 1618    Specimen: Blood, Venous Line Updated: 03/09/22 2033    Narrative:      The following orders were created for panel order Extra Tubes.  Procedure                               Abnormality         Status                     ---------                               -----------         ------                     Pineda Top[[id number]]                                         Final result                 Please view results for these tests on the individual orders.    Gray Top [[id number]] Collected: 03/09/22 1618    Specimen: Blood Updated: 03/09/22 2033      Extra Tube Hold for add-ons.     Comment: Auto resulted.       COVID-19 and FLU A/B PCR - Swab, Nasopharynx [[id number]]  (Normal) Collected: 03/09/22 1724    Specimen: Swab from Nasopharynx Updated: 03/09/22 1755     COVID19 Not Detected     Influenza A PCR Not Detected     Influenza B PCR Not Detected    Narrative:      Fact sheet for providers: https://www.fda.gov/media/402640/download    Fact sheet for patients: https://www.fda.gov/media/142040/download    Test performed by PCR.    Perkins Draw [[id number]] Collected: 03/09/22 1618    Specimen: Blood Updated: 03/09/22 1733    Narrative:      The following orders were created for panel order Perkins Draw.  Procedure                               Abnormality         Status                     ---------                               -----------         ------                     Green Top (Gel)[[id number]]                                  Final result               Lavender Top[[id number]]                                     Final result               Gold Top - SST[[id number]]                                   Final result               Light Blue Top[[id number]]                                   Final result                 Please view results for these tests on the individual orders.    Green Top (Gel) [[id number]] Collected: 03/09/22 1618    Specimen: Blood Updated: 03/09/22 1733     Extra Tube Hold for add-ons.     Comment: Auto resulted.       Lavender Top [[id number]] Collected: 03/09/22 1618    Specimen: Blood Updated: 03/09/22 1733     Extra Tube hold for add-on     Comment: Auto resulted       Gold Top - SST [[id number]] Collected: 03/09/22 1618    Specimen: Blood Updated: 03/09/22 1733     Extra Tube Hold for add-ons.     Comment: Auto resulted.       Light Blue Top [[id number]] Collected: 03/09/22 1618    Specimen: Blood Updated: 03/09/22 1733     Extra Tube hold for add-on     Comment: Auto resulted       Comprehensive Metabolic Panel [[id number]]  (Abnormal)  Collected: 03/09/22 1618    Specimen: Blood Updated: 03/09/22 1649     Glucose 221 mg/dL      BUN 14 mg/dL      Creatinine 1.23 mg/dL      Sodium 140 mmol/L      Potassium 4.4 mmol/L      Chloride 103 mmol/L      CO2 26.0 mmol/L      Calcium 9.1 mg/dL      Total Protein 6.7 g/dL      Albumin 4.20 g/dL      ALT (SGPT) 20 U/L      AST (SGOT) 13 U/L      Alkaline Phosphatase 87 U/L      Total Bilirubin 0.6 mg/dL      Globulin 2.5 gm/dL      A/G Ratio 1.7 g/dL      BUN/Creatinine Ratio 11.4     Anion Gap 11.0 mmol/L      eGFR 68.5 mL/min/1.73      Comment: National Kidney Foundation and American Society of Nephrology (ASN) Task Force recommended calculation based on the Chronic Kidney Disease Epidemiology Collaboration (CKD-EPI) equation refit without adjustment for race.       Narrative:      GFR Normal >60  Chronic Kidney Disease <60  Kidney Failure <15      Troponin [[id number]]  (Normal) Collected: 03/09/22 1618    Specimen: Blood Updated: 03/09/22 1649     Troponin T <0.010 ng/mL     Narrative:      Troponin T Reference Range:  <= 0.03 ng/mL-   Negative for AMI  >0.03 ng/mL-     Abnormal for myocardial necrosis.  Clinicians would have to utilize clinical acumen, EKG, Troponin and serial changes to determine if it is an Acute Myocardial Infarction or myocardial injury due to an underlying chronic condition.       Results may be falsely decreased if patient taking Biotin.      Protime-INR [[id number]]  (Normal) Collected: 03/09/22 1618    Specimen: Blood Updated: 03/09/22 1643     Protime 15.1 Seconds      INR 1.20    Narrative:      Therapeutic range for most indications is 2.0-3.0 INR,  or 2.5-3.5 for mechanical heart valves.    aPTT [[id number]]  (Normal) Collected: 03/09/22 1618    Specimen: Blood Updated: 03/09/22 1643     PTT 37.6 seconds     Narrative:      The recommended Heparin therapeutic range is 68-97 seconds.    CBC & Differential [[id number]]  (Abnormal) Collected: 03/09/22 1618    Specimen: Blood Updated:  03/09/22 1624    Narrative:      The following orders were created for panel order CBC & Differential.  Procedure                               Abnormality         Status                     ---------                               -----------         ------                     CBC Auto Differential[[id number]]        Abnormal            Final result                 Please view results for these tests on the individual orders.    CBC Auto Differential [[id number]]  (Abnormal) Collected: 03/09/22 1618    Specimen: Blood Updated: 03/09/22 1624     WBC 9.70 10*3/mm3      RBC 5.25 10*6/mm3      Hemoglobin 15.3 g/dL      Hematocrit 45.4 %      MCV 86.5 fL      MCH 29.1 pg      MCHC 33.7 g/dL      RDW 13.7 %      RDW-SD 42.6 fl      MPV 11.1 fL      Platelets 184 10*3/mm3      Neutrophil % 73.8 %      Lymphocyte % 16.5 %      Monocyte % 7.0 %      Eosinophil % 1.8 %      Basophil % 0.4 %      Immature Grans % 0.5 %      Neutrophils, Absolute 7.16 10*3/mm3      Lymphocytes, Absolute 1.60 10*3/mm3      Monocytes, Absolute 0.68 10*3/mm3      Eosinophils, Absolute 0.17 10*3/mm3      Basophils, Absolute 0.04 10*3/mm3      Immature Grans, Absolute 0.05 10*3/mm3      nRBC 0.0 /100 WBC     POC Glucose Once [[id number]]  (Abnormal) Collected: 03/09/22 1605    Specimen: Blood Updated: 03/09/22 1618     Glucose 259 mg/dL      Comment: Result Not ConfirmedOperator: [id number] VERONICA Hayden ID: [id number]               Chief Complaint on Day of Discharge: weakness    Hospital Course:  He is a 56 year old with CAD, paroxysmal atrial fibrillation, GERD, DM who presented to ED with left arm and leg weakness that started today. He has a history of atrial fibrillation and has planned cardioversion on Monday. Neurology evaluated him and it was likely a TIA. He was stable for discharge home with continued therapy. He will need to follow up with PCP , neurology after discharge. Will continue ASA, plavix and Eliquis at discharge.  "    Condition on Discharge:  stable    Physical Exam on Discharge:  /94   Pulse 94   Temp 97.4 °F (36.3 °C)   Resp 20   Ht 188 cm (74.02\")   Wt 129 kg (284 lb 6.3 oz)   SpO2 97%   BMI 36.50 kg/m²   Physical Exam  Vitals reviewed.   Constitutional:       General: He is not in acute distress.     Appearance: He is well-developed.   HENT:      Head: Normocephalic and atraumatic.      Nose: Nose normal.   Eyes:      Conjunctiva/sclera: Conjunctivae normal.   Cardiovascular:      Rate and Rhythm: Normal rate and regular rhythm.   Pulmonary:      Effort: Pulmonary effort is normal. No respiratory distress.      Breath sounds: Normal breath sounds. No wheezing or rales.   Musculoskeletal:         General: Normal range of motion.      Cervical back: Normal range of motion and neck supple.   Skin:     General: Skin is warm and dry.   Neurological:      Mental Status: He is alert and oriented to person, place, and time.      Motor: Weakness present.   Psychiatric:         Behavior: Behavior normal.           Discharge Disposition:  Home or Self Care    Discharge Medications:     Discharge Medications      Continue These Medications      Instructions Start Date   acetaminophen 325 MG tablet  Commonly known as: TYLENOL  Notes to patient: As needed   650 mg, Oral, Every 6 Hours PRN      albuterol sulfate  (90 Base) MCG/ACT inhaler  Commonly known as: PROVENTIL HFA;VENTOLIN HFA;PROAIR HFA  Notes to patient: As needed   2 puffs, Inhalation, Every 4 Hours PRN      amiodarone 200 MG tablet  Commonly known as: PACERONE  Notes to patient: 03/11/2022   200 mg, Oral, 2 Times Daily With Meals      amoxicillin 875 MG tablet  Commonly known as: AMOXIL  Notes to patient: 03/11/2022   875 mg, Oral, 2 Times Daily      apixaban 5 MG tablet tablet  Commonly known as: ELIQUIS  Notes to patient: 03/11/2022   Take 1 tablet by mouth Every 12 (Twelve) Hours.      Aspirin Low Dose 81 MG chewable tablet  Generic drug: " aspirin  Notes to patient: 03/12/2022   Chew 1 tablet by mouth Daily      atorvastatin 40 MG tablet  Commonly known as: LIPITOR  Notes to patient: 03/11/2022   40 mg, Oral, Nightly      clopidogrel 75 MG tablet  Commonly known as: PLAVIX  Notes to patient: 03/12/2022   75 mg, Oral, Daily      dilTIAZem  MG 24 hr capsule  Commonly known as: Cardizem CD  Notes to patient: 03/12/2022   180 mg, Oral, Daily      gabapentin 300 MG capsule  Commonly known as: NEURONTIN  Notes to patient: As directed   TAKE 1 CAPSULE BY MOUTH AT BEDTIME FOR 2 DAYS THEN 1 TWICE DAILY FOR 2 DAYS THEN 1 THREE TIMES DAILY THEREAFTER      guaiFENesin-dextromethorphan 100-10 MG/5ML syrup  Commonly known as: ROBITUSSIN DM  Notes to patient: As needed   10 mL, Oral, 4 Times Daily PRN      isosorbide mononitrate 30 MG 24 hr tablet  Commonly known as: IMDUR  Notes to patient: 03/12/2022   30 mg, Oral, Every 24 Hours Scheduled      linaclotide 145 MCG capsule capsule  Commonly known as: Linzess   145 mcg, Oral, Every Morning Before Breakfast      metFORMIN 500 MG tablet  Commonly known as: GLUCOPHAGE  Notes to patient: 03/11/2022   1,000 mg, Oral, 2 Times Daily With Meals      metoprolol succinate  MG 24 hr tablet  Commonly known as: TOPROL-XL  Notes to patient: 03/12/2022   Take 1 and 1/2 tablets by mouth Daily.      nitroglycerin 0.4 MG SL tablet  Commonly known as: NITROSTAT  Notes to patient: As needed   0.4 mg, Sublingual, Every 5 Minutes PRN, Take no more than 3 doses in 15 minutes.      nortriptyline 10 MG capsule  Commonly known as: PAMELOR  Notes to patient: 03/11/2022   TAKE 1 CAPSULE BY MOUTH THREE TIMES DAILY      NovoLOG Mix 70/30 FlexPen (70-30) 100 UNIT/ML suspension pen-injector injection  Generic drug: insulin aspart prot & aspart  Notes to patient: 03/11/2022   Start 10u ac breakfast and 5u ac supper. I anticipate will need 50 units per day.      ondansetron ODT 8 MG disintegrating tablet  Commonly known as: Zofran  "ODT  Notes to patient: As needed   8 mg, Translingual, Every 8 Hours PRN      ONE TOUCH DELICA LANCING DEV misc   Use as directed to test blood sugar.      OneTouch Delica Lancets 33G misc   1 each, Does not apply, 4 Times Daily, delica if covered, use alternative if not covered      Pen Needles 1/2\" 29G X 12MM misc   1 each, Does not apply, Nightly      pioglitazone 30 MG tablet  Commonly known as: Actos  Notes to patient: 03/11/2022   30 mg, Oral, Daily      ranolazine 500 MG 12 hr tablet  Commonly known as: RANEXA  Notes to patient: 03/11/2022   500 mg, Oral, Every 12 Hours Scheduled      spironolactone 25 MG tablet  Commonly known as: ALDACTONE  Notes to patient: 03/11/2022   Take 1 tablet by mouth once daily      tiZANidine 4 MG tablet  Commonly known as: ZANAFLEX  Notes to patient: 03/11/2022   4 mg, Oral, 3 Times Daily             Discharge Diet:   Diet Instructions     Advance Diet As Tolerated            Activity at Discharge:   Activity Instructions     Activity as Tolerated            Follow-up Appointments:   Future Appointments   Date Time Provider Department Center   3/14/2022  8:00 AM [facility] CATH LAB APPT [facility] CATH [facility]   3/17/2022  9:00 AM Artemio Sanchez MD [facility]   3/21/2022  1:30 PM Kelsey Merchant APRN [facility] None   5/4/2022 10:30 AM Obdulia Capellan APRN [facility]   6/23/2022  9:45 AM Alessandro Gan MD [facility]   9/16/2022 10:30 AM Alice Rice MD [facility] None       Test Results Pending at Discharge:     Time: >30 minutes        This document has been electronically signed by Che Dove MD on March 11, 2022 14:49 CST                  "

## 2022-03-11 NOTE — THERAPY TREATMENT NOTE
Patient Name: Herbert White .  : 1965    MRN: [id number]                              Today's Date: 3/11/2022       Admit Date: 3/9/2022    Visit Dx:     ICD-10-CM ICD-9-CM   1. Numbness  R20.0 782.0   2. Weakness  R53.1 780.79   3. Ischemic stroke ([facility])  I63.9 434.91   4. Atrial fibrillation with RVR ([facility])  I48.91 427.31   5. Type 2 diabetes mellitus with hyperosmolarity without coma, without long-term current use of insulin ([facility])  E11.00 250.20   6. Dysphagia, unspecified type  R13.10 787.20   7. Impaired functional mobility, balance, gait, and endurance  Z74.09 V49.89   8. Impaired mobility and ADLs  Z74.09 V49.89    Z78.9    9. Atherosclerosis of native coronary artery of native heart, unspecified whether angina present  I25.10 414.01   10. 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 History of PTCA  Z98.61 V45.82   12. PAF (paroxysmal atrial fibrillation) ([facility])  I48.0 427.31   13. Cerebrovascular accident (CVA), unspecified mechanism ([facility])  I63.9 434.91     Patient Active Problem List   Diagnosis   • Cervical strain, acute   • Diabetes mellitus with ketoacidosis, uncontrolled ([facility])   • Essential hypertension   • Diabetic polyneuropathy associated with type 2 diabetes mellitus ([facility])   • Type 2 diabetes mellitus without complication, with long-term current use of insulin ([facility])   • Acute bilateral low back pain   • Spondylolisthesis of lumbar region   • DDD (degenerative disc disease), lumbar   • Chest pain   • Peripheral arterial disease ([facility])   • Mixed hyperlipidemia   • CAD (coronary artery disease)   • Pneumonia of both lower lobes due to infectious organism   • Surgical aftercare, circulatory system   • Paroxysmal atrial fibrillation ([facility])   • Carotid stenosis, asymptomatic, bilateral   • Herniated cervical intervertebral disc   • Numbness and tingling of both feet   • Encounter for screening colonoscopy   •  Diabetes mellitus ([facility])   • Atrial fibrillation and flutter ([facility])   • COVID-19 virus detected   • Hypertensive urgency   • Numbness     Past Medical History:   Diagnosis Date   • Coronary artery disease    • COVID-19 virus detected 1/21/2022   • Diabetes mellitus (HCC)    • Diabetic retinopathy (HCC)    • GERD (gastroesophageal reflux disease)    • Hyperlipidemia    • Hypertension    • Osteoarthritis    • Paroxysmal atrial fibrillation (HCC)    • Sleep apnea      Past Surgical History:   Procedure Laterality Date   • CARDIAC CATHETERIZATION N/A 9/3/2019   • CARDIAC CATHETERIZATION N/A 2/14/2022   • CHOLECYSTECTOMY     • COLONOSCOPY N/A 5/24/2021   • COLONOSCOPY N/A 1/5/2022   • CORONARY ARTERY BYPASS GRAFT N/A 9/25/2019      General Information     Row Name 03/11/22 1100          OT Time and Intention    Document Type therapy note (daily note)  -CR     Mode of Treatment occupational therapy  -CR     Row Name 03/11/22 1100          General Information    Patient Profile Reviewed yes  -CR     Existing Precautions/Restrictions fall  -CR     Row Name 03/11/22 1100          Cognition    Orientation Status (Cognition) oriented x 4  -CR     Row Name 03/11/22 1100          Safety Issues, Functional Mobility    Impairments Affecting Function (Mobility) endurance/activity tolerance;shortness of breath;balance;pain  -CR           User Key  (r) = Recorded By, (t) = Taken By, (c) = Cosigned By    Initials Name Provider Type    Shira Villafuerte COTA Occupational Therapy Assistant                 Mobility/ADL's     Row Name 03/11/22 1100          Bed Mobility    Bed Mobility supine-sit;sit-supine  -CR     Supine-Sit [place] (Bed Mobility) moderate assist (50% patient effort)  -CR     Sit-Supine [place] (Bed Mobility) modified independence  -CR     Assistive Device (Bed Mobility) bed rails;head of bed elevated  -CR           User Key  (r) = Recorded By, (t) = Taken By, (c) = Cosigned By    Initials Name Provider Type     Shira Villafuerte COTA Occupational Therapy Assistant               Obj/Interventions     Row Name 03/11/22 1100          Shoulder (Therapeutic Exercise)    Shoulder (Therapeutic Exercise) strengthening exercise  -CR     Shoulder Strengthening (Therapeutic Exercise) bilateral;flexion;resisted diagonal exercise;3 lb free weight;sitting;blue;15 repititions;3 repetitions  -CR     Row Name 03/11/22 1100          Elbow/Forearm (Therapeutic Exercise)    Elbow/Forearm (Therapeutic Exercise) strengthening exercise  -CR     Elbow/Forearm Strengthening (Therapeutic Exercise) bilateral;flexion;sitting;pronation;supination;extension;3 lb free weight;3 sets;15 repititions  -CR     Row Name 03/11/22 1100          Motor Skills    Therapeutic Exercise shoulder;elbow/forearm  -CR           User Key  (r) = Recorded By, (t) = Taken By, (c) = Cosigned By    Initials Name Provider Type    Shira Villafuerte COTA Occupational Therapy Assistant               Goals/Plan    No documentation.                Clinical Impression     Row Name 03/11/22 1100          Pain Assessment    Pretreatment Pain Rating 0/10 - no pain  -CR     Posttreatment Pain Rating 0/10 - no pain  -CR     Row Name 03/11/22 1100          Plan of Care Review    Progress improving  -CR     Outcome Evaluation Pt was supine in bed when [facility] arrived. Pt states he still has a headache. Pt performed sup-sit w/ SBA, tolerated sitting EOB unsupported while performing B UE strengthening tx using 3 lb weight in all planes 15 x 3 sets to increase strength and endurance needed for ADL/IADLs. MD present during portion of tx session stating pt will be DC today. [facility] located and obtained HEP; blue theraband UB strengthening exercises in all planes as well as home safety educational handout. Pt verbalized understanding. [facility] recommended home health rehab.  -CR     Row Name 03/11/22 1100          Therapy Assessment/Plan (OT)    Rehab Potential (OT) good, to achieve stated therapy goals  -CR      Criteria for Skilled Therapeutic Interventions Met (OT) yes;skilled treatment is necessary  -CR     Therapy Frequency (OT) other (see comments)  3-7 days a week  -CR     Row Name 03/11/22 1100          Therapy Plan Review/Discharge Plan (OT)    Anticipated Discharge Disposition (OT) home with assist  -CR     Row Name 03/11/22 1100          Vital Signs    Pre Systolic BP Rehab 130  -CR     Pre Treatment Diastolic BP 79  -CR     Post Systolic BP Rehab 164  -CR     Post Treatment Diastolic BP 94  -CR     Pretreatment Heart Rate (beats/min) 93  -CR     Intratreatment Heart Rate (beats/min) 82  -CR     Posttreatment Heart Rate (beats/min) 94  -CR     Pre SpO2 (%) 97  -CR     O2 Delivery Pre Treatment room air  -CR     Post SpO2 (%) 97  -CR     O2 Delivery Post Treatment room air  -CR     Pre Patient Position Supine  -CR     Intra Patient Position Sitting  -CR     Post Patient Position Supine  -CR           User Key  (r) = Recorded By, (t) = Taken By, (c) = Cosigned By    Initials Name Provider Type    Shira Villafuerte COTA Occupational Therapy Assistant               Outcome Measures     Row Name 03/11/22 1100          How much help from another is currently needed...    Putting on and taking off regular lower body clothing? 2  -CR     Bathing (including washing, rinsing, and drying) 3  -CR     Toileting (which includes using toilet bed pan or urinal) 4  -CR     Putting on and taking off regular upper body clothing 4  -CR     Taking care of personal grooming (such as brushing teeth) 4  -CR     Eating meals 4  -CR     AM-PAC 6 Clicks Score (OT) 21  -CR           User Key  (r) = Recorded By, (t) = Taken By, (c) = Cosigned By    Initials Name Provider Type    Shira Villafuerte COTA Occupational Therapy Assistant                Occupational Therapy Education                 Title: PT OT SLP Therapies (In Progress)     Topic: Occupational Therapy (In Progress)     Point: ADL training (Not Started)     Description:    Instruct learner(s) on proper safety adaptation and remediation techniques during self care or transfers.   Instruct in proper use of assistive devices.              Learner Progress:  Not documented in this visit.          Point: Home exercise program (Not Started)     Description:   Instruct learner(s) on appropriate technique for monitoring, assisting and/or progressing therapeutic exercises/activities.              Learner Progress:  Not documented in this visit.          Point: Precautions (Done)     Description:   Instruct learner(s) on prescribed precautions during self-care and functional transfers.              Learning Progress Summary           Patient Acceptance, E,TB, VU by  at 3/10/2022 1654    Comment: POC, role of OT, transfer training                   Point: Body mechanics (Done)     Description:   Instruct learner(s) on proper positioning and spine alignment during self-care, functional mobility activities and/or exercises.              Learning Progress Summary           Patient Acceptance, E,TB, VU by  at 3/10/2022 1655    Comment: POC, role of OT, transfer training                               User Key     Initials Effective Dates Name Provider Type Discipline     06/14/21 -  Anum Chinchilla OT Occupational Therapist OT              OT Recommendation and Plan  Therapy Frequency (OT): other (see comments) (3-7 days a week)  Plan of Care Review  Progress: improving  Outcome Evaluation: Pt was supine in bed when [facility] arrived. Pt states he still has a headache. Pt performed sup-sit w/ SBA, tolerated sitting EOB unsupported while performing B UE strengthening tx using 3 lb weight in all planes 15 x 3 sets to increase strength and endurance needed for ADL/IADLs. MD present during portion of tx session stating pt will be DC today. [facility] located and obtained HEP; blue theraband UB strengthening exercises in all planes as well as home safety educational handout. Pt verbalized understanding. [facility]  recommended home health rehab.     Time Calculation:              BHUMIKA Peace  3/11/2022

## 2022-03-11 NOTE — DISCHARGE INSTR - APPOINTMENTS
[facility] in [place], [place] Tuesday March 15 @ 11am          Rashaad Christensen will call you with an appointment for 4 week follow up, it you have not heard from you soon please call the hospital and ask for her

## 2022-03-11 NOTE — PLAN OF CARE
Goal Outcome Evaluation:           Progress: improving  Outcome Evaluation: Pt was supine in bed when [facility] arrived. Pt states he still has a headache. Pt performed sup-sit w/ SBA, tolerated sitting EOB unsupported while performing B UE strengthening tx using 3 lb weight in all planes 15 x 3 sets to increase strength and endurance needed for ADL/IADLs. MD present during portion of tx session stating pt will be DC today. [facility] located and obtained HEP; blue theraband UB strengthening exercises in all planes as well as home safety educational handout. Pt verbalized understanding. [facility] recommended home health rehab.

## 2022-03-11 NOTE — THERAPY TREATMENT NOTE
Acute Care - Physical Therapy Treatment Note  [facility]     Patient Name: Herbert White Sr.  : 1965  MRN: [id number]  Today's Date: 3/11/2022      Visit Dx:     ICD-10-CM ICD-9-CM   1. Numbness  R20.0 782.0   2. Weakness  R53.1 780.79   3. Ischemic stroke ([facility])  I63.9 434.91   4. Atrial fibrillation with RVR ([facility])  I48.91 427.31   5. Type 2 diabetes mellitus with hyperosmolarity without coma, without long-term current use of insulin ([facility])  E11.00 250.20   6. Dysphagia, unspecified type  R13.10 787.20   7. Impaired functional mobility, balance, gait, and endurance  Z74.09 V49.89   8. Impaired mobility and ADLs  Z74.09 V49.89    Z78.9    9. Atherosclerosis of native coronary artery of native heart, unspecified whether angina present  I25.10 414.01   10. 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 History of PTCA  Z98.61 V45.82   12. PAF (paroxysmal atrial fibrillation) ([facility])  I48.0 427.31   13. Cerebrovascular accident (CVA), unspecified mechanism ([facility])  I63.9 434.91     Patient Active Problem List   Diagnosis   • Cervical strain, acute   • Diabetes mellitus with ketoacidosis, uncontrolled ([facility])   • Essential hypertension   • Diabetic polyneuropathy associated with type 2 diabetes mellitus ([facility])   • Type 2 diabetes mellitus without complication, with long-term current use of insulin ([facility])   • Acute bilateral low back pain   • Spondylolisthesis of lumbar region   • DDD (degenerative disc disease), lumbar   • Chest pain   • Peripheral arterial disease ([facility])   • Mixed hyperlipidemia   • CAD (coronary artery disease)   • Pneumonia of both lower lobes due to infectious organism   • Surgical aftercare, circulatory system   • Paroxysmal atrial fibrillation ([facility])   • Carotid stenosis, asymptomatic, bilateral   • Herniated cervical intervertebral disc   • Numbness and tingling of both feet   • Encounter for screening colonoscopy    • Diabetes mellitus ([facility])   • Atrial fibrillation and flutter (HCC)   • COVID-19 virus detected   • Hypertensive urgency   • Numbness     Past Medical History:   Diagnosis Date   • Coronary artery disease    • COVID-19 virus detected 1/21/2022   • Diabetes mellitus (HCC)    • Diabetic retinopathy (HCC)    • GERD (gastroesophageal reflux disease)    • Hyperlipidemia    • Hypertension    • Osteoarthritis    • Paroxysmal atrial fibrillation (HCC)    • Sleep apnea      Past Surgical History:   Procedure Laterality Date   • CARDIAC CATHETERIZATION N/A 9/3/2019   • CARDIAC CATHETERIZATION N/A 2/14/2022   • CHOLECYSTECTOMY     • COLONOSCOPY N/A 5/24/2021   • COLONOSCOPY N/A 1/5/2022   • CORONARY ARTERY BYPASS GRAFT N/A 9/25/2019     PT Assessment (last 12 hours)     PT Evaluation and Treatment     Row Name 03/11/22 1235          Physical Therapy Time and Intention    Subjective Information complains of;pain  -TA     Document Type therapy note (daily note)  -TA     Mode of Treatment physical therapy  -TA     Patient Effort good  -TA     Row Name 03/11/22 1235          General Information    Patient Profile Reviewed yes  -TA     Existing Precautions/Restrictions fall  -TA     Row Name 03/11/22 1235          Pain    Pretreatment Pain Rating 6/10  -TA     Posttreatment Pain Rating 6/10  -TA     Pain Location - back;head  -TA     Row Name 03/11/22 1235          Cognition    Affect/Mental Status (Cognition) WFL  -TA     Orientation Status (Cognition) oriented x 4  -TA     Personal Safety Interventions fall prevention program maintained;gait belt;muscle strengthening facilitated;nonskid shoes/slippers when out of bed;supervised activity  -TA     Row Name 03/11/22 1235          Range of Motion Comprehensive    General Range of Motion no range of motion deficits identified  -TA     Row Name 03/11/22 1235          Bed Mobility    Bed Mobility supine-sit;sit-supine  -TA     Supine-Sit [place] (Bed Mobility) standby assist  -TA      Sit-Supine [place] (Bed Mobility) standby assist  -TA     Assistive Device (Bed Mobility) bed rails;head of bed elevated  -TA     Row Name 03/11/22 1235          Transfers    Transfers sit-stand transfer;stand-sit transfer  -TA     Comment, (Transfers) pt performed sit<>stand x 2/5  -TA     Sit-Stand [place] (Transfers) standby assist  -TA     Stand-Sit [place] (Transfers) standby assist  -TA     Row Name 03/11/22 1235          Sit-Stand Transfer    Assistive Device (Sit-Stand Transfers) --  No AD  -TA     Row Name 03/11/22 1235          Stand-Sit Transfer    Assistive Device (Stand-Sit Transfers) --  No AD  -TA     Row Name 03/11/22 1235          Gait/Stairs (Locomotion)    [place] Level (Gait) contact guard  -TA     Assistive Device (Gait) --  No AD, pt held to HR on the wall  -TA     Distance in Feet (Gait) 130`  -TA     Deviations/Abnormal Patterns (Gait) gait speed decreased;festinating/shuffling  -TA     Left Sided Gait Deviations knee buckling, left side  -TA     Row Name 03/11/22 1235          Hip (Therapeutic Exercise)    Hip (Therapeutic Exercise) AROM (active range of motion)  -TA     Hip AROM (Therapeutic Exercise) bilateral;flexion;aBduction;aDduction;sitting;15 repititions  -TA     Row Name 03/11/22 1235          Knee (Therapeutic Exercise)    Knee (Therapeutic Exercise) AROM (active range of motion)  -TA     Knee AROM (Therapeutic Exercise) bilateral;LAQ (long arc quad);sitting;15 repititions  -TA     Row Name 03/11/22 1235          Ankle (Therapeutic Exercise)    Ankle (Therapeutic Exercise) AROM (active range of motion)  -TA     Ankle AROM (Therapeutic Exercise) bilateral;dorsiflexion;plantarflexion;20 repititions  -TA     Row Name             [REMOVED] Wound 09/25/19 0902 sternal Incision    Wound - Properties Group Placement Date: 09/25/19  -KM Placement Time: 0902  -KM Present on Hospital Admission: N  -KM Location: sternal  -KM Primary Wound Type: Incision  -KM Removal  Date: 03/11/22  -AS Removal Time: 1443  -AS     Retired Wound - Properties Group Placement Date: 09/25/19  -KM Placement Time: 0902  -KM Present on Hospital Admission: N  -KM Location: sternal  -KM Primary Wound Type: Incision  -KM Removal Date: 03/11/22  -AS Removal Time: 1443  -AS     Retired Wound - Properties Group Date first assessed: 09/25/19  -KM Time first assessed: 0902  -KM Present on Hospital Admission: N  -KM Retired Orientation: midline  -KM Location: sternal  -KM Primary Wound Type: Incision  -KM Resolution Date: 03/11/22  -AS Resolution Time: 1443  -AS     Row Name             [REMOVED] Wound 09/25/19 0903 Left leg Incision    Wound - Properties Group Placement Date: 09/25/19  -KM Placement Time: 0903  -KM Present on Hospital Admission: N  -KM Side: Left  -KM Location: leg  -KM Primary Wound Type: Incision  -KM Removal Date: 03/11/22  -AS Removal Time: 1443  -AS     Retired Wound - Properties Group Placement Date: 09/25/19  -KM Placement Time: 0903  -KM Present on Hospital Admission: N  -KM Side: Left  -KM Location: leg  -KM Primary Wound Type: Incision  -KM Removal Date: 03/11/22  -AS Removal Time: 1443  -AS     Retired Wound - Properties Group Date first assessed: 09/25/19  -KM Time first assessed: 0903  -KM Present on Hospital Admission: N  -KM Side: Left  -KM Location: leg  -KM Primary Wound Type: Incision  -KM Resolution Date: 03/11/22  -AS Resolution Time: 1443  -AS     Row Name 03/11/22 1235          Plan of Care Review    Plan of Care Reviewed With patient  -TA     Outcome Evaluation pt sup<>sit & sit<>stand with SBA, pt ambulated 130` without AD & CGA, pt held to HR on the ~70`. pt would benefit from home with assistance & continued PT services  -TA     Row Name 03/11/22 1235          Vital Signs    Pre Systolic BP Rehab 135  -TA     Pre Treatment Diastolic BP 89  -TA     Post Systolic BP Rehab 164  -TA     Post Treatment Diastolic BP 95  -TA     Pretreatment Heart Rate (beats/min) 72  -TA      Posttreatment Heart Rate (beats/min) 71  -TA     Pre SpO2 (%) 95  -TA     O2 Delivery Pre Treatment room air  -TA     Post SpO2 (%) 98  -TA     O2 Delivery Post Treatment room air  -TA     Pre Patient Position Supine  -TA     Post Patient Position Supine  -TA     Row Name 03/11/22 1235          Positioning and Restraints    Pre-Treatment Position in bed  -TA     Post Treatment Position bed  -TA     In Bed supine;call light within reach  -TA     Row Name 03/11/22 1235          Therapy Assessment/Plan (PT)    Rehab Potential (PT) good, to achieve stated therapy goals  -TA     Criteria for Skilled Interventions Met (PT) yes;meets criteria;skilled treatment is necessary  -TA     Comment, Therapy Assessment/Plan (PT) continue  -TA     Row Name 03/11/22 1235          Bed Mobility Goal 1 (PT)    Activity/Assistive Device (Bed Mobility Goal 1, PT) sit to supine;supine to sit  -TA     [place] Level/Cues Needed (Bed Mobility Goal 1, PT) independent  -TA     Time Frame (Bed Mobility Goal 1, PT) by discharge  -TA     Progress/Outcomes (Bed Mobility Goal 1, PT) goal not met  -TA     Row Name 03/11/22 1235          Transfer Goal 1 (PT)    Activity/Assistive Device (Transfer Goal 1, PT) sit-to-stand/stand-to-sit;bed-to-chair/chair-to-bed  -TA     [place] Level/Cues Needed (Transfer Goal 1, PT) modified independence  -TA     Time Frame (Transfer Goal 1, PT) by discharge  -TA     Progress/Outcome (Transfer Goal 1, PT) goal not met  -TA     Row Name 03/11/22 1235          Gait Training Goal 1 (PT)    Activity/Assistive Device (Gait Training Goal 1, PT) gait (walking locomotion);assistive device use  -TA     [place] Level (Gait Training Goal 1, PT) modified independence  -TA     Distance (Gait Training Goal 1, PT) 150'x1  -TA     Time Frame (Gait Training Goal 1, PT) by discharge  -TA     Progress/Outcome (Gait Training Goal 1, PT) goal not met  -TA           User Key  (r) = Recorded By, (t) = Taken By, (c) =  Cosigned By    Initials Name Provider Type    TA Ani Louise, PTA Physical Therapy Assistant    AS Jluis Mckeon, RN Registered Nurse    Kate Dan RN Registered Nurse                Physical Therapy Education                 Title: PT OT SLP Therapies (In Progress)     Topic: Physical Therapy (Done)     Point: Mobility training (Done)     Learning Progress Summary           Patient Acceptance, E,TB, VU,NR by LR at 3/10/2022 1624    Comment: Educated on PT POC and goals.                   Point: Home exercise program (Done)     Learning Progress Summary           Patient Acceptance, E,TB, VU,NR by LR at 3/10/2022 1624    Comment: Educated on PT POC and goals.                   Point: Body mechanics (Done)     Learning Progress Summary           Patient Acceptance, E,TB, VU,NR by LR at 3/10/2022 1624    Comment: Educated on PT POC and goals.                   Point: Precautions (Done)     Learning Progress Summary           Patient Acceptance, E,TB, VU,NR by LR at 3/10/2022 1624    Comment: Educated on PT POC and goals.                               User Key     Initials Effective Dates Name Provider Type Discipline    LR 06/16/21 -  Km Lee Physical Therapist PT              PT Recommendation and Plan  Anticipated Discharge Disposition (PT): home with assist, home with outpatient therapy services  Therapy Frequency (PT): other (see comments) (3-7d/week)  Plan of Care Reviewed With: patient  Outcome Evaluation: pt sup<>sit & sit<>stand with SBA, pt ambulated 130` without AD & CGA, pt held to HR on the ~70`. pt would benefit from home with assistance & continued PT services   Outcome Measures     Row Name 03/11/22 1500             How much help from another person do you currently need...    Turning from your back to your side while in flat bed without using bedrails? 3  -TA      Moving from lying on back to sitting on the side of a flat bed without bedrails? 3  -TA      Moving to and from a  bed to a chair (including a wheelchair)? 3  -TA      Standing up from a chair using your arms (e.g., wheelchair, bedside chair)? 3  -TA      Climbing 3-5 steps with a railing? 3  -TA      To walk in hospital room? 3  -TA      AM-PAC 6 Clicks Score (PT) 18  -TA              Functional Assessment    Outcome Measure Options AM-PAC 6 Clicks Basic Mobility (PT)  -TA            User Key  (r) = Recorded By, (t) = Taken By, (c) = Cosigned By    Initials Name Provider Type    Ani Chaudhari PTA Physical Therapy Assistant                 Time Calculation:    PT Charges     Row Name 03/11/22 1503             Time Calculation    Start Time 1235  -TA      Stop Time 1304  -TA      Time Calculation (min) 29 min  -TA      PT Received On 03/11/22  -TA              Time Calculation- PT    Total Timed Code Minutes- PT 29 minute(s)  -TA              Timed Charges    98247 - PT Therapeutic Exercise Minutes 15  -TA      54587 - Gait Training Minutes  14  -TA              Total Minutes    Timed Charges Total Minutes 29  -TA       Total Minutes 29  -TA            User Key  (r) = Recorded By, (t) = Taken By, (c) = Cosigned By    Initials Name Provider Type    Ani Chaudhari PTA Physical Therapy Assistant              Therapy Charges for Today     Code Description Service Date Service Provider Modifiers Qty    56693304576 HC PT THER PROC EA 15 MIN 3/11/2022 Ani Louise PTA GP 1    88023632254 HC GAIT TRAINING EA 15 MIN 3/11/2022 Ani Louise PTA GP 1          PT G-Codes  Outcome Measure Options: AM-PAC 6 Clicks Basic Mobility (PT)  AM-PAC 6 Clicks Score (PT): 18  AM-PAC 6 Clicks Score (OT): 21  Modified Linda Scale: 3 - Moderate disability.  Requiring some help, but able to walk without assistance.    Ani Louise PTA  3/11/2022

## 2022-03-11 NOTE — PLAN OF CARE
Problem: Adult Inpatient Plan of Care  Goal: Plan of Care Review  Outcome: Ongoing, Progressing  Flowsheets  Taken 3/11/2022 0135 by Cristine Ybarra RN  Plan of Care Reviewed With: patient  Outcome Evaluation: Pt is stable at this time. no new complaints.  Taken 3/10/2022 1309 by Che Tomlinson CCC-SLP  Progress: improving  Goal: Patient-Specific Goal (Individualized)  Outcome: Ongoing, Progressing  Goal: Absence of Hospital-Acquired Illness or Injury  Outcome: Ongoing, Progressing  Intervention: Identify and Manage Fall Risk  Recent Flowsheet Documentation  Taken 3/11/2022 0100 by Cristine Ybarra RN  Safety Promotion/Fall Prevention:   safety round/check completed   assistive device/personal items within reach   clutter free environment maintained   nonskid shoes/slippers when out of bed  Taken 3/10/2022 2300 by Cristine Ybarra RN  Safety Promotion/Fall Prevention: safety round/check completed  Taken 3/10/2022 2100 by Cristine Ybarra RN  Safety Promotion/Fall Prevention:   safety round/check completed   assistive device/personal items within reach   clutter free environment maintained   nonskid shoes/slippers when out of bed  Taken 3/10/2022 2030 by Cristine Ybarra RN  Safety Promotion/Fall Prevention:   safety round/check completed   assistive device/personal items within reach   clutter free environment maintained  Taken 3/10/2022 1900 by Cristine Ybarra RN  Safety Promotion/Fall Prevention: safety round/check completed  Intervention: Prevent Skin Injury  Recent Flowsheet Documentation  Taken 3/11/2022 0100 by Cristine Ybarra RN  Body Position: position changed independently  Taken 3/10/2022 2300 by Cristine Ybarra RN  Body Position: position changed independently  Taken 3/10/2022 2100 by Cristine Ybarra RN  Body Position: position changed independently  Taken 3/10/2022 2030 by Cristine Ybarra RN  Body Position: position changed independently  Taken 3/10/2022 1900 by Cristine Ybarra  RN  Body Position: sitting up in bed  Intervention: Prevent and Manage VTE (Venous Thromboembolism) Risk  Recent Flowsheet Documentation  Taken 3/11/2022 0100 by Cristine Ybarra RN  Activity Management: activity adjusted per tolerance  Taken 3/10/2022 2300 by Cristine Ybarra RN  Activity Management: activity adjusted per tolerance  Taken 3/10/2022 2100 by Cristine Ybarra RN  Activity Management: activity adjusted per tolerance  Taken 3/10/2022 2030 by Cristine Ybarra RN  Activity Management: activity adjusted per tolerance  VTE Prevention/Management: bleeding risk factor(s) identified  Taken 3/10/2022 1900 by Cristine Ybarra RN  Activity Management: activity adjusted per tolerance  Goal: Optimal Comfort and Wellbeing  Outcome: Ongoing, Progressing  Intervention: Monitor Pain and Promote Comfort  Recent Flowsheet Documentation  Taken 3/10/2022 2030 by Cristine Ybarra RN  Pain Management Interventions: see MAR  Intervention: Provide Person-Centered Care  Recent Flowsheet Documentation  Taken 3/10/2022 2030 by Cristine Ybarra RN  Trust Relationship/Rapport:   care explained   choices provided   emotional support provided  Goal: Readiness for Transition of Care  Outcome: Ongoing, Progressing   Goal Outcome Evaluation:  Plan of Care Reviewed With: patient           Outcome Evaluation: Pt is stable at this time. no new complaints.

## 2022-03-11 NOTE — DISCHARGE PLACEMENT REQUEST
"Herbert White Sr. (56 y.o. Male)             Date of Birth   1965    Social Security Number       Address   [address] [place], [place] [address]    Home Phone   [phone]    MRN   [id number]       Zoroastrianism   Shinto    Marital Status                               Admission Date   3/9/22    Admission Type   Emergency    Admitting Provider   Che Dove MD    Attending Provider   Che Dove MD    Department, Room/Bed   [address], 373/1       Discharge Date       Discharge Disposition   Home or Self Care    Discharge Destination                               Attending Provider: Che Dove MD    Allergies: Ace Inhibitors, Wellbutrin [Bupropion]    Isolation: None   Infection: COVID (History) (01/24/22)   Code Status: Prior   Advance Care Planning Activity    Ht: 188 cm (74.02\")   Wt: 129 kg (284 lb 6.3 oz)    Admission Cmt: None   Principal Problem: Numbness [R20.0]                 Active Insurance as of 3/9/2022     Primary Coverage     Payor Plan Insurance Group Employer/Plan Group    [facility] [id number]     Payor Plan Address Payor Plan Phone Number Payor Plan Fax Number Effective Dates    [address]   2/1/2020 - None Entered    [facility] [id number]       Subscriber Name Subscriber Birth Date Member ID       CINDYHERBERT MCDONALD VENKATA SR. 1965 [id number]                 Emergency Contacts      (Rel.) Home Phone Work Phone Mobile Phone    Nai Schaeffer (Spouse) [phone] -- [phone]            Insurance Information                [facility] Phone: --    Subscriber: Herbert White Sr. Subscriber#: [id number]    Group#: [id number] Precert#: --          [address] [address]  Phone:  [phone]  Fax:  [phone] Date: Mar 11, 2022      Ambulatory Referral to Physical " Therapy Evaluate and treat     Patient:  Herbert White . MRN:  [id number]   [facility] [address] :  1965  SSN:    Phone: [phone] Sex:  M      INSURANCE PAYOR PLAN GROUP # SUBSCRIBER ID   Primary:    [facility] [id number]      Referring Provider Information:  ROSIO CASANOVA Phone: [phone] Fax: [phone]      Referral Information:   # Visits:  1 Referral Type: Physical Therapy [AE1]   Urgency:  Routine Referral Reason: Specialty Services Required   Start Date: Mar 11, 2022 End Date:  To be determined by Insurer   Diagnosis: Impaired functional mobility, balance, gait, and endurance (Z74.09 [ICD-10-CM] V49.89 [ICD-9-CM])  Impaired mobility and ADLs (Z74.09,Z78.9 [ICD-10-CM] V49.89 [ICD-9-CM])      Refer to Dept:   Refer to Provider:   Refer to Facility:       Specialty needed: Evaluate and treat     This document serves as a request of services and does not constitute Insurance authorization or approval of services.  To determine eligibility, please contact the members Insurance carrier to verify and review coverage.     If you have medical questions regarding this request for services. Please contact [address] at [phone] during normal business hours.        Authorizing Provider:Rosio Casanova MD  Authorizing Provider's NPI: [id number]  Order Entered By: Rosio Casanova MD 3/11/2022  2:49 PM     Electronically signed by: Rosio Casanova MD 3/11/2022  2:49 PM

## 2022-03-12 NOTE — OUTREACH NOTE
Prep Survey    Flowsheet Row Responses   [facility] facility patient discharged from? [facility]   Is LACE score < 7 ? No   Emergency Room discharge w/ pulse ox? No   Eligibility [facility]   Date of Admission 03/09/22   Date of Discharge 03/11/22   Discharge Disposition Home or Self Care   Discharge diagnosis A-fib, left arm & leg weakness likely d/t TIA, DM   Does the patient have one of the following disease processes/diagnoses(primary or secondary)? Stroke (TIA)   Does the patient have Home health ordered? No   Is there a DME ordered? No   Prep survey completed? Yes          WALESKA MUNOZ - Registered Nurse

## 2022-03-14 ENCOUNTER — HOSPITAL ENCOUNTER (OUTPATIENT)
Dept: CARDIOLOGY | Facility: HOSPITAL | Age: 57
Discharge: HOME | End: 2022-03-14

## 2022-03-14 ENCOUNTER — ANESTHESIA (OUTPATIENT)
Dept: CARDIOLOGY | Facility: HOSPITAL | Age: 57
End: 2022-03-14

## 2022-03-14 ENCOUNTER — ANESTHESIA EVENT (OUTPATIENT)
Dept: CARDIOLOGY | Facility: HOSPITAL | Age: 57
End: 2022-03-14

## 2022-03-14 ENCOUNTER — TRANSITIONAL CARE MANAGEMENT TELEPHONE ENCOUNTER (OUTPATIENT)
Dept: CALL CENTER | Facility: HOSPITAL | Age: 57
End: 2022-03-14

## 2022-03-14 VITALS
OXYGEN SATURATION: 97 % | HEIGHT: 74 IN | SYSTOLIC BLOOD PRESSURE: 150 MMHG | BODY MASS INDEX: 35.08 KG/M2 | HEART RATE: 73 BPM | WEIGHT: 273.37 LBS | DIASTOLIC BLOOD PRESSURE: 97 MMHG | TEMPERATURE: 96.6 F | RESPIRATION RATE: 20 BRPM

## 2022-03-14 DIAGNOSIS — I48.91 ATRIAL FIBRILLATION AND FLUTTER: ICD-10-CM

## 2022-03-14 DIAGNOSIS — I48.91 ATRIAL FIBRILLATION, UNSPECIFIED TYPE: ICD-10-CM

## 2022-03-14 DIAGNOSIS — I48.92 ATRIAL FIBRILLATION AND FLUTTER: ICD-10-CM

## 2022-03-14 LAB
ANION GAP SERPL CALCULATED.3IONS-SCNC: 9 MMOL/L (ref 5–15)
BUN SERPL-MCNC: 17 MG/DL (ref 6–20)
BUN/CREAT SERPL: 16 (ref 7–25)
CALCIUM SPEC-SCNC: 9.2 MG/DL (ref 8.6–10.5)
CHLORIDE SERPL-SCNC: 100 MMOL/L (ref 98–107)
CO2 SERPL-SCNC: 29 MMOL/L (ref 22–29)
CREAT SERPL-MCNC: 1.06 MG/DL (ref 0.76–1.27)
DEPRECATED RDW RBC AUTO: 41.5 FL (ref 37–54)
EGFRCR SERPLBLD CKD-EPI 2021: 81.9 ML/MIN/1.73
ERYTHROCYTE [DISTWIDTH] IN BLOOD BY AUTOMATED COUNT: 13.4 % (ref 12.3–15.4)
GLUCOSE SERPL-MCNC: 385 MG/DL (ref 65–99)
HCT VFR BLD AUTO: 48.4 % (ref 37.5–51)
HGB BLD-MCNC: 16.4 G/DL (ref 13–17.7)
INR PPP: 1.27 (ref 0.8–1.2)
MAXIMAL PREDICTED HEART RATE: 163 BPM
MCH RBC QN AUTO: 29.1 PG (ref 26.6–33)
MCHC RBC AUTO-ENTMCNC: 33.9 G/DL (ref 31.5–35.7)
MCV RBC AUTO: 85.8 FL (ref 79–97)
PLATELET # BLD AUTO: 194 10*3/MM3 (ref 140–450)
PMV BLD AUTO: 10.7 FL (ref 6–12)
POTASSIUM SERPL-SCNC: 4.3 MMOL/L (ref 3.5–5.2)
PROTHROMBIN TIME: 15.7 SECONDS (ref 11.1–15.3)
RBC # BLD AUTO: 5.64 10*6/MM3 (ref 4.14–5.8)
SODIUM SERPL-SCNC: 138 MMOL/L (ref 136–145)
STRESS TARGET HR: 139 BPM
WBC NRBC COR # BLD: 7.35 10*3/MM3 (ref 3.4–10.8)

## 2022-03-14 PROCEDURE — 80048 BASIC METABOLIC PNL TOTAL CA: CPT | Performed by: NURSE PRACTITIONER

## 2022-03-14 PROCEDURE — 85027 COMPLETE CBC AUTOMATED: CPT | Performed by: NURSE PRACTITIONER

## 2022-03-14 PROCEDURE — 25010000002 PROPOFOL 10 MG/ML EMULSION: Performed by: NURSE ANESTHETIST, CERTIFIED REGISTERED

## 2022-03-14 PROCEDURE — 92960 CARDIOVERSION ELECTRIC EXT: CPT | Performed by: INTERNAL MEDICINE

## 2022-03-14 PROCEDURE — 85610 PROTHROMBIN TIME: CPT | Performed by: NURSE PRACTITIONER

## 2022-03-14 PROCEDURE — 92960 CARDIOVERSION ELECTRIC EXT: CPT

## 2022-03-14 RX ORDER — SODIUM CHLORIDE 0.9 % (FLUSH) 0.9 %
10 SYRINGE (ML) INJECTION AS NEEDED
Status: DISCONTINUED | OUTPATIENT
Start: 2022-03-14 | End: 2022-03-15 | Stop reason: HOSPADM

## 2022-03-14 RX ORDER — LIDOCAINE HYDROCHLORIDE 20 MG/ML
INJECTION, SOLUTION EPIDURAL; INFILTRATION; INTRACAUDAL; PERINEURAL AS NEEDED
Status: DISCONTINUED | OUTPATIENT
Start: 2022-03-14 | End: 2022-03-14 | Stop reason: SURG

## 2022-03-14 RX ORDER — ALBUTEROL SULFATE 2.5 MG/3ML
2.5 SOLUTION RESPIRATORY (INHALATION) ONCE
Status: COMPLETED | OUTPATIENT
Start: 2022-03-14 | End: 2022-03-14

## 2022-03-14 RX ORDER — METOPROLOL SUCCINATE 100 MG/1
50 TABLET, EXTENDED RELEASE ORAL DAILY
Qty: 45 TABLET | Refills: 3 | Status: SHIPPED | OUTPATIENT
Start: 2022-03-14 | End: 2022-04-22

## 2022-03-14 RX ORDER — SODIUM CHLORIDE 9 MG/ML
50 INJECTION, SOLUTION INTRAVENOUS CONTINUOUS
Status: DISCONTINUED | OUTPATIENT
Start: 2022-03-14 | End: 2022-03-15 | Stop reason: HOSPADM

## 2022-03-14 RX ORDER — AMIODARONE HYDROCHLORIDE 200 MG/1
200 TABLET ORAL DAILY
Qty: 60 TABLET | Refills: 1 | Status: SHIPPED | OUTPATIENT
Start: 2022-03-14

## 2022-03-14 RX ORDER — SODIUM CHLORIDE 0.9 % (FLUSH) 0.9 %
3 SYRINGE (ML) INJECTION EVERY 12 HOURS SCHEDULED
Status: DISCONTINUED | OUTPATIENT
Start: 2022-03-14 | End: 2022-03-15 | Stop reason: HOSPADM

## 2022-03-14 RX ORDER — PROPOFOL 10 MG/ML
VIAL (ML) INTRAVENOUS AS NEEDED
Status: DISCONTINUED | OUTPATIENT
Start: 2022-03-14 | End: 2022-03-14 | Stop reason: SURG

## 2022-03-14 RX ADMIN — PROPOFOL 120 MG: 10 INJECTION, EMULSION INTRAVENOUS at 09:11

## 2022-03-14 RX ADMIN — SODIUM CHLORIDE 50 ML/HR: 9 INJECTION, SOLUTION INTRAVENOUS at 08:37

## 2022-03-14 RX ADMIN — LIDOCAINE HYDROCHLORIDE 100 MG: 20 INJECTION, SOLUTION EPIDURAL; INFILTRATION; INTRACAUDAL; PERINEURAL at 09:11

## 2022-03-14 RX ADMIN — ALBUTEROL SULFATE 2.5 MG: 2.5 SOLUTION RESPIRATORY (INHALATION) at 05:43

## 2022-03-14 NOTE — ANESTHESIA PREPROCEDURE EVALUATION
Anesthesia Evaluation     Patient summary reviewed and Nursing notes reviewed   no history of anesthetic complications:  NPO Solid Status: > 8 hours  NPO Liquid Status: > 2 hours           Airway   Mallampati: II  TM distance: >3 FB  Neck ROM: full  possible difficult intubation and Large neck circumference  Comment: Date/Time: 9/25/2019 7:12 AM  Airway not difficult     General Information and Staff    Patient location during procedure: OR  CRNA: Jimmy Guerrero CRNA     Indications and Patient Condition  Indications for airway management: airway protection    Preoxygenated: yes  MILS maintained throughout  Mask difficulty assessment: 0 - not attempted     Final Airway Details  Final airway type: endotracheal airway        Successful airway: ETT  Cuffed: yes   Successful intubation technique: direct laryngoscopy  Facilitating devices/methods: cricoid pressure and intubating stylet  Endotracheal tube insertion site: oral  Blade: Terrie  Blade size: 4  ETT size (mm): 7.5  Cormack-Lehane Classification: grade III - view of epiglottis only  Placement verified by: chest auscultation and capnometry   Measured from: lips  ETT/EBT  to lips (cm): 22  Number of attempts at approach: 1  Assessment: lips, teeth, and gum same as pre-op and atraumatic intubation     Dental    (+) poor dentation    Comment: Remaining upper and lower dentition in carious,hopeless repair. Risk of loss and injury. Patient understands.    Pulmonary    (+) a smoker Current Abstained day of surgery, COPD moderate, sleep apnea, decreased breath sounds,     ROS comment:   COMPARISON:     2/21/2022     INDICATION:     Acute stroke protocol on several less than 12  hours     FINDINGS:     Portable AP chest radiograph is obtained.  Cardiomegaly. Prominent central vasculature. Central interstitial  and alveolar opacities. No large effusion. No pneumothorax.           IMPRESSION:  Cardiomegaly. Central vascular congestion.  Central opacities which may  reflect central edema.     Electronically signed by:  Kumar Bernard MD  3/9/2022 5:24 PM CST  PE comment: Albuterol treatment ordered pre-op.  Cardiovascular     ECG reviewed  PT is on anticoagulation therapy  Rhythm: irregular  Rate: abnormal    (+) hypertension, CAD, CABG (9/2019), cardiac stents (2/14/2022) dysrhythmias Atrial Fib, Paroxysmal Atrial Fib, murmur (Grade I-II systolic), PVD, hyperlipidemia,  carotid artery disease carotid bilateral  (-) carotid bruits    ROS comment: Atrial fibrillation with rapid ventricular response  Right superior axis deviation  Anterior infarct (cited on or before 20-AUG-2020)  Abnormal ECG  When compared with ECG of 04-MAR-2022 11:52,  aberrantly conducted complexes not seen     Referred By:            Confirmed By: EDUARDO ZHENG MD    Specimen Collected: 03/09/22 16:04      · Left ventricular wall thickness is consistent with mild concentric hypertrophy.  · There is calcification of the aortic valve mainly affecting the non-coronary cusp(s).  · The right atrial cavity is mildly dilated.  · Estimated right ventricular systolic pressure from tricuspid regurgitation is mildly elevated (35-45 mmHg).  · Left ventricular ejection fraction appears to be 51 - 55%.  · Left ventricular diastolic function was normal.     Prox LAD lesion  Stent  Drug-eluting stent was successfully placed. The stent used was a STNT CORNRY RX XIENCE/SKYPOINT RAPDXNG [id number]. Maximum pressure: 13 aj. Inflation time: 20 sec. The LAD was stented to improve flow to 2 Non Grafted Diagonals.  Post-Intervention Lesion Assessment  The intervention was successful. Embolic protection device was deployed. The guidewire was not threaded through a graft to reach the lesion. Post-intervention ZANDER flow is 3. Lesion had 18 mm of its length treated. There were no complications.  Supplies used: GW RUNTHROUGH NS STR RESHP .014 [id number]; STNT CORNRY RX XIENCE/SKYPOINT RAPDXNG [id number]  There is a 0% residual stenosis post  intervention.      Left Heart Findings    Left Ventricle There is moderate left ventricular systolic dysfunction. The left ventricular ejection fraction is 30-35% by visual estimation.        Neuro/Psych  (+) TIA, numbness (Diabetic peripheral neuropathy.),    GI/Hepatic/Renal/Endo    (+) obesity,  GERD,  renal disease (Creatinine 1.23), diabetes mellitus type 2 poorly controlled using insulin,     Musculoskeletal     Abdominal   (+) obese,    Substance History - negative use     OB/GYN negative ob/gyn ROS         Other   arthritis,                      Anesthesia Plan    ASA 4     MAC   total IV anesthesia  intravenous induction     Anesthetic plan, all risks, benefits, and alternatives have been provided, discussed and informed consent has been obtained with: patient and spouse/significant other.    Plan discussed with CRNA.        CODE STATUS:

## 2022-03-14 NOTE — OUTREACH NOTE
Call Center TCM Note    Flowsheet Row Responses   [facility] patient discharged from? [place]   Does the patient have one of the following disease processes/diagnoses(primary or secondary)? Stroke (TIA)   TCM attempt successful? Yes   Call start time 1724   Call end time 1728   Discharge diagnosis A-fib, left arm & leg weakness likely d/t TIA, DM   Does the patient have all medications ordered at discharge? N/A   Prescription comments NO changes to medications   Comments regarding appointments will be doing a cardioversion on 3/14--completed, reminded of Neuro appt   Does the patient have a primary care provider?  Yes   Does the patient have an appointment with their PCP within 7 days of discharge? Yes   Comments regarding PCP Hospital PCP FOLLOW UP APPOINTMENT IS 3/17/22@0900am   Has home health visited the patient within 72 hours of discharge? N/A   Psychosocial issues? No   Does the patient require any assistance with activities of daily living such as eating, bathing, dressing, walking, etc.? No   Does the patient have any residual symptoms from stroke/TIA? Yes   Residual symptoms comments Patient with residual left arm/leg numbness/tingling   Did the patient receive a copy of their discharge instructions? Yes   Nursing interventions Reviewed instructions with patient   What is the patient's perception of their health status since discharge? Improving  [Patient very tired today as he has a cardioversion today--reports he is normal rhthym when he checks his pulse.  REsidual TIA s/s as charted. ]   Nursing interventions Nurse provided patient education   Is the patient able to teach back FAST for Stroke? Yes  [REviewed and encouraged to refer back to AVS]   Is the patient/caregiver able to teach back the risk factors for a stroke? High blood pressure-goal below 120/80, History of TIAs, History of Afib, High Cholesterol   Is the patient/caregiver able to teach back signs and symptoms related to disease  process for when to call PCP? Yes   Is the patient/caregiver able to teach back signs and symptoms related to disease process for when to call 911? Yes   TCM call completed? Yes   Wrap up additional comments Call brief--pt has no questions, understands to monitor for return of s/s.           Yasmeen Fernandez, RN    3/14/2022, 17:31 EDT

## 2022-03-15 ENCOUNTER — TELEPHONE (OUTPATIENT)
Dept: CARDIOLOGY | Facility: CLINIC | Age: 57
End: 2022-03-15

## 2022-03-15 NOTE — TELEPHONE ENCOUNTER
----- Message from Dominique Kaiser sent at 3/15/2022  1:34 PM CDT -----  Contact: [phone]  He had a cardioversion yesterday and needs to talk to you about a statement for work

## 2022-03-22 ENCOUNTER — READMISSION MANAGEMENT (OUTPATIENT)
Dept: CALL CENTER | Facility: HOSPITAL | Age: 57
End: 2022-03-22

## 2022-03-22 NOTE — OUTREACH NOTE
Stroke Week 2 Survey    Flowsheet Row Responses   [facility] facility patient discharged from? [place]   Does the patient have one of the following disease processes/diagnoses(primary or secondary)? Stroke (TIA)   Week 2 attempt successful? No   Unsuccessful attempts Attempt 1  [all numbers attempted- no answer ]          NATY H - Registered Nurse

## 2022-03-25 ENCOUNTER — READMISSION MANAGEMENT (OUTPATIENT)
Dept: CALL CENTER | Facility: HOSPITAL | Age: 57
End: 2022-03-25

## 2022-04-05 ENCOUNTER — READMISSION MANAGEMENT (OUTPATIENT)
Dept: CALL CENTER | Facility: HOSPITAL | Age: 57
End: 2022-04-05

## 2022-04-05 NOTE — OUTREACH NOTE
Stroke Week 3 Survey    Flowsheet Row Responses   [facility] facility patient discharged from? [place]   Does the patient have one of the following disease processes/diagnoses(primary or secondary)? Stroke (TIA)   Week 3 attempt successful? No   Unsuccessful attempts Attempt 1          MONTSE LAM - Registered Nurse

## 2022-04-08 ENCOUNTER — READMISSION MANAGEMENT (OUTPATIENT)
Dept: CALL CENTER | Facility: HOSPITAL | Age: 57
End: 2022-04-08

## 2022-04-08 NOTE — OUTREACH NOTE
Stroke Week 3 Survey    Flowsheet Row Responses   [facility] facility patient discharged from? [place]   Does the patient have one of the following disease processes/diagnoses(primary or secondary)? Stroke (TIA)   Week 3 attempt successful? No   Unsuccessful attempts Attempt 2          OLU MUNOZ - Registered Nurse

## 2022-04-09 ENCOUNTER — HOSPITAL ENCOUNTER (EMERGENCY)
Facility: HOSPITAL | Age: 57
Discharge: HOME | End: 2022-04-09
Admitting: FAMILY MEDICINE

## 2022-04-09 ENCOUNTER — APPOINTMENT (OUTPATIENT)
Dept: CT IMAGING | Facility: HOSPITAL | Age: 57
End: 2022-04-09

## 2022-04-09 VITALS
RESPIRATION RATE: 18 BRPM | DIASTOLIC BLOOD PRESSURE: 83 MMHG | TEMPERATURE: 96.8 F | BODY MASS INDEX: 35.1 KG/M2 | SYSTOLIC BLOOD PRESSURE: 140 MMHG | OXYGEN SATURATION: 100 % | HEIGHT: 74 IN | HEART RATE: 50 BPM

## 2022-04-09 DIAGNOSIS — K29.70 GASTRITIS WITHOUT BLEEDING, UNSPECIFIED CHRONICITY, UNSPECIFIED GASTRITIS TYPE: Primary | ICD-10-CM

## 2022-04-09 DIAGNOSIS — K76.0 FATTY LIVER: ICD-10-CM

## 2022-04-09 DIAGNOSIS — D35.02 ADRENAL ADENOMA, LEFT: ICD-10-CM

## 2022-04-09 LAB
ALBUMIN SERPL-MCNC: 4 G/DL (ref 3.5–5.2)
ALBUMIN/GLOB SERPL: 1.5 G/DL
ALP SERPL-CCNC: 108 U/L (ref 39–117)
ALT SERPL W P-5'-P-CCNC: 92 U/L (ref 1–41)
ANION GAP SERPL CALCULATED.3IONS-SCNC: 8 MMOL/L (ref 5–15)
AST SERPL-CCNC: 111 U/L (ref 1–40)
BACTERIA UR QL AUTO: ABNORMAL /HPF
BASOPHILS # BLD AUTO: 0.04 10*3/MM3 (ref 0–0.2)
BASOPHILS NFR BLD AUTO: 0.3 % (ref 0–1.5)
BILIRUB SERPL-MCNC: 0.7 MG/DL (ref 0–1.2)
BILIRUB UR QL STRIP: ABNORMAL
BUN SERPL-MCNC: 24 MG/DL (ref 6–20)
BUN/CREAT SERPL: 25.3 (ref 7–25)
CALCIUM SPEC-SCNC: 9.2 MG/DL (ref 8.6–10.5)
CHLORIDE SERPL-SCNC: 101 MMOL/L (ref 98–107)
CLARITY UR: ABNORMAL
CO2 SERPL-SCNC: 27 MMOL/L (ref 22–29)
COLOR UR: ABNORMAL
CREAT SERPL-MCNC: 0.95 MG/DL (ref 0.76–1.27)
DEPRECATED RDW RBC AUTO: 38.8 FL (ref 37–54)
EGFRCR SERPLBLD CKD-EPI 2021: 93.4 ML/MIN/1.73
EOSINOPHIL # BLD AUTO: 0.13 10*3/MM3 (ref 0–0.4)
EOSINOPHIL NFR BLD AUTO: 1.1 % (ref 0.3–6.2)
ERYTHROCYTE [DISTWIDTH] IN BLOOD BY AUTOMATED COUNT: 13.1 % (ref 12.3–15.4)
GLOBULIN UR ELPH-MCNC: 2.7 GM/DL
GLUCOSE SERPL-MCNC: 156 MG/DL (ref 65–99)
GLUCOSE UR STRIP-MCNC: ABNORMAL MG/DL
HCT VFR BLD AUTO: 50 % (ref 37.5–51)
HGB BLD-MCNC: 17.3 G/DL (ref 13–17.7)
HGB UR QL STRIP.AUTO: NEGATIVE
HOLD SPECIMEN: NORMAL
HOLD SPECIMEN: NORMAL
HYALINE CASTS UR QL AUTO: ABNORMAL /LPF
IMM GRANULOCYTES # BLD AUTO: 0.09 10*3/MM3 (ref 0–0.05)
IMM GRANULOCYTES NFR BLD AUTO: 0.8 % (ref 0–0.5)
KETONES UR QL STRIP: ABNORMAL
LEUKOCYTE ESTERASE UR QL STRIP.AUTO: ABNORMAL
LIPASE SERPL-CCNC: 36 U/L (ref 13–60)
LYMPHOCYTES # BLD AUTO: 1.57 10*3/MM3 (ref 0.7–3.1)
LYMPHOCYTES NFR BLD AUTO: 13.6 % (ref 19.6–45.3)
MCH RBC QN AUTO: 28.7 PG (ref 26.6–33)
MCHC RBC AUTO-ENTMCNC: 34.6 G/DL (ref 31.5–35.7)
MCV RBC AUTO: 82.9 FL (ref 79–97)
MONOCYTES # BLD AUTO: 0.55 10*3/MM3 (ref 0.1–0.9)
MONOCYTES NFR BLD AUTO: 4.8 % (ref 5–12)
NEUTROPHILS NFR BLD AUTO: 79.4 % (ref 42.7–76)
NEUTROPHILS NFR BLD AUTO: 9.14 10*3/MM3 (ref 1.7–7)
NITRITE UR QL STRIP: NEGATIVE
NRBC BLD AUTO-RTO: 0 /100 WBC (ref 0–0.2)
PH UR STRIP.AUTO: <=5 [PH] (ref 5–9)
PLATELET # BLD AUTO: 210 10*3/MM3 (ref 140–450)
PMV BLD AUTO: 10.6 FL (ref 6–12)
POTASSIUM SERPL-SCNC: 4.2 MMOL/L (ref 3.5–5.2)
PROT SERPL-MCNC: 6.7 G/DL (ref 6–8.5)
PROT UR QL STRIP: ABNORMAL
RBC # BLD AUTO: 6.03 10*6/MM3 (ref 4.14–5.8)
RBC # UR STRIP: ABNORMAL /HPF
REF LAB TEST METHOD: ABNORMAL
SODIUM SERPL-SCNC: 136 MMOL/L (ref 136–145)
SP GR UR STRIP: 1.03 (ref 1–1.03)
SQUAMOUS #/AREA URNS HPF: ABNORMAL /HPF
UROBILINOGEN UR QL STRIP: ABNORMAL
WBC # UR STRIP: ABNORMAL /HPF
WBC NRBC COR # BLD: 11.52 10*3/MM3 (ref 3.4–10.8)
WHOLE BLOOD HOLD SPECIMEN: NORMAL
WHOLE BLOOD HOLD SPECIMEN: NORMAL

## 2022-04-09 PROCEDURE — 99283 EMERGENCY DEPT VISIT LOW MDM: CPT

## 2022-04-09 PROCEDURE — 25010000002 IOPAMIDOL 61 % SOLUTION: Performed by: STUDENT IN AN ORGANIZED HEALTH CARE EDUCATION/TRAINING PROGRAM

## 2022-04-09 PROCEDURE — 83690 ASSAY OF LIPASE: CPT | Performed by: STUDENT IN AN ORGANIZED HEALTH CARE EDUCATION/TRAINING PROGRAM

## 2022-04-09 PROCEDURE — 85025 COMPLETE CBC W/AUTO DIFF WBC: CPT | Performed by: STUDENT IN AN ORGANIZED HEALTH CARE EDUCATION/TRAINING PROGRAM

## 2022-04-09 PROCEDURE — 80053 COMPREHEN METABOLIC PANEL: CPT | Performed by: STUDENT IN AN ORGANIZED HEALTH CARE EDUCATION/TRAINING PROGRAM

## 2022-04-09 PROCEDURE — 63710000001 ONDANSETRON ODT 4 MG TABLET DISPERSIBLE: Performed by: STUDENT IN AN ORGANIZED HEALTH CARE EDUCATION/TRAINING PROGRAM

## 2022-04-09 PROCEDURE — 74177 CT ABD & PELVIS W/CONTRAST: CPT

## 2022-04-09 PROCEDURE — 81001 URINALYSIS AUTO W/SCOPE: CPT | Performed by: STUDENT IN AN ORGANIZED HEALTH CARE EDUCATION/TRAINING PROGRAM

## 2022-04-09 RX ORDER — ONDANSETRON 4 MG/1
4 TABLET, ORALLY DISINTEGRATING ORAL ONCE
Status: COMPLETED | OUTPATIENT
Start: 2022-04-09 | End: 2022-04-09

## 2022-04-09 RX ORDER — ONDANSETRON 4 MG/1
4 TABLET, FILM COATED ORAL EVERY 8 HOURS PRN
Qty: 10 TABLET | Refills: 0 | Status: SHIPPED | OUTPATIENT
Start: 2022-04-09

## 2022-04-09 RX ORDER — PANTOPRAZOLE SODIUM 40 MG/1
40 TABLET, DELAYED RELEASE ORAL ONCE
Status: COMPLETED | OUTPATIENT
Start: 2022-04-09 | End: 2022-04-09

## 2022-04-09 RX ORDER — PANTOPRAZOLE SODIUM 40 MG/1
40 TABLET, DELAYED RELEASE ORAL DAILY
Qty: 30 TABLET | Refills: 0 | Status: SHIPPED | OUTPATIENT
Start: 2022-04-09 | End: 2022-04-13

## 2022-04-09 RX ORDER — SODIUM CHLORIDE 0.9 % (FLUSH) 0.9 %
10 SYRINGE (ML) INJECTION AS NEEDED
Status: DISCONTINUED | OUTPATIENT
Start: 2022-04-09 | End: 2022-04-09 | Stop reason: HOSPADM

## 2022-04-09 RX ADMIN — PANTOPRAZOLE SODIUM 40 MG: 40 TABLET, DELAYED RELEASE ORAL at 14:32

## 2022-04-09 RX ADMIN — IOPAMIDOL 90 ML: 612 INJECTION, SOLUTION INTRAVENOUS at 13:19

## 2022-04-09 RX ADMIN — Medication 10 ML: at 12:33

## 2022-04-09 RX ADMIN — ONDANSETRON 4 MG: 4 TABLET, ORALLY DISINTEGRATING ORAL at 14:27

## 2022-04-09 NOTE — ED PROVIDER NOTES
Subjective   56 year old male who present with abdominal pain. Patient reports intermittent sharp pain on the RUQ. Pain onset was this morning. Last bowel movement was 5 days ago however he states this is not unusual for patient.  Patient reports history of cholecystectomy.  Patient denies fever or chills, diarrhea, vomiting or blood in stool.          Review of Systems   Constitutional: Negative for chills and fever.   HENT: Negative for congestion, rhinorrhea and sore throat.    Eyes: Negative for visual disturbance.   Respiratory: Negative for cough, chest tightness and shortness of breath.    Cardiovascular: Negative for chest pain, palpitations and leg swelling.   Gastrointestinal: Positive for abdominal distention and abdominal pain. Negative for constipation, nausea and vomiting.   Endocrine: Negative for polydipsia and polyuria.   Genitourinary: Negative for difficulty urinating, dysuria, frequency and urgency.   Musculoskeletal: Negative for joint swelling and myalgias.   Skin: Negative for rash and wound.   Neurological: Negative for dizziness, weakness, light-headedness and headaches.   Psychiatric/Behavioral: Negative for dysphoric mood and sleep disturbance.       Past Medical History:   Diagnosis Date   • Coronary artery disease    • COVID-19 virus detected 01/21/2022   • Diabetes mellitus (HCC)    • Diabetic retinopathy (HCC)    • GERD (gastroesophageal reflux disease)    • Hyperlipidemia    • Hypertension    • Osteoarthritis    • Paroxysmal atrial fibrillation (HCC)    • Sleep apnea    • Stroke (HCC)        Allergies   Allergen Reactions   • Ace Inhibitors Anaphylaxis   • Wellbutrin [Bupropion] Anaphylaxis       Past Surgical History:   Procedure Laterality Date   • CARDIAC CATHETERIZATION N/A 9/3/2019   • CARDIAC CATHETERIZATION N/A 2/14/2022   • CHOLECYSTECTOMY     • COLONOSCOPY N/A 5/24/2021   • COLONOSCOPY N/A 1/5/2022   • CORONARY ARTERY BYPASS GRAFT N/A 9/25/2019       Family History   Problem  Relation Age of Onset   • Diabetes Mother    • Hypertension Father        Social History     Socioeconomic History   • Marital status:    Tobacco Use   • Smoking status: Former Smoker     Packs/day: 1.00     Years: 35.00     Pack years: 35.00     Types: Cigarettes     Start date: 1980     Quit date: 9/3/2019     Years since quittin.6   • Smokeless tobacco: Never Used   Vaping Use   • Vaping Use: Never used   Substance and Sexual Activity   • Alcohol use: No   • Drug use: No   • Sexual activity: Not Currently     Comment: .           Objective   Physical Exam  Vitals and nursing note reviewed.   Constitutional:       General: He is not in acute distress.     Appearance: Normal appearance. He is not diaphoretic.   HENT:      Head: Normocephalic and atraumatic.      Right Ear: External ear normal.      Left Ear: External ear normal.   Eyes:      General: No scleral icterus.     Extraocular Movements: Extraocular movements intact.      Conjunctiva/sclera: Conjunctivae normal.      Pupils: Pupils are equal, round, and reactive to light.   Cardiovascular:      Rate and Rhythm: Normal rate and regular rhythm.      Heart sounds: Normal heart sounds.   Pulmonary:      Effort: Pulmonary effort is normal. No respiratory distress.      Breath sounds: Normal breath sounds.   Chest:      Chest wall: No tenderness.   Abdominal:      General: Bowel sounds are normal.      Palpations: Abdomen is soft.      Tenderness: There is abdominal tenderness in the right upper quadrant and epigastric area. There is no right CVA tenderness or left CVA tenderness.      Hernia: No hernia is present.   Musculoskeletal:         General: No swelling or tenderness.      Cervical back: Normal range of motion and neck supple.      Right lower leg: No edema.      Left lower leg: No edema.   Skin:     General: Skin is warm and dry.      Capillary Refill: Capillary refill takes less than 2 seconds.      Findings: No erythema or  rash.   Neurological:      General: No focal deficit present.      Mental Status: He is alert.      Motor: No weakness.   Psychiatric:         Behavior: Behavior normal.         Procedures           ED Course  ED Course as of 04/09/22 1521   Sat Apr 09, 2022   1411 Reviewed labs and imaging  [FA]      ED Course User Index  [FA] Dominique Randhawa MD      Lab Results (last 24 hours)     Procedure Component Value Units Date/Time    Urinalysis With Microscopic If Indicated (No Culture) - Urine, Clean Catch [[id number]]  (Abnormal) Collected: 04/09/22 1219    Specimen: Urine, Clean Catch Updated: 04/09/22 1231     Color, UA Dark Yellow     Appearance, UA Cloudy     pH, UA <=5.0     Specific Gravity, UA 1.029     Comment: Result obtained by Refractometer        Glucose,  mg/dL (Trace)     Ketones, UA Trace     Bilirubin, UA Small (1+)     Blood, UA Negative     Protein, UA Trace     Leuk Esterase, UA Trace     Nitrite, UA Negative     Urobilinogen, UA 1.0 E.U./dL    Urinalysis, Microscopic Only - Urine, Clean Catch [[id number]]  (Abnormal) Collected: 04/09/22 1219    Specimen: Urine, Clean Catch Updated: 04/09/22 1233     RBC, UA 0-2 /HPF      WBC, UA 0-2 /HPF      Bacteria, UA None Seen /HPF      Squamous Epithelial Cells, UA 0-2 /HPF      Hyaline Casts, UA 0-2 /LPF      Methodology Automated Microscopy    CBC & Differential [[id number]]  (Abnormal) Collected: 04/09/22 1233    Specimen: Blood from Arm, Right Updated: 04/09/22 1239    Narrative:      The following orders were created for panel order CBC & Differential.  Procedure                               Abnormality         Status                     ---------                               -----------         ------                     CBC Auto Differential[[id number]]        Abnormal            Final result                 Please view results for these tests on the individual orders.    Comprehensive Metabolic Panel [[id number]]  (Abnormal) Collected: 04/09/22  1233    Specimen: Blood from Arm, Right Updated: 04/09/22 1258     Glucose 156 mg/dL      BUN 24 mg/dL      Creatinine 0.95 mg/dL      Sodium 136 mmol/L      Potassium 4.2 mmol/L      Chloride 101 mmol/L      CO2 27.0 mmol/L      Calcium 9.2 mg/dL      Total Protein 6.7 g/dL      Albumin 4.00 g/dL      ALT (SGPT) 92 U/L      AST (SGOT) 111 U/L      Alkaline Phosphatase 108 U/L      Total Bilirubin 0.7 mg/dL      Globulin 2.7 gm/dL      A/G Ratio 1.5 g/dL      BUN/Creatinine Ratio 25.3     Anion Gap 8.0 mmol/L      eGFR 93.4 mL/min/1.73      Comment: National Kidney Foundation and American Society of Nephrology (ASN) Task Force recommended calculation based on the Chronic Kidney Disease Epidemiology Collaboration (CKD-EPI) equation refit without adjustment for race.       Narrative:      GFR Normal >60  Chronic Kidney Disease <60  Kidney Failure <15      Lipase [[id number]]  (Normal) Collected: 04/09/22 1233    Specimen: Blood from Arm, Right Updated: 04/09/22 1258     Lipase 36 U/L     CBC Auto Differential [[id number]]  (Abnormal) Collected: 04/09/22 1233    Specimen: Blood from Arm, Right Updated: 04/09/22 1239     WBC 11.52 10*3/mm3      RBC 6.03 10*6/mm3      Hemoglobin 17.3 g/dL      Hematocrit 50.0 %      MCV 82.9 fL      MCH 28.7 pg      MCHC 34.6 g/dL      RDW 13.1 %      RDW-SD 38.8 fl      MPV 10.6 fL      Platelets 210 10*3/mm3      Neutrophil % 79.4 %      Lymphocyte % 13.6 %      Monocyte % 4.8 %      Eosinophil % 1.1 %      Basophil % 0.3 %      Immature Grans % 0.8 %      Neutrophils, Absolute 9.14 10*3/mm3      Lymphocytes, Absolute 1.57 10*3/mm3      Monocytes, Absolute 0.55 10*3/mm3      Eosinophils, Absolute 0.13 10*3/mm3      Basophils, Absolute 0.04 10*3/mm3      Immature Grans, Absolute 0.09 10*3/mm3      nRBC 0.0 /100 WBC         CT Abdomen Pelvis With Contrast   Final Result   Mild gastritis.   Stable left adrenal adenoma measuring 1.8 x 1.3 cm in   cross-section.   Appendix is normal.       Electronically signed by:  Eric Dooley MD  4/9/2022 2:02 PM CDT   Workstation: [id number]                MDM  Number of Diagnoses or Management Options  Adrenal adenoma, left: new and requires workup  Fatty liver  Gastritis without bleeding, unspecified chronicity, unspecified gastritis type  Diagnosis management comments: Patient was seen in the ER for abdominal pain.  CT abdomen pelvic was suggestive for gastritis, fatty liver and incidental left adrenal adenoma.  Patient was treated with Protonix.  Patient was advised to follow-up with PCP and GI.       Amount and/or Complexity of Data Reviewed  Clinical lab tests: reviewed  Tests in the radiology section of CPT®: reviewed    Risk of Complications, Morbidity, and/or Mortality  Presenting problems: low  Diagnostic procedures: low  Management options: low    Patient Progress  Patient progress: stable      Final diagnoses:   Gastritis without bleeding, unspecified chronicity, unspecified gastritis type   Adrenal adenoma, left   Fatty liver       ED Disposition  ED Disposition     ED Disposition   Discharge    Condition   Stable    Comment   --             Artemio Sanchez MD  [address] [place] [address]  [phone]    In 3 days      [facility]  [address] [place] [address] Dr  [facility] [address] [place], [place] [id number] [phone]  In 1 week           Medication List      New Prescriptions    ondansetron 4 MG tablet  Commonly known as: ZOFRAN  Take 1 tablet by mouth Every 8 (Eight) Hours As Needed for Nausea or Vomiting.     pantoprazole 40 MG EC tablet  Commonly known as: PROTONIX  Take 1 tablet by mouth Daily for 30 days.           Where to Get Your Medications      These medications were sent to [facility]/pharmacy #[id number] - [place], [place] - [address] [place] [address] - [phone]  - [phone] [address] [address]    Phone: [phone]   · ondansetron 4 MG tablet  · pantoprazole 40  MG EC tablet          Dominique Randhawa MD  Resident  04/09/22 2469

## 2022-04-09 NOTE — DISCHARGE INSTRUCTIONS
Please follow-up with your PCP and discuss about incidental adrenal adenoma found on imaging.  Recommend follow-up with GI.   Return to the ER if symptoms returns or worsens.

## 2022-04-13 RX ORDER — ESOMEPRAZOLE MAGNESIUM 40 MG/1
40 CAPSULE, DELAYED RELEASE ORAL
Qty: 90 CAPSULE | Refills: 3 | Status: SHIPPED | OUTPATIENT
Start: 2022-04-13 | End: 2022-08-06

## 2022-04-13 RX ORDER — LANSOPRAZOLE 30 MG/1
30 CAPSULE, DELAYED RELEASE ORAL DAILY
Qty: 90 CAPSULE | Refills: 3 | Status: SHIPPED | OUTPATIENT
Start: 2022-04-13 | End: 2022-04-13

## 2022-04-13 RX ORDER — OMEPRAZOLE 20 MG/1
20 CAPSULE, DELAYED RELEASE ORAL DAILY
Qty: 90 CAPSULE | Refills: 3 | Status: SHIPPED | OUTPATIENT
Start: 2022-04-13 | End: 2022-04-13

## 2022-04-15 ENCOUNTER — OFFICE VISIT (OUTPATIENT)
Dept: NEUROLOGY | Facility: TELEHEALTH | Age: 57
End: 2022-04-15

## 2022-04-15 VITALS
DIASTOLIC BLOOD PRESSURE: 98 MMHG | BODY MASS INDEX: 34.5 KG/M2 | SYSTOLIC BLOOD PRESSURE: 162 MMHG | HEIGHT: 74 IN | WEIGHT: 268.8 LBS

## 2022-04-15 DIAGNOSIS — Z86.73 HISTORY OF TIA (TRANSIENT ISCHEMIC ATTACK): Primary | ICD-10-CM

## 2022-04-15 PROCEDURE — 99215 OFFICE O/P EST HI 40 MIN: CPT | Performed by: NURSE PRACTITIONER

## 2022-04-15 NOTE — PROGRESS NOTES
Stroke Progress Note       Chief Complaint: TIA follow-up    Subjective     HPI: Pt is a 56-yr-old right-handed white male  with known diagnosis of hypertension, hyperlipidemia, Afib on Eliquis, CAD on aspirin and plavix, and DM who presented on 3/9 after sudden onset of left-sided weakness/ numbness lasting about 45 minutes and was diagnosed with TIA. Today he states doing well and working full time. Denies any other s/s of stroke since his TIA. Strengths are equal 5/5, denies numbness and visual field is intact. NIHSS is 0, PHQ 9 is 0, and MRS is 0.      Review of Systems   Eyes: Negative.    Respiratory: Negative.    Cardiovascular: Negative.    Genitourinary: Negative.    Musculoskeletal: Negative.    Neurological: Negative.    Hematological: Negative.    Psychiatric/Behavioral: Negative.         Objective      BP: ()/()   Arterial Line BP: ()/()     Neurological Exam  Mental Status  Alert. Oriented to person, place, time and situation. Speech is normal. Language is fluent with no aphasia.    Cranial Nerves  CN II: Visual acuity is normal. Visual fields full to confrontation.  CN III, IV, VI: Extraocular movements intact bilaterally. Normal lids and orbits bilaterally. Pupils equal round and reactive to light bilaterally.  CN V: Facial sensation is normal.  CN VII: Full and symmetric facial movement.  CN IX, X: Palate elevates symmetrically. Normal gag reflex.  CN XI: Shoulder shrug strength is normal.  CN XII: Tongue midline without atrophy or fasciculations.    Motor  Normal muscle bulk throughout. No fasciculations present. Strength is 5/5 throughout all four extremities.    Sensory  Sensation is intact to light touch, pinprick, vibration and proprioception in all four extremities.    Reflexes                                            Right                      Left  Brachioradialis                    1+                         1+  Biceps                                 1+                          1+  Patellar                                1+                         1+    Coordination    Finger-to-nose, rapid alternating movements and heel-to-shin normal bilaterally without dysmetria.    Gait  Normal casual, toe, heel and tandem gait.      Physical Exam  Vitals reviewed.   Constitutional:       Appearance: Normal appearance.   HENT:      Head: Normocephalic and atraumatic.   Eyes:      General: Lids are normal.      Extraocular Movements: Extraocular movements intact.      Pupils: Pupils are equal, round, and reactive to light.   Cardiovascular:      Rate and Rhythm: Normal rate.   Pulmonary:      Effort: Pulmonary effort is normal.   Musculoskeletal:         General: Normal range of motion.      Cervical back: Normal range of motion.   Skin:     General: Skin is warm and dry.   Neurological:      General: No focal deficit present.      Mental Status: He is alert.      Coordination: Coordination is intact.      Deep Tendon Reflexes: Strength normal.      Reflex Scores:       Bicep reflexes are 1+ on the right side and 1+ on the left side.       Brachioradialis reflexes are 1+ on the right side and 1+ on the left side.       Patellar reflexes are 1+ on the right side and 1+ on the left side.  Psychiatric:         Mood and Affect: Mood normal.         Speech: Speech normal.         Results Review:    I reviewed the patient's new clinical results.    Lab Results (last 24 hours)     ** No results found for the last 24 hours. **        No radiology results for the last day  Results for orders placed during the hospital encounter of 03/09/22    Adult Transthoracic Echo Limited W/ Cont if Necessary Per Protocol    Interpretation Summary  · Left ventricular wall thickness is consistent with mild concentric hypertrophy.  · There is calcification of the aortic valve mainly affecting the non-coronary cusp(s).  · The right atrial cavity is mildly dilated.  · Estimated right ventricular systolic pressure from tricuspid  regurgitation is mildly elevated (35-45 mmHg).  · Left ventricular ejection fraction appears to be 51 - 55%.  · Left ventricular diastolic function was normal.        Assessment/Plan     Assessment/Plan: Pt is a 56-yr-old right-handed white male  with known diagnosis of hypertension, hyperlipidemia, Afib on Eliquis, CAD on aspirin and plavix, and DM who presented on 3/9 after sudden onset of left-sided weakness/ numbness lasting about 45 minutes and was diagnosed with TIA. Today he states doing well and working full time. Denies any other s/s of stroke since his TIA. Strengths are equal 5/5, denies numbness and visual field is intact. NIHSS is 0, PHQ 9 is 0, and MRS is 0. Instructed on stroke risk factors, prevention, and s/s to call 911. Instructed on the importance of daily BP monitoring and control to reduce stroke risk. Instructed to continue current medications. Pt verbalized understanding of all instructions and all questions and concerns were answered.          Patient Active Problem List   Diagnosis   • Cervical strain, acute   • Diabetes mellitus with ketoacidosis, uncontrolled (HCC)   • Essential hypertension   • Diabetic polyneuropathy associated with type 2 diabetes mellitus (HCC)   • Type 2 diabetes mellitus without complication, with long-term current use of insulin (HCC)   • Acute bilateral low back pain   • Spondylolisthesis of lumbar region   • DDD (degenerative disc disease), lumbar   • Chest pain   • Peripheral arterial disease (HCC)   • Mixed hyperlipidemia   • CAD (coronary artery disease)   • Pneumonia of both lower lobes due to infectious organism   • Surgical aftercare, circulatory system   • Paroxysmal atrial fibrillation (HCC)   • Carotid stenosis, asymptomatic, bilateral   • Herniated cervical intervertebral disc   • Numbness and tingling of both feet   • Encounter for screening colonoscopy   • Diabetes mellitus (HCC)   • Atrial fibrillation and flutter (HCC)   • COVID-19  virus detected   • Hypertensive urgency   • Numbness           Rashaad Christensen, APRN  04/15/22  08:55 CDT        I spent 40 minutes caring for Herbert Sadiq White Sr.  on this date of service. This time includes time spent by me in the following activities: preparing for the visit, reviewing tests, obtaining and/or reviewing a separately obtained history, performing a medically appropriate examination and/or evaluation, counseling and educating the patient/family/caregiver, ordering medications, tests, or procedures, referring and communicating with other health care professionals, documenting information in the medical record, independently interpreting results and communicating that information with the patient/family/caregiver and care coordination

## 2022-04-22 ENCOUNTER — OFFICE VISIT (OUTPATIENT)
Dept: CARDIOLOGY | Facility: CLINIC | Age: 57
End: 2022-04-22

## 2022-04-22 VITALS
HEART RATE: 59 BPM | SYSTOLIC BLOOD PRESSURE: 104 MMHG | HEIGHT: 74 IN | DIASTOLIC BLOOD PRESSURE: 62 MMHG | BODY MASS INDEX: 36.5 KG/M2 | WEIGHT: 284.4 LBS | TEMPERATURE: 97.5 F | OXYGEN SATURATION: 98 %

## 2022-04-22 DIAGNOSIS — I48.0 PAROXYSMAL ATRIAL FIBRILLATION: ICD-10-CM

## 2022-04-22 DIAGNOSIS — I10 ESSENTIAL HYPERTENSION: Primary | ICD-10-CM

## 2022-04-22 DIAGNOSIS — I73.9 PERIPHERAL ARTERIAL DISEASE: ICD-10-CM

## 2022-04-22 DIAGNOSIS — I25.10 CORONARY ARTERY DISEASE INVOLVING NATIVE HEART, UNSPECIFIED VESSEL OR LESION TYPE, UNSPECIFIED WHETHER ANGINA PRESENT: Chronic | ICD-10-CM

## 2022-04-22 DIAGNOSIS — E78.2 MIXED HYPERLIPIDEMIA: ICD-10-CM

## 2022-04-22 PROCEDURE — 93000 ELECTROCARDIOGRAM COMPLETE: CPT | Performed by: INTERNAL MEDICINE

## 2022-04-22 PROCEDURE — 99214 OFFICE O/P EST MOD 30 MIN: CPT | Performed by: INTERNAL MEDICINE

## 2022-04-22 RX ORDER — METOPROLOL SUCCINATE 25 MG/1
25 TABLET, EXTENDED RELEASE ORAL DAILY
Qty: 30 TABLET | Refills: 11 | Status: SHIPPED | OUTPATIENT
Start: 2022-04-22 | End: 2022-10-25

## 2022-04-22 RX ORDER — BLOOD SUGAR DIAGNOSTIC
STRIP MISCELLANEOUS
COMMUNITY
Start: 2022-04-14

## 2022-04-22 NOTE — PROGRESS NOTES
[facility]  56 y.[facility]      1. Essential hypertension    2. Paroxysmal atrial fibrillation (HCC)    3. Mixed hyperlipidemia    4. Coronary artery disease involving native heart, unspecified vessel or lesion type, unspecified whether angina present    5. Peripheral arterial disease (HCC)        History of Present Illness:    Body mass index is 36.51 kg/m². BMI is above normal parameters. Recommendations include: exercise counseling, nutrition counseling and referral to primary care.      56 years old patient resented post hospital follow-up after electrical cardioversion.  Significant provement in quality of life.  EKG confirmed the sinus rhythm with isolated premature ventricular complex.  The patient was on Topamax 25 mg and diltiazem 180.  Will discontinue diltiazem.  With documented history of CAD s/p PCI to LAD and history of CAD and CABG with LIMA to LAD vein graft to obtuse marginal vein graft to PDA and previous CT coronary angiogram patent grafts with a nonobstructive disease in LAD and 70% disease left circumflex with a patent graft to obtuse marginal and no significant disease in RCA.  Patient has symptomatic paroxysmal atrial fibrillation previously evaluated was in oral anticoagulation with amiodarone and underwent electrical cardioversion.  Presented post hospital follow-up for preserved left ventricle systolic function no symptoms of cardiac decompensation reported.  Previously the case was discussed with Dr. Blane Rice and Dr. Dwyer       DATE OF PROCEDURE: 03/14/22     Referring Physician:       /ELECTROPHYSIOLOGIST:            PROCEDURE(S) PERFORMED:   1. External cardioversion of atrial fibrillation.         INDICATIONS FOR PROCDEDURE:            Symptomatic atrial fibrillation with rapid ventricular rate    Echo March 2022    · Left ventricular wall thickness is consistent with mild concentric hypertrophy.  · There is calcification of the aortic valve mainly  affecting the non-coronary cusp(s).  · The right atrial cavity is mildly dilated.  · Estimated right ventricular systolic pressure from tricuspid regurgitation is mildly elevated (35-45 mmHg).  · Left ventricular ejection fraction appears to be 51 - 55%.  · Left ventricular diastolic function was normal    Cardiac cath February 2022      Conclusion    · Prox LAD lesion is 90% stenosed WITH 2 DIAGONALS POST STENOSIS NOT GRAFTED.  · FINAL lad IMAGES ARE )% W 3.25 x 18 mm VINEET.  · Moderate systolic dysfunction.  · LV pressures (S/D/E) : 124/14/      CT coronary angiogram 9/25/2020     CT FUNCTIONAL ANALYSIS:  Ejection Fraction     74 %  Diastolic Volume     136 ml  Systolic Volume      35 ml  Stroke Volume        101 ml  Cardiac Output       4.6 L/minute     IMPRESSION:  CONCLUSION:   1.  Patient's calcium score is 697.This places the patent in the  high likelihood of at least one significant coronary narrowing  risk for coronary artery disease.  2.  LAD: Proximal calcified atherosclerotic plaque causing mild  stenosis of less than 50%. Mid LAD soft and calcified  atherosclerotic plaque foci causing moderate stenosis of 50-70%.  LIMA to distal LAD bypass graft which appears patent.  3.  Circumflex: Proximal calcified atherosclerotic plaque causing  significant stenosis of 70% or greater. No other calcified or  soft tissue density plaque and/or stenosis. Patent saphenous vein  bypass graft to the obtuse marginal second branch.  4.  RCA: No calcified or soft tissue density plaque and no  stenosis. Patent saphenous vein bypass graft to the PDA.  5.  Remainder CT angiogram coronary exam unremarkable.     September 2019 cardiac  Cath demonstrated severe CAD multivessel with lesions LAD 80% DX 70% OM2 60% PDA 70%.     CABG 9/25/2019   DISTAL BYPASS TARGETS:    1. LIMA to LAD.    2. Greater saphenous vein to OM2    3. Greater saphenous vein to PDA   · Left ventricular wall thickness is consistent with mild concentric  hypertrophy.  · Estimated EF = 61%.  · Left ventricular systolic function is normal.  · Left ventricular diastolic dysfunction (grade I) consistent with impaired relaxation.  · Mild mitral valve regurgitation is present  9/3/19  Conclusion:  1.  Severe three-vessel obstructive coronary artery disease involving mid LAD, diagonal, obtuse marginal and RPDA.  2.  Normal left-sided filling pressures.  No gradient noted across aortic valve on pullback  3.  Patent left subclavian and LIMA    SUBJECTIVE:    Allergies   Allergen Reactions   • Ace Inhibitors Anaphylaxis   • Wellbutrin [Bupropion] Anaphylaxis         Past Medical History:   Diagnosis Date   • Coronary artery disease    • COVID-19 virus detected 2022   • Diabetes mellitus (HCC)    • Diabetic retinopathy (HCC)    • GERD (gastroesophageal reflux disease)    • Hyperlipidemia    • Hypertension    • Osteoarthritis    • Paroxysmal atrial fibrillation (HCC)    • Sleep apnea    • Stroke (HCC)          Past Surgical History:   Procedure Laterality Date   • CARDIAC CATHETERIZATION N/A 9/3/2019   • CARDIAC CATHETERIZATION N/A 2022   • CHOLECYSTECTOMY     • COLONOSCOPY N/A 2021   • COLONOSCOPY N/A 2022   • CORONARY ARTERY BYPASS GRAFT N/A 2019         Family History   Problem Relation Age of Onset   • Diabetes Mother    • Hypertension Father          Social History     Socioeconomic History   • Marital status:    Tobacco Use   • Smoking status: Former Smoker     Packs/day: 1.00     Years: 35.00     Pack years: 35.00     Types: Cigarettes     Start date: 1980     Quit date: 9/3/2019     Years since quittin.6   • Smokeless tobacco: Never Used   Vaping Use   • Vaping Use: Never used   Substance and Sexual Activity   • Alcohol use: No   • Drug use: No   • Sexual activity: Not Currently     Comment: .         Current Outpatient Medications   Medication Sig Dispense Refill   • Accu-Chek Guide test strip USE 1 TO CHECK GLUCOSE 4  "TIMES DAILY     • acetaminophen (TYLENOL) 325 MG tablet Take 2 tablets by mouth Every 6 (Six) Hours As Needed for Mild Pain .     • albuterol sulfate  (90 Base) MCG/ACT inhaler Inhale 2 puffs Every 4 (Four) Hours As Needed for Wheezing. 18 g 11   • amiodarone (PACERONE) 200 MG tablet Take 1 tablet by mouth Daily. 60 tablet 1   • apixaban (ELIQUIS) 5 MG tablet tablet Take 5 mg by mouth 2 (Two) Times a Day.     • atorvastatin (LIPITOR) 40 MG tablet Take 1 tablet by mouth Every Night for 90 days. 90 tablet 0   • clopidogrel (PLAVIX) 75 MG tablet Take 1 tablet by mouth Daily. 90 tablet 3   • esomeprazole (nexIUM) 40 MG capsule Take 1 capsule by mouth Every Morning Before Breakfast. REPLACES PROTONIX, PREVACID, PRILOSEC, WHICH ARE NOT COVERED. 90 capsule 3   • gabapentin (NEURONTIN) 300 MG capsule TAKE 1 CAPSULE BY MOUTH AT BEDTIME FOR 2 DAYS THEN 1 TWICE DAILY FOR 2 DAYS THEN 1 THREE TIMES DAILY THEREAFTER     • guaiFENesin-dextromethorphan (ROBITUSSIN DM) 100-10 MG/5ML syrup Take 10 mL by mouth 4 (Four) Times a Day As Needed for Cough. 240 mL 1   • insulin aspart prot & aspart (NovoLOG Mix 70/30 FlexPen) (70-30) 100 UNIT/ML suspension pen-injector injection Start 10u ac breakfast and 5u ac supper. I anticipate will need 50 units per day. 5 pen 0   • Insulin Pen Needle (Pen Needles 1/2\") 29G X 12MM misc 1 each Every Night. 100 each 3   • isosorbide mononitrate (IMDUR) 30 MG 24 hr tablet Take 1 tablet by mouth Daily. 30 tablet 11   • Lancet Devices (ONE TOUCH DELICA LANCING DEV) misc Use as directed to test blood sugar. (Patient taking differently: Use as directed to test blood sugar.) 1 each 11   • linaclotide (Linzess) 145 MCG capsule capsule Take 1 capsule by mouth Every Morning Before Breakfast. 30 capsule 5   • metFORMIN (GLUCOPHAGE) 500 MG tablet Take 2 tablets by mouth 2 (Two) Times a Day With Meals. 360 tablet 0   • nitroglycerin (NITROSTAT) 0.4 MG SL tablet Place 1 tablet under the tongue Every 5 (Five) " "Minutes As Needed for Chest Pain. Take no more than 3 doses in 15 minutes. 30 tablet 1   • nortriptyline (PAMELOR) 10 MG capsule TAKE 1 CAPSULE BY MOUTH THREE TIMES DAILY 90 capsule 0   • ondansetron (ZOFRAN) 4 MG tablet Take 1 tablet by mouth Every 8 (Eight) Hours As Needed for Nausea or Vomiting. 10 tablet 0   • ONETOUCH DELICA LANCETS 33G misc 1 each 4 (Four) Times a Day. delica if covered, use alternative if not covered 120 each 11   • pioglitazone (Actos) 30 MG tablet Take 1 tablet by mouth Daily. 90 tablet 0   • ranolazine (RANEXA) 500 MG 12 hr tablet Take 1 tablet by mouth Every 12 (Twelve) Hours. 60 tablet 1   • spironolactone (ALDACTONE) 25 MG tablet Take 1 tablet by mouth once daily 30 tablet 0   • tiZANidine (ZANAFLEX) 4 MG tablet Take 4 mg by mouth 3 (Three) Times a Day.       No current facility-administered medications for this visit.           Review of Systems:     Constitutional:  Denies recent weight loss, weight gain,no change in exercise tolerance.     HENT:  Denies any hearing loss, epistaxis, hoarseness    Eyes: No blurred s.    Respiratory: Exertional dyspnea    Cardiovascular: See H&P  Gastrointestinal:  Denies change in bowel habits, dyspepsia, ulcer disease,    Endocrine: Negative for cold intolerance, heat intolerance, polydipsia, polyphagia and polyuria.polyuria.     Genitourinary: Negative.      Musculoskeletal: Denies any history of arthritic symptoms or back problems.     Skin:  Denies any change in hair or nails, rashes, or skin lesions.     Allergic/Immunologic: Negative.  Negative for environmental allergies,     Neurological:  Denies any history of recurrent headaches, strokes,     Hematological: Denies any food allergies, seasonal allergies, bleeding disorders,    Psychiatric/Behavioral: Denies any history of depression,      OBJECTIVE:    /62 (BP Location: Left arm, Patient Position: Sitting, Cuff Size: Adult)   Pulse 59   Temp 97.5 °F (36.4 °C)   Ht 188 cm (74\")   Wt " 129 kg (284 lb 6.4 oz)   SpO2 98%   BMI 36.51 kg/m²       Physical Exam:     Constitutional: Cooperative, alert and oriented, well-developed, well-nourished, in no acute distress.     HENT:   Head: Normocephalic, conjunctive is pink thyroid is nonpalpable no jugular is distention    Cardiovascular: Irregular rhythm, S1 and S2 normal, no S3 or S4. Apical impulse not displaced. No murmurs, gallops, or rubs detected.     Pulmonary/Chest: Chest: Clear to auscultation no rales or wheezing    Abdominal: Abdomen soft, bowel sounds normoactive, no masses,     Musculoskeletal: No deformities, strong peripheral pulses no    Neurological: No gross motor or sensory deficits noted, affect appropriate, oriented to time, person, place.     Skin: Warm and dry to the touch, no apparent skin lesions or masses noted.     Psychiatric: He has a normal mood and affect. His behavior is normal.         Procedures      Lab Results   Component Value Date    WBC 11.52 (H) 04/09/2022    HGB 17.3 04/09/2022    HCT 50.0 04/09/2022    MCV 82.9 04/09/2022     04/09/2022     Lab Results   Component Value Date    GLUCOSE 156 (H) 04/09/2022    BUN 24 (H) 04/09/2022    CREATININE 0.95 04/09/2022    EGFRIFNONA 74 02/24/2022    BCR 25.3 (H) 04/09/2022    CO2 27.0 04/09/2022    CALCIUM 9.2 04/09/2022    ALBUMIN 4.00 04/09/2022     (H) 04/09/2022    ALT 92 (H) 04/09/2022     Lab Results   Component Value Date    CHOL 94 03/10/2022    CHOL 115 02/15/2022    CHOL 211 (H) 06/11/2021     Lab Results   Component Value Date    TRIG 34 03/10/2022    TRIG 63 02/15/2022    TRIG 144 06/11/2021     Lab Results   Component Value Date    HDL 42 03/10/2022    HDL 38 (L) 02/15/2022    HDL 40 06/11/2021     No components found for: LDLCALC  Lab Results   Component Value Date    LDL 42 03/10/2022    LDL 63 02/15/2022     (H) 06/11/2021     No results found for: HDLLDLRATIO  No components found for: CHOLHDL  Lab Results   Component Value Date     HGBA1C 10.30 (H) 03/10/2022     Lab Results   Component Value Date    TSH 0.609 02/22/2022           ASSESSMENT AND PLAN:    Symptomatic short-term persistent atrial fibrillation    [id number] score is 3 continue    He developed A. fib during acute illness we will continue amiodarone for next 6 months and if there is no further recurrence will discontinue    56 years symptomatic atrial fibrillation underwent electrical cardioversions presented post hospital follow-up.  With a history of CAD s/p CABG underwent cardiac catheterization in the mid February 2022 noted of critical LAD disease PCI stent was performed.    We will continue amiodarone and continue oral anticoagulation  #2 CAD status post CABG asymptomatic no further recurrence of chest pain and is a pleased with her clinical outcome  Continue aspirin Plavix, Ranexa and isosorbide    #2 hypertension with hypertensive heart disease.    Good blood pressure control will continue amlodipine, metoprolol and spironolactone    Patient was counseled educated regarding level of physical activity and food particularly with high sodium content    #3 hyperlipidemia  Continue atorvastatin    #4  Patient with history of postop paroxysmal atrial fibrillation flipped back into A. fib previously treated with amiodarone rate is well controlled and will continue metoprolol          Preventive    Obesity with a BMI of 35.95 with history of diabetes hypertension hyperlipidemia and CAD.    Significant low-carb weight, low-fat, DASH diet graded exercise discussed with the patient.      Significant low carb rate, low-fat, DASH diet and graded exercise discussed.    I spent 15 minutes caring for Herbert on this date of service. This time includes time spent by me of counseling/coordination of care as relates to the presenting problem and any ordered procedures/tests as outlined above.             This document has been electronically signed by Alice Rice MD on April 22, 2022  08:56 CDT        Diagnoses and all orders for this visit:    1. Essential hypertension (Primary)  -     ECG 12 Lead    2. Paroxysmal atrial fibrillation (HCC)    3. Mixed hyperlipidemia    4. Coronary artery disease involving native heart, unspecified vessel or lesion type, unspecified whether angina present    5. Peripheral arterial disease (HCC)          Alice Rice MD  4/22/2022  08:54 CDT

## 2022-05-13 ENCOUNTER — OFFICE VISIT (OUTPATIENT)
Dept: FAMILY MEDICINE CLINIC | Facility: CLINIC | Age: 57
End: 2022-05-13

## 2022-05-13 VITALS
DIASTOLIC BLOOD PRESSURE: 91 MMHG | BODY MASS INDEX: 35.16 KG/M2 | HEART RATE: 92 BPM | OXYGEN SATURATION: 98 % | HEIGHT: 74 IN | TEMPERATURE: 97.8 F | SYSTOLIC BLOOD PRESSURE: 165 MMHG | WEIGHT: 274 LBS

## 2022-05-13 DIAGNOSIS — S86.899A MEDIAL TIBIAL STRESS SYNDROME, UNSPECIFIED LATERALITY, INITIAL ENCOUNTER: Primary | ICD-10-CM

## 2022-05-13 PROCEDURE — 99213 OFFICE O/P EST LOW 20 MIN: CPT | Performed by: NURSE PRACTITIONER

## 2022-05-13 NOTE — PROGRESS NOTES
"Subjective   Herbert White . is a 56 y.o. male. Leg pain    History of Present Illness   Patient presents today for leg pain. Leg pain is bilateral. Pain located anterior lower extremities. He says his \"shins are sore\". Pain worse yesterday after walking a lot at work. He tried elevating lower extremities and taking tylenol with no relief. Denies any recent injuries.    The following portions of the patient's history were reviewed and updated as appropriate: allergies, current medications, past family history, past medical history, past social history, past surgical history, and problem list.    Review of Systems   Constitutional: Negative for chills, fatigue and fever.   HENT: Negative for congestion, ear pain, rhinorrhea and sore throat.    Eyes: Negative for blurred vision, double vision and visual disturbance.   Respiratory: Negative for cough, chest tightness, shortness of breath and wheezing.    Cardiovascular: Negative for chest pain, palpitations and leg swelling.   Gastrointestinal: Negative for abdominal pain, diarrhea, nausea and vomiting.   Endocrine: Negative for cold intolerance and heat intolerance.   Genitourinary: Negative for difficulty urinating, dysuria, frequency and hematuria.   Musculoskeletal: Positive for arthralgias (leg pain). Negative for back pain, neck pain and neck stiffness.   Skin: Negative for dry skin, pallor, rash, skin lesions and wound.   Allergic/Immunologic: Negative for environmental allergies, food allergies and immunocompromised state.   Neurological: Negative for dizziness, syncope, weakness, light-headedness, headache and confusion.   Hematological: Negative for adenopathy. Does not bruise/bleed easily.   Psychiatric/Behavioral: Negative for self-injury, sleep disturbance, suicidal ideas, depressed mood and stress. The patient is not nervous/anxious.        Vitals:    05/13/22 1131   BP: 165/91   Pulse: 92   Temp: 97.8 °F (36.6 °C)   SpO2: 98%     Body mass " index is 35.18 kg/m².    Objective   Physical Exam  Vitals and nursing note reviewed.   Constitutional:       Appearance: He is well-developed.   HENT:      Head: Normocephalic.   Eyes:      Conjunctiva/sclera: Conjunctivae normal.   Musculoskeletal:         General: Normal range of motion.      Cervical back: Full passive range of motion without pain, normal range of motion and neck supple.   Skin:     General: Skin is warm and dry.          Neurological:      Mental Status: He is alert and oriented to person, place, and time.      Coordination: Coordination is intact. Coordination normal.      Gait: Gait is intact. Gait normal.   Psychiatric:         Attention and Perception: Attention normal.         Mood and Affect: Mood normal.         Speech: Speech normal.         Behavior: Behavior normal. Behavior is cooperative.         Thought Content: Thought content normal.         Cognition and Memory: Cognition normal.         Judgment: Judgment normal.           Assessment & Plan   Diagnoses and all orders for this visit:    1. Medial tibial stress syndrome, unspecified laterality, initial encounter (Primary)  -     XR Tibia Fibula 2 View Bilateral (In Office)    Symptoms consistent with medial tibial stress syndrome.   Pt instructed to rest, ice, stretch.   Take tylenol as directed PRN for pain.  Pt requested xray's today. Xray's ordered bilateral tibia/fibula.  If symptoms do not improve or worsen, patient was instructed to return to clinic for further evaluation.         This document has been electronically signed by NELSY Morris on  May 13, 2022 13:40 CDT

## 2022-05-16 LAB
QT INTERVAL: 426 MS
QTC INTERVAL: 421 MS

## 2022-05-16 RX ORDER — INSULIN ASPART 100 [IU]/ML
INJECTION, SUSPENSION SUBCUTANEOUS
Qty: 15 ML | Refills: 0 | Status: SHIPPED | OUTPATIENT
Start: 2022-05-16 | End: 2022-06-23

## 2022-05-23 RX ORDER — PIOGLITAZONEHYDROCHLORIDE 30 MG/1
TABLET ORAL
Qty: 90 TABLET | Refills: 0 | Status: SHIPPED | OUTPATIENT
Start: 2022-05-23

## 2022-06-23 ENCOUNTER — OFFICE VISIT (OUTPATIENT)
Dept: ENDOCRINOLOGY | Facility: CLINIC | Age: 57
End: 2022-06-23

## 2022-06-23 ENCOUNTER — SPECIALTY PHARMACY (OUTPATIENT)
Dept: ENDOCRINOLOGY | Facility: CLINIC | Age: 57
End: 2022-06-23

## 2022-06-23 VITALS
WEIGHT: 271 LBS | DIASTOLIC BLOOD PRESSURE: 100 MMHG | BODY MASS INDEX: 34.78 KG/M2 | HEIGHT: 74 IN | OXYGEN SATURATION: 98 % | HEART RATE: 89 BPM | SYSTOLIC BLOOD PRESSURE: 150 MMHG

## 2022-06-23 DIAGNOSIS — E11.42 DIABETIC POLYNEUROPATHY ASSOCIATED WITH TYPE 2 DIABETES MELLITUS: ICD-10-CM

## 2022-06-23 DIAGNOSIS — I15.2 HYPERTENSION ASSOCIATED WITH DIABETES: ICD-10-CM

## 2022-06-23 DIAGNOSIS — E11.65 TYPE 2 DIABETES MELLITUS WITH HYPERGLYCEMIA, WITH LONG-TERM CURRENT USE OF INSULIN: Primary | ICD-10-CM

## 2022-06-23 DIAGNOSIS — E11.69 MIXED DIABETIC HYPERLIPIDEMIA ASSOCIATED WITH TYPE 2 DIABETES MELLITUS: ICD-10-CM

## 2022-06-23 DIAGNOSIS — E11.59 HYPERTENSION ASSOCIATED WITH DIABETES: ICD-10-CM

## 2022-06-23 DIAGNOSIS — E78.2 MIXED DIABETIC HYPERLIPIDEMIA ASSOCIATED WITH TYPE 2 DIABETES MELLITUS: ICD-10-CM

## 2022-06-23 DIAGNOSIS — Z79.4 TYPE 2 DIABETES MELLITUS WITH HYPERGLYCEMIA, WITH LONG-TERM CURRENT USE OF INSULIN: Primary | ICD-10-CM

## 2022-06-23 PROCEDURE — 99214 OFFICE O/P EST MOD 30 MIN: CPT | Performed by: INTERNAL MEDICINE

## 2022-06-23 RX ORDER — GABAPENTIN 300 MG/1
900 CAPSULE ORAL 3 TIMES DAILY
Qty: 270 CAPSULE | Refills: 5 | Status: SHIPPED | OUTPATIENT
Start: 2022-06-23 | End: 2023-06-23

## 2022-06-23 RX ORDER — SEMAGLUTIDE 1.34 MG/ML
0.5 INJECTION, SOLUTION SUBCUTANEOUS WEEKLY
Qty: 1.5 ML | Refills: 11 | Status: SHIPPED | OUTPATIENT
Start: 2022-06-23 | End: 2022-10-03

## 2022-06-23 RX ORDER — GLUCAGON 3 MG/1
1 POWDER NASAL AS NEEDED
Qty: 2 EACH | Refills: 11 | Status: SHIPPED | OUTPATIENT
Start: 2022-06-23

## 2022-06-23 RX ORDER — INSULIN ASPART INJECTION 100 [IU]/ML
INJECTION, SOLUTION SUBCUTANEOUS
Qty: 18 ML | Refills: 11 | Status: SHIPPED | OUTPATIENT
Start: 2022-06-23

## 2022-06-23 RX ORDER — EMPAGLIFLOZIN, METFORMIN HYDROCHLORIDE 5; 1000 MG/1; MG/1
2 TABLET, EXTENDED RELEASE ORAL
Qty: 60 TABLET | Refills: 11 | Status: SHIPPED | OUTPATIENT
Start: 2022-06-23 | End: 2022-08-06

## 2022-06-23 RX ORDER — INSULIN DEGLUDEC 200 U/ML
60 INJECTION, SOLUTION SUBCUTANEOUS NIGHTLY
Qty: 9 ML | Refills: 11 | Status: SHIPPED | OUTPATIENT
Start: 2022-06-23

## 2022-06-23 NOTE — PROGRESS NOTES
Specialty Pharmacy Program - Endocrinology       Herbert White Sr. is a 56 y.o. male with Type 2 Diabetes enrolled in the Endocrinology Patient Management program offered by [facility].  An initial outreach was conducted, including assessment of therapy appropriateness and specialty medication education for Ozempic, Jardiance, Steffany, and toujeo. The patient was introduced to services offered by our specialty pharmacy program, including: regular assessments, refill coordination, curbside pick-up or mail order delivery options, prior authorization maintenance, and financial assistance programs as applicable. The patient was also provided with contact information for the pharmacy team.     Insurance Coverage & Financial Support  Commercial    Relevant Past Medical History and Comorbidities  Relevant medical history and concomitant health conditions were discussed with the patient. The patient's chart has been reviewed for relevant past medical history and comorbid health conditions and updated as necessary.   Past Medical History:   Diagnosis Date   • Coronary artery disease    • COVID-19 virus detected 2022   • Diabetes mellitus (HCC)    • Diabetic retinopathy (HCC)    • GERD (gastroesophageal reflux disease)    • Hyperlipidemia    • Hypertension    • Osteoarthritis    • Paroxysmal atrial fibrillation (HCC)    • Sleep apnea    • Stroke (HCC)      Social History     Socioeconomic History   • Marital status:    Tobacco Use   • Smoking status: Current Every Day Smoker     Packs/day: 1.00     Years: 35.00     Pack years: 35.00     Types: Cigarettes     Start date: 1980     Last attempt to quit: 9/3/2019     Years since quittin.8   • Smokeless tobacco: Never Used   Vaping Use   • Vaping Use: Never used   Substance and Sexual Activity   • Alcohol use: No   • Drug use: No   • Sexual activity: Not Currently     Comment: .       Problem list reviewed by Kathleen  Gaudencio RAMOS PharmMICK on 6/23/2022 at  1:40 PM    Allergies  Known allergies and reactions were discussed with the patient. The patient's chart has been reviewed for  allergy information and updated as necessary.   Ace inhibitors and Wellbutrin [bupropion]    Allergies reviewed by Gaudencio Wang, PharmD on 6/23/2022 at  1:40 PM    Current Medication List  This medication list has been reviewed with the patient and evaluated for any interactions or necessary modifications/recommendations, and updated to include all prescription medications, OTC medications, and supplements the patient is currently taking.  This list reflects what is contained in the patient's profile, which has also been marked as reviewed to communicate to other providers it is the most up to date version of the patient's current medication therapy.     Current Outpatient Medications:   •  Accu-Chek Guide test strip, USE 1 TO CHECK GLUCOSE 4 TIMES DAILY, Disp: , Rfl:   •  acetaminophen (TYLENOL) 325 MG tablet, Take 2 tablets by mouth Every 6 (Six) Hours As Needed for Mild Pain ., Disp: , Rfl:   •  albuterol sulfate  (90 Base) MCG/ACT inhaler, Inhale 2 puffs Every 4 (Four) Hours As Needed for Wheezing., Disp: 18 g, Rfl: 11  •  amiodarone (PACERONE) 200 MG tablet, Take 1 tablet by mouth Daily., Disp: 60 tablet, Rfl: 1  •  apixaban (ELIQUIS) 5 MG tablet tablet, Take 5 mg by mouth 2 (Two) Times a Day., Disp: , Rfl:   •  atorvastatin (LIPITOR) 40 MG tablet, Take 1 tablet by mouth Every Night for 90 days., Disp: 90 tablet, Rfl: 0  •  clopidogrel (PLAVIX) 75 MG tablet, Take 1 tablet by mouth Daily., Disp: 90 tablet, Rfl: 3  •  Continuous Blood Gluc  (FreeStyle Steffany 2 Sauquoit) device, 1 each Continuous. Use as indicated for glucose monitoring, Disp: 1 each, Rfl: 1  •  Continuous Blood Gluc Sensor (FreeStyle Steffany 2 Sensor) misc, 1 each Every 14 (Fourteen) Days. Use every 14 days, Disp: 2 each, Rfl: 11  •  Empagliflozin-metFORMIN HCl ER  "(Synjardy XR) 5-1000 MG tablet sustained-release 24 hour, Take 2 tablets by mouth Daily With Breakfast., Disp: 60 tablet, Rfl: 11  •  esomeprazole (nexIUM) 40 MG capsule, Take 1 capsule by mouth Every Morning Before Breakfast. REPLACES PROTONIX, PREVACID, PRILOSEC, WHICH ARE NOT COVERED., Disp: 90 capsule, Rfl: 3  •  gabapentin (Neurontin) 300 MG capsule, Take 3 capsules by mouth 3 (Three) Times a Day., Disp: 270 capsule, Rfl: 5  •  Glucagon (Baqsimi Two Pack) 3 MG/DOSE powder, Administer 1 spray into a single nostril as directed by provider As Needed for low blood sugar, Disp: 2 each, Rfl: 11  •  guaiFENesin-dextromethorphan (ROBITUSSIN DM) 100-10 MG/5ML syrup, Take 10 mL by mouth 4 (Four) Times a Day As Needed for Cough. (Patient taking differently: Take 10 mL by mouth As Needed for Cough.), Disp: 240 mL, Rfl: 1  •  Insulin Aspart, w/Niacinamide, (Fiasp FlexTouch) 100 UNIT/ML solution pen-injector, Inject up to 20 Units under the skin into the appropriate area as directed 3 (Three) Times a Day Before Meals., Disp: 18 mL, Rfl: 11  •  Insulin Degludec (Tresiba FlexTouch) 200 UNIT/ML solution pen-injector pen injection, Inject 60 Units under the skin into the appropriate area as directed Every Night., Disp: 9 mL, Rfl: 11  •  Insulin Pen Needle (Pen Needles 1/2\") 29G X 12MM misc, 1 each Every Night., Disp: 100 each, Rfl: 3  •  Insulin Regular Human 60x4 &60x8 & 60x12 UNIT powder, Inhale 4-36 Units 3 (Three) Times a Day Before Meals., Disp: 360 each, Rfl: 11  •  isosorbide mononitrate (IMDUR) 30 MG 24 hr tablet, Take 1 tablet by mouth Daily., Disp: 30 tablet, Rfl: 11  •  Lancet Devices (ONE TOUCH DELICA LANCING DEV) misc, Use as directed to test blood sugar. (Patient taking differently: Use as directed to test blood sugar.), Disp: 1 each, Rfl: 11  •  linaclotide (Linzess) 145 MCG capsule capsule, Take 1 capsule by mouth Every Morning Before Breakfast., Disp: 90 capsule, Rfl: 1  •  metoprolol succinate XL (TOPROL-XL) 25 " MG 24 hr tablet, Take 1 tablet by mouth Daily., Disp: 30 tablet, Rfl: 11  •  nitroglycerin (NITROSTAT) 0.4 MG SL tablet, Place 1 tablet under the tongue Every 5 (Five) Minutes As Needed for Chest Pain. Take no more than 3 doses in 15 minutes., Disp: 30 tablet, Rfl: 1  •  nortriptyline (PAMELOR) 10 MG capsule, TAKE 1 CAPSULE BY MOUTH THREE TIMES DAILY, Disp: 90 capsule, Rfl: 0  •  ondansetron (ZOFRAN) 4 MG tablet, Take 1 tablet by mouth Every 8 (Eight) Hours As Needed for Nausea or Vomiting., Disp: 10 tablet, Rfl: 0  •  ONETOUCH DELICA LANCETS 33G misc, 1 each 4 (Four) Times a Day. delica if covered, use alternative if not covered, Disp: 120 each, Rfl: 11  •  pioglitazone (ACTOS) 30 MG tablet, Take 1 tablet by mouth once daily, Disp: 90 tablet, Rfl: 0  •  ranolazine (RANEXA) 500 MG 12 hr tablet, Take 1 tablet by mouth Every 12 (Twelve) Hours., Disp: 60 tablet, Rfl: 1  •  Semaglutide,0.25 or 0.5MG/DOS, (Ozempic, 0.25 or 0.5 MG/DOSE,) 2 MG/1.5ML solution pen-injector, Inject 0.5 mg under the skin into the appropriate area as directed 1 (One) Time Per Week., Disp: 1.5 mL, Rfl: 11  •  spironolactone (ALDACTONE) 25 MG tablet, Take 1 tablet by mouth once daily, Disp: 30 tablet, Rfl: 0  •  tiZANidine (ZANAFLEX) 4 MG tablet, Take 4 mg by mouth 3 (Three) Times a Day., Disp: , Rfl:     Medicines reviewed by Gaudencio Wang, PharmD on 6/23/2022 at  1:41 PM    Drug Interactions  None     Recommended Medications Assessment  • Statin - Currently Taking  • ACEi/ARB - Contraindicated    Relevant Laboratory Values  A1C Last 3 Results    HGBA1C Last 3 Results 1/21/22 2/15/22 3/10/22   Hemoglobin A1C 11.40 (A) 10.60 (A) 10.30 (A)   (A) Abnormal value            Lab Results   Component Value Date    HGBA1C 10.30 (H) 03/10/2022     Lab Results   Component Value Date    GLUCOSE 156 (H) 04/09/2022    CALCIUM 9.2 04/09/2022     04/09/2022    K 4.2 04/09/2022    CO2 27.0 04/09/2022     04/09/2022    BUN 24 (H) 04/09/2022     CREATININE 0.95 04/09/2022    EGFRIFNONA 74 02/24/2022    BCR 25.3 (H) 04/09/2022    ANIONGAP 8.0 04/09/2022     Lab Results   Component Value Date    CHOL 94 03/10/2022    TRIG 34 03/10/2022    HDL 42 03/10/2022    LDL 42 03/10/2022         Initial Education Provided for Specialty Medication  The patient has been provided with the following education and any applicable administration techniques (i.e. self-injection) have been demonstrated for the therapies indicated. All questions and concerns have been addressed prior to the patient receiving the medication, and the patient has verbalized understanding of the education and any materials provided.  Additional patient education shall be provided and documented upon request by the patient, provider or payer.      OZEMPIC® (semaglutide)  Medication Expectations   Why am I taking this medication? You are taking Ozempic, along with diet and exercise, to lower blood sugar because you have type 2 diabetes. Diabetes is not curable but with proper medication and treatment, we can keep your blood sugar within your personalized target range. This medication may also help you lose some weight, and it helps reduce the risk of death from heart attack, and stroke in adults with type 2 diabetes and known heart disease.   What should I expect while on this medication? You should expect to see your blood sugar and A1c decrease over time and you may also lose some weight.   How does the medication work? Ozempic is a non-insulin injection that works with your body's own ability to lower blood sugar and A1c and helps your body release its own insulin in response to your blood sugar rising.  This medication also slows down food from leaving your stomach, making you feel ron for longer.   How long will I be on this medication for? The amount of time you will be on this medication will be determined by your doctor based on blood sugar and A1c control. You will most likely be on this  medication or another diabetes medication throughout your lifetime. Do not abruptly stop this medication without talking to your doctor first.    How do I take this medication? Take as directed on your prescription label. Ozempic is injected under the skin (subcutaneously) of your stomach, thigh or upper arm.  Use this medication once weekly, on the same day each week, and it can be given with or without food.  Use a different injection site each week in the same body region.     For each new prefilled pen, prime the needle before the first injection by turning the dose selector to the flow check symbol and injecting into the air (priming is not required for subsequent injections).   • Once needle is inserted, continue to press the button until the dial has returned to 0 and for an additional 6 seconds before removing.  •   What are some possible side effects? You may notice you don't feel as hungry, especially when you first start using Ozempic.  The most common side effects are nausea, diarrhea, vomiting, stomach pain, and constipation.      Stop using Ozempic and call your doctor immediately if you have severe pain in your stomach area that will not go away as this could be a sign of pancreatitis (inflammation of your pancreas).  Tell your doctor if you get a lump or swelling in your neck, hoarseness, difficulty swallowing, or feel short of breath (these may be symptoms of thyroid cancer).  Talk with your doctor if you have changes in vision while taking Ozempic.   What happens if I miss a dose? If you miss a dose, take it as soon as you remember as long as it is within 5 days after your missed dose.  If more than 5 days have passed, skip the missed dose and resume Ozempic on the regularly scheduled day.     Medication Safety   What are things I should warn my doctor immediately about? Do not use Ozempic if you or a family member have ever had medullary thyroid cancer (MTC) or Multiple Endocrine Neoplasia  syndrome type 2 (MEN 2).    • Tell your doctor if you get a lump or swelling in your neck, hoarseness, difficulty swallowing, or feel short of breath (these may be symptoms of thyroid cancer).    Tell your doctor if you have or have had problems with your kidneys or pancreas.   • Stop using Ozempic and get medical help right away if you have severe pain in your stomach area that will not go away as this could be a sign of pancreatitis (inflammation of your pancreas)  Tell your doctor if you have problem digesting food or slowed emptying of your stomach (gastroparesis).    Let your doctor know if you have changes in vision while taking Ozempic or have been diagnosed with diabetic retinopathy.    Talk to your doctor if you are pregnant, planning to become pregnant, or breastfeeding.     Get medical help right away if you notice any signs/symptoms of an allergic reaction (rash, hives, difficulty breathing, etc.).   What are things that I should be cautious of? Be cautious of any side effects from this medication. Talk to your doctor if any new ones develop or aren't getting better.   What are some medications that can interact with this one? Taking Ozempic with other medications that also lower your blood sugar such as insulin and glipizide/glimepiride/glyburide may increase the risk of low blood sugar.  • Your doctor may reduce the dose of these medications when you start Ozempic to minimize low blood sugars    It should not be taken with other medicines called GLP-1 receptor agonists, because these work the same way as Ozempic.      Because Ozempic slows stomach emptying, it can affect the way some medicines work.    Always tell your doctor or pharmacist immediately if you start taking any new medications, including over-the-counter medications, vitamins, and herbal supplements.     Medication Storage/Handling   How should I handle this medication? Keep this medication out of reach of pets/children and keep the pen  capped when not in use.   How does this medication need to be stored? Store in the refrigerator prior to first use, but do not freeze.  After first use, you may continue to store in the refrigerator or at room temperature for 56 days.  Protect from excessive heat and sunlight.   How should I dispose of this medication? Used Ozempic pens should be thrown away after 56 days.  Place your used Ozempic pen and needle in an approved sharps container after use.  If you do not have a sharps container, you may use a household container made of heavy-duty plastic with a tight-fitting lid that is leak resistant (e.g., heavy-duty plastic laundry detergent bottle).    If your doctor decides to stop this medication, take to your local police station for proper disposal. Some pharmacies also have take-back bins for medication drop-off.      Resources/Support   How can I remind myself to take this medication? You can download reminder apps to help you manage your refills. You may also set an alarm on your phone to remind you.    Is financial support available?  [employer] can provide co-pay cards if you have commercial insurance or patient assistance if you have Medicare or no insurance.    Which vaccines are recommended for me? Talk to your doctor about these vaccines:  • Flu   • Coronavirus (COVID-19)   • Pneumococcal (pneumonia)   • Tdap   • Hepatitis B   • Zoster (shingles)      JARDIANCE® (empagliflozin)  Medication Expectations   Why am I taking this medication? You are taking this medication to lower blood sugar because you have type 2 diabetes. Diabetes is not curable but with proper medication and treatment, we can keep your blood sugar within your personalized target range. This medication also helps reduce the risk of death from heart attack or stroke if you have heart disease and type 2 diabetes.   What should I expect while on this medication? You should expect to see your blood sugar and A1c decrease over time. You  may also see a decrease in your blood pressure and it can help some people lose weight.     How does the medication work? Jardiance works by helping to remove some sugar that the body doesn't need through urination.    How long will I be on this medication for? The amount of time you will be on this medication will be determined by your doctor based on blood sugar and A1c control. You will most likely be on this medication or another diabetes medication throughout your lifetime. Do not abruptly stop this medication without talking to your doctor first.    How do I take this medication? Take as directed on your prescription label. This medication is usually taken in the morning and can be given with or without food.    What are some possible side effects? You may notice increased urination, especially when you first start Jardiance. The most common side effects are urinary tract infections and yeast infections and are more commonly seen in females. Talk with your doctor if you notice white or yellow vaginal discharge, vaginal itching or odor of if you notice redness, itching, pain, or swelling of the penis and/or bad-smelling discharge from the penis.    What happens if I miss a dose? If you miss a dose, take it as soon as you remember. If it is close to your next dose, skip it (do not take 2 doses at once)     Medication Safety   What are things I should warn my doctor immediately about? Tell your doctor if you have kidney disease, liver disease, heart failure, pancreas problems, or history of frequent genital yeast or urinary tract infections. Tell your doctor if you are on a low-salt diet, if you drink alcohol, or if you are having surgery. Talk to your doctor if you are pregnant, planning to become pregnant, or breastfeeding. Also tell your doctor if you notice any signs/symptoms of an allergic reaction (rash, hives, difficulty breathing, etc.).   What are things that I should be cautious of? Be cautious of any  side effects from this medication. Talk to your doctor if any new ones develop or aren't getting better.   What are some medications that can interact with this one? Some medications that interact include diuretics (water pills) and other medications that may also lower your blood sugar such as insulins and glipizide/glimepiride/glyburide. Your doctor may reduce the dose of these medications when you start Jardiance to minimize low blood sugars. Always tell your doctor or pharmacist immediately if you start taking any new medications, including over-the-counter medications, vitamins, and herbal supplements.      Medication Storage/Handling   How should I handle this medication? Keep this medication out of reach of pets/children in tightly sealed container   How does this medication need to be stored? Store at room temperature and keep dry (don't keep in bathroom or other room with moisture)   How should I dispose of this medication? There should not be a need to dispose of this medication unless your provider decides to change the dose or therapy. If that is the case, take to your local police station for proper disposal. Some pharmacies also have take-back bins for medication drop-off.      Resources/Support   How can I remind myself to take this medication? You can download reminder apps to help you manage your refills. You may also set an alarm on your phone to remind you. The pharmacy carries pill boxes that you can place next to an area you pass everyday (such as where you place your car keys or where you charge your phone)   Is financial support available?  [employer] can provide co-pay cards if you have commercial insurance or patient assistance if you have Medicare or no insurance.    Which vaccines are recommended for me? Talk to your doctor about these vaccines: Flu, Coronavirus (COVID-19), Pneumococcal (pneumonia), Tdap, Hepatitis B, Zoster (shingles)      TRESIBA® (insulin degludec)    Medication Expectations    Why am I taking this medication?  You are taking this medication to lower blood sugar and A1c because you have type 1 or type 2 diabetes. Diabetes is not curable but with proper medication and treatment, we can keep your blood sugar within your personalized target range.    What should I expect while on this medication?  You should expect to see your blood sugar and A1c decrease over time.    How does the medication work?  Tresiba is a long-acting insulin that slowly and steadily releases after administration for 24-hours of blood sugar control. Insulin helps the sugar in your blood get into cells and provide them with energy to fuel your body.     How long will I be on this medication for?  If you have Type 1 diabetes, you will be on this medication or another insulin throughout your lifetime because your body cannot make its own insulin. If you have Type 2 diabetes, the amount of time you will be on this medication will be determined by your doctor based on blood sugar and A1c control. You will most likely be on this medication or another diabetes medication throughout your lifetime because your body does not make enough insulin. Do not abruptly stop this medication without talking to your doctor first.     How do I take this medication?  Take as directed on your prescription label. Tresiba is usually given once daily and can be taken with or without food. Tresiba is injected under the skin (subcutaneously) of your stomach, thigh, or upper arm. Use a different injection site in the same body region each day.       When using the FlexTouch pens, prime the needle before each injection by injecting 2 units of insulin into the air. Once the needle is inserted under the skin, continue to press the button until the dial has returned to 0 and for an additional 6 seconds. Then remove the needle and discard. Use a new needle for each injection.   What are some possible side effects?  Tresiba  may cause low blood sugar (hypoglycemia). Signs and symptoms that may indicate hypoglycemia include dizziness, sweating, anxiety, irritability, confusion, or headache. The most common side effects of Tresiba include reactions or skin thickening at the injection site, headache, weight gain, swelling of your hands and feet, and cough/runny nose/sore throat.    What happens if I miss a dose?  If you miss a dose, take it as soon as your remember. If it is close to your next dose, skip it. Always make sure there are at least 8 hours between doses and never take 2 doses at once.       Medication Safety    What are things I should warn my doctor immediately about?  Talk to your doctor if you are pregnant, planning to become pregnant, or breastfeeding. Also tell your doctor if you notice any signs/symptoms of an allergic reaction (rash, hives, difficulty breathing, etc.).    What are things that I should be cautious of?  Be cautious of any side effects from this medication. Talk to your doctor if any new ones develop or aren't getting better.    What are some medications that can interact with this one?  Do not take this medication with rosiglitazone. Taking Tresiba with other medications that lower your blood sugar such as SGLT-2 inhibitors (Jardiance, Invokana, Farxiga, etc.), GLP-1 agonists (Ozempic, Trulicity, Victoza, etc.), glipizide/glimepiride/glyburide, pioglitazone, and others may increase the risk of low blood sugar. Your doctor may reduce the dose of these medications when you start Tresiba to minimize low blood sugars. Always tell your doctor or pharmacist immediately if you start taking any new medications, including over-the-counter medications, vitamins, and herbal supplements.        Medication Storage/Handling    How should I handle this medication?  Keep this medication out of reach of pets/children and keep the pen capped when not in use. Do not share your medicine with others.     How does this  medication need to be stored?  Store your new, unused pens in the original carton in the refrigerator. Protect it from light. Do not freeze. You may store your opened Tresiba at room temperature or in the refrigerator for up to 56 days (8 weeks). Always remove the needle from the pen before storing.     How should I dispose of this medication?  Used Tresiba pens should be thrown away after 56 days even if insulin remains.?Place your used pen and needle in an approved sharps container. If you do not have a sharps container, you may use a household container made of heavy-duty plastic with a tight-fitting lid that is leak resistant (e.g.,?heavy-duty?plastic laundry detergent bottle).?If your doctor decides to stop this medication, take to your local police station for proper disposal. Some pharmacies also have?take-back bins for medication drop-off.??       Resources/Support    How can I remind myself to take this medication?  You can download reminder apps to help you manage your refills. You may also set an alarm on your phone to remind you.     Is financial support available?   [employer] can provide co-pay cards if you have commercial insurance or patient assistance if you have Medicare or no insurance.     Which vaccines are recommended for me?  Talk to your doctor about these vaccines: Flu, Coronavirus (COVID-19), Pneumococcal (pneumonia), Tdap, Hepatitis B, Zoster (shingles)            Adherence and Self-Administration  • Barriers to Patient Adherence and/or Self-Administration: none   • Methods for Supporting Patient Adherence and/or Self-Administration: none needed at this time     Goals of Therapy   Goals     •  Specialty Pharmacy General Goal (pt-stated)       Begin basal bolus  Begin kirstin  Tolerate ozempic and jardiance             Reassessment Plan & Follow-Up  1. Medication Therapy Changes: Change to toujeo and fiasp/afrezza, start using kirstin, ozempic, and jardiance   2. Additional Plans, Therapy  Recommendations, or Therapy Problems to Be Addressed: none   3. Pharmacist to perform regular reassessments no more than (6) months from the previous assessment.  4. Welcome information and patient satisfaction survey to be sent by retail team with patient's initial fill.  5. Care Coordinator to set up future refill outreaches, coordinate prescription delivery, and escalate clinical questions to pharmacist.     Attestation  I attest that the initiated specialty medication(s) are appropriate for the patient based on my assessment.  If the prescribed therapy is at any point deemed not appropriate based on the current or future assessments, a consultation will be initiated with the patient's specialty care provider to determine the best course of action. The revised plan of therapy will be documented along with any additional patient education provided.     Ashwin Wang, SwapnaD, MPH, BCPS    6/23/2022  13:43 CDT

## 2022-06-23 NOTE — PROGRESS NOTES
"Chief Complaint   Patient presents with   • Diabetes     T2       History of Present Illness    56 y.o. male     Diabetes Type 2    Duration - more than 10 years    Complications -  Neuropathy   CAD    Present Monitoring -      Fingersticks - 4 x daily    CGM -     Present Regimen -  Mixed Insulin , oral   Carb Intake -   High carb  Exercise -   None   Symptoms -     Fatigue   ==========================================  Physical Exam  /100   Pulse 89   Ht 188 cm (74\")   Wt 123 kg (271 lb)   SpO2 98%   BMI 34.79 kg/m²   AOx3  No goiter, no carotid bruit  RRR  CTA  No Edema      ==========================================    Laboratory Workup    Lab Results   Component Value Date    WBC 11.52 (H) 04/09/2022    HGB 17.3 04/09/2022    HCT 50.0 04/09/2022    MCV 82.9 04/09/2022     04/09/2022       Lab Results   Component Value Date    GLUCOSE 156 (H) 04/09/2022    BUN 24 (H) 04/09/2022    CREATININE 0.95 04/09/2022    EGFR 93.4 04/09/2022    EGFRIFNONA 74 02/24/2022    BCR 25.3 (H) 04/09/2022     04/09/2022    K 4.2 04/09/2022     04/09/2022    CO2 27.0 04/09/2022    CALCIUM 9.2 04/09/2022    PHOS 3.9 01/25/2022    ALBUMIN 4.00 04/09/2022    GLOB 2.7 04/09/2022    BILITOT 0.7 04/09/2022    ALKPHOS 108 04/09/2022     (H) 04/09/2022    ALT 92 (H) 04/09/2022    AGRATIO 1.5 04/09/2022       No results found for: MALBCRERATIO              ==========================================      ICD-10-CM ICD-9-CM   1. Type 2 diabetes mellitus with hyperglycemia, with long-term current use of insulin ([facility])  E11.65 250.00    Z79.4 790.29     V58.67       Diabetes Mellitus    --------------------------------------------------------------------------------------------------------------------------------------------    Glycemic Management   Lab Results   Component Value Date    HGBA1C 10.30 (H) 03/10/2022       Stop mixed insulin     Tresiba , 60 units at night    ------------    Fiasp    1 unit per 5 " "grams and 1 unit per 30 above 140     But also interchangeable with afrezza     Just need to understand conversion factor of 1.5    Example , 8 units of fiasp = 12 units of afrezza        ------------    Keep pioglitazone    ------    Stop metformin and start synjardy 2 tabs w bkfast  Start ozempi 0.25 mg weekly x 4 weeks  then 0.5 mg weekly     -----    Please start using kirstin    ===    Afrezza and kirstin , 2 months will come in the mail     After 2 months kirstin will come from us and afrezza will keep coming in the mail             ==========================================================================  Microvascular Complication Monitoring    Neuropathy                       Gabapentin, increase to 900 mg TID   Retinopathy     Renal     GFR   Lab Results   Component Value Date    EGFR 93.4 04/09/2022    EGFR 81.9 03/14/2022    EGFR 68.5 03/09/2022       Albuminuria   No results found for: MALBCRERATIO         ==========================================================================    Lipids  Lab Results   Component Value Date    CHOL 94 03/10/2022    TRIG 34 03/10/2022    HDL 42 03/10/2022    LDL 42 03/10/2022       Statin   lipitor 40 mg qhs   ==========================================================================    HTN  /100   Pulse 89   Ht 188 cm (74\")   Wt 123 kg (271 lb)   SpO2 98%   BMI 34.79 kg/m²     Address next appt     Seeing cardiology     Takes aldactone 25 mg daily   toprol xl 25 mg daily   imdur     ==========================================================================    Bone Health    Vit D    Lab Results   Component Value Date    NWSS64XD 11.1 (L) 09/13/2019       Calcium intake     ==========================================================================    Thyroid Assessment  Lab Results   Component Value Date    TSH 0.609 02/22/2022           ==========================================================================    Vitamin B12  Lab Results   Component Value Date    " [id number] 230 10/29/2020                No orders of the defined types were placed in this encounter.               This document has been electronically signed by Alessandro Ferreira MD on June 23, 2022 10:33 CDT

## 2022-06-23 NOTE — PATIENT INSTRUCTIONS
Stop mixed insulin     Tresiba , 60 units at night    ------------    Fiasp    1 unit per 5 grams and 1 unit per 30 above 140     But also interchangeable with afrezza     Just need to understand conversion factor of 1.5    Example , 8 units of fiasp = 12 units of afrezza        ------------    Keep pioglitazone    ------    Stop metformin and start synjardy 2 tabs w bkfast  Start ozempi 0.25 mg weekly x 4 weeks  then 0.5 mg weekly     -----    Please start using kirstin    ===    Afrezza and kirstin , 2 months will come in the mail     After 2 months kirstin will come from us and afrezza will keep coming in the mail

## 2022-06-24 ENCOUNTER — DOCUMENTATION (OUTPATIENT)
Dept: ENDOCRINOLOGY | Facility: CLINIC | Age: 57
End: 2022-06-24

## 2022-06-29 ENCOUNTER — TELEPHONE (OUTPATIENT)
Dept: ENDOCRINOLOGY | Facility: CLINIC | Age: 57
End: 2022-06-29

## 2022-08-06 ENCOUNTER — HOSPITAL ENCOUNTER (EMERGENCY)
Facility: HOSPITAL | Age: 57
Discharge: HOME | End: 2022-08-06
Attending: STUDENT IN AN ORGANIZED HEALTH CARE EDUCATION/TRAINING PROGRAM | Admitting: STUDENT IN AN ORGANIZED HEALTH CARE EDUCATION/TRAINING PROGRAM

## 2022-08-06 ENCOUNTER — APPOINTMENT (OUTPATIENT)
Dept: GENERAL RADIOLOGY | Facility: HOSPITAL | Age: 57
End: 2022-08-06

## 2022-08-06 VITALS
WEIGHT: 250 LBS | HEART RATE: 78 BPM | DIASTOLIC BLOOD PRESSURE: 86 MMHG | TEMPERATURE: 98.3 F | RESPIRATION RATE: 18 BRPM | BODY MASS INDEX: 32.08 KG/M2 | SYSTOLIC BLOOD PRESSURE: 161 MMHG | OXYGEN SATURATION: 96 % | HEIGHT: 74 IN

## 2022-08-06 DIAGNOSIS — M54.42 LEFT-SIDED LOW BACK PAIN WITH LEFT-SIDED SCIATICA, UNSPECIFIED CHRONICITY: Primary | ICD-10-CM

## 2022-08-06 LAB — GLUCOSE BLDC GLUCOMTR-MCNC: 249 MG/DL (ref 70–130)

## 2022-08-06 PROCEDURE — 96372 THER/PROPH/DIAG INJ SC/IM: CPT

## 2022-08-06 PROCEDURE — 25010000002 ORPHENADRINE CITRATE PER 60 MG: Performed by: STUDENT IN AN ORGANIZED HEALTH CARE EDUCATION/TRAINING PROGRAM

## 2022-08-06 PROCEDURE — 99283 EMERGENCY DEPT VISIT LOW MDM: CPT

## 2022-08-06 PROCEDURE — 82962 GLUCOSE BLOOD TEST: CPT

## 2022-08-06 PROCEDURE — 72100 X-RAY EXAM L-S SPINE 2/3 VWS: CPT

## 2022-08-06 PROCEDURE — 25010000002 KETOROLAC TROMETHAMINE PER 15 MG: Performed by: STUDENT IN AN ORGANIZED HEALTH CARE EDUCATION/TRAINING PROGRAM

## 2022-08-06 PROCEDURE — 36415 COLL VENOUS BLD VENIPUNCTURE: CPT

## 2022-08-06 RX ORDER — KETOROLAC TROMETHAMINE 30 MG/ML
30 INJECTION, SOLUTION INTRAMUSCULAR; INTRAVENOUS EVERY 6 HOURS PRN
Status: DISCONTINUED | OUTPATIENT
Start: 2022-08-06 | End: 2022-08-07 | Stop reason: HOSPADM

## 2022-08-06 RX ORDER — OXYCODONE AND ACETAMINOPHEN 7.5; 325 MG/1; MG/1
1 TABLET ORAL ONCE
Status: COMPLETED | OUTPATIENT
Start: 2022-08-06 | End: 2022-08-06

## 2022-08-06 RX ORDER — ORPHENADRINE CITRATE 30 MG/ML
60 INJECTION INTRAMUSCULAR; INTRAVENOUS ONCE
Status: COMPLETED | OUTPATIENT
Start: 2022-08-06 | End: 2022-08-06

## 2022-08-06 RX ORDER — TIZANIDINE 4 MG/1
4 TABLET ORAL NIGHTLY PRN
Qty: 12 TABLET | Refills: 0 | Status: SHIPPED | OUTPATIENT
Start: 2022-08-06 | End: 2022-09-13 | Stop reason: SDUPTHER

## 2022-08-06 RX ORDER — MELOXICAM 15 MG/1
15 TABLET ORAL DAILY
Qty: 5 TABLET | Refills: 0 | Status: SHIPPED | OUTPATIENT
Start: 2022-08-06 | End: 2022-09-13 | Stop reason: ALTCHOICE

## 2022-08-06 RX ORDER — KETOROLAC TROMETHAMINE 15 MG/ML
15 INJECTION, SOLUTION INTRAMUSCULAR; INTRAVENOUS ONCE
Status: DISCONTINUED | OUTPATIENT
Start: 2022-08-06 | End: 2022-08-06

## 2022-08-06 RX ORDER — ORPHENADRINE CITRATE 30 MG/ML
60 INJECTION INTRAMUSCULAR; INTRAVENOUS ONCE
Status: DISCONTINUED | OUTPATIENT
Start: 2022-08-06 | End: 2022-08-06

## 2022-08-06 RX ADMIN — KETOROLAC TROMETHAMINE 30 MG: 30 INJECTION, SOLUTION INTRAMUSCULAR; INTRAVENOUS at 21:27

## 2022-08-06 RX ADMIN — OXYCODONE HYDROCHLORIDE AND ACETAMINOPHEN 1 TABLET: 7.5; 325 TABLET ORAL at 22:29

## 2022-08-06 RX ADMIN — ORPHENADRINE CITRATE 60 MG: 30 INJECTION INTRAMUSCULAR; INTRAVENOUS at 21:29

## 2022-08-07 NOTE — ED PROVIDER NOTES
Subjective   57-year-old male comes to the ER chief complaint of constant, worsening left lower back pain since yesterday.  He is try taking pain medicine and muscle relaxant at home, but it only made him sleepy and did not help with the pain.  Describes the pain as sharp and shooting down his left leg.  It is 9/10.  He denies injury or trauma to his lower back.  Patient has peripheral neuropathy, but no new or worsening weakness, numbness.  No difficulty urinating.      History provided by:  Patient   used: No        Review of Systems   Constitutional: Negative for chills and fever.   HENT: Negative for drooling.    Eyes: Negative for redness.   Respiratory: Negative for shortness of breath.    Cardiovascular: Negative for chest pain.   Gastrointestinal: Negative for nausea and vomiting.   Genitourinary: Negative for difficulty urinating and flank pain.   Musculoskeletal: Positive for back pain. Negative for neck pain.   Skin: Negative for color change.   Neurological: Negative for seizures, weakness and numbness.   Psychiatric/Behavioral: Negative for confusion.       Past Medical History:   Diagnosis Date   • Coronary artery disease    • COVID-19 virus detected 01/21/2022   • Diabetes mellitus (HCC)    • Diabetic retinopathy (HCC)    • GERD (gastroesophageal reflux disease)    • Hyperlipidemia    • Hypertension    • Osteoarthritis    • Paroxysmal atrial fibrillation (HCC)    • Sleep apnea    • Stroke (HCC) 02/2022       Allergies   Allergen Reactions   • Ace Inhibitors Anaphylaxis   • Wellbutrin [Bupropion] Anaphylaxis       Past Surgical History:   Procedure Laterality Date   • CARDIAC CATHETERIZATION N/A 9/3/2019   • CARDIAC CATHETERIZATION N/A 2/14/2022   • CHOLECYSTECTOMY     • COLONOSCOPY N/A 5/24/2021   • COLONOSCOPY N/A 1/5/2022   • CORONARY ARTERY BYPASS GRAFT N/A 9/25/2019       Family History   Problem Relation Age of Onset   • Diabetes Mother    • Hypertension Father        Social  "History     Socioeconomic History   • Marital status:    Tobacco Use   • Smoking status: Former Smoker     Years: 35.00     Types: Cigarettes     Start date: 1980     Quit date: 2022     Years since quittin.1   • Smokeless tobacco: Never Used   Vaping Use   • Vaping Use: Never used   Substance and Sexual Activity   • Alcohol use: No   • Drug use: No   • Sexual activity: Not Currently     Comment: .           Objective    Vitals:    22 1919 22 2100 22 2152   BP: (!) 193/103 165/90 161/86   BP Location: Right arm     Patient Position: Sitting     Pulse: 100 90 78   Resp: 22 20 18   Temp: 98.3 °F (36.8 °C)     TempSrc: Oral     SpO2: 97% 97% 96%   Weight: 113 kg (250 lb)     Height: 188 cm (74\")         Physical Exam  Vitals and nursing note reviewed.   Constitutional:       General: He is not in acute distress.     Appearance: He is well-developed. He is obese. He is not ill-appearing, toxic-appearing or diaphoretic.   HENT:      Head: Normocephalic.      Right Ear: External ear normal.      Left Ear: External ear normal.   Pulmonary:      Effort: Pulmonary effort is normal. No accessory muscle usage or respiratory distress.   Chest:      Chest wall: No tenderness.   Abdominal:      Palpations: Abdomen is soft.      Tenderness: There is no abdominal tenderness (deep palpation).   Musculoskeletal:      Cervical back: No tenderness or bony tenderness.      Thoracic back: No tenderness or bony tenderness.      Lumbar back: Tenderness present. No bony tenderness. Positive left straight leg raise test. Negative right straight leg raise test.        Back:    Skin:     General: Skin is warm and dry.      Capillary Refill: Capillary refill takes less than 2 seconds.   Neurological:      Mental Status: He is alert and oriented to person, place, and time.      Sensory: No sensory deficit.      Motor: No weakness.   Psychiatric:         Behavior: Behavior normal. "         Procedures           ED Course            XR Spine Lumbar 2 or 3 View   Final Result   No acute compression deformity or malalignment. If clinically   indicated, consider follow-up CT or MR for more sensitive   evaluation      Electronically signed by:  Bryan Marcum MD  8/6/2022 10:03 PM CDT   Workstation: [id number]                                          [facility]  Number of Diagnoses or Management Options  Left-sided low back pain with left-sided sciatica, unspecified chronicity: new and requires workup  Diagnosis management comments: Vital signs are stable, afebrile.  Neurologically intact.  X-ray findings negative for acute findings.  Patient received pain medicine and muscle relaxant.  Recommend follow-up with his PCP.  Return precautions given.  Patient states understanding and is agreeable to the plan.      Final diagnoses:   Left-sided low back pain with left-sided sciatica, unspecified chronicity       ED Disposition  ED Disposition     ED Disposition   Discharge    Condition   Stable    Comment   --             Artemio Sanchez MD  [address] [place] [id number]  [phone]    Schedule an appointment as soon as possible for a visit in 2 days  ER follow up         Medication List      New Prescriptions    meloxicam 15 MG tablet  Commonly known as: MOBIC  Take 1 tablet by mouth Daily.        Changed    * tiZANidine 4 MG tablet  Commonly known as: ZANAFLEX  What changed: Another medication with the same name was added. Make sure you understand how and when to take each.     * tiZANidine 4 MG tablet  Commonly known as: Zanaflex  Take 1 tablet by mouth At Night As Needed for Muscle Spasms.  What changed: You were already taking a medication with the same name, and this prescription was added. Make sure you understand how and when to take each.         * This list has 2 medication(s) that are the same as other medications prescribed for you. Read the directions carefully, and ask your doctor or  other care provider to review them with you.               Where to Get Your Medications      These medications were sent to [facility] [place], [place] - [facility].[address] [phone] [facility] [phone] [address] [address]    Phone: [phone]   · meloxicam 15 MG tablet  · tiZANidine 4 MG tablet          Gaudencio Mata MD  08/06/22 9250

## 2022-08-07 NOTE — ED NOTES
Pt reports L side back and leg pain x24 hours. Pt also reports some tingling that is worse than his normal neuropathy symptoms (T2DM). Bilateral pedal pulses present and normal

## 2022-08-23 ENCOUNTER — SPECIALTY PHARMACY (OUTPATIENT)
Dept: ENDOCRINOLOGY | Facility: CLINIC | Age: 57
End: 2022-08-23

## 2022-08-23 NOTE — PROGRESS NOTES
Specialty Pharmacy Refill Coordination Note     Herbert is a 57 y.o. male contacted today regarding refills of  Tresiba, ozempic, fiasp routine meds and kirstin specialty medication(s).    Reviewed and verified with patient:  Allergies       Specialty medication(s) and dose(s) confirmed: yes    Refill Questions    Flowsheet Row Most Recent Value   Changes to allergies? No   Changes to medications? No   New conditions since last clinic visit No   Unplanned office visit, urgent care, ED, or hospital admission in the last 4 weeks  No   How does patient/caregiver feel medication is working? Very good   Financial problems or insurance changes  No   Since the previous refill, were any specialty medication doses or scheduled injections missed or delayed?  No   Does this patient require a clinical escalation to a pharmacist? No          Delivery Questions    Flowsheet Row Most Recent Value   Delivery method [employer]   Delivery address correct? No  [[address]]   Number of medications in delivery 7   Copay form of payment Need payment plan set up   Questions or concerns for the pharmacist? No   Are any medications first time fills? No        Mailing to [facility] at [address] [place]    Added free kirstin sensor.       Follow-up: 1 month.      Efe Soto  Specialty Pharmacy Technician

## 2022-08-25 ENCOUNTER — APPOINTMENT (OUTPATIENT)
Dept: GENERAL RADIOLOGY | Facility: HOSPITAL | Age: 57
End: 2022-08-25

## 2022-08-25 ENCOUNTER — HOSPITAL ENCOUNTER (OUTPATIENT)
Facility: HOSPITAL | Age: 57
Discharge: HOME | End: 2022-08-30
Attending: FAMILY MEDICINE | Admitting: INTERNAL MEDICINE

## 2022-08-25 DIAGNOSIS — R07.2 PRECORDIAL PAIN: Primary | ICD-10-CM

## 2022-08-25 LAB
ALBUMIN SERPL-MCNC: 4.2 G/DL (ref 3.5–5.2)
ALBUMIN/GLOB SERPL: 1.4 G/DL
ALP SERPL-CCNC: 71 U/L (ref 39–117)
ALT SERPL W P-5'-P-CCNC: 12 U/L (ref 1–41)
ANION GAP SERPL CALCULATED.3IONS-SCNC: 11 MMOL/L (ref 5–15)
AST SERPL-CCNC: 12 U/L (ref 1–40)
BASOPHILS # BLD AUTO: 0.04 10*3/MM3 (ref 0–0.2)
BASOPHILS NFR BLD AUTO: 0.4 % (ref 0–1.5)
BILIRUB SERPL-MCNC: 0.9 MG/DL (ref 0–1.2)
BUN SERPL-MCNC: 25 MG/DL (ref 6–20)
BUN/CREAT SERPL: 18.9 (ref 7–25)
CALCIUM SPEC-SCNC: 9.8 MG/DL (ref 8.6–10.5)
CHLORIDE SERPL-SCNC: 102 MMOL/L (ref 98–107)
CK SERPL-CCNC: 54 U/L (ref 20–200)
CO2 SERPL-SCNC: 27 MMOL/L (ref 22–29)
CREAT SERPL-MCNC: 1.32 MG/DL (ref 0.76–1.27)
DEPRECATED RDW RBC AUTO: 37.3 FL (ref 37–54)
EGFRCR SERPLBLD CKD-EPI 2021: 62.5 ML/MIN/1.73
EOSINOPHIL # BLD AUTO: 0.17 10*3/MM3 (ref 0–0.4)
EOSINOPHIL NFR BLD AUTO: 1.6 % (ref 0.3–6.2)
ERYTHROCYTE [DISTWIDTH] IN BLOOD BY AUTOMATED COUNT: 12.7 % (ref 12.3–15.4)
FLUAV RNA RESP QL NAA+PROBE: NOT DETECTED
FLUBV RNA RESP QL NAA+PROBE: NOT DETECTED
GLOBULIN UR ELPH-MCNC: 3.1 GM/DL
GLUCOSE SERPL-MCNC: 234 MG/DL (ref 65–99)
HCT VFR BLD AUTO: 49.3 % (ref 37.5–51)
HGB BLD-MCNC: 17.9 G/DL (ref 13–17.7)
HOLD SPECIMEN: NORMAL
HOLD SPECIMEN: NORMAL
IMM GRANULOCYTES # BLD AUTO: 0.04 10*3/MM3 (ref 0–0.05)
IMM GRANULOCYTES NFR BLD AUTO: 0.4 % (ref 0–0.5)
LIPASE SERPL-CCNC: 19 U/L (ref 13–60)
LYMPHOCYTES # BLD AUTO: 2.07 10*3/MM3 (ref 0.7–3.1)
LYMPHOCYTES NFR BLD AUTO: 19.5 % (ref 19.6–45.3)
MAGNESIUM SERPL-MCNC: 2 MG/DL (ref 1.6–2.6)
MCH RBC QN AUTO: 29.6 PG (ref 26.6–33)
MCHC RBC AUTO-ENTMCNC: 36.3 G/DL (ref 31.5–35.7)
MCV RBC AUTO: 81.6 FL (ref 79–97)
MONOCYTES # BLD AUTO: 0.72 10*3/MM3 (ref 0.1–0.9)
MONOCYTES NFR BLD AUTO: 6.8 % (ref 5–12)
NEUTROPHILS NFR BLD AUTO: 7.55 10*3/MM3 (ref 1.7–7)
NEUTROPHILS NFR BLD AUTO: 71.3 % (ref 42.7–76)
NRBC BLD AUTO-RTO: 0 /100 WBC (ref 0–0.2)
NT-PROBNP SERPL-MCNC: 636 PG/ML (ref 0–900)
PLATELET # BLD AUTO: 213 10*3/MM3 (ref 140–450)
PMV BLD AUTO: 10.8 FL (ref 6–12)
POTASSIUM SERPL-SCNC: 3.7 MMOL/L (ref 3.5–5.2)
PROT SERPL-MCNC: 7.3 G/DL (ref 6–8.5)
RBC # BLD AUTO: 6.04 10*6/MM3 (ref 4.14–5.8)
SARS-COV-2 RNA RESP QL NAA+PROBE: NOT DETECTED
SODIUM SERPL-SCNC: 140 MMOL/L (ref 136–145)
TROPONIN T SERPL-MCNC: <0.01 NG/ML (ref 0–0.03)
TROPONIN T SERPL-MCNC: <0.01 NG/ML (ref 0–0.03)
WBC NRBC COR # BLD: 10.59 10*3/MM3 (ref 3.4–10.8)
WHOLE BLOOD HOLD COAG: NORMAL
WHOLE BLOOD HOLD SPECIMEN: NORMAL

## 2022-08-25 PROCEDURE — 83690 ASSAY OF LIPASE: CPT | Performed by: FAMILY MEDICINE

## 2022-08-25 PROCEDURE — 93005 ELECTROCARDIOGRAM TRACING: CPT | Performed by: INTERNAL MEDICINE

## 2022-08-25 PROCEDURE — 93010 ELECTROCARDIOGRAM REPORT: CPT | Performed by: INTERNAL MEDICINE

## 2022-08-25 PROCEDURE — 87636 SARSCOV2 & INF A&B AMP PRB: CPT | Performed by: FAMILY MEDICINE

## 2022-08-25 PROCEDURE — 96374 THER/PROPH/DIAG INJ IV PUSH: CPT

## 2022-08-25 PROCEDURE — C9803 HOPD COVID-19 SPEC COLLECT: HCPCS

## 2022-08-25 PROCEDURE — 80053 COMPREHEN METABOLIC PANEL: CPT | Performed by: FAMILY MEDICINE

## 2022-08-25 PROCEDURE — 71045 X-RAY EXAM CHEST 1 VIEW: CPT

## 2022-08-25 PROCEDURE — 99284 EMERGENCY DEPT VISIT MOD MDM: CPT

## 2022-08-25 PROCEDURE — G0378 HOSPITAL OBSERVATION PER HR: HCPCS

## 2022-08-25 PROCEDURE — 82550 ASSAY OF CK (CPK): CPT | Performed by: FAMILY MEDICINE

## 2022-08-25 PROCEDURE — 93005 ELECTROCARDIOGRAM TRACING: CPT

## 2022-08-25 PROCEDURE — 83880 ASSAY OF NATRIURETIC PEPTIDE: CPT | Performed by: FAMILY MEDICINE

## 2022-08-25 PROCEDURE — 63710000001 INSULIN DETEMIR PER 5 UNITS: Performed by: INTERNAL MEDICINE

## 2022-08-25 PROCEDURE — 85025 COMPLETE CBC W/AUTO DIFF WBC: CPT | Performed by: FAMILY MEDICINE

## 2022-08-25 PROCEDURE — 25010000002 MORPHINE PER 10 MG: Performed by: INTERNAL MEDICINE

## 2022-08-25 PROCEDURE — 84484 ASSAY OF TROPONIN QUANT: CPT | Performed by: FAMILY MEDICINE

## 2022-08-25 PROCEDURE — 83735 ASSAY OF MAGNESIUM: CPT | Performed by: FAMILY MEDICINE

## 2022-08-25 RX ORDER — SODIUM CHLORIDE 0.9 % (FLUSH) 0.9 %
10 SYRINGE (ML) INJECTION EVERY 12 HOURS SCHEDULED
Status: DISCONTINUED | OUTPATIENT
Start: 2022-08-25 | End: 2022-08-30 | Stop reason: HOSPADM

## 2022-08-25 RX ORDER — NICOTINE POLACRILEX 4 MG
15 LOZENGE BUCCAL
Status: DISCONTINUED | OUTPATIENT
Start: 2022-08-25 | End: 2022-08-30 | Stop reason: HOSPADM

## 2022-08-25 RX ORDER — NORTRIPTYLINE HYDROCHLORIDE 10 MG/1
10 CAPSULE ORAL 3 TIMES DAILY
Status: DISCONTINUED | OUTPATIENT
Start: 2022-08-25 | End: 2022-08-30 | Stop reason: HOSPADM

## 2022-08-25 RX ORDER — ASPIRIN 81 MG/1
324 TABLET, CHEWABLE ORAL ONCE
Status: DISCONTINUED | OUTPATIENT
Start: 2022-08-25 | End: 2022-08-25

## 2022-08-25 RX ORDER — CLOPIDOGREL BISULFATE 75 MG/1
75 TABLET ORAL DAILY
Status: DISCONTINUED | OUTPATIENT
Start: 2022-08-26 | End: 2022-08-30 | Stop reason: HOSPADM

## 2022-08-25 RX ORDER — AMIODARONE HYDROCHLORIDE 200 MG/1
200 TABLET ORAL DAILY
Status: DISCONTINUED | OUTPATIENT
Start: 2022-08-26 | End: 2022-08-30 | Stop reason: HOSPADM

## 2022-08-25 RX ORDER — GABAPENTIN 300 MG/1
900 CAPSULE ORAL 3 TIMES DAILY
Status: DISCONTINUED | OUTPATIENT
Start: 2022-08-25 | End: 2022-08-29

## 2022-08-25 RX ORDER — ASPIRIN 81 MG/1
324 TABLET, CHEWABLE ORAL ONCE
Status: COMPLETED | OUTPATIENT
Start: 2022-08-25 | End: 2022-08-25

## 2022-08-25 RX ORDER — METOPROLOL SUCCINATE 25 MG/1
25 TABLET, EXTENDED RELEASE ORAL DAILY
Status: DISCONTINUED | OUTPATIENT
Start: 2022-08-26 | End: 2022-08-30 | Stop reason: HOSPADM

## 2022-08-25 RX ORDER — SODIUM CHLORIDE 0.9 % (FLUSH) 0.9 %
10 SYRINGE (ML) INJECTION AS NEEDED
Status: DISCONTINUED | OUTPATIENT
Start: 2022-08-25 | End: 2022-08-30 | Stop reason: HOSPADM

## 2022-08-25 RX ORDER — ACETAMINOPHEN 325 MG/1
650 TABLET ORAL EVERY 6 HOURS PRN
Status: DISCONTINUED | OUTPATIENT
Start: 2022-08-25 | End: 2022-08-29 | Stop reason: SDUPTHER

## 2022-08-25 RX ORDER — INSULIN ASPART 100 [IU]/ML
0-14 INJECTION, SOLUTION INTRAVENOUS; SUBCUTANEOUS
Status: DISCONTINUED | OUTPATIENT
Start: 2022-08-26 | End: 2022-08-30 | Stop reason: HOSPADM

## 2022-08-25 RX ORDER — MORPHINE SULFATE 2 MG/ML
2 INJECTION, SOLUTION INTRAMUSCULAR; INTRAVENOUS EVERY 4 HOURS PRN
Status: DISCONTINUED | OUTPATIENT
Start: 2022-08-25 | End: 2022-08-25

## 2022-08-25 RX ORDER — ASPIRIN 325 MG
325 TABLET ORAL ONCE
Status: DISCONTINUED | OUTPATIENT
Start: 2022-08-25 | End: 2022-08-25

## 2022-08-25 RX ORDER — ASPIRIN 81 MG/1
81 TABLET ORAL DAILY
Status: DISCONTINUED | OUTPATIENT
Start: 2022-08-26 | End: 2022-08-30 | Stop reason: HOSPADM

## 2022-08-25 RX ORDER — ATORVASTATIN CALCIUM 40 MG/1
40 TABLET, FILM COATED ORAL NIGHTLY
Status: DISCONTINUED | OUTPATIENT
Start: 2022-08-25 | End: 2022-08-30 | Stop reason: HOSPADM

## 2022-08-25 RX ORDER — MORPHINE SULFATE 2 MG/ML
2 INJECTION, SOLUTION INTRAMUSCULAR; INTRAVENOUS EVERY 6 HOURS PRN
Status: DISCONTINUED | OUTPATIENT
Start: 2022-08-25 | End: 2022-08-30 | Stop reason: HOSPADM

## 2022-08-25 RX ORDER — NITROGLYCERIN 0.4 MG/1
0.4 TABLET SUBLINGUAL
Status: DISCONTINUED | OUTPATIENT
Start: 2022-08-25 | End: 2022-08-25

## 2022-08-25 RX ORDER — DEXTROSE MONOHYDRATE 25 G/50ML
25 INJECTION, SOLUTION INTRAVENOUS
Status: DISCONTINUED | OUTPATIENT
Start: 2022-08-25 | End: 2022-08-30 | Stop reason: HOSPADM

## 2022-08-25 RX ORDER — ISOSORBIDE MONONITRATE 30 MG/1
30 TABLET, EXTENDED RELEASE ORAL
Status: DISCONTINUED | OUTPATIENT
Start: 2022-08-26 | End: 2022-08-30 | Stop reason: HOSPADM

## 2022-08-25 RX ORDER — NITROGLYCERIN 0.4 MG/1
0.4 TABLET SUBLINGUAL
Status: DISCONTINUED | OUTPATIENT
Start: 2022-08-25 | End: 2022-08-30 | Stop reason: HOSPADM

## 2022-08-25 RX ORDER — RANOLAZINE 500 MG/1
500 TABLET, EXTENDED RELEASE ORAL EVERY 12 HOURS SCHEDULED
Status: DISCONTINUED | OUTPATIENT
Start: 2022-08-25 | End: 2022-08-26

## 2022-08-25 RX ADMIN — GABAPENTIN 900 MG: 300 CAPSULE ORAL at 23:53

## 2022-08-25 RX ADMIN — INSULIN DETEMIR 25 UNITS: 100 INJECTION, SOLUTION SUBCUTANEOUS at 23:52

## 2022-08-25 RX ADMIN — NORTRIPTYLINE HYDROCHLORIDE 10 MG: 10 CAPSULE ORAL at 23:53

## 2022-08-25 RX ADMIN — APIXABAN 5 MG: 5 TABLET, FILM COATED ORAL at 23:54

## 2022-08-25 RX ADMIN — RANOLAZINE 500 MG: 500 TABLET, FILM COATED, EXTENDED RELEASE ORAL at 23:54

## 2022-08-25 RX ADMIN — ASPIRIN 243 MG: 81 TABLET, CHEWABLE ORAL at 20:13

## 2022-08-25 RX ADMIN — Medication 10 ML: at 23:54

## 2022-08-25 RX ADMIN — MORPHINE SULFATE 2 MG: 2 INJECTION, SOLUTION INTRAMUSCULAR; INTRAVENOUS at 23:54

## 2022-08-25 RX ADMIN — NITROGLYCERIN 0.4 MG: 0.4 TABLET, ORALLY DISINTEGRATING SUBLINGUAL at 22:16

## 2022-08-25 RX ADMIN — ATORVASTATIN CALCIUM 40 MG: 40 TABLET, FILM COATED ORAL at 23:54

## 2022-08-26 ENCOUNTER — APPOINTMENT (OUTPATIENT)
Dept: ULTRASOUND IMAGING | Facility: HOSPITAL | Age: 57
End: 2022-08-26

## 2022-08-26 LAB
ANION GAP SERPL CALCULATED.3IONS-SCNC: 10 MMOL/L (ref 5–15)
BUN SERPL-MCNC: 22 MG/DL (ref 6–20)
BUN/CREAT SERPL: 22.9 (ref 7–25)
CALCIUM SPEC-SCNC: 9.4 MG/DL (ref 8.6–10.5)
CHLORIDE SERPL-SCNC: 104 MMOL/L (ref 98–107)
CHOLEST SERPL-MCNC: 181 MG/DL (ref 0–200)
CO2 SERPL-SCNC: 26 MMOL/L (ref 22–29)
CREAT SERPL-MCNC: 0.96 MG/DL (ref 0.76–1.27)
DEPRECATED RDW RBC AUTO: 37.6 FL (ref 37–54)
EGFRCR SERPLBLD CKD-EPI 2021: 91.6 ML/MIN/1.73
ERYTHROCYTE [DISTWIDTH] IN BLOOD BY AUTOMATED COUNT: 12.4 % (ref 12.3–15.4)
GLUCOSE BLDC GLUCOMTR-MCNC: 109 MG/DL (ref 70–130)
GLUCOSE BLDC GLUCOMTR-MCNC: 157 MG/DL (ref 70–130)
GLUCOSE BLDC GLUCOMTR-MCNC: 218 MG/DL (ref 70–130)
GLUCOSE BLDC GLUCOMTR-MCNC: 286 MG/DL (ref 70–130)
GLUCOSE SERPL-MCNC: 173 MG/DL (ref 65–99)
HBA1C MFR BLD: 8.3 % (ref 4.8–5.6)
HCT VFR BLD AUTO: 46.6 % (ref 37.5–51)
HDLC SERPL-MCNC: 30 MG/DL (ref 40–60)
HGB BLD-MCNC: 16.6 G/DL (ref 13–17.7)
LDLC SERPL CALC-MCNC: 128 MG/DL (ref 0–100)
LDLC/HDLC SERPL: 4.18 {RATIO}
MCH RBC QN AUTO: 29.5 PG (ref 26.6–33)
MCHC RBC AUTO-ENTMCNC: 35.6 G/DL (ref 31.5–35.7)
MCV RBC AUTO: 82.8 FL (ref 79–97)
PLATELET # BLD AUTO: 158 10*3/MM3 (ref 140–450)
PMV BLD AUTO: 10.7 FL (ref 6–12)
POTASSIUM SERPL-SCNC: 3.4 MMOL/L (ref 3.5–5.2)
RBC # BLD AUTO: 5.63 10*6/MM3 (ref 4.14–5.8)
SODIUM SERPL-SCNC: 140 MMOL/L (ref 136–145)
TRIGL SERPL-MCNC: 128 MG/DL (ref 0–150)
TROPONIN T SERPL-MCNC: <0.01 NG/ML (ref 0–0.03)
TSH SERPL DL<=0.05 MIU/L-ACNC: 1.03 UIU/ML (ref 0.27–4.2)
VLDLC SERPL-MCNC: 23 MG/DL (ref 5–40)
WBC NRBC COR # BLD: 7.62 10*3/MM3 (ref 3.4–10.8)

## 2022-08-26 PROCEDURE — 94799 UNLISTED PULMONARY SVC/PX: CPT

## 2022-08-26 PROCEDURE — G0378 HOSPITAL OBSERVATION PER HR: HCPCS

## 2022-08-26 PROCEDURE — 93971 EXTREMITY STUDY: CPT

## 2022-08-26 PROCEDURE — 82962 GLUCOSE BLOOD TEST: CPT

## 2022-08-26 PROCEDURE — 94660 CPAP INITIATION&MGMT: CPT

## 2022-08-26 PROCEDURE — 99214 OFFICE O/P EST MOD 30 MIN: CPT | Performed by: INTERNAL MEDICINE

## 2022-08-26 PROCEDURE — 93010 ELECTROCARDIOGRAM REPORT: CPT | Performed by: INTERNAL MEDICINE

## 2022-08-26 PROCEDURE — 63710000001 INSULIN ASPART PER 5 UNITS: Performed by: INTERNAL MEDICINE

## 2022-08-26 PROCEDURE — 80061 LIPID PANEL: CPT | Performed by: INTERNAL MEDICINE

## 2022-08-26 PROCEDURE — 80048 BASIC METABOLIC PNL TOTAL CA: CPT | Performed by: INTERNAL MEDICINE

## 2022-08-26 PROCEDURE — 83036 HEMOGLOBIN GLYCOSYLATED A1C: CPT | Performed by: INTERNAL MEDICINE

## 2022-08-26 PROCEDURE — 63710000001 INSULIN DETEMIR PER 5 UNITS: Performed by: INTERNAL MEDICINE

## 2022-08-26 PROCEDURE — 96376 TX/PRO/DX INJ SAME DRUG ADON: CPT

## 2022-08-26 PROCEDURE — 94761 N-INVAS EAR/PLS OXIMETRY MLT: CPT

## 2022-08-26 PROCEDURE — 93005 ELECTROCARDIOGRAM TRACING: CPT | Performed by: INTERNAL MEDICINE

## 2022-08-26 PROCEDURE — 85027 COMPLETE CBC AUTOMATED: CPT | Performed by: INTERNAL MEDICINE

## 2022-08-26 PROCEDURE — 84443 ASSAY THYROID STIM HORMONE: CPT | Performed by: INTERNAL MEDICINE

## 2022-08-26 PROCEDURE — 25010000002 MORPHINE PER 10 MG: Performed by: INTERNAL MEDICINE

## 2022-08-26 PROCEDURE — 84484 ASSAY OF TROPONIN QUANT: CPT | Performed by: INTERNAL MEDICINE

## 2022-08-26 PROCEDURE — 36415 COLL VENOUS BLD VENIPUNCTURE: CPT | Performed by: INTERNAL MEDICINE

## 2022-08-26 RX ORDER — RANOLAZINE 500 MG/1
1000 TABLET, EXTENDED RELEASE ORAL EVERY 12 HOURS SCHEDULED
Status: DISCONTINUED | OUTPATIENT
Start: 2022-08-26 | End: 2022-08-30 | Stop reason: HOSPADM

## 2022-08-26 RX ADMIN — MORPHINE SULFATE 2 MG: 2 INJECTION, SOLUTION INTRAMUSCULAR; INTRAVENOUS at 11:37

## 2022-08-26 RX ADMIN — AMIODARONE HYDROCHLORIDE 200 MG: 200 TABLET ORAL at 08:22

## 2022-08-26 RX ADMIN — NORTRIPTYLINE HYDROCHLORIDE 10 MG: 10 CAPSULE ORAL at 21:27

## 2022-08-26 RX ADMIN — INSULIN ASPART 5 UNITS: 100 INJECTION, SOLUTION INTRAVENOUS; SUBCUTANEOUS at 11:25

## 2022-08-26 RX ADMIN — GABAPENTIN 900 MG: 300 CAPSULE ORAL at 16:07

## 2022-08-26 RX ADMIN — INSULIN DETEMIR 25 UNITS: 100 INJECTION, SOLUTION SUBCUTANEOUS at 10:01

## 2022-08-26 RX ADMIN — Medication 10 ML: at 21:38

## 2022-08-26 RX ADMIN — ATORVASTATIN CALCIUM 40 MG: 40 TABLET, FILM COATED ORAL at 21:27

## 2022-08-26 RX ADMIN — CLOPIDOGREL BISULFATE 75 MG: 75 TABLET ORAL at 08:21

## 2022-08-26 RX ADMIN — ISOSORBIDE MONONITRATE 30 MG: 30 TABLET, EXTENDED RELEASE ORAL at 10:05

## 2022-08-26 RX ADMIN — NORTRIPTYLINE HYDROCHLORIDE 10 MG: 10 CAPSULE ORAL at 10:01

## 2022-08-26 RX ADMIN — METOPROLOL SUCCINATE 25 MG: 25 TABLET, FILM COATED, EXTENDED RELEASE ORAL at 08:21

## 2022-08-26 RX ADMIN — RANOLAZINE 500 MG: 500 TABLET, FILM COATED, EXTENDED RELEASE ORAL at 08:21

## 2022-08-26 RX ADMIN — INSULIN DETEMIR 25 UNITS: 100 INJECTION, SOLUTION SUBCUTANEOUS at 21:27

## 2022-08-26 RX ADMIN — Medication 10 ML: at 10:02

## 2022-08-26 RX ADMIN — RANOLAZINE 1000 MG: 500 TABLET, FILM COATED, EXTENDED RELEASE ORAL at 21:27

## 2022-08-26 RX ADMIN — MORPHINE SULFATE 2 MG: 2 INJECTION, SOLUTION INTRAMUSCULAR; INTRAVENOUS at 21:31

## 2022-08-26 RX ADMIN — GABAPENTIN 900 MG: 300 CAPSULE ORAL at 08:21

## 2022-08-26 RX ADMIN — GABAPENTIN 900 MG: 300 CAPSULE ORAL at 21:27

## 2022-08-26 RX ADMIN — APIXABAN 5 MG: 5 TABLET, FILM COATED ORAL at 08:21

## 2022-08-26 RX ADMIN — NORTRIPTYLINE HYDROCHLORIDE 10 MG: 10 CAPSULE ORAL at 16:07

## 2022-08-26 RX ADMIN — ASPIRIN 81 MG: 81 TABLET, FILM COATED ORAL at 10:01

## 2022-08-27 LAB
ALBUMIN SERPL-MCNC: 3.3 G/DL (ref 3.5–5.2)
ALBUMIN/GLOB SERPL: 1.1 G/DL
ALP SERPL-CCNC: 79 U/L (ref 39–117)
ALT SERPL W P-5'-P-CCNC: 20 U/L (ref 1–41)
ANION GAP SERPL CALCULATED.3IONS-SCNC: 6 MMOL/L (ref 5–15)
AST SERPL-CCNC: 14 U/L (ref 1–40)
BASOPHILS # BLD AUTO: 0.04 10*3/MM3 (ref 0–0.2)
BASOPHILS NFR BLD AUTO: 0.6 % (ref 0–1.5)
BILIRUB SERPL-MCNC: 0.7 MG/DL (ref 0–1.2)
BUN SERPL-MCNC: 21 MG/DL (ref 6–20)
BUN/CREAT SERPL: 20.8 (ref 7–25)
CALCIUM SPEC-SCNC: 9.2 MG/DL (ref 8.6–10.5)
CHLORIDE SERPL-SCNC: 104 MMOL/L (ref 98–107)
CO2 SERPL-SCNC: 31 MMOL/L (ref 22–29)
CREAT SERPL-MCNC: 1.01 MG/DL (ref 0.76–1.27)
DEPRECATED RDW RBC AUTO: 38.7 FL (ref 37–54)
EGFRCR SERPLBLD CKD-EPI 2021: 86.2 ML/MIN/1.73
EOSINOPHIL # BLD AUTO: 0.21 10*3/MM3 (ref 0–0.4)
EOSINOPHIL NFR BLD AUTO: 3.2 % (ref 0.3–6.2)
ERYTHROCYTE [DISTWIDTH] IN BLOOD BY AUTOMATED COUNT: 12.6 % (ref 12.3–15.4)
GLOBULIN UR ELPH-MCNC: 2.9 GM/DL
GLUCOSE BLDC GLUCOMTR-MCNC: 186 MG/DL (ref 70–130)
GLUCOSE BLDC GLUCOMTR-MCNC: 209 MG/DL (ref 70–130)
GLUCOSE BLDC GLUCOMTR-MCNC: 209 MG/DL (ref 70–130)
GLUCOSE BLDC GLUCOMTR-MCNC: 225 MG/DL (ref 70–130)
GLUCOSE BLDC GLUCOMTR-MCNC: 262 MG/DL (ref 70–130)
GLUCOSE SERPL-MCNC: 173 MG/DL (ref 65–99)
HCT VFR BLD AUTO: 45.7 % (ref 37.5–51)
HGB BLD-MCNC: 15.6 G/DL (ref 13–17.7)
IMM GRANULOCYTES # BLD AUTO: 0.03 10*3/MM3 (ref 0–0.05)
IMM GRANULOCYTES NFR BLD AUTO: 0.5 % (ref 0–0.5)
LYMPHOCYTES # BLD AUTO: 1.52 10*3/MM3 (ref 0.7–3.1)
LYMPHOCYTES NFR BLD AUTO: 23.4 % (ref 19.6–45.3)
MCH RBC QN AUTO: 29.1 PG (ref 26.6–33)
MCHC RBC AUTO-ENTMCNC: 34.1 G/DL (ref 31.5–35.7)
MCV RBC AUTO: 85.3 FL (ref 79–97)
MONOCYTES # BLD AUTO: 0.55 10*3/MM3 (ref 0.1–0.9)
MONOCYTES NFR BLD AUTO: 8.5 % (ref 5–12)
NEUTROPHILS NFR BLD AUTO: 4.15 10*3/MM3 (ref 1.7–7)
NEUTROPHILS NFR BLD AUTO: 63.8 % (ref 42.7–76)
NRBC BLD AUTO-RTO: 0 /100 WBC (ref 0–0.2)
PLATELET # BLD AUTO: 177 10*3/MM3 (ref 140–450)
PMV BLD AUTO: 10.8 FL (ref 6–12)
POTASSIUM SERPL-SCNC: 3.6 MMOL/L (ref 3.5–5.2)
PROT SERPL-MCNC: 6.2 G/DL (ref 6–8.5)
RBC # BLD AUTO: 5.36 10*6/MM3 (ref 4.14–5.8)
SODIUM SERPL-SCNC: 141 MMOL/L (ref 136–145)
WBC NRBC COR # BLD: 6.5 10*3/MM3 (ref 3.4–10.8)

## 2022-08-27 PROCEDURE — 63710000001 INSULIN DETEMIR PER 5 UNITS: Performed by: INTERNAL MEDICINE

## 2022-08-27 PROCEDURE — 25010000002 MORPHINE PER 10 MG: Performed by: INTERNAL MEDICINE

## 2022-08-27 PROCEDURE — 85025 COMPLETE CBC W/AUTO DIFF WBC: CPT | Performed by: NURSE PRACTITIONER

## 2022-08-27 PROCEDURE — 99214 OFFICE O/P EST MOD 30 MIN: CPT | Performed by: INTERNAL MEDICINE

## 2022-08-27 PROCEDURE — 80053 COMPREHEN METABOLIC PANEL: CPT | Performed by: NURSE PRACTITIONER

## 2022-08-27 PROCEDURE — 94799 UNLISTED PULMONARY SVC/PX: CPT

## 2022-08-27 PROCEDURE — 63710000001 INSULIN ASPART PER 5 UNITS: Performed by: INTERNAL MEDICINE

## 2022-08-27 PROCEDURE — 96376 TX/PRO/DX INJ SAME DRUG ADON: CPT

## 2022-08-27 PROCEDURE — 82962 GLUCOSE BLOOD TEST: CPT

## 2022-08-27 PROCEDURE — G0378 HOSPITAL OBSERVATION PER HR: HCPCS

## 2022-08-27 PROCEDURE — 94660 CPAP INITIATION&MGMT: CPT

## 2022-08-27 RX ADMIN — METOPROLOL SUCCINATE 25 MG: 25 TABLET, FILM COATED, EXTENDED RELEASE ORAL at 08:35

## 2022-08-27 RX ADMIN — NORTRIPTYLINE HYDROCHLORIDE 10 MG: 10 CAPSULE ORAL at 08:35

## 2022-08-27 RX ADMIN — INSULIN ASPART 3 UNITS: 100 INJECTION, SOLUTION INTRAVENOUS; SUBCUTANEOUS at 17:06

## 2022-08-27 RX ADMIN — GABAPENTIN 900 MG: 300 CAPSULE ORAL at 08:35

## 2022-08-27 RX ADMIN — INSULIN ASPART 8 UNITS: 100 INJECTION, SOLUTION INTRAVENOUS; SUBCUTANEOUS at 11:57

## 2022-08-27 RX ADMIN — MORPHINE SULFATE 2 MG: 2 INJECTION, SOLUTION INTRAMUSCULAR; INTRAVENOUS at 09:44

## 2022-08-27 RX ADMIN — GABAPENTIN 900 MG: 300 CAPSULE ORAL at 21:33

## 2022-08-27 RX ADMIN — GABAPENTIN 900 MG: 300 CAPSULE ORAL at 17:06

## 2022-08-27 RX ADMIN — INSULIN ASPART 5 UNITS: 100 INJECTION, SOLUTION INTRAVENOUS; SUBCUTANEOUS at 08:34

## 2022-08-27 RX ADMIN — CLOPIDOGREL BISULFATE 75 MG: 75 TABLET ORAL at 08:35

## 2022-08-27 RX ADMIN — RANOLAZINE 1000 MG: 500 TABLET, FILM COATED, EXTENDED RELEASE ORAL at 08:35

## 2022-08-27 RX ADMIN — ATORVASTATIN CALCIUM 40 MG: 40 TABLET, FILM COATED ORAL at 21:33

## 2022-08-27 RX ADMIN — INSULIN DETEMIR 25 UNITS: 100 INJECTION, SOLUTION SUBCUTANEOUS at 08:35

## 2022-08-27 RX ADMIN — Medication 10 ML: at 21:33

## 2022-08-27 RX ADMIN — NORTRIPTYLINE HYDROCHLORIDE 10 MG: 10 CAPSULE ORAL at 21:33

## 2022-08-27 RX ADMIN — AMIODARONE HYDROCHLORIDE 200 MG: 200 TABLET ORAL at 08:35

## 2022-08-27 RX ADMIN — ASPIRIN 81 MG: 81 TABLET, FILM COATED ORAL at 08:35

## 2022-08-27 RX ADMIN — INSULIN DETEMIR 25 UNITS: 100 INJECTION, SOLUTION SUBCUTANEOUS at 21:33

## 2022-08-27 RX ADMIN — ISOSORBIDE MONONITRATE 30 MG: 30 TABLET, EXTENDED RELEASE ORAL at 08:35

## 2022-08-27 RX ADMIN — NORTRIPTYLINE HYDROCHLORIDE 10 MG: 10 CAPSULE ORAL at 17:06

## 2022-08-27 RX ADMIN — MORPHINE SULFATE 2 MG: 2 INJECTION, SOLUTION INTRAMUSCULAR; INTRAVENOUS at 18:22

## 2022-08-27 RX ADMIN — RANOLAZINE 1000 MG: 500 TABLET, FILM COATED, EXTENDED RELEASE ORAL at 21:33

## 2022-08-27 RX ADMIN — Medication 10 ML: at 08:37

## 2022-08-28 ENCOUNTER — HOSPITAL ENCOUNTER (OUTPATIENT)
Facility: HOSPITAL | Age: 57
Setting detail: HOSPITAL OUTPATIENT SURGERY
End: 2022-08-28
Attending: INTERNAL MEDICINE | Admitting: INTERNAL MEDICINE

## 2022-08-28 DIAGNOSIS — R07.2 PRECORDIAL PAIN: ICD-10-CM

## 2022-08-28 LAB
ALBUMIN SERPL-MCNC: 3.5 G/DL (ref 3.5–5.2)
ALBUMIN/GLOB SERPL: 1.3 G/DL
ALP SERPL-CCNC: 71 U/L (ref 39–117)
ALT SERPL W P-5'-P-CCNC: 16 U/L (ref 1–41)
ANION GAP SERPL CALCULATED.3IONS-SCNC: 4 MMOL/L (ref 5–15)
AST SERPL-CCNC: 10 U/L (ref 1–40)
BASOPHILS # BLD AUTO: 0.04 10*3/MM3 (ref 0–0.2)
BASOPHILS NFR BLD AUTO: 0.6 % (ref 0–1.5)
BILIRUB SERPL-MCNC: 0.5 MG/DL (ref 0–1.2)
BUN SERPL-MCNC: 23 MG/DL (ref 6–20)
BUN/CREAT SERPL: 20.7 (ref 7–25)
CALCIUM SPEC-SCNC: 9.1 MG/DL (ref 8.6–10.5)
CHLORIDE SERPL-SCNC: 102 MMOL/L (ref 98–107)
CO2 SERPL-SCNC: 34 MMOL/L (ref 22–29)
CREAT SERPL-MCNC: 1.11 MG/DL (ref 0.76–1.27)
DEPRECATED RDW RBC AUTO: 38.1 FL (ref 37–54)
EGFRCR SERPLBLD CKD-EPI 2021: 77 ML/MIN/1.73
EOSINOPHIL # BLD AUTO: 0.2 10*3/MM3 (ref 0–0.4)
EOSINOPHIL NFR BLD AUTO: 3.1 % (ref 0.3–6.2)
ERYTHROCYTE [DISTWIDTH] IN BLOOD BY AUTOMATED COUNT: 12.2 % (ref 12.3–15.4)
GLOBULIN UR ELPH-MCNC: 2.6 GM/DL
GLUCOSE BLDC GLUCOMTR-MCNC: 190 MG/DL (ref 70–130)
GLUCOSE BLDC GLUCOMTR-MCNC: 193 MG/DL (ref 70–130)
GLUCOSE BLDC GLUCOMTR-MCNC: 195 MG/DL (ref 70–130)
GLUCOSE BLDC GLUCOMTR-MCNC: 200 MG/DL (ref 70–130)
GLUCOSE SERPL-MCNC: 170 MG/DL (ref 65–99)
HCT VFR BLD AUTO: 44 % (ref 37.5–51)
HGB BLD-MCNC: 15.2 G/DL (ref 13–17.7)
IMM GRANULOCYTES # BLD AUTO: 0.04 10*3/MM3 (ref 0–0.05)
IMM GRANULOCYTES NFR BLD AUTO: 0.6 % (ref 0–0.5)
LYMPHOCYTES # BLD AUTO: 1.54 10*3/MM3 (ref 0.7–3.1)
LYMPHOCYTES NFR BLD AUTO: 23.8 % (ref 19.6–45.3)
MCH RBC QN AUTO: 29.5 PG (ref 26.6–33)
MCHC RBC AUTO-ENTMCNC: 34.5 G/DL (ref 31.5–35.7)
MCV RBC AUTO: 85.3 FL (ref 79–97)
MONOCYTES # BLD AUTO: 0.49 10*3/MM3 (ref 0.1–0.9)
MONOCYTES NFR BLD AUTO: 7.6 % (ref 5–12)
NEUTROPHILS NFR BLD AUTO: 4.15 10*3/MM3 (ref 1.7–7)
NEUTROPHILS NFR BLD AUTO: 64.3 % (ref 42.7–76)
NRBC BLD AUTO-RTO: 0 /100 WBC (ref 0–0.2)
PLATELET # BLD AUTO: 158 10*3/MM3 (ref 140–450)
PMV BLD AUTO: 10.5 FL (ref 6–12)
POTASSIUM SERPL-SCNC: 4.3 MMOL/L (ref 3.5–5.2)
PROT SERPL-MCNC: 6.1 G/DL (ref 6–8.5)
RBC # BLD AUTO: 5.16 10*6/MM3 (ref 4.14–5.8)
SODIUM SERPL-SCNC: 140 MMOL/L (ref 136–145)
WBC NRBC COR # BLD: 6.46 10*3/MM3 (ref 3.4–10.8)

## 2022-08-28 PROCEDURE — 25010000002 MORPHINE PER 10 MG: Performed by: INTERNAL MEDICINE

## 2022-08-28 PROCEDURE — 99214 OFFICE O/P EST MOD 30 MIN: CPT | Performed by: INTERNAL MEDICINE

## 2022-08-28 PROCEDURE — 94660 CPAP INITIATION&MGMT: CPT

## 2022-08-28 PROCEDURE — 63710000001 INSULIN DETEMIR PER 5 UNITS: Performed by: INTERNAL MEDICINE

## 2022-08-28 PROCEDURE — G0378 HOSPITAL OBSERVATION PER HR: HCPCS

## 2022-08-28 PROCEDURE — 85025 COMPLETE CBC W/AUTO DIFF WBC: CPT | Performed by: NURSE PRACTITIONER

## 2022-08-28 PROCEDURE — 82962 GLUCOSE BLOOD TEST: CPT

## 2022-08-28 PROCEDURE — 94799 UNLISTED PULMONARY SVC/PX: CPT

## 2022-08-28 PROCEDURE — 63710000001 INSULIN ASPART PER 5 UNITS: Performed by: INTERNAL MEDICINE

## 2022-08-28 PROCEDURE — 80053 COMPREHEN METABOLIC PANEL: CPT | Performed by: NURSE PRACTITIONER

## 2022-08-28 PROCEDURE — 94760 N-INVAS EAR/PLS OXIMETRY 1: CPT

## 2022-08-28 PROCEDURE — 96376 TX/PRO/DX INJ SAME DRUG ADON: CPT

## 2022-08-28 RX ADMIN — NORTRIPTYLINE HYDROCHLORIDE 10 MG: 10 CAPSULE ORAL at 09:14

## 2022-08-28 RX ADMIN — GABAPENTIN 900 MG: 300 CAPSULE ORAL at 22:57

## 2022-08-28 RX ADMIN — INSULIN ASPART 3 UNITS: 100 INJECTION, SOLUTION INTRAVENOUS; SUBCUTANEOUS at 17:21

## 2022-08-28 RX ADMIN — NORTRIPTYLINE HYDROCHLORIDE 10 MG: 10 CAPSULE ORAL at 22:58

## 2022-08-28 RX ADMIN — Medication 10 ML: at 22:58

## 2022-08-28 RX ADMIN — ATORVASTATIN CALCIUM 40 MG: 40 TABLET, FILM COATED ORAL at 22:57

## 2022-08-28 RX ADMIN — ASPIRIN 81 MG: 81 TABLET, FILM COATED ORAL at 09:14

## 2022-08-28 RX ADMIN — RANOLAZINE 1000 MG: 500 TABLET, FILM COATED, EXTENDED RELEASE ORAL at 09:14

## 2022-08-28 RX ADMIN — CLOPIDOGREL BISULFATE 75 MG: 75 TABLET ORAL at 09:14

## 2022-08-28 RX ADMIN — METOPROLOL SUCCINATE 25 MG: 25 TABLET, FILM COATED, EXTENDED RELEASE ORAL at 09:14

## 2022-08-28 RX ADMIN — NORTRIPTYLINE HYDROCHLORIDE 10 MG: 10 CAPSULE ORAL at 15:16

## 2022-08-28 RX ADMIN — ISOSORBIDE MONONITRATE 30 MG: 30 TABLET, EXTENDED RELEASE ORAL at 09:14

## 2022-08-28 RX ADMIN — Medication 10 ML: at 09:15

## 2022-08-28 RX ADMIN — MORPHINE SULFATE 2 MG: 2 INJECTION, SOLUTION INTRAMUSCULAR; INTRAVENOUS at 09:20

## 2022-08-28 RX ADMIN — MORPHINE SULFATE 2 MG: 2 INJECTION, SOLUTION INTRAMUSCULAR; INTRAVENOUS at 22:58

## 2022-08-28 RX ADMIN — INSULIN DETEMIR 25 UNITS: 100 INJECTION, SOLUTION SUBCUTANEOUS at 09:16

## 2022-08-28 RX ADMIN — AMIODARONE HYDROCHLORIDE 200 MG: 200 TABLET ORAL at 09:14

## 2022-08-28 RX ADMIN — RANOLAZINE 1000 MG: 500 TABLET, FILM COATED, EXTENDED RELEASE ORAL at 22:57

## 2022-08-28 RX ADMIN — GABAPENTIN 900 MG: 300 CAPSULE ORAL at 09:14

## 2022-08-28 RX ADMIN — MORPHINE SULFATE 2 MG: 2 INJECTION, SOLUTION INTRAMUSCULAR; INTRAVENOUS at 02:55

## 2022-08-28 RX ADMIN — INSULIN ASPART 5 UNITS: 100 INJECTION, SOLUTION INTRAVENOUS; SUBCUTANEOUS at 11:14

## 2022-08-28 RX ADMIN — INSULIN DETEMIR 25 UNITS: 100 INJECTION, SOLUTION SUBCUTANEOUS at 22:57

## 2022-08-28 RX ADMIN — GABAPENTIN 900 MG: 300 CAPSULE ORAL at 15:16

## 2022-08-28 RX ADMIN — MORPHINE SULFATE 2 MG: 2 INJECTION, SOLUTION INTRAMUSCULAR; INTRAVENOUS at 15:33

## 2022-08-28 RX ADMIN — INSULIN ASPART 3 UNITS: 100 INJECTION, SOLUTION INTRAVENOUS; SUBCUTANEOUS at 09:14

## 2022-08-29 PROBLEM — I25.10 CAD S/P PERCUTANEOUS CORONARY ANGIOPLASTY: Status: ACTIVE | Noted: 2022-08-29

## 2022-08-29 PROBLEM — Z98.61 CAD S/P PERCUTANEOUS CORONARY ANGIOPLASTY: Status: ACTIVE | Noted: 2022-08-29

## 2022-08-29 PROBLEM — Z95.1 S/P CABG (CORONARY ARTERY BYPASS GRAFT): Status: ACTIVE | Noted: 2022-08-29

## 2022-08-29 LAB
ALBUMIN SERPL-MCNC: 3.8 G/DL (ref 3.5–5.2)
ALBUMIN/GLOB SERPL: 1.6 G/DL
ALP SERPL-CCNC: 109 U/L (ref 39–117)
ALT SERPL W P-5'-P-CCNC: 43 U/L (ref 1–41)
ANION GAP SERPL CALCULATED.3IONS-SCNC: 7 MMOL/L (ref 5–15)
AST SERPL-CCNC: 44 U/L (ref 1–40)
BASOPHILS # BLD AUTO: 0.07 10*3/MM3 (ref 0–0.2)
BASOPHILS NFR BLD AUTO: 1 % (ref 0–1.5)
BILIRUB SERPL-MCNC: 0.7 MG/DL (ref 0–1.2)
BUN SERPL-MCNC: 18 MG/DL (ref 6–20)
BUN/CREAT SERPL: 21.7 (ref 7–25)
CALCIUM SPEC-SCNC: 9 MG/DL (ref 8.6–10.5)
CHLORIDE SERPL-SCNC: 102 MMOL/L (ref 98–107)
CO2 SERPL-SCNC: 30 MMOL/L (ref 22–29)
CREAT SERPL-MCNC: 0.83 MG/DL (ref 0.76–1.27)
DEPRECATED RDW RBC AUTO: 37.2 FL (ref 37–54)
EGFRCR SERPLBLD CKD-EPI 2021: 101.4 ML/MIN/1.73
EOSINOPHIL # BLD AUTO: 0.23 10*3/MM3 (ref 0–0.4)
EOSINOPHIL NFR BLD AUTO: 3.1 % (ref 0.3–6.2)
ERYTHROCYTE [DISTWIDTH] IN BLOOD BY AUTOMATED COUNT: 12.3 % (ref 12.3–15.4)
GLOBULIN UR ELPH-MCNC: 2.4 GM/DL
GLUCOSE BLDC GLUCOMTR-MCNC: 145 MG/DL (ref 70–130)
GLUCOSE BLDC GLUCOMTR-MCNC: 256 MG/DL (ref 70–130)
GLUCOSE BLDC GLUCOMTR-MCNC: 87 MG/DL (ref 70–130)
GLUCOSE SERPL-MCNC: 111 MG/DL (ref 65–99)
HCT VFR BLD AUTO: 48.1 % (ref 37.5–51)
HGB BLD-MCNC: 17.1 G/DL (ref 13–17.7)
IMM GRANULOCYTES # BLD AUTO: 0.11 10*3/MM3 (ref 0–0.05)
IMM GRANULOCYTES NFR BLD AUTO: 1.5 % (ref 0–0.5)
LYMPHOCYTES # BLD AUTO: 1.57 10*3/MM3 (ref 0.7–3.1)
LYMPHOCYTES NFR BLD AUTO: 21.4 % (ref 19.6–45.3)
MCH RBC QN AUTO: 29.9 PG (ref 26.6–33)
MCHC RBC AUTO-ENTMCNC: 35.6 G/DL (ref 31.5–35.7)
MCV RBC AUTO: 84.2 FL (ref 79–97)
MONOCYTES # BLD AUTO: 0.51 10*3/MM3 (ref 0.1–0.9)
MONOCYTES NFR BLD AUTO: 6.9 % (ref 5–12)
NEUTROPHILS NFR BLD AUTO: 4.86 10*3/MM3 (ref 1.7–7)
NEUTROPHILS NFR BLD AUTO: 66.1 % (ref 42.7–76)
NRBC BLD AUTO-RTO: 0 /100 WBC (ref 0–0.2)
PLATELET # BLD AUTO: 184 10*3/MM3 (ref 140–450)
PMV BLD AUTO: 10.5 FL (ref 6–12)
POTASSIUM SERPL-SCNC: 4.5 MMOL/L (ref 3.5–5.2)
PROT SERPL-MCNC: 6.2 G/DL (ref 6–8.5)
QT INTERVAL: 446 MS
QTC INTERVAL: 471 MS
RBC # BLD AUTO: 5.71 10*6/MM3 (ref 4.14–5.8)
SODIUM SERPL-SCNC: 139 MMOL/L (ref 136–145)
WBC NRBC COR # BLD: 7.35 10*3/MM3 (ref 3.4–10.8)

## 2022-08-29 PROCEDURE — 0 IOPAMIDOL PER 1 ML: Performed by: INTERNAL MEDICINE

## 2022-08-29 PROCEDURE — 96376 TX/PRO/DX INJ SAME DRUG ADON: CPT

## 2022-08-29 PROCEDURE — 82962 GLUCOSE BLOOD TEST: CPT

## 2022-08-29 PROCEDURE — C1887 CATHETER, GUIDING: HCPCS | Performed by: INTERNAL MEDICINE

## 2022-08-29 PROCEDURE — C1769 GUIDE WIRE: HCPCS | Performed by: INTERNAL MEDICINE

## 2022-08-29 PROCEDURE — C1725 CATH, TRANSLUMIN NON-LASER: HCPCS | Performed by: INTERNAL MEDICINE

## 2022-08-29 PROCEDURE — 92928 PRQ TCAT PLMT NTRAC ST 1 LES: CPT | Performed by: INTERNAL MEDICINE

## 2022-08-29 PROCEDURE — 63710000001 INSULIN DETEMIR PER 5 UNITS: Performed by: INTERNAL MEDICINE

## 2022-08-29 PROCEDURE — 93459 L HRT ART/GRFT ANGIO: CPT | Performed by: INTERNAL MEDICINE

## 2022-08-29 PROCEDURE — 25010000002 FENTANYL CITRATE (PF) 50 MCG/ML SOLUTION: Performed by: INTERNAL MEDICINE

## 2022-08-29 PROCEDURE — G0378 HOSPITAL OBSERVATION PER HR: HCPCS

## 2022-08-29 PROCEDURE — C9600 PERC DRUG-EL COR STENT SING: HCPCS | Performed by: INTERNAL MEDICINE

## 2022-08-29 PROCEDURE — 25010000002 HYDRALAZINE PER 20 MG: Performed by: INTERNAL MEDICINE

## 2022-08-29 PROCEDURE — 63710000001 INSULIN ASPART PER 5 UNITS: Performed by: INTERNAL MEDICINE

## 2022-08-29 PROCEDURE — 99153 MOD SED SAME PHYS/QHP EA: CPT | Performed by: INTERNAL MEDICINE

## 2022-08-29 PROCEDURE — 94799 UNLISTED PULMONARY SVC/PX: CPT

## 2022-08-29 PROCEDURE — 93010 ELECTROCARDIOGRAM REPORT: CPT | Performed by: INTERNAL MEDICINE

## 2022-08-29 PROCEDURE — 94660 CPAP INITIATION&MGMT: CPT

## 2022-08-29 PROCEDURE — 93005 ELECTROCARDIOGRAM TRACING: CPT | Performed by: INTERNAL MEDICINE

## 2022-08-29 PROCEDURE — 85025 COMPLETE CBC W/AUTO DIFF WBC: CPT | Performed by: NURSE PRACTITIONER

## 2022-08-29 PROCEDURE — 25010000002 ONDANSETRON PER 1 MG: Performed by: NURSE PRACTITIONER

## 2022-08-29 PROCEDURE — C1760 CLOSURE DEV, VASC: HCPCS | Performed by: INTERNAL MEDICINE

## 2022-08-29 PROCEDURE — 25010000002 HEPARIN (PORCINE) PER 1000 UNITS: Performed by: INTERNAL MEDICINE

## 2022-08-29 PROCEDURE — 80053 COMPREHEN METABOLIC PANEL: CPT | Performed by: NURSE PRACTITIONER

## 2022-08-29 PROCEDURE — 99152 MOD SED SAME PHYS/QHP 5/>YRS: CPT | Performed by: INTERNAL MEDICINE

## 2022-08-29 PROCEDURE — C1874 STENT, COATED/COV W/DEL SYS: HCPCS | Performed by: INTERNAL MEDICINE

## 2022-08-29 PROCEDURE — C1894 INTRO/SHEATH, NON-LASER: HCPCS | Performed by: INTERNAL MEDICINE

## 2022-08-29 PROCEDURE — 25010000002 MORPHINE PER 10 MG: Performed by: INTERNAL MEDICINE

## 2022-08-29 PROCEDURE — 94760 N-INVAS EAR/PLS OXIMETRY 1: CPT

## 2022-08-29 PROCEDURE — 25010000002 MIDAZOLAM PER 1 MG: Performed by: INTERNAL MEDICINE

## 2022-08-29 DEVICE — XIENCE SKYPOINT™ EVEROLIMUS ELUTING CORONARY STENT SYSTEM 2.25 MM X 28 MM / RAPID-EXCHANGE
Type: IMPLANTABLE DEVICE | Site: CORONARY | Status: FUNCTIONAL
Brand: XIENCE SKYPOINT™

## 2022-08-29 RX ORDER — HYDRALAZINE HYDROCHLORIDE 20 MG/ML
INJECTION INTRAMUSCULAR; INTRAVENOUS AS NEEDED
Status: DISCONTINUED | OUTPATIENT
Start: 2022-08-29 | End: 2022-08-29 | Stop reason: HOSPADM

## 2022-08-29 RX ORDER — HEPARIN SODIUM 1000 [USP'U]/ML
INJECTION, SOLUTION INTRAVENOUS; SUBCUTANEOUS AS NEEDED
Status: DISCONTINUED | OUTPATIENT
Start: 2022-08-29 | End: 2022-08-29 | Stop reason: HOSPADM

## 2022-08-29 RX ORDER — SODIUM CHLORIDE 0.9 % (FLUSH) 0.9 %
10 SYRINGE (ML) INJECTION AS NEEDED
Status: DISCONTINUED | OUTPATIENT
Start: 2022-08-29 | End: 2022-08-29 | Stop reason: HOSPADM

## 2022-08-29 RX ORDER — SODIUM CHLORIDE 0.9 % (FLUSH) 0.9 %
3 SYRINGE (ML) INJECTION EVERY 12 HOURS SCHEDULED
Status: DISCONTINUED | OUTPATIENT
Start: 2022-08-29 | End: 2022-08-29 | Stop reason: HOSPADM

## 2022-08-29 RX ORDER — SODIUM CHLORIDE 9 MG/ML
100 INJECTION, SOLUTION INTRAVENOUS CONTINUOUS
Status: DISPENSED | OUTPATIENT
Start: 2022-08-29 | End: 2022-08-29

## 2022-08-29 RX ORDER — FENTANYL CITRATE 50 UG/ML
INJECTION, SOLUTION INTRAMUSCULAR; INTRAVENOUS AS NEEDED
Status: DISCONTINUED | OUTPATIENT
Start: 2022-08-29 | End: 2022-08-29 | Stop reason: HOSPADM

## 2022-08-29 RX ORDER — ACETAMINOPHEN 325 MG/1
650 TABLET ORAL EVERY 4 HOURS PRN
Status: DISCONTINUED | OUTPATIENT
Start: 2022-08-29 | End: 2022-08-30 | Stop reason: HOSPADM

## 2022-08-29 RX ORDER — NITROGLYCERIN 5 MG/ML
INJECTION, SOLUTION INTRAVENOUS AS NEEDED
Status: DISCONTINUED | OUTPATIENT
Start: 2022-08-29 | End: 2022-08-29 | Stop reason: HOSPADM

## 2022-08-29 RX ORDER — GABAPENTIN 300 MG/1
300 CAPSULE ORAL 3 TIMES DAILY
Status: DISCONTINUED | OUTPATIENT
Start: 2022-08-29 | End: 2022-08-30 | Stop reason: HOSPADM

## 2022-08-29 RX ORDER — ONDANSETRON 2 MG/ML
4 INJECTION INTRAMUSCULAR; INTRAVENOUS EVERY 6 HOURS PRN
Status: DISCONTINUED | OUTPATIENT
Start: 2022-08-29 | End: 2022-08-30 | Stop reason: HOSPADM

## 2022-08-29 RX ORDER — MIDAZOLAM HYDROCHLORIDE 1 MG/ML
INJECTION INTRAMUSCULAR; INTRAVENOUS AS NEEDED
Status: DISCONTINUED | OUTPATIENT
Start: 2022-08-29 | End: 2022-08-29 | Stop reason: HOSPADM

## 2022-08-29 RX ORDER — SODIUM CHLORIDE 9 MG/ML
50 INJECTION, SOLUTION INTRAVENOUS ONCE
Status: DISCONTINUED | OUTPATIENT
Start: 2022-08-29 | End: 2022-08-29 | Stop reason: HOSPADM

## 2022-08-29 RX ORDER — LIDOCAINE HYDROCHLORIDE 20 MG/ML
INJECTION, SOLUTION INFILTRATION; PERINEURAL AS NEEDED
Status: DISCONTINUED | OUTPATIENT
Start: 2022-08-29 | End: 2022-08-29 | Stop reason: HOSPADM

## 2022-08-29 RX ADMIN — GABAPENTIN 300 MG: 300 CAPSULE ORAL at 11:33

## 2022-08-29 RX ADMIN — AMIODARONE HYDROCHLORIDE 200 MG: 200 TABLET ORAL at 07:30

## 2022-08-29 RX ADMIN — ASPIRIN 81 MG: 81 TABLET, FILM COATED ORAL at 07:29

## 2022-08-29 RX ADMIN — INSULIN DETEMIR 25 UNITS: 100 INJECTION, SOLUTION SUBCUTANEOUS at 20:03

## 2022-08-29 RX ADMIN — MORPHINE SULFATE 2 MG: 2 INJECTION, SOLUTION INTRAMUSCULAR; INTRAVENOUS at 05:57

## 2022-08-29 RX ADMIN — CLOPIDOGREL BISULFATE 75 MG: 75 TABLET ORAL at 07:30

## 2022-08-29 RX ADMIN — Medication 10 ML: at 07:32

## 2022-08-29 RX ADMIN — SODIUM CHLORIDE 100 ML/HR: 9 INJECTION, SOLUTION INTRAVENOUS at 12:59

## 2022-08-29 RX ADMIN — GABAPENTIN 300 MG: 300 CAPSULE ORAL at 15:01

## 2022-08-29 RX ADMIN — MORPHINE SULFATE 2 MG: 2 INJECTION, SOLUTION INTRAMUSCULAR; INTRAVENOUS at 13:26

## 2022-08-29 RX ADMIN — Medication 10 ML: at 20:02

## 2022-08-29 RX ADMIN — ONDANSETRON 4 MG: 2 INJECTION INTRAMUSCULAR; INTRAVENOUS at 12:59

## 2022-08-29 RX ADMIN — NORTRIPTYLINE HYDROCHLORIDE 10 MG: 10 CAPSULE ORAL at 15:01

## 2022-08-29 RX ADMIN — METOPROLOL SUCCINATE 25 MG: 25 TABLET, FILM COATED, EXTENDED RELEASE ORAL at 07:30

## 2022-08-29 RX ADMIN — NORTRIPTYLINE HYDROCHLORIDE 10 MG: 10 CAPSULE ORAL at 07:29

## 2022-08-29 RX ADMIN — RANOLAZINE 1000 MG: 500 TABLET, FILM COATED, EXTENDED RELEASE ORAL at 20:02

## 2022-08-29 RX ADMIN — ATORVASTATIN CALCIUM 40 MG: 40 TABLET, FILM COATED ORAL at 20:02

## 2022-08-29 RX ADMIN — RANOLAZINE 1000 MG: 500 TABLET, FILM COATED, EXTENDED RELEASE ORAL at 07:28

## 2022-08-29 RX ADMIN — INSULIN ASPART 8 UNITS: 100 INJECTION, SOLUTION INTRAVENOUS; SUBCUTANEOUS at 17:53

## 2022-08-29 RX ADMIN — ISOSORBIDE MONONITRATE 30 MG: 30 TABLET, EXTENDED RELEASE ORAL at 07:30

## 2022-08-29 RX ADMIN — NORTRIPTYLINE HYDROCHLORIDE 10 MG: 10 CAPSULE ORAL at 20:02

## 2022-08-29 RX ADMIN — GABAPENTIN 300 MG: 300 CAPSULE ORAL at 20:02

## 2022-08-30 ENCOUNTER — READMISSION MANAGEMENT (OUTPATIENT)
Dept: CALL CENTER | Facility: HOSPITAL | Age: 57
End: 2022-08-30

## 2022-08-30 VITALS
HEART RATE: 75 BPM | SYSTOLIC BLOOD PRESSURE: 158 MMHG | OXYGEN SATURATION: 98 % | WEIGHT: 258.2 LBS | HEIGHT: 74 IN | RESPIRATION RATE: 18 BRPM | DIASTOLIC BLOOD PRESSURE: 78 MMHG | BODY MASS INDEX: 33.14 KG/M2 | TEMPERATURE: 96.4 F

## 2022-08-30 LAB
ALBUMIN SERPL-MCNC: 3.5 G/DL (ref 3.5–5.2)
ALBUMIN/GLOB SERPL: 1.5 G/DL
ALP SERPL-CCNC: 110 U/L (ref 39–117)
ALT SERPL W P-5'-P-CCNC: 46 U/L (ref 1–41)
ANION GAP SERPL CALCULATED.3IONS-SCNC: 7 MMOL/L (ref 5–15)
AST SERPL-CCNC: 24 U/L (ref 1–40)
BASOPHILS # BLD AUTO: 0.04 10*3/MM3 (ref 0–0.2)
BASOPHILS NFR BLD AUTO: 0.6 % (ref 0–1.5)
BILIRUB SERPL-MCNC: 0.5 MG/DL (ref 0–1.2)
BUN SERPL-MCNC: 18 MG/DL (ref 6–20)
BUN/CREAT SERPL: 21.4 (ref 7–25)
CALCIUM SPEC-SCNC: 8.9 MG/DL (ref 8.6–10.5)
CHLORIDE SERPL-SCNC: 101 MMOL/L (ref 98–107)
CO2 SERPL-SCNC: 29 MMOL/L (ref 22–29)
CREAT SERPL-MCNC: 0.84 MG/DL (ref 0.76–1.27)
DEPRECATED RDW RBC AUTO: 37.6 FL (ref 37–54)
EGFRCR SERPLBLD CKD-EPI 2021: 101.1 ML/MIN/1.73
EOSINOPHIL # BLD AUTO: 0.14 10*3/MM3 (ref 0–0.4)
EOSINOPHIL NFR BLD AUTO: 2.1 % (ref 0.3–6.2)
ERYTHROCYTE [DISTWIDTH] IN BLOOD BY AUTOMATED COUNT: 12.4 % (ref 12.3–15.4)
GLOBULIN UR ELPH-MCNC: 2.3 GM/DL
GLUCOSE BLDC GLUCOMTR-MCNC: 180 MG/DL (ref 70–130)
GLUCOSE BLDC GLUCOMTR-MCNC: 336 MG/DL (ref 70–130)
GLUCOSE SERPL-MCNC: 176 MG/DL (ref 65–99)
HCT VFR BLD AUTO: 44.5 % (ref 37.5–51)
HGB BLD-MCNC: 15.6 G/DL (ref 13–17.7)
IMM GRANULOCYTES # BLD AUTO: 0.05 10*3/MM3 (ref 0–0.05)
IMM GRANULOCYTES NFR BLD AUTO: 0.8 % (ref 0–0.5)
LYMPHOCYTES # BLD AUTO: 1.58 10*3/MM3 (ref 0.7–3.1)
LYMPHOCYTES NFR BLD AUTO: 23.9 % (ref 19.6–45.3)
MCH RBC QN AUTO: 29.2 PG (ref 26.6–33)
MCHC RBC AUTO-ENTMCNC: 35.1 G/DL (ref 31.5–35.7)
MCV RBC AUTO: 83.2 FL (ref 79–97)
MONOCYTES # BLD AUTO: 0.41 10*3/MM3 (ref 0.1–0.9)
MONOCYTES NFR BLD AUTO: 6.2 % (ref 5–12)
NEUTROPHILS NFR BLD AUTO: 4.39 10*3/MM3 (ref 1.7–7)
NEUTROPHILS NFR BLD AUTO: 66.4 % (ref 42.7–76)
NRBC BLD AUTO-RTO: 0 /100 WBC (ref 0–0.2)
PLATELET # BLD AUTO: 174 10*3/MM3 (ref 140–450)
PMV BLD AUTO: 10.8 FL (ref 6–12)
POTASSIUM SERPL-SCNC: 3.9 MMOL/L (ref 3.5–5.2)
PROT SERPL-MCNC: 5.8 G/DL (ref 6–8.5)
RBC # BLD AUTO: 5.35 10*6/MM3 (ref 4.14–5.8)
SODIUM SERPL-SCNC: 137 MMOL/L (ref 136–145)
WBC NRBC COR # BLD: 6.61 10*3/MM3 (ref 3.4–10.8)

## 2022-08-30 PROCEDURE — 63710000001 INSULIN ASPART PER 5 UNITS: Performed by: INTERNAL MEDICINE

## 2022-08-30 PROCEDURE — 82962 GLUCOSE BLOOD TEST: CPT

## 2022-08-30 PROCEDURE — 99214 OFFICE O/P EST MOD 30 MIN: CPT | Performed by: INTERNAL MEDICINE

## 2022-08-30 PROCEDURE — G0378 HOSPITAL OBSERVATION PER HR: HCPCS

## 2022-08-30 PROCEDURE — 85025 COMPLETE CBC W/AUTO DIFF WBC: CPT | Performed by: INTERNAL MEDICINE

## 2022-08-30 PROCEDURE — 94799 UNLISTED PULMONARY SVC/PX: CPT

## 2022-08-30 PROCEDURE — 63710000001 INSULIN DETEMIR PER 5 UNITS: Performed by: INTERNAL MEDICINE

## 2022-08-30 PROCEDURE — 80053 COMPREHEN METABOLIC PANEL: CPT | Performed by: INTERNAL MEDICINE

## 2022-08-30 RX ORDER — RANOLAZINE 1000 MG/1
1000 TABLET, EXTENDED RELEASE ORAL EVERY 12 HOURS SCHEDULED
Qty: 60 TABLET | Refills: 3 | Status: SHIPPED | OUTPATIENT
Start: 2022-08-30

## 2022-08-30 RX ORDER — ASPIRIN 81 MG/1
81 TABLET ORAL DAILY
Qty: 30 TABLET | Refills: 3 | Status: SHIPPED | OUTPATIENT
Start: 2022-08-31 | End: 2022-10-05

## 2022-08-30 RX ADMIN — GABAPENTIN 300 MG: 300 CAPSULE ORAL at 08:47

## 2022-08-30 RX ADMIN — ASPIRIN 81 MG: 81 TABLET, FILM COATED ORAL at 08:47

## 2022-08-30 RX ADMIN — ACETAMINOPHEN 650 MG: 325 TABLET, FILM COATED ORAL at 08:51

## 2022-08-30 RX ADMIN — Medication 10 ML: at 08:48

## 2022-08-30 RX ADMIN — INSULIN ASPART 3 UNITS: 100 INJECTION, SOLUTION INTRAVENOUS; SUBCUTANEOUS at 08:46

## 2022-08-30 RX ADMIN — AMIODARONE HYDROCHLORIDE 200 MG: 200 TABLET ORAL at 08:47

## 2022-08-30 RX ADMIN — ISOSORBIDE MONONITRATE 30 MG: 30 TABLET, EXTENDED RELEASE ORAL at 08:47

## 2022-08-30 RX ADMIN — INSULIN DETEMIR 25 UNITS: 100 INJECTION, SOLUTION SUBCUTANEOUS at 08:47

## 2022-08-30 RX ADMIN — RANOLAZINE 1000 MG: 500 TABLET, FILM COATED, EXTENDED RELEASE ORAL at 08:47

## 2022-08-30 RX ADMIN — CLOPIDOGREL BISULFATE 75 MG: 75 TABLET ORAL at 08:47

## 2022-08-30 RX ADMIN — METOPROLOL SUCCINATE 25 MG: 25 TABLET, FILM COATED, EXTENDED RELEASE ORAL at 08:47

## 2022-08-30 RX ADMIN — NORTRIPTYLINE HYDROCHLORIDE 10 MG: 10 CAPSULE ORAL at 08:47

## 2022-08-30 NOTE — OUTREACH NOTE
Prep Survey    Flowsheet Row Responses   [facility] facility patient discharged from? [place]   Is LACE score < 7 ? No   Emergency Room discharge w/ pulse ox? No   Eligibility [facility]   Date of Admission 08/25/22   Date of Discharge 08/30/22   Discharge Disposition Home or Self Care   Discharge diagnosis Precordial pain-heart cath this visit   Does the patient have one of the following disease processes/diagnoses(primary or secondary)? Other   Does the patient have Home health ordered? No   Is there a DME ordered? No   Prep survey completed? Yes          DUDLEY PRITCHARD - Registered Nurse

## 2022-08-31 ENCOUNTER — TRANSITIONAL CARE MANAGEMENT TELEPHONE ENCOUNTER (OUTPATIENT)
Dept: CALL CENTER | Facility: HOSPITAL | Age: 57
End: 2022-08-31

## 2022-08-31 LAB
QT INTERVAL: 360 MS
QT INTERVAL: 395 MS
QT INTERVAL: 426 MS
QTC INTERVAL: 456 MS
QTC INTERVAL: 465 MS
QTC INTERVAL: 467 MS

## 2022-08-31 NOTE — OUTREACH NOTE
Call Center TCM Note    Flowsheet Row Responses   [facility] [place] patient discharged from? [place]   Does the patient have one of the following disease processes/diagnoses(primary or secondary)? Other   TCM attempt successful? Yes   Call start time 1457   Call end time 1459   Discharge diagnosis Precordial pain-heart cath this visit   Medication alerts for this patient ASA   Meds reviewed with patient/caregiver? Yes   Is the patient having any side effects they believe may be caused by any medication additions or changes? No   Does the patient have all medications ordered at discharge? Yes   Is the patient taking all medications as directed (includes completed medication regime)? Yes   Does the patient have an appointment with their PCP within 7 days of discharge? Yes   Comments regarding PCP Hospital follow up 09/06 @ 10   Has the patient kept scheduled appointments due by today? N/A   Has home health visited the patient within 72 hours of discharge? N/A   Did the patient receive a copy of their discharge instructions? Yes   Nursing interventions Reviewed instructions with patient   What is the patient's perception of their health status since discharge? Improving   Is the patient/caregiver able to teach back signs and symptoms related to disease process for when to call PCP? Yes   Is the patient/caregiver able to teach back signs and symptoms related to disease process for when to call 911? Yes   Is the patient/caregiver able to teach back the hierarchy of who to call/visit for symptoms/problems? PCP, Specialist, Home health nurse, Urgent Care, ED, 911 Yes   If the patient is a current smoker, are they able to teach back resources for cessation? Not a smoker   TCM call completed? Yes   Wrap up additional comments Patient states he is doing very well just still a little tired.          Cherelle Myers RN    8/31/2022, 14:59 CDT

## 2022-09-03 DIAGNOSIS — I25.10 CORONARY ARTERY DISEASE INVOLVING NATIVE HEART, UNSPECIFIED VESSEL OR LESION TYPE, UNSPECIFIED WHETHER ANGINA PRESENT: Primary | ICD-10-CM

## 2022-09-03 PROCEDURE — 25010000002 FENTANYL CITRATE (PF) 50 MCG/ML SOLUTION: Performed by: INTERNAL MEDICINE

## 2022-09-03 PROCEDURE — C1760 CLOSURE DEV, VASC: HCPCS | Performed by: INTERNAL MEDICINE

## 2022-09-03 PROCEDURE — C1894 INTRO/SHEATH, NON-LASER: HCPCS | Performed by: INTERNAL MEDICINE

## 2022-09-03 PROCEDURE — 93455 CORONARY ART/GRFT ANGIO S&I: CPT | Performed by: INTERNAL MEDICINE

## 2022-09-03 PROCEDURE — 25010000002 MIDAZOLAM PER 1 MG: Performed by: INTERNAL MEDICINE

## 2022-09-03 PROCEDURE — C1769 GUIDE WIRE: HCPCS | Performed by: INTERNAL MEDICINE

## 2022-09-03 PROCEDURE — 25010000002 HYDRALAZINE PER 20 MG: Performed by: INTERNAL MEDICINE

## 2022-09-03 PROCEDURE — C1887 CATHETER, GUIDING: HCPCS | Performed by: INTERNAL MEDICINE

## 2022-09-03 RX ORDER — HYDRALAZINE HYDROCHLORIDE 20 MG/ML
INJECTION INTRAMUSCULAR; INTRAVENOUS AS NEEDED
Status: DISCONTINUED | OUTPATIENT
Start: 2022-09-03 | End: 2022-09-04 | Stop reason: HOSPADM

## 2022-09-03 RX ORDER — FENTANYL CITRATE 50 UG/ML
INJECTION, SOLUTION INTRAMUSCULAR; INTRAVENOUS AS NEEDED
Status: DISCONTINUED | OUTPATIENT
Start: 2022-09-03 | End: 2022-09-04 | Stop reason: HOSPADM

## 2022-09-03 RX ORDER — LIDOCAINE HYDROCHLORIDE 20 MG/ML
INJECTION, SOLUTION INFILTRATION; PERINEURAL AS NEEDED
Status: DISCONTINUED | OUTPATIENT
Start: 2022-09-03 | End: 2022-09-04 | Stop reason: HOSPADM

## 2022-09-03 RX ORDER — SODIUM CHLORIDE 9 MG/ML
INJECTION, SOLUTION INTRAVENOUS CONTINUOUS PRN
Status: COMPLETED | OUTPATIENT
Start: 2022-09-03 | End: 2022-09-03

## 2022-09-03 RX ORDER — MIDAZOLAM HYDROCHLORIDE 1 MG/ML
INJECTION INTRAMUSCULAR; INTRAVENOUS AS NEEDED
Status: DISCONTINUED | OUTPATIENT
Start: 2022-09-03 | End: 2022-09-04 | Stop reason: HOSPADM

## 2022-09-04 ENCOUNTER — APPOINTMENT (OUTPATIENT)
Dept: ULTRASOUND IMAGING | Facility: HOSPITAL | Age: 57
End: 2022-09-04

## 2022-09-04 ENCOUNTER — READMISSION MANAGEMENT (OUTPATIENT)
Dept: CALL CENTER | Facility: HOSPITAL | Age: 57
End: 2022-09-04

## 2022-09-04 ENCOUNTER — HOSPITAL ENCOUNTER (OUTPATIENT)
Facility: HOSPITAL | Age: 57
Discharge: HOME | End: 2022-09-04
Attending: INTERNAL MEDICINE | Admitting: INTERNAL MEDICINE

## 2022-09-04 VITALS
OXYGEN SATURATION: 99 % | TEMPERATURE: 98.3 F | DIASTOLIC BLOOD PRESSURE: 78 MMHG | SYSTOLIC BLOOD PRESSURE: 162 MMHG | RESPIRATION RATE: 18 BRPM | HEART RATE: 63 BPM

## 2022-09-04 DIAGNOSIS — I25.10 CORONARY ARTERY DISEASE INVOLVING NATIVE HEART, UNSPECIFIED VESSEL OR LESION TYPE, UNSPECIFIED WHETHER ANGINA PRESENT: ICD-10-CM

## 2022-09-04 LAB
ANION GAP SERPL CALCULATED.3IONS-SCNC: 7 MMOL/L (ref 5–15)
ANION GAP SERPL CALCULATED.3IONS-SCNC: 9 MMOL/L (ref 5–15)
BILIRUB UR QL STRIP: NEGATIVE
BUN SERPL-MCNC: 23 MG/DL (ref 6–20)
BUN SERPL-MCNC: 25 MG/DL (ref 6–20)
BUN/CREAT SERPL: 26.3 (ref 7–25)
BUN/CREAT SERPL: 29.1 (ref 7–25)
CALCIUM SPEC-SCNC: 8.6 MG/DL (ref 8.6–10.5)
CALCIUM SPEC-SCNC: 9 MG/DL (ref 8.6–10.5)
CHLORIDE SERPL-SCNC: 101 MMOL/L (ref 98–107)
CHLORIDE SERPL-SCNC: 108 MMOL/L (ref 98–107)
CLARITY UR: CLEAR
CO2 SERPL-SCNC: 25 MMOL/L (ref 22–29)
CO2 SERPL-SCNC: 26 MMOL/L (ref 22–29)
COLOR UR: YELLOW
CREAT SERPL-MCNC: 0.79 MG/DL (ref 0.76–1.27)
CREAT SERPL-MCNC: 0.95 MG/DL (ref 0.76–1.27)
DEPRECATED RDW RBC AUTO: 38.7 FL (ref 37–54)
EGFRCR SERPLBLD CKD-EPI 2021: 103 ML/MIN/1.73
EGFRCR SERPLBLD CKD-EPI 2021: 92.8 ML/MIN/1.73
ERYTHROCYTE [DISTWIDTH] IN BLOOD BY AUTOMATED COUNT: 12.7 % (ref 12.3–15.4)
GLUCOSE BLDC GLUCOMTR-MCNC: 158 MG/DL (ref 70–130)
GLUCOSE BLDC GLUCOMTR-MCNC: 176 MG/DL (ref 70–130)
GLUCOSE BLDC GLUCOMTR-MCNC: 223 MG/DL (ref 70–130)
GLUCOSE SERPL-MCNC: 179 MG/DL (ref 65–99)
GLUCOSE SERPL-MCNC: 463 MG/DL (ref 65–99)
GLUCOSE UR STRIP-MCNC: ABNORMAL MG/DL
HBA1C MFR BLD: 8.5 % (ref 4.8–5.6)
HCT VFR BLD AUTO: 46.1 % (ref 37.5–51)
HGB BLD-MCNC: 16.1 G/DL (ref 13–17.7)
HGB UR QL STRIP.AUTO: NEGATIVE
INR PPP: 1.12 (ref 0.8–1.2)
KETONES UR QL STRIP: NEGATIVE
LEUKOCYTE ESTERASE UR QL STRIP.AUTO: NEGATIVE
MAGNESIUM SERPL-MCNC: 2 MG/DL (ref 1.6–2.6)
MCH RBC QN AUTO: 29.3 PG (ref 26.6–33)
MCHC RBC AUTO-ENTMCNC: 34.9 G/DL (ref 31.5–35.7)
MCV RBC AUTO: 84 FL (ref 79–97)
NITRITE UR QL STRIP: NEGATIVE
PH UR STRIP.AUTO: 5.5 [PH] (ref 5–9)
PLATELET # BLD AUTO: 190 10*3/MM3 (ref 140–450)
PMV BLD AUTO: 11 FL (ref 6–12)
POTASSIUM SERPL-SCNC: 3.4 MMOL/L (ref 3.5–5.2)
POTASSIUM SERPL-SCNC: 4 MMOL/L (ref 3.5–5.2)
PROT UR QL STRIP: ABNORMAL
PROTHROMBIN TIME: 14.3 SECONDS (ref 11.1–15.3)
RBC # BLD AUTO: 5.49 10*6/MM3 (ref 4.14–5.8)
SODIUM SERPL-SCNC: 135 MMOL/L (ref 136–145)
SODIUM SERPL-SCNC: 141 MMOL/L (ref 136–145)
SP GR UR STRIP: 1.06 (ref 1–1.03)
TROPONIN T SERPL-MCNC: <0.01 NG/ML (ref 0–0.03)
TSH SERPL DL<=0.05 MIU/L-ACNC: 4.11 UIU/ML (ref 0.27–4.2)
UROBILINOGEN UR QL STRIP: ABNORMAL
WBC NRBC COR # BLD: 6.1 10*3/MM3 (ref 3.4–10.8)
WHOLE BLOOD HOLD SPECIMEN: NORMAL

## 2022-09-04 PROCEDURE — 63710000001 ONDANSETRON PER 8 MG: Performed by: INTERNAL MEDICINE

## 2022-09-04 PROCEDURE — 80048 BASIC METABOLIC PNL TOTAL CA: CPT | Performed by: INTERNAL MEDICINE

## 2022-09-04 PROCEDURE — 82962 GLUCOSE BLOOD TEST: CPT

## 2022-09-04 PROCEDURE — 83735 ASSAY OF MAGNESIUM: CPT | Performed by: INTERNAL MEDICINE

## 2022-09-04 PROCEDURE — 63710000001 INSULIN ASPART PER 5 UNITS: Performed by: INTERNAL MEDICINE

## 2022-09-04 PROCEDURE — 93975 VASCULAR STUDY: CPT

## 2022-09-04 PROCEDURE — 25010000002 HYDRALAZINE PER 20 MG: Performed by: INTERNAL MEDICINE

## 2022-09-04 PROCEDURE — 83036 HEMOGLOBIN GLYCOSYLATED A1C: CPT | Performed by: INTERNAL MEDICINE

## 2022-09-04 PROCEDURE — 84484 ASSAY OF TROPONIN QUANT: CPT | Performed by: INTERNAL MEDICINE

## 2022-09-04 PROCEDURE — 0 IOPAMIDOL PER 1 ML: Performed by: INTERNAL MEDICINE

## 2022-09-04 PROCEDURE — 63710000001 INSULIN DETEMIR PER 5 UNITS: Performed by: INTERNAL MEDICINE

## 2022-09-04 PROCEDURE — 85027 COMPLETE CBC AUTOMATED: CPT | Performed by: INTERNAL MEDICINE

## 2022-09-04 PROCEDURE — 25010000002 MORPHINE PER 10 MG: Performed by: INTERNAL MEDICINE

## 2022-09-04 PROCEDURE — 85610 PROTHROMBIN TIME: CPT | Performed by: INTERNAL MEDICINE

## 2022-09-04 PROCEDURE — 84443 ASSAY THYROID STIM HORMONE: CPT | Performed by: INTERNAL MEDICINE

## 2022-09-04 PROCEDURE — 76870 US EXAM SCROTUM: CPT

## 2022-09-04 PROCEDURE — 81003 URINALYSIS AUTO W/O SCOPE: CPT | Performed by: NURSE PRACTITIONER

## 2022-09-04 PROCEDURE — G0378 HOSPITAL OBSERVATION PER HR: HCPCS

## 2022-09-04 RX ORDER — TIZANIDINE 4 MG/1
4 TABLET ORAL 3 TIMES DAILY
Status: DISCONTINUED | OUTPATIENT
Start: 2022-09-04 | End: 2022-09-04 | Stop reason: HOSPADM

## 2022-09-04 RX ORDER — ACETAMINOPHEN 325 MG/1
650 TABLET ORAL EVERY 4 HOURS PRN
Status: DISCONTINUED | OUTPATIENT
Start: 2022-09-04 | End: 2022-09-04 | Stop reason: HOSPADM

## 2022-09-04 RX ORDER — NICOTINE POLACRILEX 4 MG
15 LOZENGE BUCCAL
Status: DISCONTINUED | OUTPATIENT
Start: 2022-09-04 | End: 2022-09-04 | Stop reason: HOSPADM

## 2022-09-04 RX ORDER — SODIUM CHLORIDE 9 MG/ML
100 INJECTION, SOLUTION INTRAVENOUS CONTINUOUS
Status: DISCONTINUED | OUTPATIENT
Start: 2022-09-04 | End: 2022-09-04 | Stop reason: HOSPADM

## 2022-09-04 RX ORDER — AMIODARONE HYDROCHLORIDE 200 MG/1
200 TABLET ORAL DAILY
Status: DISCONTINUED | OUTPATIENT
Start: 2022-09-04 | End: 2022-09-04 | Stop reason: HOSPADM

## 2022-09-04 RX ORDER — DEXTROSE MONOHYDRATE 25 G/50ML
25 INJECTION, SOLUTION INTRAVENOUS
Status: DISCONTINUED | OUTPATIENT
Start: 2022-09-04 | End: 2022-09-04 | Stop reason: HOSPADM

## 2022-09-04 RX ORDER — ASPIRIN 81 MG/1
81 TABLET ORAL DAILY
Status: DISCONTINUED | OUTPATIENT
Start: 2022-09-04 | End: 2022-09-04 | Stop reason: HOSPADM

## 2022-09-04 RX ORDER — ISOSORBIDE MONONITRATE 30 MG/1
30 TABLET, EXTENDED RELEASE ORAL
Status: DISCONTINUED | OUTPATIENT
Start: 2022-09-04 | End: 2022-09-04 | Stop reason: HOSPADM

## 2022-09-04 RX ORDER — GABAPENTIN 300 MG/1
300 CAPSULE ORAL 3 TIMES DAILY
Status: DISCONTINUED | OUTPATIENT
Start: 2022-09-04 | End: 2022-09-04 | Stop reason: HOSPADM

## 2022-09-04 RX ORDER — MELOXICAM 15 MG/1
15 TABLET ORAL DAILY
Status: DISCONTINUED | OUTPATIENT
Start: 2022-09-04 | End: 2022-09-04 | Stop reason: HOSPADM

## 2022-09-04 RX ORDER — SPIRONOLACTONE 25 MG/1
25 TABLET ORAL DAILY
Status: DISCONTINUED | OUTPATIENT
Start: 2022-09-04 | End: 2022-09-04 | Stop reason: HOSPADM

## 2022-09-04 RX ORDER — INSULIN ASPART 100 [IU]/ML
0-24 INJECTION, SOLUTION INTRAVENOUS; SUBCUTANEOUS
Status: DISCONTINUED | OUTPATIENT
Start: 2022-09-04 | End: 2022-09-04 | Stop reason: HOSPADM

## 2022-09-04 RX ORDER — NORTRIPTYLINE HYDROCHLORIDE 10 MG/1
10 CAPSULE ORAL 3 TIMES DAILY
Status: DISCONTINUED | OUTPATIENT
Start: 2022-09-04 | End: 2022-09-04 | Stop reason: HOSPADM

## 2022-09-04 RX ORDER — INSULIN ASPART 100 [IU]/ML
15 INJECTION, SOLUTION INTRAVENOUS; SUBCUTANEOUS ONCE
Status: COMPLETED | OUTPATIENT
Start: 2022-09-04 | End: 2022-09-04

## 2022-09-04 RX ORDER — GABAPENTIN 300 MG/1
900 CAPSULE ORAL 3 TIMES DAILY
Status: DISCONTINUED | OUTPATIENT
Start: 2022-09-04 | End: 2022-09-04

## 2022-09-04 RX ORDER — CLOPIDOGREL BISULFATE 75 MG/1
75 TABLET ORAL DAILY
Status: DISCONTINUED | OUTPATIENT
Start: 2022-09-04 | End: 2022-09-04 | Stop reason: HOSPADM

## 2022-09-04 RX ORDER — ONDANSETRON 4 MG/1
4 TABLET, FILM COATED ORAL EVERY 8 HOURS PRN
Status: DISCONTINUED | OUTPATIENT
Start: 2022-09-04 | End: 2022-09-04 | Stop reason: HOSPADM

## 2022-09-04 RX ORDER — PIOGLITAZONEHYDROCHLORIDE 15 MG/1
30 TABLET ORAL DAILY
Status: DISCONTINUED | OUTPATIENT
Start: 2022-09-04 | End: 2022-09-04 | Stop reason: HOSPADM

## 2022-09-04 RX ORDER — NITROGLYCERIN 0.4 MG/1
0.4 TABLET SUBLINGUAL
Status: DISCONTINUED | OUTPATIENT
Start: 2022-09-04 | End: 2022-09-04 | Stop reason: HOSPADM

## 2022-09-04 RX ORDER — METOPROLOL SUCCINATE 25 MG/1
25 TABLET, EXTENDED RELEASE ORAL DAILY
Status: DISCONTINUED | OUTPATIENT
Start: 2022-09-04 | End: 2022-09-04 | Stop reason: HOSPADM

## 2022-09-04 RX ORDER — PANTOPRAZOLE SODIUM 40 MG/1
40 TABLET, DELAYED RELEASE ORAL
Status: DISCONTINUED | OUTPATIENT
Start: 2022-09-04 | End: 2022-09-04 | Stop reason: HOSPADM

## 2022-09-04 RX ORDER — MORPHINE SULFATE 2 MG/ML
2 INJECTION, SOLUTION INTRAMUSCULAR; INTRAVENOUS EVERY 6 HOURS PRN
Status: DISCONTINUED | OUTPATIENT
Start: 2022-09-04 | End: 2022-09-04 | Stop reason: HOSPADM

## 2022-09-04 RX ORDER — ATORVASTATIN CALCIUM 40 MG/1
40 TABLET, FILM COATED ORAL NIGHTLY
Status: DISCONTINUED | OUTPATIENT
Start: 2022-09-04 | End: 2022-09-04 | Stop reason: HOSPADM

## 2022-09-04 RX ORDER — TIZANIDINE 4 MG/1
4 TABLET ORAL NIGHTLY PRN
Status: DISCONTINUED | OUTPATIENT
Start: 2022-09-04 | End: 2022-09-04 | Stop reason: HOSPADM

## 2022-09-04 RX ORDER — RANOLAZINE 500 MG/1
1000 TABLET, EXTENDED RELEASE ORAL EVERY 12 HOURS SCHEDULED
Status: DISCONTINUED | OUTPATIENT
Start: 2022-09-04 | End: 2022-09-04 | Stop reason: HOSPADM

## 2022-09-04 RX ADMIN — ACETAMINOPHEN 650 MG: 325 TABLET, FILM COATED ORAL at 01:10

## 2022-09-04 RX ADMIN — AMIODARONE HYDROCHLORIDE 200 MG: 200 TABLET ORAL at 09:23

## 2022-09-04 RX ADMIN — METOPROLOL SUCCINATE 25 MG: 25 TABLET, FILM COATED, EXTENDED RELEASE ORAL at 09:25

## 2022-09-04 RX ADMIN — GABAPENTIN 900 MG: 300 CAPSULE ORAL at 09:22

## 2022-09-04 RX ADMIN — SPIRONOLACTONE 25 MG: 25 TABLET ORAL at 09:25

## 2022-09-04 RX ADMIN — ACETAMINOPHEN 650 MG: 325 TABLET, FILM COATED ORAL at 08:26

## 2022-09-04 RX ADMIN — SODIUM CHLORIDE 100 ML/HR: 9 INJECTION, SOLUTION INTRAVENOUS at 09:19

## 2022-09-04 RX ADMIN — MELOXICAM 15 MG: 15 TABLET ORAL at 09:25

## 2022-09-04 RX ADMIN — CLOPIDOGREL BISULFATE 75 MG: 75 TABLET ORAL at 09:23

## 2022-09-04 RX ADMIN — MORPHINE SULFATE 2 MG: 2 INJECTION, SOLUTION INTRAMUSCULAR; INTRAVENOUS at 05:40

## 2022-09-04 RX ADMIN — ATORVASTATIN CALCIUM 40 MG: 40 TABLET, FILM COATED ORAL at 01:55

## 2022-09-04 RX ADMIN — ASPIRIN 81 MG: 81 TABLET, FILM COATED ORAL at 09:25

## 2022-09-04 RX ADMIN — INSULIN ASPART 8 UNITS: 100 INJECTION, SOLUTION INTRAVENOUS; SUBCUTANEOUS at 11:25

## 2022-09-04 RX ADMIN — RANOLAZINE 1000 MG: 500 TABLET, FILM COATED, EXTENDED RELEASE ORAL at 10:33

## 2022-09-04 RX ADMIN — GABAPENTIN 300 MG: 300 CAPSULE ORAL at 15:26

## 2022-09-04 RX ADMIN — TIZANIDINE 4 MG: 4 TABLET ORAL at 09:22

## 2022-09-04 RX ADMIN — INSULIN DETEMIR 20 UNITS: 100 INJECTION, SOLUTION SUBCUTANEOUS at 10:33

## 2022-09-04 RX ADMIN — TIZANIDINE 4 MG: 4 TABLET ORAL at 15:26

## 2022-09-04 RX ADMIN — APIXABAN 5 MG: 5 TABLET, FILM COATED ORAL at 09:34

## 2022-09-04 RX ADMIN — INSULIN ASPART 4 UNITS: 100 INJECTION, SOLUTION INTRAVENOUS; SUBCUTANEOUS at 08:22

## 2022-09-04 RX ADMIN — ONDANSETRON HYDROCHLORIDE 4 MG: 4 TABLET, FILM COATED ORAL at 08:22

## 2022-09-04 RX ADMIN — PIOGLITAZONE HYDROCHLORIDE 30 MG: 15 TABLET ORAL at 09:34

## 2022-09-04 RX ADMIN — MORPHINE SULFATE 2 MG: 2 INJECTION, SOLUTION INTRAMUSCULAR; INTRAVENOUS at 11:28

## 2022-09-04 RX ADMIN — APIXABAN 5 MG: 5 TABLET, FILM COATED ORAL at 01:55

## 2022-09-04 RX ADMIN — INSULIN ASPART 15 UNITS: 100 INJECTION, SOLUTION INTRAVENOUS; SUBCUTANEOUS at 01:55

## 2022-09-04 RX ADMIN — ISOSORBIDE MONONITRATE 30 MG: 30 TABLET, EXTENDED RELEASE ORAL at 09:23

## 2022-09-04 RX ADMIN — NORTRIPTYLINE HYDROCHLORIDE 10 MG: 10 CAPSULE ORAL at 15:27

## 2022-09-04 RX ADMIN — NORTRIPTYLINE HYDROCHLORIDE 10 MG: 10 CAPSULE ORAL at 09:34

## 2022-09-04 NOTE — CONSULTS
[facility] Medicine Consult  Inpatient Hospitalist Consult  Consult performed by: Behroozi, Saeid, MD  Consult ordered by: Aniket Gong MD          Date of Admission: 9/4/2022  Date of Consult: 09/04/22    Primary Care Physician: Artemio Sanchez MD  Referring Physician:  Aniket Gong MD      Chief Complaint/Reason for Consultation: Diabetes mellitus type 2 with hyperglycemia    HPI:  Patient is a obese 57-year-old Multi morbid male with known past medical history of insulin-dependent diabetes mellitus type 2, hypertension, paroxysmal atrial fibrillation, CVA, sleep apnea, coronary artery disease with a status post CABG in 2019 and previous stents, history of hospitalization in August 2022 to this facility for chest pain, with subsequent left heart catheterization on 8/29/2022 which showed severe native vessel coronary artery disease involving LAD,LCx, and RCA systems 3/3 bypass graft patent at that time, with successful PCI to mid LCx with VINEET: 0% residual stenosis with recommendation for dual antiplatelet therapy aspirin and Plavix and resuming Eliquis subsequent discharged to home with medical management, presented to outside facility, [facility] ED with complaint of chest pain. Patient had abnormal EKG concerning potential STEMI.  He was rushed to this facility and underwent left heart catheterization by Dr. Gong. Patient did not require any intervention by Dr. Gong.  Subsequently patient was admitted to medical floor.  Consultation with hospitalist service was requested for diabetes mellitus type 2.    Patient was seen and examined in room 321.  His wife at bedside.  Patient reports last night they were in [employer] when he all of a sudden developed left-sided chest pain associated with shortness of breath and dizziness.  He reports having currently a pain of about 2/10 left side of the chest, and asking for pain medication and his Neurontin.  He  denies any other complaint.  He denies any recent history of fall injury trauma fever chills headache sore throat, palpitation shortness of breath cough congestion back pain abdominal pain, UTI-like symptoms, bleeding in particular.  Patient cannot specify how much insulin he takes outpatient. Prior record reviewed, does not collaborate with patient's report.  I was unable to locate any copy of the records from [facility].    Past Medical History:  has a past medical history of Coronary artery disease, COVID-19 virus detected (01/21/2022), Diabetes mellitus (HCC), Diabetic retinopathy (HCC), GERD (gastroesophageal reflux disease), Hyperlipidemia, Hypertension, Osteoarthritis, Paroxysmal atrial fibrillation ([facility]), Sleep apnea, and Stroke ([facility]) (02/2022).    Past Surgical History:  has a past surgical history that includes Cholecystectomy; Cardiac catheterization (N/A, 9/3/2019); Coronary artery bypass graft (N/A, 9/25/2019); Colonoscopy (N/A, 5/24/2021); Colonoscopy (N/A, 1/5/2022); Cardiac catheterization (N/A, 2/14/2022); and Cardiac catheterization (N/A, 8/29/2022).    Family History: family history includes Diabetes in his mother; Hypertension in his father.     Social History:  reports that he quit smoking about 3 months ago. His smoking use included cigarettes. He started smoking about 42 years ago. He quit after 35.00 years of use. He has never used smokeless tobacco. He reports that he does not drink alcohol and does not use drugs.    Allergies:   Allergies   Allergen Reactions   • Ace Inhibitors Anaphylaxis   • Wellbutrin [Bupropion] Anaphylaxis       Medications: Scheduled Meds:amiodarone, 200 mg, Oral, Daily  apixaban, 5 mg, Oral, Q12H  aspirin, 81 mg, Oral, Daily  atorvastatin, 40 mg, Oral, Nightly  clopidogrel, 75 mg, Oral, Daily  gabapentin, 900 mg, Oral, TID  isosorbide mononitrate, 30 mg, Oral, Q24H  meloxicam, 15 mg, Oral, Daily  metoprolol succinate XL, 25 mg, Oral,  Daily  nortriptyline, 10 mg, Oral, TID  pioglitazone, 30 mg, Oral, Daily  ranolazine, 1,000 mg, Oral, Q12H  spironolactone, 25 mg, Oral, Daily  tiZANidine, 4 mg, Oral, TID      Continuous Infusions:sodium chloride, 100 mL/hr, Last Rate: 100 mL/hr (09/04/22 0137)      PRN Meds:.•  acetaminophen  •  Morphine  •  nitroglycerin  •  ondansetron  •  tiZANidine    Review of Systems:  Review of Systems   Constitutional: Positive for fatigue. Negative for chills, diaphoresis and fever.   HENT: Negative for congestion, dental problem, ear pain, facial swelling, rhinorrhea and sinus pressure.    Eyes: Negative for photophobia, discharge, redness, itching and visual disturbance.   Respiratory: Positive for chest tightness. Negative for apnea, cough, choking, shortness of breath, wheezing and stridor.    Cardiovascular: Positive for chest pain. Negative for palpitations and leg swelling.   Gastrointestinal: Negative for abdominal distention, abdominal pain, anal bleeding, blood in stool, diarrhea, nausea, rectal pain and vomiting.   Endocrine: Negative for cold intolerance, heat intolerance, polydipsia, polyphagia and polyuria.   Genitourinary: Negative for difficulty urinating, flank pain, frequency, hematuria and urgency.   Musculoskeletal: Negative for arthralgias, back pain, joint swelling and myalgias.   Skin: Negative for pallor, rash and wound.   Allergic/Immunologic: Negative for environmental allergies and immunocompromised state.   Neurological: Positive for dizziness. Negative for tremors, seizures, facial asymmetry, speech difficulty, weakness, light-headedness, numbness and headaches.   Hematological: Negative for adenopathy. Does not bruise/bleed easily.   Psychiatric/Behavioral: Negative for agitation, behavioral problems and hallucinations. The patient is not nervous/anxious.       Otherwise complete ROS is negative except as mentioned above.    Physical Exam:   Temp:  [98.1 °F (36.7 °C)] 98.1 °F (36.7 °C)  Heart  Rate:  [65-80] 69  Resp:  [18] 18  BP: (135-158)/(72-82) 152/82  Physical Exam  Constitutional:       General: He is not in acute distress.     Appearance: He is obese. He is not ill-appearing, toxic-appearing or diaphoretic.   HENT:      Head: Normocephalic and atraumatic.      Right Ear: External ear normal.      Left Ear: External ear normal.      Nose: Nose normal.      Mouth/Throat:      Mouth: Mucous membranes are moist.      Pharynx: Oropharynx is clear.   Eyes:      Extraocular Movements: Extraocular movements intact.      Conjunctiva/sclera: Conjunctivae normal.      Pupils: Pupils are equal, round, and reactive to light.   Cardiovascular:      Rate and Rhythm: Normal rate and regular rhythm.      Heart sounds: Murmur heard.     No friction rub. No gallop.   Pulmonary:      Effort: No respiratory distress.      Breath sounds: No stridor. No wheezing or rales.   Chest:      Chest wall: No tenderness.   Abdominal:      General: Abdomen is flat. There is no distension.      Palpations: Abdomen is soft.      Tenderness: There is no abdominal tenderness. There is no guarding or rebound.   Musculoskeletal:         General: No swelling or tenderness.      Cervical back: No rigidity or tenderness.      Right lower leg: No edema.      Left lower leg: No edema.      Comments: Right groin small ecchymosis at site of left heart catheterization   Lymphadenopathy:      Cervical: No cervical adenopathy.   Skin:     General: Skin is warm and dry.      Coloration: Skin is not jaundiced.      Findings: No erythema.   Neurological:      Mental Status: He is alert and oriented to person, place, and time. Mental status is at baseline.      Sensory: No sensory deficit.      Motor: No weakness.      Coordination: Coordination normal.   Psychiatric:         Mood and Affect: Mood normal.         Behavior: Behavior normal.         Judgment: Judgment normal.           Results Reviewed:  I have personally reviewed current lab,  radiology, and data and agree with results.  Lab Results (last 24 hours)     Procedure Component Value Units Date/Time    Extra Tubes [[id number]] Collected: 09/04/22 0011    Specimen: Blood, Venous Line Updated: 09/04/22 0117    Narrative:      The following orders were created for panel order Extra Tubes.  Procedure                               Abnormality         Status                     ---------                               -----------         ------                     Lavender Top[[id number]]                                     Final result                 Please view results for these tests on the individual orders.    Lavender Top [[id number]] Collected: 09/04/22 0011    Specimen: Blood Updated: 09/04/22 0117     Extra Tube hold for add-on     Comment: Auto resulted       Basic Metabolic Panel [[id number]]  (Abnormal) Collected: 09/04/22 0011    Specimen: Blood Updated: 09/04/22 0052     Glucose 463 mg/dL      BUN 25 mg/dL      Creatinine 0.95 mg/dL      Sodium 135 mmol/L      Potassium 4.0 mmol/L      Chloride 101 mmol/L      CO2 25.0 mmol/L      Calcium 8.6 mg/dL      BUN/Creatinine Ratio 26.3     Anion Gap 9.0 mmol/L      eGFR 92.8 mL/min/1.73      Comment: National Kidney Foundation and American Society of Nephrology (ASN) Task Force recommended calculation based on the Chronic Kidney Disease Epidemiology Collaboration (CKD-EPI) equation refit without adjustment for race.       Narrative:      GFR Normal >60  Chronic Kidney Disease <60  Kidney Failure <15      Protime-INR [[id number]]  (Normal) Collected: 09/04/22 0011    Specimen: Blood Updated: 09/04/22 0049     Protime 14.3 Seconds      INR 1.12    Narrative:      Therapeutic range for most indications is 2.0-3.0 INR,  or 2.5-3.5 for mechanical heart valves.    Troponin [[id number]]  (Normal) Collected: 09/04/22 0011    Specimen: Blood Updated: 09/04/22 0043     Troponin T <0.010 ng/mL     Narrative:      Troponin T Reference Range:  <= 0.03 ng/mL-    Negative for AMI  >0.03 ng/mL-     Abnormal for myocardial necrosis.  Clinicians would have to utilize clinical acumen, EKG, Troponin and serial changes to determine if it is an Acute Myocardial Infarction or myocardial injury due to an underlying chronic condition.       Results may be falsely decreased if patient taking Biotin.          Imaging Results (Last 24 Hours)     ** No results found for the last 24 hours. **          Assessment:  Active Hospital Problems    Diagnosis    • **CAD (coronary artery disease)    • Coronary artery disease involving native heart, unspecified vessel or lesion type, unspecified whether angina present      # Coronary artery disease with history of CABG, PCI,  status post emergent left heart catheterization on 9/3/2022 concerning potential STEMI  # Insulin-dependent diabetes mellitus type 2 with hyperglycemia  # Paroxysmal atrial fibrillation  # Hypertension  # Hyperlipidemia   # Peripheral neuropathy on Neurontin   #Tobacco use        Recommendations:  Patient is currently admitted to telemetry unit.  Outpatient cardiac medication including amiodarone, Eliquis, Plavix, aspirin, Lipitor, Toprol-XL, nitroglycerin as needed, Ranexa, spironolactone, has been continued by cardiology service.  Placed order for 15 units subcu insulin for treatment of hyperglycemia  Accu-Chek ACH S  Insulin sliding scale high-dose, Levemir insulin  Diabetic cardiac diet  Follow laboratory work-up as needed  Continue Neurontin.  DVT and GI prophylaxis  Analgesics as needed.  Comorbidities will be treated appropriately.  Please see orders for comprehensive plan.    I discussed the patient's findings and my recommendations with: Patient and his wife at bedside.  Patient and his wife asked if he can go home in a.m.    I would like to thank Dr. Gong for involving us in the care of Herbert White [place].  We will continue to follow while he is hospitalized.      Saeid Behroozi, MD   09/04/22   02:10  CDT

## 2022-09-04 NOTE — PLAN OF CARE
Goal Outcome Evaluation:  Plan of Care Reviewed With: patient        Progress: no change       No apparent complications from heart cath through right groin. Pt did complain about scrotum pain that had previously been evaluated by urology. Vital signs stable. Pt to be discharged

## 2022-09-04 NOTE — OUTREACH NOTE
Medical Week 2 Survey    Flowsheet Row Responses   [facility] patient discharged from? [place]   Does the patient have one of the following disease processes/diagnoses(primary or secondary)? Other   Week 2 attempt successful? No   Unsuccessful attempts Attempt 1   Revoke Readmitted          GUERLINE RAMOS - Registered Nurse

## 2022-09-04 NOTE — DISCHARGE SUMMARY
Date of Discharge:  9/4/2022    Discharge Diagnosis:  1.  Chest pain.  2.  Atherosclerotic coronary artery disease.  3.  Sustained patency in the mid circumflex artery site of previous angioplasty and stenting.  4.  S/p coronary artery bypass grafting.  5.  Arterial hypertension.  6.  Hypertensive heart disease.  7.  Paroxysmal atrial fibrillation.  8.  Chronic anticoagulation with Eliquis.  9.  Obstructive sleep apnea.  10.  Diabetes.  11.  History of cerebrovascular accident.      Presenting Problem/History of Present Illness  Coronary artery disease involving native heart, unspecified vessel or lesion type, unspecified whether angina present [I25.10]    Herbert White . is a 57 y.o. male     With a past medical history significant for atherosclerotic coronary artery disease s/p coronary artery bypass grafting with a LIMA to the LAD and a saphenous vein graft to the obtuse marginal branch and a saphenous vein graft to the posterior descending artery with recent hospitalization with symptoms of chest pain with subsequent coronary angiogram and PTCA and stenting of the mid circumflex artery with deployment of four 2.25 mm x 28 mm Xience colin point drug-eluting stent with postdilatation with a 2.75 mm x 8 mm and a 2.5 mm x 12 mm noncompliant balloon, history of arterial hypertension, hypertensive heart disease, paroxysmal atrial fibrillation on chronic anticoagulation with Eliquis, obstructive sleep apnea, diabetes, COVID-19 infection, history of cerebrovascular accident and obesity with a body mass index of 33.     Patient had presented to [facility] with symptoms of chest pain.  Patient underwent coronary intervention of on 8/28/2022.  Patient symptoms of chest pain had started prior to his presentation.  Patient initial resting electrocardiogram interpreted at [facility] had revealed ST segment elevation and was subsequently transferred to [facility].  Patient continued  to have ongoing symptoms of chest pain.  Patient on further questioning denies any fever chills productive cough.  Patient has been compliant with his antiplatelets.     Patient on presentation to [facility] continued to have symptoms of chest pain.  Due to the patient persistent symptoms of chest pain and after reviewing the EKG which did not show any acute ST segment elevation with subtle anterior ST-T wave changes.  Patient was recommended coronary angiogram.  Risk-benefit treatment option for the coronary angiogram were discussed with the patient and an informed consent was obtained.  Patient Mallampati score #2 patient ASA classification 1 #2.  Risk for minimal sedation was also discussed with the patient.    Due to the patient's ongoing symptoms of chest pain patient was transferred from [facility].  Patient was taken to the cardiac catheterization lab.      Hospital Course  Patient is a 57 y.o. male presented with  a past medical history significant for atherosclerotic coronary artery disease s/p coronary artery bypass grafting with a LIMA to the LAD and a saphenous vein graft to the obtuse marginal branch and a saphenous vein graft to the posterior descending artery with recent hospitalization with symptoms of chest pain with subsequent coronary angiogram and PTCA and stenting of the mid circumflex artery with deployment of four 2.25 mm x 28 mm Xience colin point drug-eluting stent with postdilatation with a 2.75 mm x 8 mm and a 2.5 mm x 12 mm noncompliant balloon, history of arterial hypertension, hypertensive heart disease, paroxysmal atrial fibrillation on chronic anticoagulation with Eliquis, obstructive sleep apnea, diabetes, COVID-19 infection, history of cerebrovascular accident and obesity with a body mass index of 33.     Patient had presented to [facility] with symptoms of chest pain.  Patient underwent coronary intervention of on 8/28/2022.  Patient symptoms of  chest pain had started prior to his presentation.  Patient initial resting electrocardiogram interpreted at [facility] had revealed ST segment elevation and was subsequently transferred to [facility].  Patient continued to have ongoing symptoms of chest pain.  Patient on further questioning denies any fever chills productive cough.  Patient has been compliant with his antiplatelets.     Patient on presentation to [facility] continued to have symptoms of chest pain.  Due to the patient persistent symptoms of chest pain and after reviewing the EKG which did not show any acute ST segment elevation with subtle anterior ST-T wave changes.  Patient was recommended coronary angiogram.  Risk-benefit treatment option for the coronary angiogram were discussed with the patient and an informed consent was obtained.  Patient Mallampati score #2 patient ASA classification 1 #2.  Risk for minimal sedation was also discussed with the patient    Patient having symptoms of chest pain patient was taken to the cardiac catheterization lab emergently.  Patient underwent coronary angiogram which revealed sustained patency of the circumflex artery site of previous angioplasty and stenting.  Post coronary angiogram Patient arterial sheath was pulled and patient was transferred to 3 W. telemetry.  Patient with evaluated by the hospitalist for elevated blood sugar.    Patient continued to have some scrotal discomfort and underwent ultrasound of the testicle which was unremarkable.    Patient had ambulation in the hallway and was subsequently discharged home in stable condition.    Procedures Performed  Procedure(s):  Left Heart Cath    1.  Left heart catheterization with iliofemoral arteriogram and Angio-Seal closure device.  2.  Selective cannulation of the left internal mammary artery.  3.  Selective cannulation of the saphenous vein graft to the right coronary artery and a saphenous vein graft to the obtuse  marginal branch      Consults:   Consults       Date and Time Order Name Status Description    9/4/2022 12:48 AM Inpatient Hospitalist Consult Completed     8/26/2022  8:36 AM Inpatient Cardiology Consult Completed             Pertinent Test Results:     Ejection Fraction  Lab Results   Component Value Date    EF 65 08/20/2019       Echo EF Estimated  Lab Results   Component Value Date    ECHOEFEST 58 01/23/2022       Nuclear Stress Ejection Fraction  No components found for: NUCEF    Cath Ejection Fraction Quantitative  No results found for: CATHEF    Condition on Discharge: Stable    Physical Exam at Discharge    Vital Signs  Temp:  [97.8 °F (36.6 °C)-98.3 °F (36.8 °C)] 98.3 °F (36.8 °C)  Heart Rate:  [61-80] 63  Resp:  [18] 18  BP: (103-163)/(65-91) 162/78    Physical Exam:     General Appearance:    Alert, cooperative, in no acute distress   Head:    Normocephalic, without obvious abnormality, atraumatic   Eyes:            Lids and lashes normal, conjunctivae and sclerae normal, no   icterus, no pallor, corneas clear, PERRLA   Ears:    Ears appear intact with no abnormalities noted   Throat:   No oral lesions, no thrush, oral mucosa moist   Neck:   No adenopathy, supple, trachea midline, no thyromegaly, no   carotid bruit, no JVD   Back:     No kyphosis present, no scoliosis present, no skin lesions,      erythema or scars, no tenderness to percussion or                   palpation,   range of motion normal   Lungs:     Clear to auscultation,respirations regular, even and                  unlabored    Heart:    Regular rhythm and normal rate, normal S1 and S2, no            murmur, no gallop, no rub, no click   Chest Wall:    No abnormalities observed   Abdomen:     Normal bowel sounds, no masses, no organomegaly, soft        non-tender, non-distended, no guarding, no rebound                tenderness   Rectal:     Deferred   Extremities:   Moves all extremities well, no edema, no cyanosis, no              redness.  Patient right groin did not reveal of any evidence of any hematoma.   Pulses:   Pulses palpable and equal bilaterally   Skin:   No bleeding, bruising or rash   Lymph nodes:   No palpable adenopathy   Neurologic:   Cranial nerves 2 - 12 grossly intact, sensation intact, DTR       present and equal bilaterally       Discharge Disposition Home  Home or Self Care    Discharge Medications     Discharge Medications        Continue These Medications        Instructions Start Date   Accu-Chek Guide test strip  Generic drug: glucose blood   USE 1 TO CHECK GLUCOSE 4 TIMES DAILY      acetaminophen 325 MG tablet  Commonly known as: TYLENOL   650 mg, Oral, Every 6 Hours PRN      amiodarone 200 MG tablet  Commonly known as: PACERONE   200 mg, Oral, Daily      aspirin 81 MG EC tablet   81 mg, Oral, Daily      atorvastatin 40 MG tablet  Commonly known as: LIPITOR   40 mg, Oral, Nightly      Baqsimi Two Pack 3 MG/DOSE powder  Generic drug: Glucagon   Administer 1 spray into a single nostril as directed by provider As Needed for low blood sugar      clopidogrel 75 MG tablet  Commonly known as: PLAVIX   75 mg, Oral, Daily      apixaban 5 MG tablet tablet  Commonly known as: ELIQUIS   5 mg, Oral, 2 Times Daily      Eliquis 5 MG tablet tablet  Generic drug: apixaban   Take 1 tablet by mouth Every 12 (Twelve) Hours.      Fiasp FlexTouch 100 UNIT/ML solution pen-injector  Generic drug: Insulin Aspart (w/Niacinamide)   Inject up to 20 Units under the skin into the appropriate area as directed 3 (Three) Times a Day Before Meals.      FreeStyle Steffany 2 [place] device   1 each, Does not apply, Continuous, Use as indicated for glucose monitoring      FreeStyle Steffany 2 Sensor misc   1 each, Does not apply, Every 14 Days, Use every 14 days      FreeStyle Steffany 2 Sensor misc   Use as directed for continuous glucose monitoring **Change sensor Every 14 (Fourteen) Days**      gabapentin 300 MG capsule  Commonly known as: Neurontin   900 mg,  "Oral, 3 Times Daily      Insulin Regular Human 60x4 &60x8 & 60x12 UNIT powder   4-36 Units, Inhalation, 3 Times Daily Before Meals      isosorbide mononitrate 30 MG 24 hr tablet  Commonly known as: IMDUR   30 mg, Oral, Every 24 Hours Scheduled      linaclotide 145 MCG capsule capsule  Commonly known as: Linzess   145 mcg, Oral, Every Morning Before Breakfast      meloxicam 15 MG tablet  Commonly known as: MOBIC   15 mg, Oral, Daily      metoprolol succinate XL 25 MG 24 hr tablet  Commonly known as: TOPROL-XL   25 mg, Oral, Daily      nitroglycerin 0.4 MG SL tablet  Commonly known as: NITROSTAT   0.4 mg, Sublingual, Every 5 Minutes PRN, Take no more than 3 doses in 15 minutes.      nortriptyline 10 MG capsule  Commonly known as: PAMELOR   TAKE 1 CAPSULE BY MOUTH THREE TIMES DAILY      ondansetron 4 MG tablet  Commonly known as: ZOFRAN   4 mg, Oral, Every 8 Hours PRN      ONE TOUCH DELICA LANCING DEV misc   Use as directed to test blood sugar.      OneTouch Delica Lancets 33G misc   1 each, Does not apply, 4 Times Daily, delica if covered, use alternative if not covered      Ozempic (0.25 or 0.5 MG/DOSE) 2 MG/1.5ML solution pen-injector  Generic drug: Semaglutide(0.25 or 0.5MG/DOS)   0.5 mg, Subcutaneous, Weekly      Pen Needles 1/2\" 29G X 12MM misc   1 each, Does not apply, Nightly      pioglitazone 30 MG tablet  Commonly known as: ACTOS   Take 1 tablet by mouth once daily      ranolazine 1000 MG 12 hr tablet  Commonly known as: RANEXA   1,000 mg, Oral, Every 12 Hours Scheduled      spironolactone 25 MG tablet  Commonly known as: ALDACTONE   Take 1 tablet by mouth once daily      tiZANidine 4 MG tablet  Commonly known as: ZANAFLEX   4 mg, Oral, 3 Times Daily      tiZANidine 4 MG tablet  Commonly known as: Zanaflex   4 mg, Oral, Nightly PRN      Tresiba FlexTouch 200 UNIT/ML solution pen-injector pen injection  Generic drug: Insulin Degludec   60 Units, Subcutaneous, Nightly               Discharge Diet:   " 1800-calorie ADA diet    Activity at Discharge:   As tolerated    Follow-up Appointments  Future Appointments   Date Time Provider Department Center   9/6/2022 10:00 AM Artemio Sanchez MD [facility]   10/3/2022  8:00 AM Alessandro Gan MD [facility]   10/5/2022  1:00 PM Luis Alfredo Ortiz MD [facility] None   10/25/2022  8:30 AM Alice Rice MD [facility] None         Test Results Pending at Discharge       Electronically signed by Aniket Gong MD, 09/04/22, 4:49 PM CDT.    Time: Discharge 55 min     Dictated utilizing Dragon dictation.

## 2022-09-04 NOTE — H&P
Cardiology History and Physical Note        Patient Name: Herbert White Sr.  Age/Sex: 57 y.o. male  : 1965  MRN: [id number]    Date of Admission : 9/3/2022      Primary care Physician: Artemio Sanchez MD    Reason for Admission: Chest pain abnormal resting electrocardiogram history of atherosclerotic coronary artery disease with previous coronary artery bypass grafting      Subjective:       Chief Complaint: Chest pain    History of Present Illness:  Herbert White Sr. is a 57 y.o. male     With a past medical history significant for atherosclerotic coronary artery disease s/p coronary artery bypass grafting with a LIMA to the LAD and a saphenous vein graft to the obtuse marginal branch and a saphenous vein graft to the posterior descending artery with recent hospitalization with symptoms of chest pain with subsequent coronary angiogram and PTCA and stenting of the mid circumflex artery with deployment of four 2.25 mm x 28 mm Xience colin point drug-eluting stent with postdilatation with a 2.75 mm x 8 mm and a 2.5 mm x 12 mm noncompliant balloon, history of arterial hypertension, hypertensive heart disease, paroxysmal atrial fibrillation on chronic anticoagulation with Eliquis, obstructive sleep apnea, diabetes, COVID-19 infection, history of cerebrovascular accident and obesity with a body mass index of 33.    Patient had presented to [facility] with symptoms of chest pain.  Patient underwent coronary intervention of on 2022.  Patient symptoms of chest pain had started prior to his presentation.  Patient initial resting electrocardiogram interpreted at [facility] had revealed ST segment elevation and was subsequently transferred to [facility].  Patient continued to have ongoing symptoms of chest pain.  Patient on further questioning denies any fever chills productive cough.  Patient has been compliant with his antiplatelets.    Patient on presentation to  [facility] continued to have symptoms of chest pain.  Due to the patient persistent symptoms of chest pain and after reviewing the EKG which did not show any acute ST segment elevation with subtle anterior ST-T wave changes.  Patient was recommended coronary angiogram.  Risk-benefit treatment option for the coronary angiogram were discussed with the patient and an informed consent was obtained.  Patient Mallampati score #2 patient ASA classification 1 #2.  Risk for minimal sedation was also discussed with the patient      Past Medical History:  Past Medical History:   Diagnosis Date   • Coronary artery disease    • COVID-19 virus detected 2022   • Diabetes mellitus (HCC)    • Diabetic retinopathy (HCC)    • GERD (gastroesophageal reflux disease)    • Hyperlipidemia    • Hypertension    • Osteoarthritis    • Paroxysmal atrial fibrillation (HCC)    • Sleep apnea    • Stroke (HCC) 2022       Past Surgical History:  Past Surgical History:   Procedure Laterality Date   • CARDIAC CATHETERIZATION N/A 9/3/2019   • CARDIAC CATHETERIZATION N/A 2022   • CARDIAC CATHETERIZATION N/A 2022    Procedure: Left Heart Cath;  Surgeon: Luis Alfredo Ortiz MD;  Location: [facility] CATH INVASIVE LOCATION;  Service: Cardiology;  Laterality: N/A;   • CHOLECYSTECTOMY     • COLONOSCOPY N/A 2021   • COLONOSCOPY N/A 2022   • CORONARY ARTERY BYPASS GRAFT N/A 2019       Family History:  Family History   Problem Relation Age of Onset   • Diabetes Mother    • Hypertension Father        Social History:  Social History     Socioeconomic History   • Marital status:    Tobacco Use   • Smoking status: Former Smoker     Years: 35.00     Types: Cigarettes     Start date: 1980     Quit date: 2022     Years since quittin.2   • Smokeless tobacco: Never Used   Vaping Use   • Vaping Use: Never used   Substance and Sexual Activity   • Alcohol use: No   • Drug use: No   • Sexual activity: Not  Currently     Comment: .        Cardiac Risk factor:  1.  Male.  2.  Arterial hypertension.  3.  Hyperlipidemia.  4.  Diabetes.  5.  Previous history of tobacco abuse.  6.  Obesity.      Allergies:  Allergies   Allergen Reactions   • Ace Inhibitors Anaphylaxis   • Wellbutrin [Bupropion] Anaphylaxis       Home Medication::  Prior to Admission medications    Medication Sig Start Date End Date Taking? Authorizing Provider   Accu-Chek Guide test strip USE 1 TO CHECK GLUCOSE 4 TIMES DAILY 4/14/22   ProviderHai MD   acetaminophen (TYLENOL) 325 MG tablet Take 2 tablets by mouth Every 6 (Six) Hours As Needed for Mild Pain . 2/24/22   Quinn Mathew MD   amiodarone (PACERONE) 200 MG tablet Take 1 tablet by mouth Daily. 3/14/22   Alice Rice MD   apixaban (ELIQUIS) 5 MG tablet tablet Take 1 tablet by mouth Every 12 (Twelve) Hours. 2/16/22 9/23/22  Kelsey Merchant APRN   apixaban (ELIQUIS) 5 MG tablet tablet Take 5 mg by mouth 2 (Two) Times a Day.    ProviderHai MD   aspirin 81 MG EC tablet Take 1 tablet by mouth Daily. 8/31/22   Bri Wayne APRN   atorvastatin (LIPITOR) 40 MG tablet Take 1 tablet by mouth Every Night for 90 days. 1/25/22 9/23/22  Lucio Coyle MD   clopidogrel (PLAVIX) 75 MG tablet Take 1 tablet by mouth Daily. 2/11/22   Alice Rice MD   Continuous Blood Gluc  (FreeStyle Steffany 2 Sandy) device 1 each Continuous. Use as indicated for glucose monitoring 6/23/22   Martinez Card APRN   Continuous Blood Gluc Sensor (FreeStyle Steffany 2 Sensor) misc 1 each Every 14 (Fourteen) Days. Use every 14 days 6/23/22   Martinez Card APRN   Continuous Blood Gluc Sensor (FreeStyle Steffany 2 Sensor) misc Use as directed for continuous glucose monitoring **Change sensor Every 14 (Fourteen) Days** 8/23/22   Alessandro Gan MD   gabapentin (Neurontin) 300 MG capsule Take 3 capsules by mouth 3 (Three) Times a Day. 6/23/22 6/23/23  Srinivas Ferreira,  "Alessandro GALLARDO MD   Glucagon (Baqsimi Two Pack) 3 MG/DOSE powder Administer 1 spray into a single nostril as directed by provider As Needed for low blood sugar 6/23/22   Alessandro Gan MD   Insulin Aspart, w/Niacinamide, (Fiasp FlexTouch) 100 UNIT/ML solution pen-injector Inject up to 20 Units under the skin into the appropriate area as directed 3 (Three) Times a Day Before Meals. 6/23/22   Alessandro Gan MD   Insulin Degludec (Tresiba FlexTouch) 200 UNIT/ML solution pen-injector pen injection Inject 60 Units under the skin into the appropriate area as directed Every Night. 6/23/22   Alessandro Gan MD   Insulin Pen Needle (Pen Needles 1/2\") 29G X 12MM misc 1 each Every Night. 3/3/22   Artemio Sanchez MD   Insulin Regular Human 60x4 &60x8 & 60x12 UNIT powder Inhale 4-36 Units 3 (Three) Times a Day Before Meals. 6/23/22   Martinez Card APRN   isosorbide mononitrate (IMDUR) 30 MG 24 hr tablet Take 1 tablet by mouth Daily. 8/27/20   Artemio Sanchez MD   Lancet Devices (ONE TOUCH DELICA LANCING DEV) misc Use as directed to test blood sugar.  Patient taking differently: Use as directed to test blood sugar. 11/13/20   Alessandro Gan MD   linaclotide (Linzess) 145 MCG capsule capsule Take 1 capsule by mouth Every Morning Before Breakfast. 5/9/22   Renee Osorio APRN   meloxicam (MOBIC) 15 MG tablet Take 1 tablet by mouth Daily. 8/6/22   Gaudencio Mata MD   metoprolol succinate XL (TOPROL-XL) 25 MG 24 hr tablet Take 1 tablet by mouth Daily. 4/22/22   Alice Rice MD   nitroglycerin (NITROSTAT) 0.4 MG SL tablet Place 1 tablet under the tongue Every 5 (Five) Minutes As Needed for Chest Pain. Take no more than 3 doses in 15 minutes. 9/13/19   Marcelino Goode MD   nortriptyline (PAMELOR) 10 MG capsule TAKE 1 CAPSULE BY MOUTH THREE TIMES DAILY 11/9/21   Sandra Sorto APRN   ondansetron (ZOFRAN) 4 MG tablet Take 1 tablet by mouth Every 8 (Eight) Hours As Needed for " Nausea or Vomiting. 4/9/22   Dominique Randhawa MD   ONETOUCH DELICA LANCETS 33G misc 1 each 4 (Four) Times a Day. delica if covered, use alternative if not covered 1/24/17   Alessandro Gan MD   pioglitazone (ACTOS) 30 MG tablet Take 1 tablet by mouth once daily 5/23/22   Artemio Sanchez MD   ranolazine (RANEXA) 1000 MG 12 hr tablet Take 1 tablet by mouth Every 12 (Twelve) Hours. 8/30/22   Bri Wayne APRN   Semaglutide,0.25 or 0.5MG/DOS, (Ozempic, 0.25 or 0.5 MG/DOSE,) 2 MG/1.5ML solution pen-injector Inject 0.5 mg under the skin into the appropriate area as directed 1 (One) Time Per Week. 6/23/22   Alessandro Gan MD   spironolactone (ALDACTONE) 25 MG tablet Take 1 tablet by mouth once daily 11/9/21   Sandra Sorto APRN   tiZANidine (ZANAFLEX) 4 MG tablet Take 4 mg by mouth 3 (Three) Times a Day. 6/28/21   Provider, MD Hai   tiZANidine (Zanaflex) 4 MG tablet Take 1 tablet by mouth At Night As Needed for Muscle Spasms. 8/6/22   Gaudencio Mata MD         Review of Systems   Constitutional: Positive for fatigue. Negative for chills, fever and unexpected weight change.   HENT: Negative for hearing loss and nosebleeds.    Eyes: Negative for visual disturbance.   Respiratory: Positive for chest tightness. Negative for cough, shortness of breath and wheezing.    Cardiovascular: Positive for chest pain. Negative for palpitations and leg swelling.   Gastrointestinal: Negative for abdominal pain, blood in stool, constipation, diarrhea, nausea and vomiting.   Endocrine: Negative for cold intolerance, heat intolerance, polydipsia, polyphagia and polyuria.   Genitourinary: Negative for hematuria.   Musculoskeletal: Negative for joint swelling, myalgias and neck pain.   Skin: Negative for color change, rash and wound.   Neurological: Negative for dizziness, seizures, syncope, light-headedness, numbness and headaches.   Hematological: Does not bruise/bleed easily.         Objective:      There were no vitals taken for this visit.  Blood pressure 178/78 pulse of 72 respiration of 18 oxygen saturation of 97%  Constitutional:       Appearance: Well-developed.   Eyes:      General: No scleral icterus.     Conjunctiva/sclera: Conjunctivae normal.      Pupils: Pupils are equal, round, and reactive to light.   HENT:      Head: Normocephalic and atraumatic.      Left Ear: External ear normal.      Nose: Nose normal.   Neck:      Thyroid: No thyromegaly.      Vascular: No JVD.      Trachea: No tracheal deviation.      Lymphadenopathy: No cervical adenopathy.   Pulmonary:      Breath sounds: Normal breath sounds. No stridor.   Cardiovascular:      Normal rate. Regular rhythm.   Abdominal:      General: Bowel sounds are normal.   Musculoskeletal: Normal range of motion.      Cervical back: Normal range of motion and neck supple. Skin:     General: Skin is warm and dry.   Neurological:      Mental Status: Alert and oriented to person, place, and time.      Deep Tendon Reflexes: Reflexes are normal and symmetric.   Psychiatric:         Behavior: Behavior normal.         Thought Content: Thought content normal.         Judgment: Judgment normal.         Lab Review:     Results from last 7 days   Lab Units 08/30/22  0512   SODIUM mmol/L 137   POTASSIUM mmol/L 3.9   CHLORIDE mmol/L 101   CO2 mmol/L 29.0   BUN mg/dL 18   CREATININE mg/dL 0.84   CALCIUM mg/dL 8.9   BILIRUBIN mg/dL 0.5   ALK PHOS U/L 110   ALT (SGPT) U/L 46*   AST (SGOT) U/L 24   GLUCOSE mg/dL 176*             Results from last 7 days   Lab Units 08/30/22  0512   WBC 10*3/mm3 6.61   HEMOGLOBIN g/dL 15.6   HEMATOCRIT % 44.5   PLATELETS 10*3/mm3 174                           EKG:   ECG/EMG Results (last 24 hours)     ** No results found for the last 24 hours. **          Imaging:  Imaging Results (Last 24 Hours)     ** No results found for the last 24 hours. **          I personally viewed and interpreted the patient's EKG/Telemetry  data.    Assessment:   1.  Chest pain.  2.  Atherosclerotic coronary artery disease.  3.  Paroxysmal atrial fibrillation on anticoagulation with Eliquis.  4.  Arterial hypertension with hypertensive heart disease.  5.  Hyperlipidemia.  6.  Obesity.  7.  Diabetes          Plan:   1.  Chest pain.  Patient does have a history of documented atherosclerotic coronary artery disease with previous coronary artery bypass grafting.  Patient had recent hospitalization and PTCA and stenting of the mid circumflex artery with deployment of a 2.25 mm x 28 mm Xience colin point drug-eluting stent.  Patient had presented to [facility] with symptoms of chest pain.  Patient has subtle electrocardiographic changes anteriorly and.  Due to the patient electrocardiographic changes with ongoing symptoms of chest pain patient was transferred from [facility].  Patient was taken to to the cardiac catheterization lab.  Patient was recommended coronary angiogram.  Risk-benefit treatment option for the coronary angiogram were discussed with the patient and an informed consent was obtained.  Patient Mallampati score #2 patient ASA classification 1 #2.  Risk for minimal sedation was also discussed with the patient.    2.  Arterial hypertension.  Patient blood pressure is elevated.  Patient will be continued on the home dose of the antihypertensive medication.  Patient is currently on metoprolol.  Patient is allergic to ACE inhibitor.  Patient would be started on hydralazine.  Patient has been counseled to decrease her salt intake    3.  Hypertensive heart disease.  Clinically at the present time patient not in congestive heart failure.    4.  Paroxysmal atrial fibrillation.  Patient is currently on amiodarone and anticoagulation with Eliquis.  Patient has not had any hemoptysis hematuria bright of blood per rectum.    5.  Diabetes.  Patient has been counseled on American diabetic Association diet.  Patient would be evaluated  by the hospitalist in consultation for diabetes management.    6.  Obesity with a body mass index of 33.  Patient has been counseled on weight reduction lifestyle modification and dietary restriction.    The above plan of management were discussed with the patient.  Further recommendation to follow after the coronary angiogram      Time: time spent in face-to-face evaluation of greater than 55 minutes and interacting and formulating examining and discussing the plan with the patient with 50% of greater time spent in face-to-face interaction.    Electronically signed by Aniket Gong MD, 09/04/22, 12:05 AM CDT.      Dictated utilizing Dragon dictation.

## 2022-09-04 NOTE — PROGRESS NOTES
[facility] Medicine Services  INPATIENT PROGRESS NOTE    Length of Stay: 0  Date of Admission: 9/4/2022  Primary Care Physician: Artemio aSnchez MD    Subjective   Chief Complaint: DMII, scrotal pain  HPI:  57 year old male s/p C after presentation for chest pain with abnormal EKG.  He was recommended medical management.  Hospitalists were consulted for DM management.  He complains of scrotal pain.     Review of Systems   Constitutional: Negative for chills and fever.   Respiratory: Negative for shortness of breath.    Cardiovascular: Negative for chest pain.   Gastrointestinal: Negative for abdominal pain, nausea and vomiting.   Genitourinary: Positive for testicular pain.        All pertinent negatives and positives are as above. All other systems have been reviewed and are negative unless otherwise stated.     Objective    Temp:  [98 °F (36.7 °C)-98.2 °F (36.8 °C)] 98.2 °F (36.8 °C)  Heart Rate:  [61-80] 75  Resp:  [18] 18  BP: (135-163)/(72-91) 145/91    AM-PAC 6 Clicks Score (PT): 24 (09/04/22 0722)    Physical Exam  Vitals reviewed.   Constitutional:       General: He is not in acute distress.     Appearance: Normal appearance.   HENT:      Head: Normocephalic and atraumatic.   Cardiovascular:      Rate and Rhythm: Normal rate and regular rhythm.   Pulmonary:      Effort: Pulmonary effort is normal. No respiratory distress.      Breath sounds: Normal breath sounds.   Abdominal:      General: There is no distension.      Palpations: Abdomen is soft.      Tenderness: There is no abdominal tenderness.   Musculoskeletal:         General: No swelling or deformity.   Skin:     General: Skin is warm and dry.   Neurological:      General: No focal deficit present.      Mental Status: He is alert and oriented to person, place, and time.         Results Review:  I have reviewed the labs, radiology results, and diagnostic studies.    Laboratory Data:   Results from  last 7 days   Lab Units 09/04/22  0605 09/04/22  0011 08/30/22  0512 08/29/22  0537   SODIUM mmol/L 141 135* 137 139   POTASSIUM mmol/L 3.4* 4.0 3.9 4.5   CHLORIDE mmol/L 108* 101 101 102   CO2 mmol/L 26.0 25.0 29.0 30.0*   BUN mg/dL 23* 25* 18 18   CREATININE mg/dL 0.79 0.95 0.84 0.83   GLUCOSE mg/dL 179* 463* 176* 111*   CALCIUM mg/dL 9.0 8.6 8.9 9.0   BILIRUBIN mg/dL  --   --  0.5 0.7   ALK PHOS U/L  --   --  110 109   ALT (SGPT) U/L  --   --  46* 43*   AST (SGOT) U/L  --   --  24 44*   ANION GAP mmol/L 7.0 9.0 7.0 7.0     Estimated Creatinine Clearance: 138.5 mL/min (by C-G formula based on SCr of 0.79 mg/dL).  Results from last 7 days   Lab Units 09/04/22  0605   MAGNESIUM mg/dL 2.0         Results from last 7 days   Lab Units 09/04/22  0605 08/30/22  0512 08/29/22  0537   WBC 10*3/mm3 6.10 6.61 7.35   HEMOGLOBIN g/dL 16.1 15.6 17.1   HEMATOCRIT % 46.1 44.5 48.1   PLATELETS 10*3/mm3 190 174 184     Results from last 7 days   Lab Units 09/04/22  0011   INR  1.12       Culture Data:   No results found for: BLOODCX  No results found for: URINECX  No results found for: RESPCX  No results found for: WOUNDCX  No results found for: STOOLCX  No components found for: BODYFLD    Radiology Data:   Imaging Results (Last 24 Hours)     Procedure Component Value Units Date/Time    US Testicular or Ovarian Vascular Complete [[id number]] Resulted: 09/04/22 0932     Updated: 09/04/22 1028    US Scrotum & Testicles [[id number]] Resulted: 09/04/22 0932     Updated: 09/04/22 1028          I have reviewed the patient's current medications.     Assessment/Plan     Active Hospital Problems    Diagnosis    • **CAD (coronary artery disease)    • Coronary artery disease involving native heart, unspecified vessel or lesion type, unspecified whether angina present    DMII  PAF  HTN  Mild hypokalemia  Scrotal/testicular pain    Plan:    Continue cardiac medical management with amiodarone, Eliquis, Plavix, aspirin, Lipitor, Toprol-XL,  Aldactone, Imdur, Ranexa  Glucose control: Levemir 20 units BID, SSI 0-24 units  Scrotal ultrasound, consider urology consultation pending results  VTE PPx: Eliquis      I confirmed that the patient's Advance Care Plan is present, code status is documented, or surrogate decision maker is listed in the patient's medical record.     The patient was evaluated during the global COVID-19 pandemic, and the diagnosis was suspected/considered upon their initial presentation.  Evaluation, treatment, and testing were consistent with current guidelines for patients who present with complaints or symptoms that may be related to COVID-19.            This document has been electronically signed by NELSY White on September 4, 2022 10:51 CDT

## 2022-09-05 ENCOUNTER — TRANSITIONAL CARE MANAGEMENT TELEPHONE ENCOUNTER (OUTPATIENT)
Dept: CALL CENTER | Facility: HOSPITAL | Age: 57
End: 2022-09-05

## 2022-09-05 NOTE — OUTREACH NOTE
Prep Survey    Flowsheet Row Responses   [facility] patient discharged from? [place]   Is LACE score < 7 ? No   Emergency Room discharge w/ pulse ox? No   Eligibility [facility]   Date of Admission 09/04/22   Date of Discharge 09/04/22   Discharge Disposition Home or Self Care   Discharge diagnosis Chest pain,    heart cath    Does the patient have one of the following disease processes/diagnoses(primary or secondary)? Other   Does the patient have Home health ordered? No   Is there a DME ordered? No   Prep survey completed? Yes          RUTH BADILLO - Registered Nurse

## 2022-09-05 NOTE — OUTREACH NOTE
Call Center TCM Note    Flowsheet Row Responses   [facility] patient discharged from? [place]   Does the patient have one of the following disease processes/diagnoses(primary or secondary)? Other   TCM attempt successful? No   Unsuccessful attempts Attempt 2          Yasmeen Fernandez RN    9/5/2022, 16:09 EDT

## 2022-09-05 NOTE — OUTREACH NOTE
Call Center TCM Note    Flowsheet Row Responses   [facility] patient discharged from? [place]   Does the patient have one of the following disease processes/diagnoses(primary or secondary)? Other   TCM attempt successful? No  [verbal release is ]   Unsuccessful attempts Attempt 1          Yasmeen Fernandez RN    2022, 15:39 EDT

## 2022-09-06 ENCOUNTER — TRANSITIONAL CARE MANAGEMENT TELEPHONE ENCOUNTER (OUTPATIENT)
Dept: CALL CENTER | Facility: HOSPITAL | Age: 57
End: 2022-09-06

## 2022-09-06 NOTE — OUTREACH NOTE
Call Center TCM Note    Flowsheet Row Responses   [facility] patient discharged from? [place]   Does the patient have one of the following disease processes/diagnoses(primary or secondary)? Other   TCM attempt successful? No  [verbal release ]   Unsuccessful attempts Attempt 3          Yasmeen Fernandez RN    2022, 13:04 EDT

## 2022-09-08 NOTE — DISCHARGE SUMMARY
[facility] CARDIOLOGY  [address] [place] [address]. [address]  T - [phone]     DISCHARGE SUMMARY         PATIENT  DEMOGRAPHICS   PATIENT NAME: Herbert Babcock Cindy Garza                      PHYSICIAN:  Dr. Lewis  : 1965  MRN: [id number]    ADMISSION/DISCHARGE INFO   ADMISSION DATE: 2022     DISCHARGE DATE: 2/15/22    ADMISSION DIAGNOSES: Coronary artery disease of native artery of native heart with stable angina pectoris (HCC) [I25.118]  DISCHARGE DIAGNOSES:   1. Coronary artery disease of native artery of native heart with stable angina pectoris (HCC)          Coronary artery disease of native artery of native heart with stable angina pectoris (HCC)    2. Status post PCI and stent to the LAD to supply the 2 diagonals.  3. Status post coronary bypass grafting surgery with functioning LIMA to the LAD  ATTENDING PROVIDER: Dr. Lewis       CONSULTS   Consult Orders (all) (From admission, onward)     Start     Ordered    22 0942  Cardiac Rehab Evaluation and Enrollment  Once        Provider:  (Not yet assigned)    22 0941               Consults     No orders found for last 30 day(s).        PROCEDURES   Results for orders placed during the hospital encounter of 22    Adult Transthoracic Echo Complete W/ Cont if Necessary Per Protocol    Interpretation Summary  · Left ventricular wall thickness is consistent with moderate concentric hypertrophy.  · Estimated left ventricular EF = 58% Left ventricular ejection fraction appears to be 56 - 60%. Left ventricular systolic function is normal.  · Mild tricuspid valve regurgitation is present.  · Estimated right ventricular systolic pressure from tricuspid regurgitation is normal (<35 mmHg).  · The right ventricular cavity is borderline dilated.  · There is no evidence of pericardial effusion.      @[email or web address]    XR Ribs 2 View Left    Result Date: 2/10/2022  No acute cardiopulmonary disease. No evidence for left rib  fractures. Electronically signed by:  Brian Jeffers MD  2/10/2022 4:41 PM CST Workstation: [id number]    XR Chest 1 View    Result Date: 2/10/2022  No acute cardiopulmonary disease. Electronically signed by:  Brian Jeffers MD  2/10/2022 1:01 PM CST Workstation: [id number]    XR Chest 1 View    Result Date: 1/21/2022  1. Cardiomegaly. 2. Very mild, patchy airspace disease in the lung bases, possibly infectious. Electronically signed by:  Marcos Clark MD  1/21/2022 1:24 PM CST Workstation: [id number]    CT Angiogram Chest    Result Date: 1/24/2022  Patchy left lower lobe atelectasis/infiltrate. No other acute abnormality appreciated. In particular, I see no evidence to suggest presence of acute pulmonary embolus on this study. See body of report for full details. Electronically signed by:  Valeriy Morales MD  1/24/2022 2:33 PM CST Workstation: [id number]      Adult Transthoracic Echo Complete W/ Cont if Necessary Per Protocol    Result Date: 1/23/2022  Narrative: · Left ventricular wall thickness is consistent with moderate concentric hypertrophy. · Estimated left ventricular EF = 58% Left ventricular ejection fraction appears to be 56 - 60%. Left ventricular systolic function is normal. · Mild tricuspid valve regurgitation is present. · Estimated right ventricular systolic pressure from tricuspid regurgitation is normal (<35 mmHg). · The right ventricular cavity is borderline dilated. · There is no evidence of pericardial effusion.      XR Ribs 2 View Left    Result Date: 2/10/2022  Narrative: XR RIBS 2 VIEWS UNILATERAL CLINICAL STATEMENT: rib pain/chest wall pain COMPARISON:  2/10/2022 FINDINGS:  Status post median sternotomy. Cardiomediastinal silhouette is within normal limits. There is no focal lung consolidation or pleural effusion. No evidence of pulmonary edema or pneumothorax.     Impression: No acute cardiopulmonary disease. No evidence for left rib fractures. Electronically signed by:  Brian Jeffers MD  2/10/2022 4:41 PM  CST Workstation: [id number]    Cardiac Catheterization/Vascular Study    Result Date: 2/14/2022  Narrative: · Prox LAD lesion is 90% stenosed WITH 2 DIAGONALS POST STENOSIS NOT GRAFTED. · FINAL lad IMAGES ARE )% W 3.25 x 18 mm VINEET. · Moderate systolic dysfunction. · LV pressures (S/D/E) : 124/14/      XR Chest 1 View    Result Date: 2/10/2022  Narrative: XR CHEST 1 VIEW CLINICAL STATEMENT: Chest Pain triage protocol Chest Pain Triage Protocol COMPARISON:  1/21/2022 FINDINGS: Heart is mildly enlarged. Status post median sternotomy. There is no focal lung consolidation or pleural effusion. No evidence of pulmonary edema or pneumothorax.     Impression: No acute cardiopulmonary disease. Electronically signed by:  Brian Jeffers MD  2/10/2022 1:01 PM CST Workstation: [id number]    XR Chest 1 View    Result Date: 1/21/2022  Narrative: EXAMINATION:  XR CHEST 1 VW CLINICAL HISTORY:  56 years Male,Chest Pain triage protocol COMPARISON:  Chest x-ray dated 5/4/2021 FINDINGS:  Sternotomy. Cardiomegaly. There is very mild, patchy airspace disease in the lung bases. No pleural effusion or pneumothorax.     Impression: 1. Cardiomegaly. 2. Very mild, patchy airspace disease in the lung bases, possibly infectious. Electronically signed by:  Marcos Clark MD  1/21/2022 1:24 PM CST Workstation: [id number]    CT Angiogram Chest    Result Date: 1/24/2022  Narrative: CTA chest with and without IV contrast January 24, 2022 INDICATION: Positive d-dimer. Low to intermediate pretest clinical probability for pulmonary embolus. TECHNIQUE: Spiral images obtained through the chest prior to and following intravenous administration of 90 mL of Isovue-370. Axial reformatted and multiple MIP and 3-D reconstruction images generated. FINDINGS: Scattered scarring/atelectasis. Patchy atelectasis/infiltrate left base. No acute congestive changes, pneumothorax or pleural fluid. No definite endobronchial lesions. Status post CABG. No pericardial effusion.  Prominent size heart. Slight increased prominence of mediastinal and right hilar lymph nodes compared with coronary CT angiogram dated May 5, 2021. No intraluminal filling defects noted in the central pulmonary arteries to suggest presence of acute pulmonary embolus. No thoracic aortic aneurysm. 1 cm lesion genu left adrenal was present on prior CT scan dated 11/8/2021.     Impression: Patchy left lower lobe atelectasis/infiltrate. No other acute abnormality appreciated. In particular, I see no evidence to suggest presence of acute pulmonary embolus on this study. See body of report for full details. Electronically signed by:  Valeriy Morales MD  1/24/2022 2:33 PM CST Workstation: [id number]      HISTORY OF PRESENT ILLNESS   Herbert White . is a 56 y.o. male with medical history of hypertension, PAD, mixed hyperlipidemia, CAD, paroxysmal atrial fibrillation.  Patient has a history of CAD status post CABG x3 in 2019 with LIMA to LAD, SVG to PDA and SVG to OM.  Patient was evaluated by Dr. Rice primary cardiologist was found to have ongoing intermittent chest pain and fatigue.  Given ongoing chest pain and cardiac symptoms patient was recommended for cardiac catheterization per Dr. Lewis.    DIAGNOSTIC DATA     ECG:     Atrial fibrillation with rapid ventricular response  Low voltage QRS  Possible Anterolateral infarct (cited on or before 20-AUG-2020)  Abnormal ECG  When compared with ECG of 11-FEB-2022 07:56,  Nonspecific T wave abnormality, worse in Inferior leads    Echocardiogram: 1/23/22    · Left ventricular wall thickness is consistent with moderate concentric hypertrophy.  · Estimated left ventricular EF = 58% Left ventricular ejection fraction appears to be 56 - 60%. Left ventricular systolic function is normal.  · Mild tricuspid valve regurgitation is present.  · Estimated right ventricular systolic pressure from tricuspid regurgitation is normal (<35 mmHg).  · The right ventricular cavity is  borderline dilated.  · There is no evidence of pericardial effusion.        Ejection Fraction  Lab Results   Component Value Date    EF 65 08/20/2019       Echo EF Estimated  Lab Results   Component Value Date    ECHOEFEST 58 01/23/2022       Nuclear Stress Ejection Fraction  No components found for: NUCEF    Cath Ejection Fraction Quantitative  No results found for: CATHEF    Procedure(s):  Left Heart Cath         HOSPITAL COURSE   Patient is a 56 y.o. male presented with medical history of hypertension, PAD, mixed hyperlipidemia, CAD, paroxysmal atrial fibrillation.  Patient has a history of CAD status post CABG x3 in 2019 with LIMA to LAD, SVG to PDA and SVG to OM.  Patient was evaluated by Dr. Rice primary cardiologist was found to have ongoing intermittent chest pain and fatigue.  Given ongoing chest pain and cardiac symptoms patient was recommended for cardiac catheterization per Dr. Lewis.    Patient presented through same-day surgery for scheduled cardiac catheterization due to ongoing left-sided chest pain.  He has a remote history of atrial fibrillation and Eliquis was held prior to procedure. [facility] with the following impression:    Conclusion    · Prox LAD lesion is 90% stenosed WITH 2 DIAGONALS POST STENOSIS NOT GRAFTED.  · FINAL lad IMAGES ARE )% W 3.25 x 18 mm VINEET.  · Moderate systolic dysfunction.  · LV pressures (S/D/E) : 124/14/       Recommendations    •     Dual antiplatelet therapy for one year       Patient progressed well post PCI.  Right femoral cath site within normal limits.  H&H and kidney function stable.  Patient denies any further episodes of chest pain and has ambulated without complications.  Hemoglobin A1c remains severely elevated at 10.60 patient will be recommended to see PCP for further diabetic management.  He will be discharged home on aspirin 81 mg, Plavix 75 mg and may resume Eliquis tomorrow.  After 30 days he will stop aspirin.  He will continue lipid-lowering therapy with  "atorvastatin 40 mg.  Diltiazem  mg daily, Aldactone 25 mg, antianginal therapy with Imdur 30 mg and Ranexa 500 mg twice daily will be continued.  To optimize heart rate and blood pressure metoprolol succinate XL was increased to 150 mg. Post cath instructions discussed with patient in detail including no driving x 7 days, no lifting greater 10 lbs x 2 weeks.  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 Follow up with me in 4 weeks. Follow up with Dr. Rice in 8 weeks. Extensive medication education was done as well as the importance of compliance of new medications. The patient was given handouts by nursing. Questions were answered. Side effects were discussed. The patient was encouraged to not stop ANY medications until contacting their PCP or Cardiology office to discuss the side effects. Over the counter supplements such as CoQ10 was encouraged for any leg cramps associated with the \"statin\" medications.   The patient was encouraged to stop smoking if they smoke, eat a low sodium diet, and get regular exercise. The importance of lifestyle changes was stressed.       DISCHARGE CONDITION   Condition on Discharge: Stable    Vital Signs  Temp:  [96.6 °F (35.9 °C)-98.2 °F (36.8 °C)] 96.6 °F (35.9 °C)  Heart Rate:  [] 108  Resp:  [16-18] 18  BP: (137-171)/() 171/88    Physical Exam:  Physical Exam  Vitals and nursing note reviewed.   Constitutional:       General: He is not in acute distress.     Appearance: He is obese. He is not ill-appearing, toxic-appearing or diaphoretic.   HENT:      Head: Normocephalic and atraumatic.      Right Ear: External ear normal.      Left Ear: External ear normal.   Eyes:      General: Lids are normal.      Pupils: Pupils are equal, round, and reactive to light.   Neck:      Thyroid: No thyromegaly.      Vascular: No carotid bruit or JVD.      Trachea: No tracheal deviation.   Cardiovascular:      Rate and Rhythm: Tachycardia " present. Rhythm irregularly irregular.      Chest Wall: PMI is not displaced. No thrill.      Heart sounds: Normal heart sounds, S1 normal and S2 normal. No murmur heard.  No friction rub. No gallop. No S3 or S4 sounds.    Pulmonary:      Effort: Pulmonary effort is normal. No respiratory distress.      Breath sounds: Normal breath sounds. No stridor. No wheezing or rales.   Chest:      Chest wall: No tenderness.   Abdominal:      General: Bowel sounds are normal. There is no distension.      Palpations: Abdomen is soft.      Tenderness: There is no abdominal tenderness.   Musculoskeletal:      Right lower leg: No edema.      Left lower leg: No edema.   Skin:     General: Skin is warm and dry.      Capillary Refill: Capillary refill takes 2 to 3 seconds.      Findings: No erythema or rash.          Neurological:      Mental Status: He is alert and oriented to person, place, and time.      Cranial Nerves: No cranial nerve deficit.      Deep Tendon Reflexes: Reflexes are normal and symmetric. Reflexes normal.   Psychiatric:         Behavior: Behavior normal.         Thought Content: Thought content normal.         Judgment: Judgment normal.         DISPOSITION   To Home      DISCHARGE MEDICATIONS        Your medication list      CHANGE how you take these medications      Instructions Last Dose Given Next Dose Due   aspirin 81 MG chewable tablet  What changed: Another medication with the same name was added. Make sure you understand how and when to take each.      Chew 81 mg Daily.       aspirin 81 MG chewable tablet  Start taking on: February 16, 2022  What changed: You were already taking a medication with the same name, and this prescription was added. Make sure you understand how and when to take each.      Chew 1 tablet Daily for 30 days.       metFORMIN 500 MG tablet  Commonly known as: GLUCOPHAGE  Start taking on: February 17, 2022  What changed: These instructions start on February 17, 2022. If you are unsure  what to do until then, ask your doctor or other care provider.      Take 2 tablets by mouth 2 (Two) Times a Day With Meals.       metoprolol succinate  MG 24 hr tablet  Commonly known as: TOPROL-XL  Start taking on: February 16, 2022  What changed: how much to take      Take 1.5 tablets by mouth Daily.          CONTINUE taking these medications      Instructions Last Dose Given Next Dose Due   albuterol sulfate  (90 Base) MCG/ACT inhaler  Commonly known as: PROVENTIL HFA;VENTOLIN HFA;PROAIR HFA      Inhale 2 puffs Every 4 (Four) Hours As Needed for Wheezing.       Alcohol Swabs 70 % pads      1 each Every Morning.       apixaban 5 MG tablet tablet  Commonly known as: ELIQUIS  Start taking on: February 16, 2022      Take 1 tablet by mouth Every 12 (Twelve) Hours.       atorvastatin 40 MG tablet  Commonly known as: LIPITOR      Take 1 tablet by mouth Every Night for 90 days.       benzonatate 200 MG capsule  Commonly known as: TESSALON      Take 1 capsule by mouth 3 (Three) Times a Day As Needed for Cough.       clopidogrel 75 MG tablet  Commonly known as: PLAVIX      Take 1 tablet by mouth Daily.       dilTIAZem  MG 24 hr capsule  Commonly known as: Cardizem CD      Take 1 capsule by mouth Daily for 90 days.       ezetimibe 10 MG tablet  Commonly known as: ZETIA      Take 1 tablet by mouth Daily.       gabapentin 300 MG capsule  Commonly known as: NEURONTIN      TAKE 1 CAPSULE BY MOUTH AT BEDTIME FOR 2 DAYS THEN 1 TWICE DAILY FOR 2 DAYS THEN 1 THREE TIMES DAILY THEREAFTER       glimepiride 1 MG tablet  Commonly known as: Amaryl      Take 0.5-1 tablets by mouth Every Morning Before Breakfast.       guaiFENesin-dextromethorphan 100-10 MG/5ML syrup  Commonly known as: ROBITUSSIN DM      Take 10 mL by mouth 4 (Four) Times a Day As Needed for Cough.       isosorbide mononitrate 30 MG 24 hr tablet  Commonly known as: IMDUR      Take 1 tablet by mouth Daily.       linaclotide 145 MCG capsule  "capsule  Commonly known as: Linzess      Take 1 capsule by mouth Every Morning Before Breakfast.       nitroglycerin 0.4 MG SL tablet  Commonly known as: NITROSTAT      Place 1 tablet under the tongue Every 5 (Five) Minutes As Needed for Chest Pain. Take no more than 3 doses in 15 minutes.       nortriptyline 10 MG capsule  Commonly known as: PAMELOR      TAKE 1 CAPSULE BY MOUTH THREE TIMES DAILY       ondansetron ODT 8 MG disintegrating tablet  Commonly known as: Zofran ODT      Place 1 tablet on the tongue Every 8 (Eight) Hours As Needed for Nausea or Vomiting.       ONE TOUCH DELICA LANCING DEV misc      Use as directed to test blood sugar.       OneTouch Delica Lancets 33G misc      1 each 4 (Four) Times a Day. delica if covered, use alternative if not covered       Pen Needles 1/2\" 29G X 12MM misc      1 each Every Night.       pioglitazone 30 MG tablet  Commonly known as: Actos      Take 1 tablet by mouth Daily.       ranolazine 500 MG 12 hr tablet  Commonly known as: RANEXA      Take 1 tablet by mouth Every 12 (Twelve) Hours.       spironolactone 25 MG tablet  Commonly known as: ALDACTONE      Take 1 tablet by mouth once daily       tiZANidine 4 MG tablet  Commonly known as: ZANAFLEX      Take 4 mg by mouth 3 (Three) Times a Day.             Where to Get Your Medications      These medications were sent to [facility]  [address] [place] [address]    Hours: Monday through Friday 7:00am to 5:00pm Phone: [phone] ·   apixaban 5 MG tablet tablet  · aspirin 81 MG chewable tablet  · metFORMIN 500 MG tablet  · metoprolol succinate  MG 24 hr tablet         INSTRUCTIONS   Activity:   Activity Instructions     Bathing Restrictions      Type of Restriction: Bathing    Bathing Restrictions: No Tub Bath    Driving Restrictions      Type of Restriction: Driving    Driving Restrictions: No Driving (Time Limited)    Length: 1 Week    Gradually Increase " Activity Until at Pre-Hospitalization Level      Lifting Restrictions      Type of Restriction: Lifting    Lifting Restrictions: Lifting Restriction (Indicate Limit)    Weight Limit (Pounds): 10    Length of Lifting Restriction: 2 weeks          Diet:   Diet Instructions     Advance Diet As Tolerated            Special Instructions: Patient instructed to call M.D. or return to ED with worsening shortness of breath, chest pain, fever greater than 100.4°F or any other medical concerns.    FOLLOW UP   Additional Instructions for the Follow-ups that You Need to Schedule     Discharge Follow-up with Specified Provider: Dr. Rice; 2 Months   As directed      To: Dr. Rice    Follow Up: 2 Months         Discharge Follow-up with Specified Provider: Kelsey WHITTINGTON; 1 Month   As directed      To: Kelsey WHITTINGTON    Follow Up: 1 Month            Follow-up Information     Artemio Sanchez MD .    Specialties: Family Medicine, Emergency Medicine  Contact information:  [address] [place] [address]  [phone]             Kelsey Merchant APRN Follow up in 1 month(s).    Specialties: Nurse Practitioner, Cardiology  Contact information:  [address] [place] [address] [address] [place] [id number] [phone]             Alice Rice MD Follow up in 2 month(s).    Specialty: Cardiology  Contact information:  [address] [place] [address] [address] [place] [id number] [phone]                         Follow-up Appointments  Future Appointments   Date Time Provider Department Center   5/4/2022 10:30 AM Obdulia Capellan APRN [facility]     Additional Instructions for the Follow-ups that You Need to Schedule     Discharge Follow-up with Specified Provider: Dr. Rice; 2 Months   As directed      To: Dr. Rice    Follow Up: 2 Months         Discharge Follow-up with Specified Provider: Kelsey WHITTINGTON; 1 Month   As directed      To: Kelsey WHITTINGTON    Follow Up: 1 Month               PENDING TEST RESULTS AT DISCHARGE      TIME   Time: 38 minutes were spent planning this discharge.          Dr. Lewis  is the attending at time of discharge, He is aware of the patient's status and agrees with the above discharge summary.             This document has been electronically signed by NESLY Montiel on February 15, 2022 10:34 CST     Electronically signed by NELSY Montiel, 02/15/22, 10:34 AM CST.             negative Postop Diagnosis: same

## 2022-09-12 ENCOUNTER — TELEPHONE (OUTPATIENT)
Dept: ENDOCRINOLOGY | Facility: CLINIC | Age: 57
End: 2022-09-12

## 2022-09-12 NOTE — TELEPHONE ENCOUNTER
Pt called stating that his Synjardy has been discontinued and wants to know what else he can be put on.     Thanks

## 2022-09-13 ENCOUNTER — OFFICE VISIT (OUTPATIENT)
Dept: FAMILY MEDICINE CLINIC | Facility: CLINIC | Age: 57
End: 2022-09-13

## 2022-09-13 ENCOUNTER — TELEPHONE (OUTPATIENT)
Dept: FAMILY MEDICINE CLINIC | Facility: CLINIC | Age: 57
End: 2022-09-13

## 2022-09-13 VITALS
HEART RATE: 95 BPM | SYSTOLIC BLOOD PRESSURE: 169 MMHG | WEIGHT: 251.5 LBS | DIASTOLIC BLOOD PRESSURE: 99 MMHG | HEIGHT: 74 IN | BODY MASS INDEX: 32.28 KG/M2 | OXYGEN SATURATION: 97 % | TEMPERATURE: 97.1 F

## 2022-09-13 DIAGNOSIS — M25.572 ACUTE LEFT ANKLE PAIN: Primary | ICD-10-CM

## 2022-09-13 PROCEDURE — 99213 OFFICE O/P EST LOW 20 MIN: CPT | Performed by: FAMILY MEDICINE

## 2022-09-13 RX ORDER — NORTRIPTYLINE HYDROCHLORIDE 10 MG/1
10-30 CAPSULE ORAL NIGHTLY
Qty: 90 CAPSULE | Refills: 11 | Status: SHIPPED | OUTPATIENT
Start: 2022-09-13

## 2022-09-13 RX ORDER — CELECOXIB 200 MG/1
200 CAPSULE ORAL DAILY
Qty: 30 CAPSULE | Refills: 0 | Status: SHIPPED | OUTPATIENT
Start: 2022-09-13

## 2022-09-13 RX ORDER — TIZANIDINE 4 MG/1
4 TABLET ORAL NIGHTLY PRN
Qty: 90 TABLET | Refills: 3 | Status: SHIPPED | OUTPATIENT
Start: 2022-09-13

## 2022-09-13 RX ORDER — ATORVASTATIN CALCIUM 40 MG/1
40 TABLET, FILM COATED ORAL NIGHTLY
Qty: 90 TABLET | Refills: 3 | Status: SHIPPED | OUTPATIENT
Start: 2022-09-13 | End: 2023-09-08

## 2022-09-13 RX ORDER — NITROGLYCERIN 0.4 MG/1
0.4 TABLET SUBLINGUAL
Qty: 25 TABLET | Refills: 11 | Status: SHIPPED | OUTPATIENT
Start: 2022-09-13

## 2022-09-13 RX ORDER — ISOSORBIDE MONONITRATE 30 MG/1
30 TABLET, EXTENDED RELEASE ORAL
Qty: 90 TABLET | Refills: 3 | Status: SHIPPED | OUTPATIENT
Start: 2022-09-13

## 2022-09-13 RX ORDER — ACETAMINOPHEN 325 MG/1
650 TABLET ORAL EVERY 6 HOURS PRN
Qty: 100 TABLET | Refills: 11 | Status: SHIPPED | OUTPATIENT
Start: 2022-09-13

## 2022-09-13 NOTE — PROGRESS NOTES
" Subjective   Herbert White . is a 57 y.o. male.     Chief Complaint   Patient presents with   • Fall   • Ankle Injury             History of Present Illness     Recent cardiac stents.   Also  Today, twisted left ankle   Also  Needs refills.     Review of Systems   Constitutional: Negative for chills, fatigue and fever.   HENT: Negative for congestion, ear discharge, ear pain, facial swelling, hearing loss, postnasal drip, rhinorrhea, sinus pressure, sore throat, trouble swallowing and voice change.    Eyes: Negative for discharge, redness and visual disturbance.   Respiratory: Negative for cough, chest tightness, shortness of breath and wheezing.    Cardiovascular: Negative for chest pain and palpitations.   Gastrointestinal: Negative for abdominal pain, blood in stool, constipation, diarrhea, nausea and vomiting.   Endocrine: Negative for polydipsia and polyuria.   Genitourinary: Negative for dysuria, flank pain, hematuria and urgency.   Musculoskeletal: Positive for joint swelling. Negative for arthralgias, back pain and myalgias.   Skin: Negative for rash.   Neurological: Negative for dizziness, weakness, numbness and headaches.   Hematological: Negative for adenopathy.   Psychiatric/Behavioral: Negative for confusion and sleep disturbance. The patient is not nervous/anxious.            /99 (BP Location: Left arm, Patient Position: Sitting, Cuff Size: Adult)   Pulse 95   Temp 97.1 °F (36.2 °C) (Infrared)   Ht 188 cm (74.02\")   Wt 114 kg (251 lb 8 oz)   SpO2 97%   BMI 32.28 kg/m²       Objective     Physical Exam  Vitals and nursing note reviewed.   Constitutional:       Appearance: Normal appearance. He is well-developed.   HENT:      Head: Normocephalic and atraumatic.      Right Ear: External ear normal.      Left Ear: External ear normal.      Nose: Nose normal.   Eyes:      Extraocular Movements: Extraocular movements intact.      Conjunctiva/sclera: Conjunctivae normal.      Pupils: " Pupils are equal, round, and reactive to light.   Pulmonary:      Effort: Pulmonary effort is normal.   Musculoskeletal:         General: Normal range of motion.      Cervical back: Normal range of motion.      Comments: Mild swelling left ankle    Neurological:      General: No focal deficit present.      Mental Status: He is alert and oriented to person, place, and time.   Psychiatric:         Behavior: Behavior normal.         Thought Content: Thought content normal.         Judgment: Judgment normal.             PAST MEDICAL HISTORY     Past Medical History:   Diagnosis Date   • Coronary artery disease    • COVID-19 virus detected 2022   • Diabetes mellitus (HCC)    • Diabetic retinopathy (HCC)    • GERD (gastroesophageal reflux disease)    • Hyperlipidemia    • Hypertension    • Osteoarthritis    • Paroxysmal atrial fibrillation (HCC)    • Sleep apnea    • Stroke (HCC) 2022      PAST SURGICAL HISTORY     Past Surgical History:   Procedure Laterality Date   • CARDIAC CATHETERIZATION N/A 9/3/2019   • CARDIAC CATHETERIZATION N/A 2022   • CARDIAC CATHETERIZATION N/A 2022    Procedure: Left Heart Cath;  Surgeon: Luis Alfredo Ortiz MD;  Location: [facility] CATH INVASIVE LOCATION;  Service: Cardiology;  Laterality: N/A;   • CARDIAC CATHETERIZATION N/A 9/3/2022    Procedure: Left Heart Cath;  Surgeon: Aniket Gong MD;  Location: [facility] CATH INVASIVE LOCATION;  Service: Cardiology;  Laterality: N/A;   • CHOLECYSTECTOMY     • COLONOSCOPY N/A 2021   • COLONOSCOPY N/A 2022   • CORONARY ARTERY BYPASS GRAFT N/A 2019      SOCIAL HISTORY     Social History     Socioeconomic History   • Marital status:    Tobacco Use   • Smoking status: Former Smoker     Years: 35.00     Types: Cigarettes     Start date: 1980     Quit date: 2022     Years since quittin.2   • Smokeless tobacco: Never Used   Vaping Use   • Vaping Use: Never used   Substance and Sexual Activity   • Alcohol  use: No   • Drug use: No   • Sexual activity: Not Currently     Comment: .      ALLERGIES   Ace inhibitors and Wellbutrin [bupropion]   MEDICATIONS     Current Outpatient Medications   Medication Sig Dispense Refill   • acetaminophen (TYLENOL) 325 MG tablet Take 2 tablets by mouth Every 6 (Six) Hours As Needed for Mild Pain. 100 tablet 11   • amiodarone (PACERONE) 200 MG tablet Take 1 tablet by mouth Daily. 60 tablet 1   • apixaban (ELIQUIS) 5 MG tablet tablet Take 1 tablet by mouth Every 12 (Twelve) Hours. 60 tablet 2   • aspirin 81 MG EC tablet Take 1 tablet by mouth Daily. 30 tablet 3   • atorvastatin (LIPITOR) 40 MG tablet Take 1 tablet by mouth Every Night for 360 days. 90 tablet 3   • clopidogrel (PLAVIX) 75 MG tablet Take 1 tablet by mouth Daily. 90 tablet 3   • Continuous Blood Gluc  (tidyyle Steffany 2 [place]) device 1 each Continuous. Use as indicated for glucose monitoring 1 each 1   • gabapentin (Neurontin) 300 MG capsule Take 3 capsules by mouth 3 (Three) Times a Day. 270 capsule 5   • Glucagon (Baqsimi Two Pack) 3 MG/DOSE powder Administer 1 spray into a single nostril as directed by provider As Needed for low blood sugar 2 each 11   • Insulin Aspart, w/Niacinamide, (Fiasp FlexTouch) 100 UNIT/ML solution pen-injector Inject up to 20 Units under the skin into the appropriate area as directed 3 (Three) Times a Day Before Meals. 18 mL 11   • Insulin Degludec (Tresiba FlexTouch) 200 UNIT/ML solution pen-injector pen injection Inject 60 Units under the skin into the appropriate area as directed Every Night. 9 mL 11   • Insulin Regular Human 60x4 &60x8 & 60x12 UNIT powder Inhale 4-36 Units 3 (Three) Times a Day Before Meals. 360 each 11   • isosorbide mononitrate (IMDUR) 30 MG 24 hr tablet Take 1 tablet by mouth Daily. 90 tablet 3   • linaclotide (Linzess) 145 MCG capsule capsule Take 1 capsule by mouth Every Morning Before Breakfast. 90 capsule 1   • metoprolol succinate XL (TOPROL-XL) 25 MG  "24 hr tablet Take 1 tablet by mouth Daily. 30 tablet 11   • nitroglycerin (NITROSTAT) 0.4 MG SL tablet Place 1 tablet under the tongue Every 5 (Five) Minutes As Needed for Chest Pain. Take no more than 3 doses in 15 minutes. 25 tablet 11   • nortriptyline (PAMELOR) 10 MG capsule Take 1-3 capsules by mouth Every Night. 90 capsule 11   • ondansetron (ZOFRAN) 4 MG tablet Take 1 tablet by mouth Every 8 (Eight) Hours As Needed for Nausea or Vomiting. 10 tablet 0   • pioglitazone (ACTOS) 30 MG tablet Take 1 tablet by mouth once daily 90 tablet 0   • ranolazine (RANEXA) 1000 MG 12 hr tablet Take 1 tablet by mouth Every 12 (Twelve) Hours. 60 tablet 3   • Semaglutide,0.25 or 0.5MG/DOS, (Ozempic, 0.25 or 0.5 MG/DOSE,) 2 MG/1.5ML solution pen-injector Inject 0.5 mg under the skin into the appropriate area as directed 1 (One) Time Per Week. 1.5 mL 11   • spironolactone (ALDACTONE) 25 MG tablet Take 1 tablet by mouth once daily 30 tablet 0   • tiZANidine (Zanaflex) 4 MG tablet Take 1 tablet by mouth At Night As Needed for Muscle Spasms. 90 tablet 3   • Accu-Chek Guide test strip USE 1 TO CHECK GLUCOSE 4 TIMES DAILY     • celecoxib (CeleBREX) 200 MG capsule Take 1 capsule by mouth Daily. REPLACES MOBIC FOR ARTHRITIS/JOINT PAIN BUT ONLY FOR 30 DAYS. 30 capsule 0   • Continuous Blood Gluc Sensor (FreeStyle Steffany 2 Sensor) misc 1 each Every 14 (Fourteen) Days. Use every 14 days 2 each 11   • Continuous Blood Gluc Sensor (FreeStyle Steffany 2 Sensor) misc Use as directed for continuous glucose monitoring **Change sensor Every 14 (Fourteen) Days** 2 each 11   • Insulin Pen Needle (Pen Needles 1/2\") 29G X 12MM misc 1 each Every Night. 100 each 3   • Lancet Devices (ONE TOUCH DELICA LANCING DEV) misc Use as directed to test blood sugar. (Patient taking differently: Use as directed to test blood sugar.) 1 each 11   • ONETOUCH DELICA LANCETS 33G misc 1 each 4 (Four) Times a Day. delica if covered, use alternative if not covered 120 each 11 "     No current facility-administered medications for this visit.        The following portions of the patient's history were reviewed and updated as appropriate: allergies, current medications, past family history, past medical history, past social history, past surgical history and problem list.        Assessment & Plan   Diagnoses and all orders for this visit:    1. Acute left ankle pain (Primary)  -     XR Ankle 3+ View Left    Other orders  -     tiZANidine (Zanaflex) 4 MG tablet; Take 1 tablet by mouth At Night As Needed for Muscle Spasms.  Dispense: 90 tablet; Refill: 3  -     nitroglycerin (NITROSTAT) 0.4 MG SL tablet; Place 1 tablet under the tongue Every 5 (Five) Minutes As Needed for Chest Pain. Take no more than 3 doses in 15 minutes.  Dispense: 25 tablet; Refill: 11  -     nortriptyline (PAMELOR) 10 MG capsule; Take 1-3 capsules by mouth Every Night.  Dispense: 90 capsule; Refill: 11  -     isosorbide mononitrate (IMDUR) 30 MG 24 hr tablet; Take 1 tablet by mouth Daily.  Dispense: 90 tablet; Refill: 3  -     atorvastatin (LIPITOR) 40 MG tablet; Take 1 tablet by mouth Every Night for 360 days.  Dispense: 90 tablet; Refill: 3  -     celecoxib (CeleBREX) 200 MG capsule; Take 1 capsule by mouth Daily. REPLACES MOBIC FOR ARTHRITIS/JOINT PAIN BUT ONLY FOR 30 DAYS.  Dispense: 30 capsule; Refill: 0  -     acetaminophen (TYLENOL) 325 MG tablet; Take 2 tablets by mouth Every 6 (Six) Hours As Needed for Mild Pain.  Dispense: 100 tablet; Refill: 11     will get xrays.    celebrex instead of mobic.  Suggested only tylenol due to eliquis and plavix. He understands risks.                        No follow-ups on file.                  This document has been electronically signed by Artemio Sanchez MD on September 13, 2022 16:53 CDT

## 2022-09-13 NOTE — TELEPHONE ENCOUNTER
Mr. Galindovach called and said that his foot is going more numb than usually now and wants to know what he should do?

## 2022-09-13 NOTE — TELEPHONE ENCOUNTER
Patient said it is cold, purple, and swollen.  Dr. Sanchez said to elevate above heart, ice on 15 min. Off 15 min and to take an extra Nortriptyline tonight.

## 2022-09-19 RX ORDER — EMPAGLIFLOZIN, METFORMIN HYDROCHLORIDE 5; 1000 MG/1; MG/1
2 TABLET, EXTENDED RELEASE ORAL DAILY
Qty: 60 TABLET | Refills: 11 | Status: SHIPPED | OUTPATIENT
Start: 2022-09-19

## 2022-09-22 ENCOUNTER — OFFICE VISIT (OUTPATIENT)
Dept: FAMILY MEDICINE CLINIC | Facility: CLINIC | Age: 57
End: 2022-09-22

## 2022-09-22 VITALS
SYSTOLIC BLOOD PRESSURE: 175 MMHG | OXYGEN SATURATION: 98 % | TEMPERATURE: 97.5 F | HEIGHT: 74 IN | BODY MASS INDEX: 32.47 KG/M2 | HEART RATE: 98 BPM | DIASTOLIC BLOOD PRESSURE: 116 MMHG | WEIGHT: 253 LBS

## 2022-09-22 DIAGNOSIS — M79.605 LEFT LEG PAIN: Primary | ICD-10-CM

## 2022-09-22 PROCEDURE — 99212 OFFICE O/P EST SF 10 MIN: CPT | Performed by: FAMILY MEDICINE

## 2022-09-22 NOTE — PROGRESS NOTES
" Subjective   Herbert White . is a 57 y.o. male.     Chief Complaint   Patient presents with   • Leg Pain          • Fall   • [employer] PAPERWORK             History of Present Illness     He tripped over a dog today, left calf pain.  Needs [facility] paperwork filled out.       Review of Systems   Constitutional: Negative for chills, fatigue and fever.   HENT: Negative for congestion, ear discharge, ear pain, facial swelling, hearing loss, postnasal drip, rhinorrhea, sinus pressure, sore throat, trouble swallowing and voice change.    Eyes: Negative for discharge, redness and visual disturbance.   Respiratory: Negative for cough, chest tightness, shortness of breath and wheezing.    Cardiovascular: Negative for chest pain and palpitations.   Gastrointestinal: Negative for abdominal pain, blood in stool, constipation, diarrhea, nausea and vomiting.   Endocrine: Negative for polydipsia and polyuria.   Genitourinary: Negative for dysuria, flank pain, hematuria and urgency.   Musculoskeletal: Positive for arthralgias and myalgias. Negative for back pain and joint swelling.   Skin: Negative for rash.   Neurological: Negative for dizziness, weakness, numbness and headaches.   Hematological: Negative for adenopathy.   Psychiatric/Behavioral: Negative for confusion and sleep disturbance. The patient is not nervous/anxious.            BP (!) 175/116 (BP Location: Left arm, Patient Position: Sitting, Cuff Size: Adult)   Pulse 98   Temp 97.5 °F (36.4 °C) (Infrared)   Ht 188 cm (74.02\")   Wt 115 kg (253 lb)   SpO2 98%   BMI 32.47 kg/m²       Objective     Physical Exam  Vitals and nursing note reviewed.   Constitutional:       Appearance: Normal appearance. He is well-developed.   HENT:      Head: Normocephalic and atraumatic.      Right Ear: External ear normal.      Left Ear: External ear normal.      Nose: Nose normal.   Eyes:      Extraocular Movements: Extraocular movements intact.      Conjunctiva/sclera: " Conjunctivae normal.      Pupils: Pupils are equal, round, and reactive to light.   Pulmonary:      Effort: Pulmonary effort is normal.   Musculoskeletal:         General: Normal range of motion.      Cervical back: Normal range of motion.      Comments: Some mild edema lower left leg, calf no evidence of trauma/bruising, etc.      Neurological:      General: No focal deficit present.      Mental Status: He is alert and oriented to person, place, and time.   Psychiatric:         Behavior: Behavior normal.         Thought Content: Thought content normal.         Judgment: Judgment normal.             PAST MEDICAL HISTORY     Past Medical History:   Diagnosis Date   • Coronary artery disease    • COVID-19 virus detected 2022   • Diabetes mellitus (HCC)    • Diabetic retinopathy (HCC)    • GERD (gastroesophageal reflux disease)    • Hyperlipidemia    • Hypertension    • Osteoarthritis    • Paroxysmal atrial fibrillation (HCC)    • Sleep apnea    • Stroke (HCC) 2022      PAST SURGICAL HISTORY     Past Surgical History:   Procedure Laterality Date   • CARDIAC CATHETERIZATION N/A 9/3/2019   • CARDIAC CATHETERIZATION N/A 2022   • CARDIAC CATHETERIZATION N/A 2022    Procedure: Left Heart Cath;  Surgeon: Luis Alfredo Ortiz MD;  Location: [facility] INVASIVE LOCATION;  Service: Cardiology;  Laterality: N/A;   • CARDIAC CATHETERIZATION N/A 9/3/2022    Procedure: Left Heart Cath;  Surgeon: Aniket Gong MD;  Location: [facility] CATH INVASIVE LOCATION;  Service: Cardiology;  Laterality: N/A;   • CHOLECYSTECTOMY     • COLONOSCOPY N/A 2021   • COLONOSCOPY N/A 2022   • CORONARY ARTERY BYPASS GRAFT N/A 2019      SOCIAL HISTORY     Social History     Socioeconomic History   • Marital status:    Tobacco Use   • Smoking status: Former Smoker     Years: 35.00     Types: Cigarettes     Start date: 1980     Quit date: 2022     Years since quittin.3   • Smokeless tobacco: Never Used    Vaping Use   • Vaping Use: Never used   Substance and Sexual Activity   • Alcohol use: No   • Drug use: No   • Sexual activity: Not Currently     Comment: .      ALLERGIES   Ace inhibitors and Wellbutrin [bupropion]   MEDICATIONS     Current Outpatient Medications   Medication Sig Dispense Refill   • Accu-Chek Guide test strip USE 1 TO CHECK GLUCOSE 4 TIMES DAILY     • acetaminophen (TYLENOL) 325 MG tablet Take 2 tablets by mouth Every 6 (Six) Hours As Needed for Mild Pain. 100 tablet 11   • amiodarone (PACERONE) 200 MG tablet Take 1 tablet by mouth Daily. 60 tablet 1   • apixaban (ELIQUIS) 5 MG tablet tablet Take 1 tablet by mouth Every 12 (Twelve) Hours. 60 tablet 2   • aspirin 81 MG EC tablet Take 1 tablet by mouth Daily. 30 tablet 3   • atorvastatin (LIPITOR) 40 MG tablet Take 1 tablet by mouth Every Night for 360 days. 90 tablet 3   • celecoxib (CeleBREX) 200 MG capsule Take 1 capsule by mouth Daily. REPLACES MOBIC FOR ARTHRITIS/JOINT PAIN BUT ONLY FOR 30 DAYS. 30 capsule 0   • clopidogrel (PLAVIX) 75 MG tablet Take 1 tablet by mouth Daily. 90 tablet 3   • Continuous Blood Gluc  (FreeStyle Steffany 2 [place]) device 1 each Continuous. Use as indicated for glucose monitoring 1 each 1   • Continuous Blood Gluc Sensor (FreeStyle Steffany 2 Sensor) misc 1 each Every 14 (Fourteen) Days. Use every 14 days 2 each 11   • Continuous Blood Gluc Sensor (FreeStyle Steffany 2 Sensor) misc Use as directed for continuous glucose monitoring **Change sensor Every 14 (Fourteen) Days** 2 each 11   • Empagliflozin-metFORMIN HCl ER (Synjardy XR) 5-1000 MG tablet sustained-release 24 hour Take 2 tablets by mouth Daily. 60 tablet 11   • gabapentin (Neurontin) 300 MG capsule Take 3 capsules by mouth 3 (Three) Times a Day. 270 capsule 5   • Glucagon (Baqsimi Two Pack) 3 MG/DOSE powder Administer 1 spray into a single nostril as directed by provider As Needed for low blood sugar 2 each 11   • Insulin Aspart, w/Niacinamide, (Fiasp  "FlexTouch) 100 UNIT/ML solution pen-injector Inject up to 20 Units under the skin into the appropriate area as directed 3 (Three) Times a Day Before Meals. 18 mL 11   • Insulin Degludec (Tresiba FlexTouch) 200 UNIT/ML solution pen-injector pen injection Inject 60 Units under the skin into the appropriate area as directed Every Night. 9 mL 11   • Insulin Pen Needle (Pen Needles 1/2\") 29G X 12MM misc 1 each Every Night. 100 each 3   • Insulin Regular Human 60x4 &60x8 & 60x12 UNIT powder Inhale 4-36 Units 3 (Three) Times a Day Before Meals. 360 each 11   • isosorbide mononitrate (IMDUR) 30 MG 24 hr tablet Take 1 tablet by mouth Daily. 90 tablet 3   • Lancet Devices (ONE TOUCH DELICA LANCING DEV) misc Use as directed to test blood sugar. (Patient taking differently: Use as directed to test blood sugar.) 1 each 11   • linaclotide (Linzess) 145 MCG capsule capsule Take 1 capsule by mouth Every Morning Before Breakfast. 90 capsule 1   • metoprolol succinate XL (TOPROL-XL) 25 MG 24 hr tablet Take 1 tablet by mouth Daily. 30 tablet 11   • nitroglycerin (NITROSTAT) 0.4 MG SL tablet Place 1 tablet under the tongue Every 5 (Five) Minutes As Needed for Chest Pain. Take no more than 3 doses in 15 minutes. 25 tablet 11   • nortriptyline (PAMELOR) 10 MG capsule Take 1-3 capsules by mouth Every Night. 90 capsule 11   • ondansetron (ZOFRAN) 4 MG tablet Take 1 tablet by mouth Every 8 (Eight) Hours As Needed for Nausea or Vomiting. 10 tablet 0   • ONETOUCH DELICA LANCETS 33G misc 1 each 4 (Four) Times a Day. delica if covered, use alternative if not covered 120 each 11   • pioglitazone (ACTOS) 30 MG tablet Take 1 tablet by mouth once daily 90 tablet 0   • ranolazine (RANEXA) 1000 MG 12 hr tablet Take 1 tablet by mouth Every 12 (Twelve) Hours. 60 tablet 3   • Semaglutide,0.25 or 0.5MG/DOS, (Ozempic, 0.25 or 0.5 MG/DOSE,) 2 MG/1.5ML solution pen-injector Inject 0.5 mg under the skin into the appropriate area as directed 1 (One) Time Per " Week. 1.5 mL 11   • spironolactone (ALDACTONE) 25 MG tablet Take 1 tablet by mouth once daily 30 tablet 0   • tiZANidine (Zanaflex) 4 MG tablet Take 1 tablet by mouth At Night As Needed for Muscle Spasms. 90 tablet 3     No current facility-administered medications for this visit.        The following portions of the patient's history were reviewed and updated as appropriate: allergies, current medications, past family history, past medical history, past social history, past surgical history and problem list.        Assessment & Plan   Diagnoses and all orders for this visit:    1. Left leg pain (Primary)      [facility] paperwork filled out  Return to work Monday 9/26/22.    Gentle stretching and elevation.   Call if any problems                No follow-ups on file.                  This document has been electronically signed by Artemio Sanchez MD on September 22, 2022 15:22 CDT     Answers for HPI/ROS submitted by the patient on 9/22/2022  What is the primary reason for your visit?: Lower Extremity Injury  Incident occurred: less than 1 hour ago  Incident location: in the yard  Injury mechanism: unknown  Pain location: left leg  Pain quality: shooting, stabbing  Pain - numeric: 9/10  Pain course: worsening  tingling: Yes  inability to bear weight: Yes  loss of motion: Yes  loss of sensation: No  muscle weakness: Yes  Foreign body present: no foreign bodies

## 2022-09-26 ENCOUNTER — TELEPHONE (OUTPATIENT)
Dept: FAMILY MEDICINE CLINIC | Facility: CLINIC | Age: 57
End: 2022-09-26

## 2022-09-26 NOTE — TELEPHONE ENCOUNTER
PATIENT'S EMPLOYER CALLED WITH [employer]NS ABOUT PATIENT BEING ABLE TO RETURN TO FULL DUTY WITHOUT RESTRICTIONS.  HE SAID PATIENT WAS HAVING DIFFICULTY WALKING AND WAS USING EITHER A CANE OR WALKER. PATIENT WILL BE BRINGING PAPERS BACK WITH A COUPLE OF QUESTIONS TO BE ADDRESSED.

## 2022-09-27 ENCOUNTER — OFFICE VISIT (OUTPATIENT)
Dept: FAMILY MEDICINE CLINIC | Facility: CLINIC | Age: 57
End: 2022-09-27

## 2022-09-27 VITALS
SYSTOLIC BLOOD PRESSURE: 177 MMHG | TEMPERATURE: 97.3 F | OXYGEN SATURATION: 98 % | HEART RATE: 99 BPM | HEIGHT: 74 IN | DIASTOLIC BLOOD PRESSURE: 92 MMHG | BODY MASS INDEX: 32.28 KG/M2 | WEIGHT: 251.5 LBS

## 2022-09-27 DIAGNOSIS — M79.605 LEFT LEG PAIN: Primary | ICD-10-CM

## 2022-09-27 PROCEDURE — 99212 OFFICE O/P EST SF 10 MIN: CPT | Performed by: FAMILY MEDICINE

## 2022-09-27 NOTE — PROGRESS NOTES
" Subjective   Herbert White . is a 57 y.o. male.     Chief Complaint   Patient presents with   • Leg Pain   • FMLA PAPERWORK             History of Present Illness     Since tripping over dog and left lower leg pain, he has to walk using a cane.  His work doesn't like this.    Patient needs referral to ortho  Patient needs fmla paperwork filled out    Review of Systems   Constitutional: Negative for chills, fatigue and fever.   HENT: Negative for congestion, ear discharge, ear pain, facial swelling, hearing loss, postnasal drip, rhinorrhea, sinus pressure, sore throat, trouble swallowing and voice change.    Eyes: Negative for discharge, redness and visual disturbance.   Respiratory: Negative for cough, chest tightness, shortness of breath and wheezing.    Cardiovascular: Negative for chest pain and palpitations.   Gastrointestinal: Negative for abdominal pain, blood in stool, constipation, diarrhea, nausea and vomiting.   Endocrine: Negative for polydipsia and polyuria.   Genitourinary: Negative for dysuria, flank pain, hematuria and urgency.   Musculoskeletal: Positive for arthralgias. Negative for back pain, joint swelling and myalgias.   Skin: Negative for rash.   Neurological: Negative for dizziness, weakness, numbness and headaches.   Hematological: Negative for adenopathy.   Psychiatric/Behavioral: Negative for confusion and sleep disturbance. The patient is not nervous/anxious.            /92 (BP Location: Left arm, Patient Position: Sitting, Cuff Size: Adult)   Pulse 99   Temp 97.3 °F (36.3 °C) (Infrared)   Ht 188 cm (74.02\")   Wt 114 kg (251 lb 8 oz)   SpO2 98%   BMI 32.28 kg/m²       Objective     Physical Exam  Vitals and nursing note reviewed.   Constitutional:       Appearance: Normal appearance. He is well-developed.   HENT:      Head: Normocephalic and atraumatic.      Right Ear: External ear normal.      Left Ear: External ear normal.      Nose: Nose normal.   Eyes:      " Extraocular Movements: Extraocular movements intact.      Conjunctiva/sclera: Conjunctivae normal.      Pupils: Pupils are equal, round, and reactive to light.   Pulmonary:      Effort: Pulmonary effort is normal.   Musculoskeletal:         General: Normal range of motion.      Cervical back: Normal range of motion.   Neurological:      General: No focal deficit present.      Mental Status: He is alert and oriented to person, place, and time.   Psychiatric:         Behavior: Behavior normal.         Thought Content: Thought content normal.         Judgment: Judgment normal.             PAST MEDICAL HISTORY     Past Medical History:   Diagnosis Date   • Coronary artery disease    • COVID-19 virus detected 2022   • Diabetes mellitus (HCC)    • Diabetic retinopathy (HCC)    • GERD (gastroesophageal reflux disease)    • Hyperlipidemia    • Hypertension    • Osteoarthritis    • Paroxysmal atrial fibrillation (HCC)    • Sleep apnea    • Stroke (HCC) 2022      PAST SURGICAL HISTORY     Past Surgical History:   Procedure Laterality Date   • CARDIAC CATHETERIZATION N/A 9/3/2019   • CARDIAC CATHETERIZATION N/A 2022   • CARDIAC CATHETERIZATION N/A 2022    Procedure: Left Heart Cath;  Surgeon: Luis Alfredo Ortiz MD;  Location: [facility] INVASIVE LOCATION;  Service: Cardiology;  Laterality: N/A;   • CARDIAC CATHETERIZATION N/A 9/3/2022    Procedure: Left Heart Cath;  Surgeon: Aniket Gong MD;  Location: [facility] INVASIVE LOCATION;  Service: Cardiology;  Laterality: N/A;   • CHOLECYSTECTOMY     • COLONOSCOPY N/A 2021   • COLONOSCOPY N/A 2022   • CORONARY ARTERY BYPASS GRAFT N/A 2019      SOCIAL HISTORY     Social History     Socioeconomic History   • Marital status:    Tobacco Use   • Smoking status: Former Smoker     Years: 35.00     Types: Cigarettes     Start date: 1980     Quit date: 2022     Years since quittin.3   • Smokeless tobacco: Never Used   Vaping Use    • Vaping Use: Never used   Substance and Sexual Activity   • Alcohol use: No   • Drug use: No   • Sexual activity: Not Currently     Comment: .      ALLERGIES   Ace inhibitors and Wellbutrin [bupropion]   MEDICATIONS     Current Outpatient Medications   Medication Sig Dispense Refill   • Accu-Chek Guide test strip USE 1 TO CHECK GLUCOSE 4 TIMES DAILY     • acetaminophen (TYLENOL) 325 MG tablet Take 2 tablets by mouth Every 6 (Six) Hours As Needed for Mild Pain. 100 tablet 11   • amiodarone (PACERONE) 200 MG tablet Take 1 tablet by mouth Daily. 60 tablet 1   • aspirin 81 MG EC tablet Take 1 tablet by mouth Daily. 30 tablet 3   • atorvastatin (LIPITOR) 40 MG tablet Take 1 tablet by mouth Every Night for 360 days. 90 tablet 3   • celecoxib (CeleBREX) 200 MG capsule Take 1 capsule by mouth Daily. REPLACES MOBIC FOR ARTHRITIS/JOINT PAIN BUT ONLY FOR 30 DAYS. 30 capsule 0   • clopidogrel (PLAVIX) 75 MG tablet Take 1 tablet by mouth Daily. 90 tablet 3   • Continuous Blood Gluc  (FreeStyle Steffany 2 Bennett) device 1 each Continuous. Use as indicated for glucose monitoring 1 each 1   • Continuous Blood Gluc Sensor (FreeStyle Steffany 2 Sensor) misc 1 each Every 14 (Fourteen) Days. Use every 14 days 2 each 11   • Continuous Blood Gluc Sensor (FreeStyle Steffany 2 Sensor) misc Use as directed for continuous glucose monitoring **Change sensor Every 14 (Fourteen) Days** 2 each 11   • Empagliflozin-metFORMIN HCl ER (Synjardy XR) 5-1000 MG tablet sustained-release 24 hour Take 2 tablets by mouth Daily. 60 tablet 11   • gabapentin (Neurontin) 300 MG capsule Take 3 capsules by mouth 3 (Three) Times a Day. 270 capsule 5   • Glucagon (Baqsimi Two Pack) 3 MG/DOSE powder Administer 1 spray into a single nostril as directed by provider As Needed for low blood sugar 2 each 11   • Insulin Aspart, w/Niacinamide, (Fiasp FlexTouch) 100 UNIT/ML solution pen-injector Inject up to 20 Units under the skin into the appropriate area as  "directed 3 (Three) Times a Day Before Meals. 18 mL 11   • Insulin Degludec (Tresiba FlexTouch) 200 UNIT/ML solution pen-injector pen injection Inject 60 Units under the skin into the appropriate area as directed Every Night. 9 mL 11   • Insulin Pen Needle (Pen Needles 1/2\") 29G X 12MM misc 1 each Every Night. 100 each 3   • Insulin Regular Human 60x4 &60x8 & 60x12 UNIT powder Inhale 4-36 Units 3 (Three) Times a Day Before Meals. 360 each 11   • isosorbide mononitrate (IMDUR) 30 MG 24 hr tablet Take 1 tablet by mouth Daily. 90 tablet 3   • Lancet Devices (ONE TOUCH DELICA LANCING DEV) misc Use as directed to test blood sugar. (Patient taking differently: Use as directed to test blood sugar.) 1 each 11   • linaclotide (Linzess) 145 MCG capsule capsule Take 1 capsule by mouth Every Morning Before Breakfast. 90 capsule 1   • metoprolol succinate XL (TOPROL-XL) 25 MG 24 hr tablet Take 1 tablet by mouth Daily. 30 tablet 11   • nitroglycerin (NITROSTAT) 0.4 MG SL tablet Place 1 tablet under the tongue Every 5 (Five) Minutes As Needed for Chest Pain. Take no more than 3 doses in 15 minutes. 25 tablet 11   • nortriptyline (PAMELOR) 10 MG capsule Take 1-3 capsules by mouth Every Night. 90 capsule 11   • ondansetron (ZOFRAN) 4 MG tablet Take 1 tablet by mouth Every 8 (Eight) Hours As Needed for Nausea or Vomiting. 10 tablet 0   • ONETOUCH DELICA LANCETS 33G misc 1 each 4 (Four) Times a Day. delica if covered, use alternative if not covered 120 each 11   • pioglitazone (ACTOS) 30 MG tablet Take 1 tablet by mouth once daily 90 tablet 0   • ranolazine (RANEXA) 1000 MG 12 hr tablet Take 1 tablet by mouth Every 12 (Twelve) Hours. 60 tablet 3   • Semaglutide,0.25 or 0.5MG/DOS, (Ozempic, 0.25 or 0.5 MG/DOSE,) 2 MG/1.5ML solution pen-injector Inject 0.5 mg under the skin into the appropriate area as directed 1 (One) Time Per Week. 1.5 mL 11   • spironolactone (ALDACTONE) 25 MG tablet Take 1 tablet by mouth once daily 30 tablet 0   • " tiZANidine (Zanaflex) 4 MG tablet Take 1 tablet by mouth At Night As Needed for Muscle Spasms. 90 tablet 3     No current facility-administered medications for this visit.        The following portions of the patient's history were reviewed and updated as appropriate: allergies, current medications, past family history, past medical history, past social history, past surgical history and problem list.        Assessment & Plan   Diagnoses and all orders for this visit:    1. Left leg pain (Primary)  -     Ambulatory Referral to Orthopedic Surgery    filled out fmla  Referral as requested.   Off work until 10/18/22 which is 5 weeks from date of injury                   No follow-ups on file.                  This document has been electronically signed by Artemio Sanchez MD on September 27, 2022 14:54 CDT

## 2022-10-03 ENCOUNTER — TELEMEDICINE (OUTPATIENT)
Dept: ENDOCRINOLOGY | Facility: CLINIC | Age: 57
End: 2022-10-03

## 2022-10-03 ENCOUNTER — SPECIALTY PHARMACY (OUTPATIENT)
Dept: ENDOCRINOLOGY | Facility: CLINIC | Age: 57
End: 2022-10-03

## 2022-10-03 DIAGNOSIS — E78.2 MIXED DIABETIC HYPERLIPIDEMIA ASSOCIATED WITH TYPE 2 DIABETES MELLITUS: ICD-10-CM

## 2022-10-03 DIAGNOSIS — Z79.4 TYPE 2 DIABETES MELLITUS WITH HYPERGLYCEMIA, WITH LONG-TERM CURRENT USE OF INSULIN: Primary | ICD-10-CM

## 2022-10-03 DIAGNOSIS — I15.2 HYPERTENSION ASSOCIATED WITH DIABETES: ICD-10-CM

## 2022-10-03 DIAGNOSIS — E11.69 MIXED DIABETIC HYPERLIPIDEMIA ASSOCIATED WITH TYPE 2 DIABETES MELLITUS: ICD-10-CM

## 2022-10-03 DIAGNOSIS — E11.65 TYPE 2 DIABETES MELLITUS WITH HYPERGLYCEMIA, WITH LONG-TERM CURRENT USE OF INSULIN: Primary | ICD-10-CM

## 2022-10-03 DIAGNOSIS — E11.42 DIABETIC POLYNEUROPATHY ASSOCIATED WITH TYPE 2 DIABETES MELLITUS: ICD-10-CM

## 2022-10-03 DIAGNOSIS — E11.59 HYPERTENSION ASSOCIATED WITH DIABETES: ICD-10-CM

## 2022-10-03 PROCEDURE — 99214 OFFICE O/P EST MOD 30 MIN: CPT | Performed by: INTERNAL MEDICINE

## 2022-10-03 RX ORDER — PREGABALIN 100 MG/1
100 CAPSULE ORAL 3 TIMES DAILY
Qty: 90 CAPSULE | Refills: 5 | Status: SHIPPED | OUTPATIENT
Start: 2022-10-03 | End: 2023-10-03

## 2022-10-03 RX ORDER — SEMAGLUTIDE 1.34 MG/ML
1 INJECTION, SOLUTION SUBCUTANEOUS WEEKLY
Qty: 9 ML | Refills: 3 | Status: SHIPPED | OUTPATIENT
Start: 2022-10-03

## 2022-10-03 NOTE — PROGRESS NOTES
CC  Type 2 DM                                      This was a Telehealth Encounter. Benefits and Disadvantages of a Telehealth Visit were discussed and accepted by patient. .  Patient agreed to receive service through Telehealth visit as patient is being compliant with social distancing recommendations imparted by CDC.   Patient is conducting telehealth visit at home and I am conducting telehealth visit at [facility] in [facility].    You have chosen to receive care through a telehealth visit.  Do you consent to use a video/audio connection for your medical care today? Yes        History of Present Illness    57 y.o. male     Diabetes Type 2    Duration - more than 10 years    Complications -  Neuropathy   CAD    Present Monitoring -      Fingersticks - 4 x daily    CGM -     Present Regimen -  Mixed Insulin , oral   Carb Intake -   High carb  Exercise -   None   Symptoms -     Fatigue   ==========================================  Physical Exam  There were no vitals taken for this visit.  AOx3  No goiter, no carotid bruit  RRR  CTA  No Edema      ==========================================    Laboratory Workup    Lab Results   Component Value Date    WBC 6.10 09/04/2022    HGB 16.1 09/04/2022    HCT 46.1 09/04/2022    MCV 84.0 09/04/2022     09/04/2022       Lab Results   Component Value Date    GLUCOSE 179 (H) 09/04/2022    BUN 23 (H) 09/04/2022    CREATININE 0.79 09/04/2022    EGFR 103.0 09/04/2022    EGFRIFNONA 74 02/24/2022    BCR 29.1 (H) 09/04/2022     09/04/2022    K 3.4 (L) 09/04/2022     (H) 09/04/2022    CO2 26.0 09/04/2022    CALCIUM 9.0 09/04/2022    PHOS 3.9 01/25/2022    ALBUMIN 3.50 08/30/2022    GLOB 2.3 08/30/2022    BILITOT 0.5 08/30/2022    ALKPHOS 110 08/30/2022    AST 24 08/30/2022    ALT 46 (H) 08/30/2022    AGRATIO 1.5 08/30/2022       No results found for: MALBCRERATIO              ==========================================    No diagnosis  found.    Diabetes Mellitus    --------------------------------------------------------------------------------------------------------------------------------------------    Glycemic Management   Lab Results   Component Value Date    HGBA1C 8.50 (H) 09/04/2022       Stop mixed insulin     Tresiba , 60 units at night- only doing 10 units     ------------    Fiasp    1 unit per 5 grams and 1 unit per 30 above 140     But also interchangeable with afrezza     Just need to understand conversion factor of 1.5    Example , 8 units of fiasp = 12 units of afrezza    Not even doing either     ------------    Keep pioglitazone    ------    Stop metformin and start synjardy 2 tabs w bkfast  ozempic 0.5 mg weekly - increase to 1 mg weekly     -----    Please start using kirstin  Falling   Use mastisol   ===           ==========================================================================  Microvascular Complication Monitoring    Neuropathy                       Gabapentin, increase to 900 mg TID -   Add cymbalta 30 mg daily for 3 days , then increase to 60 mg daily     Retinopathy   Bilateral   Needs new eye exam   Renal     GFR   Lab Results   Component Value Date    EGFR 103.0 09/04/2022    EGFR 92.8 09/04/2022    EGFR 101.1 08/30/2022       Albuminuria   No results found for: ADAN         ==========================================================================    Lipids  Lab Results   Component Value Date    CHOL 181 08/26/2022    TRIG 128 08/26/2022    HDL 30 (L) 08/26/2022     (H) 08/26/2022       Statin   lipitor 40 mg qhs   ==========================================================================    HTN  There were no vitals taken for this visit.    Address next appt     Seeing cardiology     Takes aldactone 25 mg daily   toprol xl 25 mg daily   imdur     ==========================================================================   Thyroid Assessment  Lab Results   Component Value Date    TSH 4.110  09/04/2022           ==========================================================================    Vitamin B12  Lab Results   Component Value Date    [id number] 230 10/29/2020                No orders of the defined types were placed in this encounter.               This document has been electronically signed by Alessandro Ferreira MD on October 3, 2022 07:58 CDT

## 2022-10-05 ENCOUNTER — OFFICE VISIT (OUTPATIENT)
Dept: CARDIOLOGY | Facility: CLINIC | Age: 57
End: 2022-10-05

## 2022-10-05 VITALS
DIASTOLIC BLOOD PRESSURE: 90 MMHG | BODY MASS INDEX: 32.34 KG/M2 | OXYGEN SATURATION: 98 % | HEART RATE: 70 BPM | WEIGHT: 252 LBS | SYSTOLIC BLOOD PRESSURE: 152 MMHG | HEIGHT: 74 IN

## 2022-10-05 DIAGNOSIS — E78.5 HYPERLIPIDEMIA, UNSPECIFIED HYPERLIPIDEMIA TYPE: ICD-10-CM

## 2022-10-05 DIAGNOSIS — R42 DIZZINESS: ICD-10-CM

## 2022-10-05 DIAGNOSIS — I48.0 PAROXYSMAL ATRIAL FIBRILLATION: ICD-10-CM

## 2022-10-05 DIAGNOSIS — I10 ESSENTIAL HYPERTENSION: ICD-10-CM

## 2022-10-05 DIAGNOSIS — I25.10 CORONARY ARTERY DISEASE INVOLVING NATIVE CORONARY ARTERY OF NATIVE HEART WITHOUT ANGINA PECTORIS: Primary | ICD-10-CM

## 2022-10-05 DIAGNOSIS — E66.2 CLASS 1 OBESITY WITH ALVEOLAR HYPOVENTILATION, SERIOUS COMORBIDITY, AND BODY MASS INDEX (BMI) OF 32.0 TO 32.9 IN ADULT: ICD-10-CM

## 2022-10-05 PROCEDURE — 99214 OFFICE O/P EST MOD 30 MIN: CPT | Performed by: INTERNAL MEDICINE

## 2022-10-05 NOTE — PROGRESS NOTES
[facility]  OFFICE NOTE    Cardiovascular Medicine  Luis Alfredo Ortiz M.D., [facility]         No referring provider defined for this encounter.    History of Present Illness    Herbert White Sr. is a 57 y.o. male who presents for a follow up visit today.  The patient has seen Dr. Rice in the past.  He has a known history of CAD s/p CABG and PCI.  He was seen during an inpatient hospitalization in August 2022 for chest discomfort.  Coronary and bypass graft angiography was performed. 3/3 bypass grafts were found to be patent.  He underwent successful PCI to mid LCx with VINEET with 0% residual stenosis.  A few days after PCI, he presented to [facility] with chest discomfort and was transferred to [facility]. 3/3 bypass grafts were found to be patent again.  Previously placed stent in mid LCx was also found to be patent.  Medical management for coronary disease and work-up for noncardiac etiologies of chest pain was recommended.  He also has a history of paroxysmal atrial fibrillation and is on Eliquis for anticoagulation.  Previous transthoracic echocardiograms have shown normal LV systolic function, left atrial dilatation, mild tricuspid valve regurgitation and no evidence of pericardial effusion.  On today's visit, he denies having any further episodes of chest discomfort since last hospital discharge.  He has not had any PND, orthopnea or leg swelling.  He has been wearing CPAP at nighttime regularly.  He denies having any palpitations, presyncope or syncope.  He does report having a few episodes of dizziness, he ended up falling during one of those episodes (denies having any syncope).  He has also fallen due to his dog wrapping around his legs a few times.    Review of Systems - ROS  Constitution: Negative for weight gain and weight loss.   HENT: Negative for congestion.    Eyes: Negative for blurred vision.   Cardiovascular: As  mentioned above  Respiratory: Negative for cough and hemoptysis.    Endocrine: Negative for polydipsia and polyuria.   Hematologic/Lymphatic: Negative for bleeding problem. Does not bruise/bleed easily.   Skin: Negative for flushing.   Musculoskeletal: Negative for neck pain and stiffness.   Gastrointestinal: Negative for abdominal pain, nausea and vomiting.   Genitourinary: Negative for dysuria and hematuria.   Neurological: Negative for focal weakness and numbness.   Psychiatric/Behavioral: Negative for altered mental status and depression.      All other systems were reviewed and were negative.    Past Medical History:   Diagnosis Date   • Coronary artery disease    • COVID-19 virus detected 01/21/2022   • Diabetes mellitus (HCC)    • Diabetic retinopathy (HCC)    • GERD (gastroesophageal reflux disease)    • Hyperlipidemia    • Hypertension    • Osteoarthritis    • Paroxysmal atrial fibrillation (HCC)    • Sleep apnea    • Stroke (HCC) 02/2022       Family History:  family history includes Diabetes in his mother; Hypertension in his father.    Social History:   reports that he has been smoking cigarettes. He started smoking about 42 years ago. He has a 17.50 pack-year smoking history. He has never used smokeless tobacco. He reports that he does not drink alcohol and does not use drugs.    Allergies:  Allergies   Allergen Reactions   • Ace Inhibitors Anaphylaxis   • Wellbutrin [Bupropion] Anaphylaxis         Current Outpatient Medications:   •  Accu-Chek Guide test strip, USE 1 TO CHECK GLUCOSE 4 TIMES DAILY, Disp: , Rfl:   •  acetaminophen (TYLENOL) 325 MG tablet, Take 2 tablets by mouth Every 6 (Six) Hours As Needed for Mild Pain., Disp: 100 tablet, Rfl: 11  •  amiodarone (PACERONE) 200 MG tablet, Take 1 tablet by mouth Daily., Disp: 60 tablet, Rfl: 1  •  apixaban (ELIQUIS) 5 MG tablet tablet, Take 1 tablet by mouth Every 12 (Twelve) Hours., Disp: 60 tablet, Rfl: 2  •  atorvastatin (LIPITOR) 40 MG tablet, Take  "1 tablet by mouth Every Night for 360 days., Disp: 90 tablet, Rfl: 3  •  celecoxib (CeleBREX) 200 MG capsule, Take 1 capsule by mouth Daily. REPLACES MOBIC FOR ARTHRITIS/JOINT PAIN BUT ONLY FOR 30 DAYS., Disp: 30 capsule, Rfl: 0  •  clopidogrel (PLAVIX) 75 MG tablet, Take 1 tablet by mouth Daily., Disp: 90 tablet, Rfl: 3  •  Continuous Blood Gluc  (FreeStyle Steffany 2 Manley) device, 1 each Continuous. Use as indicated for glucose monitoring, Disp: 1 each, Rfl: 1  •  Continuous Blood Gluc Sensor (FreeStyle Steffany 2 Sensor) misc, 1 each Every 14 (Fourteen) Days. Use every 14 days, Disp: 2 each, Rfl: 11  •  Continuous Blood Gluc Sensor (FreeStyle Steffany 2 Sensor) misc, Use as directed for continuous glucose monitoring **Change sensor Every 14 (Fourteen) Days**, Disp: 2 each, Rfl: 11  •  Empagliflozin-metFORMIN HCl ER (Synjardy XR) 5-1000 MG tablet sustained-release 24 hour, Take 2 tablets by mouth Daily., Disp: 60 tablet, Rfl: 11  •  gabapentin (Neurontin) 300 MG capsule, Take 3 capsules by mouth 3 (Three) Times a Day., Disp: 270 capsule, Rfl: 5  •  Glucagon (Baqsimi Two Pack) 3 MG/DOSE powder, Administer 1 spray into a single nostril as directed by provider As Needed for low blood sugar, Disp: 2 each, Rfl: 11  •  Insulin Aspart, w/Niacinamide, (Fiasp FlexTouch) 100 UNIT/ML solution pen-injector, Inject up to 20 Units under the skin into the appropriate area as directed 3 (Three) Times a Day Before Meals., Disp: 18 mL, Rfl: 11  •  Insulin Degludec (Tresiba FlexTouch) 200 UNIT/ML solution pen-injector pen injection, Inject 60 Units under the skin into the appropriate area as directed Every Night., Disp: 9 mL, Rfl: 11  •  Insulin Pen Needle (Pen Needles 1/2\") 29G X 12MM misc, 1 each Every Night., Disp: 100 each, Rfl: 3  •  Insulin Regular Human 60x4 &60x8 & 60x12 UNIT powder, Inhale 4-36 Units 3 (Three) Times a Day Before Meals., Disp: 360 each, Rfl: 11  •  isosorbide mononitrate (IMDUR) 30 MG 24 hr tablet, Take 1 " tablet by mouth Daily., Disp: 90 tablet, Rfl: 3  •  Lancet Devices (ONE TOUCH DELICA LANCING DEV) misc, Use as directed to test blood sugar. (Patient taking differently: Use as directed to test blood sugar.), Disp: 1 each, Rfl: 11  •  linaclotide (Linzess) 145 MCG capsule capsule, Take 1 capsule by mouth Every Morning Before Breakfast., Disp: 90 capsule, Rfl: 1  •  metoprolol succinate XL (TOPROL-XL) 25 MG 24 hr tablet, Take 1 tablet by mouth Daily., Disp: 30 tablet, Rfl: 11  •  nitroglycerin (NITROSTAT) 0.4 MG SL tablet, Place 1 tablet under the tongue Every 5 (Five) Minutes As Needed for Chest Pain. Take no more than 3 doses in 15 minutes., Disp: 25 tablet, Rfl: 11  •  nortriptyline (PAMELOR) 10 MG capsule, Take 1-3 capsules by mouth Every Night., Disp: 90 capsule, Rfl: 11  •  ondansetron (ZOFRAN) 4 MG tablet, Take 1 tablet by mouth Every 8 (Eight) Hours As Needed for Nausea or Vomiting., Disp: 10 tablet, Rfl: 0  •  ONETOUCH DELICA LANCETS 33G misc, 1 each 4 (Four) Times a Day. delica if covered, use alternative if not covered, Disp: 120 each, Rfl: 11  •  pioglitazone (ACTOS) 30 MG tablet, Take 1 tablet by mouth once daily, Disp: 90 tablet, Rfl: 0  •  pregabalin (Lyrica) 100 MG capsule, Take 1 capsule by mouth 3 (Three) Times a Day., Disp: 90 capsule, Rfl: 5  •  ranolazine (RANEXA) 1000 MG 12 hr tablet, Take 1 tablet by mouth Every 12 (Twelve) Hours., Disp: 60 tablet, Rfl: 3  •  Semaglutide, 1 MG/DOSE, (Ozempic, 1 MG/DOSE,) 4 MG/3ML solution pen-injector, Inject 1 mg under the skin into the appropriate area as directed 1 (One) Time Per Week., Disp: 9 mL, Rfl: 3  •  spironolactone (ALDACTONE) 25 MG tablet, Take 1 tablet by mouth once daily, Disp: 30 tablet, Rfl: 0  •  tiZANidine (Zanaflex) 4 MG tablet, Take 1 tablet by mouth At Night As Needed for Muscle Spasms., Disp: 90 tablet, Rfl: 3    Physical Exam:  Vitals:    10/05/22 1255   BP: 152/90   BP Location: Left arm   Patient Position: Sitting   Cuff Size: Adult  "  Pulse: 70   SpO2: 98%   Weight: 114 kg (252 lb)   Height: 188 cm (74\")   PainSc: 0-No pain     Current Pain Level: none  Pulse Ox: Normal  on room air  General: alert, appears stated age and cooperative     Body Habitus: Obese  HEENT: Head: Normocephalic, no lesions, without obvious abnormality.     Neuro: alert, oriented x3  JVP: Volume/Pulsation: Normal.  Normal waveforms.   Appropriate inspiratory decrease.    Carotid Exam: no bruit normal pulsation bilaterally   Carotid Volume: normal.     Respirations: no increased work of breathing   Chest:  Normal    Pulmonary:Normal   Heart rate: normal    Heart Rhythm: regular     Heart Sounds: S1: normal  S2: normal  S3: absent   S4: absent   Abdomen:   Appearance: normal .  Palpation: Soft, non-tender to palpation, bowel sounds positive in all four quadrants  Extremity: no significant pitting edema.   Right groin cath access site was examined.  There was no evidence of ecchymosis or hematoma.  Right femoral pulse was intact.    DATA REVIEWED:     EKG. I personally reviewed and interpreted the EKG.  normal sinus rhythm, inferior infarct, anterolateral infarct on 8/29/2022    ECG/EMG Results (all)     None        ---------------------------------------------------  TTE/AIXA:  Results for orders placed during the hospital encounter of 03/09/22    Adult Transthoracic Echo Limited W/ Cont if Necessary Per Protocol    Interpretation Summary  · Left ventricular wall thickness is consistent with mild concentric hypertrophy.  · There is calcification of the aortic valve mainly affecting the non-coronary cusp(s).  · The right atrial cavity is mildly dilated.  · Estimated right ventricular systolic pressure from tricuspid regurgitation is mildly elevated (35-45 mmHg).  · Left ventricular ejection fraction appears to be 51 - 55%.  · Left ventricular diastolic function was normal.      -----------------------------------------------------  CXR/Imaging:   Imaging Results (Most Recent) "     None          -----------------------------------------------------  CT:   XR Ankle 3+ View Left    Result Date: 9/16/2022  CONCLUSION: Small plantar calcaneal spur. Atherosclerotic calcification dorsal to the talus and tarsal bones. [id number] Electronically signed by:  Hansel Card MD  9/16/2022 10:00 AM [employer] Workstation: [id number]      ----------------------------------------------------      --------------------------------------------------------------------------------------------------  LABS:     The CVD Risk score (Carole et al., 2008) failed to calculate for the following reasons:    The patient has a prior MI, stroke, CHF, or peripheral vascular disease diagnosis         Lab Results   Component Value Date    GLUCOSE 179 (H) 09/04/2022    BUN 23 (H) 09/04/2022    CREATININE 0.79 09/04/2022    EGFRIFNONA 74 02/24/2022    BCR 29.1 (H) 09/04/2022    K 3.4 (L) 09/04/2022    CO2 26.0 09/04/2022    CALCIUM 9.0 09/04/2022    ALBUMIN 3.50 08/30/2022    AST 24 08/30/2022    ALT 46 (H) 08/30/2022     Lab Results   Component Value Date    WBC 6.10 09/04/2022    HGB 16.1 09/04/2022    HCT 46.1 09/04/2022    MCV 84.0 09/04/2022     09/04/2022     Lab Results   Component Value Date    CHOL 181 08/26/2022    TRIG 128 08/26/2022    HDL 30 (L) 08/26/2022     (H) 08/26/2022     Lab Results   Component Value Date    TSH 4.110 09/04/2022     Lab Results   Component Value Date    CKTOTAL 54 08/25/2022    TROPONINT <0.010 09/04/2022     Lab Results   Component Value Date    HGBA1C 8.50 (H) 09/04/2022     No results found for: DDIMER  Lab Results   Component Value Date    ALT 46 (H) 08/30/2022     Lab Results   Component Value Date    HGBA1C 8.50 (H) 09/04/2022    HGBA1C 8.30 (H) 08/26/2022    HGBA1C 10.30 (H) 03/10/2022     Lab Results   Component Value Date    CREATININE 0.79 09/04/2022     No results found for: IRON, TIBC, FERRITIN  Lab Results   Component Value Date    INR 1.12 09/04/2022    INR 1.27 (H)  03/14/2022    INR 1.20 03/09/2022    PROTIME 14.3 09/04/2022    PROTIME 15.7 (H) 03/14/2022    PROTIME 15.1 03/09/2022       [unfilled]    1. Coronary artery disease involving native coronary artery of native heart without angina pectoris  He has a known history of CAD s/p CABG and PCI.  He was seen during an inpatient hospitalization in August 2022 for chest discomfort.  Coronary and bypass graft angiography was performed. 3/3 bypass grafts were found to be patent.  He underwent successful PCI to mid LCx with VINEET with 0% residual stenosis.  A few days after PCI, he presented to [facility] with chest discomfort and was transferred to [facility]. 3/3 bypass grafts were found to be patent again.  Previously placed stent in mid LCx was also found to be patent.  Medical management for coronary disease and work-up for noncardiac etiologies of chest pain was recommended.  He denies having any further episodes of chest discomfort since hospital discharge.  Discontinue aspirin.  Continue Eliquis and Plavix therapy.  Continue Lipitor, Imdur, Toprol-XL and ranolazine therapy.    2. Paroxysmal atrial fibrillation (HCC)  He denies any symptoms that could be attributable to atrial fibrillation.  His [id number] score is 3.   Continue Eliquis, amiodarone and Toprol-XL therapy.    3. Essential hypertension  Clinic BP was 152/90 mmHg today.  No changes were made to his antihypertensive medication regimen because he mentioned having multiple episodes of dizziness lately.  Dietary sodium restriction was discussed.    4. Hyperlipidemia, unspecified hyperlipidemia type  Continue Lipitor therapy for now.    5. Dizziness  He has had several episodes of dizziness lately.  Orthostatic vital signs in the clinic today did not show any evidence of orthostasis.  We will obtain a 3-day live cardiac monitor to look for hemodynamically significant arrhythmias that could explain dizziness.  -  Mobile Cardiac Outpatient Telemetry; Future    6. Class 1 obesity with alveolar hypoventilation, serious comorbidity, and body mass index (BMI) of 32.0 to 32.9 in adult (HCC)  Dietary modification and regular physical activity were discussed.      Prevention:  BMI is >= 30 and <35. (Class 1 Obesity). The following options were offered after discussion;: referral to primary care      Herbert White Sr.  reports that he has been smoking cigarettes. He started smoking about 42 years ago. He has a 17.50 pack-year smoking history. He has never used smokeless tobacco.. I have educated him on the risk of diseases from using tobacco products such as cancer, COPD and heart disease.     I advised him to quit and he will think about it.      Return if symptoms worsen or fail to improve.  He will continue regular follow-up with Dr. Rice as scheduled.            Electronically signed by Luis Alfredo Ortiz MD on 10/05/22 at 12:45 CDT

## 2022-10-06 NOTE — PROGRESS NOTES
Specialty Pharmacy Refill Coordination Note     Herbert is a 57 y.o. male contacted today regarding refills of  Ozempic, lyrica and eliquis filled specialty medication(s).    Reviewed and verified with patient:  Allergies       Specialty medication(s) and dose(s) confirmed: yes    Refill Questions    Flowsheet Row Most Recent Value   Changes to allergies? No   Changes to medications? No   New conditions since last clinic visit No   Unplanned office visit, urgent care, ED, or hospital admission in the last 4 weeks  No   How does patient/caregiver feel medication is working? Very good   Financial problems or insurance changes  No   Since the previous refill, were any specialty medication doses or scheduled injections missed or delayed?  No   Does this patient require a clinical escalation to a pharmacist? No          Delivery Questions    Flowsheet Row Most Recent Value   Delivery method FedEx   Cooler needed? Yes   Do any medications need mixed or dated? No   Questions or concerns for the pharmacist? No   Are any medications first time fills? No        Mailing out pts eliquis, ozempic and lyrica tomorrow.          Follow-up: as needed.      Efe Soto  Specialty Pharmacy Technician

## 2022-10-11 ENCOUNTER — OFFICE VISIT (OUTPATIENT)
Dept: ORTHOPEDIC SURGERY | Facility: CLINIC | Age: 57
End: 2022-10-11

## 2022-10-11 VITALS — WEIGHT: 252 LBS | HEIGHT: 74 IN | BODY MASS INDEX: 32.34 KG/M2

## 2022-10-11 DIAGNOSIS — M25.572 ACUTE LEFT ANKLE PAIN: Primary | ICD-10-CM
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
## 2022-10-11 DIAGNOSIS — M79.89 LEFT LEG SWELLING: ICD-10-CM

## 2022-10-11 DIAGNOSIS — S86.002A ACHILLES TENDON INJURY, LEFT, INITIAL ENCOUNTER: ICD-10-CM

## 2022-10-11 DIAGNOSIS — M25.472 LEFT ANKLE SWELLING: ICD-10-CM

## 2022-10-11 DIAGNOSIS — M79.662 PAIN OF LEFT CALF: ICD-10-CM

## 2022-10-11 DIAGNOSIS — R26.9 ALTERED GAIT: ICD-10-CM

## 2022-10-11 PROCEDURE — 99214 OFFICE O/P EST MOD 30 MIN: CPT | Performed by: NURSE PRACTITIONER

## 2022-10-11 RX ORDER — TRAMADOL HYDROCHLORIDE 50 MG/1
50 TABLET ORAL EVERY 8 HOURS PRN
Qty: 30 TABLET | Refills: 0 | Status: SHIPPED | OUTPATIENT
Start: 2022-10-11

## 2022-10-11 NOTE — PROGRESS NOTES
[facility] 57 [facility]   Primary provider:  Artemio Sanchez MD       Chief Complaint   Patient presents with   • Left Ankle - Initial Evaluation, Pain       HISTORY OF PRESENT ILLNESS: This 57-year-old male patient presents today with complaints of left ankle pain.  The patient reports he fell over 3 weeks ago but has fallen multiple times since his original fall.  At the first fall, the patient reports he twisted his ankle and has had difficulty walking or applying pressure to his left foot.  Patient reports his pain is severe, constant with aching, burning and swelling.  The patient reports his pain is worse with standing and walking.  The patient reports he has tried acetaminophen, ice and rest.  Patient reports he followed up with Dr. Garcia who referred him to Ortho and put him off of work until follow-up with Ortho.  Patient reports slight decrease sensation in the foot.  Denies tingling.  Denies fever or chills.  Denies nausea or vomiting.        Ankle Pain   The incident occurred more than 1 week ago. The injury mechanism was a fall and a twisting injury. The pain is present in the left ankle. The quality of the pain is described as aching and burning. The pain is at a severity of 9/10. The pain is severe. The pain has been constant since onset. The symptoms are aggravated by weight bearing and movement. He has tried ice and rest for the symptoms.        CONCURRENT MEDICAL HISTORY:    Past Medical History:   Diagnosis Date   • Coronary artery disease    • COVID-19 virus detected 01/21/2022   • Diabetes mellitus (HCC)    • Diabetic retinopathy (HCC)    • GERD (gastroesophageal reflux disease)    • Hyperlipidemia    • Hypertension    • Osteoarthritis    • Paroxysmal atrial fibrillation (HCC)    • Sleep apnea    • Stroke (HCC) 02/2022       Allergies   Allergen Reactions   • Ace Inhibitors Anaphylaxis   • Wellbutrin [Bupropion] Anaphylaxis         Current Outpatient Medications:   •   Accu-Chek Guide test strip, USE 1 TO CHECK GLUCOSE 4 TIMES DAILY, Disp: , Rfl:   •  acetaminophen (TYLENOL) 325 MG tablet, Take 2 tablets by mouth Every 6 (Six) Hours As Needed for Mild Pain., Disp: 100 tablet, Rfl: 11  •  amiodarone (PACERONE) 200 MG tablet, Take 1 tablet by mouth Daily., Disp: 60 tablet, Rfl: 1  •  apixaban (ELIQUIS) 5 MG tablet tablet, Take 1 tablet by mouth Every 12 (Twelve) Hours., Disp: 60 tablet, Rfl: 2  •  atorvastatin (LIPITOR) 40 MG tablet, Take 1 tablet by mouth Every Night for 360 days., Disp: 90 tablet, Rfl: 3  •  celecoxib (CeleBREX) 200 MG capsule, Take 1 capsule by mouth Daily. REPLACES MOBIC FOR ARTHRITIS/JOINT PAIN BUT ONLY FOR 30 DAYS., Disp: 30 capsule, Rfl: 0  •  clopidogrel (PLAVIX) 75 MG tablet, Take 1 tablet by mouth Daily., Disp: 90 tablet, Rfl: 3  •  Continuous Blood Gluc  (FreeStyle Steffany 2 [place]) device, 1 each Continuous. Use as indicated for glucose monitoring, Disp: 1 each, Rfl: 1  •  Continuous Blood Gluc Sensor (FreeStyle Steffany 2 Sensor) misc, 1 each Every 14 (Fourteen) Days. Use every 14 days, Disp: 2 each, Rfl: 11  •  Continuous Blood Gluc Sensor (FreeStyle Steffany 2 Sensor) misc, Use as directed for continuous glucose monitoring **Change sensor Every 14 (Fourteen) Days**, Disp: 2 each, Rfl: 11  •  Empagliflozin-metFORMIN HCl ER (Synjardy XR) 5-1000 MG tablet sustained-release 24 hour, Take 2 tablets by mouth Daily., Disp: 60 tablet, Rfl: 11  •  gabapentin (Neurontin) 300 MG capsule, Take 3 capsules by mouth 3 (Three) Times a Day., Disp: 270 capsule, Rfl: 5  •  Glucagon (Baqsimi Two Pack) 3 MG/DOSE powder, Administer 1 spray into a single nostril as directed by provider As Needed for low blood sugar, Disp: 2 each, Rfl: 11  •  Insulin Aspart, w/Niacinamide, (Fiasp FlexTouch) 100 UNIT/ML solution pen-injector, Inject up to 20 Units under the skin into the appropriate area as directed 3 (Three) Times a Day Before Meals., Disp: 18 mL, Rfl: 11  •  Insulin  "Degludec (Tresiba FlexTouch) 200 UNIT/ML solution pen-injector pen injection, Inject 60 Units under the skin into the appropriate area as directed Every Night., Disp: 9 mL, Rfl: 11  •  Insulin Pen Needle (Pen Needles 1/2\") 29G X 12MM misc, 1 each Every Night., Disp: 100 each, Rfl: 3  •  Insulin Regular Human 60x4 &60x8 & 60x12 UNIT powder, Inhale 4-36 Units 3 (Three) Times a Day Before Meals., Disp: 360 each, Rfl: 11  •  isosorbide mononitrate (IMDUR) 30 MG 24 hr tablet, Take 1 tablet by mouth Daily., Disp: 90 tablet, Rfl: 3  •  Lancet Devices (ONE TOUCH DELICA LANCING DEV) misc, Use as directed to test blood sugar. (Patient taking differently: Use as directed to test blood sugar.), Disp: 1 each, Rfl: 11  •  linaclotide (Linzess) 145 MCG capsule capsule, Take 1 capsule by mouth Every Morning Before Breakfast., Disp: 90 capsule, Rfl: 1  •  metoprolol succinate XL (TOPROL-XL) 25 MG 24 hr tablet, Take 1 tablet by mouth Daily., Disp: 30 tablet, Rfl: 11  •  nitroglycerin (NITROSTAT) 0.4 MG SL tablet, Place 1 tablet under the tongue Every 5 (Five) Minutes As Needed for Chest Pain. Take no more than 3 doses in 15 minutes., Disp: 25 tablet, Rfl: 11  •  nortriptyline (PAMELOR) 10 MG capsule, Take 1-3 capsules by mouth Every Night., Disp: 90 capsule, Rfl: 11  •  ondansetron (ZOFRAN) 4 MG tablet, Take 1 tablet by mouth Every 8 (Eight) Hours As Needed for Nausea or Vomiting., Disp: 10 tablet, Rfl: 0  •  ONETOUCH DELICA LANCETS 33G misc, 1 each 4 (Four) Times a Day. delica if covered, use alternative if not covered, Disp: 120 each, Rfl: 11  •  pioglitazone (ACTOS) 30 MG tablet, Take 1 tablet by mouth once daily, Disp: 90 tablet, Rfl: 0  •  pregabalin (Lyrica) 100 MG capsule, Take 1 capsule by mouth 3 (Three) Times a Day., Disp: 90 capsule, Rfl: 5  •  ranolazine (RANEXA) 1000 MG 12 hr tablet, Take 1 tablet by mouth Every 12 (Twelve) Hours., Disp: 60 tablet, Rfl: 3  •  Semaglutide, 1 MG/DOSE, (Ozempic, 1 MG/DOSE,) 4 MG/3ML " "solution pen-injector, Inject 1 mg under the skin into the appropriate area as directed 1 (One) Time Per Week., Disp: 9 mL, Rfl: 3  •  spironolactone (ALDACTONE) 25 MG tablet, Take 1 tablet by mouth once daily, Disp: 30 tablet, Rfl: 0  •  tiZANidine (Zanaflex) 4 MG tablet, Take 1 tablet by mouth At Night As Needed for Muscle Spasms., Disp: 90 tablet, Rfl: 3  •  traMADol (ULTRAM) 50 MG tablet, Take 1 tablet by mouth Every 8 (Eight) Hours As Needed for Moderate Pain., Disp: 30 tablet, Rfl: 0    Past Surgical History:   Procedure Laterality Date   • CARDIAC CATHETERIZATION N/A 9/3/2019   • CARDIAC CATHETERIZATION N/A 2022   • CARDIAC CATHETERIZATION N/A 2022    Procedure: Left Heart Cath;  Surgeon: Luis Alfredo Ortiz MD;  Location: [facility] INVASIVE LOCATION;  Service: Cardiology;  Laterality: N/A;   • CARDIAC CATHETERIZATION N/A 9/3/2022    Procedure: Left Heart Cath;  Surgeon: Aniket Gong MD;  Location: [facility] CATH INVASIVE LOCATION;  Service: Cardiology;  Laterality: N/A;   • CHOLECYSTECTOMY     • COLONOSCOPY N/A 2021   • COLONOSCOPY N/A 2022   • CORONARY ARTERY BYPASS GRAFT N/A 2019       Family History   Problem Relation Age of Onset   • Diabetes Mother    • Hypertension Father        Social History     Socioeconomic History   • Marital status:    Tobacco Use   • Smoking status: Every Day     Packs/day: 0.50     Years: 35.00     Pack years: 17.50     Types: Cigarettes     Start date: 1980     Last attempt to quit: 2022     Years since quittin.3   • Smokeless tobacco: Never   Vaping Use   • Vaping Use: Never used   Substance and Sexual Activity   • Alcohol use: No   • Drug use: No   • Sexual activity: Not Currently     Comment: .        Review of Systems   Musculoskeletal:        Muscle pain      Neurological: Positive for dizziness.   Psychiatric/Behavioral: Positive for sleep disturbance.       PHYSICAL EXAMINATION:       Ht 188 cm (74\")   Wt 114 kg " (252 lb)   BMI 32.35 kg/m²     Physical Exam    GAIT:     []  Normal  []  Antalgic    Assistive device: []  None  []  Walker     []  Crutches  [x]  Cane     []  Wheelchair  []  Stretcher    Left Ankle Exam     Tenderness   Left ankle tenderness location: Achilles tendon radiating up to posterior knee.   Swelling: moderate    Range of Motion   Dorsiflexion: abnormal   Plantar flexion: abnormal   Eversion: abnormal   Inversion: abnormal     Other   Erythema: absent  Sensation: decreased  Pulse: present    Comments:  2-3+ pitting edema in the lower leg and ankle.  Decreased posterior tibial pulse.  Strong pedal pulse palpated.  Small abrasion to the anterior knee.  No streaking, erythema or increased warmth noted.  Pain to posterior calf and behind the knee with dorsiflexion of foot.  Left foot cooler than the right.  Sensation intact but diminished.  Pain with arc of motion.                    XR Ankle 3+ View Left    Result Date: 9/16/2022  Narrative: Three view left ankle HISTORY: Left ankle pain AP, lateral and oblique views obtained. COMPARISON: Right ankle December 9, 2021 FINDINGS: No fracture or dislocation. Small plantar calcaneal spur. No other osseous or articular abnormality. Atherosclerotic calcification dorsal to the talus and tarsal bones.     Impression: CONCLUSION: Small plantar calcaneal spur. Atherosclerotic calcification dorsal to the talus and tarsal bones. [id number] Electronically signed by:  Hansel Card MD  9/16/2022 10:00 AM CDT Workstation: [id number]          ASSESSMENT:    Diagnoses and all orders for this visit:    Acute left ankle pain  -     traMADol (ULTRAM) 50 MG tablet; Take 1 tablet by mouth Every 8 (Eight) Hours As Needed for Moderate Pain.  -     MRI Ankle Right Without Contrast; Future    Fall from slip, trip, or stumble, initial encounter  -     traMADol (ULTRAM) 50 MG tablet; Take 1 tablet by mouth Every 8 (Eight) Hours As Needed for Moderate Pain.  -     MRI Ankle Right Without  Contrast; Future    Pain of left calf  -     traMADol (ULTRAM) 50 MG tablet; Take 1 tablet by mouth Every 8 (Eight) Hours As Needed for Moderate Pain.    Left leg swelling  -     traMADol (ULTRAM) 50 MG tablet; Take 1 tablet by mouth Every 8 (Eight) Hours As Needed for Moderate Pain.    Altered gait  -     US Venous Doppler Upper Extremity Left (duplex); Future  -     MRI Ankle Right Without Contrast; Future    Achilles tendon injury, left, initial encounter  -     traMADol (ULTRAM) 50 MG tablet; Take 1 tablet by mouth Every 8 (Eight) Hours As Needed for Moderate Pain.  -     MRI Ankle Right Without Contrast; Future    Left ankle swelling  -     US Venous Doppler Upper Extremity Left (duplex); Future  -     MRI Ankle Right Without Contrast; Future          PLAN  Recommended the patient be placed in a walking boot.  Advised patient to modify activity and no weightbearing on the left leg.  Offered to order a walker, patient declined reporting he already has 1.  Recommended a ultrasound to rule out a DVT.  Recommend MRI for evaluation of Achilles tendon tear or rupture.  Discussed with patient to avoid fall hazards and use of ambulatory device to prevent further fall.  Encouraged to rest, ice and elevate to decrease pain and swelling.  New prescription for tramadol sent to pharmacy.  Advised to take as prescribed and do not take while driving or operating heavy machinery.  Provided patient with a work note.  No work until follow-up with Ortho.  Advised patient he will follow-up after the ultrasound and the MRI have been completed.  May follow-up sooner as needed if symptoms change or worsen.  Advised patient if his symptoms worsen and we are not available he should go to the ER.  Patient verbalizes understanding.    All questions and concerns are addressed with understanding noted. They are aware and are in agreement to this plan.    Return for to review MRI. Will notify of U/S when availble..    Romana Webber  NELSY      This document has been electronically signed by NELSY Clement on October 11, 2022 14:30 CDT        EMR Dragon/Transciption Disclaimer: Some of this note may be an electronic transcription/translation of spoken language to printed text using the Dragon Dictation System.     Time spent of a minimum of 30 minutes including the face to face evaluation, reviewing of medical history and prior medial records, reviewing of diagnostic studies, ordering additional tests, documentation, patient education and coordination of care.       Answers for HPI/ROS submitted by the patient on 10/10/2022  What is the primary reason for your visit?: Other  Please describe your symptoms.: Lower left leg pain possible torn Achilles tendon or calf muscle  Have you had these symptoms before?: Yes  How long have you been having these symptoms?: 1-2 weeks  Please list any medications you are currently taking for this condition.: Tylenol  Please describe any probable cause for these symptoms. : I have fell three times in the last two weeks

## 2022-10-12 DIAGNOSIS — F40.232 FEAR OF OTHER MEDICAL CARE: Primary | ICD-10-CM

## 2022-10-12 RX ORDER — DIAZEPAM 10 MG/1
10 TABLET ORAL ONCE
Qty: 1 TABLET | Refills: 0 | Status: SHIPPED | OUTPATIENT
Start: 2022-10-12 | End: 2022-10-12

## 2022-10-12 NOTE — TELEPHONE ENCOUNTER
BARTOLOME,     Patient called and is needing a medication to calm him before his MRI on 10/14/2022.

## 2022-10-14 ENCOUNTER — HOSPITAL ENCOUNTER (OUTPATIENT)
Dept: ULTRASOUND IMAGING | Facility: HOSPITAL | Age: 57
Discharge: HOME | End: 2022-10-14

## 2022-10-14 ENCOUNTER — PRIOR AUTHORIZATION (OUTPATIENT)
Dept: ORTHOPEDIC SURGERY | Facility: CLINIC | Age: 57
End: 2022-10-14

## 2022-10-14 ENCOUNTER — HOSPITAL ENCOUNTER (OUTPATIENT)
Dept: MRI IMAGING | Facility: HOSPITAL | Age: 57
Discharge: HOME | End: 2022-10-14
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
## 2022-10-14 DIAGNOSIS — M25.472 LEFT ANKLE SWELLING: ICD-10-CM

## 2022-10-14 DIAGNOSIS — R26.9 ALTERED GAIT: ICD-10-CM

## 2022-10-14 DIAGNOSIS — S86.002A ACHILLES TENDON INJURY, LEFT, INITIAL ENCOUNTER: ICD-10-CM

## 2022-10-14 DIAGNOSIS — M25.572 ACUTE LEFT ANKLE PAIN: ICD-10-CM

## 2022-10-14 PROCEDURE — 93971 EXTREMITY STUDY: CPT

## 2022-10-14 PROCEDURE — 73721 MRI JNT OF LWR EXTRE W/O DYE: CPT

## 2022-10-18 ENCOUNTER — TELEPHONE (OUTPATIENT)
Dept: ORTHOPEDIC SURGERY | Facility: CLINIC | Age: 57
End: 2022-10-18

## 2022-10-18 NOTE — TELEPHONE ENCOUNTER
----- Message from NELSY Clement sent at 10/17/2022 12:09 PM CDT -----  Please notify patient the ultrasound is negative for a DVT.  ThanksSujatha

## 2022-10-19 ENCOUNTER — TELEPHONE (OUTPATIENT)
Dept: ORTHOPEDIC SURGERY | Facility: CLINIC | Age: 57
End: 2022-10-19

## 2022-10-19 DIAGNOSIS — M25.572 ACUTE LEFT ANKLE PAIN: ICD-10-CM

## 2022-10-19 DIAGNOSIS — M25.472 LEFT ANKLE SWELLING: ICD-10-CM

## 2022-10-19 DIAGNOSIS — M67.88 ACHILLES TENDONOSIS: Primary | ICD-10-CM

## 2022-10-19 DIAGNOSIS — S86.002A ACHILLES TENDON INJURY, LEFT, INITIAL ENCOUNTER: ICD-10-CM

## 2022-10-19 DIAGNOSIS — R26.9 ALTERED GAIT: ICD-10-CM

## 2022-10-19 DIAGNOSIS — S86.012A RUPTURE OF LEFT ACHILLES TENDON, INITIAL ENCOUNTER: ICD-10-CM

## 2022-10-19 NOTE — TELEPHONE ENCOUNTER
----- Message from NELSY Clement sent at 10/19/2022  4:24 PM CDT -----  Can you please call this patient and advise him I am going to send him to Dr. Gerardo for evaluation and cancel my appointment.     His MRI shows an achilles tendinosis with tear. He should remain in the boot and non WBA until follow up with Dr. Gerardo.     Thanks, Sujatha

## 2022-10-19 NOTE — PROGRESS NOTES
Dr. Gerardo,     Could you see this patient? If so, I am going to have schedule an appointment with you.     Thanks, Sujatha

## 2022-10-19 NOTE — PROGRESS NOTES
Can you please call this patient and advise him I am going to send him to Dr. Gerardo for evaluation and cancel my appointment.     His MRI shows an achilles tendinosis with tear. He should remain in the boot and non WBA until follow up with Dr. Gerardo.     Thanks, Sujatha

## 2022-10-19 NOTE — TELEPHONE ENCOUNTER
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

## 2022-10-20 DIAGNOSIS — S86.012A RUPTURE OF LEFT ACHILLES TENDON, INITIAL ENCOUNTER: Primary | ICD-10-CM

## 2022-10-25 ENCOUNTER — OFFICE VISIT (OUTPATIENT)
Dept: CARDIOLOGY | Facility: CLINIC | Age: 57
End: 2022-10-25

## 2022-10-25 VITALS
WEIGHT: 247.5 LBS | HEART RATE: 84 BPM | DIASTOLIC BLOOD PRESSURE: 98 MMHG | BODY MASS INDEX: 31.76 KG/M2 | OXYGEN SATURATION: 99 % | SYSTOLIC BLOOD PRESSURE: 188 MMHG | HEIGHT: 74 IN

## 2022-10-25 DIAGNOSIS — I48.0 PAROXYSMAL ATRIAL FIBRILLATION: ICD-10-CM

## 2022-10-25 DIAGNOSIS — Z98.61 CAD S/P PERCUTANEOUS CORONARY ANGIOPLASTY: ICD-10-CM

## 2022-10-25 DIAGNOSIS — I48.92 ATRIAL FIBRILLATION AND FLUTTER: Chronic | ICD-10-CM

## 2022-10-25 DIAGNOSIS — I48.91 ATRIAL FIBRILLATION AND FLUTTER: Chronic | ICD-10-CM

## 2022-10-25 DIAGNOSIS — I10 ESSENTIAL HYPERTENSION: ICD-10-CM

## 2022-10-25 DIAGNOSIS — I25.10 CAD S/P PERCUTANEOUS CORONARY ANGIOPLASTY: ICD-10-CM

## 2022-10-25 DIAGNOSIS — E78.2 MIXED HYPERLIPIDEMIA: ICD-10-CM

## 2022-10-25 DIAGNOSIS — I25.10 CORONARY ARTERY DISEASE INVOLVING NATIVE HEART, UNSPECIFIED VESSEL OR LESION TYPE, UNSPECIFIED WHETHER ANGINA PRESENT: Primary | Chronic | ICD-10-CM

## 2022-10-25 PROCEDURE — 99214 OFFICE O/P EST MOD 30 MIN: CPT | Performed by: INTERNAL MEDICINE

## 2022-10-25 RX ORDER — AMLODIPINE BESYLATE 5 MG/1
5 TABLET ORAL DAILY
Qty: 30 TABLET | Refills: 5 | Status: SHIPPED | OUTPATIENT
Start: 2022-10-25 | End: 2022-11-22

## 2022-10-25 RX ORDER — LABETALOL 200 MG/1
200 TABLET, FILM COATED ORAL 2 TIMES DAILY
Qty: 60 TABLET | Refills: 5 | Status: SHIPPED | OUTPATIENT
Start: 2022-10-25 | End: 2022-10-27

## 2022-10-25 NOTE — PROGRESS NOTES
[facility]  57 y[facility]      1. Coronary artery disease involving native heart, unspecified vessel or lesion type, unspecified whether angina present    2. Atrial fibrillation and flutter (HCC)    3. Essential hypertension    4. Mixed hyperlipidemia    5. Paroxysmal atrial fibrillation (HCC)    6. CAD S/P percutaneous coronary angioplasty        History of Present Illness:    Body mass index is 31.78 kg/m². BMI is above normal parameters. Recommendations include: exercise counseling, nutrition counseling and referral to primary care.    Patient is a current tobacco user. I have educated the patient on the risks of continued tobacco use. Tobacco cessation education was given <3 min. Patient does not desire tobacco cessation at this time.     57 years old patient who presented dated 10/25/2022 for routine follow-up s/p cardiac catheterization September 2022 noted patent stent LIMA was patent and patent to PDA graft tract that has cardiac catheterization August 2022 when he underwent PCI and stent to left circumflex system.  He had a history of CAD s/p CABG with a LIMA to LAD vein graft to marginal and vein graft to PDA.  His problem with maintaining her decent systolic blood pressure.  The patient was on metoprolol 25 mg he also takes Imdur and Ranexa.  No symptom of cardiac decompensation such orthopnea PND chest pain lightheaded dizziness reported.  He walks with the help of cane.  He is compliant to the medication but unfortunately restarted to smoke again.      Cardiac cath September 2022    Impression   A.  Sustained patency in the mid circumflex artery site of previous angioplasty and stenting with evidence of good ZANDER-3 flow.  B.  Proximal left anterior descending artery with diffuse disease with focal 70 to 80% stenosis followed by the mid to the distal left anterior descending artery which was subtotally occluded with evidence of competitive flow and flow in the distal left anterior  descending artery through the patent LIMA.  C.  100% occlusion of the posterior descending artery with a patent saphenous vein graft to the posterior descending artery.       DATE OF PROCEDURE: 03/14/22     Referring Physician:       /ELECTROPHYSIOLOGIST:            PROCEDURE(S) PERFORMED:   1. External cardioversion of atrial fibrillation.         INDICATIONS FOR PROCDEDURE:            Symptomatic atrial fibrillation with rapid ventricular rate    Echo March 2022    · Left ventricular wall thickness is consistent with mild concentric hypertrophy.  · There is calcification of the aortic valve mainly affecting the non-coronary cusp(s).  · The right atrial cavity is mildly dilated.  · Estimated right ventricular systolic pressure from tricuspid regurgitation is mildly elevated (35-45 mmHg).  · Left ventricular ejection fraction appears to be 51 - 55%.  · Left ventricular diastolic function was normal    Cardiac cath February 2022      Conclusion    · Prox LAD lesion is 90% stenosed WITH 2 DIAGONALS POST STENOSIS NOT GRAFTED.  · FINAL lad IMAGES ARE )% W 3.25 x 18 mm VINEET.  · Moderate systolic dysfunction.  · LV pressures (S/D/E) : 124/14/      CT coronary angiogram 9/25/2020     CT FUNCTIONAL ANALYSIS:  Ejection Fraction     74 %  Diastolic Volume     136 ml  Systolic Volume      35 ml  Stroke Volume        101 ml  Cardiac Output       4.6 L/minute     IMPRESSION:  CONCLUSION:   1.  Patient's calcium score is 697.This places the patent in the  high likelihood of at least one significant coronary narrowing  risk for coronary artery disease.  2.  LAD: Proximal calcified atherosclerotic plaque causing mild  stenosis of less than 50%. Mid LAD soft and calcified  atherosclerotic plaque foci causing moderate stenosis of 50-70%.  LIMA to distal LAD bypass graft which appears patent.  3.  Circumflex: Proximal calcified atherosclerotic plaque causing  significant stenosis of 70% or greater. No other calcified  or  soft tissue density plaque and/or stenosis. Patent saphenous vein  bypass graft to the obtuse marginal second branch.  4.  RCA: No calcified or soft tissue density plaque and no  stenosis. Patent saphenous vein bypass graft to the PDA.  5.  Remainder CT angiogram coronary exam unremarkable.     September 2019 cardiac  Cath demonstrated severe CAD multivessel with lesions LAD 80% DX 70% OM2 60% PDA 70%.     CABG 9/25/2019   DISTAL BYPASS TARGETS:    1. LIMA to LAD.    2. Greater saphenous vein to OM2    3. Greater saphenous vein to PDA   · Left ventricular wall thickness is consistent with mild concentric hypertrophy.  · Estimated EF = 61%.  · Left ventricular systolic function is normal.  · Left ventricular diastolic dysfunction (grade I) consistent with impaired relaxation.  · Mild mitral valve regurgitation is present  9/3/19  Conclusion:  1.  Severe three-vessel obstructive coronary artery disease involving mid LAD, diagonal, obtuse marginal and RPDA.  2.  Normal left-sided filling pressures.  No gradient noted across aortic valve on pullback  3.  Patent left subclavian and LIMA    SUBJECTIVE:    Allergies   Allergen Reactions   • Ace Inhibitors Anaphylaxis   • Wellbutrin [Bupropion] Anaphylaxis         Past Medical History:   Diagnosis Date   • Coronary artery disease    • COVID-19 virus detected 01/21/2022   • Diabetes mellitus (HCC)    • Diabetic retinopathy (HCC)    • GERD (gastroesophageal reflux disease)    • Hyperlipidemia    • Hypertension    • Osteoarthritis    • Paroxysmal atrial fibrillation (HCC)    • Sleep apnea    • Stroke (HCC) 02/2022         Past Surgical History:   Procedure Laterality Date   • CARDIAC CATHETERIZATION N/A 9/3/2019   • CARDIAC CATHETERIZATION N/A 2/14/2022   • CARDIAC CATHETERIZATION N/A 8/29/2022    Procedure: Left Heart Cath;  Surgeon: Luis Alfredo Ortiz MD;  Location: [facility] CATH INVASIVE LOCATION;  Service: Cardiology;  Laterality: N/A;   • CARDIAC CATHETERIZATION N/A  9/3/2022    Procedure: Left Heart Cath;  Surgeon: Aniket Gong MD;  Location: [facility] CATH INVASIVE LOC[facility];  Service: Cardiology;  Laterality: N/A;   • CHOLECYSTECTOMY     • COLONOSCOPY N/A 5/24/2021   • COLONOSCOPY N/A 1/5/2022   • CORONARY ARTERY BYPASS GRAFT N/A 9/25/2019         Family History   Problem Relation Age of Onset   • Diabetes Mother    • Hypertension Father          Social History     Socioeconomic History   • Marital status:    Tobacco Use   • Smoking status: Every Day     Packs/day: 1.00     Years: 35.00     Pack years: 35.00     Types: Cigarettes     Start date: 8/27/1980   • Smokeless tobacco: Never   Vaping Use   • Vaping Use: Never used   Substance and Sexual Activity   • Alcohol use: No   • Drug use: No   • Sexual activity: Not Currently     Comment: .         Current Outpatient Medications   Medication Sig Dispense Refill   • Accu-Chek Guide test strip USE 1 TO CHECK GLUCOSE 4 TIMES DAILY     • acetaminophen (TYLENOL) 325 MG tablet Take 2 tablets by mouth Every 6 (Six) Hours As Needed for Mild Pain. 100 tablet 11   • amiodarone (PACERONE) 200 MG tablet Take 1 tablet by mouth Daily. 60 tablet 1   • apixaban (ELIQUIS) 5 MG tablet tablet Take 1 tablet by mouth Every 12 (Twelve) Hours. 60 tablet 2   • atorvastatin (LIPITOR) 40 MG tablet Take 1 tablet by mouth Every Night for 360 days. 90 tablet 3   • celecoxib (CeleBREX) 200 MG capsule Take 1 capsule by mouth Daily. REPLACES MOBIC FOR ARTHRITIS/JOINT PAIN BUT ONLY FOR 30 DAYS. 30 capsule 0   • clopidogrel (PLAVIX) 75 MG tablet Take 1 tablet by mouth Daily. 90 tablet 3   • Continuous Blood Gluc  (FreeStyle Steffany 2 Manning) device 1 each Continuous. Use as indicated for glucose monitoring 1 each 1   • Continuous Blood Gluc Sensor (FreeStyle Steffany 2 Sensor) misc 1 each Every 14 (Fourteen) Days. Use every 14 days 2 each 11   • Continuous Blood Gluc Sensor (FreeStyle Steffany 2 Sensor) misc Use as directed for continuous glucose  "monitoring **Change sensor Every 14 (Fourteen) Days** 2 each 11   • Empagliflozin-metFORMIN HCl ER (Synjardy XR) 5-1000 MG tablet sustained-release 24 hour Take 2 tablets by mouth Daily. 60 tablet 11   • gabapentin (Neurontin) 300 MG capsule Take 3 capsules by mouth 3 (Three) Times a Day. 270 capsule 5   • Glucagon (Baqsimi Two Pack) 3 MG/DOSE powder Administer 1 spray into a single nostril as directed by provider As Needed for low blood sugar 2 each 11   • Insulin Aspart, w/Niacinamide, (Fiasp FlexTouch) 100 UNIT/ML solution pen-injector Inject up to 20 Units under the skin into the appropriate area as directed 3 (Three) Times a Day Before Meals. 18 mL 11   • Insulin Degludec (Tresiba FlexTouch) 200 UNIT/ML solution pen-injector pen injection Inject 60 Units under the skin into the appropriate area as directed Every Night. 9 mL 11   • Insulin Pen Needle (Pen Needles 1/2\") 29G X 12MM misc 1 each Every Night. 100 each 3   • Insulin Regular Human 60x4 &60x8 & 60x12 UNIT powder Inhale 4-36 Units 3 (Three) Times a Day Before Meals. 360 each 11   • isosorbide mononitrate (IMDUR) 30 MG 24 hr tablet Take 1 tablet by mouth Daily. 90 tablet 3   • Lancet Devices (ONE TOUCH DELICA LANCING DEV) misc Use as directed to test blood sugar. (Patient taking differently: Use as directed to test blood sugar.) 1 each 11   • linaclotide (Linzess) 145 MCG capsule capsule Take 1 capsule by mouth Every Morning Before Breakfast. 90 capsule 1   • nitroglycerin (NITROSTAT) 0.4 MG SL tablet Place 1 tablet under the tongue Every 5 (Five) Minutes As Needed for Chest Pain. Take no more than 3 doses in 15 minutes. 25 tablet 11   • nortriptyline (PAMELOR) 10 MG capsule Take 1-3 capsules by mouth Every Night. 90 capsule 11   • ondansetron (ZOFRAN) 4 MG tablet Take 1 tablet by mouth Every 8 (Eight) Hours As Needed for Nausea or Vomiting. 10 tablet 0   • ONETOUCH DELICA LANCETS 33G misc 1 each 4 (Four) Times a Day. delica if covered, use alternative if " not covered 120 each 11   • pioglitazone (ACTOS) 30 MG tablet Take 1 tablet by mouth once daily 90 tablet 0   • pregabalin (Lyrica) 100 MG capsule Take 1 capsule by mouth 3 (Three) Times a Day. 90 capsule 5   • ranolazine (RANEXA) 1000 MG 12 hr tablet Take 1 tablet by mouth Every 12 (Twelve) Hours. 60 tablet 3   • Semaglutide, 1 MG/DOSE, (Ozempic, 1 MG/DOSE,) 4 MG/3ML solution pen-injector Inject 1 mg under the skin into the appropriate area as directed 1 (One) Time Per Week. 9 mL 3   • spironolactone (ALDACTONE) 25 MG tablet Take 1 tablet by mouth once daily 30 tablet 0   • tiZANidine (Zanaflex) 4 MG tablet Take 1 tablet by mouth At Night As Needed for Muscle Spasms. 90 tablet 3   • traMADol (ULTRAM) 50 MG tablet Take 1 tablet by mouth Every 8 (Eight) Hours As Needed for Moderate Pain. 30 tablet 0   • amLODIPine (NORVASC) 5 MG tablet Take 1 tablet by mouth Daily. 30 tablet 5   • labetalol (NORMODYNE) 200 MG tablet Take 1 tablet by mouth 2 (Two) Times a Day. 60 tablet 5     No current facility-administered medications for this visit.           Review of Systems:   Today dated 10/25/2022 no significant change was noted review of system compared to previous except as described above  Constitutional:  Denies recent weight loss, weight gain,no change in exercise tolerance.     HENT:  Denies any hearing loss, epistaxis, hoarseness    Eyes: No blurred s.    Respiratory: Exertional dyspnea    Cardiovascular: See H&P  Gastrointestinal:  Denies change in bowel habits, dyspepsia, ulcer disease,    Endocrine: Negative for cold intolerance, heat intolerance, polydipsia, polyphagia and polyuria.polyuria.     Genitourinary: Negative.      Musculoskeletal: History of osteoarthritis    Skin:  Denies any change in hair or nails, rashes, or skin lesions.     Allergic/Immunologic: Negative.  Negative for environmental allergies,     Neurological:  Denies any history of recurrent headaches, strokes,     Hematological: Denies any food  "allergies, seasonal allergies, bleeding disorders,    Psychiatric/Behavioral: Denies any history of depression,      OBJECTIVE:    BP (!) 188/98 (BP Location: Left arm, Patient Position: Sitting, Cuff Size: Adult)   Pulse 84   Ht 188 cm (74\")   Wt 112 kg (247 lb 8 oz)   SpO2 99%   BMI 31.78 kg/m²       Physical Exam:   No change was noted in the physical exam except the blood pressures on the higher side for which we will adjust antihypertensive medication  Constitutional: Cooperative, alert and oriented, well-developed, well-nourished, in no acute distress.     HENT:   Head: Normocephalic, conjunctive is pink thyroid is nonpalpable no jugular is distention    Cardiovascular: Irregular rhythm, S1 and S2 normal, no S3 or S4. Apical impulse not displaced. No murmurs, gallops, or rubs detected.     Pulmonary/Chest: Chest: Clear to auscultation no rales or wheezing    Abdominal: Abdomen soft, bowel sounds normoactive, no masses,     Musculoskeletal: No deformities, strong peripheral pulses no    Neurological: No gross motor or sensory deficits noted, affect appropriate, oriented to time, person, place.     Skin: Warm and dry to the touch, no apparent skin lesions or masses noted.     Psychiatric: He has a normal mood and affect. His behavior is normal.         Procedures      Lab Results   Component Value Date    WBC 6.10 09/04/2022    HGB 16.1 09/04/2022    HCT 46.1 09/04/2022    MCV 84.0 09/04/2022     09/04/2022     Lab Results   Component Value Date    GLUCOSE 179 (H) 09/04/2022    BUN 23 (H) 09/04/2022    CREATININE 0.79 09/04/2022    EGFRIFNONA 74 02/24/2022    BCR 29.1 (H) 09/04/2022    CO2 26.0 09/04/2022    CALCIUM 9.0 09/04/2022    ALBUMIN 3.50 08/30/2022    AST 24 08/30/2022    ALT 46 (H) 08/30/2022     Lab Results   Component Value Date    CHOL 181 08/26/2022    CHOL 94 03/10/2022    CHOL 115 02/15/2022     Lab Results   Component Value Date    TRIG 128 08/26/2022    TRIG 34 03/10/2022    TRIG 63 " 02/15/2022     Lab Results   Component Value Date    HDL 30 (L) 08/26/2022    HDL 42 03/10/2022    HDL 38 (L) 02/15/2022     No components found for: LDLCALC  Lab Results   Component Value Date     (H) 08/26/2022    LDL 42 03/10/2022    LDL 63 02/15/2022     No results found for: HDLLDLRATIO  No components found for: CHOLHDL  Lab Results   Component Value Date    HGBA1C 8.50 (H) 09/04/2022     Lab Results   Component Value Date    TSH 4.110 09/04/2022           ASSESSMENT AND PLAN:    Paroxysmal atrial fib    [id number] score is 3 continue    We will reassess him in next 6 months remained atrial fibrillation first with discontinue amiodarone and subsequent atrial fibrillation as atrial fibrillation was during acute medical conditions  Patient has normal LFTs normal TSH and chest x-ray no acute pathology was        We will continue amiodarone and continue oral anticoagulation    #2 CAD status post CABG  Patient was evaluated by cardiac catheterization first in August 2022 having stent placed to the left versus system and then subsequent November 2022 no intervention was performed medical management was   asymptomatic no further recurrence of chest pain and is a pleased with her clinical outcome  Continue aspirin Plavix, Ranexa and isosorbide    #2 hypertension blood pressure is uncontrolled    Will add labetalol 200 mg twice a day we will discontinue metoprolol  , Will add to Norvasc a dose of 5 mg and continue spironolactone if needed ACE and ARB can be  She was counseled educated regarding risk factor lifestyle modification eating habit particularly food with high sodium    Patient was counseled educated regarding level of physical activity and food particularly with high sodium content    #3 hyperlipidemia  Continue atorvastatin    #4 postop atrial fibrillation continue amiodarone for the next 6 months          Preventive    Class II obesity with a BMI of 31.78 with history of diabetes hypertension  hyperlipidemia and CAD.    Significant low-carb weight, low-fat, DASH diet graded exercise discussed with the patient.      Smoking    Future risk of continued smoking explained to the patient given the extensive medical history    I spent 25 minutes caring for Herbert on this date of service. This time includes time spent by me of counseling/coordination of care as relates to the presenting problem and any ordered procedures/tests as outlined above.             This document has been electronically signed by Alice Rice MD on October 25, 2022 08:48 CDT        Diagnoses and all orders for this visit:    1. Coronary artery disease involving native heart, unspecified vessel or lesion type, unspecified whether angina present (Primary)    2. Atrial fibrillation and flutter (HCC)    3. Essential hypertension    4. Mixed hyperlipidemia    5. Paroxysmal atrial fibrillation (HCC)    6. CAD S/P percutaneous coronary angioplasty    Other orders  -     labetalol (NORMODYNE) 200 MG tablet; Take 1 tablet by mouth 2 (Two) Times a Day.  Dispense: 60 tablet; Refill: 5  -     amLODIPine (NORVASC) 5 MG tablet; Take 1 tablet by mouth Daily.  Dispense: 30 tablet; Refill: 5          Alice Rice MD  10/25/2022  08:48 CDT

## 2022-10-27 ENCOUNTER — TELEPHONE (OUTPATIENT)
Dept: CARDIOLOGY | Facility: CLINIC | Age: 57
End: 2022-10-27

## 2022-10-27 RX ORDER — LABETALOL 100 MG/1
100 TABLET, FILM COATED ORAL 2 TIMES DAILY
Qty: 60 TABLET | Refills: 5 | Status: SHIPPED | OUTPATIENT
Start: 2022-10-27

## 2022-10-27 NOTE — TELEPHONE ENCOUNTER
Contacted patient, BP is running low, advised to stop norvasc and decrease labetalol 1/2 tablet daily. He was understanding.

## 2022-10-31 ENCOUNTER — TELEPHONE (OUTPATIENT)
Dept: PODIATRY | Facility: CLINIC | Age: 57
End: 2022-10-31

## 2022-10-31 ENCOUNTER — OFFICE VISIT (OUTPATIENT)
Dept: PODIATRY | Facility: CLINIC | Age: 57
End: 2022-10-31

## 2022-10-31 VITALS — HEART RATE: 86 BPM | OXYGEN SATURATION: 97 % | WEIGHT: 247 LBS | HEIGHT: 74 IN | BODY MASS INDEX: 31.7 KG/M2

## 2022-10-31 DIAGNOSIS — S86.002A ACHILLES TENDON INJURY, LEFT, INITIAL ENCOUNTER: Primary | ICD-10-CM

## 2022-10-31 PROCEDURE — 99213 OFFICE O/P EST LOW 20 MIN: CPT | Performed by: PODIATRIST

## 2022-10-31 NOTE — TELEPHONE ENCOUNTER
PT WIFE  REQUESTS A CALL BACK RE WANTS TO KNOW WHY THE PT CANT GET PAIN PILLS. CALL BACK # [phone].  THANK YOU.

## 2022-10-31 NOTE — TELEPHONE ENCOUNTER
Spoke with patient and let him know that he would not be getting pain medication from Dr. Gerardo for his diagnosis.

## 2022-11-01 ENCOUNTER — TELEPHONE (OUTPATIENT)
Dept: PODIATRY | Facility: CLINIC | Age: 57
End: 2022-11-01

## 2022-11-01 NOTE — TELEPHONE ENCOUNTER
PATIENT CALLED AND NEEDS A MORE DETAILED LETTER FOR HIS SHORT TERM DIABILITY.  NEEDS YOU TO CONTACT HIM ASAP ABOUT THIS BECAUSE HE CANT GET PAID UNTIL HE GET THIS AND HE NEEDS TO MAKE A HOUSE PAYMENT.  TOLD HIM NURSES WAS STILL SEEING PATIENT BUT THEY ANSWER MESSAGE AT THE END OF THE MORNING OFFICE AND THE END OF THE AFTERNOON OFFICE.

## 2022-11-02 ENCOUNTER — TELEPHONE (OUTPATIENT)
Dept: PODIATRY | Facility: CLINIC | Age: 57
End: 2022-11-02

## 2022-11-02 NOTE — TELEPHONE ENCOUNTER
PT WIFE REQUESTS A CALL BACK RE SAYS 2 OF PT TOES ARE TURNING BLACK DUE TO IT SEEMS HIS CAST IS TOO TIGHT.  CALL BACK #  [phone].  THANK YOU.

## 2022-11-10 ENCOUNTER — SPECIALTY PHARMACY (OUTPATIENT)
Dept: ENDOCRINOLOGY | Facility: CLINIC | Age: 57
End: 2022-11-10

## 2022-11-10 NOTE — PROGRESS NOTES
Specialty Pharmacy Refill Coordination Note     Herbert is a 57 y.o. male contacted today regarding refills of  Synjardy and eliquis specialty medication(s).    Reviewed and verified with patient:  Allergies       Specialty medication(s) and dose(s) confirmed: yes    Refill Questions    Flowsheet Row Most Recent Value   Changes to allergies? No   Changes to medications? No   New conditions since last clinic visit No   Unplanned office visit, urgent care, ED, or hospital admission in the last 4 weeks  No   How does patient/caregiver feel medication is working? Very good   Financial problems or insurance changes  No   Since the previous refill, were any specialty medication doses or scheduled injections missed or delayed?  No   Does this patient require a clinical escalation to a pharmacist? No          Delivery Questions    Flowsheet Row Most Recent Value   Delivery method [employer]   Delivery address correct? No   Number of medications in delivery 2   Medication being filled and delivered synjardy and eliquis   Doses left of specialty medications 4   Is there any medication that is due not being filled? No   Supplies needed? No supplies needed   Cooler needed? No   Additional comments 0.00   Questions or concerns for the pharmacist? No   Are any medications first time fills? No        Pt doesn't have the money for the sensor's and only needs the synjardy and eliquis at this time.  I had to call the emergency contact to get his to answer the phone.            Follow-up: 1 month.      Efe Soto  Specialty Pharmacy Technician

## 2022-11-15 ENCOUNTER — OFFICE VISIT (OUTPATIENT)
Dept: PODIATRY | Facility: CLINIC | Age: 57
End: 2022-11-15

## 2022-11-15 ENCOUNTER — SPECIALTY PHARMACY (OUTPATIENT)
Dept: ENDOCRINOLOGY | Facility: CLINIC | Age: 57
End: 2022-11-15

## 2022-11-15 VITALS
OXYGEN SATURATION: 98 % | HEIGHT: 74 IN | SYSTOLIC BLOOD PRESSURE: 140 MMHG | HEART RATE: 90 BPM | BODY MASS INDEX: 31.7 KG/M2 | WEIGHT: 247 LBS | DIASTOLIC BLOOD PRESSURE: 84 MMHG

## 2022-11-15 DIAGNOSIS — S86.002D ACHILLES TENDON INJURY, LEFT, SUBSEQUENT ENCOUNTER: Primary | ICD-10-CM

## 2022-11-15 PROCEDURE — 99213 OFFICE O/P EST LOW 20 MIN: CPT | Performed by: PODIATRIST

## 2022-11-15 NOTE — PROGRESS NOTES
Herbert White Sr.  1965  57 y.o. male  PCP: Artemio Sanchez 9/13  BS: 152 per patient       Chief Complaint   Patient presents with   • Left Foot - Follow-up, Injury       History of Present Illness    Herbert White Sr. is a 57 y.o.male came to clinic for a follow up appointment on left achilles injury which happen in September 13 th. Patient states his pain is 7/10. Patient is wearing a tall cam boot and has a scooter.     Past Medical History:   Diagnosis Date   • Coronary artery disease    • COVID-19 virus detected 01/21/2022   • Diabetes mellitus (HCC)    • Diabetic retinopathy (HCC)    • GERD (gastroesophageal reflux disease)    • Hyperlipidemia    • Hypertension    • Osteoarthritis    • Paroxysmal atrial fibrillation (HCC)    • Sleep apnea    • Stroke (HCC) 02/2022         Past Surgical History:   Procedure Laterality Date   • CARDIAC CATHETERIZATION N/A 9/3/2019   • CARDIAC CATHETERIZATION N/A 2/14/2022   • CARDIAC CATHETERIZATION N/A 8/29/2022    Procedure: Left Heart Cath;  Surgeon: Luis Alfredo Ortiz MD;  Location: [facility] INVASIVE LOCATION;  Service: Cardiology;  Laterality: N/A;   • CARDIAC CATHETERIZATION N/A 9/3/2022    Procedure: Left Heart Cath;  Surgeon: Aniket Gong MD;  Location: [facility] CATH INVASIVE LOCATION;  Service: Cardiology;  Laterality: N/A;   • CHOLECYSTECTOMY     • COLONOSCOPY N/A 5/24/2021   • COLONOSCOPY N/A 1/5/2022   • CORONARY ARTERY BYPASS GRAFT N/A 9/25/2019         Family History   Problem Relation Age of Onset   • Diabetes Mother    • Hypertension Father        Allergies   Allergen Reactions   • Ace Inhibitors Anaphylaxis   • Wellbutrin [Bupropion] Anaphylaxis       Social History     Socioeconomic History   • Marital status:    Tobacco Use   • Smoking status: Every Day     Packs/day: 1.00     Years: 35.00     Pack years: 35.00     Types: Cigarettes     Start date: 8/27/1980   • Smokeless tobacco: Never   Vaping Use   • Vaping Use: Never used    Substance and Sexual Activity   • Alcohol use: No   • Drug use: No   • Sexual activity: Not Currently     Comment: .         Current Outpatient Medications   Medication Sig Dispense Refill   • Accu-Chek Guide test strip USE 1 TO CHECK GLUCOSE 4 TIMES DAILY     • acetaminophen (TYLENOL) 325 MG tablet Take 2 tablets by mouth Every 6 (Six) Hours As Needed for Mild Pain. 100 tablet 11   • amiodarone (PACERONE) 200 MG tablet Take 1 tablet by mouth Daily. 60 tablet 1   • amLODIPine (NORVASC) 5 MG tablet Take 1 tablet by mouth Daily. 30 tablet 5   • apixaban (ELIQUIS) 5 MG tablet tablet Take 1 tablet by mouth Every 12 (Twelve) Hours. 60 tablet 2   • atorvastatin (LIPITOR) 40 MG tablet Take 1 tablet by mouth Every Night for 360 days. 90 tablet 3   • celecoxib (CeleBREX) 200 MG capsule Take 1 capsule by mouth Daily. REPLACES MOBIC FOR ARTHRITIS/JOINT PAIN BUT ONLY FOR 30 DAYS. 30 capsule 0   • clopidogrel (PLAVIX) 75 MG tablet Take 1 tablet by mouth Daily. 90 tablet 3   • Continuous Blood Gluc  (FreeStyle Steffany 2 Bellflower) device 1 each Continuous. Use as indicated for glucose monitoring 1 each 1   • Continuous Blood Gluc Sensor (FreeStyle Steffany 2 Sensor) misc 1 each Every 14 (Fourteen) Days. Use every 14 days 2 each 11   • Continuous Blood Gluc Sensor (FreeStyle Steffany 2 Sensor) misc Use as directed for continuous glucose monitoring **Change sensor Every 14 (Fourteen) Days** 2 each 11   • Empagliflozin-metFORMIN HCl ER (Synjardy XR) 5-1000 MG tablet sustained-release 24 hour Take 2 tablets by mouth Daily. 60 tablet 11   • gabapentin (Neurontin) 300 MG capsule Take 3 capsules by mouth 3 (Three) Times a Day. 270 capsule 5   • Glucagon (Baqsimi Two Pack) 3 MG/DOSE powder Administer 1 spray into a single nostril as directed by provider As Needed for low blood sugar 2 each 11   • Insulin Aspart, w/Niacinamide, (Fiasp FlexTouch) 100 UNIT/ML solution pen-injector Inject up to 20 Units under the skin into the  "appropriate area as directed 3 (Three) Times a Day Before Meals. 18 mL 11   • Insulin Degludec (Tresiba FlexTouch) 200 UNIT/ML solution pen-injector pen injection Inject 60 Units under the skin into the appropriate area as directed Every Night. 9 mL 11   • Insulin Pen Needle (Pen Needles 1/2\") 29G X 12MM misc 1 each Every Night. 100 each 3   • Insulin Regular Human 60x4 &60x8 & 60x12 UNIT powder Inhale 4-36 Units 3 (Three) Times a Day Before Meals. 360 each 11   • isosorbide mononitrate (IMDUR) 30 MG 24 hr tablet Take 1 tablet by mouth Daily. 90 tablet 3   • labetalol (NORMODYNE) 100 MG tablet Take 1 tablet by mouth 2 (Two) Times a Day. 60 tablet 5   • Lancet Devices (ONE TOUCH DELICA LANCING DEV) misc Use as directed to test blood sugar. (Patient taking differently: Use as directed to test blood sugar.) 1 each 11   • linaclotide (Linzess) 145 MCG capsule capsule Take 1 capsule by mouth Every Morning Before Breakfast. 90 capsule 1   • nitroglycerin (NITROSTAT) 0.4 MG SL tablet Place 1 tablet under the tongue Every 5 (Five) Minutes As Needed for Chest Pain. Take no more than 3 doses in 15 minutes. 25 tablet 11   • nortriptyline (PAMELOR) 10 MG capsule Take 1-3 capsules by mouth Every Night. 90 capsule 11   • ondansetron (ZOFRAN) 4 MG tablet Take 1 tablet by mouth Every 8 (Eight) Hours As Needed for Nausea or Vomiting. 10 tablet 0   • ONETOUCH DELICA LANCETS 33G misc 1 each 4 (Four) Times a Day. delica if covered, use alternative if not covered 120 each 11   • pioglitazone (ACTOS) 30 MG tablet Take 1 tablet by mouth once daily 90 tablet 0   • pregabalin (Lyrica) 100 MG capsule Take 1 capsule by mouth 3 (Three) Times a Day. 90 capsule 5   • ranolazine (RANEXA) 1000 MG 12 hr tablet Take 1 tablet by mouth Every 12 (Twelve) Hours. 60 tablet 3   • Semaglutide, 1 MG/DOSE, (Ozempic, 1 MG/DOSE,) 4 MG/3ML solution pen-injector Inject 1 mg under the skin into the appropriate area as directed 1 (One) Time Per Week. 9 mL 3   • " "spironolactone (ALDACTONE) 25 MG tablet Take 1 tablet by mouth once daily 30 tablet 0   • tiZANidine (Zanaflex) 4 MG tablet Take 1 tablet by mouth At Night As Needed for Muscle Spasms. 90 tablet 3   • traMADol (ULTRAM) 50 MG tablet Take 1 tablet by mouth Every 8 (Eight) Hours As Needed for Moderate Pain. 30 tablet 0     No current facility-administered medications for this visit.       Review of Systems   Constitutional: Positive for chills and fatigue.   Eyes: Positive for visual disturbance.   Respiratory: Negative.    Cardiovascular: Negative.         AFIB   Gastrointestinal: Negative.    Endocrine: Negative.    Genitourinary: Negative.    Musculoskeletal: Positive for back pain and joint swelling.   Allergic/Immunologic: Negative.    Neurological: Positive for dizziness.   Hematological: Negative.    Psychiatric/Behavioral: Negative.          OBJECTIVE    /84   Pulse 90   Ht 188 cm (74\")   Wt 112 kg (247 lb)   SpO2 98%   BMI 31.71 kg/m²     Physical Exam  Vitals reviewed.   Constitutional:       General: He is not in acute distress.     Appearance: He is well-developed.   HENT:      Head: Normocephalic and atraumatic.      Nose: Nose normal.   Eyes:      Conjunctiva/sclera: Conjunctivae normal.      Pupils: Pupils are equal, round, and reactive to light.   Cardiovascular:      Pulses:           Dorsalis pedis pulses are 2+ on the left side.        Posterior tibial pulses are 2+ on the left side.   Pulmonary:      Effort: Pulmonary effort is normal. No respiratory distress.      Breath sounds: No wheezing.   Musculoskeletal:         General: Tenderness present. No deformity. Normal range of motion.        Feet:    Feet:      Left foot:      Skin integrity: Skin integrity normal.   Skin:     General: Skin is warm and dry.      Capillary Refill: Capillary refill takes less than 2 seconds.   Neurological:      Mental Status: He is alert and oriented to person, place, and time.   Psychiatric:         " Behavior: Behavior normal.         Thought Content: Thought content normal.                  Procedures        Diagnoses and all orders for this visit:    1. Achilles tendon injury, left, subsequent encounter (Primary)  -     Ambulatory Referral to Physical Therapy Evaluate and treat        -Minimal tenderness to the Achilles tendon.  Decreased muscle strength.  Referral to physical therapy.  Discontinue using cam boot.  - All questions were answered   - RTC 6 weeks            This document has been electronically signed by Erick Gerardo DPM on November 16, 2022 13:26 CST     11/16/2022  13:26 CST

## 2022-11-15 NOTE — PROGRESS NOTES
Specialty Pharmacy Refill Coordination Note     Herbert is a 57 y.o. male contacted today regarding refills of  Fiasp,freestyle,ozempic,pregabalin, tresiba, specialty medication(s).    Reviewed and verified with patient:       Specialty medication(s) and dose(s) confirmed: yes    Refill Questions    Flowsheet Row Most Recent Value   Changes to allergies? No   Changes to medications? No   New conditions since last clinic visit No   Unplanned office visit, urgent care, ED, or hospital admission in the last 4 weeks  No   How does patient/caregiver feel medication is working? Good   Financial problems or insurance changes  No   Since the previous refill, were any specialty medication doses or scheduled injections missed or delayed?  No   Does this patient require a clinical escalation to a pharmacist? No          Delivery Questions    Flowsheet Row Most Recent Value   Delivery address correct? Yes   Delivery phone number [phone]   Number of medications in delivery 5   Medication being filled and delivered fiasp, freestyle,ozempic,lyrica, tresiba   Is there any medication that is due not being filled? No   Supplies needed? No supplies needed   Cooler needed? Yes   Do any medications need mixed or dated? No   Copay form of payment Credit card on file   Questions or concerns for the pharmacist? No   Are any medications first time fills? No                 Follow-up: 30 day(s)     Dana Desai, Pharmacy Technician  Specialty Pharmacy Technician

## 2022-11-22 ENCOUNTER — TELEPHONE (OUTPATIENT)
Dept: CARDIOLOGY | Facility: CLINIC | Age: 57
End: 2022-11-22

## 2022-11-22 NOTE — TELEPHONE ENCOUNTER
Contacted patient per Dr. Rice. Informed him to stop amlodipine and cut labetol in 1/2 BID. HE voiced his understanding.

## 2022-11-22 NOTE — TELEPHONE ENCOUNTER
----- Message from Dominique Kaiser sent at 11/22/2022 10:39 AM CST -----  Contact: [phone]  Bp dropped to 100/55 yesterday and he thinks that is too low. Wants to talk to someone

## 2022-12-05 ENCOUNTER — SPECIALTY PHARMACY (OUTPATIENT)
Dept: ENDOCRINOLOGY | Facility: CLINIC | Age: 57
End: 2022-12-05

## 2022-12-05 NOTE — PROGRESS NOTES
Specialty Pharmacy Refill Coordination Note     Herbert is a 57 y.o. male contacted today regarding refills of  Eliquis, amiodarone, baqsimi, fiasp, ozempic and synjardy specialty medication(s).    Reviewed and verified with patient:       Specialty medication(s) and dose(s) confirmed: yes    Refill Questions    Flowsheet Row Most Recent Value   Changes to allergies? No   Changes to medications? No   New conditions since last clinic visit No   Unplanned office visit, urgent care, ED, or hospital admission in the last 4 weeks  No   How does patient/caregiver feel medication is working? Good   Financial problems or insurance changes  No   Since the previous refill, were any specialty medication doses or scheduled injections missed or delayed?  No   Does this patient require a clinical escalation to a pharmacist? No          Delivery Questions    Flowsheet Row Most Recent Value   Delivery method [employer]   Delivery address correct? Yes   Questions or concerns for the pharmacist? No   Are any medications first time fills? No                 Follow-up: 1 month.      Efe Soto  Specialty Pharmacy Technician

## 2022-12-15 ENCOUNTER — SPECIALTY PHARMACY (OUTPATIENT)
Dept: ENDOCRINOLOGY | Facility: CLINIC | Age: 57
End: 2022-12-15

## 2022-12-15 NOTE — PROGRESS NOTES
Specialty Pharmacy Refill Coordination Note     Herbert is a 57 y.o. male contacted today regarding refills of  lyrica and tresiba specialty medication(s).    Specialty medication(s) and dose(s) confirmed: yes    Refill Questions    Flowsheet Row Most Recent Value   Changes to allergies? No   Changes to medications? No   New conditions since last clinic visit No   Unplanned office visit, urgent care, ED, or hospital admission in the last 4 weeks  No   How does patient/caregiver feel medication is working? Good   Financial problems or insurance changes  No   Since the previous refill, were any specialty medication doses or scheduled injections missed or delayed?  No   Does this patient require a clinical escalation to a pharmacist? No          Delivery Questions    Flowsheet Row Most Recent Value   Delivery method [employer]Ex   Delivery address correct? Yes   Delivery phone number [phone]   Number of medications in delivery 2   Medication being filled and delivered lyrica and tresiba   Is there any medication that is due not being filled? No   Supplies needed? No supplies needed   Cooler needed? Yes   Do any medications need mixed or dated? No   Questions or concerns for the pharmacist? No   Are any medications first time fills? No        No issues to report, trying to mail out today, or get Saturday delivery.          Follow-up: 1 month.      Efe Soto  Specialty Pharmacy Technician

## 2023-03-29 ENCOUNTER — SPECIALTY PHARMACY (OUTPATIENT)
Dept: ENDOCRINOLOGY | Facility: CLINIC | Age: 58
End: 2023-03-29
Payer: COMMERCIAL

## 2023-04-01 ENCOUNTER — APPOINTMENT (OUTPATIENT)
Dept: CT IMAGING | Facility: HOSPITAL | Age: 58
End: 2023-04-01
Payer: COMMERCIAL

## 2023-04-01 ENCOUNTER — APPOINTMENT (OUTPATIENT)
Dept: GENERAL RADIOLOGY | Facility: HOSPITAL | Age: 58
End: 2023-04-01
Payer: COMMERCIAL

## 2023-04-01 ENCOUNTER — HOSPITAL ENCOUNTER (EMERGENCY)
Facility: HOSPITAL | Age: 58
Discharge: HOME | End: 2023-04-01
Attending: EMERGENCY MEDICINE | Admitting: EMERGENCY MEDICINE
Payer: COMMERCIAL

## 2023-04-01 VITALS
TEMPERATURE: 98.5 F | HEART RATE: 62 BPM | BODY MASS INDEX: 32.08 KG/M2 | OXYGEN SATURATION: 98 % | HEIGHT: 74 IN | SYSTOLIC BLOOD PRESSURE: 110 MMHG | WEIGHT: 250 LBS | RESPIRATION RATE: 18 BRPM | DIASTOLIC BLOOD PRESSURE: 65 MMHG

## 2023-04-01 DIAGNOSIS — R42 POSTURAL DIZZINESS: Primary | ICD-10-CM

## 2023-04-01 DIAGNOSIS — S30.0XXA CONTUSION OF SACRUM, INITIAL ENCOUNTER: ICD-10-CM

## 2023-04-01 LAB
ALBUMIN SERPL-MCNC: 3.9 G/DL (ref 3.5–5.2)
ALBUMIN/GLOB SERPL: 1.5 G/DL
ALP SERPL-CCNC: 72 U/L (ref 39–117)
ALT SERPL W P-5'-P-CCNC: 15 U/L (ref 1–41)
ANION GAP SERPL CALCULATED.3IONS-SCNC: 10 MMOL/L (ref 5–15)
AST SERPL-CCNC: 16 U/L (ref 1–40)
BASOPHILS # BLD AUTO: 0.04 10*3/MM3 (ref 0–0.2)
BASOPHILS NFR BLD AUTO: 0.5 % (ref 0–1.5)
BILIRUB SERPL-MCNC: 0.6 MG/DL (ref 0–1.2)
BILIRUB UR QL STRIP: NEGATIVE
BUN SERPL-MCNC: 15 MG/DL (ref 6–20)
BUN/CREAT SERPL: 16.1 (ref 7–25)
CALCIUM SPEC-SCNC: 8.9 MG/DL (ref 8.6–10.5)
CHLORIDE SERPL-SCNC: 105 MMOL/L (ref 98–107)
CLARITY UR: CLEAR
CO2 SERPL-SCNC: 26 MMOL/L (ref 22–29)
COLOR UR: YELLOW
CREAT SERPL-MCNC: 0.93 MG/DL (ref 0.76–1.27)
DEPRECATED RDW RBC AUTO: 39.5 FL (ref 37–54)
EGFRCR SERPLBLD CKD-EPI 2021: 95.2 ML/MIN/1.73
EOSINOPHIL # BLD AUTO: 0.22 10*3/MM3 (ref 0–0.4)
EOSINOPHIL NFR BLD AUTO: 2.9 % (ref 0.3–6.2)
ERYTHROCYTE [DISTWIDTH] IN BLOOD BY AUTOMATED COUNT: 13 % (ref 12.3–15.4)
GLOBULIN UR ELPH-MCNC: 2.6 GM/DL
GLUCOSE SERPL-MCNC: 171 MG/DL (ref 65–99)
GLUCOSE UR STRIP-MCNC: ABNORMAL MG/DL
HCT VFR BLD AUTO: 49.5 % (ref 37.5–51)
HGB BLD-MCNC: 17 G/DL (ref 13–17.7)
HGB UR QL STRIP.AUTO: NEGATIVE
HOLD SPECIMEN: NORMAL
IMM GRANULOCYTES # BLD AUTO: 0.02 10*3/MM3 (ref 0–0.05)
IMM GRANULOCYTES NFR BLD AUTO: 0.3 % (ref 0–0.5)
KETONES UR QL STRIP: NEGATIVE
LEUKOCYTE ESTERASE UR QL STRIP.AUTO: NEGATIVE
LYMPHOCYTES # BLD AUTO: 2.18 10*3/MM3 (ref 0.7–3.1)
LYMPHOCYTES NFR BLD AUTO: 28.8 % (ref 19.6–45.3)
MAGNESIUM SERPL-MCNC: 2.2 MG/DL (ref 1.6–2.6)
MCH RBC QN AUTO: 28.7 PG (ref 26.6–33)
MCHC RBC AUTO-ENTMCNC: 34.3 G/DL (ref 31.5–35.7)
MCV RBC AUTO: 83.6 FL (ref 79–97)
MONOCYTES # BLD AUTO: 0.56 10*3/MM3 (ref 0.1–0.9)
MONOCYTES NFR BLD AUTO: 7.4 % (ref 5–12)
NEUTROPHILS NFR BLD AUTO: 4.55 10*3/MM3 (ref 1.7–7)
NEUTROPHILS NFR BLD AUTO: 60.1 % (ref 42.7–76)
NITRITE UR QL STRIP: NEGATIVE
NRBC BLD AUTO-RTO: 0 /100 WBC (ref 0–0.2)
PH UR STRIP.AUTO: 6 [PH] (ref 5–8)
PLATELET # BLD AUTO: 217 10*3/MM3 (ref 140–450)
PMV BLD AUTO: 10.7 FL (ref 6–12)
POTASSIUM SERPL-SCNC: 4.1 MMOL/L (ref 3.5–5.2)
PROT SERPL-MCNC: 6.5 G/DL (ref 6–8.5)
PROT UR QL STRIP: NEGATIVE
RBC # BLD AUTO: 5.92 10*6/MM3 (ref 4.14–5.8)
SODIUM SERPL-SCNC: 141 MMOL/L (ref 136–145)
SP GR UR STRIP: 1.02 (ref 1–1.03)
TROPONIN T SERPL HS-MCNC: 13 NG/L
UROBILINOGEN UR QL STRIP: ABNORMAL
WBC NRBC COR # BLD: 7.57 10*3/MM3 (ref 3.4–10.8)
WHOLE BLOOD HOLD COAG: NORMAL
WHOLE BLOOD HOLD SPECIMEN: NORMAL

## 2023-04-01 PROCEDURE — 70450 CT HEAD/BRAIN W/O DYE: CPT

## 2023-04-01 PROCEDURE — 84484 ASSAY OF TROPONIN QUANT: CPT

## 2023-04-01 PROCEDURE — 72220 X-RAY EXAM SACRUM TAILBONE: CPT

## 2023-04-01 PROCEDURE — 83735 ASSAY OF MAGNESIUM: CPT

## 2023-04-01 PROCEDURE — 93005 ELECTROCARDIOGRAM TRACING: CPT

## 2023-04-01 PROCEDURE — 36415 COLL VENOUS BLD VENIPUNCTURE: CPT

## 2023-04-01 PROCEDURE — 80053 COMPREHEN METABOLIC PANEL: CPT

## 2023-04-01 PROCEDURE — 81003 URINALYSIS AUTO W/O SCOPE: CPT | Performed by: EMERGENCY MEDICINE

## 2023-04-01 PROCEDURE — 99284 EMERGENCY DEPT VISIT MOD MDM: CPT

## 2023-04-01 PROCEDURE — 85025 COMPLETE CBC W/AUTO DIFF WBC: CPT

## 2023-04-01 PROCEDURE — 71045 X-RAY EXAM CHEST 1 VIEW: CPT

## 2023-04-01 PROCEDURE — 93005 ELECTROCARDIOGRAM TRACING: CPT | Performed by: EMERGENCY MEDICINE

## 2023-04-01 RX ORDER — SODIUM CHLORIDE 0.9 % (FLUSH) 0.9 %
10 SYRINGE (ML) INJECTION AS NEEDED
Status: DISCONTINUED | OUTPATIENT
Start: 2023-04-01 | End: 2023-04-01 | Stop reason: HOSPADM

## 2023-04-01 NOTE — ED PROVIDER NOTES
Subjective   History of Present Illness    Pt presents with two falls today.  First time he was standing up from the toilet and became lightheaded and fell backward.  Second time he bent over to pick something up and when he stood back up he became lightheaded and fell again.  He injured his tailbone, denies other injury.  He has been having similar spells for about a month, always with position change, but today is the first time he has fallen.     He denies any change in medications recently.  He denies fever, cough, vomiting, diarrhea, chest pain, dyspnea or other symptoms of acute illness.      PMH HTN, DM, HL, PAF, CAD, GERD    History provided by:  Patient and spouse      Review of Systems   Constitutional: Negative for fever.   Respiratory: Negative for cough and shortness of breath.    Cardiovascular: Negative for chest pain.   Gastrointestinal: Negative for diarrhea and vomiting.   Neurological: Positive for light-headedness.   All other systems reviewed and are negative.      Past Medical History:   Diagnosis Date   • Coronary artery disease    • COVID-19 virus detected 01/21/2022   • Diabetes mellitus    • Diabetic retinopathy    • GERD (gastroesophageal reflux disease)    • Hyperlipidemia    • Hypertension    • Osteoarthritis    • Paroxysmal atrial fibrillation    • Sleep apnea    • Stroke 02/2022       Allergies   Allergen Reactions   • Ace Inhibitors Anaphylaxis   • Wellbutrin [Bupropion] Anaphylaxis       Past Surgical History:   Procedure Laterality Date   • CARDIAC CATHETERIZATION N/A 9/3/2019   • CARDIAC CATHETERIZATION N/A 2/14/2022   • CARDIAC CATHETERIZATION N/A 8/29/2022    Procedure: Left Heart Cath;  Surgeon: Luis Alfredo Ortiz MD;  Location: [facility] INVASIVE LOCATION;  Service: Cardiology;  Laterality: N/A;   • CARDIAC CATHETERIZATION N/A 9/3/2022    Procedure: Left Heart Cath;  Surgeon: Aniket Gong MD;  Location: [facility] INVASIVE LOCATION;  Service: Cardiology;  Laterality:  N/A;   • CHOLECYSTECTOMY     • COLONOSCOPY N/A 5/24/2021   • COLONOSCOPY N/A 1/5/2022   • CORONARY ARTERY BYPASS GRAFT N/A 9/25/2019       Family History   Problem Relation Age of Onset   • Diabetes Mother    • Hypertension Father        Social History     Socioeconomic History   • Marital status:    Tobacco Use   • Smoking status: Every Day     Packs/day: 1.00     Years: 35.00     Pack years: 35.00     Types: Cigarettes     Start date: 8/27/1980   • Smokeless tobacco: Never   Vaping Use   • Vaping Use: Never used   Substance and Sexual Activity   • Alcohol use: No   • Drug use: No   • Sexual activity: Not Currently     Comment: .           Objective   Physical Exam  Vitals and nursing note reviewed.   Constitutional:       General: He is not in acute distress.     Appearance: Normal appearance. He is not ill-appearing.   HENT:      Head: Normocephalic and atraumatic.      Mouth/Throat:      Mouth: Mucous membranes are moist.   Eyes:      General: No scleral icterus.        Right eye: No discharge.         Left eye: No discharge.      Conjunctiva/sclera: Conjunctivae normal.   Cardiovascular:      Rate and Rhythm: Normal rate and regular rhythm.      Heart sounds: No murmur heard.  Pulmonary:      Effort: Pulmonary effort is normal. No respiratory distress.      Breath sounds: Normal breath sounds. No wheezing.   Abdominal:      General: Bowel sounds are normal. There is no distension.      Palpations: Abdomen is soft.      Tenderness: There is no abdominal tenderness. There is no guarding or rebound.   Musculoskeletal:         General: No swelling. Normal range of motion.      Cervical back: Normal range of motion and neck supple.   Skin:     General: Skin is warm and dry.      Findings: No rash.   Neurological:      General: No focal deficit present.      Mental Status: He is alert and oriented to person, place, and time. Mental status is at baseline.   Psychiatric:         Mood and Affect: Mood  normal.         Behavior: Behavior normal.         Thought Content: Thought content normal.         Procedures           ED Course         EKG NSR.  CXR and sacral XR negative.  CT head from triage negative.  Labs benign.  UA negative.  Orthostatics borderline.  Pt counseled to increase salt and fluid intake and followup with PCP.     Patient stable on serial rechecks.  Discussed findings, concerns, plan of care, expected course, reasons to return and followup.  Provided the opportunity to ask questions.                                    Medical Decision Making  Contusion of sacrum, initial encounter: acute illness or injury  Postural dizziness: acute illness or injury  Amount and/or Complexity of Data Reviewed  Labs: ordered. Decision-making details documented in ED Course.  Radiology: ordered. Decision-making details documented in ED Course.  ECG/medicine tests: ordered and independent interpretation performed. Decision-making details documented in ED Course.          Final diagnoses:   Postural dizziness   Contusion of sacrum, initial encounter       ED Disposition  ED Disposition     ED Disposition   Discharge    Condition   Stable    Comment   --             Cece Bedoya,   [address] [place] [address]  [phone]    In 1 week           Medication List      No changes were made to your prescriptions during this visit.          Emmett Vanegas MD  04/02/23 0210

## 2023-04-02 LAB
QT INTERVAL: 430 MS
QTC INTERVAL: 440 MS

## 2023-08-01 ENCOUNTER — OFFICE VISIT (OUTPATIENT)
Dept: CARDIOLOGY | Facility: CLINIC | Age: 58
End: 2023-08-01
Payer: COMMERCIAL

## 2023-08-01 VITALS
WEIGHT: 245 LBS | SYSTOLIC BLOOD PRESSURE: 140 MMHG | BODY MASS INDEX: 31.44 KG/M2 | HEIGHT: 74 IN | OXYGEN SATURATION: 98 % | DIASTOLIC BLOOD PRESSURE: 74 MMHG | HEART RATE: 75 BPM

## 2023-08-01 DIAGNOSIS — Z95.1 S/P CABG (CORONARY ARTERY BYPASS GRAFT): ICD-10-CM

## 2023-08-01 DIAGNOSIS — I48.0 PAROXYSMAL ATRIAL FIBRILLATION: ICD-10-CM

## 2023-08-01 DIAGNOSIS — I10 ESSENTIAL HYPERTENSION: ICD-10-CM

## 2023-08-01 DIAGNOSIS — I25.10 CORONARY ARTERY DISEASE INVOLVING NATIVE CORONARY ARTERY OF NATIVE HEART WITHOUT ANGINA PECTORIS: Primary | Chronic | ICD-10-CM

## 2023-08-01 PROCEDURE — 99214 OFFICE O/P EST MOD 30 MIN: CPT | Performed by: INTERNAL MEDICINE

## 2023-08-02 ENCOUNTER — TELEPHONE (OUTPATIENT)
Dept: CARDIOLOGY | Facility: CLINIC | Age: 58
End: 2023-08-02
Payer: COMMERCIAL

## 2023-08-10 DIAGNOSIS — I65.23 CAROTID STENOSIS, ASYMPTOMATIC, BILATERAL: Primary | ICD-10-CM

## 2023-08-17 ENCOUNTER — HOSPITAL ENCOUNTER (OUTPATIENT)
Dept: CARDIOLOGY | Facility: HOSPITAL | Age: 58
Discharge: HOME | End: 2023-08-17
Payer: COMMERCIAL

## 2023-08-17 ENCOUNTER — HOSPITAL ENCOUNTER (OUTPATIENT)
Dept: NUCLEAR MEDICINE | Facility: HOSPITAL | Age: 58
Discharge: HOME | End: 2023-08-17
Payer: COMMERCIAL

## 2023-08-17 DIAGNOSIS — I25.10 CORONARY ARTERY DISEASE INVOLVING NATIVE CORONARY ARTERY OF NATIVE HEART WITHOUT ANGINA PECTORIS: Chronic | ICD-10-CM

## 2023-08-17 DIAGNOSIS — I10 ESSENTIAL HYPERTENSION: ICD-10-CM

## 2023-08-17 DIAGNOSIS — Z95.1 S/P CABG (CORONARY ARTERY BYPASS GRAFT): ICD-10-CM

## 2023-08-17 DIAGNOSIS — I48.0 PAROXYSMAL ATRIAL FIBRILLATION: ICD-10-CM

## 2023-08-17 LAB
BH CV REST NUCLEAR ISOTOPE DOSE: 10.8 MCI
BH CV STRESS COMMENTS STAGE 1: NORMAL
BH CV STRESS DOSE REGADENOSON STAGE 1: 0.4
BH CV STRESS DURATION MIN STAGE 1: 0
BH CV STRESS DURATION SEC STAGE 1: 10
BH CV STRESS NUCLEAR ISOTOPE DOSE: 30.3 MCI
BH CV STRESS PROTOCOL 1: NORMAL
BH CV STRESS RECOVERY BP: NORMAL MMHG
BH CV STRESS RECOVERY HR: 90 BPM
BH CV STRESS RECOVERY O2: 98 %
BH CV STRESS STAGE 1: 1
LV EF NUC BP: 55 %
MAXIMAL PREDICTED HEART RATE: 161 BPM
PERCENT MAX PREDICTED HR: 63.35 %
STRESS BASELINE BP: NORMAL MMHG
STRESS BASELINE HR: 77 BPM
STRESS O2 SAT REST: 97 %
STRESS PERCENT HR: 75 %
STRESS POST EXERCISE DUR SEC: 25 SEC
STRESS POST O2 SAT PEAK: 96 %
STRESS POST PEAK BP: NORMAL MMHG
STRESS POST PEAK HR: 102 BPM
STRESS TARGET HR: 137 BPM

## 2023-08-17 PROCEDURE — 25010000002 REGADENOSON 0.4 MG/5ML SOLUTION: Performed by: INTERNAL MEDICINE

## 2023-08-17 PROCEDURE — 0 TECHNETIUM SESTAMIBI: Performed by: INTERNAL MEDICINE

## 2023-08-17 PROCEDURE — 93017 CV STRESS TEST TRACING ONLY: CPT

## 2023-08-17 PROCEDURE — A9500 TC99M SESTAMIBI: HCPCS | Performed by: INTERNAL MEDICINE

## 2023-08-17 PROCEDURE — 78452 HT MUSCLE IMAGE SPECT MULT: CPT

## 2023-08-17 RX ORDER — SODIUM CHLORIDE 9 MG/ML
10 INJECTION INTRAVENOUS EVERY 12 HOURS SCHEDULED
Status: DISCONTINUED | OUTPATIENT
Start: 2023-08-17 | End: 2023-08-18 | Stop reason: HOSPADM

## 2023-08-17 RX ORDER — REGADENOSON 0.08 MG/ML
0.4 INJECTION, SOLUTION INTRAVENOUS ONCE
Status: COMPLETED | OUTPATIENT
Start: 2023-08-17 | End: 2023-08-17

## 2023-08-17 RX ADMIN — SODIUM CHLORIDE 10 ML: 9 INJECTION INTRAMUSCULAR; INTRAVENOUS; SUBCUTANEOUS at 09:15

## 2023-08-17 RX ADMIN — TECHNETIUM TC 99M SESTAMIBI 1 DOSE: 1 INJECTION INTRAVENOUS at 07:30

## 2023-08-17 RX ADMIN — REGADENOSON 0.4 MG: 0.08 INJECTION, SOLUTION INTRAVENOUS at 09:15

## 2023-08-17 RX ADMIN — TECHNETIUM TC 99M SESTAMIBI 1 DOSE: 1 INJECTION INTRAVENOUS at 09:16

## 2023-08-17 NOTE — NURSING NOTE
Patient blood pressure elevated at 185/105. NELSY Fields notified. Manual blood pressure taken with result of 154/98. NELSY Fields gave okay to proceed with testing.
